# Patient Record
Sex: FEMALE | Race: WHITE | Employment: OTHER | URBAN - METROPOLITAN AREA
[De-identification: names, ages, dates, MRNs, and addresses within clinical notes are randomized per-mention and may not be internally consistent; named-entity substitution may affect disease eponyms.]

---

## 2017-06-09 ENCOUNTER — OP HISTORICAL/CONVERTED ENCOUNTER (OUTPATIENT)
Dept: OTHER | Age: 67
End: 2017-06-09

## 2017-10-19 ENCOUNTER — TELEPHONE (OUTPATIENT)
Dept: CARDIOLOGY CLINIC | Age: 67
End: 2017-10-19

## 2017-10-19 NOTE — TELEPHONE ENCOUNTER
Patient has been scheduled with Dr. Nathaniel Carrero for Tuesday November 28th at 3pm - arrival time at 2:45pm     New patient packet has been mailed

## 2017-11-27 ENCOUNTER — TELEPHONE (OUTPATIENT)
Dept: CARDIOLOGY CLINIC | Age: 67
End: 2017-11-27

## 2017-11-27 NOTE — TELEPHONE ENCOUNTER
Telephone Call RE:  Appointment reminder     Outcome:     [] Patient confirmed appointment   [] Patient rescheduled appointment for    [] Unable to reach   [x] Left message              [] Other:       Audrey Dumont

## 2017-11-28 ENCOUNTER — OFFICE VISIT (OUTPATIENT)
Dept: CARDIOLOGY CLINIC | Age: 67
End: 2017-11-28

## 2017-11-28 VITALS
DIASTOLIC BLOOD PRESSURE: 78 MMHG | TEMPERATURE: 97.9 F | WEIGHT: 197.4 LBS | BODY MASS INDEX: 34.98 KG/M2 | HEART RATE: 83 BPM | SYSTOLIC BLOOD PRESSURE: 112 MMHG | RESPIRATION RATE: 20 BRPM | OXYGEN SATURATION: 98 % | HEIGHT: 63 IN

## 2017-11-28 DIAGNOSIS — I42.0 DILATED CARDIOMYOPATHY (HCC): ICD-10-CM

## 2017-11-28 DIAGNOSIS — I10 HYPERTENSION, UNSPECIFIED TYPE: Primary | ICD-10-CM

## 2017-11-28 RX ORDER — SPIRONOLACTONE 25 MG/1
TABLET ORAL DAILY
COMMUNITY
End: 2019-02-04

## 2017-11-28 NOTE — PROGRESS NOTES
[facility] Consultation Note      DOS:   11/28/2017  NAME:  Jen Marin   MRN:   [id number]   REFERRING PROVIDER:  Harmony Ledesma MD   PRIMARY CARE PHYSICIAN: Susan Sims MD  PRIMARY CARDIOLOGIST: Harmony Ledesma MD      Chief Complaint:   Chief Complaint   Patient presents with    New Patient    Shortness of Breath    Fatigue       HPI: 69y.o. year old female with a history of DM2, HTN, hypothyroidism, NICM, VT s/p AICD  who presents for further evaluation of her chronic systolic heart failure. She notes increased GABRIEL and worsening generalized fatigue. She was recently started on entresto but is concerned about the cost. She is in the \"donut hole\" for her medication coverage and is unable to afford entresto. History:  Past Medical History:   Diagnosis Date    Acid indigestion     heartburn    Asthma     Back pain     Constipation     Diabetes mellitus     Diarrhea     Frequent episodic tension-type headache     Hemorrhoids     Hernia of unspecified site of abdominal cavity without mention of obstruction or gangrene     HTN (hypertension)     ICD (implantable cardioverter-defibrillator) in place     Muscle pain     joint pain    Skin cancer     SOB (shortness of breath)     Thyroid disorder     Wears dentures      Past Surgical History:   Procedure Laterality Date    HX CARPAL TUNNEL RELEASE      right hand    HX HEART CATHETERIZATION  2/6/16    HX HYSTERECTOMY      HX PACEMAKER Left 6/20/16    [employer] ICD    HX THYROIDECTOMY      REMOVAL GALLBLADDER       Social History     Social History    Marital status:      Spouse name: N/A    Number of children: N/A    Years of education: N/A     Occupational History    Not on file.      Social History Main Topics    Smoking status: Never Smoker    Smokeless tobacco: Never Used    Alcohol use Yes      Comment: wine occasionally    Drug use: No    Sexual activity: Not on file     Other Topics Concern    Not on file     Social History Narrative     Family History   Problem Relation Age of Onset    Diabetes Mother     Heart Disease Mother     Heart Disease Father     Cancer Sister     Diabetes Sister     Heart Disease Sister     Hypertension Sister     Diabetes Brother        Current Medications:   Current Outpatient Prescriptions   Medication Sig Dispense Refill    metFORMIN (GLUCOPHAGE) 1,000 mg tablet Take 1 Tab by mouth two (2) times a day. Resume 6/23 30 Tab 0    acetaminophen-codeine (TYLENOL #3) 300-30 mg per tablet Take 1 Tab by mouth every six (6) hours as needed for Pain. Max Daily Amount: 4 Tabs. 20 Tab 0    Insulin Needles, Disposable, (BD INSULIN PEN NEEDLE UF SHORT) 31 gauge x 5/16\" ndle USE AS DIRECTED FOUR TIMES A  Package 5    OTHER 1 Cap daily. Tumeric Curcumin 500 mg      furosemide (LASIX) 20 mg tablet Take 20 mg by mouth daily.  gabapentin (NEURONTIN) 300 mg capsule Take 300 mg by mouth two (2) times a day.  oxybutynin chloride XL (DITROPAN XL) 5 mg CR tablet Take 5 mg by mouth daily.  levothyroxine (SYNTHROID) 150 mcg tablet Take 150 mcg by mouth Daily (before breakfast).  metoprolol succinate (TOPROL-XL) 50 mg XL tablet Take 150 mg by mouth daily.  DULoxetine (CYMBALTA) 30 mg capsule Take 30 mg by mouth daily.  omeprazole (PRILOSEC) 40 mg capsule Take 40 mg by mouth daily.  isosorbide mononitrate ER (IMDUR) 30 mg tablet Take 30 mg by mouth two (2) times a day.  atorvastatin (LIPITOR) 20 mg tablet Take  by mouth daily.  nitroglycerin (NITROSTAT) 0.4 mg SL tablet by SubLINGual route every five (5) minutes as needed for Chest Pain.  HUMALOG MIX 75-25 KWIKPEN 100 unit/mL (75-25) flexpen INJECT 24 UNITS UNDER THE SKIN TWICE A DAY WITH BREAKFAST AND DINNER 15 mL 6    aspirin 81 mg tablet Take 81 mg by mouth.  potassium chloride SR (KLOR-CON 10) 10 mEq tablet Take 10 mEq by mouth daily.       fluticasone-salmeterol (ADVAIR DISKUS) 250-50 mcg/dose diskus inhaler Take 1 Puff by inhalation as needed.  glucose blood VI test strips (FREESTYLE TEST) strip 200 Each by Does Not Apply route four (4) times daily. 2 Package 6       Allergies: Allergies   Allergen Reactions    Codeine Other (comments)     Patient states she is not allergic    Novocain [Procaine] Nausea and Vomiting    Tramadol Other (comments)     Headache       ROS:    General:  positive for  - fatigue  HEENT: negative  Pulmonary: positive for - shortness of breath  Cardiac: positive for dyspnea, dyspnea on exertion  GI:  no abdominal pain, change in bowel habits, or black or bloody stools  Musculo: positive for - joint pain, joint stiffness, joint swelling and muscle pain  Neuro: no TIA or stroke symptoms  Skin:  negative  Allergies: REMINDER:DOCUMENT ALLERGIES IN ALLERGY ACTIVITY  Psych: positive for - depression    Admission Weight: Last Weight   Weight: 197 lb 6.4 oz (89.5 kg) Weight: 197 lb 6.4 oz (89.5 kg)       Physical Exam:   Vitals:    Visit Vitals    Ht 5' 3\" (1.6 m)    Wt 197 lb 6.4 oz (89.5 kg)    BMI 34.97 kg/m2       General:  alert, fatigued, cooperative  HEENT: PERRLA, EOMI and Sclera clear, anicteric  Neck:  supple, no significant adenopathy, carotids upstroke normal bilaterally, no bruits  CVP:  6 cm  ( - ) HJR  Cardiac: regular rate, regular rhythm, S1, S2 normal, S4 present and systolic murmur present  Chest: breath sounds clear and equal bilaterally  Abdomen: soft, non-tender. Bowel sounds normal. No masses, no organomegaly. Extremity: extremities normal, atraumatic, no cyanosis or edema  Neuro: Alert and oriented to person, place, and time; normal strength and tone. Normal symmetric reflexes. Normal coordination and gait  Skin:   no rashes, no ecchymoses, no petechiae    Recent Labs: No flowsheet data found.      EK/28/17  Sinus  Rhythm at 76 bpm  -Right bundle branch block with left axis -bifascicular block.    -  Nonspecific T-abnormality,  ms      Cardiac Catheterizations:   LHC in 2/16 which demonstrated nonobstructive/nonrevascularizable CAD. LV gram demonstrated an LVEF of 20%. Echocardiogram:  1/16 LVEF 25-30%  4/16 LVEF 30-35%. Impression / Plan:     1. ICM - Stage C, NYHA Class III   On metoprolol succinate, spironolactone, furosemide   Continue entresto 24/26 mg, 1 tablet twice daily - samples given, 30 day coupon given   2/16 - LVEF 20%   7/17 LVEF 20-25%   Check CMP, CBC, TFTs, SPEP, UPEP, serum free light chains, iron profile   Would benefit from CRT - refer to Dr. Timmy Stokes    2. s/p AICD   No shocks    3. CAD    Stable on ASA, BB, statin, nitrate    4. 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  Neuropathy   On neurontin and cymbalta   Recommend gluten free diet        [facility] [address] [address]  [phone]  97 hour VAD/HF Pager: [phone]

## 2017-11-28 NOTE — LETTER
11/28/2017 4:53 PM 
 
Patient:  Sharon Higgins YOB: 1950 Date of Visit: 11/28/2017 Dear Cari Zamora MD 
[address] [facility] [address] [address] VIA Facsimile: [phone] Lisa Avilez MD 
[address] [facility] [address] [facility],[address] [address] VIA Facsimile: [phone] Norris Corey MD 
[address] [facility] [address] [place] [address] VIA In Basket 
 : Thank you for referring Ms. Harish Singleton to me for evaluation/treatment. Below are the relevant portions of my assessment and plan of care. [facility] Consultation Note DOS:   11/28/2017 NAME:  Sharon Higgins MRN:   [id number] REFERRING PROVIDER:  Lisa Avilez MD  
PRIMARY CARE PHYSICIAN: Cari Zamora MD 
PRIMARY CARDIOLOGIST: Lisa Avilez MD 
 
 
Chief Complaint:  
Chief Complaint Patient presents with  New Patient  Shortness of Breath  Fatigue HPI: 69y.o. year old female with a history of DM2, HTN, hypothyroidism, NICM, VT s/p AICD  who presents for further evaluation of her chronic systolic heart failure. She notes increased GABRIEL and worsening generalized fatigue. She was recently started on entresto but is concerned about the cost. She is in the \"donut hole\" for her medication coverage and is unable to afford entresto. History: 
Past Medical History:  
Diagnosis Date  Acid indigestion   
 heartburn  Asthma  Back pain  Constipation  Diabetes mellitus  Diarrhea  Frequent episodic tension-type headache  Hemorrhoids  Hernia of unspecified site of abdominal cavity without mention of obstruction or gangrene  HTN (hypertension)  ICD (implantable cardioverter-defibrillator) in place  Muscle pain   
 joint pain  Skin cancer  SOB (shortness of breath)  Thyroid disorder  Wears dentures Past Surgical History:  
Procedure Laterality Date  HX CARPAL TUNNEL RELEASE    
 right hand  HX HEART CATHETERIZATION  2/6/16  HX HYSTERECTOMY  HX PACEMAKER Left 6/20/16 Adriana Miller 78  HX THYROIDECTOMY  REMOVAL GALLBLADDER Social History Social History  Marital status:  Spouse name: N/A  
 Number of children: N/A  
 Years of education: N/A Occupational History  Not on file. Social History Main Topics  Smoking status: Never Smoker  Smokeless tobacco: Never Used  Alcohol use Yes Comment: wine occasionally  Drug use: No  
 Sexual activity: Not on file Other Topics Concern  Not on file Social History Narrative Family History Problem Relation Age of Onset  Diabetes Mother  Heart Disease Mother  Heart Disease Father  Cancer Sister  Diabetes Sister  Heart Disease Sister  Hypertension Sister  Diabetes Brother Current Medications:  
Current Outpatient Prescriptions Medication Sig Dispense Refill  metFORMIN (GLUCOPHAGE) 1,000 mg tablet Take 1 Tab by mouth two (2) times a day. Resume 6/23 30 Tab 0  
 acetaminophen-codeine (TYLENOL #3) 300-30 mg per tablet Take 1 Tab by mouth every six (6) hours as needed for Pain. Max Daily Amount: 4 Tabs. 20 Tab 0  
 Insulin Needles, Disposable, (BD INSULIN PEN NEEDLE UF SHORT) 31 gauge x 5/16\" ndle USE AS DIRECTED FOUR TIMES A  Package 5  
 OTHER 1 Cap daily. Tumeric Curcumin 500 mg  furosemide (LASIX) 20 mg tablet Take 20 mg by mouth daily.  gabapentin (NEURONTIN) 300 mg capsule Take 300 mg by mouth two (2) times a day.  oxybutynin chloride XL (DITROPAN XL) 5 mg CR tablet Take 5 mg by mouth daily.  levothyroxine (SYNTHROID) 150 mcg tablet Take 150 mcg by mouth Daily (before breakfast).  metoprolol succinate (TOPROL-XL) 50 mg XL tablet Take 150 mg by mouth daily.  DULoxetine (CYMBALTA) 30 mg capsule Take 30 mg by mouth daily.  omeprazole (PRILOSEC) 40 mg capsule Take 40 mg by mouth daily.  isosorbide mononitrate ER (IMDUR) 30 mg tablet Take 30 mg by mouth two (2) times a day.  atorvastatin (LIPITOR) 20 mg tablet Take  by mouth daily.  nitroglycerin (NITROSTAT) 0.4 mg SL tablet by SubLINGual route every five (5) minutes as needed for Chest Pain.  HUMALOG MIX 75-25 KWIKPEN 100 unit/mL (75-25) flexpen INJECT 24 UNITS UNDER THE SKIN TWICE A DAY WITH BREAKFAST AND DINNER 15 mL 6  
 aspirin 81 mg tablet Take 81 mg by mouth.  potassium chloride SR (KLOR-CON 10) 10 mEq tablet Take 10 mEq by mouth daily.  fluticasone-salmeterol (ADVAIR DISKUS) 250-50 mcg/dose diskus inhaler Take 1 Puff by inhalation as needed.  glucose blood VI test strips (FREESTYLE TEST) strip 200 Each by Does Not Apply route four (4) times daily. 2 Package 6 Allergies: Allergies Allergen Reactions  Codeine Other (comments) Patient states she is not allergic  Novocain [Procaine] Nausea and Vomiting  Tramadol Other (comments) Headache ROS:   
General:  positive for  - fatigue HEENT: negative Pulmonary: positive for - shortness of breath Cardiac: positive for dyspnea, dyspnea on exertion GI:  no abdominal pain, change in bowel habits, or black or bloody stools Musculo: positive for - joint pain, joint stiffness, joint swelling and muscle pain Neuro: no TIA or stroke symptoms Skin:  negative Allergies: REMINDER:DOCUMENT ALLERGIES IN ALLERGY ACTIVITY Psych: positive for - depression Admission Weight: Last Weight Weight: 197 lb 6.4 oz (89.5 kg) Weight: 197 lb 6.4 oz (89.5 kg) Physical Exam:  
Vitals:   
Visit Vitals  Ht 5' 3\" (1.6 m)  Wt 197 lb 6.4 oz (89.5 kg)  BMI 34.97 kg/m2 General:  alert, fatigued, cooperative HEENT: PERRLA, EOMI and Sclera clear, anicteric Neck:  supple, no significant adenopathy, carotids upstroke normal bilaterally, no bruits CVP:  6 cm  ( - ) HJR Cardiac: regular rate, regular rhythm, S1, S2 normal, S4 present and systolic murmur present Chest: breath sounds clear and equal bilaterally Abdomen: soft, non-tender. Bowel sounds normal. No masses, no organomegaly. Extremity: extremities normal, atraumatic, no cyanosis or edema Neuro: Alert and oriented to person, place, and time; normal strength and tone. Normal symmetric reflexes. Normal coordination and gait Skin:   no rashes, no ecchymoses, no petechiae Recent Labs: No flowsheet data found. EK/28/17 Sinus  Rhythm at 76 bpm 
-Right bundle branch block with left axis -bifascicular block. -  Nonspecific T-abnormality,  ms Cardiac Catheterizations: LHC in  which demonstrated nonobstructive/nonrevascularizable CAD. LV gram demonstrated an LVEF of 20%. Echocardiogram: 
 LVEF 25-30%  LVEF 30-35%. Impression / Plan: 1. ICM - Stage C, NYHA Class III On metoprolol succinate, spironolactone, furosemide Continue entresto 24/26 mg, 1 tablet twice daily - samples given, 30 day coupon given  - LVEF 20%  LVEF 20-25% Check CMP, CBC, TFTs, SPEP, UPEP, serum free light chains, iron profile Would benefit from CRT - refer to Dr. Bertha Valentin 2. s/p AICD No shocks 3. CAD Stable on ASA, BB, statin, nitrate 4. 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 Neuropathy On neurontin and cymbalta Recommend gluten free diet Veronica Luna MD 
 
[facility] [address] [facility] [address] [id number] [phone] 
57 hour VAD/HF Pager: [phone] If you have questions, please do not hesitate to call me. I look forward to following Ms. Deng Barrientos along with you. Sincerely, Veronica Luna MD

## 2017-11-28 NOTE — PATIENT INSTRUCTIONS
1. Continue entresto 24/26 mg, 1 tablet twice daily  2. Take metoprolol succinate after eating a meal with protein  3. Blood work today  4. Refer to Dr. Yan Osuna for Upgrade to BiV pacing  5.  Follow up with the [facility] in 2-4 weeks

## 2017-11-28 NOTE — COMMUNICATION BODY
[facility] Consultation Note      DOS:   11/28/2017  NAME:  Sharon Higgins   MRN:   [id number]   REFERRING PROVIDER:  Lisa Avilez MD   PRIMARY CARE PHYSICIAN: Cari Zamora MD  PRIMARY CARDIOLOGIST: Lisa Avilez MD      Chief Complaint:   Chief Complaint   Patient presents with    New Patient    Shortness of Breath    Fatigue       HPI: 69y.o. year old female with a history of DM2, HTN, hypothyroidism, NICM, VT s/p AICD  who presents for further evaluation of her chronic systolic heart failure. She notes increased GABRIEL and worsening generalized fatigue. She was recently started on entresto but is concerned about the cost. She is in the \"donut hole\" for her medication coverage and is unable to afford entresto. History:  Past Medical History:   Diagnosis Date    Acid indigestion     heartburn    Asthma     Back pain     Constipation     Diabetes mellitus     Diarrhea     Frequent episodic tension-type headache     Hemorrhoids     Hernia of unspecified site of abdominal cavity without mention of obstruction or gangrene     HTN (hypertension)     ICD (implantable cardioverter-defibrillator) in place     Muscle pain     joint pain    Skin cancer     SOB (shortness of breath)     Thyroid disorder     Wears dentures      Past Surgical History:   Procedure Laterality Date    HX CARPAL TUNNEL RELEASE      right hand    HX HEART CATHETERIZATION  2/6/16    HX HYSTERECTOMY      HX PACEMAKER Left 6/20/16    [employer] ICD    HX THYROIDECTOMY      REMOVAL GALLBLADDER       Social History     Social History    Marital status:      Spouse name: N/A    Number of children: N/A    Years of education: N/A     Occupational History    Not on file.      Social History Main Topics    Smoking status: Never Smoker    Smokeless tobacco: Never Used    Alcohol use Yes      Comment: wine occasionally    Drug use: No    Sexual activity: Not on file     Other Topics Concern    Not on file     Social History Narrative     Family History   Problem Relation Age of Onset    Diabetes Mother     Heart Disease Mother     Heart Disease Father     Cancer Sister     Diabetes Sister     Heart Disease Sister     Hypertension Sister     Diabetes Brother        Current Medications:   Current Outpatient Prescriptions   Medication Sig Dispense Refill    metFORMIN (GLUCOPHAGE) 1,000 mg tablet Take 1 Tab by mouth two (2) times a day. Resume 6/23 30 Tab 0    acetaminophen-codeine (TYLENOL #3) 300-30 mg per tablet Take 1 Tab by mouth every six (6) hours as needed for Pain. Max Daily Amount: 4 Tabs. 20 Tab 0    Insulin Needles, Disposable, (BD INSULIN PEN NEEDLE UF SHORT) 31 gauge x 5/16\" ndle USE AS DIRECTED FOUR TIMES A  Package 5    OTHER 1 Cap daily. Tumeric Curcumin 500 mg      furosemide (LASIX) 20 mg tablet Take 20 mg by mouth daily.  gabapentin (NEURONTIN) 300 mg capsule Take 300 mg by mouth two (2) times a day.  oxybutynin chloride XL (DITROPAN XL) 5 mg CR tablet Take 5 mg by mouth daily.  levothyroxine (SYNTHROID) 150 mcg tablet Take 150 mcg by mouth Daily (before breakfast).  metoprolol succinate (TOPROL-XL) 50 mg XL tablet Take 150 mg by mouth daily.  DULoxetine (CYMBALTA) 30 mg capsule Take 30 mg by mouth daily.  omeprazole (PRILOSEC) 40 mg capsule Take 40 mg by mouth daily.  isosorbide mononitrate ER (IMDUR) 30 mg tablet Take 30 mg by mouth two (2) times a day.  atorvastatin (LIPITOR) 20 mg tablet Take  by mouth daily.  nitroglycerin (NITROSTAT) 0.4 mg SL tablet by SubLINGual route every five (5) minutes as needed for Chest Pain.  HUMALOG MIX 75-25 KWIKPEN 100 unit/mL (75-25) flexpen INJECT 24 UNITS UNDER THE SKIN TWICE A DAY WITH BREAKFAST AND DINNER 15 mL 6    aspirin 81 mg tablet Take 81 mg by mouth.  potassium chloride SR (KLOR-CON 10) 10 mEq tablet Take 10 mEq by mouth daily.       fluticasone-salmeterol (ADVAIR DISKUS) 250-50 mcg/dose diskus inhaler Take 1 Puff by inhalation as needed.  glucose blood VI test strips (FREESTYLE TEST) strip 200 Each by Does Not Apply route four (4) times daily. 2 Package 6       Allergies: Allergies   Allergen Reactions    Codeine Other (comments)     Patient states she is not allergic    Novocain [Procaine] Nausea and Vomiting    Tramadol Other (comments)     Headache       ROS:    General:  positive for  - fatigue  HEENT: negative  Pulmonary: positive for - shortness of breath  Cardiac: positive for dyspnea, dyspnea on exertion  GI:  no abdominal pain, change in bowel habits, or black or bloody stools  Musculo: positive for - joint pain, joint stiffness, joint swelling and muscle pain  Neuro: no TIA or stroke symptoms  Skin:  negative  Allergies: REMINDER:DOCUMENT ALLERGIES IN ALLERGY ACTIVITY  Psych: positive for - depression    Admission Weight: Last Weight   Weight: 197 lb 6.4 oz (89.5 kg) Weight: 197 lb 6.4 oz (89.5 kg)       Physical Exam:   Vitals:    Visit Vitals    Ht 5' 3\" (1.6 m)    Wt 197 lb 6.4 oz (89.5 kg)    BMI 34.97 kg/m2       General:  alert, fatigued, cooperative  HEENT: PERRLA, EOMI and Sclera clear, anicteric  Neck:  supple, no significant adenopathy, carotids upstroke normal bilaterally, no bruits  CVP:  6 cm  ( - ) HJR  Cardiac: regular rate, regular rhythm, S1, S2 normal, S4 present and systolic murmur present  Chest: breath sounds clear and equal bilaterally  Abdomen: soft, non-tender. Bowel sounds normal. No masses, no organomegaly. Extremity: extremities normal, atraumatic, no cyanosis or edema  Neuro: Alert and oriented to person, place, and time; normal strength and tone. Normal symmetric reflexes. Normal coordination and gait  Skin:   no rashes, no ecchymoses, no petechiae    Recent Labs: No flowsheet data found.      EK/28/17  Sinus  Rhythm at 76 bpm  -Right bundle branch block with left axis -bifascicular block.    -  Nonspecific T-abnormality,  ms      Cardiac Catheterizations:   LHC in 2/16 which demonstrated nonobstructive/nonrevascularizable CAD. LV gram demonstrated an LVEF of 20%. Echocardiogram:  1/16 LVEF 25-30%  4/16 LVEF 30-35%. Impression / Plan:     1. ICM - Stage C, NYHA Class III   On metoprolol succinate, spironolactone, furosemide   Continue entresto 24/26 mg, 1 tablet twice daily - samples given, 30 day coupon given   2/16 - LVEF 20%   7/17 LVEF 20-25%   Check CMP, CBC, TFTs, SPEP, UPEP, serum free light chains, iron profile   Would benefit from CRT - refer to Dr. Miryam Escalante    2. s/p AICD   No shocks    3. CAD    Stable on ASA, BB, statin, nitrate    4. 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  Neuropathy   On neurontin and cymbalta   Recommend gluten free diet        MD Alex Pandey [address] [place] [address]  [phone]  57 hour VAD/HF Pager: [phone]

## 2017-11-29 ENCOUNTER — DOCUMENTATION ONLY (OUTPATIENT)
Dept: CARDIOLOGY CLINIC | Age: 67
End: 2017-11-29

## 2017-11-29 ENCOUNTER — TELEPHONE (OUTPATIENT)
Dept: CARDIOLOGY CLINIC | Age: 67
End: 2017-11-29

## 2017-11-29 NOTE — TELEPHONE ENCOUNTER
Called [facility] pharmacy-no prior authorization needed but co-pay is $173.74. Aimee Warner assisting patient to apply for patient assistance.

## 2017-11-29 NOTE — PROGRESS NOTES
met with the patient during her office visit to the [facility] yesterday.  discussed the entresto patient assistance program with the patient and her  (patient currently in [facility]). Presented her with the patient section of the application to complete and submit back to  along with her income verifications. She reports she will gather these forms and return them to .  will also follow up with the patient on insulin patient assistance once the patient's income verifications are received.     Mark Zavala, MSW, LCSW    Clinical    Alex Go 1976   [employer] ® ACP Facilitator

## 2017-11-29 NOTE — TELEPHONE ENCOUNTER
I spoke with patient regarding her appointment to see Dr. Kathe Bass. She is scheduled to see him 12-27-17 at 10:40AM.  She understands.

## 2017-12-01 LAB
ALBUMIN SERPL ELPH-MCNC: 3.3 G/DL (ref 2.9–4.4)
ALBUMIN SERPL-MCNC: 3.8 G/DL (ref 3.6–4.8)
ALBUMIN/GLOB SERPL: 0.9 {RATIO} (ref 0.7–1.7)
ALBUMIN/GLOB SERPL: 1.2 {RATIO} (ref 1.2–2.2)
ALP SERPL-CCNC: 97 IU/L (ref 39–117)
ALPHA1 GLOB SERPL ELPH-MCNC: 0.3 G/DL (ref 0–0.4)
ALPHA2 GLOB SERPL ELPH-MCNC: 0.8 G/DL (ref 0.4–1)
ALT SERPL-CCNC: 29 IU/L (ref 0–32)
AST SERPL-CCNC: 31 IU/L (ref 0–40)
B-GLOBULIN SERPL ELPH-MCNC: 1.2 G/DL (ref 0.7–1.3)
BILIRUB SERPL-MCNC: 0.5 MG/DL (ref 0–1.2)
BUN SERPL-MCNC: 10 MG/DL (ref 8–27)
BUN/CREAT SERPL: 16 (ref 12–28)
CALCIUM SERPL-MCNC: 8.9 MG/DL (ref 8.7–10.3)
CHLORIDE SERPL-SCNC: 99 MMOL/L (ref 96–106)
CO2 SERPL-SCNC: 23 MMOL/L (ref 18–29)
CREAT SERPL-MCNC: 0.61 MG/DL (ref 0.57–1)
ERYTHROCYTE [DISTWIDTH] IN BLOOD BY AUTOMATED COUNT: 14.1 % (ref 12.3–15.4)
GAMMA GLOB SERPL ELPH-MCNC: 1.4 G/DL (ref 0.4–1.8)
GFR SERPLBLD CREATININE-BSD FMLA CKD-EPI: 107 ML/MIN/1.73
GFR SERPLBLD CREATININE-BSD FMLA CKD-EPI: 93 ML/MIN/1.73
GLOBULIN SER CALC-MCNC: 3.1 G/DL (ref 1.5–4.5)
GLOBULIN SER CALC-MCNC: 3.6 G/DL (ref 2.2–3.9)
GLUCOSE SERPL-MCNC: 310 MG/DL (ref 65–99)
HCT VFR BLD AUTO: 39.4 % (ref 34–46.6)
HGB BLD-MCNC: 12.8 G/DL (ref 11.1–15.9)
IRON SATN MFR SERPL: 20 % (ref 15–55)
IRON SERPL-MCNC: 66 UG/DL (ref 27–139)
KAPPA LC FREE SER-MCNC: 45.8 MG/L (ref 3.3–19.4)
KAPPA LC FREE/LAMBDA FREE SER: 0.86 {RATIO} (ref 0.26–1.65)
LAMBDA LC FREE SERPL-MCNC: 53.1 MG/L (ref 5.7–26.3)
M PROTEIN SERPL ELPH-MCNC: NORMAL G/DL
MCH RBC QN AUTO: 29.6 PG (ref 26.6–33)
MCHC RBC AUTO-ENTMCNC: 32.5 G/DL (ref 31.5–35.7)
MCV RBC AUTO: 91 FL (ref 79–97)
NOTE, 149533: NORMAL
NT-PROBNP SERPL-MCNC: 376 PG/ML (ref 0–301)
PLATELET # BLD AUTO: 116 X10E3/UL (ref 150–379)
PLEASE NOTE, 011150: NORMAL
POTASSIUM SERPL-SCNC: 4 MMOL/L (ref 3.5–5.2)
PROT SERPL-MCNC: 6.9 G/DL (ref 6–8.5)
PROT UR-MCNC: NORMAL MG/DL
RBC # BLD AUTO: 4.33 X10E6/UL (ref 3.77–5.28)
SODIUM SERPL-SCNC: 139 MMOL/L (ref 134–144)
T3FREE SERPL-MCNC: 2.4 PG/ML (ref 2–4.4)
T4 SERPL-MCNC: 4.7 UG/DL (ref 4.5–12)
TIBC SERPL-MCNC: 323 UG/DL (ref 250–450)
TSH SERPL DL<=0.005 MIU/L-ACNC: 5.72 UIU/ML (ref 0.45–4.5)
UIBC SERPL-MCNC: 257 UG/DL (ref 118–369)
WBC # BLD AUTO: 6.7 X10E3/UL (ref 3.4–10.8)

## 2017-12-04 ENCOUNTER — TELEPHONE (OUTPATIENT)
Dept: CARDIOLOGY CLINIC | Age: 67
End: 2017-12-04

## 2017-12-04 NOTE — TELEPHONE ENCOUNTER
I called patient and reviewed lab results with her, per Verónica Sanchez, ok, needs to follow up with PCP for thyroid and glucose. I faxed lab results to her PCP and she states she will call for appointment today. She has no further questions.

## 2017-12-14 LAB
ALBUMIN 24H MFR UR ELPH: 0 %
ALPHA1 GLOB 24H MFR UR ELPH: 0 %
ALPHA2 GLOB 24H MFR UR ELPH: 0 %
B-GLOBULIN MFR UR ELPH: 0 %
GAMMA GLOB 24H MFR UR ELPH: 0 %
INTERPRETATION UR IFE-IMP: NORMAL
M PROTEIN 24H MFR UR ELPH: NORMAL %
NOTE, 149533: NORMAL
PROT 24H UR-MRATE: <40 MG/24 HR (ref 30–150)
PROT UR-MCNC: <4 MG/DL

## 2017-12-22 DIAGNOSIS — R06.02 SOB (SHORTNESS OF BREATH): Primary | ICD-10-CM

## 2017-12-22 DIAGNOSIS — I42.0 DILATED CARDIOMYOPATHY (HCC): ICD-10-CM

## 2017-12-26 ENCOUNTER — TELEPHONE (OUTPATIENT)
Dept: CARDIOLOGY CLINIC | Age: 67
End: 2017-12-26

## 2017-12-26 NOTE — TELEPHONE ENCOUNTER
Call from patient. [facility] a question [facility] regarding  her patient assistance forms. I have sent this information to Sara Campbell.

## 2017-12-27 ENCOUNTER — TELEPHONE (OUTPATIENT)
Dept: CARDIOLOGY CLINIC | Age: 67
End: 2017-12-27

## 2017-12-27 ENCOUNTER — CLINICAL SUPPORT (OUTPATIENT)
Dept: CARDIOLOGY CLINIC | Age: 67
End: 2017-12-27

## 2017-12-27 ENCOUNTER — OFFICE VISIT (OUTPATIENT)
Dept: CARDIOLOGY CLINIC | Age: 67
End: 2017-12-27

## 2017-12-27 VITALS
OXYGEN SATURATION: 98 % | DIASTOLIC BLOOD PRESSURE: 86 MMHG | HEIGHT: 63 IN | BODY MASS INDEX: 34.45 KG/M2 | SYSTOLIC BLOOD PRESSURE: 140 MMHG | WEIGHT: 194.4 LBS | HEART RATE: 67 BPM | RESPIRATION RATE: 18 BRPM

## 2017-12-27 DIAGNOSIS — I42.0 DILATED CARDIOMYOPATHY (HCC): Primary | ICD-10-CM

## 2017-12-27 DIAGNOSIS — Z01.818 PREOPERATIVE TESTING: ICD-10-CM

## 2017-12-27 DIAGNOSIS — Z95.810 PRESENCE OF AUTOMATIC CARDIOVERTER/DEFIBRILLATOR (AICD): Primary | ICD-10-CM

## 2017-12-27 NOTE — TELEPHONE ENCOUNTER
I left a message on patient's voice mail reminding her to have blood drawn tomorrow. I also asked that she give us a [facility] so that her order can be faxed.

## 2017-12-27 NOTE — PROGRESS NOTES
Chief Complaint   Patient presents with    Pacemaker Check    Other     Pt c/o of acid reflux; chest tightnest; Onset 24 hrs. 1. Have you been to the ER, urgent care clinic since your last visit? Hospitalized since your last visit? No    2. Have you seen or consulted any other health care providers outside of the [address] [place] [address] since your last visit? Include any pap smears or colon screening.  No

## 2017-12-27 NOTE — PROGRESS NOTES
HISTORY OF PRESENTING ILLNESS      Lucía Sarmiento is a 69 y.o. female with NICM, NYHA class II-III, LVEF 30-35%, ICD, CAD, DM here to consider upgrade of her ICD to biventricular system.         ACTIVE PROBLEM LIST     Patient Active Problem List    Diagnosis Date Noted    Cardiomyopathy [facility]) 05/23/2016    Skin cancer     Acid indigestion     Asthma     Back pain     Wears dentures     Constipation     Diabetes mellitus     Diarrhea     Frequent episodic tension-type headache     Hemorrhoids     Hernia of unspecified site of abdominal cavity without mention of obstruction or gangrene     HTN (hypertension)     Muscle pain     SOB (shortness of breath)     Thyroid disorder            PAST MEDICAL HISTORY     Past Medical History:   Diagnosis Date    Acid indigestion     heartburn    Asthma     Back pain     Constipation     Diabetes mellitus     Diarrhea     Frequent episodic tension-type headache     Hemorrhoids     Hernia of unspecified site of abdominal cavity without mention of obstruction or gangrene     HTN (hypertension)     ICD (implantable cardioverter-defibrillator) in place     Muscle pain     joint pain    Skin cancer     SOB (shortness of breath)     Thyroid disorder     Wears dentures            PAST SURGICAL HISTORY     Past Surgical History:   Procedure Laterality Date    HX CARPAL TUNNEL RELEASE      right hand    HX HEART CATHETERIZATION  2/6/16    HX HYSTERECTOMY      HX PACEMAKER Left 6/20/16    [employer] ICD    HX THYROIDECTOMY      REMOVAL GALLBLADDER            ALLERGIES     Allergies   Allergen Reactions    Codeine Other (comments)     Patient states she is not allergic    Novocain [Procaine] Nausea and Vomiting    Tramadol Other (comments)     Headache          FAMILY HISTORY     Family History   Problem Relation Age of Onset    Diabetes Mother     Heart Disease Mother     Heart Disease Father     Cancer Sister     Diabetes Sister     Heart Disease Sister     Hypertension Sister     Diabetes Brother     negative for cardiac disease       SOCIAL HISTORY     Social History     Social History    Marital status:      Spouse name: N/A    Number of children: N/A    Years of education: N/A     Social History Main Topics    Smoking status: Never Smoker    Smokeless tobacco: Never Used    Alcohol use Yes      Comment: wine occasionally    Drug use: No    Sexual activity: Not Asked     Other Topics Concern    None     Social History Narrative         MEDICATIONS     Current Outpatient Prescriptions   Medication Sig    spironolactone (ALDACTONE) 25 mg tablet Take  by mouth daily.  sacubitril-valsartan (ENTRESTO) 24 mg/26 mg tablet Take 1 Tab by mouth two (2) times a day.  sacubitril-valsartan (ENTRESTO) 24 mg/26 mg tablet Take 1 Tab by mouth two (2) times a day.  metFORMIN (GLUCOPHAGE) 1,000 mg tablet Take 1 Tab by mouth two (2) times a day. Resume 6/23    Insulin Needles, Disposable, (BD INSULIN PEN NEEDLE UF SHORT) 31 gauge x 5/16\" ndle USE AS DIRECTED FOUR TIMES A DAY    OTHER 1 Cap daily. Tumeric Curcumin 500 mg    gabapentin (NEURONTIN) 300 mg capsule Take 300 mg by mouth two (2) times a day.  oxybutynin chloride XL (DITROPAN XL) 5 mg CR tablet Take 5 mg by mouth daily.  levothyroxine (SYNTHROID) 150 mcg tablet Take 175 mcg by mouth Daily (before breakfast).  metoprolol succinate (TOPROL-XL) 50 mg XL tablet Take 150 mg by mouth daily.  DULoxetine (CYMBALTA) 30 mg capsule Take 30 mg by mouth daily.  omeprazole (PRILOSEC) 40 mg capsule Take 40 mg by mouth daily.  atorvastatin (LIPITOR) 20 mg tablet Take  by mouth daily.  HUMALOG MIX 75-25 KWIKPEN 100 unit/mL (75-25) flexpen INJECT 24 UNITS UNDER THE SKIN TWICE A DAY WITH BREAKFAST AND DINNER    aspirin 81 mg tablet Take 81 mg by mouth.  potassium chloride SR (KLOR-CON 10) 10 mEq tablet Take 10 mEq by mouth daily.     glucose blood VI test strips (FREESTYLE TEST) strip 200 Each by Does Not Apply route four (4) times daily.  acetaminophen-codeine (TYLENOL #3) 300-30 mg per tablet Take 1 Tab by mouth every six (6) hours as needed for Pain. Max Daily Amount: 4 Tabs.  furosemide (LASIX) 20 mg tablet Take 20 mg by mouth daily.  isosorbide mononitrate ER (IMDUR) 30 mg tablet Take 30 mg by mouth two (2) times a day.  nitroglycerin (NITROSTAT) 0.4 mg SL tablet by SubLINGual route every five (5) minutes as needed for Chest Pain.  fluticasone-salmeterol (ADVAIR DISKUS) 250-50 mcg/dose diskus inhaler Take 1 Puff by inhalation as needed. No current facility-administered medications for this visit. I have reviewed the nurses notes, vitals, problem list, allergy list, medical history, family, social history and medications. REVIEW OF SYMPTOMS      General: Pt denies excessive weight gain or loss. Pt is able to conduct ADL's  HEENT: Denies blurred vision, headaches, hearing loss, epistaxis and difficulty swallowing. Respiratory: Denies cough, congestion, shortness of breath, GABRIEL, wheezing or stridor. Cardiovascular: Denies precordial pain, palpitations, edema or PND  Gastrointestinal: Denies poor appetite, indigestion, abdominal pain or blood in stool  Genitourinary: Denies hematuria, dysuria, increased urinary frequency  Musculoskeletal: Denies joint pain or swelling from muscles or joints  Neurologic: Denies tremor, paresthesias, headache, or sensory motor disturbance  Psychiatric: Denies confusion, insomnia, depression  Integumentray: Denies rash, itching or ulcers. Hematologic: Denies easy bruising, bleeding     PHYSICAL EXAMINATION      Vitals:    12/27/17 1044   BP: 140/86   Pulse: 67   Resp: 18   SpO2: 98%   Weight: 194 lb 6.4 oz (88.2 kg)   Height: 5' 3\" (1.6 m)     General: Well developed, in no acute distress.   HEENT: No jaundice, oral mucosa moist, no oral ulcers  Neck: Supple, no stiffness, no lymphadenopathy, supple  Heart:  Normal S1/S2 negative S3 or S4. Regular, no murmur, gallop or rub, no jugular venous distention  Respiratory: Clear bilaterally x 4, no wheezing or rales  Abdomen:   Soft, non-tender, bowel sounds are active.   Extremities:  No edema, normal cap refill, no cyanosis. Musculoskeletal: No clubbing, no deformities  Neuro: A&Ox3, speech clear, gait stable, cooperative, no focal neurologic deficits  Skin: Skin color is normal. No rashes or lesions. Non diaphoretic, moist.  Vascular: 2+ pulses symmetric in all extremities       DIAGNOSTIC DATA      EKG:        LABORATORY DATA      Lab Results   Component Value Date/Time    WBC 6.7 11/29/2017 12:00 AM    HGB 12.8 11/29/2017 12:00 AM    HCT 39.4 11/29/2017 12:00 AM    PLATELET 287 85/74/3553 12:00 AM    MCV 91 11/29/2017 12:00 AM      Lab Results   Component Value Date/Time    Sodium 139 11/29/2017 12:00 AM    Potassium 4.0 11/29/2017 12:00 AM    Chloride 99 11/29/2017 12:00 AM    CO2 23 11/29/2017 12:00 AM    Glucose 310 11/29/2017 12:00 AM    BUN 10 11/29/2017 12:00 AM    Creatinine 0.61 11/29/2017 12:00 AM    BUN/Creatinine ratio 16 11/29/2017 12:00 AM    GFR est  11/29/2017 12:00 AM    GFR est non-AA 93 11/29/2017 12:00 AM    Calcium 8.9 11/29/2017 12:00 AM    Bilirubin, total 0.5 11/29/2017 12:00 AM    AST (SGOT) 31 11/29/2017 12:00 AM    Alk. phosphatase 97 11/29/2017 12:00 AM    Protein, total 6.9 11/29/2017 12:00 AM    Albumin 3.8 11/29/2017 12:00 AM    A-G Ratio 1.2 11/29/2017 12:00 AM    ALT (SGPT) 29 11/29/2017 12:00 AM           ASSESSMENT      1. Cardiomyopathy              A. NICM              B. NYHA class II              C. LVEF 30-35%  2. CAD, nonobstructive  3. Asthma  4. Diabetes  5. Hypothyroidism  6.  IVCD/LBBB       PLAN     Plan for upgrade of single chamber ICD to a biventricular ICD system with [facility] [employer]/ MAC anesthesia       FOLLOW-UP     1 month post procedure      Thank you,  Dotty Simmons MD and  Beau/Susan for involving me in the care of this extraordinarily pleasant female. Please do not hesitate to contact me for further questions/concerns.          Jeanne Ching MD  Cardiac Electrophysiology / Cardiology    [address] [facility] [address]  [address] [facility], [facility], [address]  [phone] / [phone] Fax   [phone] / [phone] Fax

## 2017-12-28 ENCOUNTER — DOCUMENTATION ONLY (OUTPATIENT)
Dept: CARDIOLOGY CLINIC | Age: 67
End: 2017-12-28

## 2017-12-28 NOTE — PROGRESS NOTES
Returned the patient's phone call with regard to the [employer] patient assistance application. The patient inquired if she needed to fill out the patient section to which  confirmed that she does. She reports she will mail the application back to  on this date.  will await the appliation and submit once received.      Nupur Hitchcock MSW, LCSW    Clinical    [phone]   [employer] ® ACP Facilitator

## 2018-01-04 ENCOUNTER — TELEPHONE (OUTPATIENT)
Dept: CARDIOLOGY CLINIC | Age: 68
End: 2018-01-04

## 2018-01-04 LAB
BUN SERPL-MCNC: 20 MG/DL (ref 8–27)
BUN/CREAT SERPL: 24 (ref 12–28)
CALCIUM SERPL-MCNC: 9.1 MG/DL (ref 8.7–10.3)
CHLORIDE SERPL-SCNC: 93 MMOL/L (ref 96–106)
CO2 SERPL-SCNC: 27 MMOL/L (ref 18–29)
CREAT SERPL-MCNC: 0.84 MG/DL (ref 0.57–1)
GLUCOSE SERPL-MCNC: 418 MG/DL (ref 65–99)
NT-PROBNP SERPL-MCNC: 216 PG/ML (ref 0–301)
POTASSIUM SERPL-SCNC: 4.2 MMOL/L (ref 3.5–5.2)
SODIUM SERPL-SCNC: 136 MMOL/L (ref 134–144)

## 2018-01-04 NOTE — TELEPHONE ENCOUNTER
I called patient and reviewed her lab results with her. She states her glucose was high due to not taking insulin for 2 months because she was in [facility] and couldn't afford it. Curtis Olsen has applied for patient assistance for her for [facility] and for Insulin. She saw Dr. Layne Kam yesterday and he wrote new script for insulin and gave her samples of Entresto. She is scheduled for BiV upgrade with Dr. Liza Portillo on January 26, will make follow up appointment in [facility] before that. Transferred to [facility] for follow up appointment.

## 2018-01-04 NOTE — TELEPHONE ENCOUNTER
Glucose elevated- needs to see PCP or whoever manages her diabetes   proBNP stable   she needs follow up appointment thanks

## 2018-01-04 NOTE — TELEPHONE ENCOUNTER
Spoke with patient to schedule an appointment with our office.     Patient is scheduled on Tuesday February 20th at 1pm with Arthur Hensley NP

## 2018-01-08 ENCOUNTER — DOCUMENTATION ONLY (OUTPATIENT)
Dept: CARDIOLOGY CLINIC | Age: 68
End: 2018-01-08

## 2018-01-08 NOTE — PROGRESS NOTES
Received the patient's financial assistance paperwork in the mail on this date. Faxed her application for entresto patient assistance to [employer] on this date.     Ascencion Blanca, MSW, LCSW    Clinical    [phone]   [employer] ® ACP Facilitator

## 2018-01-18 RX ORDER — SODIUM CHLORIDE 0.9 % (FLUSH) 0.9 %
5-10 SYRINGE (ML) INJECTION EVERY 8 HOURS
Status: CANCELLED | OUTPATIENT
Start: 2018-01-18

## 2018-01-18 RX ORDER — SODIUM CHLORIDE 0.9 % (FLUSH) 0.9 %
5-10 SYRINGE (ML) INJECTION AS NEEDED
Status: CANCELLED | OUTPATIENT
Start: 2018-01-18

## 2018-01-20 LAB
BUN SERPL-MCNC: 24 MG/DL (ref 8–27)
BUN/CREAT SERPL: 33 (ref 12–28)
CALCIUM SERPL-MCNC: 9.3 MG/DL (ref 8.7–10.3)
CHLORIDE SERPL-SCNC: 96 MMOL/L (ref 96–106)
CO2 SERPL-SCNC: 27 MMOL/L (ref 18–29)
CREAT SERPL-MCNC: 0.73 MG/DL (ref 0.57–1)
ERYTHROCYTE [DISTWIDTH] IN BLOOD BY AUTOMATED COUNT: 13.7 % (ref 12.3–15.4)
GLUCOSE SERPL-MCNC: 229 MG/DL (ref 65–99)
HCT VFR BLD AUTO: 41.9 % (ref 34–46.6)
HGB BLD-MCNC: 13.7 G/DL (ref 11.1–15.9)
INR PPP: 1.1 (ref 0.8–1.2)
MCH RBC QN AUTO: 30.9 PG (ref 26.6–33)
MCHC RBC AUTO-ENTMCNC: 32.7 G/DL (ref 31.5–35.7)
MCV RBC AUTO: 95 FL (ref 79–97)
PLATELET # BLD AUTO: 144 X10E3/UL (ref 150–379)
POTASSIUM SERPL-SCNC: 4.2 MMOL/L (ref 3.5–5.2)
PROTHROMBIN TIME: 11.8 SEC (ref 9.1–12)
RBC # BLD AUTO: 4.43 X10E6/UL (ref 3.77–5.28)
SODIUM SERPL-SCNC: 140 MMOL/L (ref 134–144)
WBC # BLD AUTO: 8.2 X10E3/UL (ref 3.4–10.8)

## 2018-01-26 ENCOUNTER — APPOINTMENT (OUTPATIENT)
Dept: GENERAL RADIOLOGY | Age: 68
End: 2018-01-26
Attending: INTERNAL MEDICINE
Payer: MEDICARE

## 2018-01-26 ENCOUNTER — ANESTHESIA EVENT (OUTPATIENT)
Dept: SURGERY | Age: 68
End: 2018-01-26
Payer: MEDICARE

## 2018-01-26 ENCOUNTER — ANESTHESIA (OUTPATIENT)
Dept: SURGERY | Age: 68
End: 2018-01-26
Payer: MEDICARE

## 2018-01-26 ENCOUNTER — HOSPITAL ENCOUNTER (OUTPATIENT)
Dept: NON INVASIVE DIAGNOSTICS | Age: 68
Setting detail: OBSERVATION
Discharge: HOME | End: 2018-01-27
Attending: INTERNAL MEDICINE | Admitting: INTERNAL MEDICINE
Payer: MEDICARE

## 2018-01-26 DIAGNOSIS — Z95.810 BIVENTRICULAR ICD (IMPLANTABLE CARDIOVERTER-DEFIBRILLATOR) IN PLACE: Primary | ICD-10-CM

## 2018-01-26 LAB
ATRIAL RATE: 71 BPM
CALCULATED P AXIS, ECG09: 27 DEGREES
CALCULATED R AXIS, ECG10: -41 DEGREES
CALCULATED T AXIS, ECG11: 99 DEGREES
DIAGNOSIS, 93000: NORMAL
GLUCOSE BLD STRIP.AUTO-MCNC: 211 MG/DL (ref 65–100)
GLUCOSE BLD STRIP.AUTO-MCNC: 226 MG/DL (ref 65–100)
GLUCOSE BLD STRIP.AUTO-MCNC: 252 MG/DL (ref 65–100)
P-R INTERVAL, ECG05: 156 MS
Q-T INTERVAL, ECG07: 450 MS
QRS DURATION, ECG06: 138 MS
QTC CALCULATION (BEZET), ECG08: 489 MS
SERVICE CMNT-IMP: ABNORMAL
VENTRICULAR RATE, ECG03: 71 BPM

## 2018-01-26 PROCEDURE — 74011250636 HC RX REV CODE- 250/636: Performed by: INTERNAL MEDICINE

## 2018-01-26 PROCEDURE — 74011250636 HC RX REV CODE- 250/636

## 2018-01-26 PROCEDURE — 74011636637 HC RX REV CODE- 636/637: Performed by: INTERNAL MEDICINE

## 2018-01-26 PROCEDURE — C1892 INTRO/SHEATH,FIXED,PEEL-AWAY: HCPCS

## 2018-01-26 PROCEDURE — 77030005401 HC CATH RAD ARRO -A

## 2018-01-26 PROCEDURE — 93005 ELECTROCARDIOGRAM TRACING: CPT

## 2018-01-26 PROCEDURE — 33264 RMVL & RPLCMT DFB GEN MLT LD: CPT

## 2018-01-26 PROCEDURE — 77030018673

## 2018-01-26 PROCEDURE — 33225 L VENTRIC PACING LEAD ADD-ON: CPT

## 2018-01-26 PROCEDURE — 71046 X-RAY EXAM CHEST 2 VIEWS: CPT

## 2018-01-26 PROCEDURE — 77030018547 HC SUT ETHBND1 J&J -B

## 2018-01-26 PROCEDURE — 77030031139 HC SUT VCRL2 J&J -A

## 2018-01-26 PROCEDURE — 99218 HC RM OBSERVATION: CPT

## 2018-01-26 PROCEDURE — 77030028698 HC BLD TISS PLSM MEDT -D

## 2018-01-26 PROCEDURE — 77030012935 HC DRSG AQUACEL BMS -B

## 2018-01-26 PROCEDURE — 77030020268 HC MISC GENERAL SUPPLY

## 2018-01-26 PROCEDURE — 36620 INSERTION CATHETER ARTERY: CPT

## 2018-01-26 PROCEDURE — 82962 GLUCOSE BLOOD TEST: CPT

## 2018-01-26 PROCEDURE — 77030018729 HC ELECTRD DEFIB PAD CARD -B

## 2018-01-26 PROCEDURE — 74011000250 HC RX REV CODE- 250: Performed by: INTERNAL MEDICINE

## 2018-01-26 PROCEDURE — 74011250637 HC RX REV CODE- 250/637: Performed by: INTERNAL MEDICINE

## 2018-01-26 PROCEDURE — C1751 CATH, INF, PER/CENT/MIDLINE: HCPCS

## 2018-01-26 PROCEDURE — 74011000250 HC RX REV CODE- 250

## 2018-01-26 PROCEDURE — A4565 SLINGS: HCPCS

## 2018-01-26 PROCEDURE — C1887 CATHETER, GUIDING: HCPCS | Performed by: INTERNAL MEDICINE

## 2018-01-26 PROCEDURE — C1900 LEAD, CORONARY VENOUS: HCPCS | Performed by: INTERNAL MEDICINE

## 2018-01-26 PROCEDURE — C1882 AICD, OTHER THAN SING/DUAL: HCPCS | Performed by: INTERNAL MEDICINE

## 2018-01-26 PROCEDURE — C1898 LEAD, PMKR, OTHER THAN TRANS: HCPCS | Performed by: INTERNAL MEDICINE

## 2018-01-26 PROCEDURE — 77030011640 HC PAD GRND REM COVD -A

## 2018-01-26 PROCEDURE — 77030029065 HC DRSG HEMO QCLOT ZMED -B

## 2018-01-26 PROCEDURE — 94640 AIRWAY INHALATION TREATMENT: CPT

## 2018-01-26 PROCEDURE — 77030013797 HC KT TRNSDUC PRSSR EDWD -A

## 2018-01-26 PROCEDURE — 76060000034 HC ANESTHESIA 1.5 TO 2 HR

## 2018-01-26 PROCEDURE — C1769 GUIDE WIRE: HCPCS

## 2018-01-26 PROCEDURE — 77030002933 HC SUT MCRYL J&J -A

## 2018-01-26 PROCEDURE — 74011636320 HC RX REV CODE- 636/320: Performed by: INTERNAL MEDICINE

## 2018-01-26 RX ORDER — SODIUM CHLORIDE 0.9 % (FLUSH) 0.9 %
5-10 SYRINGE (ML) INJECTION AS NEEDED
Status: DISCONTINUED | OUTPATIENT
Start: 2018-01-26 | End: 2018-01-26 | Stop reason: HOSPADM

## 2018-01-26 RX ORDER — DULOXETIN HYDROCHLORIDE 30 MG/1
30 CAPSULE, DELAYED RELEASE ORAL DAILY
Status: DISCONTINUED | OUTPATIENT
Start: 2018-01-27 | End: 2018-01-27 | Stop reason: HOSPADM

## 2018-01-26 RX ORDER — PANTOPRAZOLE SODIUM 40 MG/1
40 TABLET, DELAYED RELEASE ORAL
Status: DISCONTINUED | OUTPATIENT
Start: 2018-01-27 | End: 2018-01-27 | Stop reason: HOSPADM

## 2018-01-26 RX ORDER — FUROSEMIDE 20 MG/1
20 TABLET ORAL DAILY
Status: DISCONTINUED | OUTPATIENT
Start: 2018-01-27 | End: 2018-01-27 | Stop reason: HOSPADM

## 2018-01-26 RX ORDER — ARFORMOTEROL TARTRATE 15 UG/2ML
15 SOLUTION RESPIRATORY (INHALATION)
Status: DISCONTINUED | OUTPATIENT
Start: 2018-01-26 | End: 2018-01-27 | Stop reason: HOSPADM

## 2018-01-26 RX ORDER — FENTANYL CITRATE 50 UG/ML
INJECTION, SOLUTION INTRAMUSCULAR; INTRAVENOUS
Status: COMPLETED
Start: 2018-01-26 | End: 2018-01-26

## 2018-01-26 RX ORDER — INSULIN GLARGINE 100 [IU]/ML
28 INJECTION, SOLUTION SUBCUTANEOUS
Status: DISCONTINUED | OUTPATIENT
Start: 2018-01-26 | End: 2018-01-27 | Stop reason: HOSPADM

## 2018-01-26 RX ORDER — POLYETHYLENE GLYCOL 3350 17 G/17G
17 POWDER, FOR SOLUTION ORAL AS NEEDED
COMMUNITY
End: 2022-01-01

## 2018-01-26 RX ORDER — EPHEDRINE SULFATE 50 MG/ML
INJECTION, SOLUTION INTRAVENOUS AS NEEDED
Status: DISCONTINUED | OUTPATIENT
Start: 2018-01-26 | End: 2018-01-26 | Stop reason: HOSPADM

## 2018-01-26 RX ORDER — OXYBUTYNIN CHLORIDE 5 MG/1
5 TABLET, EXTENDED RELEASE ORAL DAILY
Status: DISCONTINUED | OUTPATIENT
Start: 2018-01-27 | End: 2018-01-27 | Stop reason: HOSPADM

## 2018-01-26 RX ORDER — GUAIFENESIN 100 MG/5ML
81 LIQUID (ML) ORAL DAILY
Status: DISCONTINUED | OUTPATIENT
Start: 2018-01-27 | End: 2018-01-27 | Stop reason: HOSPADM

## 2018-01-26 RX ORDER — DEXTROSE 50 % IN WATER (D50W) INTRAVENOUS SYRINGE
12.5-25 AS NEEDED
Status: DISCONTINUED | OUTPATIENT
Start: 2018-01-26 | End: 2018-01-27 | Stop reason: HOSPADM

## 2018-01-26 RX ORDER — GENTAMICIN SULFATE 80 MG/100ML
80 INJECTION, SOLUTION INTRAVENOUS ONCE
Status: COMPLETED | OUTPATIENT
Start: 2018-01-26 | End: 2018-01-26

## 2018-01-26 RX ORDER — ACETAMINOPHEN 325 MG/1
650 TABLET ORAL
Status: SHIPPED | COMMUNITY
Start: 2018-01-26 | End: 2018-05-07 | Stop reason: SDUPTHER

## 2018-01-26 RX ORDER — ACETAMINOPHEN 325 MG/1
650 TABLET ORAL
Status: DISCONTINUED | OUTPATIENT
Start: 2018-01-26 | End: 2018-01-27 | Stop reason: HOSPADM

## 2018-01-26 RX ORDER — SPIRONOLACTONE 25 MG/1
25 TABLET ORAL DAILY
Status: DISCONTINUED | OUTPATIENT
Start: 2018-01-27 | End: 2018-01-27 | Stop reason: HOSPADM

## 2018-01-26 RX ORDER — ATORVASTATIN CALCIUM 20 MG/1
20 TABLET, FILM COATED ORAL DAILY
Status: DISCONTINUED | OUTPATIENT
Start: 2018-01-27 | End: 2018-01-27 | Stop reason: HOSPADM

## 2018-01-26 RX ORDER — FENTANYL CITRATE 50 UG/ML
INJECTION, SOLUTION INTRAMUSCULAR; INTRAVENOUS AS NEEDED
Status: DISCONTINUED | OUTPATIENT
Start: 2018-01-26 | End: 2018-01-26 | Stop reason: HOSPADM

## 2018-01-26 RX ORDER — CEFAZOLIN SODIUM IN 0.9 % NACL 2 G/50 ML
2 INTRAVENOUS SOLUTION, PIGGYBACK (ML) INTRAVENOUS ONCE
Status: COMPLETED | OUTPATIENT
Start: 2018-01-26 | End: 2018-01-26

## 2018-01-26 RX ORDER — ONDANSETRON 2 MG/ML
4 INJECTION INTRAMUSCULAR; INTRAVENOUS
Status: DISCONTINUED | OUTPATIENT
Start: 2018-01-26 | End: 2018-01-27 | Stop reason: HOSPADM

## 2018-01-26 RX ORDER — BUDESONIDE 0.5 MG/2ML
500 INHALANT ORAL
Status: DISCONTINUED | OUTPATIENT
Start: 2018-01-26 | End: 2018-01-27 | Stop reason: HOSPADM

## 2018-01-26 RX ORDER — METOPROLOL SUCCINATE 50 MG/1
150 TABLET, EXTENDED RELEASE ORAL DAILY
Status: DISCONTINUED | OUTPATIENT
Start: 2018-01-27 | End: 2018-01-27 | Stop reason: HOSPADM

## 2018-01-26 RX ORDER — MIDAZOLAM HYDROCHLORIDE 1 MG/ML
INJECTION, SOLUTION INTRAMUSCULAR; INTRAVENOUS AS NEEDED
Status: DISCONTINUED | OUTPATIENT
Start: 2018-01-26 | End: 2018-01-26 | Stop reason: HOSPADM

## 2018-01-26 RX ORDER — BUPIVACAINE HYDROCHLORIDE 5 MG/ML
20 INJECTION, SOLUTION EPIDURAL; INTRACAUDAL ONCE
Status: COMPLETED | OUTPATIENT
Start: 2018-01-26 | End: 2018-01-26

## 2018-01-26 RX ORDER — PROPOFOL 10 MG/ML
INJECTION, EMULSION INTRAVENOUS AS NEEDED
Status: DISCONTINUED | OUTPATIENT
Start: 2018-01-26 | End: 2018-01-26 | Stop reason: HOSPADM

## 2018-01-26 RX ORDER — SODIUM CHLORIDE 0.9 % (FLUSH) 0.9 %
5-10 SYRINGE (ML) INJECTION AS NEEDED
Status: DISCONTINUED | OUTPATIENT
Start: 2018-01-26 | End: 2018-01-27 | Stop reason: HOSPADM

## 2018-01-26 RX ORDER — SODIUM CHLORIDE 0.9 % (FLUSH) 0.9 %
5-10 SYRINGE (ML) INJECTION EVERY 8 HOURS
Status: DISCONTINUED | OUTPATIENT
Start: 2018-01-26 | End: 2018-01-26 | Stop reason: HOSPADM

## 2018-01-26 RX ORDER — CEFAZOLIN SODIUM IN 0.9 % NACL 2 G/50 ML
2 INTRAVENOUS SOLUTION, PIGGYBACK (ML) INTRAVENOUS EVERY 8 HOURS
Status: DISCONTINUED | OUTPATIENT
Start: 2018-01-26 | End: 2018-01-27 | Stop reason: HOSPADM

## 2018-01-26 RX ORDER — KETAMINE HYDROCHLORIDE 10 MG/ML
INJECTION, SOLUTION INTRAMUSCULAR; INTRAVENOUS AS NEEDED
Status: DISCONTINUED | OUTPATIENT
Start: 2018-01-26 | End: 2018-01-26 | Stop reason: HOSPADM

## 2018-01-26 RX ORDER — SODIUM CHLORIDE 9 MG/ML
INJECTION, SOLUTION INTRAVENOUS
Status: DISCONTINUED | OUTPATIENT
Start: 2018-01-26 | End: 2018-01-26 | Stop reason: HOSPADM

## 2018-01-26 RX ORDER — CEPHALEXIN 500 MG/1
500 CAPSULE ORAL 3 TIMES DAILY
Qty: 15 CAP | Refills: 0 | Status: SHIPPED | OUTPATIENT
Start: 2018-01-26 | End: 2018-01-31

## 2018-01-26 RX ORDER — SODIUM CHLORIDE 0.9 % (FLUSH) 0.9 %
5-10 SYRINGE (ML) INJECTION EVERY 8 HOURS
Status: DISCONTINUED | OUTPATIENT
Start: 2018-01-26 | End: 2018-01-27 | Stop reason: HOSPADM

## 2018-01-26 RX ORDER — POTASSIUM CHLORIDE 750 MG/1
10 TABLET, FILM COATED, EXTENDED RELEASE ORAL DAILY
Status: DISCONTINUED | OUTPATIENT
Start: 2018-01-27 | End: 2018-01-27 | Stop reason: HOSPADM

## 2018-01-26 RX ORDER — MAGNESIUM SULFATE 100 %
4 CRYSTALS MISCELLANEOUS AS NEEDED
Status: DISCONTINUED | OUTPATIENT
Start: 2018-01-26 | End: 2018-01-27 | Stop reason: HOSPADM

## 2018-01-26 RX ORDER — GABAPENTIN 100 MG/1
100 CAPSULE ORAL 3 TIMES DAILY
Status: DISCONTINUED | OUTPATIENT
Start: 2018-01-26 | End: 2018-01-27 | Stop reason: HOSPADM

## 2018-01-26 RX ORDER — PROPOFOL 10 MG/ML
INJECTION, EMULSION INTRAVENOUS
Status: DISCONTINUED | OUTPATIENT
Start: 2018-01-26 | End: 2018-01-26 | Stop reason: HOSPADM

## 2018-01-26 RX ORDER — HEPARIN SODIUM 200 [USP'U]/100ML
500 INJECTION, SOLUTION INTRAVENOUS ONCE
Status: COMPLETED | OUTPATIENT
Start: 2018-01-26 | End: 2018-01-26

## 2018-01-26 RX ORDER — BUPIVACAINE HYDROCHLORIDE 5 MG/ML
20 INJECTION, SOLUTION EPIDURAL; INTRACAUDAL ONCE
Status: DISPENSED | OUTPATIENT
Start: 2018-01-26 | End: 2018-01-27

## 2018-01-26 RX ORDER — INSULIN LISPRO 100 [IU]/ML
INJECTION, SOLUTION INTRAVENOUS; SUBCUTANEOUS
Status: DISCONTINUED | OUTPATIENT
Start: 2018-01-26 | End: 2018-01-27 | Stop reason: HOSPADM

## 2018-01-26 RX ORDER — LIDOCAINE HYDROCHLORIDE AND EPINEPHRINE 10; 10 MG/ML; UG/ML
20 INJECTION, SOLUTION INFILTRATION; PERINEURAL ONCE
Status: COMPLETED | OUTPATIENT
Start: 2018-01-26 | End: 2018-01-26

## 2018-01-26 RX ORDER — FENTANYL CITRATE 50 UG/ML
100 INJECTION, SOLUTION INTRAMUSCULAR; INTRAVENOUS ONCE
Status: CANCELLED | OUTPATIENT
Start: 2018-01-26 | End: 2018-01-27

## 2018-01-26 RX ADMIN — INSULIN GLARGINE 28 UNITS: 100 INJECTION, SOLUTION SUBCUTANEOUS at 18:49

## 2018-01-26 RX ADMIN — IOPAMIDOL 35 ML: 755 INJECTION, SOLUTION INTRAVENOUS at 15:30

## 2018-01-26 RX ADMIN — FENTANYL CITRATE 50 MCG: 50 INJECTION, SOLUTION INTRAMUSCULAR; INTRAVENOUS at 14:20

## 2018-01-26 RX ADMIN — CEFAZOLIN 2 G: 1 INJECTION, POWDER, FOR SOLUTION INTRAMUSCULAR; INTRAVENOUS; PARENTERAL at 14:53

## 2018-01-26 RX ADMIN — SODIUM CHLORIDE: 900 INJECTION, SOLUTION INTRAVENOUS at 15:29

## 2018-01-26 RX ADMIN — ACETAMINOPHEN 650 MG: 325 TABLET ORAL at 18:27

## 2018-01-26 RX ADMIN — EPHEDRINE SULFATE 7.5 MG: 50 INJECTION, SOLUTION INTRAVENOUS at 14:45

## 2018-01-26 RX ADMIN — BUDESONIDE 500 MCG: 0.5 INHALANT RESPIRATORY (INHALATION) at 19:51

## 2018-01-26 RX ADMIN — KETAMINE HYDROCHLORIDE 10 MG: 10 INJECTION, SOLUTION INTRAMUSCULAR; INTRAVENOUS at 15:03

## 2018-01-26 RX ADMIN — EPHEDRINE SULFATE 7.5 MG: 50 INJECTION, SOLUTION INTRAVENOUS at 15:03

## 2018-01-26 RX ADMIN — INSULIN LISPRO 3 UNITS: 100 INJECTION, SOLUTION INTRAVENOUS; SUBCUTANEOUS at 18:48

## 2018-01-26 RX ADMIN — GENTAMICIN SULFATE 80 MG: 80 INJECTION, SOLUTION INTRAVENOUS at 15:18

## 2018-01-26 RX ADMIN — ONDANSETRON 4 MG: 2 INJECTION INTRAMUSCULAR; INTRAVENOUS at 17:20

## 2018-01-26 RX ADMIN — KETAMINE HYDROCHLORIDE 10 MG: 10 INJECTION, SOLUTION INTRAMUSCULAR; INTRAVENOUS at 15:13

## 2018-01-26 RX ADMIN — GABAPENTIN 100 MG: 100 CAPSULE ORAL at 21:40

## 2018-01-26 RX ADMIN — GABAPENTIN 100 MG: 100 CAPSULE ORAL at 18:27

## 2018-01-26 RX ADMIN — LIDOCAINE HYDROCHLORIDE,EPINEPHRINE BITARTRATE 200 MG: 10; .01 INJECTION, SOLUTION INFILTRATION; PERINEURAL at 15:01

## 2018-01-26 RX ADMIN — FENTANYL CITRATE 50 MCG: 50 INJECTION, SOLUTION INTRAMUSCULAR; INTRAVENOUS at 14:58

## 2018-01-26 RX ADMIN — CEFAZOLIN 2 G: 1 INJECTION, POWDER, FOR SOLUTION INTRAMUSCULAR; INTRAVENOUS; PARENTERAL at 21:06

## 2018-01-26 RX ADMIN — KETAMINE HYDROCHLORIDE 10 MG: 10 INJECTION, SOLUTION INTRAMUSCULAR; INTRAVENOUS at 15:26

## 2018-01-26 RX ADMIN — EPHEDRINE SULFATE 7.5 MG: 50 INJECTION, SOLUTION INTRAVENOUS at 15:14

## 2018-01-26 RX ADMIN — ARFORMOTEROL TARTRATE 15 MCG: 15 SOLUTION RESPIRATORY (INHALATION) at 19:51

## 2018-01-26 RX ADMIN — SACUBITRIL AND VALSARTAN 1 TABLET: 24; 26 TABLET, FILM COATED ORAL at 18:49

## 2018-01-26 RX ADMIN — INSULIN LISPRO 2 UNITS: 100 INJECTION, SOLUTION INTRAVENOUS; SUBCUTANEOUS at 21:40

## 2018-01-26 RX ADMIN — HEPARIN SODIUM 1000 UNITS: 200 INJECTION, SOLUTION INTRAVENOUS at 15:05

## 2018-01-26 RX ADMIN — FENTANYL CITRATE 50 MCG: 50 INJECTION, SOLUTION INTRAMUSCULAR; INTRAVENOUS at 15:59

## 2018-01-26 RX ADMIN — EPHEDRINE SULFATE 7.5 MG: 50 INJECTION, SOLUTION INTRAVENOUS at 15:36

## 2018-01-26 RX ADMIN — PROPOFOL 30 MG: 10 INJECTION, EMULSION INTRAVENOUS at 14:40

## 2018-01-26 RX ADMIN — Medication 10 ML: at 22:00

## 2018-01-26 RX ADMIN — KETAMINE HYDROCHLORIDE 10 MG: 10 INJECTION, SOLUTION INTRAMUSCULAR; INTRAVENOUS at 15:48

## 2018-01-26 RX ADMIN — SODIUM CHLORIDE: 9 INJECTION, SOLUTION INTRAVENOUS at 14:15

## 2018-01-26 RX ADMIN — PROPOFOL 50 MCG/KG/MIN: 10 INJECTION, EMULSION INTRAVENOUS at 14:41

## 2018-01-26 RX ADMIN — FENTANYL CITRATE 50 MCG: 50 INJECTION, SOLUTION INTRAMUSCULAR; INTRAVENOUS at 16:03

## 2018-01-26 RX ADMIN — IOPAMIDOL 15 ML: 755 INJECTION, SOLUTION INTRAVENOUS at 15:00

## 2018-01-26 RX ADMIN — BUPIVACAINE HYDROCHLORIDE 100 MG: 5 INJECTION, SOLUTION EPIDURAL; INTRACAUDAL at 15:02

## 2018-01-26 RX ADMIN — MIDAZOLAM HYDROCHLORIDE 2 MG: 1 INJECTION, SOLUTION INTRAMUSCULAR; INTRAVENOUS at 14:15

## 2018-01-26 RX ADMIN — KETAMINE HYDROCHLORIDE 10 MG: 10 INJECTION, SOLUTION INTRAMUSCULAR; INTRAVENOUS at 14:54

## 2018-01-26 NOTE — PROGRESS NOTES
1315 Received patient from waiting area. Armband and allergies verbally confirmed with patient. Procedure explained and consents signed. 1420 right radial arterial iv placed by anesthesia  1430 TRANSFER - OUT REPORT:    Verbal report given to brittney(name) on Tasha Melton  being transferred to ep lab(unit) for routine progression of care       Report consisted of patients Situation, Background, Assessment and   Recommendations(SBAR). Information from the following report(s) Procedure Summary was reviewed with the receiving nurse. Lines:   Peripheral IV 01/26/18 Left Antecubital (Active)   Site Assessment Clean, dry, & intact 1/26/2018  1:44 PM       Peripheral IV 01/26/18 Right Antecubital (Active)        Opportunity for questions and clarification was provided. Patient transported with:   Registered Nurse    1600PM TRANSFER - IN REPORT:    Verbal report received from doc (name) on Tasha Melton  being received from ep lab(unit) for   Routine progression of care    Report consisted of patients Situation, Background, Assessment and   Recommendations(SBAR). Information from the following report(s) Procedure Summary was reviewed with the receiving nurse. Opportunity for questions and clarification was provided. Assessment completed upon patients arrival to unit and care assumed.      1615 pressure dressing placed over dressing, 100% pacing  in to see, update given plan to recover for one hour then transfer to room 520, ice pack to left shoulder dressing, sling to left arm     1620 arterial removed from right wrist, hand held pressure for 10 minutes, quick clot and transparent dressing applied, arm board reapplied   1720 message to dr. Manny Potter re; humalog orders, pt takes metformin needs to hold for 48 hours due to contrast used in procedure  1730 report called preparing yuliet transfer to room 520 with monitor

## 2018-01-26 NOTE — PROGRESS NOTES
TRANSFER - OUT REPORT:    Verbal report given to NainMiguel AngelPhong  [facility]ail  [facility]       Report consisted of patients Situation, Background, Assessment and   Recommendations(SBAR). Information from the following report(s) SBAR and Procedure Summary was reviewed with the receiving nurse. Lines:   Peripheral IV 01/26/18 Left Antecubital (Active)   Site Assessment Clean, dry, & intact 1/26/2018  1:44 PM       Peripheral IV 01/26/18 Right Antecubital (Active)       Arterial Line 01/26/18 Right Radial artery (Active)        Opportunity for questions and clarification was provided.       Patient transported with:   Registered Nurse

## 2018-01-26 NOTE — ANESTHESIA PREPROCEDURE EVALUATION
Anesthetic History   No history of anesthetic complications            Review of Systems / Medical History  Patient summary reviewed, nursing notes reviewed and pertinent labs reviewed    Pulmonary  Within defined limits          Asthma        Neuro/Psych   Within defined limits           Cardiovascular  Within defined limits  Hypertension          Pacemaker    Exercise tolerance: >4 METS  Comments: EF 55% from last cath in 2015   GI/Hepatic/Renal  Within defined limits              Endo/Other  Within defined limits  Diabetes  Hypothyroidism  Obesity     Other Findings              Physical Exam    Airway  Mallampati: II    Neck ROM: normal range of motion   Mouth opening: Normal     Cardiovascular  Regular rate and rhythm,  S1 and S2 normal,  no murmur, click, rub, or gallop  Rhythm: regular  Rate: normal         Dental    Dentition: Full lower dentures and Full upper dentures     Pulmonary  Breath sounds clear to auscultation               Abdominal  GI exam deferred       Other Findings            Anesthetic Plan    ASA: 3  Anesthesia type: MAC          Induction: Intravenous  Anesthetic plan and risks discussed with: Patient

## 2018-01-26 NOTE — H&P
HISTORY OF PRESENTING ILLNESS       Jose Alejandro Mendoza is a 69 y.o. female with NICM, DM, hypertension, LVEF 20%, LHC 2/16, LBBB (QRS duration 144 msec), NYHA class III symptoms and a single chamber ICD presenting for upgrade to a biventricular ICD system.          ACTIVE PROBLEM LIST           Patient Active Problem List     Diagnosis Date Noted    Cardiomyopathy ([facility] [address] 05/23/2016    Skin cancer      Acid indigestion      Asthma      Back pain      Wears dentures      Constipation      Diabetes mellitus      Diarrhea      Frequent episodic tension-type headache      Hemorrhoids      Hernia of unspecified site of abdominal cavity without mention of obstruction or gangrene      HTN (hypertension)      Muscle pain      SOB (shortness of breath)      Thyroid disorder               PAST MEDICAL HISTORY           Past Medical History:   Diagnosis Date    Acid indigestion       heartburn    Asthma      Back pain      Constipation      Diabetes mellitus      Diarrhea      Frequent episodic tension-type headache      Hemorrhoids      Hernia of unspecified site of abdominal cavity without mention of obstruction or gangrene      HTN (hypertension)      ICD (implantable cardioverter-defibrillator) in place      Muscle pain       joint pain    Skin cancer      SOB (shortness of breath)      Thyroid disorder      Wears dentures               PAST SURGICAL HISTORY            Past Surgical History:   Procedure Laterality Date    HX CARPAL TUNNEL RELEASE         right hand    HX HEART CATHETERIZATION   2/6/16    HX HYSTERECTOMY        HX PACEMAKER Left 6/20/16     [employer]    HX THYROIDECTOMY        REMOVAL GALLBLADDER                 ALLERGIES            Allergies   Allergen Reactions    Codeine Other (comments)       Patient states she is not allergic    Novocain [Procaine] Nausea and Vomiting    Tramadol Other (comments)       Headache            FAMILY HISTORY     Family History   Problem Relation Age of Onset    Diabetes Mother      Heart Disease Mother      Heart Disease Father      Cancer Sister      Diabetes Sister      Heart Disease Sister      Hypertension Sister      Diabetes Brother      negative for cardiac disease         SOCIAL HISTORY       Social History                Social History    Marital status:        Spouse name: N/A    Number of children: N/A    Years of education: N/A              Social History Main Topics     Smoking status: Never Smoker     Smokeless tobacco: Never Used     Alcohol use Yes         Comment: wine occasionally     Drug use: No     Sexual activity: Not Asked            Other Topics Concern    None      Social History Narrative               MEDICATIONS           Current Outpatient Prescriptions   Medication Sig    spironolactone (ALDACTONE) 25 mg tablet Take  by mouth daily.  sacubitril-valsartan (ENTRESTO) 24 mg/26 mg tablet Take 1 Tab by mouth two (2) times a day.  sacubitril-valsartan (ENTRESTO) 24 mg/26 mg tablet Take 1 Tab by mouth two (2) times a day.  metFORMIN (GLUCOPHAGE) 1,000 mg tablet Take 1 Tab by mouth two (2) times a day. Resume 6/23    Insulin Needles, Disposable, (BD INSULIN PEN NEEDLE UF SHORT) 31 gauge x 5/16\" ndle USE AS DIRECTED FOUR TIMES A DAY    OTHER 1 Cap daily. Tumeric Curcumin 500 mg    gabapentin (NEURONTIN) 300 mg capsule Take 300 mg by mouth two (2) times a day.  oxybutynin chloride XL (DITROPAN XL) 5 mg CR tablet Take 5 mg by mouth daily.  levothyroxine (SYNTHROID) 150 mcg tablet Take 175 mcg by mouth Daily (before breakfast).  metoprolol succinate (TOPROL-XL) 50 mg XL tablet Take 150 mg by mouth daily.  DULoxetine (CYMBALTA) 30 mg capsule Take 30 mg by mouth daily.  omeprazole (PRILOSEC) 40 mg capsule Take 40 mg by mouth daily.  atorvastatin (LIPITOR) 20 mg tablet Take  by mouth daily.     HUMALOG MIX 75-25 KWIKPEN 100 unit/mL (75-25) flexpen INJECT 24 UNITS UNDER THE SKIN TWICE A DAY WITH BREAKFAST AND DINNER    aspirin 81 mg tablet Take 81 mg by mouth.  potassium chloride SR (KLOR-CON 10) 10 mEq tablet Take 10 mEq by mouth daily.  glucose blood VI test strips (FREESTYLE TEST) strip 200 Each by Does Not Apply route four (4) times daily.  acetaminophen-codeine (TYLENOL #3) 300-30 mg per tablet Take 1 Tab by mouth every six (6) hours as needed for Pain. Max Daily Amount: 4 Tabs.  furosemide (LASIX) 20 mg tablet Take 20 mg by mouth daily.  isosorbide mononitrate ER (IMDUR) 30 mg tablet Take 30 mg by mouth two (2) times a day.  nitroglycerin (NITROSTAT) 0.4 mg SL tablet by SubLINGual route every five (5) minutes as needed for Chest Pain.  fluticasone-salmeterol (ADVAIR DISKUS) 250-50 mcg/dose diskus inhaler Take 1 Puff by inhalation as needed.      No current facility-administered medications for this visit.          I have reviewed the nurses notes, vitals, problem list, allergy list, medical history, family, social history and medications.         REVIEW OF SYMPTOMS       General: Pt denies excessive weight gain or loss. Pt is able to conduct ADL's  HEENT: Denies blurred vision, headaches, hearing loss, epistaxis and difficulty swallowing. Respiratory: Denies cough, congestion, shortness of breath, GABRIEL, wheezing or stridor. Cardiovascular: Denies precordial pain, palpitations, edema or PND  Gastrointestinal: Denies poor appetite, indigestion, abdominal pain or blood in stool  Genitourinary: Denies hematuria, dysuria, increased urinary frequency  Musculoskeletal: Denies joint pain or swelling from muscles or joints  Neurologic: Denies tremor, paresthesias, headache, or sensory motor disturbance  Psychiatric: Denies confusion, insomnia, depression  Integumentray: Denies rash, itching or ulcers.   Hematologic: Denies easy bruising, bleeding      PHYSICAL EXAMINATION           Vitals:     12/27/17 1044   BP: 140/86   Pulse: 67   Resp: 18   SpO2: 98%   Weight: 194 lb 6.4 oz (88.2 kg)   Height: 5' 3\" (1.6 m)      General: Well developed, in no acute distress. HEENT: No jaundice, oral mucosa moist, no oral ulcers  Neck: Supple, no stiffness, no lymphadenopathy, supple  Heart:  Normal S1/S2 negative S3 or S4. Regular, no murmur, gallop or rub, no jugular venous distention  Respiratory: Clear bilaterally x 4, no wheezing or rales  Abdomen:   Soft, non-tender, bowel sounds are active.   Extremities:  No edema, normal cap refill, no cyanosis. Musculoskeletal: No clubbing, no deformities  Neuro: A&Ox3, speech clear, gait stable, cooperative, no focal neurologic deficits  Skin: Skin color is normal. No rashes or lesions. Non diaphoretic, moist.  Vascular: 2+ pulses symmetric in all extremities         DIAGNOSTIC DATA       EKG:          LABORATORY DATA             Lab Results   Component Value Date/Time     WBC 6.7 11/29/2017 12:00 AM     HGB 12.8 11/29/2017 12:00 AM     HCT 39.4 11/29/2017 12:00 AM     PLATELET 688 23/93/8919 12:00 AM     MCV 91 11/29/2017 12:00 AM            Lab Results   Component Value Date/Time     Sodium 139 11/29/2017 12:00 AM     Potassium 4.0 11/29/2017 12:00 AM     Chloride 99 11/29/2017 12:00 AM     CO2 23 11/29/2017 12:00 AM     Glucose 310 11/29/2017 12:00 AM     BUN 10 11/29/2017 12:00 AM     Creatinine 0.61 11/29/2017 12:00 AM     BUN/Creatinine ratio 16 11/29/2017 12:00 AM     GFR est  11/29/2017 12:00 AM     GFR est non-AA 93 11/29/2017 12:00 AM     Calcium 8.9 11/29/2017 12:00 AM     Bilirubin, total 0.5 11/29/2017 12:00 AM     AST (SGOT) 31 11/29/2017 12:00 AM     Alk. phosphatase 97 11/29/2017 12:00 AM     Protein, total 6.9 11/29/2017 12:00 AM     Albumin 3.8 11/29/2017 12:00 AM     A-G Ratio 1.2 11/29/2017 12:00 AM     ALT (SGPT) 29 11/29/2017 12:00 AM             ASSESSMENT       1. Cardiomyopathy   A. NICM   B. LVEF 25%   C. NYHA class III  2. ICD   A. [address] [facility] chamber  3.  Diabetes mellitus  4. Hypertension  5.  LBBB         PLAN      Plan for upgrade of single chamber ICD to a biventricular ICD system         Claudio Carrasco MD  Cardiac Electrophysiology / Cardiology     [address], [address], [address]  [place]                                                                                  [place]  [phone] / [phone] Fax                                                                  [phone] / [phone] Fax

## 2018-01-26 NOTE — IP AVS SNAPSHOT
[address] [facility] 
 
 
 [address] [facility] 
[phone] Patient: Byron Amador MRN: [id number] DTK:2/60/3159 About your hospitalization You were admitted on:  January 26, 2018 You last received care in the:  [facility]  MED SURG 2 You were discharged on:  January 27, 2018 Why you were hospitalized Your primary diagnosis was:  Not on File Your diagnoses also included:  Biventricular Icd (Implantable Cardioverter-Defibrillator) In Place Follow-up Information Follow up With Details Comments Contact Info Dirk Nicholson NP On 2/2/2018 10 am  [address] 100[address] [facility] 
[phone] Tara Nguyen MD   [address] [place], [place] [address] [phone] Your Scheduled Appointments Friday February 02, 2018 10:00 AM EST  
ESTABLISHED PATIENT with Dirk Nicholson NP  
[facility] ([facility]) [address] [facility] 600 1007 [facility]  
[phone] Tuesday February 20, 2018  1:00 PM EST Follow Up with YUNIOR Perez 1229 [facility] ([facility]) [address]  
[phone] Discharge Orders None A check franck indicates which time of day the medication should be taken. My Medications START taking these medications Instructions Each Dose to Equal  
 Morning Noon Evening Bedtime  
 acetaminophen 325 mg tablet Commonly known as:  TYLENOL Take 2 Tabs by mouth every four (4) hours as needed. 650 mg  
    
   
   
   
  
 * cephALEXin 500 mg capsule Commonly known as:  Eulas Po Take 1 Cap by mouth three (3) times daily for 5 days. 500 mg  
    
  
   
  
   
   
  
  
 * cephALEXin 500 mg capsule Commonly known as:  Eulas Po Take 1 Cap by mouth three (3) times daily for 5 days.   
 500 mg  
 HYDROcodone-acetaminophen 5-325 mg per tablet Commonly known as:  Carol Flanagan Take 1 Tab by mouth every four (4) hours as needed for Pain. Max Daily Amount: 6 Tabs. 1 Tab * Notice: This list has 2 medication(s) that are the same as other medications prescribed for you. Read the directions carefully, and ask your doctor or other care provider to review them with you. CONTINUE taking these medications Instructions Each Dose to Equal  
 Morning Noon Evening Bedtime  
 acetaminophen-codeine 300-30 mg per tablet Commonly known as:  TYLENOL #3 Take 1 Tab by mouth every six (6) hours as needed for Pain. Max Daily Amount: 4 Tabs. 1 Tab ADVAIR DISKUS 250-50 mcg/dose diskus inhaler Generic drug:  fluticasone-salmeterol Take 1 Puff by inhalation as needed. 1 Puff  
    
   
   
   
  
 aspirin 81 mg tablet Take 81 mg by mouth. 81 mg  
    
   
   
   
  
 atorvastatin 20 mg tablet Commonly known as:  LIPITOR Your last dose was:  1/27/2018  8:28 AM  
Your next dose is:  1/28/2018 Tomorrow Morning Take  by mouth daily. DULoxetine 30 mg capsule Commonly known as:  CYMBALTA Your last dose was:  1/27/2018  8:28 AM  
Your next dose is:  1/28/2018 Tomorrow Morning Take 30 mg by mouth daily. 30 mg  
    
  
   
   
   
  
 furosemide 20 mg tablet Commonly known as:  LASIX Your last dose was:  1/27/2018  8:28 AM  
Your next dose is:  1/28/2018 Tomorrow Morning Take 20 mg by mouth daily. 20 mg  
    
  
   
   
   
  
 gabapentin 300 mg capsule Commonly known as:  NEURONTIN Your last dose was:  1/27/2018  8:28 AM  
Your next dose is: Today at 4:00pm (1/27/18) Take 100 mg by mouth three (3) times daily. 100 mg  
    
  
   
   
  
   
  
 glucose blood VI test strips strip Commonly known as:  FREESTYLE TEST  
   
 200 Each by Does Not Apply route four (4) times daily. 200 Each HumaLOG Mix 75-25 KwikPen 100 unit/mL (75-25) Inpn Generic drug:  Insulin Lisp & Lisp Prot (Hum) INJECT 24 UNITS UNDER THE SKIN TWICE A DAY WITH BREAKFAST AND DINNER Insulin Needles (Disposable) 31 gauge x 5/16\" Ndle Commonly known as:  BD INSULIN PEN NEEDLE UF SHORT  
   
 USE AS DIRECTED FOUR TIMES A DAY  
     
   
   
   
  
 KLOR-CON 10 10 mEq tablet Generic drug:  potassium chloride SR Your last dose was:  1/27/2018  8:28 AM  
Your next dose is:  1/28/2018 Tomorrow Morning Take 10 mEq by mouth daily. 10 mEq  
    
  
   
   
   
  
 levothyroxine 150 mcg tablet Commonly known as:  SYNTHROID Your last dose was:  1/27/2018  8:01 AM  
Your next dose is:  Tomorrow morning, on an empty stomach and 30 minutes prior to eating. Take 175 mcg by mouth Daily (before breakfast). 175 mcg  
    
  
   
   
   
  
 metFORMIN 1,000 mg tablet Commonly known as:  GLUCOPHAGE Take 1 Tab by mouth two (2) times a day. Resume 6/23  
 1000 mg  
    
   
   
   
  
 metoprolol succinate 50 mg XL tablet Commonly known as:  TOPROL-XL Your last dose was:  1/27/2018  8:28 AM  
Your next dose is:  1/28/2018 Tomorrow morning Take 150 mg by mouth daily. 150 mg MIRALAX 17 gram packet Generic drug:  polyethylene glycol Take 17 g by mouth daily. 17 g  
    
   
   
   
  
 nitroglycerin 0.4 mg SL tablet Commonly known as:  NITROSTAT  
   
 by SubLINGual route every five (5) minutes as needed for Chest Pain. omeprazole 40 mg capsule Commonly known as:  PRILOSEC Take 40 mg by mouth daily. 40 mg  
    
   
   
   
  
 OTHER  
   
 1 Cap daily. Tumeric Curcumin 500 mg  
 1 Cap  
    
   
   
   
  
 oxybutynin chloride XL 5 mg CR tablet Commonly known as:  DITROPAN XL  
 Your last dose was:  1/27/2018  8:27 AM  
Your next dose is:  1/28/2018 Tomorrow morning Take 5 mg by mouth daily. 5 mg  
    
  
   
   
   
  
 sacubitril-valsartan 24-26 mg tablet Commonly known as:  ENTRESTO Your last dose was:  1/27/2018  9:00 AM  
Your next dose is: Tonight (1/27) at 6:00pm  
   
 Take 1 Tab by mouth two (2) times a day. 1 Tab  
    
  
   
   
  
   
  
 spironolactone 25 mg tablet Commonly known as:  ALDACTONE Your last dose was:  1/27/2018  8:28 AM  
Your next dose is:  1/28/2018 Tomorrow morning Take  by mouth daily. Where to Get Your Medications These medications were sent to [address] [place] [address], [address] [place] [address] Phone:  [phone]  
  cephALEXin 500 mg capsule  
 cephALEXin 500 mg capsule Information on where to get these meds will be given to you by the nurse or doctor. ! Ask your nurse or doctor about these medications HYDROcodone-acetaminophen 5-325 mg per tablet Discharge Instructions ICD Discharge Instructions Please make sure you have received your Temporary ICD identification card with your discharge instructions MEDICATIONS ? Take only the medications prescribed to you at discharge. ACTIVITY ? Return to your normal activity, except as noted below. o Do not lift anything heavier than 10 pounds for 4 weeks with the affected arm. This is how long it takes the muscles to heal, and the leads inside your heart to stabilize their position. o Do not reach above your head with the affected arm for 4 weeks, doing so increases the risk of lead dislodgement. o It is, however, important to move the affected arm to prevent shoulder stiffness and locking.  
o Avoid tight clothes or unnecessary pressure over your incision (such as bra straps or seat belts). If it is tender or sensitive to clothing, cover the incision with a soft dressing or pad. 
o Questions about driving are individualized and should be discussed with one of the EP Physicians prior to discharge. SHOWERING  
  
 
? Leave the bandage over your incision for 7 days after the ICD implant. You bandage will be removed in clinic in 7 days. ? It is important to keep the bandaged area clean and dry. You may shower around the site until the bandage is removed in clinic. Thereafter, you may shower after the bandage is removed, washing it gently with soap and water. Do not apply any lotions, powders, or perfumes to the incision line. ? Avoid submerging your incision in water (tub baths, hot tubs, or swimming) for four weeks. ? Underneath the dressing. o If you have white steri-strips over your incision (underneath the gauze dressing), they will curl up at the end and fall off, usually within 10 days. Do not pull them off. 
- OR -  
o You may have a different type of closure for the incision. If Dermabond Adhesive was used to close your incision, you will receive a separate instruction sheet. DISCHARGE PRECAUTIONS ? Record your temperature every day, at the same time, for 3 weeks after your implant. A temperature of 100.5 F, or higher, can be the first sign of infection. This should be reported to your Doctor immediately. ? You can have an MRI after 6 weeks. You must be aware that any strong magnet or magnetic field can affect your ICD. In general, be careful of metal detectors, heavy machinery, and any area where arc-welding is performed. Avoid metal detectors such as the ones in security checkpoints at [facility] or [address] [place] [address]. When approaching a security checkpoint show your ICD Identification Card to security personnel and ask to be hand searched. ? Always tell your doctor or dentist that you have an ICD.   Antibiotics may be prescribed before certain procedures. ? If you use a cell phone, hold it on the opposite side from where your ICD is implanted. ? Your temporary identification will be given to you with these instructions. Keep your ICD card in your wallet or on your person at all times. You should receive your permanent card in 8 weeks. If you do not receive your permanent card, please call the office at [phone]. TAKING YOUR PULSE ? Take your pulse the same time every day, preferably in the morning. ? Sit down and rest for 5 minutes prior to taking your pulse. ? Take your pulse for 1 full minute, use a clock or stop watch with a second hand. ? To feel your pulse, use the first two fingers of one hand; place them on the thumb side of the wrist of the opposite hand. The pulse will be steady, regular and throbbing. ? Call the office if your pulse is less than 40 beats per minute. SYMPTOMS THAT NEED TO BE REPORTED IMMEDIATELY ? Temperature more than 100.4 F ? Redness or warmth at the incision site, or pain for longer than the first 5 days after the implant. ? Drainage from the incision site. ? Swelling around the incision site. ? Shortness of breath. ? Rapid heart rate or palpitations. ? Dizziness, lightheadedness, fainting. ? Slow pulse below 40 beats per minute. ? REMEMBER: If you feel something is an emergency or cannot be handled over the phone, call 911 or go to the closest emergency room. WHAT TO DO IF YOUR ICD DELIVERS A SHOCK · If you ICD delivers a shock, you should seek attention at the nearest emergency room, call our office or call 911. Celeste Perez MD 
Cardiac Electrophysiology / Cardiology 9 [facility] YANDY Daley [address] 
[address] [place] [address], [address] [phone] / [phone] Fax       [phone] / [phone] Fax [employer] Announcement We are excited to announce that we are making your provider's discharge notes available to you in [employer]. You will see these notes when they are completed and signed by the physician that discharged you from your recent hospital stay. If you have any questions or concerns about any information you see in Fandeavor, please call the Health Information Department where you were seen or reach out to your Primary Care Provider for more information about your plan of care. Introducing [facility]! Nicolas Alvares introduces [employer]andeavor patient portal. Now you can access parts of your medical record, email your doctor's office, and request medication refills online. 1. In your internet browser, go to https://[employer]. [employer] 2. Click on the First Time User? Click Here link in the Sign In box. You will see the New Member Sign Up page. 3. Enter your Fandeavor Access Code exactly as it appears below. You will not need to use this code after youve completed the sign-up process. If you do not sign up before the expiration date, you must request a new code. · [employer]avor Access Code: [email or web address] Expires: 4/25/2018  4:06 PM 
 
4. Enter the last four digits of your Social Security Number (xxxx) and Date of Birth (mm/dd/yyyy) as indicated and click Submit. You will be taken to the next sign-up page. 5. Create a [employer] ID. This will be your Fandeavor login ID and cannot be changed, so think of one that is secure and easy to remember. 6. Create a [employer]avor password. You can change your password at any time. 7. Enter your Password Reset Question and Answer. This can be used at a later time if you forget your password. 8. Enter your e-mail address. You will receive e-mail notification when new information is available in [address] 9. Click Sign Up. You can now view and download portions of your medical record. 10. Click the Download Summary menu link to download a portable copy of your medical information. If you have questions, please visit the Frequently Asked Questions section of the [employer] website. Remember, [employer] is NOT to be used for urgent needs. For medical emergencies, dial 911. Now available from your iPhone and Android! Providers Seen During Your Hospitalization Provider Specialty Primary office phone Rocky Rodriguez MD Cardiology [phone] Immunizations Administered for This Admission Name Date Influenza Vaccine (Quad) PF  Deferred () Your Primary Care Physician (PCP) Primary Care Physician Office Phone Office Fax Paulette Babb [phone] You are allergic to the following Allergen Reactions Codeine Other (comments) Patient states she is not allergic Novocain (Procaine) Nausea and Vomiting Tramadol Other (comments) Headache Recent Documentation Height Weight BMI Smoking Status 1.6 m 86.3 kg 33.7 kg/m2 Never Smoker Emergency Contacts Name Discharge Info Relation Home Work Mobile [address] [place] [address] CAREGIVER [3] Spouse [3] [phone] Patient Belongings The following personal items are in your possession at time of discharge: 
  Dental Appliances: None  Visual Aid: Glasses, With patient      Home Medications: None   Jewelry: None  Clothing: None    Other Valuables: None  Personal Items Sent to Safe:  (none) Please provide this summary of care documentation to your next provider. Signatures-by signing, you are acknowledging that this After Visit Summary has been reviewed with you and you have received a copy. Patient Signature:  ____________________________________________________________ Date:  ____________________________________________________________  
  
Rexann Ports Provider Signature:  ____________________________________________________________ Date:  ____________________________________________________________

## 2018-01-26 NOTE — ANESTHESIA PROCEDURE NOTES
Arterial Line Placement    Start time: 1/26/2018 2:15 PM  End time: 1/26/2018 2:25 PM  Performed by: Anahi Little by: Santa Null     Pre-Procedure  Indications:  Arterial pressure monitoring  Preanesthetic Checklist: patient identified, risks and benefits discussed, anesthesia consent, site marked, patient being monitored, timeout performed and patient being monitored    Timeout Time: 14:15        Procedure:   Prep:  Alcohol  Seldinger Technique?: No    Orientation:  Right  Location:  Radial artery  Catheter size:  20 G  Number of attempts:  2  Cont Cardiac Output Sensor: No      Assessment:   Post-procedure:  Line secured and sterile dressing applied  Patient Tolerance:  Patient tolerated the procedure well with no immediate complications

## 2018-01-26 NOTE — ROUTINE PROCESS
TRANSFER - IN REPORT:    Verbal report received from Zahira Loredo RN(name) on Cori Kirk  being received from CATH lab(unit) for routine post - op      Report consisted of patients Situation, Background, Assessment and   Recommendations(SBAR). Information from the following report(s) SBAR, Kardex, Procedure Summary, Intake/Output, MAR and Recent Results was reviewed with the receiving nurse. Opportunity for questions and clarification was provided. Assessment completed upon patients arrival to unit and care assumed.      Primary Nurse Praveena Sharma RN and Zahira Loredo RN performed a dual skin assessment on this patient Impairment noted- see wound doc flow sheet  Godwin score is 20

## 2018-01-26 NOTE — IP AVS SNAPSHOT
[address] 
[phone] Patient: Wyatt Castaneda MRN: [id number] YHQ:[birth year] A check franck indicates which time of day the medication should be taken. My Medications START taking these medications Instructions Each Dose to Equal  
 Morning Noon Evening Bedtime  
 acetaminophen 325 mg tablet Commonly known as:  TYLENOL Take 2 Tabs by mouth every four (4) hours as needed. 650 mg  
    
   
   
   
  
 * cephALEXin 500 mg capsule Commonly known as:  Estel Barry Take 1 Cap by mouth three (3) times daily for 5 days. 500 mg  
    
  
   
  
   
   
  
  
 * cephALEXin 500 mg capsule Commonly known as:  Estel Baryr Take 1 Cap by mouth three (3) times daily for 5 days. 500 mg HYDROcodone-acetaminophen 5-325 mg per tablet Commonly known as:  Rean Fore Take 1 Tab by mouth every four (4) hours as needed for Pain. Max Daily Amount: 6 Tabs. 1 Tab * Notice: This list has 2 medication(s) that are the same as other medications prescribed for you. Read the directions carefully, and ask your doctor or other care provider to review them with you. CONTINUE taking these medications Instructions Each Dose to Equal  
 Morning Noon Evening Bedtime  
 acetaminophen-codeine 300-30 mg per tablet Commonly known as:  TYLENOL #3 Take 1 Tab by mouth every six (6) hours as needed for Pain. Max Daily Amount: 4 Tabs. 1 Tab ADVAIR DISKUS 250-50 mcg/dose diskus inhaler Generic drug:  fluticasone-salmeterol Take 1 Puff by inhalation as needed. 1 Puff  
    
   
   
   
  
 aspirin 81 mg tablet Take 81 mg by mouth. 81 mg  
    
   
   
   
  
 atorvastatin 20 mg tablet Commonly known as:  LIPITOR Your last dose was:  1/27/2018  8:28 AM  
 Your next dose is:  1/28/2018 Tomorrow Morning Take  by mouth daily. DULoxetine 30 mg capsule Commonly known as:  CYMBALTA Your last dose was:  1/27/2018  8:28 AM  
Your next dose is:  1/28/2018 Tomorrow Morning Take 30 mg by mouth daily. 30 mg  
    
  
   
   
   
  
 furosemide 20 mg tablet Commonly known as:  LASIX Your last dose was:  1/27/2018  8:28 AM  
Your next dose is:  1/28/2018 Tomorrow Morning Take 20 mg by mouth daily. 20 mg  
    
  
   
   
   
  
 gabapentin 300 mg capsule Commonly known as:  NEURONTIN Your last dose was:  1/27/2018  8:28 AM  
Your next dose is: Today at 4:00pm (1/27/18) Take 100 mg by mouth three (3) times daily. 100 mg  
    
  
   
   
  
   
  
 glucose blood VI test strips strip Commonly known as:  FREESTYLE TEST  
   
 200 Each by Does Not Apply route four (4) times daily. 200 Each HumaLOG Mix 75-25 KwikPen 100 unit/mL (75-25) Inpn Generic drug:  Insulin Lisp & Lisp Prot (Hum) INJECT 24 UNITS UNDER THE SKIN TWICE A DAY WITH BREAKFAST AND DINNER Insulin Needles (Disposable) 31 gauge x 5/16\" Ndle Commonly known as:  BD INSULIN PEN NEEDLE UF SHORT  
   
 USE AS DIRECTED FOUR TIMES A DAY  
     
   
   
   
  
 KLOR-CON 10 10 mEq tablet Generic drug:  potassium chloride SR Your last dose was:  1/27/2018  8:28 AM  
Your next dose is:  1/28/2018 Tomorrow Morning Take 10 mEq by mouth daily. 10 mEq  
    
  
   
   
   
  
 levothyroxine 150 mcg tablet Commonly known as:  SYNTHROID Your last dose was:  1/27/2018  8:01 AM  
Your next dose is:  Tomorrow morning, on an empty stomach and 30 minutes prior to eating. Take 175 mcg by mouth Daily (before breakfast). 175 mcg  
    
  
   
   
   
  
 metFORMIN 1,000 mg tablet Commonly known as:  GLUCOPHAGE Take 1 Tab by mouth two (2) times a day. Resume 6/23 1000 mg  
    
   
   
   
  
 metoprolol succinate 50 mg XL tablet Commonly known as:  TOPROL-XL Your last dose was:  1/27/2018  8:28 AM  
Your next dose is:  1/28/2018 Tomorrow morning Take 150 mg by mouth daily. 150 mg MIRALAX 17 gram packet Generic drug:  polyethylene glycol Take 17 g by mouth daily. 17 g  
    
   
   
   
  
 nitroglycerin 0.4 mg SL tablet Commonly known as:  NITROSTAT  
   
 by SubLINGual route every five (5) minutes as needed for Chest Pain. omeprazole 40 mg capsule Commonly known as:  PRILOSEC Take 40 mg by mouth daily. 40 mg  
    
   
   
   
  
 OTHER  
   
 1 Cap daily. Tumeric Curcumin 500 mg  
 1 Cap  
    
   
   
   
  
 oxybutynin chloride XL 5 mg CR tablet Commonly known as:  DITROPAN XL Your last dose was:  1/27/2018  8:27 AM  
Your next dose is:  1/28/2018 Tomorrow morning Take 5 mg by mouth daily. 5 mg  
    
  
   
   
   
  
 sacubitril-valsartan 24-26 mg tablet Commonly known as:  ENTRESTO Your last dose was:  1/27/2018  9:00 AM  
Your next dose is: Tonight (1/27) at 6:00pm  
   
 Take 1 Tab by mouth two (2) times a day. 1 Tab  
    
  
   
   
  
   
  
 spironolactone 25 mg tablet Commonly known as:  ALDACTONE Your last dose was:  1/27/2018  8:28 AM  
Your next dose is:  1/28/2018 Tomorrow morning Take  by mouth daily. Where to Get Your Medications These medications were sent to [address] [place] [address], [address] [place] [address] Phone:  [phone]  
  cephALEXin 500 mg capsule  
 cephALEXin 500 mg capsule Information on where to get these meds will be given to you by the nurse or doctor. ! Ask your nurse or doctor about these medications HYDROcodone-acetaminophen 5-325 mg per tablet

## 2018-01-26 NOTE — ANESTHESIA POSTPROCEDURE EVALUATION
Post-Anesthesia Evaluation and Assessment    Patient: Rosette Al MRN: [id number]  SSN: [id number]    YOB: 1950  Age: 69 y.o. Sex: female       Cardiovascular Function/Vital Signs  Visit Vitals    /67 (BP 1 Location: Right arm, BP Patient Position: At rest)    Pulse 87    Temp 36.3 °C (97.4 °F)    Resp 14    Ht 5' 3\" (1.6 m)    Wt 86.3 kg (190 lb 4.1 oz)    SpO2 92%    BMI 33.7 kg/m2       Patient is status post MAC anesthesia for * No procedures listed *. Nausea/Vomiting: None    Postoperative hydration reviewed and adequate. Pain:  Pain Scale 1: Numeric (0 - 10) (01/26/18 1744)  Pain Intensity 1: 5 (01/26/18 1744)   Managed    Neurological Status: At baseline    Mental Status and Level of Consciousness: Arousable    Pulmonary Status:   O2 Device: Room air (01/26/18 1744)   Adequate oxygenation and airway patent    Complications related to anesthesia: None    Post-anesthesia assessment completed.  No concerns    Signed By: Nidia Vanessa MD     January 26, 2018

## 2018-01-26 NOTE — PROCEDURES
Cardiac Electrophysiology Report        PATIENT INFORMATION     Patient Name: Jamar Rodriguez       MRN: [id number]    Study Date: 2018    YOB: 1950        Age: 69 y.o. Gender: female      Procedure: Biventricular ICD Implantation    Referring Physician:  Pelon Suazo MD / Dr. Wilbert Baron / Dr. Sabine Bingham     Duty Name   Electrophysiologist Hugo Brandon MD   Monitor Anesthesia service   Circulator Esthela Orozco RN; Pierre Bragg RN       PATIENT HISTORY     Aidan Ohara a 69 y.o. female with NICM, DM, hypertension, LVEF 20%, LHC , LBBB (QRS duration 144 msec), NYHA class III symptoms and a single chamber ICD presenting for upgrade to a biventricular ICD system for primary prevention of sudden death. PROCEDURE     The patient was brought to the Cardiac Electrophysiology laboratory in a post-absorptive, fasting state. Informed consent was obtained. A peripheral IV was in place. Continuous electrocardiographic, blood pressure, O2 saturation and  CO2 monitoring was initiated. Intravenous antibiotics were administered pre-operatively. Self-adhesive cardioversion patches were positioned on the chest.  Conscious sedation was effectuated according to protocol. The patient was then prepped and draped in the usual sterile fashion. A left subclavian venogram was performed and demonstrated patency of the vein. A 50/50 mixture of lidocaine (1%) with epinephrine and bupivicaine (0.5%) was utilized for local anesthesia. An incision was performed medial to the left delto-pectoral groove. Sharp and blunt dissection was carried down to the level of the pre-pectoralis fascia. Hemostasis was maintained with electrocautery. Extra-thoracic, subclavian venous access was obtained x 2 and sheaths were placed using the modified Seldinger technique.  A permanent pace/sense lead was then advanced under fluoroscopic guidance into the right atrium and positioned where a stable pace/sense characteristic was encountered. The sheath was then peeled away and the lead was anchored to the pre-pectoralis fascia using O-ethibond, non-absorbable suture material. Using various coronary sinus sheaths/catheters/wires, the coronary sinus was catheterized. A balloon-occlusive venogram was performed and demonstrated several CS branch targets. Once engaged, a quad-electrode LV lead was advanced over a 0.014 ACS wire into the target branch where a stable pace/sense characteristic was encountered without phrenic nerve stimulation. The sheath was then removed and the lead was anchored to the pre-pectoralis fascia using O-ethibond, non-absorbable suture material. A device pocket was then revised to accommodate the larger generator and flushed copiously with antibiotic solution. An ICD generator was connected to the leads and the generator was placed into the pocket. The device was secured to the pocket using O-ethibond, non-absorbable suture material. The pocket was then closed in three layers using 2-O vicryl, 3-O monocryl, 4-O monocryl absorbable suture material and dermabond. The skin was closed using a sub-cuticular technique. A bio-occlusive dressing were applied to the skin. The patient remained hemodynamically stable, tolerated the procedure well and was transferred in stable condition. There were no immediate complications encountered during the procedure. No specimen were removed; there was minimal blood loss.        LEAD & GENERATOR DATA       Model # Serial #   Explanted Generator [id number] [id number]   Implanted Generator Tru [employer] [id number]   Atrial Lead [employer] [id number]   RV Lead [employer] [id number]   LV Lead [employer] [id number]       PACE/SENSE DATA      Sensed Wave (mV) Threshold (V) Impedance (?)   Atrium 2.3 1.3 693   Right Ventricle 7.8 0.7 526   Left Ventricle 21.0 1.6 809   Shock ------ ------ 87 FINAL PROGRAMMING     Pacing Mode Lower Rate (ppm) Upper Rate (ppm)   VVI 60 130   VF Rate (bpm) # Antitachycardia Pacing [employer] (J)   210 Quick Convert 41 x 8   VT Rate (bpm) # Antitachycardia Pacing [employer] (J)   185 Busts (3) 41 x 6         DEFIBRILLATION THRESHOLD TESTING     Deferred      MEDICATION SUMMARY     Medication Route Unit Total   Gentamycin IV mg 80   Ancef IV grams 2       RADIOLOGY SUMMARY      Total   Fluoro Time (minutes) 4.3      Dose Area Product (mGy) 124       CONCLUSIONS     1. Successful upgrade of single chamber ICD to a biventricular ICD system  2. IV antibiotics x 24 hours for 3 doses followed by Keflex 500 mg po tid x 5 days. 3. Monitor overnight on telemetry. 4. CXR (PA & lateral) and device interrogation in AM.  5. Possible discharge in AM.  6. Wound check in EP clinic in 7 days. 7. Follow up in EP clinic in 1 month or earlier if necessary. 8. Follow up with Jacklyn Ivey MD and Dr. Abdulaziz Stokes as scheduled. Thank you, Jacklyn Ivey MD and Dr. Abdulaziz Stkoes for involving me in the care of this extraordinarily pleasant female.               Omar Dias MD  Cardiac Electrophysiology / Cardiology    [address]  [address] [facility], [address], [address]  [facility]       [facility]  [phone] / [phone] Fax     [phone] / [phone] Fax

## 2018-01-27 VITALS
WEIGHT: 190.26 LBS | RESPIRATION RATE: 14 BRPM | OXYGEN SATURATION: 95 % | SYSTOLIC BLOOD PRESSURE: 110 MMHG | TEMPERATURE: 98 F | HEIGHT: 63 IN | DIASTOLIC BLOOD PRESSURE: 66 MMHG | BODY MASS INDEX: 33.71 KG/M2 | HEART RATE: 74 BPM

## 2018-01-27 LAB
ANION GAP SERPL CALC-SCNC: 6 MMOL/L (ref 5–15)
BUN SERPL-MCNC: 15 MG/DL (ref 6–20)
BUN/CREAT SERPL: 20 (ref 12–20)
CALCIUM SERPL-MCNC: 8.3 MG/DL (ref 8.5–10.1)
CHLORIDE SERPL-SCNC: 104 MMOL/L (ref 97–108)
CO2 SERPL-SCNC: 26 MMOL/L (ref 21–32)
CREAT SERPL-MCNC: 0.75 MG/DL (ref 0.55–1.02)
GLUCOSE BLD STRIP.AUTO-MCNC: 208 MG/DL (ref 65–100)
GLUCOSE SERPL-MCNC: 242 MG/DL (ref 65–100)
POTASSIUM SERPL-SCNC: 4 MMOL/L (ref 3.5–5.1)
SERVICE CMNT-IMP: ABNORMAL
SODIUM SERPL-SCNC: 136 MMOL/L (ref 136–145)

## 2018-01-27 PROCEDURE — 74011250637 HC RX REV CODE- 250/637: Performed by: INTERNAL MEDICINE

## 2018-01-27 PROCEDURE — 94640 AIRWAY INHALATION TREATMENT: CPT

## 2018-01-27 PROCEDURE — 80048 BASIC METABOLIC PNL TOTAL CA: CPT | Performed by: INTERNAL MEDICINE

## 2018-01-27 PROCEDURE — 74011000250 HC RX REV CODE- 250: Performed by: INTERNAL MEDICINE

## 2018-01-27 PROCEDURE — 74011636637 HC RX REV CODE- 636/637: Performed by: INTERNAL MEDICINE

## 2018-01-27 PROCEDURE — 82962 GLUCOSE BLOOD TEST: CPT

## 2018-01-27 PROCEDURE — 99218 HC RM OBSERVATION: CPT

## 2018-01-27 PROCEDURE — 74011250636 HC RX REV CODE- 250/636: Performed by: INTERNAL MEDICINE

## 2018-01-27 RX ORDER — HYDROCODONE BITARTRATE AND ACETAMINOPHEN 5; 325 MG/1; MG/1
1 TABLET ORAL
Qty: 20 TAB | Refills: 0 | Status: SHIPPED | OUTPATIENT
Start: 2018-01-27 | End: 2019-02-04

## 2018-01-27 RX ORDER — CEPHALEXIN 500 MG/1
500 CAPSULE ORAL 3 TIMES DAILY
Qty: 15 CAP | Refills: 0 | Status: SHIPPED | OUTPATIENT
Start: 2018-01-27 | End: 2018-02-01

## 2018-01-27 RX ADMIN — METOPROLOL SUCCINATE 150 MG: 50 TABLET, EXTENDED RELEASE ORAL at 08:28

## 2018-01-27 RX ADMIN — ATORVASTATIN CALCIUM 20 MG: 20 TABLET, FILM COATED ORAL at 08:28

## 2018-01-27 RX ADMIN — INSULIN LISPRO 3 UNITS: 100 INJECTION, SOLUTION INTRAVENOUS; SUBCUTANEOUS at 08:28

## 2018-01-27 RX ADMIN — SACUBITRIL AND VALSARTAN 1 TABLET: 24; 26 TABLET, FILM COATED ORAL at 09:00

## 2018-01-27 RX ADMIN — CEFAZOLIN 2 G: 1 INJECTION, POWDER, FOR SOLUTION INTRAMUSCULAR; INTRAVENOUS; PARENTERAL at 04:41

## 2018-01-27 RX ADMIN — FUROSEMIDE 20 MG: 20 TABLET ORAL at 08:27

## 2018-01-27 RX ADMIN — ACETAMINOPHEN 650 MG: 325 TABLET ORAL at 04:48

## 2018-01-27 RX ADMIN — GABAPENTIN 100 MG: 100 CAPSULE ORAL at 08:28

## 2018-01-27 RX ADMIN — DULOXETINE HYDROCHLORIDE 30 MG: 30 CAPSULE, DELAYED RELEASE ORAL at 08:28

## 2018-01-27 RX ADMIN — Medication 10 ML: at 04:43

## 2018-01-27 RX ADMIN — LEVOTHYROXINE SODIUM 175 MCG: 150 TABLET ORAL at 08:01

## 2018-01-27 RX ADMIN — OXYBUTYNIN CHLORIDE 5 MG: 5 TABLET, FILM COATED, EXTENDED RELEASE ORAL at 08:27

## 2018-01-27 RX ADMIN — BUDESONIDE 500 MCG: 0.5 INHALANT RESPIRATORY (INHALATION) at 09:22

## 2018-01-27 RX ADMIN — SPIRONOLACTONE 25 MG: 25 TABLET, FILM COATED ORAL at 08:28

## 2018-01-27 RX ADMIN — ASPIRIN 81 MG 81 MG: 81 TABLET ORAL at 08:27

## 2018-01-27 RX ADMIN — POTASSIUM CHLORIDE 10 MEQ: 750 TABLET, FILM COATED, EXTENDED RELEASE ORAL at 08:28

## 2018-01-27 RX ADMIN — PANTOPRAZOLE SODIUM 40 MG: 40 TABLET, DELAYED RELEASE ORAL at 08:01

## 2018-01-27 RX ADMIN — ACETAMINOPHEN 650 MG: 325 TABLET ORAL at 08:28

## 2018-01-27 RX ADMIN — ARFORMOTEROL TARTRATE 15 MCG: 15 SOLUTION RESPIRATORY (INHALATION) at 09:22

## 2018-01-27 NOTE — DISCHARGE SUMMARY
Cardiac Electrophysiology Discharge Summary       Patient ID:  Luis Armando Zepeda  [id number]  54 y.o.  1950    Admit Date: 1/26/2018    Discharge Date: 1/27/2018     Admitting Physician: Sivan Gill MD     Discharge Physician: Sivan Gill MD    Admission Diagnoses: cath lab ;Biventricular ICD (implantable cardioverter-defib*    Discharge Diagnoses: Active Problems:    Biventricular ICD (implantable cardioverter-defibrillator) in place (1/26/2018)        Discharge Condition: stable    Cardiology Procedures this Admission:  upgrade to [address] [place] [address] Course: Patient underwent above procedure without complication and remained stable without acute events. Discharge Exam:  Visit Vitals    /66    Pulse 74    Temp 98 °F (36.7 °C)    Resp 14    Ht 5' 3\" (1.6 m)    Wt 190 lb 4.1 oz (86.3 kg)    SpO2 95%    BMI 33.7 kg/m2       Abdomen: soft, non-tender  Extremities: extremities normal  Heart: regular rate and rhythm  Lungs: clear to auscultation bilaterally  Neck: no JVD  Neurologic: Grossly normal  Pulses: 2+ and symmetric    Disposition: Home    Patient Instructions:   Current Discharge Medication List      START taking these medications    Details   !! cephALEXin (KEFLEX) 500 mg capsule Take 1 Cap by mouth three (3) times daily for 5 days. Qty: 15 Cap, Refills: 0      acetaminophen (TYLENOL) 325 mg tablet Take 2 Tabs by mouth every four (4) hours as needed. !! cephALEXin (KEFLEX) 500 mg capsule Take 1 Cap by mouth three (3) times daily for 5 days. Qty: 15 Cap, Refills: 0       !! - Potential duplicate medications found. Please discuss with provider. CONTINUE these medications which have NOT CHANGED    Details   polyethylene glycol (MIRALAX) 17 gram packet Take 17 g by mouth daily. spironolactone (ALDACTONE) 25 mg tablet Take  by mouth daily.     Associated Diagnoses: Hypertension, unspecified type; Dilated cardiomyopathy (HCC)      sacubitril-valsartan (ENTRESTO) 24 mg/26 mg tablet Take 1 Tab by mouth two (2) times a day. Qty: 28 Tab, Refills: 0    Associated Diagnoses: Hypertension, unspecified type; Dilated cardiomyopathy (HCC)      metFORMIN (GLUCOPHAGE) 1,000 mg tablet Take 1 Tab by mouth two (2) times a day. Resume 6/23  Qty: 30 Tab, Refills: 0      acetaminophen-codeine (TYLENOL #3) 300-30 mg per tablet Take 1 Tab by mouth every six (6) hours as needed for Pain. Max Daily Amount: 4 Tabs. Qty: 20 Tab, Refills: 0      Insulin Needles, Disposable, (BD INSULIN PEN NEEDLE UF SHORT) 31 gauge x 5/16\" ndle USE AS DIRECTED FOUR TIMES A DAY  Qty: 200 Package, Refills: 5      furosemide (LASIX) 20 mg tablet Take 20 mg by mouth daily. oxybutynin chloride XL (DITROPAN XL) 5 mg CR tablet Take 5 mg by mouth daily. levothyroxine (SYNTHROID) 150 mcg tablet Take 175 mcg by mouth Daily (before breakfast). metoprolol succinate (TOPROL-XL) 50 mg XL tablet Take 150 mg by mouth daily. DULoxetine (CYMBALTA) 30 mg capsule Take 30 mg by mouth daily. omeprazole (PRILOSEC) 40 mg capsule Take 40 mg by mouth daily. atorvastatin (LIPITOR) 20 mg tablet Take  by mouth daily. Associated Diagnoses: Type II or unspecified type diabetes mellitus without mention of complication, uncontrolled; Other specified acquired hypothyroidism; Unspecified vitamin D deficiency      HUMALOG MIX 75-25 KWIKPEN 100 unit/mL (75-25) flexpen INJECT 24 UNITS UNDER THE SKIN TWICE A DAY WITH BREAKFAST AND DINNER  Qty: 15 mL, Refills: 6      aspirin 81 mg tablet Take 81 mg by mouth. Associated Diagnoses: Type II or unspecified type diabetes mellitus without mention of complication, uncontrolled      potassium chloride SR (KLOR-CON 10) 10 mEq tablet Take 10 mEq by mouth daily. Associated Diagnoses: Type II or unspecified type diabetes mellitus without mention of complication, uncontrolled      OTHER 1 Cap daily.  Tumeric Curcumin 500 mg      gabapentin (NEURONTIN) 300 mg capsule Take 100 mg by mouth three (3) times daily. nitroglycerin (NITROSTAT) 0.4 mg SL tablet by SubLINGual route every five (5) minutes as needed for Chest Pain. Associated Diagnoses: Type II or unspecified type diabetes mellitus without mention of complication, uncontrolled; Other specified acquired hypothyroidism; Unspecified vitamin D deficiency      fluticasone-salmeterol (ADVAIR DISKUS) 250-50 mcg/dose diskus inhaler Take 1 Puff by inhalation as needed. Associated Diagnoses: Type II or unspecified type diabetes mellitus without mention of complication, uncontrolled      glucose blood VI test strips (FREESTYLE TEST) strip 200 Each by Does Not Apply route four (4) times daily. Qty: 2 Package, Refills: 6    Associated Diagnoses: Type II or unspecified type diabetes mellitus without mention of complication, uncontrolled             Referenced discharge instructions provided by nursing for diet and activity.     Follow-up with Ziggy Craig MD             Signed:  Radhames Dalton MD  Cardiac Electrophysiology  1/27/2018  11:37 AM

## 2018-01-27 NOTE — PROGRESS NOTES
Bedside and Verbal shift change report given to Ana Rosa Barton (oncoming nurse) by Nereida Doss (offgoing nurse). Report included the following information SBAR, Kardex, Intake/Output, MAR and Recent Results.

## 2018-01-27 NOTE — DISCHARGE INSTRUCTIONS
ICD  Discharge Instructions    Please make sure you have received your Temporary ICD identification card with your discharge instructions      MEDICATIONS         Take only the medications prescribed to you at discharge. ACTIVITY         Return to your normal activity, except as noted below. o Do not lift anything heavier than 10 pounds for 4 weeks with the affected arm. This is how long it takes the muscles to heal, and the leads inside your heart to stabilize their position. o Do not reach above your head with the affected arm for 4 weeks, doing so increases the risk of lead dislodgement.    o It is, however, important to move the affected arm to prevent shoulder stiffness and locking. o Avoid tight clothes or unnecessary pressure over your incision (such as bra straps or seat belts). If it is tender or sensitive to clothing, cover the incision with a soft dressing or pad.  o Questions about driving are individualized and should be discussed with one of the EP Physicians prior to discharge. SHOWERING         Leave the bandage over your incision for 7 days after the ICD implant. You bandage will be removed in clinic in 7 days.  It is important to keep the bandaged area clean and dry. You may shower around the site until the bandage is removed in clinic. Thereafter, you may shower after the bandage is removed, washing it gently with soap and water. Do not apply any lotions, powders, or perfumes to the incision line.  Avoid submerging your incision in water (tub baths, hot tubs, or swimming) for four weeks.  Underneath the dressing. o If you have white steri-strips over your incision (underneath the gauze dressing), they will curl up at the end and fall off, usually within 10 days. Do not pull them off.  - OR -   o You may have a different type of closure for the incision.   If Dermabond Adhesive was used to close your incision, you will receive a separate instruction sheet.      DISCHARGE PRECAUTIONS         Record your temperature every day, at the same time, for 3 weeks after your implant. A temperature of 100.5 F, or higher, can be the first sign of infection. This should be reported to your Doctor immediately.  You can have an MRI after 6 weeks. You must be aware that any strong magnet or magnetic field can affect your ICD. In general, be careful of metal detectors, heavy machinery, and any area where arc-welding is performed. Avoid metal detectors such as the ones in security checkpoints at [facility] or [address] [place] [address]. When approaching a security checkpoint show your ICD Identification Card to security personnel and ask to be hand searched.  Always tell your doctor or dentist that you have an ICD. Antibiotics may be prescribed before certain procedures.  If you use a cell phone, hold it on the opposite side from where your ICD is implanted.  Your temporary identification will be given to you with these instructions. Keep your ICD card in your wallet or on your person at all times. You should receive your permanent card in 8 weeks. If you do not receive your permanent card, please call the office at [phone]. TAKING YOUR PULSE         Take your pulse the same time every day, preferably in the morning.  Sit down and rest for 5 minutes prior to taking your pulse.  Take your pulse for 1 full minute, use a clock or stop watch with a second hand.  To feel your pulse, use the first two fingers of one hand; place them on the thumb side of the wrist of the opposite hand. The pulse will be steady, regular and throbbing.  Call the office if your pulse is less than 40 beats per minute. SYMPTOMS THAT NEED TO BE REPORTED IMMEDIATELY         Temperature more than 100.4 F     Redness or warmth at the incision site, or pain for longer than the first 5 days after the implant.  Drainage from the incision site.      Swelling around the incision site.  Shortness of breath.  Rapid heart rate or palpitations.  Dizziness, lightheadedness, fainting.  Slow pulse below 40 beats per minute.  REMEMBER: If you feel something is an emergency or cannot be handled over the phone, call 911 or go to the closest emergency room. WHAT TO DO IF YOUR ICD DELIVERS A SHOCK     · If you ICD delivers a shock, you should seek attention at the nearest emergency room, call our office or call 911.           Ana M Corral MD  Cardiac Electrophysiology / Cardiology    [facility]  [address] [facility], [address] [facility], [address]  [address]  [phone] / [phone] Fax       [phone] / [phone] Fax

## 2018-01-27 NOTE — ROUTINE PROCESS
Bedside and Verbal shift change report given to Atul Barnhart RN (oncoming nurse) by Nedra Valadez RN (offgoing nurse). Report included the following information SBAR, Kardex, ED Summary, Intake/Output, MAR and Recent Results.

## 2018-02-05 ENCOUNTER — OFFICE VISIT (OUTPATIENT)
Dept: CARDIOLOGY CLINIC | Age: 68
End: 2018-02-05

## 2018-02-05 VITALS
SYSTOLIC BLOOD PRESSURE: 128 MMHG | DIASTOLIC BLOOD PRESSURE: 70 MMHG | HEIGHT: 63 IN | OXYGEN SATURATION: 98 % | WEIGHT: 194.8 LBS | BODY MASS INDEX: 34.52 KG/M2 | HEART RATE: 63 BPM | RESPIRATION RATE: 18 BRPM

## 2018-02-05 DIAGNOSIS — Z51.89 VISIT FOR WOUND CHECK: Primary | ICD-10-CM

## 2018-02-05 NOTE — PROGRESS NOTES
Bianka Aguilar is a 69 y.o. female  Chief Complaint   Patient presents with    Wound Check     Visit Vitals    /70 (BP 1 Location: Right arm, BP Patient Position: Sitting)    Pulse 63    Resp 18    Ht 5' 3\" (1.6 m)    Wt 194 lb 12.8 oz (88.4 kg)    SpO2 98%    BMI 34.51 kg/m2

## 2018-02-05 NOTE — PROGRESS NOTES
Patient presents for wound check post-device implantation. The dressing was removed and the site was inspected. The site appeared to be well-healing without ecchymosis/tenderness/erythema. Denies pain, fevers, discharge. Plan:    Continue follow up in device clinic as planned.        Michael rCowe MD

## 2018-02-15 ENCOUNTER — TELEPHONE (OUTPATIENT)
Dept: CARDIOLOGY CLINIC | Age: 68
End: 2018-02-15

## 2018-02-15 NOTE — TELEPHONE ENCOUNTER
Telephone Call RE:  Appointment reminder     Outcome:     [] Patient confirmed appointment   [] Patient rescheduled appointment for    [] Unable to reach   [] Left message              [x] Other:     Need to reschedule patients appointment .    Attempted to reach      formerly [facility]

## 2018-02-15 NOTE — TELEPHONE ENCOUNTER
Received call, ID verified using two patient identifiers, patient is s/p BiV ICD on 1/26/18. She states she has a raised area that feels hard at the incision site. Denies any redness,pain, swelling,chills,  fever, drainage or open areas at incision. Confirms that she has been following all post op restrictions as far as lifting and moving her arm. Advised patient that I would relay information to Dr. Severo Rosser for his recommendations. Advised patient to continue monitoring site and to call with any further questions or concerns.

## 2018-02-16 ENCOUNTER — DOCUMENTATION ONLY (OUTPATIENT)
Dept: CARDIOLOGY CLINIC | Age: 68
End: 2018-02-16

## 2018-02-16 ENCOUNTER — TELEPHONE (OUTPATIENT)
Dept: CARDIOLOGY CLINIC | Age: 68
End: 2018-02-16

## 2018-02-16 DIAGNOSIS — Z95.810 BIVENTRICULAR ICD (IMPLANTABLE CARDIOVERTER-DEFIBRILLATOR) IN PLACE: Primary | ICD-10-CM

## 2018-02-16 RX ORDER — SULFAMETHOXAZOLE AND TRIMETHOPRIM 800; 160 MG/1; MG/1
1 TABLET ORAL 2 TIMES DAILY
Qty: 10 TAB | Refills: 0 | Status: SHIPPED | OUTPATIENT
Start: 2018-02-16 | End: 2018-05-07 | Stop reason: ALTCHOICE

## 2018-02-16 NOTE — TELEPHONE ENCOUNTER
Telephone Call RE:  Appointment reminder     Outcome:     [] Patient confirmed appointment   [] Patient rescheduled appointment for    [] Unable to reach   [] Left message              [x] Other:     Asked patients sister to have the patient return my call to reschedule her appointment on Tuesday. Patient was driving .         Zheng Bazan

## 2018-02-16 NOTE — TELEPHONE ENCOUNTER
Called patient, ID verified using 2 patient identifiers, per CHRIS Tavares NP patient needs to be seen in office for site check. Patient scheduled for today at 1:20 pm at [facility] office. Patient agreeable to this plan.

## 2018-02-16 NOTE — PROGRESS NOTES
HISTORY OF PRESENTING ILLNESS      Jesús Calloway is a 69 y.o. female with recent CRTD implant presenting prior to scheduled appointment with complaint of pain and swelling at her ICD site. She has been icing the site. There is no redness but the site is noticeably swollen, slightly tender and soft. Denies fevers. Incision intact.         ACTIVE PROBLEM LIST     Patient Active Problem List    Diagnosis Date Noted    Biventricular ICD (implantable cardioverter-defibrillator) in place 01/26/2018     Priority: 1 - One    Cardiomyopathy ([address] 05/23/2016    Skin cancer     Acid indigestion     Asthma     Back pain     Wears dentures     Constipation     Diabetes mellitus     Diarrhea     Frequent episodic tension-type headache     Hemorrhoids     Hernia of unspecified site of abdominal cavity without mention of obstruction or gangrene     HTN (hypertension)     Muscle pain     SOB (shortness of breath)     Thyroid disorder            PAST MEDICAL HISTORY     Past Medical History:   Diagnosis Date    Acid indigestion     heartburn    Asthma     Back pain     Constipation     Diabetes mellitus     Diarrhea     Frequent episodic tension-type headache     Hemorrhoids     Hernia of unspecified site of abdominal cavity without mention of obstruction or gangrene     HTN (hypertension)     ICD (implantable cardioverter-defibrillator) in place     Muscle pain     joint pain    Skin cancer     SOB (shortness of breath)     Thyroid disorder     Wears dentures            PAST SURGICAL HISTORY     Past Surgical History:   Procedure Laterality Date    HX CARPAL TUNNEL RELEASE      right hand    HX HEART CATHETERIZATION  2/6/16    HX HYSTERECTOMY      HX PACEMAKER Left 6/20/16    [employer] ICD    HX THYROIDECTOMY      REMOVAL GALLBLADDER            ALLERGIES     Allergies   Allergen Reactions    Codeine Other (comments)     Patient states she is not allergic    Novocain [Procaine] Nausea and Vomiting    Tramadol Other (comments)     Headache          FAMILY HISTORY     Family History   Problem Relation Age of Onset    Diabetes Mother     Heart Disease Mother     Heart Disease Father    Harding Junk Sister     Diabetes Sister     Heart Disease Sister     Hypertension Sister     Diabetes Brother     negative for cardiac disease       SOCIAL HISTORY     Social History     Social History    Marital status:      Spouse name: N/A    Number of children: N/A    Years of education: N/A     Social History Main Topics    Smoking status: Never Smoker    Smokeless tobacco: Never Used    Alcohol use Yes      Comment: wine occasionally    Drug use: No    Sexual activity: Not on file     Other Topics Concern    Not on file     Social History Narrative         MEDICATIONS     Current Outpatient Prescriptions   Medication Sig    HYDROcodone-acetaminophen (NORCO) 5-325 mg per tablet Take 1 Tab by mouth every four (4) hours as needed for Pain. Max Daily Amount: 6 Tabs.  polyethylene glycol (MIRALAX) 17 gram packet Take 17 g by mouth daily.  acetaminophen (TYLENOL) 325 mg tablet Take 2 Tabs by mouth every four (4) hours as needed.  spironolactone (ALDACTONE) 25 mg tablet Take  by mouth daily.  sacubitril-valsartan (ENTRESTO) 24 mg/26 mg tablet Take 1 Tab by mouth two (2) times a day.  metFORMIN (GLUCOPHAGE) 1,000 mg tablet Take 1 Tab by mouth two (2) times a day. Resume 6/23    acetaminophen-codeine (TYLENOL #3) 300-30 mg per tablet Take 1 Tab by mouth every six (6) hours as needed for Pain. Max Daily Amount: 4 Tabs.  Insulin Needles, Disposable, (BD INSULIN PEN NEEDLE UF SHORT) 31 gauge x 5/16\" ndle USE AS DIRECTED FOUR TIMES A DAY    OTHER 1 Cap daily. Tumeric Curcumin 500 mg    furosemide (LASIX) 20 mg tablet Take 20 mg by mouth daily.  gabapentin (NEURONTIN) 300 mg capsule Take 100 mg by mouth three (3) times daily.     oxybutynin chloride XL (DITROPAN XL) 5 mg CR tablet Take 5 mg by mouth daily.  levothyroxine (SYNTHROID) 150 mcg tablet Take 175 mcg by mouth Daily (before breakfast).  metoprolol succinate (TOPROL-XL) 50 mg XL tablet Take 50 mg by mouth daily.  DULoxetine (CYMBALTA) 30 mg capsule Take 30 mg by mouth daily.  omeprazole (PRILOSEC) 40 mg capsule Take 40 mg by mouth daily.  atorvastatin (LIPITOR) 20 mg tablet Take  by mouth daily.  nitroglycerin (NITROSTAT) 0.4 mg SL tablet by SubLINGual route every five (5) minutes as needed for Chest Pain.  HUMALOG MIX 75-25 KWIKPEN 100 unit/mL (75-25) flexpen INJECT 24 UNITS UNDER THE SKIN TWICE A DAY WITH BREAKFAST AND DINNER    aspirin 81 mg tablet Take 81 mg by mouth.  potassium chloride SR (KLOR-CON 10) 10 mEq tablet Take 10 mEq by mouth daily.  fluticasone-salmeterol (ADVAIR DISKUS) 250-50 mcg/dose diskus inhaler Take 1 Puff by inhalation as needed.  glucose blood VI test strips (FREESTYLE TEST) strip 200 Each by Does Not Apply route four (4) times daily. No current facility-administered medications for this visit. I have reviewed the nurses notes, vitals, problem list, allergy list, medical history, family, social history and medications. REVIEW OF SYMPTOMS      General: Pt denies excessive weight gain or loss. Pt is able to conduct ADL's  HEENT: Denies blurred vision, headaches, hearing loss, epistaxis and difficulty swallowing. Respiratory: Denies cough, congestion, shortness of breath, GABRIEL, wheezing or stridor.   Cardiovascular: Denies precordial pain, palpitations, edema or PND  Gastrointestinal: Denies poor appetite, indigestion, abdominal pain or blood in stool  Genitourinary: Denies hematuria, dysuria, increased urinary frequency  Musculoskeletal: Denies joint pain or swelling from muscles or joints  Neurologic: Denies tremor, paresthesias, headache, or sensory motor disturbance  Psychiatric: Denies confusion, insomnia, depression  Integumentray: Denies rash, itching or ulcers. Hematologic: Denies easy bruising, bleeding       PHYSICAL EXAMINATION      There were no vitals filed for this visit. General: Well developed, in no acute distress. HEENT: No jaundice, oral mucosa moist, no oral ulcers  Neck: Supple, no stiffness, no lymphadenopathy, supple  Heart:  Normal S1/S2 negative S3 or S4. Regular, no murmur, gallop or rub, no jugular venous distention  Respiratory: Clear bilaterally x 4, no wheezing or rales  Abdomen:   Soft, non-tender, bowel sounds are active.   Extremities:  No edema, normal cap refill, no cyanosis. Musculoskeletal: No clubbing, no deformities  Neuro: A&Ox3, speech clear, gait stable, cooperative, no focal neurologic deficits  Skin: Skin color is normal. No rashes or lesions. Non diaphoretic, moist.  Vascular: 2+ pulses symmetric in all extremities       DIAGNOSTIC DATA      EKG:        LABORATORY DATA      Lab Results   Component Value Date/Time    WBC 8.2 01/19/2018 02:09 PM    HGB 13.7 01/19/2018 02:09 PM    HCT 41.9 01/19/2018 02:09 PM    PLATELET 501 (L) 74/78/0830 02:09 PM    MCV 95 01/19/2018 02:09 PM      Lab Results   Component Value Date/Time    Sodium 136 01/27/2018 04:45 AM    Potassium 4.0 01/27/2018 04:45 AM    Chloride 104 01/27/2018 04:45 AM    CO2 26 01/27/2018 04:45 AM    Anion gap 6 01/27/2018 04:45 AM    Glucose 242 (H) 01/27/2018 04:45 AM    BUN 15 01/27/2018 04:45 AM    Creatinine 0.75 01/27/2018 04:45 AM    BUN/Creatinine ratio 20 01/27/2018 04:45 AM    GFR est AA >60 01/27/2018 04:45 AM    GFR est non-AA >60 01/27/2018 04:45 AM    Calcium 8.3 (L) 01/27/2018 04:45 AM    Bilirubin, total 0.5 11/29/2017 12:00 AM    AST (SGOT) 31 11/29/2017 12:00 AM    Alk. phosphatase 97 11/29/2017 12:00 AM    Protein, total 6.9 11/29/2017 12:00 AM    Albumin 3.8 11/29/2017 12:00 AM    A-G Ratio 1.2 11/29/2017 12:00 AM    ALT (SGPT) 29 11/29/2017 12:00 AM           ASSESSMENT      1. ICD  2.  Pocket swelling/pain         PLAN     Unclear whether swelling is due to infection. Will treat with prophylactic antibiotics. Bactrim DS 1 tab bid.         FOLLOW-UP     1 week      Ronaldo Alejandro MD  Cardiac Electrophysiology / Cardiology    [facility] [address], [address] [place] [address]  [phone] / [phone] Fax   [phone] / [phone] Fax

## 2018-02-23 ENCOUNTER — OFFICE VISIT (OUTPATIENT)
Dept: CARDIOLOGY CLINIC | Age: 68
End: 2018-02-23

## 2018-02-23 VITALS
SYSTOLIC BLOOD PRESSURE: 148 MMHG | HEIGHT: 63 IN | BODY MASS INDEX: 34.2 KG/M2 | HEART RATE: 80 BPM | OXYGEN SATURATION: 98 % | WEIGHT: 193 LBS | RESPIRATION RATE: 20 BRPM | DIASTOLIC BLOOD PRESSURE: 80 MMHG

## 2018-02-23 DIAGNOSIS — Z51.89 VISIT FOR WOUND CHECK: Primary | ICD-10-CM

## 2018-02-23 NOTE — PROGRESS NOTES
Patient presents for wound check post-device implantation. The dressing was removed and the site was inspected. The site appeared to be well-healing without ecchymosis/tenderness/erythema. Denies pain, fevers, discharge. Plan:    Continue follow up in device clinic as planned.        Deejay Doran MD

## 2018-02-23 NOTE — MR AVS SNAPSHOT
[address] [facility] 
[phone] Patient: Yulissa Ppoe MRN: [id number] OBC:[birth year] Visit Information Date & Time Provider Department Dept. Phone Encounter #  
 2/23/2018 11:20 AM Cliff Moses MD [facility] [phone] [id number] Your Appointments 2/28/2018  2:15 PM  
PROCEDURE with PACEMAKER, STFRANCES  
[facility] ([facility] SCHEDULING) Appt Note: 5 wk hosp fu and device check sll [address] [facility] [address] [facility]  
[address] [place] [address]  
  
    
 2/28/2018  2:20 PM  
ESTABLISHED PATIENT with Cliff Moses MD  
[facility] ([address]) Appt Note: 5 wk hosp fu and device check sll [address] [facility] [address] [facility]  
[address] [address]  
  
    
 3/12/2018  9:40 AM  
ESTABLISHED PATIENT with Cliff Moses MD  
[facility] ([address]) Appt Note: 2 wk fu  
 [address] [facility]  
[phone] Upcoming Health Maintenance Date Due Hepatitis C Screening 1950 DTaP/Tdap/Td series (1 - Tdap) 8/28/1969 BREAST CANCER SCRN MAMMOGRAM 8/28/1998 FOBT Q 1 YEAR AGE 50-75 8/28/1998 ZOSTER VACCINE AGE 60> 6/28/2008 GLAUCOMA SCREENING Q2Y 8/28/2013 Pneumococcal 65+ High/Highest Risk (1 of 2 - PCV13) 8/28/2013 MEDICARE YEARLY EXAM 8/28/2013 MICROALBUMIN Q1 2/25/2015 EYE EXAM RETINAL OR DILATED Q1 11/5/2015 HEMOGLOBIN A1C Q6M 11/18/2015 FOOT EXAM Q1 4/2/2016 LIPID PANEL Q1 5/12/2016 Influenza Age 5 to Adult 8/1/2017 Allergies as of 2/23/2018  Review Complete On: 2/5/2018 By: Cliff Moses MD  
  
 Severity Noted Reaction Type Reactions Codeine  05/31/2011   Intolerance Other (comments) Patient states she is not allergic Novocain [Procaine]  11/01/2011    Nausea and Vomiting Tramadol  05/23/2016    Other (comments) Headache Current Immunizations  Never Reviewed No immunizations on file. Not reviewed this visit Vitals BP Pulse Resp Height(growth percentile) Weight(growth percentile) SpO2  
 148/80 80 20 5' 3\" (1.6 m) 193 lb (87.5 kg) 98% BMI Smoking Status 34.19 kg/m2 Never Smoker Vitals History BMI and BSA Data Body Mass Index Body Surface Area  
 34.19 kg/m 2 1.97 m 2 Preferred Pharmacy Pharmacy Name Phone [facility] [address] [facility] [address] [facility], [address] [phone] Your Updated Medication List  
  
   
This list is accurate as of 2/23/18 11:48 AM.  Always use your most recent med list.  
  
  
  
  
 acetaminophen 325 mg tablet Commonly known as:  TYLENOL Take 2 Tabs by mouth every four (4) hours as needed. acetaminophen-codeine 300-30 mg per tablet Commonly known as:  TYLENOL #3 Take 1 Tab by mouth every six (6) hours as needed for Pain. Max Daily Amount: 4 Tabs. ADVAIR DISKUS 250-50 mcg/dose diskus inhaler Generic drug:  fluticasone-salmeterol Take 1 Puff by inhalation as needed. aspirin 81 mg tablet Take 81 mg by mouth. atorvastatin 20 mg tablet Commonly known as:  LIPITOR Take  by mouth daily. DULoxetine 30 mg capsule Commonly known as:  CYMBALTA Take 30 mg by mouth daily. furosemide 20 mg tablet Commonly known as:  LASIX Take 20 mg by mouth daily. gabapentin 300 mg capsule Commonly known as:  NEURONTIN Take 100 mg by mouth three (3) times daily. glucose blood VI test strips strip Commonly known as:  FREESTYLE TEST  
200 Each by Does Not Apply route four (4) times daily. HumaLOG Mix 75-25 KwikPen 100 unit/mL (75-25) Inpn Generic drug:  insulin lispro  & lisp protamine (human) INJECT 24 UNITS UNDER THE SKIN TWICE A DAY WITH BREAKFAST AND DINNER HYDROcodone-acetaminophen 5-325 mg per tablet Commonly known as:  [place] Take 1 Tab by mouth every four (4) hours as needed for Pain. Max Daily Amount: 6 Tabs. Insulin Needles (Disposable) 31 gauge x 5/16\" Ndle Commonly known as:  BD INSULIN PEN NEEDLE UF SHORT  
USE AS DIRECTED FOUR TIMES A DAY  
  
 KLOR-CON 10 10 mEq tablet Generic drug:  potassium chloride SR Take 10 mEq by mouth daily. levothyroxine 150 mcg tablet Commonly known as:  SYNTHROID Take 175 mcg by mouth Daily (before breakfast). metFORMIN 1,000 mg tablet Commonly known as:  GLUCOPHAGE Take 1 Tab by mouth two (2) times a day. Resume 6/23  
  
 metoprolol succinate 50 mg XL tablet Commonly known as:  TOPROL-XL Take 50 mg by mouth daily. MIRALAX 17 gram packet Generic drug:  polyethylene glycol Take 17 g by mouth daily. nitroglycerin 0.4 mg SL tablet Commonly known as:  NITROSTAT  
by SubLINGual route every five (5) minutes as needed for Chest Pain. omeprazole 40 mg capsule Commonly known as:  PRILOSEC Take 40 mg by mouth daily. OTHER  
1 Cap daily. Tumeric Curcumin 500 mg  
  
 oxybutynin chloride XL 5 mg CR tablet Commonly known as:  DITROPAN XL Take 5 mg by mouth daily. sacubitril-valsartan 24-26 mg tablet Commonly known as:  ENTRESTO Take 1 Tab by mouth two (2) times a day. spironolactone 25 mg tablet Commonly known as:  ALDACTONE Take  by mouth daily. trimethoprim-sulfamethoxazole 160-800 mg per tablet Commonly known as:  BACTRIM DS, SEPTRA DS Take 1 Tab by mouth two (2) times a day. Introducing [facility]!    
 Kelly Barroso introduces [employer] patient portal. Now you can access parts of your medical record, email your doctor's office, and request medication refills online. 1. In your internet browser, go to [email or web address] 2. Click on the First Time User? Click Here link in the Sign In box. You will see the New Member Sign Up page. 3. Enter your [employer] Access Code exactly as it appears below. You will not need to use this code after youve completed the sign-up process. If you do not sign up before the expiration date, you must request a new code. · [employer] Access Code: [email or web address] Expires: 4/25/2018  4:06 PM 
 
4. Enter the last four digits of your Social Security Number (xxxx) and Date of Birth (mm/dd/yyyy) as indicated and click Submit. You will be taken to the next sign-up page. 5. Create a Gipis ID. This will be your G[employer]is login ID and cannot be changed, so think of one that is secure and easy to remember. 6. Create a G[employer]is password. You can change your password at any time. 7. Enter your Password Reset Question and Answer. This can be used at a later time if you forget your password. 8. Enter your e-mail address. You will receive e-mail notification when new information is available in [address] 9. Click Sign Up. You can now view and download portions of your medical record. 10. Click the Download Summary menu link to download a portable copy of your medical information. If you have questions, please visit the Frequently Asked Questions section of the [employer] website. Remember, [employer]is is NOT to be used for urgent needs. For medical emergencies, dial 911. Now available from your iPhone and Android! Please provide this summary of care documentation to your next provider. Your primary care clinician is listed as Mendez Guo. If you have any questions after today's visit, please call [phone].

## 2018-03-12 ENCOUNTER — TELEPHONE (OUTPATIENT)
Dept: CARDIOLOGY CLINIC | Age: 68
End: 2018-03-12

## 2018-04-05 ENCOUNTER — TELEPHONE (OUTPATIENT)
Dept: CARDIOLOGY CLINIC | Age: 68
End: 2018-04-05

## 2018-04-05 NOTE — TELEPHONE ENCOUNTER
Telephone Call RE:  Appointment reminder     Outcome:     [] Patient confirmed appointment   [] Patient rescheduled appointment for    [] Unable to reach   [x] Left message              [] Other:     Asking patient for a return call to schedule a follow up everett Simons

## 2018-05-07 ENCOUNTER — OFFICE VISIT (OUTPATIENT)
Dept: CARDIOLOGY CLINIC | Age: 68
End: 2018-05-07

## 2018-05-07 ENCOUNTER — CLINICAL SUPPORT (OUTPATIENT)
Dept: CARDIOLOGY CLINIC | Age: 68
End: 2018-05-07

## 2018-05-07 VITALS
RESPIRATION RATE: 20 BRPM | SYSTOLIC BLOOD PRESSURE: 160 MMHG | BODY MASS INDEX: 34.84 KG/M2 | OXYGEN SATURATION: 98 % | HEIGHT: 63 IN | DIASTOLIC BLOOD PRESSURE: 80 MMHG | HEART RATE: 84 BPM | WEIGHT: 196.6 LBS

## 2018-05-07 DIAGNOSIS — Z45.02 ICD (IMPLANTABLE CARDIOVERTER-DEFIBRILLATOR) BATTERY DEPLETION: Primary | ICD-10-CM

## 2018-05-07 DIAGNOSIS — I42.8 OTHER CARDIOMYOPATHY (HCC): Primary | ICD-10-CM

## 2018-05-07 RX ORDER — ACETAMINOPHEN 500 MG
500 TABLET ORAL
COMMUNITY

## 2018-05-07 NOTE — PROGRESS NOTES
HISTORY OF PRESENTING ILLNESS      Oc Paige a 69 y.o. female with NICM, DM, hypertension, LVEF 20%, LHC 2/16, LBBB (QRS duration 144 msec), NYHA class III symptoms and a single chamber ICD who underwent upgrade to a biventricular ICD system for primary prevention of sudden death and now presents for follow up. Her implant site had been swollen and tender; prophylactic courses of additional antibiotics were prescribed and she demonstrated continued improvement of these symptoms. Her incision site is healing well and device interrogation reveals normal functioning.         ACTIVE PROBLEM LIST     Patient Active Problem List    Diagnosis Date Noted    Biventricular ICD (implantable cardioverter-defibrillator) in place 01/26/2018     Priority: 1 - One    Cardiomyopathy ([facility] [address] 05/23/2016    Skin cancer     Acid indigestion     Asthma     Back pain     Wears dentures     Constipation     Diabetes mellitus     Diarrhea     Frequent episodic tension-type headache     Hemorrhoids     Hernia of unspecified site of abdominal cavity without mention of obstruction or gangrene     HTN (hypertension)     Muscle pain     SOB (shortness of breath)     Thyroid disorder            PAST MEDICAL HISTORY     Past Medical History:   Diagnosis Date    Acid indigestion     heartburn    Asthma     Back pain     Constipation     Diabetes mellitus     Diarrhea     Frequent episodic tension-type headache     Hemorrhoids     Hernia of unspecified site of abdominal cavity without mention of obstruction or gangrene     HTN (hypertension)     ICD (implantable cardioverter-defibrillator) in place     Muscle pain     joint pain    Pacemaker     biventricular ICD 1/26/18    Skin cancer     SOB (shortness of breath)     Thyroid disorder     Wears dentures            PAST SURGICAL HISTORY     Past Surgical History:   Procedure Laterality Date    HX CARPAL TUNNEL RELEASE      right hand    HX HEART CATHETERIZATION  2/6/16    HX HYSTERECTOMY      HX PACEMAKER Left 6/20/16    HuoBi ICD    HX THYROIDECTOMY      REMOVAL GALLBLADDER            ALLERGIES     Allergies   Allergen Reactions    Codeine Other (comments)     Patient states she is not allergic    Novocain [Procaine] Nausea and Vomiting    Tramadol Other (comments)     Headache          FAMILY HISTORY     Family History   Problem Relation Age of Onset    Diabetes Mother     Heart Disease Mother     Heart Disease Father     Cancer Sister     Diabetes Sister     Heart Disease Sister     Hypertension Sister     Diabetes Brother     negative for cardiac disease       SOCIAL HISTORY     Social History     Social History    Marital status:      Spouse name: N/A    Number of children: N/A    Years of education: N/A     Social History Main Topics    Smoking status: Never Smoker    Smokeless tobacco: Never Used    Alcohol use Yes      Comment: wine occasionally    Drug use: No    Sexual activity: Not on file     Other Topics Concern    Not on file     Social History Narrative         MEDICATIONS     Current Outpatient Prescriptions   Medication Sig    trimethoprim-sulfamethoxazole (BACTRIM DS, SEPTRA DS) 160-800 mg per tablet Take 1 Tab by mouth two (2) times a day.  HYDROcodone-acetaminophen (NORCO) 5-325 mg per tablet Take 1 Tab by mouth every four (4) hours as needed for Pain. Max Daily Amount: 6 Tabs.  polyethylene glycol (MIRALAX) 17 gram packet Take 17 g by mouth daily.  acetaminophen (TYLENOL) 325 mg tablet Take 2 Tabs by mouth every four (4) hours as needed.  spironolactone (ALDACTONE) 25 mg tablet Take  by mouth daily.  sacubitril-valsartan (ENTRESTO) 24 mg/26 mg tablet Take 1 Tab by mouth two (2) times a day.  metFORMIN (GLUCOPHAGE) 1,000 mg tablet Take 1 Tab by mouth two (2) times a day.  Resume 6/23    acetaminophen-codeine (TYLENOL #3) 300-30 mg per tablet Take 1 Tab by mouth every six (6) hours as needed for Pain. Max Daily Amount: 4 Tabs.  Insulin Needles, Disposable, (BD INSULIN PEN NEEDLE UF SHORT) 31 gauge x 5/16\" ndle USE AS DIRECTED FOUR TIMES A DAY    OTHER 1 Cap daily. Tumeric Curcumin 500 mg    furosemide (LASIX) 20 mg tablet Take 20 mg by mouth daily.  gabapentin (NEURONTIN) 300 mg capsule Take 100 mg by mouth three (3) times daily.  oxybutynin chloride XL (DITROPAN XL) 5 mg CR tablet Take 5 mg by mouth daily.  levothyroxine (SYNTHROID) 150 mcg tablet Take 175 mcg by mouth Daily (before breakfast).  metoprolol succinate (TOPROL-XL) 50 mg XL tablet Take 50 mg by mouth daily.  DULoxetine (CYMBALTA) 30 mg capsule Take 30 mg by mouth daily.  omeprazole (PRILOSEC) 40 mg capsule Take 40 mg by mouth daily.  atorvastatin (LIPITOR) 20 mg tablet Take  by mouth daily.  nitroglycerin (NITROSTAT) 0.4 mg SL tablet by SubLINGual route every five (5) minutes as needed for Chest Pain.  HUMALOG MIX 75-25 KWIKPEN 100 unit/mL (75-25) flexpen INJECT 24 UNITS UNDER THE SKIN TWICE A DAY WITH BREAKFAST AND DINNER    aspirin 81 mg tablet Take 81 mg by mouth.  potassium chloride SR (KLOR-CON 10) 10 mEq tablet Take 10 mEq by mouth daily.  fluticasone-salmeterol (ADVAIR DISKUS) 250-50 mcg/dose diskus inhaler Take 1 Puff by inhalation as needed.  glucose blood VI test strips (FREESTYLE TEST) strip 200 Each by Does Not Apply route four (4) times daily. No current facility-administered medications for this visit. I have reviewed the nurses notes, vitals, problem list, allergy list, medical history, family, social history and medications. REVIEW OF SYMPTOMS      General: Pt denies excessive weight gain or loss. Pt is able to conduct ADL's  HEENT: Denies blurred vision, headaches, hearing loss, epistaxis and difficulty swallowing. Respiratory: Denies cough, congestion, shortness of breath, GABRIEL, wheezing or stridor.   Cardiovascular: Denies precordial pain, palpitations, edema or PND  Gastrointestinal: Denies poor appetite, indigestion, abdominal pain or blood in stool  Genitourinary: Denies hematuria, dysuria, increased urinary frequency  Musculoskeletal: Denies joint pain or swelling from muscles or joints  Neurologic: Denies tremor, paresthesias, headache, or sensory motor disturbance  Psychiatric: Denies confusion, insomnia, depression  Integumentray: Denies rash, itching or ulcers. Hematologic: Denies easy bruising, bleeding       PHYSICAL EXAMINATION      There were no vitals filed for this visit. General: Well developed, in no acute distress. HEENT: No jaundice, oral mucosa moist, no oral ulcers  Neck: Supple, no stiffness, no lymphadenopathy, supple  Heart:  Normal S1/S2 negative S3 or S4. Regular, no murmur, gallop or rub, no jugular venous distention  Respiratory: Clear bilaterally x 4, no wheezing or rales  Abdomen:   Soft, non-tender, bowel sounds are active.   Extremities:  No edema, normal cap refill, no cyanosis. Musculoskeletal: No clubbing, no deformities  Neuro: A&Ox3, speech clear, gait stable, cooperative, no focal neurologic deficits  Skin: Skin color is normal. No rashes or lesions.  Non diaphoretic, moist.  Vascular: 2+ pulses symmetric in all extremities       DIAGNOSTIC DATA      EKG:        LABORATORY DATA      Lab Results   Component Value Date/Time    WBC 8.2 01/19/2018 02:09 PM    HGB 13.7 01/19/2018 02:09 PM    HCT 41.9 01/19/2018 02:09 PM    PLATELET 017 (L) 42/77/2721 02:09 PM    MCV 95 01/19/2018 02:09 PM      Lab Results   Component Value Date/Time    Sodium 136 01/27/2018 04:45 AM    Potassium 4.0 01/27/2018 04:45 AM    Chloride 104 01/27/2018 04:45 AM    CO2 26 01/27/2018 04:45 AM    Anion gap 6 01/27/2018 04:45 AM    Glucose 242 (H) 01/27/2018 04:45 AM    BUN 15 01/27/2018 04:45 AM    Creatinine 0.75 01/27/2018 04:45 AM    BUN/Creatinine ratio 20 01/27/2018 04:45 AM    GFR est AA >60 01/27/2018 04:45 AM    GFR est non-AA >60 01/27/2018 04:45 AM    Calcium 8.3 (L) 01/27/2018 04:45 AM    Bilirubin, total 0.5 11/29/2017 12:00 AM    AST (SGOT) 31 11/29/2017 12:00 AM    Alk. phosphatase 97 11/29/2017 12:00 AM    Protein, total 6.9 11/29/2017 12:00 AM    Albumin 3.8 11/29/2017 12:00 AM    A-G Ratio 1.2 11/29/2017 12:00 AM    ALT (SGPT) 29 11/29/2017 12:00 AM           ASSESSMENT      1. Cardiomyopathy              A. NICM              B. NYHA class II              C. LVEF 30-35%  2. CAD, nonobstructive  3. Asthma  4. Diabetes  5. Hypothyroidism  6. IVCD/LBBB  7. ICD   A. Upgrade to CRTD       PLAN     Continue current drug regimen. Continue follow-up in device clinic. FOLLOW-UP     1 year      Thank you, Mony Calzada MD and Dr. Rodrigue Bender and Dr. Joy Ohara for allowing me to participate in the care of this extraordinarily pleasant female. Please do not hesitate to contact me for further questions/concerns.          Chelsie Almendarez MD  Cardiac Electrophysiology / Cardiology    [facility].  [address] [facility], [facility], [address] [place] [address]  [phone] / [phone] Fax   [phone] / [phone] Fax

## 2018-05-07 NOTE — PROGRESS NOTES
Chief Complaint   Patient presents with    Follow-up     Cardiomyopathy,ICD     1. Have you been to the ER, urgent care clinic since your last visit? Hospitalized since your last visit? No    2. Have you seen or consulted any other health care providers outside of the [address] [place] [address] since your last visit? Include any pap smears or colon screening.  No  Visit Vitals    /80 (BP 1 Location: Right arm, BP Patient Position: Sitting)    Pulse 84    Resp 20    Ht 5' 3\" (1.6 m)    Wt 196 lb 9.6 oz (89.2 kg)    SpO2 98%    BMI 34.83 kg/m2

## 2018-05-07 NOTE — MR AVS SNAPSHOT
[address] [facility] [address] [facility] 
[phone] Patient: Enrike De Paz MRN: [id number] [id number] Visit Information Date & Time Provider Department Dept. Phone Encounter #  
 5/7/2018 10:20 AM Yinka Salvador MD [facility] [facility] [phone] [id number] Your Appointments 5/13/2019 10:20 AM  
ESTABLISHED PATIENT with Yinka Salvador MD  
[facility] ([address]) Appt Note: annual  
 [address] [facility] [address] [facility]  
[phone]  
  
   
 [address] [facility] [address] [address] [facility] [place]  
  
    
  
 8/16/2018 10:45 AM  
REMOTE OFFICE VISIT with Elizabeth Arambula [facility] ([facility] SCHEDULING) Appt Note: lat icd/stf  
 [address] [facility] [address] [facility] [address]  
[phone]  
  
   
 [address] [address]  
  
    
 11/20/2018 11:30 AM  
REMOTE OFFICE VISIT with Elizabeth Arambula [facility] ([facility] SCHEDULING) Appt Note: lat icd/stf  
 [address] [facility] [address] [facility]  
[phone]  
  
    
 2/26/2019 11:00 AM  
REMOTE OFFICE VISIT with Elizabeth Arambula [facility] ([facility] SCHEDULING) Appt Note: lat icd/stf  
 [address] [facility] [address] [facility]  
[phone] Upcoming Health Maintenance Date Due Hepatitis C Screening 1950 DTaP/Tdap/Td series (1 - Tdap) 8/28/1969 BREAST CANCER SCRN MAMMOGRAM 8/28/1998 FOBT Q 1 YEAR AGE 50-75 8/28/1998 ZOSTER VACCINE AGE 60> 6/28/2008 GLAUCOMA SCREENING Q2Y 8/28/2013 Pneumococcal 65+ High/Highest Risk (1 of 2 - PCV13) 8/28/2013 MICROALBUMIN Q1 2/25/2015 EYE EXAM RETINAL OR DILATED Q1 11/5/2015 HEMOGLOBIN A1C Q6M 11/18/2015 FOOT EXAM Q1 4/2/2016 LIPID PANEL Q1 5/12/2016 MEDICARE YEARLY EXAM 3/14/2018 Influenza Age 5 to Adult 8/1/2018 Allergies as of 5/7/2018  Review Complete On: 5/7/2018 By: Mike Bradley MD  
  
 Severity Noted Reaction Type Reactions Codeine  05/31/2011   Intolerance Other (comments) Patient states she is not allergic Novocain [Procaine]  11/01/2011    Nausea and Vomiting Tramadol  05/23/2016    Other (comments) Headache Current Immunizations  Reviewed on 5/7/2018 No immunizations on file. Reviewed by Sue Jaramillo LPN on 8/6/9131 at 53:95 AM  
You Were Diagnosed With   
  
 Codes Comments Other [facility]    -  Primary ICD-10-CM: I42.8 ICD-9-CM: 425.4 Vitals BP Pulse Resp Height(growth percentile) Weight(growth percentile) SpO2  
 160/80 (BP 1 Location: Right arm, BP Patient Position: Sitting) 84 20 5' 3\" (1.6 m) 196 lb 9.6 oz (89.2 kg) 98% BMI OB Status Smoking Status 34.83 kg/m2 Hysterectomy Never Smoker Vitals History BMI and BSA Data Body Mass Index Body Surface Area 34.83 kg/m 2 1.99 m 2 Preferred Pharmacy Pharmacy Name Phone [address] [facility], [address] [phone] Your Updated Medication List  
  
   
This list is accurate as of 5/7/18 10:52 AM.  Always use your most recent med list.  
  
  
  
  
 acetaminophen-codeine 300-30 mg per tablet Commonly known as:  TYLENOL #3 Take 1 Tab by mouth every six (6) hours as needed for Pain. Max Daily Amount: 4 Tabs. ADVAIR DISKUS 250-50 mcg/dose diskus inhaler Generic drug:  fluticasone-salmeterol Take 1 Puff by inhalation as needed. aspirin 81 mg tablet Take 81 mg by mouth. atorvastatin 20 mg tablet Commonly known as:  LIPITOR Take  by mouth daily. DULoxetine 30 mg capsule Commonly known as:  CYMBALTA Take 30 mg by mouth daily. furosemide 20 mg tablet Commonly known as:  LASIX Take 20 mg by mouth daily. gabapentin 300 mg capsule Commonly known as:  NEURONTIN Take 100 mg by mouth three (3) times daily. glucose blood VI test strips strip Commonly known as:  FREESTYLE TEST  
200 Each by Does Not Apply route four (4) times daily. HumaLOG Mix 75-25 KwikPen 100 unit/mL (75-25) Inpn Generic drug:  insulin lispro  & lisp protamine (human) INJECT 24 UNITS UNDER THE SKIN TWICE A DAY WITH BREAKFAST AND DINNER HYDROcodone-acetaminophen 5-325 mg per tablet Commonly known as:  Anahi Batman Take 1 Tab by mouth every four (4) hours as needed for Pain. Max Daily Amount: 6 Tabs. Insulin Needles (Disposable) 31 gauge x 5/16\" Ndle Commonly known as:  BD ULTRA-FINE SHORT PEN NEEDLE  
USE AS DIRECTED FOUR TIMES A DAY  
  
 KLOR-CON 10 10 mEq tablet Generic drug:  potassium chloride SR Take 10 mEq by mouth daily. levothyroxine 150 mcg tablet Commonly known as:  SYNTHROID Take 175 mcg by mouth Daily (before breakfast). metFORMIN 1,000 mg tablet Commonly known as:  GLUCOPHAGE Take 1 Tab by mouth two (2) times a day. Resume 6/23  
  
 metoprolol succinate 50 mg XL tablet Commonly known as:  TOPROL-XL Take 50 mg by mouth daily. MIRALAX 17 gram packet Generic drug:  polyethylene glycol Take 17 g by mouth as needed. nitroglycerin 0.4 mg SL tablet Commonly known as:  NITROSTAT  
by SubLINGual route every five (5) minutes as needed for Chest Pain. omeprazole 40 mg capsule Commonly known as:  PRILOSEC Take 40 mg by mouth daily. OTHER  
1 Cap daily. Tumeric Curcumin 500 mg  
  
 oxybutynin chloride XL 5 mg CR tablet Commonly known as:  DITROPAN XL Take 5 mg by mouth daily. sacubitril-valsartan 24-26 mg tablet Commonly known as:  ENTRESTO Take 1 Tab by mouth two (2) times a day. spironolactone 25 mg tablet Commonly known as:  ALDACTONE Take  by mouth daily. TYLENOL EXTRA STRENGTH 500 mg tablet Generic drug:  acetaminophen Take  by mouth every six (6) hours as needed for Pain. We Performed the Following AMB POC EKG ROUTINE W/ 12 LEADS, INTER & REP [01877 CPT(R)] Introducing [facility] SERVICES! Yannick Fernandez introduces [employer] patient portal. Now you can access parts of your medical record, email your doctor's office, and request medication refills online. 1. In your internet browser, go to [email or web address] 2. Click on the First Time User? Click Here link in the Sign In box. You will see the New Member Sign Up page. 3. Enter your [employer] Access Code exactly as it appears below. You will not need to use this code after youve completed the sign-up process. If you do not sign up before the expiration date, you must request a new code. · [employer] Access Code: [email or web address] Expires: 8/5/2018 10:52 AM 
 
4. Enter the last four digits of your Social Security Number (xxxx) and Date of Birth (mm/dd/yyyy) as indicated and click Submit. You will be taken to the next sign-up page. 5. Create a [employer] ID. This will be your [employer] login ID and cannot be changed, so think of one that is secure and easy to remember. 6. Create a [employer] password. You can change your password at any time. 7. Enter your Password Reset Question and Answer. This can be used at a later time if you forget your password. 8. Enter your e-mail address. You will receive e-mail notification when new information is available in [address] 9. Click Sign Up. You can now view and download portions of your medical record. 10. Click the Download Summary menu link to download a portable copy of your medical information. If you have questions, please visit the Frequently Asked Questions section of the [employer] website. Remember, [employer] is NOT to be used for urgent needs. For medical emergencies, dial 911. Now available from your iPhone and Android! Please provide this summary of care documentation to your next provider. Your primary care clinician is listed as Mony Calzada. If you have any questions after today's visit, please call [phone].

## 2018-05-08 ENCOUNTER — OP HISTORICAL/CONVERTED ENCOUNTER (OUTPATIENT)
Dept: OTHER | Age: 68
End: 2018-05-08

## 2018-08-16 ENCOUNTER — OFFICE VISIT (OUTPATIENT)
Dept: CARDIOLOGY CLINIC | Age: 68
End: 2018-08-16

## 2018-08-16 DIAGNOSIS — Z95.810 CARDIAC DEFIBRILLATOR IN SITU: Primary | ICD-10-CM

## 2018-11-20 ENCOUNTER — OFFICE VISIT (OUTPATIENT)
Dept: CARDIOLOGY CLINIC | Age: 68
End: 2018-11-20

## 2018-11-20 DIAGNOSIS — Z95.810 CARDIAC DEFIBRILLATOR IN SITU: Primary | ICD-10-CM

## 2018-11-29 NOTE — PROGRESS NOTES
LOV:5/7/18  Reason For Visit: Follow Up, Cardiomyopathy, ICD  EKG: YES  Device:[employer] biventricular ICD   Device Check: 11/20/18

## 2018-12-07 ENCOUNTER — OFFICE VISIT (OUTPATIENT)
Dept: CARDIOLOGY CLINIC | Age: 68
End: 2018-12-07

## 2018-12-07 ENCOUNTER — CLINICAL SUPPORT (OUTPATIENT)
Dept: CARDIOLOGY CLINIC | Age: 68
End: 2018-12-07

## 2018-12-07 VITALS
BODY MASS INDEX: 35.44 KG/M2 | OXYGEN SATURATION: 98 % | RESPIRATION RATE: 12 BRPM | WEIGHT: 200 LBS | SYSTOLIC BLOOD PRESSURE: 108 MMHG | HEART RATE: 106 BPM | HEIGHT: 63 IN | DIASTOLIC BLOOD PRESSURE: 74 MMHG

## 2018-12-07 DIAGNOSIS — Z95.810 PRESENCE OF AUTOMATIC CARDIOVERTER/DEFIBRILLATOR (AICD): Primary | ICD-10-CM

## 2018-12-07 DIAGNOSIS — I42.9 CARDIOMYOPATHY, UNSPECIFIED TYPE (HCC): Primary | ICD-10-CM

## 2018-12-07 RX ORDER — AMIODARONE HYDROCHLORIDE 200 MG/1
200 TABLET ORAL DAILY
Qty: 30 TAB | Refills: 3 | Status: SHIPPED | OUTPATIENT
Start: 2018-12-07 | End: 2019-02-04

## 2018-12-07 NOTE — PROGRESS NOTES
Visit Vitals  /74 (BP 1 Location: Right arm, BP Patient Position: Sitting)   Pulse (!) 106   Resp 12   Ht 5' 3\" (1.6 m)   Wt 200 lb (90.7 kg)   SpO2 98%   BMI 35.43 kg/m²

## 2018-12-07 NOTE — PROGRESS NOTES
1. Have you been to the ER, urgent care clinic since your last visit? Hospitalized since your last visit? No    2. Have you seen or consulted any other health care providers outside of the [address] [place] [address] since your last visit? Include any pap smears or colon screening. No     Pt reports Med Rec. Completed. Pt states Nitroglycerin . Chief Complaint   Patient presents with    Pacemaker Check    Cardiomyopathy    Chest Pain     Pt states chest pain 3 wks.

## 2018-12-07 NOTE — PROGRESS NOTES
HISTORY OF PRESENTING ILLNESS      Dorothea Benavidez is a 77 y.o. female with NICM, DM, hypertension, LVEF 20%, LHC 2/16, LBBB (QRS duration 144 msec), NYHA class III symptoms and a single chamber ICD who underwent upgrade to a biventricular ICD system for primary prevention of sudden death. Interrogation today shows an 80% CRT burden. She is having frequent PVCs. Reports worsening SOB despite adjustments in diuretics. Interrogation also shows decreased thoracic impedance since November as well. She believes she is about 5 pounds above her dry weight.         ACTIVE PROBLEM LIST     Patient Active Problem List    Diagnosis Date Noted    Biventricular ICD (implantable cardioverter-defibrillator) in place 01/26/2018     Priority: 1 - One    Cardiomyopathy ([facility] [address] 05/23/2016    Skin cancer     Acid indigestion     Asthma     Back pain     Wears dentures     Constipation     Diabetes mellitus     Diarrhea     Frequent episodic tension-type headache     Hemorrhoids     Hernia of unspecified site of abdominal cavity without mention of obstruction or gangrene     HTN (hypertension)     Muscle pain     SOB (shortness of breath)     Thyroid disorder            PAST MEDICAL HISTORY     Past Medical History:   Diagnosis Date    Acid indigestion     heartburn    Asthma     Back pain     Constipation     Diabetes mellitus     Diarrhea     Frequent episodic tension-type headache     Hemorrhoids     Hernia of unspecified site of abdominal cavity without mention of obstruction or gangrene     HTN (hypertension)     ICD (implantable cardioverter-defibrillator) in place     Muscle pain     joint pain    Pacemaker     biventricular ICD 1/26/18    Skin cancer     SOB (shortness of breath)     Thyroid disorder     Wears dentures            PAST SURGICAL HISTORY     Past Surgical History:   Procedure Laterality Date    HX CARPAL TUNNEL RELEASE      right hand    HX HEART CATHETERIZATION  2/6/16  HX HYSTERECTOMY      HX PACEMAKER Left 6/20/16    [employer] ICD    HX THYROIDECTOMY      REMOVAL GALLBLADDER            ALLERGIES     Allergies   Allergen Reactions    Codeine Other (comments)     Patient states she is not allergic    Novocain [Procaine] Nausea and Vomiting    Tramadol Other (comments)     Headache          FAMILY HISTORY     Family History   Problem Relation Age of Onset    Diabetes Mother     Heart Disease Mother     Heart Disease Father     Cancer Sister     Diabetes Sister     Heart Disease Sister     Hypertension Sister     Diabetes Brother     negative for cardiac disease       SOCIAL HISTORY     Social History     Socioeconomic History    Marital status:      Spouse name: Not on file    Number of children: Not on file    Years of education: Not on file    Highest education level: Not on file   Tobacco Use    Smoking status: Never Smoker    Smokeless tobacco: Never Used   Substance and Sexual Activity    Alcohol use: Yes     Comment: wine occasionally    Drug use: No         MEDICATIONS     Current Outpatient Medications   Medication Sig    acetaminophen (TYLENOL EXTRA STRENGTH) 500 mg tablet Take  by mouth every six (6) hours as needed for Pain.  polyethylene glycol (MIRALAX) 17 gram packet Take 17 g by mouth as needed.  spironolactone (ALDACTONE) 25 mg tablet Take  by mouth daily.  metFORMIN (GLUCOPHAGE) 1,000 mg tablet Take 1 Tab by mouth two (2) times a day. Resume 6/23    Insulin Needles, Disposable, (BD INSULIN PEN NEEDLE UF SHORT) 31 gauge x 5/16\" ndle USE AS DIRECTED FOUR TIMES A DAY    OTHER 1 Cap daily. Tumeric Curcumin 500 mg    furosemide (LASIX) 20 mg tablet Take 20 mg by mouth daily.  gabapentin (NEURONTIN) 300 mg capsule Take 100 mg by mouth three (3) times daily.  oxybutynin chloride XL (DITROPAN XL) 5 mg CR tablet Take 5 mg by mouth daily.     levothyroxine (SYNTHROID) 150 mcg tablet Take 175 mcg by mouth Daily (before breakfast).  metoprolol succinate (TOPROL-XL) 50 mg XL tablet Take 50 mg by mouth daily.  omeprazole (PRILOSEC) 40 mg capsule Take 40 mg by mouth daily.  atorvastatin (LIPITOR) 20 mg tablet Take  by mouth daily.  nitroglycerin (NITROSTAT) 0.4 mg SL tablet by SubLINGual route every five (5) minutes as needed for Chest Pain.  HUMALOG MIX 75-25 KWIKPEN 100 unit/mL (75-25) flexpen INJECT 24 UNITS UNDER THE SKIN TWICE A DAY WITH BREAKFAST AND DINNER    aspirin 81 mg tablet Take 81 mg by mouth.  potassium chloride SR (KLOR-CON 10) 10 mEq tablet Take 10 mEq by mouth daily.  fluticasone-salmeterol (ADVAIR DISKUS) 250-50 mcg/dose diskus inhaler Take 1 Puff by inhalation as needed.  glucose blood VI test strips (FREESTYLE TEST) strip 200 Each by Does Not Apply route four (4) times daily.  HYDROcodone-acetaminophen (NORCO) 5-325 mg per tablet Take 1 Tab by mouth every four (4) hours as needed for Pain. Max Daily Amount: 6 Tabs. (Patient not taking: Reported on 5/7/2018)    sacubitril-valsartan (ENTRESTO) 24 mg/26 mg tablet Take 1 Tab by mouth two (2) times a day.  acetaminophen-codeine (TYLENOL #3) 300-30 mg per tablet Take 1 Tab by mouth every six (6) hours as needed for Pain. Max Daily Amount: 4 Tabs.  DULoxetine (CYMBALTA) 30 mg capsule Take 30 mg by mouth daily. No current facility-administered medications for this visit. I have reviewed the nurses notes, vitals, problem list, allergy list, medical history, family, social history and medications. REVIEW OF SYMPTOMS      General: Pt denies excessive weight gain or loss. Pt is able to conduct ADL's  HEENT: Denies blurred vision, headaches, hearing loss, epistaxis and difficulty swallowing. Respiratory: Denies cough, congestion, shortness of breath, GABRIEL, wheezing or stridor.   Cardiovascular: Denies precordial pain, palpitations, edema or PND  Gastrointestinal: Denies poor appetite, indigestion, abdominal pain or blood in stool  Genitourinary: Denies hematuria, dysuria, increased urinary frequency  Musculoskeletal: Denies joint pain or swelling from muscles or joints  Neurologic: Denies tremor, paresthesias, headache, or sensory motor disturbance  Psychiatric: Denies confusion, insomnia, depression  Integumentray: Denies rash, itching or ulcers. Hematologic: Denies easy bruising, bleeding       PHYSICAL EXAMINATION      Vitals:    12/07/18 1408   BP: 108/74   Pulse: (!) 106   Resp: 12   SpO2: 98%   Weight: 200 lb (90.7 kg)   Height: 5' 3\" (1.6 m)     General: Well developed, in no acute distress. HEENT: No jaundice, oral mucosa moist, no oral ulcers  Neck: Supple, no stiffness, no lymphadenopathy, supple  Heart:  Normal S1/S2 negative S3 or S4. Regular, no murmur, gallop or rub, no jugular venous distention  Respiratory: Clear bilaterally x 4, no wheezing or rales  Abdomen:   Soft, non-tender, bowel sounds are active.   Extremities:  No edema, normal cap refill, no cyanosis. Musculoskeletal: No clubbing, no deformities  Neuro: A&Ox3, speech clear, gait stable, cooperative, no focal neurologic deficits  Skin: Skin color is normal. No rashes or lesions.  Non diaphoretic, moist.  Vascular: 2+ pulses symmetric in all extremities       DIAGNOSTIC DATA      EKG:        LABORATORY DATA      Lab Results   Component Value Date/Time    WBC 8.2 01/19/2018 02:09 PM    HGB 13.7 01/19/2018 02:09 PM    HCT 41.9 01/19/2018 02:09 PM    PLATELET 201 (L) 03/40/7994 02:09 PM    MCV 95 01/19/2018 02:09 PM      Lab Results   Component Value Date/Time    Sodium 136 01/27/2018 04:45 AM    Potassium 4.0 01/27/2018 04:45 AM    Chloride 104 01/27/2018 04:45 AM    CO2 26 01/27/2018 04:45 AM    Anion gap 6 01/27/2018 04:45 AM    Glucose 242 (H) 01/27/2018 04:45 AM    BUN 15 01/27/2018 04:45 AM    Creatinine 0.75 01/27/2018 04:45 AM    BUN/Creatinine ratio 20 01/27/2018 04:45 AM    GFR est AA >60 01/27/2018 04:45 AM    GFR est non-AA >60 01/27/2018 04:45 AM    Calcium 8.3 (L) 01/27/2018 04:45 AM    Bilirubin, total 0.5 11/29/2017 12:00 AM    AST (SGOT) 31 11/29/2017 12:00 AM    Alk. phosphatase 97 11/29/2017 12:00 AM    Protein, total 6.9 11/29/2017 12:00 AM    Albumin 3.8 11/29/2017 12:00 AM    A-G Ratio 1.2 11/29/2017 12:00 AM    ALT (SGPT) 29 11/29/2017 12:00 AM           ASSESSMENT      1.   2.   3.   4.   5.   6. PLAN     Uncertain whether patient's volume overload is related to suboptimal CRT%. Will start trial of amiodarone 200 mg daily to suppress PVC's to determine whether optimization of CRT would help with fluid retention. Will notify Dr. Macey Rodriguez regarding patient's current symptoms       FOLLOW-UP     1 month    Thank you, Emma Woods MD and Dr. Macey Rodriguez for allowing me to participate in the care of this extraordinarily pleasant female. Please do not hesitate to contact me for further questions/concerns.          Kapil Gonzalez MD  Cardiac Electrophysiology / Cardiology    [address]  [address], [facility] [address] Yanci Orozco.    Raquel Lechuga  [phone] / [phone] Fax   [phone] / [phone] Fax

## 2018-12-25 ENCOUNTER — IP HISTORICAL/CONVERTED ENCOUNTER (OUTPATIENT)
Dept: OTHER | Age: 68
End: 2018-12-25

## 2018-12-31 ENCOUNTER — IP HISTORICAL/CONVERTED ENCOUNTER (OUTPATIENT)
Dept: OTHER | Age: 68
End: 2018-12-31

## 2019-01-31 NOTE — PROGRESS NOTES
HISTORY OF PRESENTING ILLNESS Muriel Beverly is a 77 y.o. female with NICM, DM, hypertension, LVEF 20%, LHC 2/16, LBBB (QRS duration 144 msec), NYHA class III symptoms and a single chamber ICD who underwent upgrade to a biventricular ICD system for primary prevention of sudden death. Interrogation today shows an 80% CRT burden. She was having frequent PVCs. She reported worsening SOB despite adjustments in diuretics. Interrogation had shown decreased thoracic impedance since November as well. Her CRT pacing percentage was 80%. She was started on amiodarone in an attempt to suppress her PVCs to allow optimization of her CRT pacing. Subsequently she was hospitalized for heart failure exacerbation and later again for a GI bleed. She is also no longer on metoprolol due to hypotension. In fact she is now on Midodrine. She has not continued on amiodarone as her prescription ran out and she was uncertain whether to continue this. ACTIVE PROBLEM LIST Patient Active Problem List  
 Diagnosis Date Noted  Biventricular ICD (implantable cardioverter-defibrillator) in place 01/26/2018 Priority: 1 - One  Cardiomyopathy ([address] 05/23/2016  
 Skin cancer  Acid indigestion  Asthma  Back pain  Wears dentures  Constipation  Diabetes mellitus  Diarrhea  Frequent episodic tension-type headache  Hemorrhoids  Hernia of unspecified site of abdominal cavity without mention of obstruction or gangrene  HTN (hypertension)  Muscle pain  SOB (shortness of breath)  Thyroid disorder PAST MEDICAL HISTORY Past Medical History:  
Diagnosis Date  Acid indigestion   
 heartburn  Asthma  Back pain  Constipation  Diabetes mellitus  Diarrhea  Frequent episodic tension-type headache  Hemorrhoids  Hernia of unspecified site of abdominal cavity without mention of obstruction or gangrene  HTN (hypertension)  ICD (implantable cardioverter-defibrillator) in place  Muscle pain   
 joint pain  Pacemaker   
 biventricular ICD 1/26/18  
 Skin cancer  SOB (shortness of breath)  Thyroid disorder  Wears dentures PAST SURGICAL HISTORY Past Surgical History:  
Procedure Laterality Date  HX CARPAL TUNNEL RELEASE    
 right hand  HX HEART CATHETERIZATION  2/6/16  HX HYSTERECTOMY  HX PACEMAKER Left 6/20/16 Adriana Miller 78  HX THYROIDECTOMY  REMOVAL GALLBLADDER ALLERGIES Allergies Allergen Reactions  Codeine Other (comments) Patient states she is not allergic  Novocain [Procaine] Nausea and Vomiting  Tramadol Other (comments) Headache FAMILY HISTORY Family History Problem Relation Age of Onset  Diabetes Mother  Heart Disease Mother  Heart Disease Father  Cancer Sister  Diabetes Sister  Heart Disease Sister  Hypertension Sister  Diabetes Brother   
 negative for cardiac disease SOCIAL HISTORY Social History Socioeconomic History  Marital status:  Spouse name: Not on file  Number of children: Not on file  Years of education: Not on file  Highest education level: Not on file Tobacco Use  Smoking status: Never Smoker  Smokeless tobacco: Never Used Substance and Sexual Activity  Alcohol use: Yes Comment: wine occasionally  Drug use: No  
 
 
 
MEDICATIONS Current Outpatient Medications Medication Sig  
 amiodarone (CORDARONE) 200 mg tablet Take 1 Tab by mouth daily.  acetaminophen (TYLENOL EXTRA STRENGTH) 500 mg tablet Take  by mouth every six (6) hours as needed for Pain.  HYDROcodone-acetaminophen (NORCO) 5-325 mg per tablet Take 1 Tab by mouth every four (4) hours as needed for Pain. Max Daily Amount: 6 Tabs. (Patient not taking: Reported on 5/7/2018)  polyethylene glycol (MIRALAX) 17 gram packet Take 17 g by mouth as needed.  spironolactone (ALDACTONE) 25 mg tablet Take  by mouth daily.  sacubitril-valsartan (ENTRESTO) 24 mg/26 mg tablet Take 1 Tab by mouth two (2) times a day.  metFORMIN (GLUCOPHAGE) 1,000 mg tablet Take 1 Tab by mouth two (2) times a day. Resume 6/23  acetaminophen-codeine (TYLENOL #3) 300-30 mg per tablet Take 1 Tab by mouth every six (6) hours as needed for Pain. Max Daily Amount: 4 Tabs.  Insulin Needles, Disposable, (BD INSULIN PEN NEEDLE UF SHORT) 31 gauge x 5/16\" ndle USE AS DIRECTED FOUR TIMES A DAY  
 OTHER 1 Cap daily. Tumeric Curcumin 500 mg  furosemide (LASIX) 20 mg tablet Take 20 mg by mouth daily.  gabapentin (NEURONTIN) 300 mg capsule Take 100 mg by mouth three (3) times daily.  oxybutynin chloride XL (DITROPAN XL) 5 mg CR tablet Take 5 mg by mouth daily.  levothyroxine (SYNTHROID) 150 mcg tablet Take 175 mcg by mouth Daily (before breakfast).  metoprolol succinate (TOPROL-XL) 50 mg XL tablet Take 50 mg by mouth daily.  DULoxetine (CYMBALTA) 30 mg capsule Take 30 mg by mouth daily.  omeprazole (PRILOSEC) 40 mg capsule Take 40 mg by mouth daily.  atorvastatin (LIPITOR) 20 mg tablet Take  by mouth daily.  nitroglycerin (NITROSTAT) 0.4 mg SL tablet by SubLINGual route every five (5) minutes as needed for Chest Pain.  HUMALOG MIX 75-25 KWIKPEN 100 unit/mL (75-25) flexpen INJECT 24 UNITS UNDER THE SKIN TWICE A DAY WITH BREAKFAST AND DINNER  
 aspirin 81 mg tablet Take 81 mg by mouth.  potassium chloride SR (KLOR-CON 10) 10 mEq tablet Take 10 mEq by mouth daily.  fluticasone-salmeterol (ADVAIR DISKUS) 250-50 mcg/dose diskus inhaler Take 1 Puff by inhalation as needed.  glucose blood VI test strips (FREESTYLE TEST) strip 200 Each by Does Not Apply route four (4) times daily. No current facility-administered medications for this visit.    
 
 
I have reviewed the nurses notes, vitals, problem list, allergy list, medical history, family, social history and medications. REVIEW OF SYMPTOMS General: Pt denies excessive weight gain or loss. Pt is able to conduct ADL's HEENT: Denies blurred vision, headaches, hearing loss, epistaxis and difficulty swallowing. Respiratory: Denies cough, congestion, shortness of breath, GABRIEL, wheezing or stridor. Cardiovascular: Denies precordial pain, palpitations, edema or PND Gastrointestinal: Denies poor appetite, indigestion, abdominal pain or blood in stool Genitourinary: Denies hematuria, dysuria, increased urinary frequency Musculoskeletal: Denies joint pain or swelling from muscles or joints Neurologic: Denies tremor, paresthesias, headache, or sensory motor disturbance Psychiatric: Denies confusion, insomnia, depression Integumentray: Denies rash, itching or ulcers. Hematologic: Denies easy bruising, bleeding PHYSICAL EXAMINATION There were no vitals filed for this visit. General: Well developed, in no acute distress. HEENT: No jaundice, oral mucosa moist, no oral ulcers Neck: Supple, no stiffness, no lymphadenopathy, supple Heart:  Normal S1/S2 negative S3 or S4. Regular, no murmur, gallop or rub, no jugular venous distention Respiratory: Clear bilaterally x 4, no wheezing or rales Abdomen:   Soft, non-tender, bowel sounds are active.  
Extremities:  No edema, normal cap refill, no cyanosis. Musculoskeletal: No clubbing, no deformities Neuro: A&Ox3, speech clear, gait stable, cooperative, no focal neurologic deficits Skin: Skin color is normal. No rashes or lesions. Non diaphoretic, moist. 
Vascular: 2+ pulses symmetric in all extremities DIAGNOSTIC DATA EKG: Sinus rhythm with inferior axis PVCs and quadrigeminy LABORATORY DATA Lab Results Component Value Date/Time  WBC 8.2 01/19/2018 02:09 PM  
 HGB 13.7 01/19/2018 02:09 PM  
 HCT 41.9 01/19/2018 02:09 PM  
 PLATELET 478 (L) 67/38/0980 02:09 PM  
 MCV 95 01/19/2018 02:09 PM  
  
Lab Results Component Value Date/Time Sodium 136 01/27/2018 04:45 AM  
 Potassium 4.0 01/27/2018 04:45 AM  
 Chloride 104 01/27/2018 04:45 AM  
 CO2 26 01/27/2018 04:45 AM  
 Anion gap 6 01/27/2018 04:45 AM  
 Glucose 242 (H) 01/27/2018 04:45 AM  
 BUN 15 01/27/2018 04:45 AM  
 Creatinine 0.75 01/27/2018 04:45 AM  
 BUN/Creatinine ratio 20 01/27/2018 04:45 AM  
 GFR est AA >60 01/27/2018 04:45 AM  
 GFR est non-AA >60 01/27/2018 04:45 AM  
 Calcium 8.3 (L) 01/27/2018 04:45 AM  
 Bilirubin, total 0.5 11/29/2017 12:00 AM  
 AST (SGOT) 31 11/29/2017 12:00 AM  
 Alk. phosphatase 97 11/29/2017 12:00 AM  
 Protein, total 6.9 11/29/2017 12:00 AM  
 Albumin 3.8 11/29/2017 12:00 AM  
 A-G Ratio 1.2 11/29/2017 12:00 AM  
 ALT (SGPT) 29 11/29/2017 12:00 AM  
  
 
 
 ASSESSMENT 1. Cardiomyopathy 
            A. NICM 
            B. NYHA class II 
            C. LVEF 30-35% 2. CAD, nonobstructive 3. Asthma 4. Diabetes 5. Hypothyroidism 6. IVCD/LBBB 7. ICD Glenice Postin to CRTD PLAN Restart amiodarone trial at 200 mg daily in attempt to suppress her PVCs and to allow enhanced CRT pacing to determine whether she has clinical benefit with CRT. If her pacing improves to 90% yet she fail to derive clinical improvement, will discontinue amiodarone and allow her to have PVCs. However, should she have improvement with optimized CRT pacing percentages will consider long-term management strategies for PVC suppression worthwhile pursuing. FOLLOW-UP  
 
1 month with device interrogation Thank you, Mykel Chavez MD, Dr. Sandriat Keller, and Dr. Elmer Jimenez for allowing me to participate in the care of this extraordinarily pleasant female. Please do not hesitate to contact me for further questions/concerns. Mino Butcher MD 
Cardiac Electrophysiology / Cardiology [address] [place] [address] 
 [address] [facility], [address] [facility], [address] [place], [address] [place] [address] Drive    [place] 
[phone] / [phone] Fax   [phone] / [phone] Fax

## 2019-02-04 ENCOUNTER — OFFICE VISIT (OUTPATIENT)
Dept: CARDIOLOGY CLINIC | Age: 69
End: 2019-02-04

## 2019-02-04 VITALS
SYSTOLIC BLOOD PRESSURE: 102 MMHG | BODY MASS INDEX: 33.95 KG/M2 | HEIGHT: 63 IN | OXYGEN SATURATION: 97 % | RESPIRATION RATE: 16 BRPM | WEIGHT: 191.6 LBS | HEART RATE: 78 BPM | DIASTOLIC BLOOD PRESSURE: 60 MMHG

## 2019-02-04 DIAGNOSIS — E03.8 OTHER SPECIFIED HYPOTHYROIDISM: ICD-10-CM

## 2019-02-04 DIAGNOSIS — I42.9 CARDIOMYOPATHY, UNSPECIFIED TYPE (HCC): Primary | ICD-10-CM

## 2019-02-04 DIAGNOSIS — Z79.4 TYPE 2 DIABETES MELLITUS WITH DIABETIC NEPHROPATHY, WITH LONG-TERM CURRENT USE OF INSULIN (HCC): ICD-10-CM

## 2019-02-04 DIAGNOSIS — Z95.810 BIVENTRICULAR ICD (IMPLANTABLE CARDIOVERTER-DEFIBRILLATOR) IN PLACE: ICD-10-CM

## 2019-02-04 DIAGNOSIS — E55.9 VITAMIN D DEFICIENCY, UNSPECIFIED: ICD-10-CM

## 2019-02-04 DIAGNOSIS — E11.21 TYPE 2 DIABETES MELLITUS WITH DIABETIC NEPHROPATHY, WITH LONG-TERM CURRENT USE OF INSULIN (HCC): ICD-10-CM

## 2019-02-04 RX ORDER — ALLOPURINOL 100 MG/1
100 TABLET ORAL DAILY
COMMUNITY
Start: 2019-01-18 | End: 2020-09-21

## 2019-02-04 RX ORDER — AMIODARONE HYDROCHLORIDE 200 MG/1
200 TABLET ORAL DAILY
Qty: 30 TAB | Refills: 3 | Status: SHIPPED | OUTPATIENT
Start: 2019-02-04 | End: 2019-09-23 | Stop reason: ALTCHOICE

## 2019-02-04 RX ORDER — CODEINE PHOSPHATE AND GUAIFENESIN 10; 100 MG/5ML; MG/5ML
5 SOLUTION ORAL
COMMUNITY
End: 2020-09-21

## 2019-02-04 RX ORDER — LANOLIN ALCOHOL/MO/W.PET/CERES
3 CREAM (GRAM) TOPICAL
COMMUNITY

## 2019-02-04 RX ORDER — NITROGLYCERIN 0.4 MG/1
0.4 TABLET SUBLINGUAL
Qty: 1 BOTTLE | Refills: 3 | Status: SHIPPED | OUTPATIENT
Start: 2019-02-04 | End: 2020-08-14

## 2019-02-04 RX ORDER — ALBUTEROL SULFATE 90 UG/1
2 AEROSOL, METERED RESPIRATORY (INHALATION)
COMMUNITY
Start: 2019-01-17 | End: 2020-09-21

## 2019-02-04 RX ORDER — MIDODRINE HYDROCHLORIDE 5 MG/1
TABLET ORAL 2 TIMES DAILY
COMMUNITY
End: 2019-09-23

## 2019-02-04 RX ORDER — LINACLOTIDE 145 UG/1
145 CAPSULE, GELATIN COATED ORAL
COMMUNITY
Start: 2019-01-16 | End: 2020-09-21

## 2019-02-04 NOTE — PROGRESS NOTES
[facility] on 12/25/18 for CHF, elevated cardiac enzymes, pneumonia and erratic BP and was sent to [facility]([place]), Discharged on 12/30/18, went back [facility] 12/31/18 GI bleed discharged on 1/7/19. Visit Vitals /60 (BP 1 Location: Left arm, BP Patient Position: Sitting) Pulse 78 Resp 16 Ht 5' 3\" (1.6 m) Wt 191 lb 9.6 oz (86.9 kg) SpO2 97% BMI 33.94 kg/m² C/o SOB with least exertion

## 2019-02-04 NOTE — PATIENT INSTRUCTIONS

## 2019-02-08 ENCOUNTER — OP HISTORICAL/CONVERTED ENCOUNTER (OUTPATIENT)
Dept: OTHER | Age: 69
End: 2019-02-08

## 2019-02-26 ENCOUNTER — OFFICE VISIT (OUTPATIENT)
Dept: CARDIOLOGY CLINIC | Age: 69
End: 2019-02-26

## 2019-02-26 DIAGNOSIS — Z95.810 CARDIAC DEFIBRILLATOR IN SITU: Primary | ICD-10-CM

## 2019-03-13 ENCOUNTER — CLINICAL SUPPORT (OUTPATIENT)
Dept: CARDIOLOGY CLINIC | Age: 69
End: 2019-03-13

## 2019-03-13 ENCOUNTER — OFFICE VISIT (OUTPATIENT)
Dept: CARDIOLOGY CLINIC | Age: 69
End: 2019-03-13

## 2019-03-13 VITALS — BODY MASS INDEX: 34.55 KG/M2 | WEIGHT: 195 LBS | HEART RATE: 83 BPM | HEIGHT: 63 IN | OXYGEN SATURATION: 98 %

## 2019-03-13 DIAGNOSIS — Z95.810 BIVENTRICULAR ICD (IMPLANTABLE CARDIOVERTER-DEFIBRILLATOR) IN PLACE: Primary | ICD-10-CM

## 2019-03-13 DIAGNOSIS — Z95.810 BIVENTRICULAR ICD (IMPLANTABLE CARDIOVERTER-DEFIBRILLATOR) IN PLACE: ICD-10-CM

## 2019-03-13 DIAGNOSIS — I42.9 CARDIOMYOPATHY, UNSPECIFIED TYPE (HCC): Primary | ICD-10-CM

## 2019-03-13 RX ORDER — CARVEDILOL 12.5 MG/1
TABLET ORAL 2 TIMES DAILY WITH MEALS
COMMUNITY
End: 2019-09-23

## 2019-03-13 RX ORDER — METFORMIN HYDROCHLORIDE 750 MG/1
500 TABLET, EXTENDED RELEASE ORAL 2 TIMES DAILY
COMMUNITY
End: 2021-03-17 | Stop reason: ALTCHOICE

## 2019-03-13 RX ORDER — COLCHICINE 0.6 MG/1
0.6 TABLET ORAL DAILY
COMMUNITY
End: 2019-09-23

## 2019-03-13 NOTE — PROGRESS NOTES
HISTORY OF PRESENTING ILLNESS      Early Juana is a 77 y.o. female with NICM, DM, hypertension, LVEF 20%, LHC 2/16, LBBB (QRS duration 144 msec), NYHA class III symptoms and a single chamber ICD who underwent upgrade to a biventricular ICD system for primary prevention of sudden death. Interrogation showed an 80% CRT burden. She was having frequent PVCs. She reported worsening SOB despite adjustments in diuretics. She was started on amiodarone in an attempt to suppress her PVCs to allow optimization of her CRT pacing. Subsequently she was hospitalized for heart failure exacerbation and later again for a GI bleed. She is also no longer on metoprolol due to hypotension and was started on Midodrine. Last visit we restarted amiodarone trial at 200 mg daily in attempt to suppress her PVCs and to allow enhanced CRT pacing to determine whether she has clinical benefit with CRT. Her pacing percentage improved from 69% to 84% however she has failed to derive some dramatic improvement thus far. EKG shows sinus rhythm with occasional PVCs.        ACTIVE PROBLEM LIST     Patient Active Problem List    Diagnosis Date Noted    Biventricular ICD (implantable cardioverter-defibrillator) in place 01/26/2018     Priority: 1 - One    Cardiomyopathy ([address] 05/23/2016    Skin cancer     Acid indigestion     Asthma     Back pain     Wears dentures     Constipation     Diabetes mellitus     Diarrhea     Frequent episodic tension-type headache     Hemorrhoids     Hernia of unspecified site of abdominal cavity without mention of obstruction or gangrene     HTN (hypertension)     Muscle pain     SOB (shortness of breath)     Thyroid disorder            PAST MEDICAL HISTORY     Past Medical History:   Diagnosis Date    Acid indigestion     heartburn    Asthma     Back pain     Constipation     Diabetes mellitus     Diarrhea     Frequent episodic tension-type headache     Hemorrhoids     Hernia of unspecified site of abdominal cavity without mention of obstruction or gangrene     HTN (hypertension)     ICD (implantable cardioverter-defibrillator) in place     Muscle pain     joint pain    Pacemaker     biventricular ICD 1/26/18    Skin cancer     SOB (shortness of breath)     Thyroid disorder     Wears dentures            PAST SURGICAL HISTORY     Past Surgical History:   Procedure Laterality Date    HX CARPAL TUNNEL RELEASE      right hand    HX HEART CATHETERIZATION  2/6/16    HX HYSTERECTOMY      HX PACEMAKER Left 6/20/16    [employer] ICD    HX THYROIDECTOMY      REMOVAL GALLBLADDER            ALLERGIES     Allergies   Allergen Reactions    Codeine Other (comments)     Patient states she is not allergic    Novocain [Procaine] Nausea and Vomiting    Tramadol Other (comments)     Headache          FAMILY HISTORY     Family History   Problem Relation Age of Onset    Diabetes Mother     Heart Disease Mother     Heart Disease Father     Cancer Sister     Diabetes Sister     Heart Disease Sister     Hypertension Sister     Diabetes Brother     negative for cardiac disease       SOCIAL HISTORY     Social History     Socioeconomic History    Marital status:      Spouse name: Not on file    Number of children: Not on file    Years of education: Not on file    Highest education level: Not on file   Tobacco Use    Smoking status: Never Smoker    Smokeless tobacco: Never Used   Substance and Sexual Activity    Alcohol use: Yes     Comment: wine occasionally    Drug use: No         MEDICATIONS     Current Outpatient Medications   Medication Sig    melatonin 3 mg tablet Take 3 mg by mouth nightly as needed.  guaiFENesin-codeine (VIRTUSSIN AC) 100-10 mg/5 mL solution Take 5 mL by mouth three (3) times daily as needed for Cough.  midodrine (PROAMITINE) 5 mg tablet Take  by mouth two (2) times a day.  allopurinol (ZYLOPRIM) 100 mg tablet Take 100 mg by mouth daily.  PROAIR HFA 90 mcg/actuation inhaler Take 2 Puffs by inhalation every four (4) hours as needed.  LINZESS 145 mcg cap capsule Take 145 mcg by mouth daily.  amiodarone (CORDARONE) 200 mg tablet Take 1 Tab by mouth daily.  nitroglycerin (NITROSTAT) 0.4 mg SL tablet 1 Tab by SubLINGual route every five (5) minutes as needed for Chest Pain.  acetaminophen (TYLENOL EXTRA STRENGTH) 500 mg tablet Take  by mouth every six (6) hours as needed for Pain.  polyethylene glycol (MIRALAX) 17 gram packet Take 17 g by mouth as needed.  sacubitril-valsartan (ENTRESTO) 24 mg/26 mg tablet Take 1 Tab by mouth two (2) times a day.  metFORMIN (GLUCOPHAGE) 1,000 mg tablet Take 1 Tab by mouth two (2) times a day. Resume 6/23 (Patient taking differently: Take 500 mg by mouth two (2) times a day. Resume 6/23)    acetaminophen-codeine (TYLENOL #3) 300-30 mg per tablet Take 1 Tab by mouth every six (6) hours as needed for Pain. Max Daily Amount: 4 Tabs.  Insulin Needles, Disposable, (BD INSULIN PEN NEEDLE UF SHORT) 31 gauge x 5/16\" ndle USE AS DIRECTED FOUR TIMES A DAY    furosemide (LASIX) 40 mg tablet Take 40 mg by mouth daily.  gabapentin (NEURONTIN) 300 mg capsule Take 100 mg by mouth three (3) times daily.  oxybutynin chloride XL (DITROPAN XL) 10 mg CR tablet Take 10 mg by mouth daily.  levothyroxine 175 mcg cap Take 175 mcg by mouth Daily (before breakfast).  omeprazole (PRILOSEC) 40 mg capsule Take 40 mg by mouth daily.  atorvastatin (LIPITOR) 40 mg tablet Take 40 mg by mouth daily.  HUMALOG MIX 75-25 KWIKPEN 100 unit/mL (75-25) flexpen INJECT 24 UNITS UNDER THE SKIN TWICE A DAY WITH BREAKFAST AND DINNER    aspirin 81 mg tablet Take 81 mg by mouth.  potassium chloride SR (KLOR-CON 10) 10 mEq tablet Take 10 mEq by mouth daily.  glucose blood VI test strips (FREESTYLE TEST) strip 200 Each by Does Not Apply route four (4) times daily.      No current facility-administered medications for this visit. I have reviewed the nurses notes, vitals, problem list, allergy list, medical history, family, social history and medications. REVIEW OF SYMPTOMS      General: Pt denies excessive weight gain or loss. Pt is able to conduct ADL's  HEENT: Denies blurred vision, headaches, hearing loss, epistaxis and difficulty swallowing. Respiratory: Denies cough, congestion, shortness of breath, GABRIEL, wheezing or stridor. Cardiovascular: Denies precordial pain, palpitations, edema or PND  Gastrointestinal: Denies poor appetite, indigestion, abdominal pain or blood in stool  Genitourinary: Denies hematuria, dysuria, increased urinary frequency  Musculoskeletal: Denies joint pain or swelling from muscles or joints  Neurologic: Denies tremor, paresthesias, headache, or sensory motor disturbance  Psychiatric: Denies confusion, insomnia, depression  Integumentray: Denies rash, itching or ulcers. Hematologic: Denies easy bruising, bleeding       PHYSICAL EXAMINATION      There were no vitals filed for this visit. General: Well developed, in no acute distress. HEENT: No jaundice, oral mucosa moist, no oral ulcers  Neck: Supple, no stiffness, no lymphadenopathy, supple  Heart:  Normal S1/S2 negative S3 or S4. Regular, no murmur, gallop or rub, no jugular venous distention  Respiratory: Clear bilaterally x 4, no wheezing or rales  Abdomen:   Soft, non-tender, bowel sounds are active.   Extremities:  No edema, normal cap refill, no cyanosis. Musculoskeletal: No clubbing, no deformities  Neuro: A&Ox3, speech clear, gait stable, cooperative, no focal neurologic deficits  Skin: Skin color is normal. No rashes or lesions.  Non diaphoretic, moist.  Vascular: 2+ pulses symmetric in all extremities       DIAGNOSTIC DATA      EKG: Sinus rhythm with pvcs       LABORATORY DATA      Lab Results   Component Value Date/Time    WBC 8.2 01/19/2018 02:09 PM    HGB 13.7 01/19/2018 02:09 PM    HCT 41.9 01/19/2018 02:09 PM PLATELET 502 (L) 02/29/9616 02:09 PM    MCV 95 01/19/2018 02:09 PM      Lab Results   Component Value Date/Time    Sodium 136 01/27/2018 04:45 AM    Potassium 4.0 01/27/2018 04:45 AM    Chloride 104 01/27/2018 04:45 AM    CO2 26 01/27/2018 04:45 AM    Anion gap 6 01/27/2018 04:45 AM    Glucose 242 (H) 01/27/2018 04:45 AM    BUN 15 01/27/2018 04:45 AM    Creatinine 0.75 01/27/2018 04:45 AM    BUN/Creatinine ratio 20 01/27/2018 04:45 AM    GFR est AA >60 01/27/2018 04:45 AM    GFR est non-AA >60 01/27/2018 04:45 AM    Calcium 8.3 (L) 01/27/2018 04:45 AM    Bilirubin, total 0.5 11/29/2017 12:00 AM    AST (SGOT) 31 11/29/2017 12:00 AM    Alk. phosphatase 97 11/29/2017 12:00 AM    Protein, total 6.9 11/29/2017 12:00 AM    Albumin 3.8 11/29/2017 12:00 AM    A-G Ratio 1.2 11/29/2017 12:00 AM    ALT (SGPT) 29 11/29/2017 12:00 AM           ASSESSMENT      1. Cardiomyopathy              A. NICM              B. NYHA class II              C. LVEF 30-35%  2. CAD, nonobstructive  3. Asthma  4. Diabetes  5. Hypothyroidism  6. IVCD/LBBB  7. ICD              C. Upgrade to CRTD       PLAN     Will increase amiodarone to 400 mg daily. If her pacing improves to 90% yet she fails to derive clinical improvement, will discontinue amiodarone and allow her to have PVCs. However, should she have improvement with optimized CRT pacing percentages will consider long-term management strategies for PVC suppression worthwhile pursuing. Recheck device in 2 weeks at [facility]. FOLLOW-UP     6 months    Thank you, Reed Bateman MD and Dr. Toshia Woodward for allowing me to participate in the care of this extraordinarily pleasant female. Please do not hesitate to contact me for further questions/concerns.          Lina Llanes MD  Cardiac Electrophysiology / Cardiology    [address]  [address] [facility], [facility] [address] [place] [address] [address] [facility] [address], [place]  [phone] / [phone] Fax   [phone] / [phone] Fax

## 2019-08-19 ENCOUNTER — OP HISTORICAL/CONVERTED ENCOUNTER (OUTPATIENT)
Dept: OTHER | Age: 69
End: 2019-08-19

## 2019-09-23 ENCOUNTER — OFFICE VISIT (OUTPATIENT)
Dept: CARDIOLOGY CLINIC | Age: 69
End: 2019-09-23

## 2019-09-23 ENCOUNTER — CLINICAL SUPPORT (OUTPATIENT)
Dept: CARDIOLOGY CLINIC | Age: 69
End: 2019-09-23

## 2019-09-23 VITALS
SYSTOLIC BLOOD PRESSURE: 132 MMHG | HEART RATE: 84 BPM | DIASTOLIC BLOOD PRESSURE: 80 MMHG | BODY MASS INDEX: 35.15 KG/M2 | OXYGEN SATURATION: 97 % | WEIGHT: 198.4 LBS | RESPIRATION RATE: 18 BRPM | HEIGHT: 63 IN

## 2019-09-23 DIAGNOSIS — E11.21 TYPE 2 DIABETES MELLITUS WITH DIABETIC NEPHROPATHY, WITH LONG-TERM CURRENT USE OF INSULIN (HCC): ICD-10-CM

## 2019-09-23 DIAGNOSIS — Z95.810 BIVENTRICULAR ICD (IMPLANTABLE CARDIOVERTER-DEFIBRILLATOR) IN PLACE: Primary | ICD-10-CM

## 2019-09-23 DIAGNOSIS — Z79.4 TYPE 2 DIABETES MELLITUS WITH DIABETIC NEPHROPATHY, WITH LONG-TERM CURRENT USE OF INSULIN (HCC): ICD-10-CM

## 2019-09-23 DIAGNOSIS — I42.9 CARDIOMYOPATHY, UNSPECIFIED TYPE (HCC): ICD-10-CM

## 2019-09-23 DIAGNOSIS — I10 HYPERTENSION, UNSPECIFIED TYPE: ICD-10-CM

## 2019-09-23 RX ORDER — DICLOFENAC SODIUM 10 MG/G
GEL TOPICAL
COMMUNITY
End: 2020-08-14

## 2019-09-23 NOTE — PROGRESS NOTES
HISTORY OF PRESENTING ILLNESS      Matt Betancourt is a 68 y.o. female  with NICM, DM, hypertension, LVEF 20%, LHC 2/16, LBBB (QRS duration 144 msec), NYHA class III symptoms and a single chamber ICD who underwent upgrade to a biventricular ICD system for primary prevention of sudden death. Interrogation showed an 80% CRT burden. She was having frequent PVCs. She reported worsening SOB despite adjustments in diuretics. She was started on amiodarone in an attempt to suppress her PVCs to allow optimization of her CRT pacing. Subsequently she was hospitalized for heart failure exacerbation and later again for a GI bleed, she is no longer on anticoagulation d/t GI Bleeding.      Last visit we restarted amiodarone trial at 200 mg daily in attempt to suppress her PVCs and to allow enhanced CRT pacing to determine whether she has clinical benefit with CRT. Her pacing percentage improved from 69% to 84% however she has failed to derive some dramatic improvement thus far.       Her amiodarone was then increased to 400 mg daily. Since last visit, she underwent cardiac catheterization on 8/19/2019 which demonstrated non obstructive coronary disease. She notes only minimal improvement in her symptoms, struggling mainly with her fluid restriction. She ran out of amiodarone 2.5 months ago and has not refilled. Her CRT pacing has improved to 92% despite being off of amiodarone.         ACTIVE PROBLEM LIST     Patient Active Problem List    Diagnosis Date Noted    Biventricular ICD (implantable cardioverter-defibrillator) in place 01/26/2018     Priority: 1 - One    Cardiomyopathy ([address] 05/23/2016    Skin cancer     Acid indigestion     Asthma     Back pain     Wears dentures     Constipation     Diabetes mellitus     Diarrhea     Frequent episodic tension-type headache     Hemorrhoids     Hernia of unspecified site of abdominal cavity without mention of obstruction or gangrene     HTN (hypertension)     Muscle pain     SOB (shortness of breath)     Thyroid disorder            PAST MEDICAL HISTORY     Past Medical History:   Diagnosis Date    Acid indigestion     heartburn    Asthma     Back pain     Constipation     Diabetes mellitus     Diarrhea     Frequent episodic tension-type headache     Hemorrhoids     Hernia of unspecified site of abdominal cavity without mention of obstruction or gangrene     HTN (hypertension)     ICD (implantable cardioverter-defibrillator) in place     Muscle pain     joint pain    Pacemaker     biventricular ICD 1/26/18    Skin cancer     SOB (shortness of breath)     Thyroid disorder     Wears dentures            PAST SURGICAL HISTORY     Past Surgical History:   Procedure Laterality Date    HX CARPAL TUNNEL RELEASE      right hand    HX HEART CATHETERIZATION  2/6/16    HX HYSTERECTOMY      HX PACEMAKER Left 6/20/16    [place] [employer] ICD    HX THYROIDECTOMY      REMOVAL GALLBLADDER            ALLERGIES     Allergies   Allergen Reactions    Codeine Other (comments)     Patient states she is not allergic    Novocain [Procaine] Nausea and Vomiting    Tramadol Other (comments)     Headache          FAMILY HISTORY     Family History   Problem Relation Age of Onset    Diabetes Mother     Heart Disease Mother     Heart Disease Father     Cancer Sister     Diabetes Sister     Heart Disease Sister     Hypertension Sister     Diabetes Brother     negative for cardiac disease       SOCIAL HISTORY     Social History     Socioeconomic History    Marital status:      Spouse name: Not on file    Number of children: Not on file    Years of education: Not on file    Highest education level: Not on file   Tobacco Use    Smoking status: Never Smoker    Smokeless tobacco: Never Used   Substance and Sexual Activity    Alcohol use: Yes     Comment: wine occasionally    Drug use: No         MEDICATIONS     Current Outpatient Medications   Medication Sig  carvedilol (COREG) 12.5 mg tablet Take  by mouth two (2) times daily (with meals).  colchicine 0.6 mg tablet Take 0.6 mg by mouth daily.  metFORMIN (GLUCOPHAGE) 500 mg tablet Take  by mouth two (2) times daily (with meals).  melatonin 3 mg tablet Take 3 mg by mouth nightly as needed.  guaiFENesin-codeine (VIRTUSSIN AC) 100-10 mg/5 mL solution Take 5 mL by mouth three (3) times daily as needed for Cough.  midodrine (PROAMITINE) 5 mg tablet Take  by mouth two (2) times a day.  allopurinol (ZYLOPRIM) 100 mg tablet Take 100 mg by mouth daily.  PROAIR HFA 90 mcg/actuation inhaler Take 2 Puffs by inhalation every four (4) hours as needed.  LINZESS 145 mcg cap capsule Take 145 mcg by mouth daily.  amiodarone (CORDARONE) 200 mg tablet Take 1 Tab by mouth daily.  nitroglycerin (NITROSTAT) 0.4 mg SL tablet 1 Tab by SubLINGual route every five (5) minutes as needed for Chest Pain.  acetaminophen (TYLENOL EXTRA STRENGTH) 500 mg tablet Take  by mouth every six (6) hours as needed for Pain.  polyethylene glycol (MIRALAX) 17 gram packet Take 17 g by mouth as needed.  sacubitril-valsartan (ENTRESTO) 24 mg/26 mg tablet Take 1 Tab by mouth two (2) times a day.  acetaminophen-codeine (TYLENOL #3) 300-30 mg per tablet Take 1 Tab by mouth every six (6) hours as needed for Pain. Max Daily Amount: 4 Tabs.  Insulin Needles, Disposable, (BD INSULIN PEN NEEDLE UF SHORT) 31 gauge x 5/16\" ndle USE AS DIRECTED FOUR TIMES A DAY    furosemide (LASIX) 40 mg tablet Take 40 mg by mouth daily.  gabapentin (NEURONTIN) 300 mg capsule Take 100 mg by mouth three (3) times daily.  oxybutynin chloride XL (DITROPAN XL) 10 mg CR tablet Take 10 mg by mouth two (2) times a day.  levothyroxine 175 mcg cap Take 175 mcg by mouth Daily (before breakfast).  omeprazole (PRILOSEC) 40 mg capsule Take 40 mg by mouth daily.  atorvastatin (LIPITOR) 40 mg tablet Take 40 mg by mouth daily.     HUMALOG MIX 75-25 KWIKPEN 100 unit/mL (75-25) flexpen INJECT 24 UNITS UNDER THE SKIN TWICE A DAY WITH BREAKFAST AND DINNER    aspirin 81 mg tablet Take 81 mg by mouth.  potassium chloride SR (KLOR-CON 10) 10 mEq tablet Take 10 mEq by mouth daily.  glucose blood VI test strips (FREESTYLE TEST) strip 200 Each by Does Not Apply route four (4) times daily. No current facility-administered medications for this visit. I have reviewed the nurses notes, vitals, problem list, allergy list, medical history, family, social history and medications. REVIEW OF SYMPTOMS      General: Pt denies excessive weight gain or loss. Pt is able to conduct ADL's  HEENT: Denies blurred vision, headaches, hearing loss, epistaxis and difficulty swallowing. Respiratory: Denies cough, congestion, shortness of breath, GABRIEL, wheezing or stridor. Cardiovascular: Denies precordial pain, palpitations, edema or PND  Gastrointestinal: Denies poor appetite, indigestion, abdominal pain or blood in stool  Genitourinary: Denies hematuria, dysuria, increased urinary frequency  Musculoskeletal: Denies joint pain or swelling from muscles or joints  Neurologic: Denies tremor, paresthesias, headache, or sensory motor disturbance  Psychiatric: Denies confusion, insomnia, depression  Integumentray: Denies rash, itching or ulcers. Hematologic: Denies easy bruising, bleeding       PHYSICAL EXAMINATION      Vitals:    09/23/19 1056   Weight: 198 lb 6.4 oz (90 kg)   Height: 5' 3\" (1.6 m)     General: Well developed, in no acute distress. HEENT: No jaundice, oral mucosa moist, no oral ulcers  Neck: Supple, no stiffness, no lymphadenopathy, supple  Heart:  Normal S1/S2 negative S3 or S4. Regular, no murmur, gallop or rub, no jugular venous distention  Respiratory: Clear bilaterally x 4, no wheezing or rales  Abdomen:   Soft, non-tender, bowel sounds are active.   Extremities:  No edema, normal cap refill, no cyanosis.   Musculoskeletal: No clubbing, no deformities  Neuro: A&Ox3, speech clear, gait stable, cooperative, no focal neurologic deficits  Skin: Skin color is normal. No rashes or lesions. Non diaphoretic, moist.  Vascular: 2+ pulses symmetric in all extremities       DIAGNOSTIC DATA      EKG: v pacing with PVCs       LABORATORY DATA      Lab Results   Component Value Date/Time    WBC 8.2 01/19/2018 02:09 PM    HGB 13.7 01/19/2018 02:09 PM    HCT 41.9 01/19/2018 02:09 PM    PLATELET 139 (L) 15/52/9825 02:09 PM    MCV 95 01/19/2018 02:09 PM      Lab Results   Component Value Date/Time    Sodium 136 01/27/2018 04:45 AM    Potassium 4.0 01/27/2018 04:45 AM    Chloride 104 01/27/2018 04:45 AM    CO2 26 01/27/2018 04:45 AM    Anion gap 6 01/27/2018 04:45 AM    Glucose 242 (H) 01/27/2018 04:45 AM    BUN 15 01/27/2018 04:45 AM    Creatinine 0.75 01/27/2018 04:45 AM    BUN/Creatinine ratio 20 01/27/2018 04:45 AM    GFR est AA >60 01/27/2018 04:45 AM    GFR est non-AA >60 01/27/2018 04:45 AM    Calcium 8.3 (L) 01/27/2018 04:45 AM    Bilirubin, total 0.5 11/29/2017 12:00 AM    AST (SGOT) 31 11/29/2017 12:00 AM    Alk. phosphatase 97 11/29/2017 12:00 AM    Protein, total 6.9 11/29/2017 12:00 AM    Albumin 3.8 11/29/2017 12:00 AM    A-G Ratio 1.2 11/29/2017 12:00 AM    ALT (SGPT) 29 11/29/2017 12:00 AM           ASSESSMENT      1. Cardiomyopathy              A. NICM              B. NYHA class II              C. LVEF 30-35%  2. CAD, nonobstructive  3. Asthma  4. Diabetes  5. Hypothyroidism  6. IVCD/LBBB  7. ICD              K. Upgrade to CRTD     PLAN     Continue to monitoring pacing percentages and will follow up. She has had increased CRT pacing percentages without the use of amiodarone. She will continue device checks with [facility]. I've contacted [facility] to discuss possible watchman device for patient as she is not on [address] at this time.       FOLLOW-UP   prn    Thank you, Kathy Gibson MD and Dr. Victor M Coto for allowing me to participate in the care of this extraordinarily pleasant female. Please do not hesitate to contact me for further questions/concerns.        Vasyl Darnell, NP

## 2019-09-23 NOTE — PROGRESS NOTES
Room # 3    SOB, fatigue, palpitations, rare dizziness, slight edema lower legs and tight around abd    Needs refill on Amiodarone    Visit Vitals  /80 (BP 1 Location: Left arm, BP Patient Position: Sitting)   Pulse 84   Resp 18   Ht 5' 3\" (1.6 m)   Wt 198 lb 6.4 oz (90 kg)   SpO2 97%   BMI 35.14 kg/m²

## 2019-11-04 ENCOUNTER — OFFICE VISIT (OUTPATIENT)
Dept: CARDIOLOGY CLINIC | Age: 69
End: 2019-11-04

## 2019-11-04 VITALS
DIASTOLIC BLOOD PRESSURE: 82 MMHG | HEIGHT: 63 IN | RESPIRATION RATE: 16 BRPM | HEART RATE: 72 BPM | WEIGHT: 199.8 LBS | BODY MASS INDEX: 35.4 KG/M2 | SYSTOLIC BLOOD PRESSURE: 148 MMHG | OXYGEN SATURATION: 97 %

## 2019-11-04 DIAGNOSIS — Z45.02 ICD (IMPLANTABLE CARDIOVERTER-DEFIBRILLATOR) BATTERY DEPLETION: ICD-10-CM

## 2019-11-04 DIAGNOSIS — I10 HYPERTENSION, UNSPECIFIED TYPE: ICD-10-CM

## 2019-11-04 DIAGNOSIS — Z95.810 CARDIAC DEFIBRILLATOR IN SITU: ICD-10-CM

## 2019-11-04 DIAGNOSIS — Z79.4 TYPE 2 DIABETES MELLITUS WITH DIABETIC NEPHROPATHY, WITH LONG-TERM CURRENT USE OF INSULIN (HCC): ICD-10-CM

## 2019-11-04 DIAGNOSIS — Z95.810 BIVENTRICULAR ICD (IMPLANTABLE CARDIOVERTER-DEFIBRILLATOR) IN PLACE: Primary | ICD-10-CM

## 2019-11-04 DIAGNOSIS — E11.21 TYPE 2 DIABETES MELLITUS WITH DIABETIC NEPHROPATHY, WITH LONG-TERM CURRENT USE OF INSULIN (HCC): ICD-10-CM

## 2019-11-04 DIAGNOSIS — I42.0 DILATED CARDIOMYOPATHY (HCC): ICD-10-CM

## 2019-11-04 DIAGNOSIS — I42.8 OTHER CARDIOMYOPATHY (HCC): ICD-10-CM

## 2019-11-04 RX ORDER — CARVEDILOL 12.5 MG/1
TABLET ORAL 2 TIMES DAILY WITH MEALS
COMMUNITY
End: 2020-08-14

## 2019-11-04 NOTE — PROGRESS NOTES
ROOM # 5    Discuss Watchman    Visit Vitals  /82 (BP 1 Location: Left arm, BP Patient Position: Sitting) Comment: No medications taken this am   Pulse 72   Resp 16   Ht 5' 3\" (1.6 m)   Wt 199 lb 12.8 oz (90.6 kg)   SpO2 97%   BMI 35.39 kg/m²

## 2019-11-04 NOTE — PROGRESS NOTES
HISTORY OF PRESENTING ILLNESS      Joyce Paz is a 68 y.o. female with NICM, DM, hypertension, LVEF 20%, LHC 2/16, LBBB (QRS duration 144 msec), NYHA class III symptoms, single chamber ICD s/p upgrade to a biventricular ICD system, frequent PVCs who we started on amiodarone in an attempt to suppress her PVCs to allow optimization of her CRT pacing. Subsequently she was hospitalized for heart failure exacerbation and later again for a GI bleed, she is no longer on anticoagulation d/t GI Bleeding. She now presents to discuss CVA risk reduction.         ACTIVE PROBLEM LIST     Patient Active Problem List    Diagnosis Date Noted    Biventricular ICD (implantable cardioverter-defibrillator) in place 01/26/2018     Priority: 1 - One    Cardiomyopathy ([address] 05/23/2016    Skin cancer     Acid indigestion     Asthma     Back pain     Wears dentures     Constipation     Diabetes mellitus     Diarrhea     Frequent episodic tension-type headache     Hemorrhoids     Hernia of unspecified site of abdominal cavity without mention of obstruction or gangrene     HTN (hypertension)     Muscle pain     SOB (shortness of breath)     Thyroid disorder            PAST MEDICAL HISTORY     Past Medical History:   Diagnosis Date    Acid indigestion     heartburn    Asthma     Back pain     Constipation     Diabetes mellitus     Diarrhea     Frequent episodic tension-type headache     Hemorrhoids     Hernia of unspecified site of abdominal cavity without mention of obstruction or gangrene     HTN (hypertension)     ICD (implantable cardioverter-defibrillator) in place     Muscle pain     joint pain    Pacemaker     biventricular ICD 1/26/18    Skin cancer     SOB (shortness of breath)     Thyroid disorder     Wears dentures            PAST SURGICAL HISTORY     Past Surgical History:   Procedure Laterality Date    HX CARPAL TUNNEL RELEASE      right hand    HX HEART CATHETERIZATION  2/6/16  HX HYSTERECTOMY      HX PACEMAKER Left 6/20/16    [employer] ICD    HX THYROIDECTOMY      REMOVAL GALLBLADDER            ALLERGIES     Allergies   Allergen Reactions    Codeine Other (comments)     Patient states she is not allergic    Novocain [Procaine] Nausea and Vomiting    Tramadol Other (comments)     Headache          FAMILY HISTORY     Family History   Problem Relation Age of Onset    Diabetes Mother     Heart Disease Mother     Heart Disease Father     Cancer Sister     Diabetes Sister     Heart Disease Sister     Hypertension Sister     Diabetes Brother     negative for cardiac disease       SOCIAL HISTORY     Social History     Socioeconomic History    Marital status:      Spouse name: Not on file    Number of children: Not on file    Years of education: Not on file    Highest education level: Not on file   Tobacco Use    Smoking status: Never Smoker    Smokeless tobacco: Never Used   Substance and Sexual Activity    Alcohol use: Not Currently     Comment: wine occasionally    Drug use: No         MEDICATIONS     Current Outpatient Medications   Medication Sig    metFORMIN (GLUCOPHAGE) 500 mg tablet Take  by mouth two (2) times daily (with meals).  allopurinol (ZYLOPRIM) 100 mg tablet Take 100 mg by mouth daily.  PROAIR HFA 90 mcg/actuation inhaler Take 2 Puffs by inhalation every four (4) hours as needed.  acetaminophen (TYLENOL EXTRA STRENGTH) 500 mg tablet Take  by mouth every six (6) hours as needed for Pain.  sacubitril-valsartan (ENTRESTO) 24 mg/26 mg tablet Take 1 Tab by mouth two (2) times a day.  acetaminophen-codeine (TYLENOL #3) 300-30 mg per tablet Take 1 Tab by mouth every six (6) hours as needed for Pain. Max Daily Amount: 4 Tabs.  Insulin Needles, Disposable, (BD INSULIN PEN NEEDLE UF SHORT) 31 gauge x 5/16\" ndle USE AS DIRECTED FOUR TIMES A DAY    furosemide (LASIX) 40 mg tablet Take 40 mg by mouth three (3) times daily. Indications: sometimes tid    gabapentin (NEURONTIN) 100 mg capsule Take 100 mg by mouth three (3) times daily.  oxybutynin chloride XL (DITROPAN XL) 10 mg CR tablet Take 10 mg by mouth two (2) times a day.  levothyroxine 175 mcg cap Take 175 mcg by mouth Daily (before breakfast).  omeprazole (PRILOSEC) 40 mg capsule Take 40 mg by mouth daily.  atorvastatin (LIPITOR) 40 mg tablet Take 40 mg by mouth daily.  HUMALOG MIX 75-25 KWIKPEN 100 unit/mL (75-25) flexpen INJECT 24 UNITS UNDER THE SKIN TWICE A DAY WITH BREAKFAST AND DINNER    glucose blood VI test strips (FREESTYLE TEST) strip 200 Each by Does Not Apply route four (4) times daily.  diclofenac (VOLTAREN) 1 % gel Apply  to affected area four (4) times daily as needed.  melatonin 3 mg tablet Take 3 mg by mouth nightly as needed.  guaiFENesin-codeine (VIRTUSSIN AC) 100-10 mg/5 mL solution Take 5 mL by mouth three (3) times daily as needed for Cough.  LINZESS 145 mcg cap capsule Take 145 mcg by mouth daily.  nitroglycerin (NITROSTAT) 0.4 mg SL tablet 1 Tab by SubLINGual route every five (5) minutes as needed for Chest Pain.  polyethylene glycol (MIRALAX) 17 gram packet Take 17 g by mouth as needed.  aspirin 81 mg tablet Take 81 mg by mouth.  potassium chloride SR (KLOR-CON 10) 10 mEq tablet Take 10 mEq by mouth daily. No current facility-administered medications for this visit. I have reviewed the nurses notes, vitals, problem list, allergy list, medical history, family, social history and medications. REVIEW OF SYMPTOMS      General: Pt denies excessive weight gain or loss. Pt is able to conduct ADL's  HEENT: Denies blurred vision, headaches, hearing loss, epistaxis and difficulty swallowing. Respiratory: Denies cough, congestion, shortness of breath, GABRIEL, wheezing or stridor.   Cardiovascular: Denies precordial pain, palpitations, edema or PND  Gastrointestinal: Denies poor appetite, indigestion, abdominal pain or blood in stool  Genitourinary: Denies hematuria, dysuria, increased urinary frequency  Musculoskeletal: Denies joint pain or swelling from muscles or joints  Neurologic: Denies tremor, paresthesias, headache, or sensory motor disturbance  Psychiatric: Denies confusion, insomnia, depression  Integumentray: Denies rash, itching or ulcers. Hematologic: Denies easy bruising, bleeding       PHYSICAL EXAMINATION      Vitals:    11/04/19 1043   Resp: 16   Weight: 199 lb 12.8 oz (90.6 kg)   Height: 5' 3\" (1.6 m)     General: Well developed, in no acute distress. HEENT: No jaundice, oral mucosa moist, no oral ulcers  Neck: Supple, no stiffness, no lymphadenopathy, supple  Heart:  Normal S1/S2 negative S3 or S4. Regular, no murmur, gallop or rub, no jugular venous distention  Respiratory: Clear bilaterally x 4, no wheezing or rales  Abdomen:   Soft, non-tender, bowel sounds are active.   Extremities:  No edema, normal cap refill, no cyanosis. Musculoskeletal: No clubbing, no deformities  Neuro: A&Ox3, speech clear, gait stable, cooperative, no focal neurologic deficits  Skin: Skin color is normal. No rashes or lesions.  Non diaphoretic, moist.  Vascular: 2+ pulses symmetric in all extremities       DIAGNOSTIC DATA      EKG:        LABORATORY DATA      Lab Results   Component Value Date/Time    WBC 8.2 01/19/2018 02:09 PM    HGB 13.7 01/19/2018 02:09 PM    HCT 41.9 01/19/2018 02:09 PM    PLATELET 939 (L) 01/76/2099 02:09 PM    MCV 95 01/19/2018 02:09 PM      Lab Results   Component Value Date/Time    Sodium 136 01/27/2018 04:45 AM    Potassium 4.0 01/27/2018 04:45 AM    Chloride 104 01/27/2018 04:45 AM    CO2 26 01/27/2018 04:45 AM    Anion gap 6 01/27/2018 04:45 AM    Glucose 242 (H) 01/27/2018 04:45 AM    BUN 15 01/27/2018 04:45 AM    Creatinine 0.75 01/27/2018 04:45 AM    BUN/Creatinine ratio 20 01/27/2018 04:45 AM    GFR est AA >60 01/27/2018 04:45 AM    GFR est non-AA >60 01/27/2018 04:45 AM    Calcium 8.3 (L) 01/27/2018 04:45 AM    Bilirubin, total 0.5 11/29/2017 12:00 AM    AST (SGOT) 31 11/29/2017 12:00 AM    Alk. phosphatase 97 11/29/2017 12:00 AM    Protein, total 6.9 11/29/2017 12:00 AM    Albumin 3.8 11/29/2017 12:00 AM    A-G Ratio 1.2 11/29/2017 12:00 AM    ALT (SGPT) 29 11/29/2017 12:00 AM           ASSESSMENT      1. Cardiomyopathy              A. NICM              B. NYHA class II              C. LVEF 30-35%  2. CAD, nonobstructive  3. Asthma  4. Diabetes  5. Hypothyroidism  6. IVCD/LBBB  7. ICD              K. Upgrade to CRTD       PLAN     Will review pacemaker electrograms with [facility] to determine whether patient's ATR episodes are actually atrial arrhythmias that would warrant OAC/consideration for EDWARD occlusion vs atrial oversensing of ventricular depolarizations in which case would defer anticoagulation/LAAO. If it is felt to be oversensing, will attempt to reprogram sensitivities and monitor for AF/AFL in future. FOLLOW-UP     TBD      Thank you, Libby Barba MD and Dr. Sonia Charles for allowing me to participate in the care of this extraordinarily pleasant female. Please do not hesitate to contact me for further questions/concerns.          Buffy Guzman MD  Cardiac Electrophysiology / Cardiology    [address]  [address] [facility], [facility], [address]  [phone] / [phone] Fax   [phone] / [phone] Fax

## 2019-11-14 ENCOUNTER — TELEPHONE (OUTPATIENT)
Dept: CARDIOLOGY CLINIC | Age: 69
End: 2019-11-14

## 2019-11-14 NOTE — TELEPHONE ENCOUNTER
Madalyn Ghosh with [facility] Cardiology need to speak with a nurse about a gail.     She can be reached at [phone]

## 2019-11-15 NOTE — TELEPHONE ENCOUNTER
Returned call.  was unable to reach Jaú. Was disconnected before leaving . Will attempt call again later.

## 2019-11-19 NOTE — TELEPHONE ENCOUNTER
Returned call to SOFIA[facility], she was not available, attempted times 2 to leave voice mail and got disconnected prior to being able to leave a message both times. Received her e-mail address and will send her an e-mail stating that per alban Justin NP patient  doesn't need the ANISA for her pre-watchman measurements, no need for watchman d/t atrial tachycardia- not AF on her device. Patient notified via phone of this information as well.

## 2020-01-28 ENCOUNTER — OFFICE VISIT (OUTPATIENT)
Dept: CARDIOLOGY CLINIC | Age: 70
End: 2020-01-28

## 2020-01-28 DIAGNOSIS — Z95.810 AUTOMATIC IMPLANTABLE CARDIAC DEFIBRILLATOR IN SITU: Primary | ICD-10-CM

## 2020-02-06 ENCOUNTER — DOCUMENTATION ONLY (OUTPATIENT)
Dept: CARDIOLOGY CLINIC | Age: 70
End: 2020-02-06

## 2020-02-20 ENCOUNTER — OP HISTORICAL/CONVERTED ENCOUNTER (OUTPATIENT)
Dept: OTHER | Age: 70
End: 2020-02-20

## 2020-05-05 ENCOUNTER — DOCUMENTATION ONLY (OUTPATIENT)
Dept: CARDIOLOGY CLINIC | Age: 70
End: 2020-05-05

## 2020-05-05 NOTE — PROGRESS NOTES
Received monthly [facility] report. Heart failure index is 2. Continue follow up.      Catarino Waters NP

## 2020-08-10 ENCOUNTER — APPOINTMENT (OUTPATIENT)
Dept: GENERAL RADIOLOGY | Age: 70
DRG: 064 | End: 2020-08-10
Attending: FAMILY MEDICINE
Payer: MEDICARE

## 2020-08-10 ENCOUNTER — APPOINTMENT (OUTPATIENT)
Dept: CT IMAGING | Age: 70
DRG: 064 | End: 2020-08-10
Attending: FAMILY MEDICINE
Payer: MEDICARE

## 2020-08-10 ENCOUNTER — HOSPITAL ENCOUNTER (INPATIENT)
Age: 70
LOS: 2 days | Discharge: HOME HEALTH | DRG: 064 | End: 2020-08-14
Attending: EMERGENCY MEDICINE | Admitting: HOSPITALIST
Payer: MEDICARE

## 2020-08-10 DIAGNOSIS — R47.1 DYSARTHRIA: ICD-10-CM

## 2020-08-10 DIAGNOSIS — I10 HYPERTENSION, UNSPECIFIED TYPE: ICD-10-CM

## 2020-08-10 DIAGNOSIS — I42.9 CARDIOMYOPATHY, UNSPECIFIED TYPE (HCC): ICD-10-CM

## 2020-08-10 DIAGNOSIS — I50.9 CHRONIC CONGESTIVE HEART FAILURE, UNSPECIFIED HEART FAILURE TYPE (HCC): ICD-10-CM

## 2020-08-10 DIAGNOSIS — R77.8 ELEVATED TROPONIN: ICD-10-CM

## 2020-08-10 DIAGNOSIS — I42.0 DILATED CARDIOMYOPATHY (HCC): ICD-10-CM

## 2020-08-10 DIAGNOSIS — I10 ESSENTIAL HYPERTENSION: ICD-10-CM

## 2020-08-10 DIAGNOSIS — I63.512 CEREBRAL INFARCTION DUE TO OCCLUSION OF LEFT MIDDLE CEREBRAL ARTERY (HCC): ICD-10-CM

## 2020-08-10 DIAGNOSIS — Z95.810 BIVENTRICULAR ICD (IMPLANTABLE CARDIOVERTER-DEFIBRILLATOR) IN PLACE: ICD-10-CM

## 2020-08-10 DIAGNOSIS — G45.9 TIA (TRANSIENT ISCHEMIC ATTACK): Primary | ICD-10-CM

## 2020-08-10 LAB
ALBUMIN SERPL-MCNC: 2.7 G/DL (ref 3.5–5)
ALBUMIN/GLOB SERPL: 0.6 {RATIO} (ref 1.1–2.2)
ALP SERPL-CCNC: 87 U/L (ref 45–117)
ALT SERPL-CCNC: 24 U/L (ref 12–78)
ANION GAP SERPL CALC-SCNC: 6 MMOL/L (ref 5–15)
AST SERPL-CCNC: 25 U/L (ref 15–37)
BASOPHILS # BLD: 0 K/UL (ref 0–0.1)
BASOPHILS NFR BLD: 1 % (ref 0–1)
BILIRUB SERPL-MCNC: 0.9 MG/DL (ref 0.2–1)
BNP SERPL-MCNC: 1862 PG/ML
BUN SERPL-MCNC: 20 MG/DL (ref 6–20)
BUN/CREAT SERPL: 29 (ref 12–20)
CALCIUM SERPL-MCNC: 9 MG/DL (ref 8.5–10.1)
CHLORIDE SERPL-SCNC: 106 MMOL/L (ref 97–108)
CO2 SERPL-SCNC: 25 MMOL/L (ref 21–32)
COMMENT, HOLDF: NORMAL
CREAT SERPL-MCNC: 0.7 MG/DL (ref 0.55–1.02)
DIFFERENTIAL METHOD BLD: ABNORMAL
EOSINOPHIL # BLD: 0.1 K/UL (ref 0–0.4)
EOSINOPHIL NFR BLD: 2 % (ref 0–7)
ERYTHROCYTE [DISTWIDTH] IN BLOOD BY AUTOMATED COUNT: 13.7 % (ref 11.5–14.5)
GLOBULIN SER CALC-MCNC: 4.9 G/DL (ref 2–4)
GLUCOSE SERPL-MCNC: 185 MG/DL (ref 65–100)
HCT VFR BLD AUTO: 33.1 % (ref 35–47)
HGB BLD-MCNC: 10.6 G/DL (ref 11.5–16)
IMM GRANULOCYTES # BLD AUTO: 0 K/UL (ref 0–0.04)
IMM GRANULOCYTES NFR BLD AUTO: 0 % (ref 0–0.5)
INR PPP: 1.3 (ref 0.9–1.1)
LYMPHOCYTES # BLD: 1.8 K/UL (ref 0.8–3.5)
LYMPHOCYTES NFR BLD: 32 % (ref 12–49)
MCH RBC QN AUTO: 30.6 PG (ref 26–34)
MCHC RBC AUTO-ENTMCNC: 32 G/DL (ref 30–36.5)
MCV RBC AUTO: 95.7 FL (ref 80–99)
MONOCYTES # BLD: 0.5 K/UL (ref 0–1)
MONOCYTES NFR BLD: 9 % (ref 5–13)
NEUTS SEG # BLD: 3.2 K/UL (ref 1.8–8)
NEUTS SEG NFR BLD: 56 % (ref 32–75)
NRBC # BLD: 0 K/UL (ref 0–0.01)
NRBC BLD-RTO: 0 PER 100 WBC
PLATELET # BLD AUTO: 133 K/UL (ref 150–400)
PMV BLD AUTO: 10.2 FL (ref 8.9–12.9)
POTASSIUM SERPL-SCNC: 3.5 MMOL/L (ref 3.5–5.1)
PROT SERPL-MCNC: 7.6 G/DL (ref 6.4–8.2)
PROTHROMBIN TIME: 13.5 SEC (ref 9–11.1)
RBC # BLD AUTO: 3.46 M/UL (ref 3.8–5.2)
SAMPLES BEING HELD,HOLD: NORMAL
SODIUM SERPL-SCNC: 137 MMOL/L (ref 136–145)
TROPONIN I SERPL-MCNC: 0.28 NG/ML
TSH SERPL DL<=0.05 MIU/L-ACNC: 6.58 UIU/ML (ref 0.36–3.74)
WBC # BLD AUTO: 5.7 K/UL (ref 3.6–11)

## 2020-08-10 PROCEDURE — 84484 ASSAY OF TROPONIN QUANT: CPT

## 2020-08-10 PROCEDURE — 85610 PROTHROMBIN TIME: CPT

## 2020-08-10 PROCEDURE — 84443 ASSAY THYROID STIM HORMONE: CPT

## 2020-08-10 PROCEDURE — 74011000258 HC RX REV CODE- 258: Performed by: RADIOLOGY

## 2020-08-10 PROCEDURE — 83880 ASSAY OF NATRIURETIC PEPTIDE: CPT

## 2020-08-10 PROCEDURE — 70450 CT HEAD/BRAIN W/O DYE: CPT

## 2020-08-10 PROCEDURE — 94760 N-INVAS EAR/PLS OXIMETRY 1: CPT

## 2020-08-10 PROCEDURE — 80053 COMPREHEN METABOLIC PANEL: CPT

## 2020-08-10 PROCEDURE — 71045 X-RAY EXAM CHEST 1 VIEW: CPT

## 2020-08-10 PROCEDURE — 70496 CT ANGIOGRAPHY HEAD: CPT

## 2020-08-10 PROCEDURE — 36415 COLL VENOUS BLD VENIPUNCTURE: CPT

## 2020-08-10 PROCEDURE — 93005 ELECTROCARDIOGRAM TRACING: CPT

## 2020-08-10 PROCEDURE — 99285 EMERGENCY DEPT VISIT HI MDM: CPT

## 2020-08-10 PROCEDURE — 99218 HC RM OBSERVATION: CPT

## 2020-08-10 PROCEDURE — 0042T CT CODE NEURO PERF W CBF: CPT

## 2020-08-10 PROCEDURE — 85025 COMPLETE CBC W/AUTO DIFF WBC: CPT

## 2020-08-10 PROCEDURE — 74011636320 HC RX REV CODE- 636/320: Performed by: RADIOLOGY

## 2020-08-10 RX ORDER — INSULIN LISPRO 100 [IU]/ML
INJECTION, SOLUTION INTRAVENOUS; SUBCUTANEOUS
Status: DISCONTINUED | OUTPATIENT
Start: 2020-08-11 | End: 2020-08-14 | Stop reason: HOSPADM

## 2020-08-10 RX ORDER — GUAIFENESIN 100 MG/5ML
81 LIQUID (ML) ORAL DAILY
Status: DISCONTINUED | OUTPATIENT
Start: 2020-08-11 | End: 2020-08-12

## 2020-08-10 RX ORDER — ACETAMINOPHEN 650 MG/1
650 SUPPOSITORY RECTAL
Status: DISCONTINUED | OUTPATIENT
Start: 2020-08-10 | End: 2020-08-14 | Stop reason: HOSPADM

## 2020-08-10 RX ORDER — SODIUM CHLORIDE 9 MG/ML
75 INJECTION, SOLUTION INTRAVENOUS CONTINUOUS
Status: CANCELLED | OUTPATIENT
Start: 2020-08-10 | End: 2020-08-11

## 2020-08-10 RX ORDER — MAGNESIUM SULFATE 100 %
4 CRYSTALS MISCELLANEOUS AS NEEDED
Status: DISCONTINUED | OUTPATIENT
Start: 2020-08-10 | End: 2020-08-14 | Stop reason: HOSPADM

## 2020-08-10 RX ORDER — CARVEDILOL 3.12 MG/1
3.12 TABLET ORAL 2 TIMES DAILY WITH MEALS
Status: DISCONTINUED | OUTPATIENT
Start: 2020-08-11 | End: 2020-08-14 | Stop reason: HOSPADM

## 2020-08-10 RX ORDER — OXYBUTYNIN CHLORIDE 5 MG/1
10 TABLET, EXTENDED RELEASE ORAL 2 TIMES DAILY
Status: DISCONTINUED | OUTPATIENT
Start: 2020-08-11 | End: 2020-08-14 | Stop reason: HOSPADM

## 2020-08-10 RX ORDER — FUROSEMIDE 10 MG/ML
40 INJECTION INTRAMUSCULAR; INTRAVENOUS ONCE
Status: COMPLETED | OUTPATIENT
Start: 2020-08-10 | End: 2020-08-11

## 2020-08-10 RX ORDER — ATORVASTATIN CALCIUM 40 MG/1
40 TABLET, FILM COATED ORAL DAILY
Status: DISCONTINUED | OUTPATIENT
Start: 2020-08-11 | End: 2020-08-14 | Stop reason: HOSPADM

## 2020-08-10 RX ORDER — SODIUM CHLORIDE 0.9 % (FLUSH) 0.9 %
10 SYRINGE (ML) INJECTION
Status: DISCONTINUED | OUTPATIENT
Start: 2020-08-10 | End: 2020-08-10

## 2020-08-10 RX ORDER — SODIUM CHLORIDE 0.9 % (FLUSH) 0.9 %
10 SYRINGE (ML) INJECTION
Status: COMPLETED | OUTPATIENT
Start: 2020-08-10 | End: 2020-08-10

## 2020-08-10 RX ORDER — ACETAMINOPHEN 325 MG/1
650 TABLET ORAL
Status: DISCONTINUED | OUTPATIENT
Start: 2020-08-10 | End: 2020-08-14 | Stop reason: HOSPADM

## 2020-08-10 RX ORDER — DEXTROSE MONOHYDRATE 100 MG/ML
0-250 INJECTION, SOLUTION INTRAVENOUS AS NEEDED
Status: DISCONTINUED | OUTPATIENT
Start: 2020-08-10 | End: 2020-08-14 | Stop reason: HOSPADM

## 2020-08-10 RX ORDER — LANOLIN ALCOHOL/MO/W.PET/CERES
3 CREAM (GRAM) TOPICAL
Status: DISCONTINUED | OUTPATIENT
Start: 2020-08-10 | End: 2020-08-14 | Stop reason: HOSPADM

## 2020-08-10 RX ADMIN — SODIUM CHLORIDE 100 ML: 900 INJECTION, SOLUTION INTRAVENOUS at 22:25

## 2020-08-10 RX ADMIN — Medication 10 ML: at 22:26

## 2020-08-10 RX ADMIN — IOPAMIDOL 120 ML: 755 INJECTION, SOLUTION INTRAVENOUS at 22:20

## 2020-08-10 NOTE — ED PROVIDER NOTES
20-year-old female presents to the emergency department as a transfer from [facility] in [place], [place] as a transfer after she had a TIA reportedly this morning on awaking around 9 AM.  Reportedly the patient experienced difficulty speaking and left-sided numbness and weakness. Records were reviewed from OSH evaluation significant for glucose 245, and elevated troponin at 0.3. She has known CHF. CT head at OSH showed no acute intracranial abnormalities. CXR shows cardiomegaly and vascular congestion present. Her case was reportedly discussed with Dr. Kacey Moody, hospitalist who accepted the patient in transfer for admission and further TIA work-up. She also notes some mild discomfort with urination that has been going on for the last couple days as well, but denies fevers, abdominal pain, flank pain, nausea or vomiting. TIA   Primary symptoms include speech difficulty. Pertinent negatives include no shortness of breath, no chest pain, no vomiting, no headaches and no nausea.         Past Medical History:   Diagnosis Date    Acid indigestion     heartburn    Asthma     Back pain     Constipation     Diabetes mellitus     Diarrhea     Frequent episodic tension-type headache     Hemorrhoids     Hernia of unspecified site of abdominal cavity without mention of obstruction or gangrene     HTN (hypertension)     ICD (implantable cardioverter-defibrillator) in place     Muscle pain     joint pain    Pacemaker     biventricular ICD 1/26/18    Skin cancer     SOB (shortness of breath)     Thyroid disorder     Wears dentures        Past Surgical History:   Procedure Laterality Date    HX CARPAL TUNNEL RELEASE      right hand    HX HEART CATHETERIZATION  2/6/16    HX HYSTERECTOMY      HX PACEMAKER Left 6/20/16    [employer] ICD    HX THYROIDECTOMY      REMOVAL GALLBLADDER           Family History:   Problem Relation Age of Onset    Diabetes Mother     Heart Disease Mother     Heart Disease Father     Cancer Sister     Diabetes Sister     Heart Disease Sister     Hypertension Sister     Diabetes Brother        Social History     Socioeconomic History    Marital status:      Spouse name: Not on file    Number of children: Not on file    Years of education: Not on file    Highest education level: Not on file   Occupational History    Not on file   Social Needs    Financial resource strain: Not on file    Food insecurity     Worry: Not on file     Inability: Not on file   [employer] needs     Medical: Not on file     Non-medical: Not on file   Tobacco Use    Smoking status: Never Smoker    Smokeless tobacco: Never Used   Substance and Sexual Activity    Alcohol use: Not Currently     Comment: wine occasionally    Drug use: No    Sexual activity: Not on file   Lifestyle    Physical activity     Days per week: Not on file     Minutes per session: Not on file    Stress: Not on file   Relationships    Social connections     Talks on phone: Not on file     Gets together: Not on file     Attends Voodoo service: Not on file     Active member of club or organization: Not on file     Attends meetings of clubs or organizations: Not on file     Relationship status: Not on file    Intimate partner violence     Fear of current or ex partner: Not on file     Emotionally abused: Not on file     Physically abused: Not on file     Forced sexual activity: Not on file   Other Topics Concern    Not on file   Social History Narrative    Not on file         ALLERGIES: Codeine; Novocain [procaine]; and Tramadol    Review of Systems   Constitutional: Negative for activity change, appetite change, chills and fever. HENT: Negative for congestion, rhinorrhea, sinus pressure, sneezing and sore throat. Eyes: Negative for photophobia and visual disturbance. Respiratory: Negative for cough and shortness of breath. Cardiovascular: Negative for chest pain. Gastrointestinal: Negative for abdominal pain, blood in stool, constipation, diarrhea, nausea and vomiting. Genitourinary: Negative for difficulty urinating, dysuria, flank pain, frequency, hematuria, menstrual problem, urgency, vaginal bleeding and vaginal discharge. Musculoskeletal: Negative for arthralgias, back pain, myalgias and neck pain. Skin: Negative for rash and wound. Neurological: Positive for speech difficulty, weakness and numbness. Negative for syncope and headaches. Psychiatric/Behavioral: Negative for self-injury and suicidal ideas. All other systems reviewed and are negative. Vitals:    08/11/20 0539 08/11/20 0548 08/11/20 0927 08/11/20 1006   BP:  113/50 94/43 109/63   Pulse:  92 77 81   Resp:  14  17   Temp:  97.4 °F (36.3 °C)  97.5 °F (36.4 °C)   SpO2:  93%  95%   Weight: 92.7 kg (204 lb 6.4 oz)      Height:                Physical Exam  Vitals signs and nursing note reviewed. Constitutional:       General: She is not in acute distress. Appearance: Normal appearance. She is well-developed. She is obese. She is not diaphoretic. HENT:      Head: Normocephalic and atraumatic. Nose: Nose normal.   Eyes:      Extraocular Movements: Extraocular movements intact. Conjunctiva/sclera: Conjunctivae normal.      Pupils: Pupils are equal, round, and reactive to light. Neck:      Musculoskeletal: Neck supple. Cardiovascular:      Rate and Rhythm: Normal rate and regular rhythm. Heart sounds: Normal heart sounds. Pulmonary:      Effort: Pulmonary effort is normal.      Breath sounds: Rales (mild bibasilar) present. Abdominal:      General: There is no distension. Palpations: Abdomen is soft. Tenderness: There is no abdominal tenderness. Musculoskeletal:         General: No tenderness. Skin:     General: Skin is warm and dry. Neurological:      General: No focal deficit present.       Mental Status: She is alert and oriented to person, place, and time.      Cranial Nerves: No cranial nerve deficit. Sensory: No sensory deficit. Motor: No weakness. Coordination: Coordination normal.      Comments: Intact sensation, 5/5 strength in all 4 extremities, intact finger to nose, neg pronator drift, fluent speech, CN intact. MDM    68 y.o. female presents for TIA workup, neuro sx resolved. CT neg at OSH. Labs returned showing positive troponin at 0.28, less than previous measure at OSH. No chest pain. UA ordered. Will admit to the hospitalist service for further care and assessment. Procedures    1754 EKG shows atrial sensed ventricular paced rhythm with a rate of 89 bpm with frequent PVCs no acute ST or T wave abnormalities suggestive of ischemia. Hospitalist Perfect Serve for Admission  7:25 PM    ED Room Number: 650/01  Patient Name and age:  Theron Hanson 68 y.o.  female  Working Diagnosis:   1. TIA (transient ischemic attack)    2. Cardiomyopathy, unspecified type ([address]    3. Chronic congestive heart failure, unspecified heart failure type ([address]    4. Cerebral infarction due to occlusion of left middle cerebral artery ([address]        COVID-19 Suspicion:  no    Code Status:  Full Code  Readmission: no  Isolation Requirements:  no  Recommended Level of Care:  telemetry  Department:[facility] Adult ED - 21   Other: Transferred from OSH for TIA/CVA work-up. Neuro symptoms resolved. Has chronic CHF and mildly elevated troponin on our labs, but similar to and slightly less than labs taken at OSH. CT head at OSH negative. She was reportedly accepted by Dr. Rickey Carroll earlier but presented to the ED because it was in the bed.

## 2020-08-10 NOTE — ED TRIAGE NOTES
Pt arrived from Hospital in [facility] [place] [facility] for expressive aphasia, L sided weakness and L sided facial droop that resolved around 1300 today. Dr. Maurisio Dos Santos accepted patient here. Pt states she is still intermittently having some expressive aphasia. Pt does not takes any blood thinners.

## 2020-08-11 ENCOUNTER — APPOINTMENT (OUTPATIENT)
Dept: MRI IMAGING | Age: 70
DRG: 064 | End: 2020-08-11
Attending: FAMILY MEDICINE
Payer: MEDICARE

## 2020-08-11 PROBLEM — R47.1 DYSARTHRIA: Status: ACTIVE | Noted: 2020-08-11

## 2020-08-11 LAB
APPEARANCE UR: CLEAR
ASPIRIN TEST, ASPIRN: 577 ARU
ATRIAL RATE: 89 BPM
BACTERIA URNS QL MICRO: NEGATIVE /HPF
BILIRUB UR QL: NEGATIVE
CALCULATED P AXIS, ECG09: 64 DEGREES
CALCULATED R AXIS, ECG10: -46 DEGREES
CALCULATED T AXIS, ECG11: 77 DEGREES
CHOLEST SERPL-MCNC: 148 MG/DL
COLOR UR: ABNORMAL
DIAGNOSIS, 93000: NORMAL
EPITH CASTS URNS QL MICRO: ABNORMAL /LPF
ERYTHROCYTE [DISTWIDTH] IN BLOOD BY AUTOMATED COUNT: 13.6 % (ref 11.5–14.5)
EST. AVERAGE GLUCOSE BLD GHB EST-MCNC: 252 MG/DL
GLUCOSE BLD STRIP.AUTO-MCNC: 201 MG/DL (ref 65–100)
GLUCOSE BLD STRIP.AUTO-MCNC: 220 MG/DL (ref 65–100)
GLUCOSE BLD STRIP.AUTO-MCNC: 242 MG/DL (ref 65–100)
GLUCOSE BLD STRIP.AUTO-MCNC: 248 MG/DL (ref 65–100)
GLUCOSE UR STRIP.AUTO-MCNC: 250 MG/DL
HBA1C MFR BLD: 10.4 % (ref 4–5.6)
HCT VFR BLD AUTO: 34.7 % (ref 35–47)
HDLC SERPL-MCNC: 53 MG/DL
HDLC SERPL: 2.8 {RATIO} (ref 0–5)
HGB BLD-MCNC: 11.1 G/DL (ref 11.5–16)
HGB UR QL STRIP: ABNORMAL
KETONES UR QL STRIP.AUTO: NEGATIVE MG/DL
LDLC SERPL CALC-MCNC: 70.4 MG/DL (ref 0–100)
LEUKOCYTE ESTERASE UR QL STRIP.AUTO: ABNORMAL
LIPID PROFILE,FLP: NORMAL
MCH RBC QN AUTO: 30.3 PG (ref 26–34)
MCHC RBC AUTO-ENTMCNC: 32 G/DL (ref 30–36.5)
MCV RBC AUTO: 94.8 FL (ref 80–99)
NITRITE UR QL STRIP.AUTO: POSITIVE
NRBC # BLD: 0 K/UL (ref 0–0.01)
NRBC BLD-RTO: 0 PER 100 WBC
P-R INTERVAL, ECG05: 168 MS
P2Y12 PLT RESPONSE,PPPR: 246 PRU (ref 194–418)
PH UR STRIP: 5 [PH] (ref 5–8)
PLATELET # BLD AUTO: 146 K/UL (ref 150–400)
PMV BLD AUTO: 10.2 FL (ref 8.9–12.9)
PROT UR STRIP-MCNC: 100 MG/DL
Q-T INTERVAL, ECG07: 428 MS
QRS DURATION, ECG06: 174 MS
QTC CALCULATION (BEZET), ECG08: 520 MS
RBC # BLD AUTO: 3.66 M/UL (ref 3.8–5.2)
RBC #/AREA URNS HPF: ABNORMAL /HPF (ref 0–5)
SERVICE CMNT-IMP: ABNORMAL
SP GR UR REFRACTOMETRY: 1.01 (ref 1–1.03)
TRIGL SERPL-MCNC: 123 MG/DL (ref ?–150)
TROPONIN I SERPL-MCNC: 0.27 NG/ML
TROPONIN I SERPL-MCNC: 0.27 NG/ML
UR CULT HOLD, URHOLD: NORMAL
UROBILINOGEN UR QL STRIP.AUTO: 1 EU/DL (ref 0.2–1)
VENTRICULAR RATE, ECG03: 89 BPM
VLDLC SERPL CALC-MCNC: 24.6 MG/DL
WBC # BLD AUTO: 6.4 K/UL (ref 3.6–11)
WBC URNS QL MICRO: ABNORMAL /HPF (ref 0–4)

## 2020-08-11 PROCEDURE — 80061 LIPID PANEL: CPT

## 2020-08-11 PROCEDURE — 84484 ASSAY OF TROPONIN QUANT: CPT

## 2020-08-11 PROCEDURE — 97116 GAIT TRAINING THERAPY: CPT

## 2020-08-11 PROCEDURE — 74011250636 HC RX REV CODE- 250/636: Performed by: FAMILY MEDICINE

## 2020-08-11 PROCEDURE — 96376 TX/PRO/DX INJ SAME DRUG ADON: CPT

## 2020-08-11 PROCEDURE — 74011250637 HC RX REV CODE- 250/637: Performed by: PSYCHIATRY & NEUROLOGY

## 2020-08-11 PROCEDURE — 83036 HEMOGLOBIN GLYCOSYLATED A1C: CPT

## 2020-08-11 PROCEDURE — 97112 NEUROMUSCULAR REEDUCATION: CPT

## 2020-08-11 PROCEDURE — 92610 EVALUATE SWALLOWING FUNCTION: CPT

## 2020-08-11 PROCEDURE — 99222 1ST HOSP IP/OBS MODERATE 55: CPT | Performed by: SPECIALIST

## 2020-08-11 PROCEDURE — 82962 GLUCOSE BLOOD TEST: CPT

## 2020-08-11 PROCEDURE — 99218 HC RM OBSERVATION: CPT

## 2020-08-11 PROCEDURE — 85027 COMPLETE CBC AUTOMATED: CPT

## 2020-08-11 PROCEDURE — 85576 BLOOD PLATELET AGGREGATION: CPT

## 2020-08-11 PROCEDURE — 99222 1ST HOSP IP/OBS MODERATE 55: CPT | Performed by: STUDENT IN AN ORGANIZED HEALTH CARE EDUCATION/TRAINING PROGRAM

## 2020-08-11 PROCEDURE — 97530 THERAPEUTIC ACTIVITIES: CPT

## 2020-08-11 PROCEDURE — 74011250637 HC RX REV CODE- 250/637: Performed by: FAMILY MEDICINE

## 2020-08-11 PROCEDURE — 99223 1ST HOSP IP/OBS HIGH 75: CPT | Performed by: PSYCHIATRY & NEUROLOGY

## 2020-08-11 PROCEDURE — 96374 THER/PROPH/DIAG INJ IV PUSH: CPT

## 2020-08-11 PROCEDURE — 77010033678 HC OXYGEN DAILY

## 2020-08-11 PROCEDURE — 97161 PT EVAL LOW COMPLEX 20 MIN: CPT

## 2020-08-11 PROCEDURE — 81001 URINALYSIS AUTO W/SCOPE: CPT

## 2020-08-11 PROCEDURE — 97165 OT EVAL LOW COMPLEX 30 MIN: CPT

## 2020-08-11 PROCEDURE — 36415 COLL VENOUS BLD VENIPUNCTURE: CPT

## 2020-08-11 PROCEDURE — 74011250637 HC RX REV CODE- 250/637: Performed by: HOSPITALIST

## 2020-08-11 PROCEDURE — 74011636637 HC RX REV CODE- 636/637: Performed by: FAMILY MEDICINE

## 2020-08-11 PROCEDURE — 74011250636 HC RX REV CODE- 250/636: Performed by: HOSPITALIST

## 2020-08-11 RX ORDER — FUROSEMIDE 40 MG/1
40 TABLET ORAL 3 TIMES DAILY
Status: DISCONTINUED | OUTPATIENT
Start: 2020-08-11 | End: 2020-08-14 | Stop reason: HOSPADM

## 2020-08-11 RX ORDER — ALLOPURINOL 100 MG/1
100 TABLET ORAL DAILY
Status: DISCONTINUED | OUTPATIENT
Start: 2020-08-12 | End: 2020-08-14 | Stop reason: HOSPADM

## 2020-08-11 RX ORDER — GLUCOSAMINE SULFATE 1500 MG
POWDER IN PACKET (EA) ORAL DAILY
COMMUNITY
End: 2020-09-21

## 2020-08-11 RX ORDER — PANTOPRAZOLE SODIUM 40 MG/1
40 TABLET, DELAYED RELEASE ORAL
Status: DISCONTINUED | OUTPATIENT
Start: 2020-08-12 | End: 2020-08-14 | Stop reason: HOSPADM

## 2020-08-11 RX ORDER — CLOPIDOGREL BISULFATE 75 MG/1
75 TABLET ORAL DAILY
Status: DISCONTINUED | OUTPATIENT
Start: 2020-08-11 | End: 2020-08-14 | Stop reason: HOSPADM

## 2020-08-11 RX ORDER — LANOLIN ALCOHOL/MO/W.PET/CERES
400 CREAM (GRAM) TOPICAL 2 TIMES DAILY
COMMUNITY

## 2020-08-11 RX ORDER — FUROSEMIDE 10 MG/ML
40 INJECTION INTRAMUSCULAR; INTRAVENOUS 2 TIMES DAILY
Status: DISCONTINUED | OUTPATIENT
Start: 2020-08-11 | End: 2020-08-12

## 2020-08-11 RX ORDER — LANOLIN ALCOHOL/MO/W.PET/CERES
400 CREAM (GRAM) TOPICAL 2 TIMES DAILY
Status: DISCONTINUED | OUTPATIENT
Start: 2020-08-11 | End: 2020-08-14 | Stop reason: HOSPADM

## 2020-08-11 RX ORDER — GABAPENTIN 100 MG/1
100 CAPSULE ORAL 3 TIMES DAILY
Status: DISCONTINUED | OUTPATIENT
Start: 2020-08-11 | End: 2020-08-14 | Stop reason: HOSPADM

## 2020-08-11 RX ORDER — MELATONIN
1000 DAILY
Status: DISCONTINUED | OUTPATIENT
Start: 2020-08-12 | End: 2020-08-14 | Stop reason: HOSPADM

## 2020-08-11 RX ADMIN — SACUBITRIL AND VALSARTAN 1 TABLET: 24; 26 TABLET, FILM COATED ORAL at 17:10

## 2020-08-11 RX ADMIN — ASPIRIN 81 MG CHEWABLE TABLET 81 MG: 81 TABLET CHEWABLE at 09:27

## 2020-08-11 RX ADMIN — CARVEDILOL 3.12 MG: 3.12 TABLET, FILM COATED ORAL at 17:06

## 2020-08-11 RX ADMIN — FUROSEMIDE 40 MG: 10 INJECTION, SOLUTION INTRAMUSCULAR; INTRAVENOUS at 17:07

## 2020-08-11 RX ADMIN — FUROSEMIDE 40 MG: 10 INJECTION, SOLUTION INTRAMUSCULAR; INTRAVENOUS at 15:13

## 2020-08-11 RX ADMIN — INSULIN LISPRO 1 UNITS: 100 INJECTION, SOLUTION INTRAVENOUS; SUBCUTANEOUS at 21:06

## 2020-08-11 RX ADMIN — INSULIN LISPRO 2 UNITS: 100 INJECTION, SOLUTION INTRAVENOUS; SUBCUTANEOUS at 17:06

## 2020-08-11 RX ADMIN — INSULIN LISPRO 2 UNITS: 100 INJECTION, SOLUTION INTRAVENOUS; SUBCUTANEOUS at 11:50

## 2020-08-11 RX ADMIN — ATORVASTATIN CALCIUM 40 MG: 40 TABLET, FILM COATED ORAL at 09:27

## 2020-08-11 RX ADMIN — INSULIN LISPRO 2 UNITS: 100 INJECTION, SOLUTION INTRAVENOUS; SUBCUTANEOUS at 09:27

## 2020-08-11 RX ADMIN — LEVOTHYROXINE SODIUM 175 MCG: 0.1 TABLET ORAL at 06:59

## 2020-08-11 RX ADMIN — FUROSEMIDE 40 MG: 10 INJECTION, SOLUTION INTRAMUSCULAR; INTRAVENOUS at 04:45

## 2020-08-11 RX ADMIN — GABAPENTIN 100 MG: 100 CAPSULE ORAL at 21:07

## 2020-08-11 RX ADMIN — GABAPENTIN 100 MG: 100 CAPSULE ORAL at 17:06

## 2020-08-11 RX ADMIN — MELATONIN 3 MG: at 00:34

## 2020-08-11 RX ADMIN — Medication 400 MG: at 17:06

## 2020-08-11 RX ADMIN — OXYBUTYNIN CHLORIDE 10 MG: 5 TABLET, EXTENDED RELEASE ORAL at 17:06

## 2020-08-11 RX ADMIN — CLOPIDOGREL BISULFATE 75 MG: 75 TABLET ORAL at 11:50

## 2020-08-11 NOTE — PROGRESS NOTES
Problem: Mobility Impaired (Adult and Pediatric)  Goal: *Acute Goals and Plan of Care (Insert Text)  Description: FUNCTIONAL STATUS PRIOR TO ADMISSION: Patient was modified independent using a rollator and single point cane for functional mobility. HOME SUPPORT PRIOR TO ADMISSION: The patient lived with  and blind adult son but did not require assist.    Physical Therapy Goals  Initiated 8/11/2020  1. Patient will move from supine to sit and sit to supine , scoot up and down, and roll side to side in bed with independence within 7 day(s). 2.  Patient will transfer from bed to chair and chair to bed with independence using the least restrictive device within 7 day(s). 3.  Patient will perform sit to stand with independence within 7 day(s). 4.  Patient will ambulate with modified independence for 300 feet with the least restrictive device within 7 day(s). 5.  Patient will ascend/descend 3 stairs with one handrail(s) with modified independence within 7 day(s). Outcome: Not Met   PHYSICAL THERAPY EVALUATION  Patient: July Alfaro (48 y.o. female)  Date: 8/11/2020  Primary Diagnosis: TIA (transient ischemic attack) [G45.9]        Precautions: fall risk       ASSESSMENT  Based on the objective data described below, the patient presents with a slight decline in function s/p admission for TIA 8/10/20. She exhibited LE weakness, hypokinesia in R hand, shortness of breath with ambulation, an unsteady gait, and minor trouble word finding. At baseline, she was inde and helped to look after her son; her  works during the day. She required Nilo for bed mobility, sit<>stand transfers, and ambulation. She also required supplemental O2 to keep saturation above 95%. At this time, recommending HH as she is near her baseline in order to increase independence and safety in functional mobility and ADLs. She owns a cane and a rollator. Educated on BEFAST for s/s of stroke and given a handout. Current Level of Function Impacting Discharge (mobility/balance): Nilo for ambulation    Functional Outcome Measure: The patient scored 21/56 on the Wu outcome measure which is indicative of moderate fall risk. Other factors to consider for discharge: supplemental O2 use in hospital, owns a rollator, 7 dogs at home      Patient will benefit from skilled therapy intervention to address the above noted impairments. PLAN :  Recommendations and Planned Interventions: bed mobility training, transfer training, gait training, therapeutic exercises, neuromuscular re-education, patient and family training/education, and therapeutic activities      Frequency/Duration: Patient will be followed by physical therapy:  3 times a week to address goals. Recommendation for discharge: (in order for the patient to meet his/her long term goals)  Physical therapy at least 2 days/week in the home     This discharge recommendation:  Has not yet been discussed the attending provider and/or case management    IF patient discharges home will need the following DME: patient owns DME required for discharge         SUBJECTIVE:   Patient stated I have 7 dogs. I rescue 'em.     OBJECTIVE DATA SUMMARY:   HISTORY:    Past Medical History:   Diagnosis Date    Acid indigestion     heartburn    Asthma     Back pain     Constipation     Diabetes mellitus     Diarrhea     Frequent episodic tension-type headache     Hemorrhoids     Hernia of unspecified site of abdominal cavity without mention of obstruction or gangrene     HTN (hypertension)     ICD (implantable cardioverter-defibrillator) in place     Muscle pain     joint pain    Pacemaker     biventricular ICD 1/26/18    Skin cancer     SOB (shortness of breath)     Thyroid disorder     Wears dentures      Past Surgical History:   Procedure Laterality Date    HX CARPAL TUNNEL RELEASE      right hand    HX HEART CATHETERIZATION  2/6/16    HX HYSTERECTOMY      HX PACEMAKER Left 16    [employer] ICD    HX THYROIDECTOMY      REMOVAL GALLBLADDER         Home Situation  Home Environment: Private residence  # Steps to Enter: 3  Rails to Enter: Yes  Hand Rails : Bilateral  One/Two Story Residence: One story  Living Alone: No( and son who is blind)  Support Systems: Family member(s)  Patient Expects to be Discharged to[de-identified] Private residence  Current DME Used/Available at Home: [place], straight, Walker, rolling    EXAMINATION/PRESENTATION/DECISION MAKING:   Critical Behavior:  Neurologic State: Alert, Appropriate for age  Orientation Level: Oriented X4  Cognition: Follows commands     Hearing: Auditory  Auditory Impairment: None    Range Of Motion:  AROM: Within functional limits        PROM: Within functional limits        Strength:    Strength: Generally decreased, functional(L LE)    Tone & Sensation:   Tone: Normal    Sensation: Intact       Coordination:  Coordination: Generally decreased, functional(R hand to nose)   Vision:      Functional Mobility:  Bed Mobility:     Supine to Sit: Minimum assistance; Additional time  Sit to Supine: (left in chair)  Scooting: Contact guard assistance  Transfers:  Sit to Stand: Minimum assistance  Stand to Sit: Contact guard assistance(VC)       Balance:   Sitting: Intact  Standing: Impaired  Standing - Static: Good;Constant support  Standing - Dynamic : Fair;Constant support  Ambulation/Gait Training:  Distance (ft): 150 Feet (ft)(+50 additional)  Assistive Device: Gait belt;Walker, rolling  Ambulation - Level of Assistance: Minimal assistance; Additional time        Gait Abnormalities: Decreased step clearance;Shuffling gait;Trunk sway increased        Base of Support: Widened     Speed/Aleah: Slow  Step Length: Left shortened;Right shortened        Stairs:       Functional Measure:  Wu Balance Test:    Sitting to Standin  Standing Unsupported: 3  Sitting with Back Unsupported: 4  Standing to Sitting: 3  Transfers: 2  Standing Unsupported with Eyes Closed: 2  Standing Unsupported with Feet Together: 1  Reach Forward with Outstretched Arm: 1   Object: 1  Turn to Look Over Shoulders: 0  Turn 360 Degrees: 2  Alternate Foot on Step/Stool: 0  Standing Unsupported One Foot in Front: 0  Stand on One Le  Total:          56=Maximum possible score;   0-20=High fall risk  21-40=Moderate fall risk   41-56=Low fall risk                Physical Therapy Evaluation Charge Determination   History Examination Presentation Decision-Making   HIGH Complexity :3+ comorbidities / personal factors will impact the outcome/ POC  HIGH Complexity : 4+ Standardized tests and measures addressing body structure, function, activity limitation and / or participation in recreation  LOW Complexity : Stable, uncomplicated  Other outcome measures Wu  MEDIUM      Based on the above components, the patient evaluation is determined to be of the following complexity level: LOW     Activity Tolerance:   Fair and desaturates with exertion and requires oxygen  Please refer to the flowsheet for vital signs taken during this treatment. After treatment patient left in no apparent distress:   Sitting in chair and Call bell within reach    COMMUNICATION/EDUCATION:   The patients plan of care was discussed with: Registered nurse. Fall prevention education was provided and the patient/caregiver indicated understanding., Patient/family have participated as able in goal setting and plan of care. , and Patient/family agree to work toward stated goals and plan of care. Thank you for this referral.  Tanisha Bermudez, SPT   Time Calculation: 32 mins    Regarding student involvement in patient care:  A student participated in this treatment session. Per CMS Medicare statements and APTA guidelines I certify that the following was true:  1. I was present and directly observed the entire session. 2. I made all skilled judgments and clinical decisions regarding care.   3. I am the practitioner responsible for assessment, treatment, and documentation.

## 2020-08-11 NOTE — PROGRESS NOTES
Problem: Self Care Deficits Care Plan (Adult)  Goal: *Acute Goals and Plan of Care (Insert Text)  Description: FUNCTIONAL STATUS PRIOR TO ADMISSION: She uses a walker at home per her report. She is independent with her own self care activities. She lives with her  and they have 7 dogs. HOME SUPPORT:  can support her at home. Occupational Therapy Goals  Initiated 8/11/2020    1. Patient will perform lower body dressing with supervision/set-up using within 7 days. 2.  Patient will perform upper body dressing with supervision/set-up within 7 days. 3.  Patient will standing ADLs 15 mins at supervision/set-up within 7 days. 4.  Patient will complete toilet transfer with supervision within 7 days. 5.  patient will complete toileting with supervision within 7 days. 6.  patient will complete bathing with supervision within 7 days. Outcome: Progressing Towards Goal     OCCUPATIONAL THERAPY EVALUATION  Patient: Pepper Arciniega (29 y.o. female)  Date: 8/11/2020  Primary Diagnosis: TIA (transient ischemic attack) [G45.9]        Precautions:   Fall    ASSESSMENT  Based on the objective data described below, the patient presents with decreased independence with self care and functional mobility following admission for TIA. CT revealed stenosis in L MCA region with partial occlusion. She reports her symptoms are resolving and noted no significant issues during OT assessment. PT sitting unsupported EOB for FM assessment. Strength equal LOLA.  present during intervention and in agreement with all findings. At this time, recommend pt to return home with family support. Today, pt completed FM sitting unsupported on the EOB. Pt did well with all activities. Current Level of Function Impacting Discharge (ADLs/self-care): mod A overall for LB dressing and bathing tasks. Functional Outcome Measure:   The patient scored Total A-D  Total A-D (Motor Function): 66/66 on the Fugl-Lorenzo Assessment which is indicative of moderate impairment in upper extremity functional status. Other factors to consider for discharge: none     Patient will benefit from skilled therapy intervention to address the above noted impairments. PLAN :  Recommendations and Planned Interventions: self care training, functional mobility training, therapeutic exercise, balance training, therapeutic activities, endurance activities, neuromuscular re-education, patient education, home safety training, and family training/education    Frequency/Duration: Patient will be followed by occupational therapy 5 times a week to address goals. Recommendation for discharge: (in order for the patient to meet his/her long term goals)  No skilled occupational therapy/ follow up rehabilitation needs identified at this time. This discharge recommendation:  Has been made in collaboration with the attending provider and/or case management    IF patient discharges home will need the following DME: none       SUBJECTIVE:   Patient stated I am feeling much better.     OBJECTIVE DATA SUMMARY:   HISTORY:   Past Medical History:   Diagnosis Date    Acid indigestion     heartburn    Asthma     Back pain     Constipation     Diabetes mellitus     Diarrhea     Frequent episodic tension-type headache     Hemorrhoids     Hernia of unspecified site of abdominal cavity without mention of obstruction or gangrene     HTN (hypertension)     ICD (implantable cardioverter-defibrillator) in place     Muscle pain     joint pain    Pacemaker     biventricular ICD 1/26/18    Skin cancer     SOB (shortness of breath)     Thyroid disorder     Wears dentures      Past Surgical History:   Procedure Laterality Date    HX CARPAL TUNNEL RELEASE      right hand    HX HEART CATHETERIZATION  2/6/16    HX HYSTERECTOMY      HX PACEMAKER Left 6/20/16    [employer] ICD    HX THYROIDECTOMY      REMOVAL GALLBLADDER       Expanded or extensive additional review of patient history:     Home Situation  Home Environment: Private residence  # Steps to Enter: 3  Rails to Enter: Yes  Hand Rails : Bilateral  One/Two Story Residence: One story  Living Alone: No  Support Systems: Family member(s)  Patient Expects to be Discharged to[de-identified] Private residence  Current DME Used/Available at Home: [place], straight, Walker, rolling  Tub or Shower Type: Tub/Shower combination    Hand dominance: Right    EXAMINATION OF PERFORMANCE DEFICITS:  Cognitive/Behavioral Status:  Neurologic State: Alert  Orientation Level: Oriented X4  Cognition: Appropriate for age attention/concentration  Perception: Appears intact  Perseveration: No perseveration noted  Safety/Judgement: Good awareness of safety precautions    Skin: see nursing notes    Edema: none noted    Hearing: Auditory  Auditory Impairment: None    Vision/Perceptual:                           Acuity: Within Defined Limits         Range of Motion:    AROM: Within functional limits  PROM: Within functional limits                      Strength:    Strength: Within functional limits                Coordination:  Coordination: Within functional limits  Fine Motor Skills-Upper: Right Intact; Left Intact    Gross Motor Skills-Upper: Right Intact; Left Intact    Tone & Sensation:    Tone: Normal  Sensation: Intact                      Balance:  Sitting: Intact  Sitting - Static: Good (unsupported)  Sitting - Dynamic: Good (unsupported)  Standing: Impaired  Standing - Static: Fair  Standing - Dynamic : Fair    Functional Mobility and Transfers for ADLs:  Bed Mobility:  Supine to Sit: Minimum assistance  Sit to Supine: Minimum assistance  Scooting: Contact guard assistance    Transfers:  Sit to Stand: Contact guard assistance  Stand to Sit: Contact guard assistance  Bed to Chair: Contact guard assistance  Bathroom Mobility: Contact guard assistance  Toilet Transfer : Contact guard assistance(to C)    ADL Assessment:  Feeding: Supervision    Oral Facial Hygiene/Grooming: Supervision    Bathing: Moderate assistance(simulated with EOB activities)    Upper Body Dressing: Minimum assistance    Lower Body Dressing: Maximum assistance    Toileting: Contact guard assistance                ADL Intervention and task modifications:                                     Cognitive Retraining  Safety/Judgement: Good awareness of safety precautions    Functional Measure:  Fugl-Lorenzo Assessment of Motor Recovery after Stroke:   Reflex Activity  Flexors/Biceps/Fingers: Can be elicited  Extensors/Triceps: Can be elicited  Reflex Subtotal: 4    Volitional Movement Within Synergies  Shoulder Retraction: Full  Shoulder Elevation: Full  Shoulder Abduction (90 degrees): Full  Shoulder External Rotation: Full  Elbow Flexion: Full  Forearm Supination: Full  Shoulder Adduction/Internal Rotation: Full  Elbow Extension: Full  Forearm Pronation: Full  Subtotal: 18    Volitional Movement Mixing Synergies  Hand to Lumbar Spine: Full  Shoulder Flexion (0-90 degrees): Full  Pronation-Supination: Full  Subtotal: 6    Volitional Movement With Little or No Synergy  Shoulder Abduction (0-90 degrees): Full  Shoulder Flexion ( degrees): Full  Pronation/Supination: Full  Subtotal : 6    Normal Reflex Activity  Biceps, Triceps, Finger Flexors:  Full  Subtotal : 2    Upper Extremity Total   Upper Extremity Total: 36    Wrist  Stability at 15 Degree Dorsiflexion: Full  Repeated Dorsiflexion/ Volar Flexion: Full  Stability at 15 Degree Dorsiflexion: Full  Repeated Dorsiflexion/ Volar Flexion: Full  Circumduction: Full  Wrist Total: 10    Hand  Mass Flexion: Full  Mass Extension: Full  Grasp A: Full  Grasp B: Full  Grasp C: Full  Grasp D: Full  Grasp E: Full  Hand Total: 14    Coordination/Speed  Tremor: None  Dysmetria: None  Time: <1s  Coordination/Speed Total : 6    Total A-D  Total A-D (Motor Function): 66/66     This is a reliable/valid measure of arm function after a neurological event. It has established value to characterize functional status and for measuring spontaneous and therapy-induced recovery; tests proximal and distal motor functions. Fugl-Lorenzo Assessment - UE scores recorded between five and 30 days post neurologic event can be used to predict UE recovery at six months post neurologic event. Severe = 0-21 points   Moderately Severe = 22-33 points   Moderate = 34-47 points   Mild = 48-66 points  FRANTZ Covarrubias, CADEN Currie, & DEVANTE Clark (1992). Measurement of motor recovery after stroke: Outcome assessment and sample size requirements. Stroke, 23, pp. 0719-2394.   ------------------------------------------------------------------------------------------------------------------------------------------------------------------  MCID:  Stroke:   Tucker Ba et al, 2001; n = 171; mean age 79 (6) years; assessed within 16 (12) days of stroke, Acute Stroke)  FMA Motor Scores from Admission to Discharge   10 point increase in FMA Upper Extremity = 1.5 change in discharge FIM   10 point increase in FMA Lower Extremity = 1.9 change in discharge FIM  MDC:   Stroke:   Reza Mcfadden et al, 2008, n = 14, mean age = 59.9 (14.6) years, assessed on average 14 (6.5) months post stroke, Chronic Stroke)   FMA = 5.2 points for the Upper Extremity portion of the assessment     Occupational Therapy Evaluation Charge Determination   History Examination Decision-Making   LOW Complexity : Brief history review  HIGH Complexity : 5 or more performance deficits relating to physical, cognitive , or psychosocial skils that result in activity limitations and / or participation restrictions HIGH Complexity : Patient presents with comorbidities that affect occupational performance.  Signifigant modification of tasks or assistance (eg, physical or verbal) with assessment (s) is necessary to enable patient to complete evaluation       Based on the above components, the patient evaluation is determined to be of the following complexity level: LOW   Pain Rating:  No pain    Activity Tolerance:   Good  Please refer to the flowsheet for vital signs taken during this treatment. After treatment patient left in no apparent distress:    Supine in bed, Call bell within reach, and Caregiver / family present    COMMUNICATION/EDUCATION:   The patients plan of care was discussed with: Physical therapist and Registered nurse. Patient was educated regarding her deficit(s) of general debility as this relates to her diagnosis of possible TIA. She demonstrated Good understanding as evidenced by engagement with education. Patient and/or family was verbally educated on the BE FAST acronym for signs/symptoms of CVA and TIA. BE FAST was written on patient's communication board  for visual education and reinforcement. All questions answered with patient indicating good understanding. Home safety education was provided and the patient/caregiver indicated understanding. This patients plan of care is appropriate for delegation to [facility].     Thank you for this referral.  Tono Brooks OT  Time Calculation: 23 mins

## 2020-08-11 NOTE — PROGRESS NOTES
Transition of Care Plan   RUR- N/A    DISPOSITION: Patient presents from home and is expected to go home with home health when ready ; pending medical progression, pending CVA work-up    Transport:  Chau Coates [phone]  [address]    Follow up: PCP/Specialist(s)       Reason for Admission:   Transferred from [facility] in [facility] for expressive aphasia, L sided weakness and L sided facial droop                    RUR Score:  N/A                    Plan for utilizing home health:    Home heal recommended, CM to discuss choices on the day of discharge       PCP: First and Last name:  Dr. Zaira Echevarria. Reed Gutierrez MD    Name of Practice: [facility]    Are you a current patient: Yes/No: yes    Approximate date of last visit: couple of months ago    Can you participate in a virtual visit with your PCP: yes                     Current Advanced Directive/Advance Care Plan: Living Will on file'  Danilo Officer [phone] is [email or web address] and son Tato Easley is [email or web address]                          Transition of Care Plan:    Patient is expected to return home with home when ready, pending CVA work up. Reviewed chart for transitions of care,and discussed in rounds. CM met with patient at bedside to explain role and offer support. Patient is alert and oriented x4, and confirmed demographics. Patient lives with her  and a son in one-storied hosue with 3 steps to enter. Patient is independent with ADLs and ambulates with a cane and walker. Mrs. Shital Aranda located at [address] [facility] a mail order for maintenance medications. Her  or son is expected to transport at discharge. Observation notice provided in writing to patient and/or caregiver as well as verbal explanation of the policy. Patients who are in outpatient status also receive the Observation notice.       Care Management Interventions  PCP Verified by CM: Yes(couple of months ago )  Palliative Care Criteria Met (RRAT>21 & CHF Dx)?: No  Mode of Transport at Discharge:  Other (see comment)( Aparna Salamanca [phone])  Transition of Care Consult (CM Consult): Discharge Planning  MyChart Signup: No  Discharge Durable Medical Equipment: No  Health Maintenance Reviewed: Yes  Physical Therapy Consult: Yes  Occupational Therapy Consult: Yes  Speech Therapy Consult: Yes  Current Support Network: Own Home, Lives with Spouse  Confirm Follow Up Transport: Family( Aparna Salamanca [phone])  The Patient and/or Patient Representative was Provided with a Choice of Provider and Agrees with the Discharge Plan?: Yes  Freedom of Choice List was Provided with Basic Dialogue that Supports the Patient's Individualized Plan of Care/Goals, Treatment Preferences and Shares the Quality Data Associated with the Providers?: Yes  [place] Resource Information Provided?: No  Discharge Location  Discharge Placement: Home with home health    SHERI Epperson

## 2020-08-11 NOTE — PROGRESS NOTES
Problem: Diabetes Self-Management  Goal: *Disease process and treatment process  Description: Define diabetes and identify own type of diabetes; list 3 options for treating diabetes. 8/11/2020 0416 by Elizabeth Foster  Outcome: Progressing Towards Goal  8/11/2020 0414 by Elizabeth Foster  Outcome: Progressing Towards Goal  Goal: *Incorporating nutritional management into lifestyle  Description: Describe effect of type, amount and timing of food on blood glucose; list 3 methods for planning meals. 8/11/2020 0416 by Elizabeth Foster  Outcome: Progressing Towards Goal  8/11/2020 0414 by Elizabeth Foster  Outcome: Progressing Towards Goal  Goal: *Incorporating physical activity into lifestyle  Description: State effect of exercise on blood glucose levels. 8/11/2020 0416 by Elizabeth Foster  Outcome: Progressing Towards Goal  8/11/2020 0414 by Elizabeth Foster  Outcome: Progressing Towards Goal  Goal: *Developing strategies to promote health/change behavior  Description: Define the ABC's of diabetes; identify appropriate screenings, schedule and personal plan for screenings. 8/11/2020 0416 by Elizabeth Foster  Outcome: Progressing Towards Goal  8/11/2020 0414 by Elizabeth Foster  Outcome: Progressing Towards Goal  Goal: *Using medications safely  Description: State effect of diabetes medications on diabetes; name diabetes medication taking, action and side effects. 8/11/2020 0416 by Elizabeth Foster  Outcome: Progressing Towards Goal  8/11/2020 0414 by Elizabeth Foster  Outcome: Progressing Towards Goal  Goal: *Monitoring blood glucose, interpreting and using results  Description: Identify recommended blood glucose targets  and personal targets.   8/11/2020 0416 by Elizabeth Foster  Outcome: Progressing Towards Goal  8/11/2020 0414 by Elizabeth Foster  Outcome: Progressing Towards Goal  Goal: *Prevention, detection, treatment of acute complications  Description: List symptoms of hyper- and hypoglycemia; describe how to treat low blood sugar and actions for lowering  high blood glucose level. 8/11/2020 0416 by Faustino Trevino  Outcome: Progressing Towards Goal  8/11/2020 0414 by Faustino Trevino  Outcome: Progressing Towards Goal  Goal: *Prevention, detection and treatment of chronic complications  Description: Define the natural course of diabetes and describe the relationship of blood glucose levels to long term complications of diabetes.   8/11/2020 0416 by Faustino Trevino  Outcome: Progressing Towards Goal  8/11/2020 0414 by Faustino Trevino  Outcome: Progressing Towards Goal  Goal: *Developing strategies to address psychosocial issues  Description: Describe feelings about living with diabetes; identify support needed and support network  8/11/2020 0416 by Faustino Trevino  Outcome: Progressing Towards Goal  8/11/2020 0414 by Faustino Trevino  Outcome: Progressing Towards Goal  Goal: *Insulin pump training  8/11/2020 0416 by Lou LUCAS  Outcome: Progressing Towards Goal  8/11/2020 0414 by Faustino Trevino  Outcome: Progressing Towards Goal  Goal: *Sick day guidelines  8/11/2020 0416 by Faustino Tervino  Outcome: Progressing Towards Goal  8/11/2020 0414 by Faustino Trevino  Outcome: Progressing Towards Goal  Goal: *Patient Specific Goal (EDIT GOAL, INSERT TEXT)  8/11/2020 0416 by Faustino Trevino  Outcome: Progressing Towards Goal  8/11/2020 0414 by Faustino Trevino  Outcome: Progressing Towards Goal     Problem: TIA/CVA Stroke: 0-24 hours  Goal: Off Pathway (Use only if patient is Off Pathway)  Outcome: Progressing Towards Goal  Goal: Activity/Safety  Outcome: Progressing Towards Goal  Goal: Consults, if ordered  Outcome: Progressing Towards Goal  Goal: Diagnostic Test/Procedures  Outcome: Progressing Towards Goal  Goal: Nutrition/Diet  Outcome: Progressing Towards Goal  Goal: Discharge Planning  Outcome: Progressing Towards Goal  Goal: Medications  Outcome: Progressing Towards Goal  Goal: Respiratory  Outcome: Progressing Towards Goal  Goal: Treatments/Interventions/Procedures  Outcome: Progressing Towards Goal  Goal: Minimize risk of bleeding post-thrombolytic infusion  Outcome: Progressing Towards Goal  Goal: Monitor for complications post-thrombolytic infusion  Outcome: Progressing Towards Goal  Goal: Psychosocial  Outcome: Progressing Towards Goal  Goal: *Hemodynamically stable  Outcome: Progressing Towards Goal  Goal: *Neurologically stable  Description: Absence of additional neurological deficits    Outcome: Progressing Towards Goal  Goal: *Verbalizes anxiety and depression are reduced or absent  Outcome: Progressing Towards Goal  Goal: *Absence of Signs of Aspiration on Current Diet  Outcome: Progressing Towards Goal  Goal: *Absence of deep venous thrombosis signs and symptoms(Stroke Metric)  Outcome: Progressing Towards Goal  Goal: *Ability to perform ADLs and demonstrates progressive mobility and function  Outcome: Progressing Towards Goal  Goal: *Stroke education started(Stroke Metric)  Outcome: Progressing Towards Goal  Goal: *Dysphagia screen performed(Stroke Metric)  Outcome: Progressing Towards Goal  Goal: *Rehab consulted(Stroke Metric)  Outcome: Progressing Towards Goal     Problem: Patient Education: Go to Patient Education Activity  Goal: Patient/Family Education  8/11/2020 0416 by David LUCAS  Outcome: Progressing Towards Goal  8/11/2020 0414 by Luis Field  Outcome: Progressing Towards Goal     Problem: Patient Education: Go to Patient Education Activity  Goal: Patient/Family Education  Outcome: Progressing Towards Goal     Problem: Ischemic Stroke: Discharge Outcomes  Goal: *Verbalizes anxiety and depression are reduced or absent  Outcome: Progressing Towards Goal  Goal: *Verbalize understanding of risk factor modification(Stroke Metric)  Outcome: Progressing Towards Goal  Goal: *Hemodynamically stable  Outcome: Progressing Towards Goal  Goal: *Absence of aspiration pneumonia  Outcome: Progressing Towards Goal  Goal: *Aware of needed dietary changes  Outcome: Progressing Towards Goal  Goal: *Verbalize understanding of prescribed medications including anti-coagulants, anti-lipid, and/or anti-platelets(Stroke Metric)  Outcome: Progressing Towards Goal  Goal: *Tolerating diet  Outcome: Progressing Towards Goal  Goal: *Aware of follow-up diagnostics related to anticoagulants  Outcome: Progressing Towards Goal  Goal: *Ability to perform ADLs and demonstrates progressive mobility and function  Outcome: Progressing Towards Goal  Goal: *Absence of DVT(Stroke Metric)  Outcome: Progressing Towards Goal  Goal: *Absence of aspiration  Outcome: Progressing Towards Goal  Goal: *Optimal pain control at patient's stated goal  Outcome: Progressing Towards Goal  Goal: *Home safety concerns addressed  Outcome: Progressing Towards Goal  Goal: *Describes available resources and support systems  Outcome: Progressing Towards Goal  Goal: *Verbalizes understanding of activation of EMS(911) for stroke symptoms(Stroke Metric)  Outcome: Progressing Towards Goal  Goal: *Understands and describes signs and symptoms to report to providers(Stroke Metric)  Outcome: Progressing Towards Goal  Goal: *Neurolgocially stable (absence of additional neurological deficits)  Outcome: Progressing Towards Goal  Goal: *Verbalizes importance of follow-up with primary care physician(Stroke Metric)  Outcome: Progressing Towards Goal  Goal: *Smoking cessation discussed,if applicable(Stroke Metric)  Outcome: Progressing Towards Goal  Goal: *Depression screening completed(Stroke Metric)  Outcome: Progressing Towards Goal

## 2020-08-11 NOTE — PROGRESS NOTES
1854: Patient complaining of light headedness. BP 90/52, MAP 65. Orders received to notify on call physician if MAP is less than 60. Will continue to monitor.

## 2020-08-11 NOTE — CONSULTS
INPATIENT NEUROLOGY CONSULTATION  8/11/2020     Consulted by: Juan Gregory MD        Patient ID:  Nadya Navarro  [id number]  68 y.o.  1950    CC: Slurred speech    HPI    Sia Silva is a 75-year-old woman with a history of diabetes, hypertension who has a pacemaker here because she started to notice difficulty speaking. She was talking to her daughter on the phone and could not get her words out. She went to the hospital and was transferred here for escalated care. Imaging via CTA showing left MCA stenosis with partial occlusion. Also evidence of a right P-comm aneurysm. Neuro interventional has already visited with her today she tells me. Currently she denies any headache. She feels her baseline she tells me. She takes aspirin prior to admission daily. Review of Systems   Eyes: Negative for double vision. Gastrointestinal: Negative for nausea. Neurological: Positive for speech change and headaches. All other systems reviewed and are negative.       Past Medical History:   Diagnosis Date    Acid indigestion     heartburn    Asthma     Back pain     Constipation     Diabetes mellitus     Diarrhea     Frequent episodic tension-type headache     Hemorrhoids     Hernia of unspecified site of abdominal cavity without mention of obstruction or gangrene     HTN (hypertension)     ICD (implantable cardioverter-defibrillator) in place     Muscle pain     joint pain    Pacemaker     biventricular ICD 1/26/18    Skin cancer     SOB (shortness of breath)     Thyroid disorder     Wears dentures      Family History   Problem Relation Age of Onset    Diabetes Mother     Heart Disease Mother     Heart Disease Father     Cancer Sister     Diabetes Sister     Heart Disease Sister     Hypertension Sister     Diabetes Brother      Social History     Socioeconomic History    Marital status:      Spouse name: Not on file    Number of children: Not on file    Years of education: Not on file    Highest education level: Not on file   Occupational History    Not on file   Social Needs    Financial resource strain: Not on file    Food insecurity     Worry: Not on file     Inability: Not on file    Transportation needs     Medical: Not on file     Non-medical: Not on file   Tobacco Use    Smoking status: Never Smoker    Smokeless tobacco: Never Used   Substance and Sexual Activity    Alcohol use: Not Currently     Comment: wine occasionally    Drug use: No    Sexual activity: Not on file   Lifestyle    Physical activity     Days per week: Not on file     Minutes per session: Not on file    Stress: Not on file   Relationships    Social connections     Talks on phone: Not on file     Gets together: Not on file     Attends [employer] service: Not on file     Active member of club or organization: Not on file     Attends meetings of clubs or organizations: Not on file     Relationship status: Not on file    Intimate partner violence     Fear of current or ex partner: Not on file     Emotionally abused: Not on file     Physically abused: Not on file     Forced sexual activity: Not on file   Other Topics Concern    Not on file   Social History Narrative    Not on file     Current Facility-Administered Medications   Medication Dose Route Frequency    aspirin chewable tablet 81 mg  81 mg Oral DAILY    atorvastatin (LIPITOR) tablet 40 mg  40 mg Oral DAILY    carvediloL (COREG) tablet 3.125 mg  3.125 mg Oral BID WITH MEALS    levothyroxine (SYNTHROID) 175 mcg  175 mcg Oral ACB    melatonin tablet 3 mg  3 mg Oral QHS PRN    oxybutynin chloride XL (DITROPAN XL) tablet 10 mg  10 mg Oral BID    sacubitriL-valsartan (ENTRESTO) 24-26 mg tablet 1 Tab  1 Tab Oral BID    acetaminophen (TYLENOL) tablet 650 mg  650 mg Oral Q4H PRN    Or    acetaminophen (TYLENOL) solution 650 mg  650 mg Per NG tube Q4H PRN    Or    acetaminophen (TYLENOL) suppository 650 mg  650 mg Rectal Q4H PRN    glucose chewable tablet 16 g  4 Tab Oral PRN    glucagon (GLUCAGEN) injection 1 mg  1 mg IntraMUSCular PRN    dextrose 10% infusion 0-250 mL  0-250 mL IntraVENous PRN    insulin lispro (HUMALOG) injection   SubCUTAneous AC&HS     Allergies   Allergen Reactions    Codeine Other (comments)     Patient states she is not allergic    Novocain [Procaine] Nausea and Vomiting    Tramadol Other (comments)     Headache       Visit Vitals  /63 (BP 1 Location: Right arm, BP Patient Position: At rest)   Pulse 81   Temp 97.5 °F (36.4 °C)   Resp 17   Ht 5' 2\" (1.575 m)   Wt 92.7 kg (204 lb 6.4 oz)   SpO2 95%   BMI 37.39 kg/m²     Physical Exam   Constitutional: She appears well-developed and well-nourished. Cardiovascular: Normal rate. Pulmonary/Chest: Effort normal.   Skin: Skin is warm and dry. Psychiatric: Her behavior is normal.   Vitals reviewed. Neurologic Exam     Mental Status   Elderly woman pleasant smiling talkative sitting upright in her chair. Pupils are equal, EOMI but she does have a slightly disconjugate gaze. Left eye can only see shadows. Right eye intact.   Face is asymmetric to the right tongue is midline speech is a little slurred but no clear aphasia  Strength is 5/5 throughout symmetric intact  Finger-to-nose intact  Sensation intact  Gait deferred            Lab Results   Component Value Date/Time    WBC 6.4 08/11/2020 12:17 AM    HGB 11.1 (L) 08/11/2020 12:17 AM    HCT 34.7 (L) 08/11/2020 12:17 AM    PLATELET 461 (L) 17/58/2305 12:17 AM    MCV 94.8 08/11/2020 12:17 AM     Lab Results   Component Value Date/Time    Hemoglobin A1c 10.4 (H) 08/11/2020 12:17 AM    Hemoglobin A1c, External 10.4 05/12/2015    Hemoglobin A1c, External 7.9 02/13/2015    Glucose 185 (H) 08/10/2020 05:54 PM    Glucose (POC) 220 (H) 08/11/2020 11:21 AM    Microalb/Creat ratio (ug/mg creat.) 2.7 02/28/2013 01:44 PM    MICROALBUMIN,MG/DAY CANCELED 08/27/2012 12:00 AM    LDL, calculated 70.4 08/11/2020 12:17 AM Creatinine 0.70 08/10/2020 05:54 PM      Lab Results   Component Value Date/Time    Cholesterol, total 148 08/11/2020 12:17 AM    HDL Cholesterol 53 08/11/2020 12:17 AM    LDL, calculated 70.4 08/11/2020 12:17 AM    LDL-C, External 38 05/12/2015    Triglyceride 123 08/11/2020 12:17 AM    CHOL/HDL Ratio 2.8 08/11/2020 12:17 AM     Lab Results   Component Value Date/Time    ALT (SGPT) 24 08/10/2020 05:54 PM    Alk. phosphatase 87 08/10/2020 05:54 PM    Bilirubin, total 0.9 08/10/2020 05:54 PM    Albumin 2.7 (L) 08/10/2020 05:54 PM    Protein, total 7.6 08/10/2020 05:54 PM    INR 1.3 (H) 08/10/2020 05:54 PM    Prothrombin time 13.5 (H) 08/10/2020 05:54 PM    Prothrombin time, External 11.8 06/15/2016    PLATELET 363 (L) 98/16/3647 12:17 AM        CT Results (maximum last 3): Results from [place] encounter on 08/10/20   CT CODE NEURO PERF W CBF    Narrative *PRELIMINARY REPORT*    No significant perfusion defect. No acute thrombosis. Stenosis at the origins of  the M2 segments of the left middle cerebral artery. Preliminary report was provided by Dr. Austen Agarwal, the on-call radiologist, at 11:11  PM.    Final report to follow. *END PRELIMINARY REPORT*    EXAM:  CT CODE NEURO PERF W CBF, CTA CODE NEURO HEAD AND NECK W CONT    INDICATION:   CVA. Dysarthria. COMPARISON:  CT head 8/10/2020. CONTRAST:  120 mL of Isovue-370. TECHNIQUE:  Unenhanced  images were obtained to localize the volume for  acquisition. Multislice helical axial CT angiography was performed from the  aortic arch to the top of the head during uneventful rapid bolus intravenous  contrast administration. Coronal and sagittal reformations and 3D post  processing was performed. CT dose reduction was achieved through use of a  standardized protocol tailored for this examination and automatic exposure  control for dose modulation.     CT brain perfusion was performed with generation of hemodynamic maps of multiple  parameters, including cerebral blood flow, cerebral blood volume, and MTT (mean  transit time). CT dose reduction was achieved through use of a standardized  protocol tailored for this examination and automatic exposure control for dose  modulation. FINDINGS:    CTA Head:  There is no evidence of large vessel occlusion in the anterior circulation  bilaterally. There is partially occlusive thrombus noted at the left MCA  bifurcation within the proximal left M2 anterior and posterior divisions (series  2 image 366 and series 2 image 363), with resultant severe focal stenoses. The  distal left MCA branches remain widely patent. Calcification of the bilateral  carotid siphons with no more than mild stenosis. The anterior communicating  artery is diminutive but patent. There is no evidence of large vessel occlusion or flow-limiting stenosis of the  intracranial vertebral arteries, basilar artery, or posterior cerebral arteries. The right posterior communicating artery is patent, the left is not seen. There  are large bilateral anterior choroidal arteries. 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 The dural venous sinuses and deep cerebral venous system are patent. No  evidence of abnormal enhancement on delayed phase images. CTA NECK:  NASCET method was utilized for calculating stenosis. Moderate calcific atherosclerosis of the aortic arch. Mild stenosis of the  proximal left subclavian artery from circumferential calcified plaque. The  common carotid arteries demonstrate no significant stenosis. Calcific  atherosclerosis of the right carotid bifurcation with mild (less than 50%)  stenosis of the proximal right internal carotid artery. Calcific atherosclerosis  of the left carotid bifurcation without significant stenosis. There is a left dominant vertebrobasilar arterial system.  The cervical vertebral  arteries are normal in course, size and contour without significant stenosis. Status post left hemithyroidectomy. The right thyroid lobe is enlarged and  heterogeneous without a discrete dominant nodule. The trachea is deviated to the  left, but without significant narrowing. There is interlobular septal thickening  noted in the upper lobes. There are numerous mildly enlarged mediastinal lymph  nodes measuring up to 13 mm. Left chest cardiac device is noted. No acute  fracture or aggressive osseous lesion. Multilevel degenerative disc disease in  the mid and lower cervical spine with scattered multilevel facet arthropathy. CT Brain Perfusion:  There are no regional areas of elevated Tmax, decreased cerebral blood flow or  blood volume. rCBF < 30% = 0 cc. Tmax > 6 seconds = 0 cc. Impression IMPRESSION:   CTA Head:  1. Partially occlusive thrombus within the proximal left M2 anterior and  posterior divisions, with associated severe focal stenoses. The distal left MCA  branches remain widely patent. 2. Additional mild atherosclerotic disease as above. 3. 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 CTA Neck:  1. No evidence of flow-limiting stenosis. 2. Mild stenosis (less than 50% by NASCET criteria) of the proximal right  internal carotid artery. 3. Status post left thyroid lobectomy. Enlarged heterogeneous right thyroid  gland without a discrete nodule. Leftward deviation of the trachea without  significant narrowing. 4. Pulmonary interstitial edema. 5. Numerous mildly enlarged mediastinal lymph nodes, which are indeterminant. CT Brain Perfusion:  1. No acute abnormality. The findings were called to [address] [place] [address] on 8/11/2020 at 8:18 AM by Dr. Rohith Srivastava. [address]           CT CODE NEURO HEAD WO CONTRAST    Narrative EXAM: CT CODE NEURO HEAD WO CONTRAST    INDICATION: CVA    COMPARISON: None. CONTRAST: None. TECHNIQUE: Unenhanced CT of the head was performed using 5 mm images. Brain and  bone windows were generated.  Coronal and sagittal reformats. CT dose reduction  was achieved through use of a standardized protocol tailored for this  examination and automatic exposure control for dose modulation. FINDINGS:  The ventricles and sulci are appropriate in size, shape and configuration. Old  lacunar infarcts are seen in the basal ganglia. There is mild periventricular  white matter disease. There is no intracranial hemorrhage, extra-axial  collection, or mass effect. The basilar cisterns are open. No CT evidence of  acute infarct. The bone windows demonstrate no abnormalities. The visualized portions of the  paranasal sinuses and mastoid air cells are clear. Impression IMPRESSION:   No evidence of acute intracranial process. Old lacunar infarcts in the basal  ganglia. Mild periventricular white matter disease, likely representing chronic  small vessel ischemia. MRI Results (maximum last 3): No results found for this or any previous visit. VAS/US/Carotid Doppler Results (maximum last 3): No results found for this or any previous visit. PET Results (maximum last 3): No results found for this or any previous visit. Assessment and Plan        78-year-old woman whom I suspect has had a left MCA infarct. She still has some mild symptoms manifesting as dysarthria and facial asymmetry. Recommend dual antiplatelets which has been discussed with her already. MRI if possible given the pacemaker. Echo , statin. Rehab needs. Recommend inpatient status     During this evaluation, we also discussed stroke education to include signs and symptoms of stroke and TIA. This clinical note was dictated with an electronic dictation software that can make unintentional errors. If there are any questions, please contact me directly for clarification.       2 [facility] [place],   NEUROLOGIST  Diplomate CLARA  8/11/2020

## 2020-08-11 NOTE — CONSULTS
Date of  Admission: 8/10/2020  5:27 PM     Jerardo Stallworth is a 68 y.o. female admitted for TIA (transient ischemic attack) [G45.9]  Subjective:  Asked to see for cardiac evaluation. Patient admitted with tia and found to have intracranial disease on ct scan. Has non ischemic cardiomyopathy and an AICD. She is unable to really exercise because of some orthopedic issues mainly with her knees but she does do her own laundry cooking and cleaning with minimal cardiac symptoms. She feels like she has been retaining fluid lately so she is back on 80 mg a day Lasix. Occasionally she takes an extra. She is uncertain about what her weights been doing. She has no history or symptoms of A. fib. Her last defibrillator check was in February of this year. denies chest pain, palpitations, syncope, orthopnea, paroxysmal nocturnal dyspnea, exertional chest pressure/discomfort, claudication. Cardiac risk factors: family history, dyslipidemia, diabetes mellitus, obesity, hypertension, post-menopausal.    Assessment/Plan: In spite of her elevated brain natruretic peptide, she appears to be fairly well compensated with minimal signs of volume overload. I would continue her 80 mg a day Lasix and other meds as previously prescribed. Her troponin is slightly elevated but flat and I do not think this represents any sort of acute coronary syndrome. An echo has already been ordered which is good and will follow that up and we will have her aicd interogated to make sure she is not having any atrial fib, since this would change her anticoagulate therapy.     Patient Active Problem List    Diagnosis Date Noted    Biventricular ICD (implantable cardioverter-defibrillator) in place 01/26/2018     Priority: 1 - One    Dysarthria 08/11/2020    TIA (transient ischemic attack) 08/10/2020    Cardiomyopathy ([facility] [address] 05/23/2016    Skin cancer     Acid indigestion     Asthma     Back pain     Wears dentures     Constipation  Diabetes mellitus     Diarrhea     Frequent episodic tension-type headache     Hemorrhoids     Hernia of unspecified site of abdominal cavity without mention of obstruction or gangrene     HTN (hypertension)     Muscle pain     SOB (shortness of breath)     Thyroid disorder       Apolinar Garcia MD  Past Medical History:   Diagnosis Date    Acid indigestion     heartburn    Asthma     Back pain     Constipation     Diabetes mellitus     Diarrhea     Frequent episodic tension-type headache     Hemorrhoids     Hernia of unspecified site of abdominal cavity without mention of obstruction or gangrene     HTN (hypertension)     ICD (implantable cardioverter-defibrillator) in place     Muscle pain     joint pain    Pacemaker     biventricular ICD 1/26/18    Skin cancer     SOB (shortness of breath)     Thyroid disorder     Wears dentures       Past Surgical History:   Procedure Laterality Date    HX CARPAL TUNNEL RELEASE      right hand    HX HEART CATHETERIZATION  2/6/16    HX HYSTERECTOMY      HX PACEMAKER Left 6/20/16    [employer] ICD    HX THYROIDECTOMY      REMOVAL GALLBLADDER       Allergies   Allergen Reactions    Codeine Other (comments)     Patient states she is not allergic    Novocain [Procaine] Nausea and Vomiting    Tramadol Other (comments)     Headache      Family History   Problem Relation Age of Onset    Diabetes Mother     Heart Disease Mother     Heart Disease Father     Cancer Sister     Diabetes Sister     Heart Disease Sister     Hypertension Sister     Diabetes Brother       Current Facility-Administered Medications   Medication Dose Route Frequency    clopidogreL (PLAVIX) tablet 75 mg  75 mg Oral DAILY    [Held by provider] furosemide (LASIX) tablet 40 mg  40 mg Oral TID    [START ON 8/12/2020] cholecalciferol (VITAMIN D3) (1000 Units /25 mcg) tablet 1 Tab  1,000 Units Oral DAILY    [START ON 8/12/2020] allopurinoL (ZYLOPRIM) tablet 100 mg  100 mg Oral DAILY    gabapentin (NEURONTIN) capsule 100 mg  100 mg Oral TID    . PHARMACY TO SUBSTITUTE PER PROTOCOL (Reordered from: [facility] 145 mcg cap capsule)    Per Protocol    magnesium oxide (MAG-OX) tablet 400 mg  400 mg Oral BID    [START ON 8/12/2020] pantoprazole (PROTONIX) tablet 40 mg  40 mg Oral ACB    furosemide (LASIX) injection 40 mg  40 mg IntraVENous BID    aspirin chewable tablet 81 mg  81 mg Oral DAILY    atorvastatin (LIPITOR) tablet 40 mg  40 mg Oral DAILY    carvediloL (COREG) tablet 3.125 mg  3.125 mg Oral BID WITH MEALS    levothyroxine (SYNTHROID) 175 mcg  175 mcg Oral ACB    melatonin tablet 3 mg  3 mg Oral QHS PRN    oxybutynin chloride XL (DITROPAN XL) tablet 10 mg  10 mg Oral BID    sacubitriL-valsartan (ENTRESTO) 24-26 mg tablet 1 Tab  1 Tab Oral BID    acetaminophen (TYLENOL) tablet 650 mg  650 mg Oral Q4H PRN    Or    acetaminophen (TYLENOL) solution 650 mg  650 mg Per NG tube Q4H PRN    Or    acetaminophen (TYLENOL) suppository 650 mg  650 mg Rectal Q4H PRN    glucose chewable tablet 16 g  4 Tab Oral PRN    glucagon (GLUCAGEN) injection 1 mg  1 mg IntraMUSCular PRN    dextrose 10% infusion 0-250 mL  0-250 mL IntraVENous PRN    insulin lispro (HUMALOG) injection   SubCUTAneous AC&HS         Review of Symptoms:  A comprehensive review of systems was negative except for that written in the HPI. Physical Exam    Visit Vitals  /89   Pulse 90   Temp 97.5 °F (36.4 °C)   Resp 25   Ht 5' 2\" (1.575 m)   Wt 204 lb 6.4 oz (92.7 kg)   SpO2 97%   BMI 37.39 kg/m²     Neck: supple, symmetrical, trachea midline, no adenopathy, no carotid bruit and no JVD  Heart: regular rate and rhythm, S1, S2 normal, no murmur, click, rub or gallop  Lungs: clear to auscultation bilaterally  Abdomen: soft, non-tender.  Bowel sounds normal. No masses,  no organomegaly  Extremities: edema tr  Pulses: 2+ and symmetric  Neurologic: Grossly normal    Cardiographics    Telemetry: paced  ECG: paced  Echocardiogram: Not done    Recent radiology, intake/output and wt reviewed    Labs:   Recent Results (from the past 24 hour(s))   CBC WITH AUTOMATED DIFF    Collection Time: 08/10/20  5:54 PM   Result Value Ref Range    WBC 5.7 3.6 - 11.0 K/uL    RBC 3.46 (L) 3.80 - 5.20 M/uL    HGB 10.6 (L) 11.5 - 16.0 g/dL    HCT 33.1 (L) 35.0 - 47.0 %    MCV 95.7 80.0 - 99.0 FL    MCH 30.6 26.0 - 34.0 PG    MCHC 32.0 30.0 - 36.5 g/dL    RDW 13.7 11.5 - 14.5 %    PLATELET 213 (L) 295 - 400 K/uL    MPV 10.2 8.9 - 12.9 FL    NRBC 0.0 0  WBC    ABSOLUTE NRBC 0.00 0.00 - 0.01 K/uL    NEUTROPHILS 56 32 - 75 %    LYMPHOCYTES 32 12 - 49 %    MONOCYTES 9 5 - 13 %    EOSINOPHILS 2 0 - 7 %    BASOPHILS 1 0 - 1 %    IMMATURE GRANULOCYTES 0 0.0 - 0.5 %    ABS. NEUTROPHILS 3.2 1.8 - 8.0 K/UL    ABS. LYMPHOCYTES 1.8 0.8 - 3.5 K/UL    ABS. MONOCYTES 0.5 0.0 - 1.0 K/UL    ABS. EOSINOPHILS 0.1 0.0 - 0.4 K/UL    ABS. BASOPHILS 0.0 0.0 - 0.1 K/UL    ABS. IMM. GRANS. 0.0 0.00 - 0.04 K/UL    DF AUTOMATED     METABOLIC PANEL, COMPREHENSIVE    Collection Time: 08/10/20  5:54 PM   Result Value Ref Range    Sodium 137 136 - 145 mmol/L    Potassium 3.5 3.5 - 5.1 mmol/L    Chloride 106 97 - 108 mmol/L    CO2 25 21 - 32 mmol/L    Anion gap 6 5 - 15 mmol/L    Glucose 185 (H) 65 - 100 mg/dL    BUN 20 6 - 20 MG/DL    Creatinine 0.70 0.55 - 1.02 MG/DL    BUN/Creatinine ratio 29 (H) 12 - 20      GFR est AA >60 >60 ml/min/1.73m2    GFR est non-AA >60 >60 ml/min/1.73m2    Calcium 9.0 8.5 - 10.1 MG/DL    Bilirubin, total 0.9 0.2 - 1.0 MG/DL    ALT (SGPT) 24 12 - 78 U/L    AST (SGOT) 25 15 - 37 U/L    Alk.  phosphatase 87 45 - 117 U/L    Protein, total 7.6 6.4 - 8.2 g/dL    Albumin 2.7 (L) 3.5 - 5.0 g/dL    Globulin 4.9 (H) 2.0 - 4.0 g/dL    A-G Ratio 0.6 (L) 1.1 - 2.2     PROTHROMBIN TIME + INR    Collection Time: 08/10/20  5:54 PM   Result Value Ref Range    INR 1.3 (H) 0.9 - 1.1      Prothrombin time 13.5 (H) 9.0 - 11.1 sec   TROPONIN I Collection Time: 08/10/20  5:54 PM   Result Value Ref Range    Troponin-I, Qt. 0.28 (H) <0.05 ng/mL   NT-PRO BNP    Collection Time: 08/10/20  5:54 PM   Result Value Ref Range    NT pro-BNP 1,862 (H) <125 PG/ML   SAMPLES BEING HELD    Collection Time: 08/10/20  5:54 PM   Result Value Ref Range    SAMPLES BEING HELD 1RED     COMMENT        Add-on orders for these samples will be processed based on acceptable specimen integrity and analyte stability, which may vary by analyte.    TSH 3RD GENERATION    Collection Time: 08/10/20  5:54 PM   Result Value Ref Range    TSH 6.58 (H) 0.36 - 3.74 uIU/mL   EKG, 12 LEAD, INITIAL    Collection Time: 08/10/20  5:54 PM   Result Value Ref Range    Ventricular Rate 89 BPM    Atrial Rate 89 BPM    P-R Interval 168 ms    QRS Duration 174 ms    Q-T Interval 428 ms    QTC Calculation (Bezet) 520 ms    Calculated P Axis 64 degrees    Calculated R Axis -46 degrees    Calculated T Axis 77 degrees    Diagnosis       Atrial-sensed ventricular-paced rhythm with occasional AV dual-paced   complexes and with frequent premature ventricular complexes  When compared with ECG of 26-JAN-2018 13:53,  Electronic ventricular pacemaker has replaced Sinus rhythm     LIPID PANEL    Collection Time: 08/11/20 12:17 AM   Result Value Ref Range    LIPID PROFILE          Cholesterol, total 148 <200 MG/DL    Triglyceride 123 <150 MG/DL    HDL Cholesterol 53 MG/DL    LDL, calculated 70.4 0 - 100 MG/DL    VLDL, calculated 24.6 MG/DL    CHOL/HDL Ratio 2.8 0.0 - 5.0     HEMOGLOBIN A1C WITH EAG    Collection Time: 08/11/20 12:17 AM   Result Value Ref Range    Hemoglobin A1c 10.4 (H) 4.0 - 5.6 %    Est. average glucose 252 mg/dL   CBC W/O DIFF    Collection Time: 08/11/20 12:17 AM   Result Value Ref Range    WBC 6.4 3.6 - 11.0 K/uL    RBC 3.66 (L) 3.80 - 5.20 M/uL    HGB 11.1 (L) 11.5 - 16.0 g/dL    HCT 34.7 (L) 35.0 - 47.0 %    MCV 94.8 80.0 - 99.0 FL    MCH 30.3 26.0 - 34.0 PG    MCHC 32.0 30.0 - 36.5 g/dL    RDW 13.6 11.5 - 14.5 %    PLATELET 515 (L) 912 - 400 K/uL    MPV 10.2 8.9 - 12.9 FL    NRBC 0.0 0  WBC    ABSOLUTE NRBC 0.00 0.00 - 0.01 K/uL   TROPONIN I    Collection Time: 08/11/20 12:17 AM   Result Value Ref Range    Troponin-I, Qt. 0.27 (H) <0.05 ng/mL   URINALYSIS W/MICROSCOPIC    Collection Time: 08/11/20 12:45 AM   Result Value Ref Range    Color YELLOW/STRAW      Appearance CLEAR CLEAR      Specific gravity 1.010 1.003 - 1.030      pH (UA) 5.0 5.0 - 8.0      Protein 100 (A) NEG mg/dL    Glucose 250 (A) NEG mg/dL    Ketone Negative NEG mg/dL    Bilirubin Negative NEG      Blood SMALL (A) NEG      Urobilinogen 1.0 0.2 - 1.0 EU/dL    Nitrites Positive (A) NEG      Leukocyte Esterase SMALL (A) NEG      WBC 5-10 0 - 4 /hpf    RBC 0-5 0 - 5 /hpf    Epithelial cells FEW FEW /lpf    Bacteria Negative NEG /hpf   URINE CULTURE HOLD SAMPLE    Collection Time: 08/11/20 12:45 AM    Specimen: Serum; Urine   Result Value Ref Range    Urine culture hold        Urine on hold in Microbiology dept for 2 days. If unpreserved urine is submitted, it cannot be used for addtional testing after 24 hours, recollection will be required.    TROPONIN I    Collection Time: 08/11/20  6:28 AM   Result Value Ref Range    Troponin-I, Qt. 0.27 (H) <0.05 ng/mL   GLUCOSE, POC    Collection Time: 08/11/20  8:42 AM   Result Value Ref Range    Glucose (POC) 201 (H) 65 - 100 mg/dL    Performed by Shahid Troncoso    GLUCOSE, POC    Collection Time: 08/11/20 11:21 AM   Result Value Ref Range    Glucose (POC) 220 (H) 65 - 100 mg/dL    Performed by Shahid Troncoso

## 2020-08-11 NOTE — PROGRESS NOTES
Bedside shift change report given to WANDA Mcintyre (oncoming nurse) by Abhishek Jamison RN (offgoing nurse). Report included the following information SBAR, Kardex, Intake/Output, MAR, Med Rec Status, Cardiac Rhythm NSR, Quality Measures and Dual Neuro Assessment.

## 2020-08-11 NOTE — CONSULTS
Neurointerventional Surgery Consult    Patient: Ashlie Crespo MRN: [id number]  SSN: [id number]    YOB: 1950  Age: 68 y.o. Sex: female      History of Presenting Illness:       Ashlie Crespo is a 68 y.o. female with a past medical history of diabetes, hypertension, CHF with LvEF 25% and AICD. Presents with dysarthria and right sided facial droop. She was initially sent to [facility] and transferred to [facility]. Today on exam she tells me that her slurred speech and word finding difficulty have completely resolved, feels episode lasted about 15 hours or so. Stroke work up including CTA of head/neck shows left MCA stenosis with partial M2 occlusion. We are consulted for this reason. During my exam today, she denies headache, numbness/tingling, shortness of breath, chest pain, weakness on one side or there other, speech or visual disturbance.      Past Medical History:   Diagnosis Date    Acid indigestion     heartburn    Asthma     Back pain     Constipation     Diabetes mellitus     Diarrhea     Frequent episodic tension-type headache     Hemorrhoids     Hernia of unspecified site of abdominal cavity without mention of obstruction or gangrene     HTN (hypertension)     ICD (implantable cardioverter-defibrillator) in place     Muscle pain     joint pain    Pacemaker     biventricular ICD 1/26/18    Skin cancer     SOB (shortness of breath)     Thyroid disorder     Wears dentures      Past Surgical History:   Procedure Laterality Date    HX CARPAL TUNNEL RELEASE      right hand    HX HEART CATHETERIZATION  2/6/16    HX HYSTERECTOMY      HX PACEMAKER Left 6/20/16    [employer] ICD    HX THYROIDECTOMY      REMOVAL GALLBLADDER        Family History   Problem Relation Age of Onset    Diabetes Mother     Heart Disease Mother     Heart Disease Father     Cancer Sister     Diabetes Sister     Heart Disease Sister     Hypertension Sister  Diabetes Brother      Social History     Tobacco Use    Smoking status: Never Smoker    Smokeless tobacco: Never Used   Substance Use Topics    Alcohol use: Not Currently     Comment: wine occasionally      Current Facility-Administered Medications   Medication Dose Route Frequency Provider Last Rate Last Dose    clopidogreL (PLAVIX) tablet 75 mg  75 mg Oral DAILY Shayna Mckeon DO   75 mg at 08/11/20 1150    [Held by provider] furosemide (LASIX) tablet 40 mg  40 mg Oral TID Elvira Awad MD        [START ON 8/12/2020] cholecalciferol (VITAMIN D3) (1000 Units /25 mcg) tablet 1 Tab  1,000 Units Oral DAILY Elvira Awad MD        [START ON 8/12/2020] allopurinoL (ZYLOPRIM) tablet 100 mg  100 mg Oral DAILY Elvira Awad MD        gabapentin (NEURONTIN) capsule 100 mg  100 mg Oral TID Elvira Awad MD        . PHARMACY TO SUBSTITUTE PER PROTOCOL (Reordered from: LINZESS 145 mcg cap capsule)    Per Protocol Elvira Awad MD        magnesium oxide (MAG-OX) tablet 400 mg  400 mg Oral BID Elvira Awad MD       [address] [place] [address] [START ON 8/12/2020] pantoprazole (PROTONIX) tablet 40 mg  40 mg Oral ACB Gaurav Ny MD        furosemide (LASIX) injection 40 mg  40 mg IntraVENous BID Elvira Awad MD        aspirin chewable tablet 81 mg  81 mg Oral DAILY Aura Soares MD   81 mg at 08/11/20 0927    atorvastatin (LIPITOR) tablet 40 mg  40 mg Oral DAILY Aura Soares MD   40 mg at 08/11/20 0085    carvediloL (COREG) tablet 3.125 mg  3.125 mg Oral BID WITH MEALS Aura Soares MD   Stopped at 08/11/20 6273    levothyroxine (SYNTHROID) 175 mcg  175 mcg Oral ACB Aura Soares MD   175 mcg at 08/11/20 0659    melatonin tablet 3 mg  3 mg Oral QHS PRN Aura Soares MD   3 mg at 08/11/20 0034    oxybutynin chloride XL (DITROPAN XL) tablet 10 mg  10 mg Oral BID Aura Soares MD        sacubitriL-valsartan (ENTRESTO) 24-26 mg tablet 1 Tab  1 Tab Oral BID Aura Soares MD   Stopped at 08/11/20 3931    acetaminophen (TYLENOL) tablet 650 mg  650 mg Oral Q4H PRN Royce Castillo MD        Or    acetaminophen (TYLENOL) solution 650 mg  650 mg Per NG tube Q4H PRN Royce Castillo MD        Or   Praveena Downy acetaminophen (TYLENOL) suppository 650 mg  650 mg Rectal Q4H PRN Royce Castillo MD        glucose chewable tablet 16 g  4 Tab Oral PRN Royce Castillo MD        glucagon (GLUCAGEN) injection 1 mg  1 mg IntraMUSCular PRN Royce Castillo MD        dextrose 10% infusion 0-250 mL  0-250 mL IntraVENous PRN Royce Castillo MD        insulin lispro (HUMALOG) injection   SubCUTAneous AC&HS Royce Castillo MD   2 Units at 08/11/20 1150        Allergies   Allergen Reactions    Codeine Other (comments)     Patient states she is not allergic    Novocain [Procaine] Nausea and Vomiting    Tramadol Other (comments)     Headache       Review of Systems:  A comprehensive review of systems was negative except for that written in the History of Present Illness. Objective:     Vitals:    08/11/20 0548 08/11/20 0927 08/11/20 1006 08/11/20 1355   BP: 113/50 94/43 109/63 105/66   Pulse: 92 77 81 80   Resp: 14  17 25   Temp: 97.4 °F (36.3 °C)  97.5 °F (36.4 °C) 97.5 °F (36.4 °C)   SpO2: 93%  95% 97%   Weight:       Height:              Neurologic Exam:  Mental Status:  Alert and oriented x 4. Appropriate affect, mood and behavior. Language:    Normal fluency, repetition, comprehension and naming. Cranial Nerves:   Pupils equal, round and reactive to light. Visual fields full to confrontation in the right, patient has chronic visual loss in the left eye due to                                        Cataracts and can only see shadows. Extraocular movements intact. Facial sensation intact V1 - V3. Full facial strength, no asymmetry. Hearing intact pm left side, chronically reduced hearing in right ear. No dysarthria. Tongue protrudes to midline, palate elevates symmetrically.       Shoulder shrug 5/5 bilaterally. Motor:    No pronator drift. Bulk and tone normal.      5/5 power in all extremities proximally and distally. No involuntary movements. Sensation:    Sensation intact throughout to light touch    Coordination & Gait: Deferred. FTN intact and H2S indicating no ataxia. Imaging: All imaging reviewed by Dr. Irvin Willis    CT Results (maximum last 3): Results from [place] encounter on 08/10/20   CT CODE NEURO PERF W CBF     Narrative *PRELIMINARY REPORT*     No significant perfusion defect. No acute thrombosis. Stenosis at the origins of  the M2 segments of the left middle cerebral artery.     Preliminary report was provided by Dr. Galilea Ruelas, the on-call radiologist, at 11:11  PM.     Final report to follow.     *END PRELIMINARY REPORT*     EXAM:  CT CODE NEURO PERF W CBF, CTA CODE NEURO HEAD AND NECK W CONT     INDICATION:   CVA. Dysarthria.     COMPARISON:  CT head 8/10/2020.     CONTRAST:  120 mL of Isovue-370.     TECHNIQUE:  Unenhanced  images were obtained to localize the volume for  acquisition. Multislice helical axial CT angiography was performed from the  aortic arch to the top of the head during uneventful rapid bolus intravenous  contrast administration. Coronal and sagittal reformations and 3D post  processing was performed. CT dose reduction was achieved through use of a  standardized protocol tailored for this examination and automatic exposure  control for dose modulation.     CT brain perfusion was performed with generation of hemodynamic maps of multiple  parameters, including cerebral blood flow, cerebral blood volume, and MTT (mean  transit time). CT dose reduction was achieved through use of a standardized  protocol tailored for this examination and automatic exposure control for dose  modulation.     FINDINGS:     CTA Head:  There is no evidence of large vessel occlusion in the anterior circulation  bilaterally.  There is partially occlusive thrombus noted at the left MCA  bifurcation within the proximal left M2 anterior and posterior divisions (series  2 image 366 and series 2 image 363), with resultant severe focal stenoses. The  distal left MCA branches remain widely patent. Calcification of the bilateral  carotid siphons with no more than mild stenosis. The anterior communicating  artery is diminutive but patent.      There is no evidence of large vessel occlusion or flow-limiting stenosis of the  intracranial vertebral arteries, basilar artery, or posterior cerebral arteries. The right posterior communicating artery is patent, the left is not seen. There  are large bilateral anterior choroidal arteries.      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 The dural venous sinuses and deep cerebral venous system are patent. No  evidence of abnormal enhancement on delayed phase images.     CTA NECK:  NASCET method was utilized for calculating stenosis.     Moderate calcific atherosclerosis of the aortic arch. Mild stenosis of the  proximal left subclavian artery from circumferential calcified plaque. The  common carotid arteries demonstrate no significant stenosis. Calcific  atherosclerosis of the right carotid bifurcation with mild (less than 50%)  stenosis of the proximal right internal carotid artery. Calcific atherosclerosis  of the left carotid bifurcation without significant stenosis.     There is a left dominant vertebrobasilar arterial system. The cervical vertebral  arteries are normal in course, size and contour without significant stenosis.      Status post left hemithyroidectomy. The right thyroid lobe is enlarged and  heterogeneous without a discrete dominant nodule. The trachea is deviated to the  left, but without significant narrowing. There is interlobular septal thickening  noted in the upper lobes. There are numerous mildly enlarged mediastinal lymph  nodes measuring up to 13 mm.  Left chest cardiac device is noted. No acute  fracture or aggressive osseous lesion. Multilevel degenerative disc disease in  the mid and lower cervical spine with scattered multilevel facet arthropathy.     CT Brain Perfusion:  There are no regional areas of elevated Tmax, decreased cerebral blood flow or  blood volume. rCBF < 30% = 0 cc. Tmax > 6 seconds = 0 cc.        Impression IMPRESSION:   CTA Head:  1. Partially occlusive thrombus within the proximal left M2 anterior and  posterior divisions, with associated severe focal stenoses. The distal left MCA  branches remain widely patent. 2. Additional mild atherosclerotic disease as above. 3. 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     CTA Neck:  1. No evidence of flow-limiting stenosis. 2. Mild stenosis (less than 50% by NASCET criteria) of the proximal right  internal carotid artery. 3. Status post left thyroid lobectomy. Enlarged heterogeneous right thyroid  gland without a discrete nodule. Leftward deviation of the trachea without  significant narrowing. 4. Pulmonary interstitial edema. 5. Numerous mildly enlarged mediastinal lymph nodes, which are indeterminant.     CT Brain Perfusion:  1. No acute abnormality.     The findings were called to [address] [place] [address] on 8/11/2020 at 8:18 AM by Dr. Adi Pena. [id number]               CT CODE NEURO HEAD WO CONTRAST     Narrative EXAM: CT CODE NEURO HEAD WO CONTRAST     INDICATION: CVA     COMPARISON: None.     CONTRAST: None.     TECHNIQUE: Unenhanced CT of the head was performed using 5 mm images. Brain and  bone windows were generated. Coronal and sagittal reformats. CT dose reduction  was achieved through use of a standardized protocol tailored for this  examination and automatic exposure control for dose modulation.       FINDINGS:  The ventricles and sulci are appropriate in size, shape and configuration. Old  lacunar infarcts are seen in the basal ganglia. There is mild periventricular  white matter disease. There is no intracranial hemorrhage, extra-axial  collection, or mass effect. The basilar cisterns are open. No CT evidence of  acute infarct.     The bone windows demonstrate no abnormalities. The visualized portions of the  paranasal sinuses and mastoid air cells are clear.        Impression IMPRESSION:   No evidence of acute intracranial process. Old lacunar infarcts in the basal  ganglia. Mild periventricular white matter disease, likely representing chronic  small vessel ischemia.          Assessment:     Hospital Problems  Date Reviewed: 8/11/2020          Codes Class Noted POA    * (Principal) Dysarthria ICD-10-CM: R47.1  ICD-9-CM: 784.51  8/11/2020 Yes              Plan: This is a 68year old female who presents from OSH with dysarthria and expressive aphasia. All of her symptoms have resolved today and she feels back at baseline. We are consulted for partially occlusive thrombus within the left M2 anterior and posterior divisions with associated severe focal stenoses. 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 All imaging reviewed by Dr. Dulce Alves. She does have a partially occlusive thrombus vs. Severe stenosis in the left M2 anterior division. This is too distal for any consideration of stenting. The patient needs to be on dual antiplatelet therapy. She does report a history of GI bleed 2 years ago, but ultimately was diagnosed as possible hemorrhoids and EGD was negative. [facility] -- discussed with hospitalist that either PCP or cardiologist should be notified about dual antiplatelet therapy to ensure she does not have any definitive contraindications. Aspirin and P2Y12 testing ordered for tomorrow to ensure she is therapeutic. Recommend outpatient follow up with Dr. Dulce Alves to discuss treatment options vs. surveillance imaging moving forward for incidental cerebral aneurysm.      Thank you for this consult and participating in the care of this patient. I have discussed the diagnosis with the patient and the intended plan as seen in the above orders. Patient is in agreement.       Signed By: Britney Zelaya NP     August 11, 2020

## 2020-08-11 NOTE — ED NOTES
TRANSFER - OUT REPORT:    Verbal report given to Leslie Mitchell (name) on Rebecca Roe  being transferred to [facility] (unit) for routine progression of care       Report consisted of patients Situation, Background, Assessment and   Recommendations(SBAR). Information from the following report(s) SBAR, ED Summary, [facility] and Recent Results was reviewed with the receiving nurse. Lines:   Peripheral IV 08/10/20 Left Antecubital (Active)   Site Assessment Clean, dry, & intact 08/10/20 1746   Phlebitis Assessment 0 08/10/20 1746   Infiltration Assessment 0 08/10/20 1746   Dressing Status Clean, dry, & intact 08/10/20 1746        Opportunity for questions and clarification was provided.

## 2020-08-11 NOTE — PROGRESS NOTES
Physical Therapy:     Orders received, chart reviewed and patient evaluated by physical therapy. Pending progression with skilled acute physical therapy, recommend:  Physical therapy at least 2 days/week in the home     Full evaluation to follow.      Brittnee Moore, PT, DPT

## 2020-08-11 NOTE — PROGRESS NOTES
Problem: Diabetes Self-Management  Goal: *Disease process and treatment process  Description: Define diabetes and identify own type of diabetes; list 3 options for treating diabetes. Outcome: Progressing Towards Goal  Goal: *Incorporating nutritional management into lifestyle  Description: Describe effect of type, amount and timing of food on blood glucose; list 3 methods for planning meals. Outcome: Progressing Towards Goal  Goal: *Incorporating physical activity into lifestyle  Description: State effect of exercise on blood glucose levels. Outcome: Progressing Towards Goal     Problem: TIA/CVA Stroke: Day 2 Until Discharge  Goal: Activity/Safety  Outcome: Progressing Towards Goal  Goal: Diagnostic Test/Procedures  Outcome: Progressing Towards Goal  Goal: Nutrition/Diet  Outcome: Progressing Towards Goal  Goal: Discharge Planning  Outcome: Progressing Towards Goal     Problem: Falls - Risk of  Goal: *Absence of Falls  Description: Document Claire Fall Risk and appropriate interventions in the flowsheet.   Outcome: Progressing Towards Goal  Note: Fall Risk Interventions:  Mobility Interventions: Patient to call before getting OOB         Medication Interventions: Bed/chair exit alarm, Evaluate medications/consider consulting pharmacy, Patient to call before getting OOB

## 2020-08-11 NOTE — H&P
History & Physical    Primary Care Provider: Gertrude Hernández MD  Source of Information: Patient     History of Presenting Illness:   Gennaro Doran is a 68 y.o. female who presents with dysarthria    History was probably obtained from the patient    Patient reports that this morning she woke up and had slurred speech, also noticed that she had some right-sided facial droop. Patient went to [facility] and was transferred to [facility].  I could not find any paperwork that came with the patient from [facility]. Per nurse Malcolm Fung, she did not find any paperwork with the patient. Patient reports that she had no other focal neurological deficit associated with her symptoms. Reports that she has had some blurred vision off and on but has attributed it to old age. Patient denies any other complaints or problems    The patient denies any Headache, blurry vision, sore throat, trouble swallowing,chest pain, SOB, cough, fever, chills, N/V/D, abd pain, urinary symptoms, constipation, recent travels, sick contacts, focal or generalized oneirological symptoms,  falls, injuries, rashes, contact with COVID-19 diagnosed patients, hematemesis, melena, hemoptysis, hematuria, rashes, denies starting any new medications and denies any other concerns or problems besides as mentioned above. Review of Systems:  A comprehensive review of systems was negative except for that written in the History of Present Illness.      Past Medical History:   Diagnosis Date    Acid indigestion     heartburn    Asthma     Back pain     Constipation     Diabetes mellitus     Diarrhea     Frequent episodic tension-type headache     Hemorrhoids     Hernia of unspecified site of abdominal cavity without mention of obstruction or gangrene     HTN (hypertension)     ICD (implantable cardioverter-defibrillator) in place     Muscle pain     joint pain    Pacemaker     biventricular ICD 1/26/18    Skin cancer     SOB (shortness of breath)     Thyroid disorder     Wears dentures       Past Surgical History:   Procedure Laterality Date    HX CARPAL TUNNEL RELEASE      right hand    HX HEART CATHETERIZATION  2/6/16    HX HYSTERECTOMY      HX PACEMAKER Left 6/20/16    [employer] ICD    HX THYROIDECTOMY      REMOVAL GALLBLADDER       Prior to Admission medications    Medication Sig Start Date End Date Taking? Authorizing Provider   carvedilol (COREG) 12.5 mg tablet Take  by mouth two (2) times daily (with meals). Provider, Historical   diclofenac (VOLTAREN) 1 % gel Apply  to affected area four (4) times daily as needed. Provider, Historical   metFORMIN (GLUCOPHAGE) 500 mg tablet Take  by mouth two (2) times daily (with meals). Provider, Historical   melatonin 3 mg tablet Take 3 mg by mouth nightly as needed. Provider, Historical   guaiFENesin-codeine (VIRTUSSIN AC) 100-10 mg/5 mL solution Take 5 mL by mouth three (3) times daily as needed for Cough. Provider, Historical   allopurinol (ZYLOPRIM) 100 mg tablet Take 100 mg by mouth daily. 1/18/19   Provider, Historical   PROAIR HFA 90 mcg/actuation inhaler Take 2 Puffs by inhalation every four (4) hours as needed. 1/17/19   Provider, Historical   LINZESS 145 mcg cap capsule Take 145 mcg by mouth daily as needed. 1/16/19   Provider, Historical   nitroglycerin (NITROSTAT) 0.4 mg SL tablet 1 Tab by SubLINGual route every five (5) minutes as needed for Chest Pain. 2/4/19   Michael Chicas MD   acetaminophen (TYLENOL EXTRA STRENGTH) 500 mg tablet Take  by mouth every six (6) hours as needed for Pain. Provider, Historical   polyethylene glycol (MIRALAX) 17 gram packet Take 17 g by mouth as needed. Provider, Historical   sacubitril-valsartan (ENTRESTO) 24 mg/26 mg tablet Take 1 Tab by mouth two (2) times a day.  11/28/17   Casa Santoyo MD acetaminophen-codeine (TYLENOL #3) 300-30 mg per tablet Take 1 Tab by mouth every six (6) hours as needed for Pain. Max Daily Amount: 4 Tabs. 6/21/16   Nica Colunga NP   Insulin Needles, Disposable, (BD INSULIN PEN NEEDLE UF SHORT) 31 gauge x 5/16\" ndle USE AS DIRECTED FOUR TIMES A DAY 6/21/16   Nica Colunga NP   furosemide (LASIX) 40 mg tablet Take 40 mg by mouth three (3) times daily. Indications: sometimes tid 5/4/16   Provider, Historical   gabapentin (NEURONTIN) 100 mg capsule Take 100 mg by mouth three (3) times daily. 4/27/16   Provider, Historical   oxybutynin chloride XL (DITROPAN XL) 10 mg CR tablet Take 10 mg by mouth two (2) times a day. 3/30/16   Provider, Historical   levothyroxine 175 mcg cap Take 175 mcg by mouth Daily (before breakfast). 4/27/16   Provider, Historical   omeprazole (PRILOSEC) 40 mg capsule Take 40 mg by mouth daily. 4/27/16   Provider, Historical   atorvastatin (LIPITOR) 40 mg tablet Take 40 mg by mouth daily. Provider, Historical   HUMALOG MIX 75-25 KWIKPEN 100 unit/mL (75-25) flexpen INJECT 24 UNITS UNDER THE SKIN TWICE A DAY WITH BREAKFAST AND DINNER 2/15/15   Rod Lenz MD   aspirin 81 mg tablet Take 81 mg by mouth. Provider, Historical   potassium chloride SR (KLOR-CON 10) 10 mEq tablet Take 10 mEq by mouth daily. Provider, Historical   glucose blood VI test strips (FREESTYLE TEST) strip 200 Each by Does Not Apply route four (4) times daily.  11/1/11   Rod Lenz MD     Allergies   Allergen Reactions    Codeine Other (comments)     Patient states she is not allergic    Novocain [Procaine] Nausea and Vomiting    Tramadol Other (comments)     Headache      Family History   Problem Relation Age of Onset    Diabetes Mother     Heart Disease Mother     Heart Disease Father    Yuncindy SepulvedaJasson Sister     Diabetes Sister     Heart Disease Sister     Hypertension Sister     Diabetes Brother         SOCIAL HISTORY:  Patient resides:  Independently x Assisted Living    SNF    With family care       Smoking history:   None    Former x   Chronic      Alcohol history:   None    Social x   Chronic      Ambulates:   Independently    w/cane x   w/walker    w/wc    CODE STATUS:  DNR    Full x   Other      Objective:     Physical Exam:     Visit Vitals  /57   Pulse 96   Temp 98.5 °F (36.9 °C)   Resp 21   Ht 5' 2\" (1.575 m)   Wt 103.2 kg (227 lb 8.2 oz)   SpO2 96%   BMI 41.61 kg/m²      O2 Device: Room air    General : alert x 3, awake, no acute distress, resting in bed, female, appears to be stated age  HEENT: PEERL, EOMI, moist mucus membrane, TM clear, right sided facial droop  Neck: supple, no JVD, no meningeal signs  Chest: Clear to auscultation bilaterally   CVS: S1 S2 heard, Capillary refill less than 2 seconds  Abd: soft/ Non tender, non distended, BS physiological,   Ext: no clubbing, no cyanosis, no edema, brisk 2+ DP pulses  Neuro/Psych: pleasant mood and affect, CN 2-12 grossly intac except for right facial droop t, sensory grossly within normal limit, Strength 5/5 in all extremities, DTR 1+ x 4  Skin: warm      EKG: Pending      Data Review:     Recent Days:  Recent Labs     08/10/20  1754   WBC 5.7   HGB 10.6*   HCT 33.1*   *     Recent Labs     08/10/20  1754      K 3.5      CO2 25   *   BUN 20   CREA 0.70   CA 9.0   ALB 2.7*   ALT 24   INR 1.3*     No results for input(s): PH, PCO2, PO2, HCO3, FIO2 in the last 72 hours.     24 Hour Results:  Recent Results (from the past 24 hour(s))   CBC WITH AUTOMATED DIFF    Collection Time: 08/10/20  5:54 PM   Result Value Ref Range    WBC 5.7 3.6 - 11.0 K/uL    RBC 3.46 (L) 3.80 - 5.20 M/uL    HGB 10.6 (L) 11.5 - 16.0 g/dL    HCT 33.1 (L) 35.0 - 47.0 %    MCV 95.7 80.0 - 99.0 FL    MCH 30.6 26.0 - 34.0 PG    MCHC 32.0 30.0 - 36.5 g/dL    RDW 13.7 11.5 - 14.5 %    PLATELET 987 (L) 764 - 400 K/uL    MPV 10.2 8.9 - 12.9 FL    NRBC 0.0 0  WBC    ABSOLUTE NRBC 0.00 0.00 - 0.01 K/uL NEUTROPHILS 56 32 - 75 %    LYMPHOCYTES 32 12 - 49 %    MONOCYTES 9 5 - 13 %    EOSINOPHILS 2 0 - 7 %    BASOPHILS 1 0 - 1 %    IMMATURE GRANULOCYTES 0 0.0 - 0.5 %    ABS. NEUTROPHILS 3.2 1.8 - 8.0 K/UL    ABS. LYMPHOCYTES 1.8 0.8 - 3.5 K/UL    ABS. MONOCYTES 0.5 0.0 - 1.0 K/UL    ABS. EOSINOPHILS 0.1 0.0 - 0.4 K/UL    ABS. BASOPHILS 0.0 0.0 - 0.1 K/UL    ABS. IMM. GRANS. 0.0 0.00 - 0.04 K/UL    DF AUTOMATED     METABOLIC PANEL, COMPREHENSIVE    Collection Time: 08/10/20  5:54 PM   Result Value Ref Range    Sodium 137 136 - 145 mmol/L    Potassium 3.5 3.5 - 5.1 mmol/L    Chloride 106 97 - 108 mmol/L    CO2 25 21 - 32 mmol/L    Anion gap 6 5 - 15 mmol/L    Glucose 185 (H) 65 - 100 mg/dL    BUN 20 6 - 20 MG/DL    Creatinine 0.70 0.55 - 1.02 MG/DL    BUN/Creatinine ratio 29 (H) 12 - 20      GFR est AA >60 >60 ml/min/1.73m2    GFR est non-AA >60 >60 ml/min/1.73m2    Calcium 9.0 8.5 - 10.1 MG/DL    Bilirubin, total 0.9 0.2 - 1.0 MG/DL    ALT (SGPT) 24 12 - 78 U/L    AST (SGOT) 25 15 - 37 U/L    Alk. phosphatase 87 45 - 117 U/L    Protein, total 7.6 6.4 - 8.2 g/dL    Albumin 2.7 (L) 3.5 - 5.0 g/dL    Globulin 4.9 (H) 2.0 - 4.0 g/dL    A-G Ratio 0.6 (L) 1.1 - 2.2     PROTHROMBIN TIME + INR    Collection Time: 08/10/20  5:54 PM   Result Value Ref Range    INR 1.3 (H) 0.9 - 1.1      Prothrombin time 13.5 (H) 9.0 - 11.1 sec   TROPONIN I    Collection Time: 08/10/20  5:54 PM   Result Value Ref Range    Troponin-I, Qt. 0.28 (H) <0.05 ng/mL   NT-PRO BNP    Collection Time: 08/10/20  5:54 PM   Result Value Ref Range    NT pro-BNP 1,862 (H) <125 PG/ML   SAMPLES BEING HELD    Collection Time: 08/10/20  5:54 PM   Result Value Ref Range    SAMPLES BEING HELD 1RED     COMMENT        Add-on orders for these samples will be processed based on acceptable specimen integrity and analyte stability, which may vary by analyte.    EKG, 12 LEAD, INITIAL    Collection Time: 08/10/20  5:54 PM   Result Value Ref Range    Ventricular Rate 89 BPM Atrial Rate 89 BPM    P-R Interval 168 ms    QRS Duration 174 ms    Q-T Interval 428 ms    QTC Calculation (Bezet) 520 ms    Calculated P Axis 64 degrees    Calculated R Axis -46 degrees    Calculated T Axis 77 degrees    Diagnosis       Atrial-sensed ventricular-paced rhythm with occasional AV dual-paced   complexes and with frequent premature ventricular complexes  When compared with ECG of 26-JAN-2018 13:53,  Electronic ventricular pacemaker has replaced Sinus rhythm           Imaging:     No results found.     Assessment/Plan      Dysarthria: Patient will be observed on a telemetry bed, per ER physician on report he was told that the CT of the head is negative for acute CVA, will repeat a CT head CTA head and neck, continue aspirin, statin, neurovascular checks, PT OT speech consult, IV hydration, cycle troponins, telemetry monitoring, neurology consult, echocardiogram, MRI brain if possible with ICD, close monitoring    Acute on chronic systolic congestive heart failure: Patient appears to be in mild CHF exacerbation, baseline unclear, will give 1 dose Lasix, strict I's and O's, daily weights, telemetry monitoring, aspirin, echocardiogram, may consider getting a cardiology consult, supportive care and close monitoring    Troponinemia: Patient with chronically elevated troponin, per ER physician troponin was 0.3 at outside facility today, appears to be trending down, aspirin, statin, telemetry monitoring, cycle troponins, echo, may consider getting a cardiology consult    Diabetes mellitus type 2 poorly controlled: Patient will be on sliding scale and insulin Accu-Cheks diet control close monitoring hold metformin for now    Hypothyroidism: Continue home medications and continue monitor       GI DVT prophylaxis: Patient will be on SCDs                                   Signed By: Ke Carroll MD     August 10, 2020

## 2020-08-11 NOTE — PROGRESS NOTES
Bedside and Verbal shift change report given to WANDA Corona (oncoming nurse) by Manuel Martin RN (offgoing nurse). Report included the following information SBAR, Kardex, MAR, Cardiac Rhythm Paced and Dual Neuro Assessment.

## 2020-08-11 NOTE — CONSULTS
Ears/Nose/Throat Consult    Subjective:     Date of Consultation:  August 11, 2020    Referring Physician:     History of Present Illness:   Patient is a 68 y.o.  female who is being evaluated for CT finding of enlarged right thyroid lobe. she  was admitted to the hospital for TIA (transient ischemic attack) [G45.9].       Past Medical History:   Diagnosis Date    Acid indigestion     heartburn    Asthma     Back pain     Constipation     Diabetes mellitus     Diarrhea     Frequent episodic tension-type headache     Hemorrhoids     Hernia of unspecified site of abdominal cavity without mention of obstruction or gangrene     HTN (hypertension)     ICD (implantable cardioverter-defibrillator) in place     Muscle pain     joint pain    Pacemaker     biventricular ICD 1/26/18    Skin cancer     SOB (shortness of breath)     Thyroid disorder     Wears dentures       Family History   Problem Relation Age of Onset    Diabetes Mother     Heart Disease Mother     Heart Disease Father     Cancer Sister     Diabetes Sister     Heart Disease Sister     Hypertension Sister     Diabetes Brother       Social History     Tobacco Use    Smoking status: Never Smoker    Smokeless tobacco: Never Used   Substance Use Topics    Alcohol use: Not Currently     Comment: wine occasionally     Past Surgical History:   Procedure Laterality Date    HX CARPAL TUNNEL RELEASE      right hand    HX HEART CATHETERIZATION  2/6/16    HX HYSTERECTOMY      HX PACEMAKER Left 6/20/16    [employer] ICD    HX THYROIDECTOMY      REMOVAL GALLBLADDER        Current Facility-Administered Medications   Medication Dose Route Frequency    clopidogreL (PLAVIX) tablet 75 mg  75 mg Oral DAILY    [Held by provider] furosemide (LASIX) tablet 40 mg  40 mg Oral TID    [START ON 8/12/2020] cholecalciferol (VITAMIN D3) (1000 Units /25 mcg) tablet 1 Tab  1,000 Units Oral DAILY    [START ON 8/12/2020] allopurinoL (ZYLOPRIM) tablet 100 mg  100 mg Oral DAILY    gabapentin (NEURONTIN) capsule 100 mg  100 mg Oral TID    . PHARMACY TO SUBSTITUTE PER PROTOCOL (Reordered from: LINZESS 145 mcg cap capsule)    Per Protocol    magnesium oxide (MAG-OX) tablet 400 mg  400 mg Oral BID    [START ON 2020] pantoprazole (PROTONIX) tablet 40 mg  40 mg Oral ACB    furosemide (LASIX) injection 40 mg  40 mg IntraVENous BID    aspirin chewable tablet 81 mg  81 mg Oral DAILY    atorvastatin (LIPITOR) tablet 40 mg  40 mg Oral DAILY    carvediloL (COREG) tablet 3.125 mg  3.125 mg Oral BID WITH MEALS    levothyroxine (SYNTHROID) 175 mcg  175 mcg Oral ACB    melatonin tablet 3 mg  3 mg Oral QHS PRN    oxybutynin chloride XL (DITROPAN XL) tablet 10 mg  10 mg Oral BID    sacubitriL-valsartan (ENTRESTO) 24-26 mg tablet 1 Tab  1 Tab Oral BID    acetaminophen (TYLENOL) tablet 650 mg  650 mg Oral Q4H PRN    Or    acetaminophen (TYLENOL) solution 650 mg  650 mg Per NG tube Q4H PRN    Or    acetaminophen (TYLENOL) suppository 650 mg  650 mg Rectal Q4H PRN    glucose chewable tablet 16 g  4 Tab Oral PRN    glucagon (GLUCAGEN) injection 1 mg  1 mg IntraMUSCular PRN    dextrose 10% infusion 0-250 mL  0-250 mL IntraVENous PRN    insulin lispro (HUMALOG) injection   SubCUTAneous AC&HS      Allergies   Allergen Reactions    Codeine Other (comments)     Patient states she is not allergic    Novocain [Procaine] Nausea and Vomiting    Tramadol Other (comments)     Headache            Objective:     Patient Vitals for the past 8 hrs:   BP Temp Pulse Resp SpO2   20 1710 115/70  83     20 1706 115/70  86     20 1513 108/89  90     20 1355 105/66 97.5 °F (36.4 °C) 80 25 97 %   20 1006 109/63 97.5 °F (36.4 °C) 81 17 95 %     Temp (24hrs), Av.6 °F (36.4 °C), Min:97.3 °F (36.3 °C), Max:98.5 °F (36.9 °C)     1901 -  0700  In: 300 [P.O.:300]  Out: 500 [Urine:500]     CT scan reviewed:  No pathology bilateral temporal bones, cpa. Residual right thyroid lobe enlarged, s/p left thyroid lobectomy years ago. No airway compromise. Assessment:     S/p left thyroid lobectomy. Residual right thyroid lobe enlarged, deviation of trachea but good tracheal airway throughout. Hospital Problems  Date Reviewed: 8/11/2020          Codes Class Noted POA    * (Principal) Dysarthria ICD-10-CM: R47.1  ICD-9-CM: 784.51  8/11/2020 Yes                Plan:      S/p left thyroid lobectomy. Residual right thyroid lobe enlarged, deviation of trachea but good tracheal airway throughout. In light of recent tia,  Recommend eval with one of our thyroid surgeons as outpatient after recovery from acute event. Thyroid findings noncontributory to current tia findings.       Signed By: Katalina Pembertno MD     August 11, 2020

## 2020-08-11 NOTE — PROGRESS NOTES
[facility] [facility] Adult  Hospitalist Group                                                                                          Hospitalist Progress Note  Emile Beckman MD  Answering service: [phone] from in house phone        Date of Service:  2020  NAME:  Ervin Parks  :    MRN:  [id number]      Admission Summary:   Ervin Parks is a 68 y.o. female who presents with dysarthria     History was probably obtained from the patient     Patient reports that this morning she woke up and had slurred speech, also noticed that she had some right-sided facial droop. Patient went to [facility] and was transferred to [facility].  I could not find any paperwork that came with the patient from [facility]. Per nurse Ning So, she did not find any paperwork with the patient. Patient reports that she had no other focal neurological deficit associated with her symptoms. Reports that she has had some blurred vision off and on but has attributed it to old age. Patient denies any other complaints or problems     The patient denies any Headache, blurry vision, sore throat, trouble swallowing,chest pain, SOB, cough, fever, chills, N/V/D, abd pain, urinary symptoms, constipation, recent travels, sick contacts, focal or generalized oneirological symptoms,  falls, injuries, rashes, contact with COVID-19 diagnosed patients, hematemesis, melena, hemoptysis, hematuria, rashes, denies starting any new medications and denies any other concerns or problems besides as mentioned above. Interval history / Subjective:     F/u dysarthria     Assessment & Plan:     Dysarthria  -CT head 8/10 No evidence of acute intracranial process. Old lacunar infarcts in the basal ganglia. Mild periventricular white matter disease, likely representing chronic small vessel ischemia. -CTA head and neck with perfusion: 1.  Partially occlusive thrombus within the proximal left M2 anterior and  posterior divisions, with associated severe focal stenoses. The distal left MCA  branches remain widely patent. 2. Additional mild atherosclerotic disease as above. 3. 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     CTA Neck:  1. No evidence of flow-limiting stenosis. 2. Mild stenosis (less than 50% by NASCET criteria) of the proximal right  internal carotid artery. 3. Status post left thyroid lobectomy. Enlarged heterogeneous right thyroid  gland without a discrete nodule. Leftward deviation of the trachea without  significant narrowing. 4. Pulmonary interstitial edema. 5. Numerous mildly enlarged mediastinal lymph nodes, which are indeterminant.     CT Brain Perfusion:  1. No acute abnormality    -Follow Echo  -MRI brain if possible  -Appreciate Neurology/NIS. On dual antiplatelets       Acute on chronic systolic congestive heart failure, NYHA III  -s/p ICD  -on IV lasix  -Daily weight  -I/O  -follow echo  -Will consult Cardiology     Mildly elevated troponins  -Follow Cardiology    Enlarged thyroid with leftward tracheal deviation  -CT Enlarged heterogeneous right thyroid gland without a discrete nodule. Leftward deviation of the trachea without significant narrowing.  -Will consult ENT     Diabetes mellitus type 2 poorly controlled:  -SSI     Hypothyroidism:   -Continue home medications and continue monitor      Diabetic diet     PT/OT home health    Code status: FULL CODE  DVT prophylaxis: scd    Plan : Awaiting Echo, Cardiology, ENT    Care Plan discussed with: Patient/Family  Anticipated Disposition: Home w/Family  Anticipated Discharge: 24 hours to 48 hours     Hospital Problems  Date Reviewed: 8/11/2020          Codes Class Noted POA    * (Principal) Dysarthria ICD-10-CM: R47.1  ICD-9-CM: 784.51  8/11/2020 Yes                Review of Systems:   A comprehensive review of systems was negative except for that written in the HPI.        Vital Signs:    Last 24hrs VS reviewed since prior progress note. Most recent are:  Visit Vitals  /63 (BP 1 Location: Right arm, BP Patient Position: At rest)   Pulse 81   Temp 97.5 °F (36.4 °C)   Resp 17   Ht 5' 2\" (1.575 m)   Wt 92.7 kg (204 lb 6.4 oz)   SpO2 95%   BMI 37.39 kg/m²         Intake/Output Summary (Last 24 hours) at 8/11/2020 1227  Last data filed at 8/11/2020 5117  Gross per 24 hour   Intake 300 ml   Output 500 ml   Net -200 ml        Physical Examination:             Constitutional:  No acute distress, cooperative, pleasant    ENT:  Oral mucosa moist, oropharynx benign. Resp:  CTA bilaterally. No wheezing/rhonchi/rales. No accessory muscle use   CV:  Regular rhythm, normal rate, no murmurs, gallops, rubs    GI:  Soft, non distended, non tender. normoactive bowel sounds, no hepatosplenomegaly     Musculoskeletal:  No edema, warm, 2+ pulses throughout    Neurologic:  Moves all extremities. AAOx3, CN II-XII reviewed     Skin:  Good turgor, no rashes or ulcers       Data Review:    Review and/or order of clinical lab test      Labs:     Recent Labs     08/11/20 0017 08/10/20  1754   WBC 6.4 5.7   HGB 11.1* 10.6*   HCT 34.7* 33.1*   * 133*     Recent Labs     08/10/20  1754      K 3.5      CO2 25   BUN 20   CREA 0.70   *   CA 9.0     Recent Labs     08/10/20  1754   ALT 24   AP 87   TBILI 0.9   TP 7.6   ALB 2.7*   GLOB 4.9*     Recent Labs     08/10/20  1754   INR 1.3*   PTP 13.5*      No results for input(s): FE, TIBC, PSAT, FERR in the last 72 hours. No results found for: FOL, RBCF   No results for input(s): PH, PCO2, PO2 in the last 72 hours.   Recent Labs     08/11/20  0628 08/11/20 0017 08/10/20  1754   TROIQ 0.27* 0.27* 0.28*     Lab Results   Component Value Date/Time    Cholesterol, total 148 08/11/2020 12:17 AM    HDL Cholesterol 53 08/11/2020 12:17 AM    LDL, calculated 70.4 08/11/2020 12:17 AM    Triglyceride 123 08/11/2020 12:17 AM    CHOL/HDL Ratio 2.8 08/11/2020 12:17 AM     Lab Results   Component Value Date/Time    Glucose (POC) 220 (H) 08/11/2020 11:21 AM    Glucose (POC) 201 (H) 08/11/2020 08:42 AM    Glucose (POC) 208 (H) 01/27/2018 07:57 AM    Glucose (POC) 211 (H) 01/26/2018 09:29 PM    Glucose (POC) 226 (H) 01/26/2018 04:17 PM     Lab Results   Component Value Date/Time    Color YELLOW/STRAW 08/11/2020 12:45 AM    Appearance CLEAR 08/11/2020 12:45 AM    Specific gravity 1.010 08/11/2020 12:45 AM    pH (UA) 5.0 08/11/2020 12:45 AM    Protein 100 (A) 08/11/2020 12:45 AM    Glucose 250 (A) 08/11/2020 12:45 AM    Ketone Negative 08/11/2020 12:45 AM    Bilirubin Negative 08/11/2020 12:45 AM    Urobilinogen 1.0 08/11/2020 12:45 AM    Nitrites Positive (A) 08/11/2020 12:45 AM    Leukocyte Esterase SMALL (A) 08/11/2020 12:45 AM    Epithelial cells FEW 08/11/2020 12:45 AM    Bacteria Negative 08/11/2020 12:45 AM    WBC 5-10 08/11/2020 12:45 AM    RBC 0-5 08/11/2020 12:45 AM         Medications Reviewed:     Current Facility-Administered Medications   Medication Dose Route Frequency    clopidogreL (PLAVIX) tablet 75 mg  75 mg Oral DAILY    aspirin chewable tablet 81 mg  81 mg Oral DAILY    atorvastatin (LIPITOR) tablet 40 mg  40 mg Oral DAILY    carvediloL (COREG) tablet 3.125 mg  3.125 mg Oral BID WITH MEALS    levothyroxine (SYNTHROID) 175 mcg  175 mcg Oral ACB    melatonin tablet 3 mg  3 mg Oral QHS PRN    oxybutynin chloride XL (DITROPAN XL) tablet 10 mg  10 mg Oral BID    sacubitriL-valsartan (ENTRESTO) 24-26 mg tablet 1 Tab  1 Tab Oral BID    acetaminophen (TYLENOL) tablet 650 mg  650 mg Oral Q4H PRN    Or    acetaminophen (TYLENOL) solution 650 mg  650 mg Per NG tube Q4H PRN    Or    acetaminophen (TYLENOL) suppository 650 mg  650 mg Rectal Q4H PRN    glucose chewable tablet 16 g  4 Tab Oral PRN    glucagon (GLUCAGEN) injection 1 mg  1 mg IntraMUSCular PRN    dextrose 10% infusion 0-250 mL  0-250 mL IntraVENous PRN    insulin lispro (HUMALOG) injection   SubCUTAneous AC&HS     ______________________________________________________________________  EXPECTED LENGTH OF STAY: - - -  ACTUAL LENGTH OF STAY:          MD Natalie

## 2020-08-11 NOTE — PROGRESS NOTES
Patient seen at the bedside and initial assessment completed. Full note to follow. Home with home health. Observation notice provided in writing to patient and/or caregiver as well as verbal explanation of the policy. Patients who are in outpatient status also receive the Observation notice.       SHERI Vargas

## 2020-08-11 NOTE — PROGRESS NOTES
Patient arrived to [facility]        08/10/20 3247   Vital Signs   Temp 97.3 °F (36.3 °C)   Temp Source Oral   Pulse (Heart Rate) 98   Heart Rate Source Monitor   Cardiac Rhythm Paced   Resp Rate 14   O2 Sat (%) 93 %   Level of Consciousness Alert   /85   MAP (Calculated) 100   MEWS Score 0

## 2020-08-11 NOTE — PROGRESS NOTES
SPEECH PATHOLOGY BEDSIDE SWALLOW EVALUATION/DISCHARGE  Patient: Willie Rahman (24 y.o. female)  Date: 8/11/2020  Primary Diagnosis: TIA (transient ischemic attack) [G45.9]       Precautions:        ASSESSMENT :  Based on the objective data described below, the patient presents with no oral or pharyngeal dysphagia, and no overt s/s aspiration observed. Patient reported that she has returned to baseline language, speech, and cognitive function, and patient Ox4. Skilled acute therapy provided by a speech-language pathologist is not indicated at this time. PLAN :  Recommendations:  -Continue regular/thin liquid diet  Discharge Recommendations: None     SUBJECTIVE:   Patient stated That was yesterday when asked about difficulty with word-finding.     OBJECTIVE:     Past Medical History:   Diagnosis Date    Acid indigestion     heartburn    Asthma     Back pain     Constipation     Diabetes mellitus     Diarrhea     Frequent episodic tension-type headache     Hemorrhoids     Hernia of unspecified site of abdominal cavity without mention of obstruction or gangrene     HTN (hypertension)     ICD (implantable cardioverter-defibrillator) in place     Muscle pain     joint pain    Pacemaker     biventricular ICD 1/26/18    Skin cancer     SOB (shortness of breath)     Thyroid disorder     Wears dentures      Past Surgical History:   Procedure Laterality Date    HX CARPAL TUNNEL RELEASE      right hand    HX HEART CATHETERIZATION  2/6/16    HX HYSTERECTOMY      HX PACEMAKER Left 6/20/16    [employer] ICD    HX THYROIDECTOMY      REMOVAL GALLBLADDER       Prior Level of Function/Home Situation:   Home Situation  Home Environment: Private residence  # Steps to Enter: 3  Rails to Enter: Yes  Hand Rails : Bilateral  One/Two Story Residence: One story  Living Alone: No( and son who is blind)  Support Systems: Family member(s)  Patient Expects to be Discharged to[de-identified] Private residence  Current DME Used/Available at Home: Cane, straight, Walker, rolling  Diet prior to admission: regular/thin  Current Diet:  Regular/thin   Cognitive and Communication Status:  Neurologic State: Alert, Appropriate for age  Orientation Level: Oriented X4  Cognition: Follows commands           Oral Assessment:  Oral Assessment  Labial: Right droop(mild)  Dentition: Upper & lower dentures  Oral Hygiene: moist oral mucosa  Lingual: No impairment  Velum: No impairment  Mandible: No impairment  P.O. Trials:  Patient Position: sitting EOB  Vocal quality prior to P.O.: No impairment  Consistency Presented: Thin liquid; Solid; Other (comment)(breakfast tray)  How Presented: Self-fed/presented;Straw;Successive swallows;Spoon     Bolus Acceptance: No impairment  Bolus Formation/Control: No impairment     Propulsion: No impairment  Oral Residue: None  Initiation of Swallow: No impairment  Laryngeal Elevation: Functional  Aspiration Signs/Symptoms: None  Pharyngeal Phase Characteristics: No impairment, issues, or problems   Effective Modifications: None  Cues for Modifications: None       Oral Phase Severity: No impairment  Pharyngeal Phase Severity : No impairment  NOMS:   The NOMS functional outcome measure was used to quantify this patient's level of swallowing impairment. Based on the NOMS, the patient was determined to be at level 7 for swallow function     NOMS Swallowing Levels:  Level 1 (CN): NPO  Level 2 (CM): NPO but takes consistency in therapy  Level 3 (CL): Takes less than 50% of nutrition p.o. and continues with nonoral feedings; and/or safe with mod cues; and/or max diet restriction  Level 4 (CK):  Safe swallow but needs mod cues; and/or mod diet restriction; and/or still requires some nonoral feeding/supplements  Level 5 (CJ): Safe swallow with min diet restriction; and/or needs min cues  Level 6 (CI): Independent with p.o.; rare cues; usually self cues; may need to avoid some foods or needs extra time  Level 7 ([address] [place] [address]): Independent for all p.o.  AMBER. (2003). National Outcomes Measurement System (NOMS): Adult Speech-Language Pathology User's Guide. Pain:  Pain Scale 1: Numeric (0 - 10)  Pain Intensity 1: 0     After treatment:   Patient left in no apparent distress in bed, Call bell within reach and Nursing notified    COMMUNICATION/EDUCATION:   Patient was educated regarding her deficit(s) of WFL swallow as this relates to her diagnosis of ?CVA. She demonstrated Good understanding as evidenced by verbalizing understanding. The patient's plan of care including recommendations, planned interventions, and recommended diet changes were discussed with: Registered nurse.      Thank you for this referral.  John Moran, SLP  Time Calculation: 10 mins

## 2020-08-11 NOTE — PROGRESS NOTES
MRI safety note:    Ms. Nathalie Ruelas has a [employer] AG[employer] 77 F286 CRT-D with RA Invegity 7741 lead, RV [place] 0292 lead, and LV Acuity x4 Spiral Short 4674 lead. This system is MR conditional, 1.5T or 3T. Will obtain device settings orders from cardiologist and coordinate time with [employer] for MRI at soonest time possible, estimated on 8/12 or 8/13.

## 2020-08-11 NOTE — NURSE NAVIGATOR
Chart reviewed by Heart Failure Nurse Navigator. Heart Failure database completed. EF:  pending     ACEi/ARB/ARNi: Entresto     BB: Coreg    Aldosterone Antagonist: echo pending    Obstructive Sleep Apnea Screening: No   STOP-BANG score:   Referred to Sleep Medicine:     CRT AICD    NYHA Functional Class III. Heart Failure Teach Back in Patient Education. Heart Failure Avoiding Triggers on Discharge Instructions. Cardiologist:       Post discharge follow up phone call to be made within 48-72 hours of discharge.

## 2020-08-12 ENCOUNTER — APPOINTMENT (OUTPATIENT)
Dept: MRI IMAGING | Age: 70
DRG: 064 | End: 2020-08-12
Attending: FAMILY MEDICINE
Payer: MEDICARE

## 2020-08-12 ENCOUNTER — APPOINTMENT (OUTPATIENT)
Dept: NON INVASIVE DIAGNOSTICS | Age: 70
DRG: 064 | End: 2020-08-12
Attending: FAMILY MEDICINE
Payer: MEDICARE

## 2020-08-12 LAB
ECHO AO ROOT DIAM: 2.91 CM
ECHO AV AREA PEAK VELOCITY: 1.59 CM2
ECHO AV AREA/BSA PEAK VELOCITY: 0.8 CM2/M2
ECHO AV PEAK GRADIENT: 9.73 MMHG
ECHO AV PEAK VELOCITY: 155.98 CM/S
ECHO LA AREA 4C: 27.17 CM2
ECHO LA MAJOR AXIS: 4.83 CM
ECHO LA MINOR AXIS: 2.53 CM
ECHO LA VOL 2C: 94.84 ML (ref 22–52)
ECHO LA VOL 4C: 91.33 ML (ref 22–52)
ECHO LA VOL BP: 104.08 ML (ref 22–52)
ECHO LA VOL/BSA BIPLANE: 54.45 ML/M2 (ref 16–28)
ECHO LA VOLUME INDEX A2C: 49.62 ML/M2 (ref 16–28)
ECHO LA VOLUME INDEX A4C: 47.78 ML/M2 (ref 16–28)
ECHO LV EDV A2C: 143.48 ML
ECHO LV EDV A4C: 159.04 ML
ECHO LV EDV BP: 152.78 ML (ref 56–104)
ECHO LV EDV INDEX A4C: 83.2 ML/M2
ECHO LV EDV INDEX BP: 79.9 ML/M2
ECHO LV EDV NDEX A2C: 75.1 ML/M2
ECHO LV EJECTION FRACTION A2C: 18 PERCENT
ECHO LV EJECTION FRACTION A4C: 21 PERCENT
ECHO LV EJECTION FRACTION BIPLANE: 20 PERCENT (ref 55–100)
ECHO LV ESV A2C: 116.98 ML
ECHO LV ESV A4C: 126.34 ML
ECHO LV ESV BP: 122.28 ML (ref 19–49)
ECHO LV ESV INDEX A2C: 61.2 ML/M2
ECHO LV ESV INDEX A4C: 66.1 ML/M2
ECHO LV ESV INDEX BP: 64 ML/M2
ECHO LV INTERNAL DIMENSION DIASTOLIC: 5.6 CM (ref 3.9–5.3)
ECHO LV INTERNAL DIMENSION DIASTOLIC: 5.65 CM (ref 3.9–5.3)
ECHO LV INTERNAL DIMENSION SYSTOLIC: 4.93 CM
ECHO LV INTERNAL DIMENSION SYSTOLIC: 5.14 CM
ECHO LV IVSD: 1.07 CM (ref 0.6–0.9)
ECHO LV POSTERIOR WALL DIASTOLIC: 1.1 CM (ref 0.6–0.9)
ECHO LVOT DIAM: 2.25 CM
ECHO LVOT PEAK GRADIENT: 1.54 MMHG
ECHO LVOT PEAK VELOCITY: 62.11 CM/S
ECHO MV A VELOCITY: 59.12 CM/S
ECHO MV AREA PHT: 3.86 CM2
ECHO MV E DECELERATION TIME (DT): 0.2 S
ECHO MV E VELOCITY: 103.62 CM/S
ECHO MV E/A RATIO: 1.75
ECHO MV PRESSURE HALF TIME (PHT): 0.06 S
ECHO PV PEAK INSTANTANEOUS GRADIENT SYSTOLIC: 5.7 MMHG
ECHO RV INTERNAL DIMENSION: 5.94 CM
ECHO RV TAPSE: 2.33 CM (ref 1.5–2)
ECHO TV REGURGITANT MAX VELOCITY: 280.74 CM/S
ECHO TV REGURGITANT PEAK GRADIENT: 31.53 MMHG
GLUCOSE BLD STRIP.AUTO-MCNC: 184 MG/DL (ref 65–100)
GLUCOSE BLD STRIP.AUTO-MCNC: 224 MG/DL (ref 65–100)
GLUCOSE BLD STRIP.AUTO-MCNC: 232 MG/DL (ref 65–100)
GLUCOSE BLD STRIP.AUTO-MCNC: 239 MG/DL (ref 65–100)
GLUCOSE BLD STRIP.AUTO-MCNC: 241 MG/DL (ref 65–100)
SERVICE CMNT-IMP: ABNORMAL

## 2020-08-12 PROCEDURE — 93306 TTE W/DOPPLER COMPLETE: CPT | Performed by: SPECIALIST

## 2020-08-12 PROCEDURE — 96376 TX/PRO/DX INJ SAME DRUG ADON: CPT

## 2020-08-12 PROCEDURE — 96375 TX/PRO/DX INJ NEW DRUG ADDON: CPT

## 2020-08-12 PROCEDURE — 70551 MRI BRAIN STEM W/O DYE: CPT

## 2020-08-12 PROCEDURE — 82962 GLUCOSE BLOOD TEST: CPT

## 2020-08-12 PROCEDURE — 99232 SBSQ HOSP IP/OBS MODERATE 35: CPT | Performed by: SPECIALIST

## 2020-08-12 PROCEDURE — 99232 SBSQ HOSP IP/OBS MODERATE 35: CPT | Performed by: NURSE PRACTITIONER

## 2020-08-12 PROCEDURE — 65660000000 HC RM CCU STEPDOWN

## 2020-08-12 PROCEDURE — 74011250637 HC RX REV CODE- 250/637: Performed by: FAMILY MEDICINE

## 2020-08-12 PROCEDURE — 74011250636 HC RX REV CODE- 250/636: Performed by: HOSPITALIST

## 2020-08-12 PROCEDURE — 99218 HC RM OBSERVATION: CPT

## 2020-08-12 PROCEDURE — 94760 N-INVAS EAR/PLS OXIMETRY 1: CPT

## 2020-08-12 PROCEDURE — 74011636637 HC RX REV CODE- 636/637: Performed by: FAMILY MEDICINE

## 2020-08-12 PROCEDURE — 74011250637 HC RX REV CODE- 250/637: Performed by: PSYCHIATRY & NEUROLOGY

## 2020-08-12 PROCEDURE — 74011250637 HC RX REV CODE- 250/637: Performed by: HOSPITALIST

## 2020-08-12 RX ORDER — ASPIRIN 325 MG
325 TABLET ORAL DAILY
Status: DISCONTINUED | OUTPATIENT
Start: 2020-08-13 | End: 2020-08-14 | Stop reason: HOSPADM

## 2020-08-12 RX ORDER — CLOPIDOGREL BISULFATE 75 MG/1
75 TABLET ORAL
Status: DISCONTINUED | OUTPATIENT
Start: 2020-08-12 | End: 2020-08-12

## 2020-08-12 RX ADMIN — ASPIRIN 81 MG CHEWABLE TABLET 81 MG: 81 TABLET CHEWABLE at 08:11

## 2020-08-12 RX ADMIN — INSULIN LISPRO 1 UNITS: 100 INJECTION, SOLUTION INTRAVENOUS; SUBCUTANEOUS at 22:45

## 2020-08-12 RX ADMIN — LEVOTHYROXINE SODIUM 175 MCG: 0.1 TABLET ORAL at 08:13

## 2020-08-12 RX ADMIN — CARVEDILOL 3.12 MG: 3.12 TABLET, FILM COATED ORAL at 08:11

## 2020-08-12 RX ADMIN — ALLOPURINOL 100 MG: 100 TABLET ORAL at 08:11

## 2020-08-12 RX ADMIN — PANTOPRAZOLE SODIUM 40 MG: 40 TABLET, DELAYED RELEASE ORAL at 07:00

## 2020-08-12 RX ADMIN — OXYBUTYNIN CHLORIDE 10 MG: 5 TABLET, EXTENDED RELEASE ORAL at 17:49

## 2020-08-12 RX ADMIN — GABAPENTIN 100 MG: 100 CAPSULE ORAL at 08:11

## 2020-08-12 RX ADMIN — FUROSEMIDE 40 MG: 10 INJECTION, SOLUTION INTRAMUSCULAR; INTRAVENOUS at 08:11

## 2020-08-12 RX ADMIN — VITAMIN D 1 TABLET: 25 TAB ORAL at 08:11

## 2020-08-12 RX ADMIN — OXYBUTYNIN CHLORIDE 10 MG: 5 TABLET, EXTENDED RELEASE ORAL at 08:11

## 2020-08-12 RX ADMIN — INSULIN LISPRO 2 UNITS: 100 INJECTION, SOLUTION INTRAVENOUS; SUBCUTANEOUS at 17:49

## 2020-08-12 RX ADMIN — Medication 400 MG: at 17:49

## 2020-08-12 RX ADMIN — GABAPENTIN 100 MG: 100 CAPSULE ORAL at 16:23

## 2020-08-12 RX ADMIN — INSULIN LISPRO 2 UNITS: 100 INJECTION, SOLUTION INTRAVENOUS; SUBCUTANEOUS at 11:51

## 2020-08-12 RX ADMIN — Medication 400 MG: at 08:11

## 2020-08-12 RX ADMIN — GABAPENTIN 100 MG: 100 CAPSULE ORAL at 22:46

## 2020-08-12 RX ADMIN — SACUBITRIL AND VALSARTAN 1 TABLET: 24; 26 TABLET, FILM COATED ORAL at 08:11

## 2020-08-12 RX ADMIN — CLOPIDOGREL BISULFATE 75 MG: 75 TABLET ORAL at 08:11

## 2020-08-12 RX ADMIN — PERFLUTREN 2 ML: 6.52 INJECTION, SUSPENSION INTRAVENOUS at 11:20

## 2020-08-12 RX ADMIN — ATORVASTATIN CALCIUM 40 MG: 40 TABLET, FILM COATED ORAL at 08:11

## 2020-08-12 NOTE — PROGRESS NOTES
Bedside shift change report given to Ewa Llanos RN (oncoming nurse) by Katie Whipple RN (offgoing nurse). Report included the following information SBAR, Kardex, Intake/Output, MAR, Med Rec Status, Cardiac Rhythm NSR, Quality Measures and Dual Neuro Assessment.

## 2020-08-12 NOTE — PROGRESS NOTES
08/12/20 0929 08/12/20 0932 08/12/20 0945   Vital Signs   Pulse (Heart Rate) 74 75  --    BP (!) 80/33 93/59 103/43   MAP (Calculated) (!) 49 70 (!) 63   BP Patient Position Sitting Supine Trendelenburg      08/12/20 0950   Vital Signs   Pulse (Heart Rate) 76   BP (!) 83/34   MAP (Calculated) (!) 50   BP Patient Position Supine       Pt received IV Lasix this morning. BP taken as pt sitting in chair (70's). Pt states she \"feels different\". Pt placed back to bed in trendelenburg, BP stabilized. Pt placed back to supine position and rechecked BP in 15 minutes. BP dropped again. Paged MD and made aware. Pt placed back in trendelenburg position. Will continue to monitor.

## 2020-08-12 NOTE — PROGRESS NOTES
[facility] Adult  Hospitalist Group                                                                                          Hospitalist Progress Note  Keaton Gotti MD  Answering service: [phone] from in house phone        Date of Service:  2020  NAME:  Joe Booth  :    MRN:  [id number]      Admission Summary:   Joe Booth is a 68 y.o. female who presents with dysarthria     History was probably obtained from the patient     Patient reports that this morning she woke up and had slurred speech, also noticed that she had some right-sided facial droop. Patient went to [facility] [place] and was transferred to [address] [place] [address].  I could not find any paperwork that came with the patient from [facility] [place]. Per nurse Zelalem, she did not find any paperwork with the patient. Patient reports that she had no other focal neurological deficit associated with her symptoms. Reports that she has had some blurred vision off and on but has attributed it to old age. Patient denies any other complaints or problems     The patient denies any Headache, blurry vision, sore throat, trouble swallowing,chest pain, SOB, cough, fever, chills, N/V/D, abd pain, urinary symptoms, constipation, recent travels, sick contacts, focal or generalized oneirological symptoms,  falls, injuries, rashes, contact with COVID-19 diagnosed patients, hematemesis, melena, hemoptysis, hematuria, rashes, denies starting any new medications and denies any other concerns or problems besides as mentioned above. Interval history / Subjective:     F/u dysarthria   BP dropped again with IV lasix  Assessment & Plan:     Dysarthria/facial asymmetry  -likely acute CVA  -CT head 8/10 No evidence of acute intracranial process. Old lacunar infarcts in the basal ganglia.  Mild periventricular white matter disease, likely representing chronic small vessel ischemia. -CTA head and neck with perfusion: 1. Partially occlusive thrombus within the proximal left M2 anterior and  posterior divisions, with associated severe focal stenoses. The distal left MCA  branches remain widely patent. 2. Additional mild atherosclerotic disease as above. 3. 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     CTA Neck:  1. No evidence of flow-limiting stenosis. 2. Mild stenosis (less than 50% by NASCET criteria) of the proximal right  internal carotid artery. 3. Status post left thyroid lobectomy. Enlarged heterogeneous right thyroid  gland without a discrete nodule. Leftward deviation of the trachea without  significant narrowing. 4. Pulmonary interstitial edema. 5. Numerous mildly enlarged mediastinal lymph nodes, which are indeterminant.     CT Brain Perfusion:  1. No acute abnormality    -Follow Echo, still awaited  -MRI brain if possible  -Appreciate Neurology/NIS. On dual antiplatelets  -ASA test 577, P2Y12 246, per NIS, increased ASA to 325 mg and continue Plavix 75 mg for 3 months, followed by either ASA or Plavix if no contraindications  -Follow ASA/P2Y12 for tomorrow  -repeat CTA head and neck in 3 months  -Outpatient follow up with Neurology/NIS       Acute on chronic systolic congestive heart failure, NYHA III  -s/p ICD  -on IV lasix  -Daily weight  -I/O  -Still awaiting echo  -In light of low normal BP would let cardiology re-adjust home meds  -Appreciate Cardiology     Mildly elevated troponins  -Appreciate Cardiology, no need for work up     Enlarged thyroid with leftward tracheal deviation  -CT Enlarged heterogeneous right thyroid gland without a discrete nodule. Leftward deviation of the trachea without significant narrowing.   -Appreciate ENT, outpatient follow up     Diabetes mellitus type 2 poorly controlled:  -Hba1c 10.4  -On Humalog Mix at home, currently on hold  -SSI     Hypothyroidism:   -Continue home medications and continue monitor      Diabetic diet     PT/OT home health    Code status: FULL CODE  DVT prophylaxis: scd  PTA: home  Baseline: Independent    Plan : Awaiting Echo, MRI, Cardiology, NIS. Discharge soon    Care Plan discussed with: Patient/Family  Anticipated Disposition: Home w/Family  Anticipated Discharge: 24 hours to 48 hours     Hospital Problems  Date Reviewed: 8/11/2020          Codes Class Noted POA    * (Principal) Dysarthria ICD-10-CM: R47.1  ICD-9-CM: 784.51  8/11/2020 Yes                Review of Systems:   A comprehensive review of systems was negative except for that written in the HPI. Vital Signs:    Last 24hrs VS reviewed since prior progress note. Most recent are:  Visit Vitals  BP 97/49 (BP 1 Location: Right arm, BP Patient Position: At rest)   Pulse 72   Temp 97.6 °F (36.4 °C)   Resp 13   Ht 5' 2\" (1.575 m)   Wt 90.9 kg (200 lb 4.8 oz)   SpO2 95%   BMI 36.64 kg/m²         Intake/Output Summary (Last 24 hours) at 8/12/2020 0801  Last data filed at 8/12/2020 0413  Gross per 24 hour   Intake 150 ml   Output 950 ml   Net -800 ml        Physical Examination:             Constitutional:  No acute distress, cooperative, pleasant    ENT:  Oral mucosa moist, oropharynx benign. Resp:  CTA bilaterally. No wheezing/rhonchi/rales. No accessory muscle use   CV:  Regular rhythm, normal rate, no murmurs, gallops, rubs    GI:  Soft, non distended, non tender. normoactive bowel sounds, no hepatosplenomegaly     Musculoskeletal:  No edema, warm, 2+ pulses throughout    Neurologic:  Moves all extremities.   AAOx3, CN II-XII reviewed     Skin:  Good turgor, no rashes or ulcers       Data Review:    Review and/or order of clinical lab test      Labs:     Recent Labs     08/11/20  0017 08/10/20  1754   WBC 6.4 5.7   HGB 11.1* 10.6*   HCT 34.7* 33.1*   * 133*     Recent Labs     08/10/20  1754      K 3.5      CO2 25   BUN 20   CREA 0.70   *   CA 9.0     Recent Labs     08/10/20  1754   ALT 24   AP 87   TBILI 0.9   TP 7. 6   ALB 2.7*   GLOB 4.9*     Recent Labs     08/10/20  1754   INR 1.3*   PTP 13.5*      No results for input(s): FE, TIBC, PSAT, FERR in the last 72 hours. No results found for: FOL, RBCF   No results for input(s): PH, PCO2, PO2 in the last 72 hours.   Recent Labs     08/11/20  0628 08/11/20  0017 08/10/20  1754   TROIQ 0.27* 0.27* 0.28*     Lab Results   Component Value Date/Time    Cholesterol, total 148 08/11/2020 12:17 AM    HDL Cholesterol 53 08/11/2020 12:17 AM    LDL, calculated 70.4 08/11/2020 12:17 AM    Triglyceride 123 08/11/2020 12:17 AM    CHOL/HDL Ratio 2.8 08/11/2020 12:17 AM     Lab Results   Component Value Date/Time    Glucose (POC) 184 (H) 08/12/2020 07:55 AM    Glucose (POC) 242 (H) 08/11/2020 08:39 PM    Glucose (POC) 248 (H) 08/11/2020 04:24 PM    Glucose (POC) 220 (H) 08/11/2020 11:21 AM    Glucose (POC) 201 (H) 08/11/2020 08:42 AM     Lab Results   Component Value Date/Time    Color YELLOW/STRAW 08/11/2020 12:45 AM    Appearance CLEAR 08/11/2020 12:45 AM    Specific gravity 1.010 08/11/2020 12:45 AM    pH (UA) 5.0 08/11/2020 12:45 AM    Protein 100 (A) 08/11/2020 12:45 AM    Glucose 250 (A) 08/11/2020 12:45 AM    Ketone Negative 08/11/2020 12:45 AM    Bilirubin Negative 08/11/2020 12:45 AM    Urobilinogen 1.0 08/11/2020 12:45 AM    Nitrites Positive (A) 08/11/2020 12:45 AM    Leukocyte Esterase SMALL (A) 08/11/2020 12:45 AM    Epithelial cells FEW 08/11/2020 12:45 AM    Bacteria Negative 08/11/2020 12:45 AM    WBC 5-10 08/11/2020 12:45 AM    RBC 0-5 08/11/2020 12:45 AM         Medications Reviewed:     Current Facility-Administered Medications   Medication Dose Route Frequency    clopidogreL (PLAVIX) tablet 75 mg  75 mg Oral DAILY    [Held by provider] furosemide (LASIX) tablet 40 mg  40 mg Oral TID    cholecalciferol (VITAMIN D3) (1000 Units /25 mcg) tablet 1 Tab  1,000 Units Oral DAILY    allopurinoL (ZYLOPRIM) tablet 100 mg  100 mg Oral DAILY    gabapentin (NEURONTIN) capsule 100 mg  100 mg Oral TID    . PHARMACY TO SUBSTITUTE PER PROTOCOL (Reordered from: LINZESS 145 mcg cap capsule)    Per Protocol    magnesium oxide (MAG-OX) tablet 400 mg  400 mg Oral BID    pantoprazole (PROTONIX) tablet 40 mg  40 mg Oral ACB    furosemide (LASIX) injection 40 mg  40 mg IntraVENous BID    aspirin chewable tablet 81 mg  81 mg Oral DAILY    atorvastatin (LIPITOR) tablet 40 mg  40 mg Oral DAILY    carvediloL (COREG) tablet 3.125 mg  3.125 mg Oral BID WITH MEALS    levothyroxine (SYNTHROID) 175 mcg  175 mcg Oral ACB    melatonin tablet 3 mg  3 mg Oral QHS PRN    oxybutynin chloride XL (DITROPAN XL) tablet 10 mg  10 mg Oral BID    sacubitriL-valsartan (ENTRESTO) 24-26 mg tablet 1 Tab  1 Tab Oral BID    acetaminophen (TYLENOL) tablet 650 mg  650 mg Oral Q4H PRN    Or    acetaminophen (TYLENOL) solution 650 mg  650 mg Per NG tube Q4H PRN    Or    acetaminophen (TYLENOL) suppository 650 mg  650 mg Rectal Q4H PRN    glucose chewable tablet 16 g  4 Tab Oral PRN    glucagon (GLUCAGEN) injection 1 mg  1 mg IntraMUSCular PRN    dextrose 10% infusion 0-250 mL  0-250 mL IntraVENous PRN    insulin lispro (HUMALOG) injection   SubCUTAneous AC&HS     ______________________________________________________________________  EXPECTED LENGTH OF STAY: - - -  ACTUAL LENGTH OF STAY:          Sebastien Tavares MD

## 2020-08-12 NOTE — PROGRESS NOTES
Physical Therapy    Chart reviewed prior to session and spoke with OT regarding her recent note, deferred session. Patient's BP remains low and RN recommending bed level exercises only. Will defer at this time, check back later in day as able and appropriate.     Nivia Alvarado, MS, PT

## 2020-08-12 NOTE — PROGRESS NOTES
Transition of Care Plan  · RUR- N/A   · DISPOSITION: Patient presents from home and is expected to go home with home health PT when ready ; pending medical progression: BP issues, echo and aicd interogation per cardiology   · Heart failure patient, letter provided   · Transport:  Kaylee Barone [phone]  · Continue Medical Care   · Follow up: PCP/Specialist(s)     Reviewed chart for transitions of care,and discussed in rounds. Patient is having BP issues and cardio have switched back to PO Lasix. Planned Echo and Aicd Interogation today. Patient upgraded to IP status for continued medical care. Medicare pt has received, reviewed, and signed 1st IM letter informing them of their right to appeal the discharge. Signed copy has been placed on pt bedside chart. BCPI-A letter regarding Heart Failure has been delivered to the patient/caregiver.       SHERI Hugo

## 2020-08-12 NOTE — PROGRESS NOTES
NeuroInterventional Note  68year old female who presents from OSH with dysarthria and expressive aphasia. All of her symptoms have resolved. There is more likely a moderate to severe stenosis of the left M2 segment with no changes in CT perfusion. 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 PCP or cardiologist should be notified about dual antiplatelet therapy to ensure she does not have any definitive contraindications, otherwise patient will benefit from aspirin and plavix. Current ASA assay is 577 and P2Y12: 246. Recommendations:  1.  mg + Plavix 75 mg daily for 3 months followed by either ASA or Plavix if no contraindications  2. ASA and P2Y12 assays in AM  2. Keep LDL <70  3. Repeat CTA in 3 months  4.  Will follow up patient in the clinic and do a surveillance on her 3 mm Pcomm aneurysm    Nancy Davis MD  NeuroInterventional Surgery

## 2020-08-12 NOTE — PROGRESS NOTES
8/12/2020     Admit Date: 8/10/2020    Admit Diagnosis: TIA (transient ischemic attack) [G45.9]    Principal Problem:    Dysarthria (8/11/2020)        Subjective:  Lying flat, comfortable in bed. Feels nearly back to baseline. Says she often has low bp, because of her meds. This does not limit her in anyway. Izzy Stanley denies chest pain, dyspnea, palpitations, syncope, orthopnea, paroxysmal nocturnal dyspnea, exertional chest pressure/discomfort, claudication. Assessment/Plan:  With low bp and minimal volume overload, will switch back to po lasix. For echo and aicd interogation today   Jose Leary MD    Objective:     Visit Vitals  /43 (BP 1 Location: Right arm, BP Patient Position: Trendelenburg) Comment: RN notified   Pulse 74   Temp 98 °F (36.7 °C)   Resp 15   Ht 5' 2\" (1.575 m)   Wt 200 lb 4.8 oz (90.9 kg)   SpO2 93%   BMI 36.64 kg/m²        Physical Exam:  Neck: supple, symmetrical, trachea midline, no adenopathy, no carotid bruit and no JVD  Heart: regular rate and rhythm, S1, S2 normal, no murmur, click, rub or gallop  Lungs: clear to auscultation bilaterally  Abdomen: soft, non-tender.  Bowel sounds normal. No masses,  no organomegaly  Extremities: edema tr      Labs:    Recent Labs     08/11/20  0628   TROIQ 0.27*     Recent Labs     08/10/20  1754      K 3.5      BUN 20   CREA 0.70   *   CA 9.0     Recent Labs     08/11/20  0017   WBC 6.4   HGB 11.1*   HCT 34.7*   *     Recent Labs     08/11/20  0017   CHOL 148   LDLC 70.4       Telemetry: PVC, paced     Current Facility-Administered Medications   Medication Dose Route Frequency    clopidogreL (PLAVIX) tablet 75 mg  75 mg Oral DAILY    furosemide (LASIX) tablet 40 mg  40 mg Oral TID    cholecalciferol (VITAMIN D3) (1000 Units /25 mcg) tablet 1 Tab  1,000 Units Oral DAILY    allopurinoL (ZYLOPRIM) tablet 100 mg  100 mg Oral DAILY    gabapentin (NEURONTIN) capsule 100 mg  100 mg Oral TID  . PHARMACY TO SUBSTITUTE PER PROTOCOL (Reordered from: LINZESS 145 mcg cap capsule)    Per Protocol    magnesium oxide (MAG-OX) tablet 400 mg  400 mg Oral BID    pantoprazole (PROTONIX) tablet 40 mg  40 mg Oral ACB    aspirin chewable tablet 81 mg  81 mg Oral DAILY    atorvastatin (LIPITOR) tablet 40 mg  40 mg Oral DAILY    carvediloL (COREG) tablet 3.125 mg  3.125 mg Oral BID WITH MEALS    levothyroxine (SYNTHROID) 175 mcg  175 mcg Oral ACB    melatonin tablet 3 mg  3 mg Oral QHS PRN    oxybutynin chloride XL (DITROPAN XL) tablet 10 mg  10 mg Oral BID    sacubitriL-valsartan (ENTRESTO) 24-26 mg tablet 1 Tab  1 Tab Oral BID    acetaminophen (TYLENOL) tablet 650 mg  650 mg Oral Q4H PRN    Or    acetaminophen (TYLENOL) solution 650 mg  650 mg Per NG tube Q4H PRN    Or    acetaminophen (TYLENOL) suppository 650 mg  650 mg Rectal Q4H PRN    glucose chewable tablet 16 g  4 Tab Oral PRN    glucagon (GLUCAGEN) injection 1 mg  1 mg IntraMUSCular PRN    dextrose 10% infusion 0-250 mL  0-250 mL IntraVENous PRN    insulin lispro (HUMALOG) injection   SubCUTAneous AC&HS       Data Review: current meds, labs,recent radiology, intake/output/weight and problem list reviewed

## 2020-08-12 NOTE — PROGRESS NOTES
Completed thorough chart review, noted BP 97/49 10 min prior to session. Communicated with RN who recommended bedlevel activities only. Checked in on patient and BP supine was 96/42. Defer treatment at this time and will follow up as able. RN notified. 2nd attempt for OT tx at 15:30 and BP 92/38, RN notified. Defer OT at this time.  Will attempt to follow up tomorrow    Thank you,  Froy Loyola OTR/L

## 2020-08-12 NOTE — PROGRESS NOTES
Problem: Diabetes Self-Management  Goal: *Disease process and treatment process  Description: Define diabetes and identify own type of diabetes; list 3 options for treating diabetes. Outcome: Progressing Towards Goal  Goal: *Incorporating nutritional management into lifestyle  Description: Describe effect of type, amount and timing of food on blood glucose; list 3 methods for planning meals. Outcome: Progressing Towards Goal  Goal: *Incorporating physical activity into lifestyle  Description: State effect of exercise on blood glucose levels. Outcome: Progressing Towards Goal  Goal: *Developing strategies to promote health/change behavior  Description: Define the ABC's of diabetes; identify appropriate screenings, schedule and personal plan for screenings. Outcome: Progressing Towards Goal  Goal: *Using medications safely  Description: State effect of diabetes medications on diabetes; name diabetes medication taking, action and side effects. Outcome: Progressing Towards Goal  Goal: *Monitoring blood glucose, interpreting and using results  Description: Identify recommended blood glucose targets  and personal targets. Outcome: Progressing Towards Goal  Goal: *Prevention, detection, treatment of acute complications  Description: List symptoms of hyper- and hypoglycemia; describe how to treat low blood sugar and actions for lowering  high blood glucose level. Outcome: Progressing Towards Goal  Goal: *Prevention, detection and treatment of chronic complications  Description: Define the natural course of diabetes and describe the relationship of blood glucose levels to long term complications of diabetes.   Outcome: Progressing Towards Goal  Goal: *Developing strategies to address psychosocial issues  Description: Describe feelings about living with diabetes; identify support needed and support network  Outcome: Progressing Towards Goal  Goal: *Insulin pump training  Outcome: Progressing Towards Goal  Goal: *Sick day guidelines  Outcome: Progressing Towards Goal  Goal: *Patient Specific Goal (EDIT GOAL, INSERT TEXT)  Outcome: Progressing Towards Goal     Problem: Patient Education: Go to Patient Education Activity  Goal: Patient/Family Education  Outcome: Progressing Towards Goal     Problem: Patient Education: Go to Patient Education Activity  Goal: Patient/Family Education  Outcome: Progressing Towards Goal     Problem: TIA/CVA Stroke: 0-24 hours  Goal: Off Pathway (Use only if patient is Off Pathway)  Outcome: Progressing Towards Goal  Goal: Activity/Safety  Outcome: Progressing Towards Goal  Goal: Consults, if ordered  Outcome: Progressing Towards Goal  Goal: Diagnostic Test/Procedures  Outcome: Progressing Towards Goal  Goal: Nutrition/Diet  Outcome: Progressing Towards Goal  Goal: Discharge Planning  Outcome: Progressing Towards Goal  Goal: Medications  Outcome: Progressing Towards Goal  Goal: Respiratory  Outcome: Progressing Towards Goal  Goal: Treatments/Interventions/Procedures  Outcome: Progressing Towards Goal  Goal: Minimize risk of bleeding post-thrombolytic infusion  Outcome: Progressing Towards Goal  Goal: Monitor for complications post-thrombolytic infusion  Outcome: Progressing Towards Goal  Goal: Psychosocial  Outcome: Progressing Towards Goal  Goal: *Hemodynamically stable  Outcome: Progressing Towards Goal  Goal: *Neurologically stable  Description: Absence of additional neurological deficits    Outcome: Progressing Towards Goal  Goal: *Verbalizes anxiety and depression are reduced or absent  Outcome: Progressing Towards Goal  Goal: *Absence of Signs of Aspiration on Current Diet  Outcome: Progressing Towards Goal  Goal: *Absence of deep venous thrombosis signs and symptoms(Stroke Metric)  Outcome: Progressing Towards Goal  Goal: *Ability to perform ADLs and demonstrates progressive mobility and function  Outcome: Progressing Towards Goal  Goal: *Stroke education started(Stroke Metric)  Outcome: Progressing Towards Goal  Goal: *Dysphagia screen performed(Stroke Metric)  Outcome: Progressing Towards Goal  Goal: *Rehab consulted(Stroke Metric)  Outcome: Progressing Towards Goal     Problem: TIA/CVA Stroke: Day 2 Until Discharge  Goal: Off Pathway (Use only if patient is Off Pathway)  Outcome: Progressing Towards Goal  Goal: Activity/Safety  Outcome: Progressing Towards Goal  Goal: Diagnostic Test/Procedures  Outcome: Progressing Towards Goal  Goal: Nutrition/Diet  Outcome: Progressing Towards Goal  Goal: Discharge Planning  Outcome: Progressing Towards Goal  Goal: Medications  Outcome: Progressing Towards Goal  Goal: Respiratory  Outcome: Progressing Towards Goal  Goal: Treatments/Interventions/Procedures  Outcome: Progressing Towards Goal  Goal: Psychosocial  Outcome: Progressing Towards Goal  Goal: *Verbalizes anxiety and depression are reduced or absent  Outcome: Progressing Towards Goal  Goal: *Absence of aspiration  Outcome: Progressing Towards Goal  Goal: *Absence of deep venous thrombosis signs and symptoms(Stroke Metric)  Outcome: Progressing Towards Goal  Goal: *Optimal pain control at patient's stated goal  Outcome: Progressing Towards Goal  Goal: *Tolerating diet  Outcome: Progressing Towards Goal  Goal: *Ability to perform ADLs and demonstrates progressive mobility and function  Outcome: Progressing Towards Goal  Goal: *Stroke education continued(Stroke Metric)  Outcome: Progressing Towards Goal     Problem: Ischemic Stroke: Discharge Outcomes  Goal: *Verbalizes anxiety and depression are reduced or absent  Outcome: Progressing Towards Goal  Goal: *Verbalize understanding of risk factor modification(Stroke Metric)  Outcome: Progressing Towards Goal  Goal: *Hemodynamically stable  Outcome: Progressing Towards Goal  Goal: *Absence of aspiration pneumonia  Outcome: Progressing Towards Goal  Goal: *Aware of needed dietary changes  Outcome: Progressing Towards Goal  Goal: *Verbalize understanding of prescribed medications including anti-coagulants, anti-lipid, and/or anti-platelets(Stroke Metric)  Outcome: Progressing Towards Goal  Goal: *Tolerating diet  Outcome: Progressing Towards Goal  Goal: *Aware of follow-up diagnostics related to anticoagulants  Outcome: Progressing Towards Goal  Goal: *Ability to perform ADLs and demonstrates progressive mobility and function  Outcome: Progressing Towards Goal  Goal: *Absence of DVT(Stroke Metric)  Outcome: Progressing Towards Goal  Goal: *Absence of aspiration  Outcome: Progressing Towards Goal  Goal: *Optimal pain control at patient's stated goal  Outcome: Progressing Towards Goal  Goal: *Home safety concerns addressed  Outcome: Progressing Towards Goal  Goal: *Describes available resources and support systems  Outcome: Progressing Towards Goal  Goal: *Verbalizes understanding of activation of EMS(911) for stroke symptoms(Stroke Metric)  Outcome: Progressing Towards Goal  Goal: *Understands and describes signs and symptoms to report to providers(Stroke Metric)  Outcome: Progressing Towards Goal  Goal: *Neurolgocially stable (absence of additional neurological deficits)  Outcome: Progressing Towards Goal  Goal: *Verbalizes importance of follow-up with primary care physician(Stroke Metric)  Outcome: Progressing Towards Goal  Goal: *Smoking cessation discussed,if applicable(Stroke Metric)  Outcome: Progressing Towards Goal  Goal: *Depression screening completed(Stroke Metric)  Outcome: Progressing Towards Goal

## 2020-08-12 NOTE — PROGRESS NOTES
Problem: TIA/CVA Stroke: Day 2 Until Discharge  Goal: Activity/Safety  8/12/2020 1209 by Jessica Bhardwaj RN  Outcome: Progressing Towards Goal  8/12/2020 1206 by Jessica Bhardwaj RN  Outcome: Progressing Towards Goal  Goal: Diagnostic Test/Procedures  Outcome: Progressing Towards Goal  Goal: Medications  Outcome: Progressing Towards Goal  Goal: Treatments/Interventions/Procedures  8/12/2020 1209 by Jessica Bhardwaj RN  Outcome: Progressing Towards Goal  8/12/2020 1206 by Jessica Bhardwaj RN  Outcome: Progressing Towards Goal     Problem: Heart Failure: Day 2  Goal: Diagnostic Test/Procedures  Outcome: Progressing Towards Goal  Goal: Nutrition/Diet  Outcome: Progressing Towards Goal

## 2020-08-12 NOTE — PROGRESS NOTES
Neurology Progress Note    Patient ID:  Alphonso Clay  [id number]  68 y.o.  1950    Chief Complaint:    Subjective:   Initial HPI  Sesar Renteria is a 55-year-old woman with a history of diabetes, hypertension who has a pacemaker here because she started to notice difficulty speaking. She was talking to her daughter on the phone and could not get her words out. She went to the hospital and was transferred here for escalated care. Imaging via CTA showing left MCA stenosis with partial occlusion. Also evidence of a right P-comm aneurysm. Neuro interventional has already visited with her today she tells me. Currently she denies any headache. She feels her baseline she tells me. She takes aspirin prior to admission daily. Interval visit   During my exam today, she denies headache, numbness/tingling,  speech or visual disturbance. No events overnight. Objective: All records in [facility] reviewed and noted    ROS:  Per HPI  All other 12 pt ROS negative    Meds:  Current Facility-Administered Medications   Medication Dose Route Frequency    clopidogreL (PLAVIX) tablet 75 mg  75 mg Oral NOW    [START ON 8/13/2020] aspirin tablet 325 mg  325 mg Oral DAILY    clopidogreL (PLAVIX) tablet 75 mg  75 mg Oral DAILY    furosemide (LASIX) tablet 40 mg  40 mg Oral TID    cholecalciferol (VITAMIN D3) (1000 Units /25 mcg) tablet 1 Tab  1,000 Units Oral DAILY    allopurinoL (ZYLOPRIM) tablet 100 mg  100 mg Oral DAILY    gabapentin (NEURONTIN) capsule 100 mg  100 mg Oral TID    . PHARMACY TO SUBSTITUTE PER PROTOCOL (Reordered from: LINZESS 145 mcg cap capsule)    Per Protocol    magnesium oxide (MAG-OX) tablet 400 mg  400 mg Oral BID    pantoprazole (PROTONIX) tablet 40 mg  40 mg Oral ACB    aspirin chewable tablet 81 mg  81 mg Oral DAILY    atorvastatin (LIPITOR) tablet 40 mg  40 mg Oral DAILY    carvediloL (COREG) tablet 3.125 mg  3.125 mg Oral BID WITH MEALS    levothyroxine (SYNTHROID) 175 mcg  175 mcg Oral ACB    melatonin tablet 3 mg  3 mg Oral QHS PRN    oxybutynin chloride XL (DITROPAN XL) tablet 10 mg  10 mg Oral BID    sacubitriL-valsartan (ENTRESTO) 24-26 mg tablet 1 Tab  1 Tab Oral BID    acetaminophen (TYLENOL) tablet 650 mg  650 mg Oral Q4H PRN    Or    acetaminophen (TYLENOL) solution 650 mg  650 mg Per NG tube Q4H PRN    Or    acetaminophen (TYLENOL) suppository 650 mg  650 mg Rectal Q4H PRN    glucose chewable tablet 16 g  4 Tab Oral PRN    glucagon (GLUCAGEN) injection 1 mg  1 mg IntraMUSCular PRN    dextrose 10% infusion 0-250 mL  0-250 mL IntraVENous PRN    insulin lispro (HUMALOG) injection   SubCUTAneous AC&HS       Imaging:  CT Results (most recent):  Results from Hospital Encounter encounter on 08/10/20   CT CODE NEURO PERF W CBF    Narrative *PRELIMINARY REPORT*    No significant perfusion defect. No acute thrombosis. Stenosis at the origins of  the M2 segments of the left middle cerebral artery. Preliminary report was provided by Dr. Ernesto Melendez, the on-call radiologist, at 11:11  PM.    Final report to follow. *END PRELIMINARY REPORT*    EXAM:  CT CODE NEURO PERF W CBF, CTA CODE NEURO HEAD AND NECK W CONT    INDICATION:   CVA. Dysarthria. COMPARISON:  CT head 8/10/2020. CONTRAST:  120 mL of Isovue-370. TECHNIQUE:  Unenhanced  images were obtained to localize the volume for  acquisition. Multislice helical axial CT angiography was performed from the  aortic arch to the top of the head during uneventful rapid bolus intravenous  contrast administration. Coronal and sagittal reformations and 3D post  processing was performed. CT dose reduction was achieved through use of a  standardized protocol tailored for this examination and automatic exposure  control for dose modulation. CT brain perfusion was performed with generation of hemodynamic maps of multiple  parameters, including cerebral blood flow, cerebral blood volume, and MTT (mean  transit time).  CT dose reduction was achieved through use of a standardized  protocol tailored for this examination and automatic exposure control for dose  modulation. FINDINGS:    CTA Head:  There is no evidence of large vessel occlusion in the anterior circulation  bilaterally. There is partially occlusive thrombus noted at the left MCA  bifurcation within the proximal left M2 anterior and posterior divisions (series  2 image 366 and series 2 image 363), with resultant severe focal stenoses. The  distal left MCA branches remain widely patent. Calcification of the bilateral  carotid siphons with no more than mild stenosis. The anterior communicating  artery is diminutive but patent. There is no evidence of large vessel occlusion or flow-limiting stenosis of the  intracranial vertebral arteries, basilar artery, or posterior cerebral arteries. The right posterior communicating artery is patent, the left is not seen. There  are large bilateral anterior choroidal arteries. 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 The dural venous sinuses and deep cerebral venous system are patent. No  evidence of abnormal enhancement on delayed phase images. CTA NECK:  NASCET method was utilized for calculating stenosis. Moderate calcific atherosclerosis of the aortic arch. Mild stenosis of the  proximal left subclavian artery from circumferential calcified plaque. The  common carotid arteries demonstrate no significant stenosis. Calcific  atherosclerosis of the right carotid bifurcation with mild (less than 50%)  stenosis of the proximal right internal carotid artery. Calcific atherosclerosis  of the left carotid bifurcation without significant stenosis. There is a left dominant vertebrobasilar arterial system. The cervical vertebral  arteries are normal in course, size and contour without significant stenosis. Status post left hemithyroidectomy.  The right thyroid lobe is enlarged and  heterogeneous without a discrete dominant nodule. The trachea is deviated to the  left, but without significant narrowing. There is interlobular septal thickening  noted in the upper lobes. There are numerous mildly enlarged mediastinal lymph  nodes measuring up to 13 mm. Left chest cardiac device is noted. No acute  fracture or aggressive osseous lesion. Multilevel degenerative disc disease in  the mid and lower cervical spine with scattered multilevel facet arthropathy. CT Brain Perfusion:  There are no regional areas of elevated Tmax, decreased cerebral blood flow or  blood volume. rCBF < 30% = 0 cc. Tmax > 6 seconds = 0 cc. Impression IMPRESSION:   CTA Head:  1. Partially occlusive thrombus within the proximal left M2 anterior and  posterior divisions, with associated severe focal stenoses. The distal left MCA  branches remain widely patent. 2. Additional mild atherosclerotic disease as above. 3. 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 CTA Neck:  1. No evidence of flow-limiting stenosis. 2. Mild stenosis (less than 50% by NASCET criteria) of the proximal right  internal carotid artery. 3. Status post left thyroid lobectomy. Enlarged heterogeneous right thyroid  gland without a discrete nodule. Leftward deviation of the trachea without  significant narrowing. 4. Pulmonary interstitial edema. 5. Numerous mildly enlarged mediastinal lymph nodes, which are indeterminant. CT Brain Perfusion:  1. No acute abnormality. The findings were called to [address] [place] [address] on 8/11/2020 at 8:18 AM by Dr. Terra Curtis.    789                 Lab Review   Recent Results (from the past 24 hour(s))   GLUCOSE, POC    Collection Time: 08/11/20 11:21 AM   Result Value Ref Range    Glucose (POC) 220 (H) 65 - 100 mg/dL    Performed by Bonita Arias    PLATELET FUNCTION, VERIFY NOW P2Y12    Collection Time: 08/11/20  4:00 PM   Result Value Ref Range    P2Y12 Plt response 246 194 - 418 PRU   ASPIRIN TEST Collection Time: 08/11/20  4:00 PM   Result Value Ref Range    Aspirin test 577 ARU   GLUCOSE, POC    Collection Time: 08/11/20  4:24 PM   Result Value Ref Range    Glucose (POC) 248 (H) 65 - 100 mg/dL    Performed by Hesham Sifuentes    GLUCOSE, POC    Collection Time: 08/11/20  8:39 PM   Result Value Ref Range    Glucose (POC) 242 (H) 65 - 100 mg/dL    Performed by Niall Dunn    GLUCOSE, POC    Collection Time: 08/12/20  7:55 AM   Result Value Ref Range    Glucose (POC) 184 (H) 65 - 100 mg/dL    Performed by Manda Alvarez (CON)        Exam:  Visit Vitals  /43 (BP 1 Location: Right arm, BP Patient Position: Trendelenburg)   Pulse 74   Temp 98 °F (36.7 °C)   Resp 15   Ht 5' 2\" (1.575 m)   Wt 90.9 kg (200 lb 4.8 oz)   SpO2 93%   BMI 36.64 kg/m²     Gen: Well developed  CV: RRR  Lungs: non labored breathing  Abd: non distending  Neuro: A&O x 3, no dysarthria or aphasia  CN II-XII: PERRL, EOMI, slight right side droop without smiling, with smile no drooping present.  tongue/palate midline  Motor: strength 5/5 all four ext  Gait: FTN intact     Assessment:     Patient Active Problem List   Diagnosis Code    Acid indigestion K30    Asthma 493    Back pain M54.9    Wears dentures Z97.2    Constipation 564.0    Diabetes mellitus 250    Diarrhea R19.7    Frequent episodic tension-type headache G44.219    Hemorrhoids 65    Hernia of unspecified site of abdominal cavity without mention of obstruction or gangrene K46.9    HTN (hypertension) I10    Muscle pain M79.10    SOB (shortness of breath) R06.02    Thyroid disorder E07.9    Skin cancer C44.90    Cardiomyopathy (HCC) I42.9    Biventricular ICD (implantable cardioverter-defibrillator) in place Z95.810    TIA (transient ischemic attack) G45.9    Dysarthria R47.1     Plan:   1.) Left MCA stenosis with partial occlusion.    - seen on CTA    -MRI of the brain, pending awaiting pacemaker information   -ASA assay is 577, Increase ASA 325mg daily -P2Y12 246,Continue Plavix 75 mg daily for 3 months  Per [facility]    - LDL  70.4, goal less than 70, Continue Lipitor 40mg nightly    - Echo, pending    - A1c 10.4, goal less than 6 , will defer to primary team and consult  diabetes management   2.)Incidental 3 mm right posterior communicating artery aneurysm.   - Repeat CTA in 3 months    - [facility] will follow up patient in the clinic and do a surveillance on her 3 mm  Pcomm aneurysm    Signed:  Raymon Jones NP  8/12/2020  10:22 AM

## 2020-08-13 LAB
ANION GAP SERPL CALC-SCNC: 7 MMOL/L (ref 5–15)
ASPIRIN TEST, ASPIRN: 441 ARU
BUN SERPL-MCNC: 18 MG/DL (ref 6–20)
BUN/CREAT SERPL: 21 (ref 12–20)
CALCIUM SERPL-MCNC: 8.5 MG/DL (ref 8.5–10.1)
CHLORIDE SERPL-SCNC: 99 MMOL/L (ref 97–108)
CO2 SERPL-SCNC: 29 MMOL/L (ref 21–32)
COMMENT, HOLDF: NORMAL
CREAT SERPL-MCNC: 0.85 MG/DL (ref 0.55–1.02)
GLUCOSE BLD STRIP.AUTO-MCNC: 195 MG/DL (ref 65–100)
GLUCOSE BLD STRIP.AUTO-MCNC: 226 MG/DL (ref 65–100)
GLUCOSE BLD STRIP.AUTO-MCNC: 248 MG/DL (ref 65–100)
GLUCOSE BLD STRIP.AUTO-MCNC: 248 MG/DL (ref 65–100)
GLUCOSE SERPL-MCNC: 172 MG/DL (ref 65–100)
P2Y12 PLT RESPONSE,PPPR: 223 PRU (ref 194–418)
POTASSIUM SERPL-SCNC: 3.6 MMOL/L (ref 3.5–5.1)
SAMPLES BEING HELD,HOLD: NORMAL
SERVICE CMNT-IMP: ABNORMAL
SODIUM SERPL-SCNC: 135 MMOL/L (ref 136–145)

## 2020-08-13 PROCEDURE — 80048 BASIC METABOLIC PNL TOTAL CA: CPT

## 2020-08-13 PROCEDURE — 94760 N-INVAS EAR/PLS OXIMETRY 1: CPT

## 2020-08-13 PROCEDURE — 85576 BLOOD PLATELET AGGREGATION: CPT

## 2020-08-13 PROCEDURE — 74011250637 HC RX REV CODE- 250/637: Performed by: NURSE PRACTITIONER

## 2020-08-13 PROCEDURE — 97530 THERAPEUTIC ACTIVITIES: CPT

## 2020-08-13 PROCEDURE — 82962 GLUCOSE BLOOD TEST: CPT

## 2020-08-13 PROCEDURE — 74011250637 HC RX REV CODE- 250/637: Performed by: FAMILY MEDICINE

## 2020-08-13 PROCEDURE — 97116 GAIT TRAINING THERAPY: CPT

## 2020-08-13 PROCEDURE — 36415 COLL VENOUS BLD VENIPUNCTURE: CPT

## 2020-08-13 PROCEDURE — 65660000000 HC RM CCU STEPDOWN

## 2020-08-13 PROCEDURE — 99232 SBSQ HOSP IP/OBS MODERATE 35: CPT | Performed by: SPECIALIST

## 2020-08-13 PROCEDURE — 74011250637 HC RX REV CODE- 250/637: Performed by: HOSPITALIST

## 2020-08-13 PROCEDURE — 74011250637 HC RX REV CODE- 250/637: Performed by: PSYCHIATRY & NEUROLOGY

## 2020-08-13 PROCEDURE — 97535 SELF CARE MNGMENT TRAINING: CPT

## 2020-08-13 PROCEDURE — 74011636637 HC RX REV CODE- 636/637: Performed by: FAMILY MEDICINE

## 2020-08-13 RX ADMIN — INSULIN LISPRO 2 UNITS: 100 INJECTION, SOLUTION INTRAVENOUS; SUBCUTANEOUS at 21:16

## 2020-08-13 RX ADMIN — OXYBUTYNIN CHLORIDE 10 MG: 5 TABLET, EXTENDED RELEASE ORAL at 17:41

## 2020-08-13 RX ADMIN — OXYBUTYNIN CHLORIDE 10 MG: 5 TABLET, EXTENDED RELEASE ORAL at 09:03

## 2020-08-13 RX ADMIN — CLOPIDOGREL BISULFATE 75 MG: 75 TABLET ORAL at 09:04

## 2020-08-13 RX ADMIN — ATORVASTATIN CALCIUM 40 MG: 40 TABLET, FILM COATED ORAL at 09:04

## 2020-08-13 RX ADMIN — PANTOPRAZOLE SODIUM 40 MG: 40 TABLET, DELAYED RELEASE ORAL at 06:42

## 2020-08-13 RX ADMIN — VITAMIN D 1 TABLET: 25 TAB ORAL at 09:04

## 2020-08-13 RX ADMIN — INSULIN LISPRO 2 UNITS: 100 INJECTION, SOLUTION INTRAVENOUS; SUBCUTANEOUS at 17:40

## 2020-08-13 RX ADMIN — CARVEDILOL 3.12 MG: 3.12 TABLET, FILM COATED ORAL at 09:04

## 2020-08-13 RX ADMIN — LEVOTHYROXINE SODIUM 175 MCG: 0.1 TABLET ORAL at 09:03

## 2020-08-13 RX ADMIN — CARVEDILOL 3.12 MG: 3.12 TABLET, FILM COATED ORAL at 17:41

## 2020-08-13 RX ADMIN — GABAPENTIN 100 MG: 100 CAPSULE ORAL at 21:16

## 2020-08-13 RX ADMIN — Medication 400 MG: at 17:41

## 2020-08-13 RX ADMIN — INSULIN LISPRO 2 UNITS: 100 INJECTION, SOLUTION INTRAVENOUS; SUBCUTANEOUS at 12:17

## 2020-08-13 RX ADMIN — GABAPENTIN 100 MG: 100 CAPSULE ORAL at 09:04

## 2020-08-13 RX ADMIN — ASPIRIN 325 MG ORAL TABLET 325 MG: 325 PILL ORAL at 09:04

## 2020-08-13 RX ADMIN — GABAPENTIN 100 MG: 100 CAPSULE ORAL at 17:41

## 2020-08-13 RX ADMIN — Medication 400 MG: at 09:03

## 2020-08-13 RX ADMIN — ALLOPURINOL 100 MG: 100 TABLET ORAL at 09:04

## 2020-08-13 NOTE — PROGRESS NOTES
8/13/2020     Admit Date: 8/10/2020    Admit Diagnosis: TIA (transient ischemic attack) [G45.9];TIA (transient ischemic attack) [G45.9]    Principal Problem:    Dysarthria (8/11/2020)    Active Problems:    TIA (transient ischemic attack) (8/10/2020)        Subjective:  No complaints. bp still fluctuating, no lasix since yesterday am. Coreg dose reduced       Cozettryan Rodt denies chest pain, dyspnea, palpitations, syncope, orthopnea, paroxysmal nocturnal dyspnea, exertional chest pressure/discomfort, claudication, lower extremity edema. Assessment/Plan:  Would continue current coreg and entresto doses, lasix 40mg per day. I told her to separate meds by 2hrs to avoid low bp. Dr [facility]. If has afib should switch antiplatelets to oac's. She has fup with cardiology nurse practioner in [place] [address] 8/25   Travon Shaffer MD    Objective:     Visit Vitals  BP 96/63   Pulse 76   Temp 97.7 °F (36.5 °C)   Resp 15   Ht 5' 2\" (1.575 m)   Wt 200 lb (90.7 kg)   SpO2 95%   BMI 36.58 kg/m²        Physical Exam:  Neck: supple, symmetrical, trachea midline, no adenopathy, no carotid bruit and no JVD  Heart: regular rate and rhythm, S1, S2 normal, no murmur, click, rub or gallop  Lungs: clear to auscultation bilaterally  Abdomen: soft, non-tender.  Bowel sounds normal. No masses,  no organomegaly  Extremities: extremities normal, atraumatic, no cyanosis or edema      Labs:    Recent Labs     08/11/20  0628   TROIQ 0.27*     Recent Labs     08/13/20  0549   *   K 3.6   CL 99   BUN 18   CREA 0.85   *   CA 8.5     Recent Labs     08/11/20  0017   WBC 6.4   HGB 11.1*   HCT 34.7*   *     Recent Labs     08/11/20  0017   CHOL 148   LDLC 70.4       Telemetry: paced     Current Facility-Administered Medications   Medication Dose Route Frequency    aspirin tablet 325 mg  325 mg Oral DAILY    clopidogreL (PLAVIX) tablet 75 mg  75 mg Oral DAILY    [Held by provider] furosemide (LASIX) tablet 40 mg  40 mg Oral TID    cholecalciferol (VITAMIN D3) (1000 Units /25 mcg) tablet 1 Tab  1,000 Units Oral DAILY    allopurinoL (ZYLOPRIM) tablet 100 mg  100 mg Oral DAILY    gabapentin (NEURONTIN) capsule 100 mg  100 mg Oral TID    . PHARMACY TO SUBSTITUTE PER PROTOCOL (Reordered from: LINZESS 145 mcg cap capsule)    Per Protocol    magnesium oxide (MAG-OX) tablet 400 mg  400 mg Oral BID    pantoprazole (PROTONIX) tablet 40 mg  40 mg Oral ACB    atorvastatin (LIPITOR) tablet 40 mg  40 mg Oral DAILY    carvediloL (COREG) tablet 3.125 mg  3.125 mg Oral BID WITH MEALS    levothyroxine (SYNTHROID) 175 mcg  175 mcg Oral ACB    melatonin tablet 3 mg  3 mg Oral QHS PRN    oxybutynin chloride XL (DITROPAN XL) tablet 10 mg  10 mg Oral BID    sacubitriL-valsartan (ENTRESTO) 24-26 mg tablet 1 Tab  1 Tab Oral BID    acetaminophen (TYLENOL) tablet 650 mg  650 mg Oral Q4H PRN    Or    acetaminophen (TYLENOL) solution 650 mg  650 mg Per NG tube Q4H PRN    Or    acetaminophen (TYLENOL) suppository 650 mg  650 mg Rectal Q4H PRN    glucose chewable tablet 16 g  4 Tab Oral PRN    glucagon (GLUCAGEN) injection 1 mg  1 mg IntraMUSCular PRN    dextrose 10% infusion 0-250 mL  0-250 mL IntraVENous PRN    insulin lispro (HUMALOG) injection   SubCUTAneous AC&HS       Data Review: current meds, labs,recent radiology, intake/output/weight and problem list reviewed

## 2020-08-13 NOTE — PROGRESS NOTES
Problem: Mobility Impaired (Adult and Pediatric)  Goal: *Acute Goals and Plan of Care (Insert Text)  Description: FUNCTIONAL STATUS PRIOR TO ADMISSION: Patient was modified independent using a rollator and single point cane for functional mobility. HOME SUPPORT PRIOR TO ADMISSION: The patient lived with  and blind adult son but did not require assist.    Physical Therapy Goals  Initiated 8/11/2020  1. Patient will move from supine to sit and sit to supine , scoot up and down, and roll side to side in bed with independence within 7 day(s). 2.  Patient will transfer from bed to chair and chair to bed with independence using the least restrictive device within 7 day(s). 3.  Patient will perform sit to stand with independence within 7 day(s). 4.  Patient will ambulate with modified independence for 300 feet with the least restrictive device within 7 day(s). 5.  Patient will ascend/descend 3 stairs with one handrail(s) with modified independence within 7 day(s). Outcome: Progressing Towards Goal  PHYSICAL THERAPY TREATMENT  Patient: Deepak Larson (51 y.o. female)  Date: 8/13/2020  Diagnosis: TIA (transient ischemic attack) [G45.9]  TIA (transient ischemic attack) [G45.9]   Dysarthria       Precautions: Fall  Chart, physical therapy assessment, plan of care and goals were reviewed. ASSESSMENT  Patient continues with skilled PT services and is progressing towards goals. Orthostatics obtained and negative. Pt able to perform bed mobility, transfers, and ambulation using RW with up to CGA and VCs for safe walker management. O2 sats remained >95% on room air. Pt agreeable to end session seated in a chair and left with all need met. [facility]. Current Level of Function Impacting Discharge (mobility/balance):  CGA for transfers and ambulation    Other factors to consider for discharge: mod I baseline          PLAN :  Patient continues to benefit from skilled intervention to address the above impairments. Continue treatment per established plan of care. to address goals. Recommendation for discharge: (in order for the patient to meet his/her long term goals)  Physical therapy at least 2 days/week in the home AND ensure assist and/or supervision for safety with mobility and ADLs    This discharge recommendation:  Has not yet been discussed the attending provider and/or case management    IF patient discharges home will need the following DME: patient owns DME required for discharge       SUBJECTIVE:   Patient stated I have 7 dogs.     OBJECTIVE DATA SUMMARY:   Critical Behavior:  Neurologic State: Alert  Orientation Level: Oriented X4  Cognition: Appropriate decision making, Appropriate for age attention/concentration, Appropriate safety awareness, Follows commands  Safety/Judgement: Good awareness of safety precautions  Functional Mobility Training:  Bed Mobility:     Supine to Sit: Stand-by assistance  Sit to Supine: Stand-by assistance           Transfers:  Sit to Stand: Contact guard assistance  Stand to Sit: Contact guard assistance        Bed to Chair: Contact guard assistance                    Balance:  Sitting: Intact  Standing: Impaired  Standing - Static: Fair  Standing - Dynamic : Fair  Ambulation/Gait Training:  Distance (ft): 100 Feet (ft)  Assistive Device: Gait belt;Walker, rolling  Ambulation - Level of Assistance: Contact guard assistance        Gait Abnormalities: Decreased step clearance;Shuffling gait;Trunk sway increased        Base of Support: Widened     Speed/[place]ah: Slow;Shuffled  Step Length: Right shortened;Left shortened    Activity Tolerance:   Good, VSS on RA  Please refer to the flowsheet for vital signs taken during this treatment.     After treatment patient left in no apparent distress:   Sitting in chair, Call bell within reach, and Bed / chair alarm activated    COMMUNICATION/COLLABORATION:   The patients plan of care was discussed with: Occupational therapist and Registered nurse.      Jonathan Del Real PT, DPT   Time Calculation: 26 mins

## 2020-08-13 NOTE — PROGRESS NOTES
[address] [facility]                                                                                          Hospitalist Progress Note  Amy Singleton MD  Answering service: [phone] from in house phone        Date of Service:  2020  NAME:  Radha Brewer  :    MRN:  [id number]      Admission Summary:   Radha Brewer is a 68 y.o. female who presents with dysarthria     History was probably obtained from the patient     Patient reports that this morning she woke up and had slurred speech, also noticed that she had some right-sided facial droop. Patient went to [facility] and was transferred to [facility].  I could not find any paperwork that came with the patient from [facility]. Per nurse Paulino Li, she did not find any paperwork with the patient. Patient reports that she had no other focal neurological deficit associated with her symptoms. Reports that she has had some blurred vision off and on but has attributed it to old age. Patient denies any other complaints or problems     The patient denies any Headache, blurry vision, sore throat, trouble swallowing,chest pain, SOB, cough, fever, chills, N/V/D, abd pain, urinary symptoms, constipation, recent travels, sick contacts, focal or generalized oneirological symptoms,  falls, injuries, rashes, contact with COVID-19 diagnosed patients, hematemesis, melena, hemoptysis, hematuria, rashes, denies starting any new medications and denies any other concerns or problems besides as mentioned above. Interval history / Subjective:     F/u dysarthria   Patient seen and examined this morning. Patient has no new complaints. Her blood pressure on the low side.      Assessment & Plan:     # Acute ischemic stroke with Left MCA stenosis w/ partial occlusion   Patient who presented with dysarthria/facial asymmetry  - MRI showing small scattered acute infarcts in the left cerebral hemisphere, in the left  middle cerebral artery territory  - Echo showing EF 20-25% with no shunt   - on dual antiplt therapeutic( but given scattered emboli, suspect cardiac origin many need [address] will discuss case with neuro)  - repeat CTA head and neck in 3 months  -Outpatient follow up with Neurology/NIS       # Acute on chronic systolic congestive heart failure, NYHA III  - s/p ICD  - on IV lasix( on hold due to low Bp)  - ct with carvedilol 3.125 mg BID and entresto   - strict I&O, daily weight   - In light of low normal BP would let cardiology re-adjust home meds  - [address] [facility]     # Mildly elevated troponins  -Appreciate Cardiology, no need for work up     # Enlarged thyroid with leftward tracheal deviation  - CT Enlarged heterogeneous right thyroid gland without a discrete nodule. Leftward deviation of the trachea without significant narrowing.  - Appreciate ENT, outpatient follow up     # Diabetes mellitus type 2 poorly controlled:  -Hba1c 10.4  -On Humalog Mix at home, currently on hold  -SSI     # Hypothyroidism:   -Continue home medications and continue monitor      Diabetic diet     PT/OT home health    Code status: FULL CODE  DVT prophylaxis: scd  PTA: home  Baseline: Independent    Care Plan discussed with: Patient/Family  Anticipated Disposition: Home w/Family  Anticipated Discharge: 24 hours to 48 hours     Hospital Problems  Date Reviewed: 8/11/2020          Codes Class Noted POA    * (Principal) Dysarthria ICD-10-CM: R47.1  ICD-9-CM: 784.51  8/11/2020 Yes        TIA (transient ischemic attack) ICD-10-CM: G45.9  ICD-9-CM: 435.9  8/10/2020 Unknown                Review of Systems:   A comprehensive review of systems was negative except for that written in the HPI. Vital Signs:    Last 24hrs VS reviewed since prior progress note.  Most recent are:  Visit Vitals  /62 (BP 1 Location: Right arm, BP Patient Position: Standing)   Pulse 90   Temp 97.5 °F (36.4 °C)   Resp 14   Ht 5' 2\" (1.575 m)   Wt 90.7 kg (200 lb)   SpO2 95%   BMI 36.58 kg/m²         Intake/Output Summary (Last 24 hours) at 8/13/2020 1214  Last data filed at 8/13/2020 0538  Gross per 24 hour   Intake 360 ml   Output 850 ml   Net -490 ml        Physical Examination:             Constitutional:  No acute distress, cooperative, pleasant    ENT:  Oral mucosa moist, oropharynx benign. Resp:  CTA bilaterally. No wheezing/rhonchi/rales. No accessory muscle use   CV:  Regular rhythm, normal rate, no murmurs, gallops, rubs    GI:  Soft, non distended, non tender. normoactive bowel sounds, no hepatosplenomegaly     Musculoskeletal:  No edema, warm, 2+ pulses throughout    Neurologic:  Moves all extremities. AAOx3, CN II-XII reviewed     Skin:  Good turgor, no rashes or ulcers       Data Review:     I personally reviewed labs and imaging     Labs:     Recent Labs     08/11/20 0017 08/10/20  1754   WBC 6.4 5.7   HGB 11.1* 10.6*   HCT 34.7* 33.1*   * 133*     Recent Labs     08/13/20  0549 08/10/20  1754   * 137   K 3.6 3.5   CL 99 106   CO2 29 25   BUN 18 20   CREA 0.85 0.70   * 185*   CA 8.5 9.0     Recent Labs     08/10/20  1754   ALT 24   AP 87   TBILI 0.9   TP 7.6   ALB 2.7*   GLOB 4.9*     Recent Labs     08/10/20  1754   INR 1.3*   PTP 13.5*      No results for input(s): FE, TIBC, PSAT, FERR in the last 72 hours. No results found for: FOL, RBCF   No results for input(s): PH, PCO2, PO2 in the last 72 hours.   Recent Labs     08/11/20  0628 08/11/20 0017 08/10/20  1754   TROIQ 0.27* 0.27* 0.28*     Lab Results   Component Value Date/Time    Cholesterol, total 148 08/11/2020 12:17 AM    HDL Cholesterol 53 08/11/2020 12:17 AM    LDL, calculated 70.4 08/11/2020 12:17 AM    Triglyceride 123 08/11/2020 12:17 AM    CHOL/HDL Ratio 2.8 08/11/2020 12:17 AM     Lab Results   Component Value Date/Time    Glucose (POC) 248 (H) 08/13/2020 11:41 AM    Glucose (POC) 195 (H) 08/13/2020 08:23 AM    Glucose (POC) 224 (H) 08/12/2020 10:40 PM    Glucose (POC) 232 (H) 08/12/2020 08:01 PM    Glucose (POC) 239 (H) 08/12/2020 05:13 PM     Lab Results   Component Value Date/Time    Color YELLOW/STRAW 08/11/2020 12:45 AM    Appearance CLEAR 08/11/2020 12:45 AM    Specific gravity 1.010 08/11/2020 12:45 AM    pH (UA) 5.0 08/11/2020 12:45 AM    Protein 100 (A) 08/11/2020 12:45 AM    Glucose 250 (A) 08/11/2020 12:45 AM    Ketone Negative 08/11/2020 12:45 AM    Bilirubin Negative 08/11/2020 12:45 AM    Urobilinogen 1.0 08/11/2020 12:45 AM    Nitrites Positive (A) 08/11/2020 12:45 AM    Leukocyte Esterase SMALL (A) 08/11/2020 12:45 AM    Epithelial cells FEW 08/11/2020 12:45 AM    Bacteria Negative 08/11/2020 12:45 AM    WBC 5-10 08/11/2020 12:45 AM    RBC 0-5 08/11/2020 12:45 AM         Medications Reviewed:     Current Facility-Administered Medications   Medication Dose Route Frequency    aspirin tablet 325 mg  325 mg Oral DAILY    clopidogreL (PLAVIX) tablet 75 mg  75 mg Oral DAILY    [Held by provider] furosemide (LASIX) tablet 40 mg  40 mg Oral TID    cholecalciferol (VITAMIN D3) (1000 Units /25 mcg) tablet 1 Tab  1,000 Units Oral DAILY    allopurinoL (ZYLOPRIM) tablet 100 mg  100 mg Oral DAILY    gabapentin (NEURONTIN) capsule 100 mg  100 mg Oral TID    . PHARMACY TO SUBSTITUTE PER PROTOCOL (Reordered from: LINZESS 145 mcg cap capsule)    Per Protocol    magnesium oxide (MAG-OX) tablet 400 mg  400 mg Oral BID    pantoprazole (PROTONIX) tablet 40 mg  40 mg Oral ACB    atorvastatin (LIPITOR) tablet 40 mg  40 mg Oral DAILY    carvediloL (COREG) tablet 3.125 mg  3.125 mg Oral BID WITH MEALS    levothyroxine (SYNTHROID) 175 mcg  175 mcg Oral ACB    melatonin tablet 3 mg  3 mg Oral QHS PRN    oxybutynin chloride XL (DITROPAN XL) tablet 10 mg  10 mg Oral BID    sacubitriL-valsartan (ENTRESTO) 24-26 mg tablet 1 Tab  1 Tab Oral BID    acetaminophen (TYLENOL) tablet 650 mg  650 mg Oral Q4H PRN    Or    acetaminophen (TYLENOL) solution 650 mg  650 mg Per NG tube Q4H PRN    Or    acetaminophen (TYLENOL) suppository 650 mg  650 mg Rectal Q4H PRN    glucose chewable tablet 16 g  4 Tab Oral PRN    glucagon (GLUCAGEN) injection 1 mg  1 mg IntraMUSCular PRN    dextrose 10% infusion 0-250 mL  0-250 mL IntraVENous PRN    insulin lispro (HUMALOG) injection   SubCUTAneous AC&HS     ______________________________________________________________________  EXPECTED LENGTH OF STAY: 4d 7h  ACTUAL LENGTH OF STAY:          1                 Mariam Milladr MD

## 2020-08-13 NOTE — PROGRESS NOTES
Bedside shift change report given to WANDA Mcintyre (oncoming nurse) by Kendell Og RN (offgoing nurse). Report included the following information SBAR, Kardex, Intake/Output, MAR, Med Rec Status, Cardiac Rhythm NSR, Quality Measures and Dual Neuro Assessment.

## 2020-08-13 NOTE — PROGRESS NOTES
Problem: Diabetes Self-Management  Goal: *Disease process and treatment process  Description: Define diabetes and identify own type of diabetes; list 3 options for treating diabetes. Outcome: Progressing Towards Goal  Goal: *Incorporating nutritional management into lifestyle  Description: Describe effect of type, amount and timing of food on blood glucose; list 3 methods for planning meals. Outcome: Progressing Towards Goal  Goal: *Incorporating physical activity into lifestyle  Description: State effect of exercise on blood glucose levels. Outcome: Progressing Towards Goal  Goal: *Developing strategies to promote health/change behavior  Description: Define the ABC's of diabetes; identify appropriate screenings, schedule and personal plan for screenings. Outcome: Progressing Towards Goal  Goal: *Using medications safely  Description: State effect of diabetes medications on diabetes; name diabetes medication taking, action and side effects. Outcome: Progressing Towards Goal  Goal: *Monitoring blood glucose, interpreting and using results  Description: Identify recommended blood glucose targets  and personal targets. Outcome: Progressing Towards Goal  Goal: *Prevention, detection, treatment of acute complications  Description: List symptoms of hyper- and hypoglycemia; describe how to treat low blood sugar and actions for lowering  high blood glucose level. Outcome: Progressing Towards Goal  Goal: *Prevention, detection and treatment of chronic complications  Description: Define the natural course of diabetes and describe the relationship of blood glucose levels to long term complications of diabetes.   Outcome: Progressing Towards Goal  Goal: *Developing strategies to address psychosocial issues  Description: Describe feelings about living with diabetes; identify support needed and support network  Outcome: Progressing Towards Goal  Goal: *Insulin pump training  Outcome: Progressing Towards Goal  Goal: *Sick day guidelines  Outcome: Progressing Towards Goal  Goal: *Patient Specific Goal (EDIT GOAL, INSERT TEXT)  Outcome: Progressing Towards Goal     Problem: TIA/CVA Stroke: Day 2 Until Discharge  Goal: Off Pathway (Use only if patient is Off Pathway)  Outcome: Progressing Towards Goal  Goal: Activity/Safety  Outcome: Progressing Towards Goal  Goal: Diagnostic Test/Procedures  Outcome: Progressing Towards Goal  Goal: Nutrition/Diet  Outcome: Progressing Towards Goal  Goal: Discharge Planning  Outcome: Progressing Towards Goal  Goal: Medications  Outcome: Progressing Towards Goal  Goal: Respiratory  Outcome: Progressing Towards Goal  Goal: Treatments/Interventions/Procedures  Outcome: Progressing Towards Goal  Goal: Psychosocial  Outcome: Progressing Towards Goal  Goal: *Verbalizes anxiety and depression are reduced or absent  Outcome: Progressing Towards Goal  Goal: *Absence of aspiration  Outcome: Progressing Towards Goal  Goal: *Absence of deep venous thrombosis signs and symptoms(Stroke Metric)  Outcome: Progressing Towards Goal  Goal: *Optimal pain control at patient's stated goal  Outcome: Progressing Towards Goal  Goal: *Tolerating diet  Outcome: Progressing Towards Goal  Goal: *Ability to perform ADLs and demonstrates progressive mobility and function  Outcome: Progressing Towards Goal  Goal: *Stroke education continued(Stroke Metric)  Outcome: Progressing Towards Goal     Problem: Ischemic Stroke: Discharge Outcomes  Goal: *Verbalizes anxiety and depression are reduced or absent  Outcome: Progressing Towards Goal  Goal: *Verbalize understanding of risk factor modification(Stroke Metric)  Outcome: Progressing Towards Goal  Goal: *Hemodynamically stable  Outcome: Progressing Towards Goal  Goal: *Absence of aspiration pneumonia  Outcome: Progressing Towards Goal  Goal: *Aware of needed dietary changes  Outcome: Progressing Towards Goal  Goal: *Verbalize understanding of prescribed medications including anti-coagulants, anti-lipid, and/or anti-platelets(Stroke Metric)  Outcome: Progressing Towards Goal  Goal: *Tolerating diet  Outcome: Progressing Towards Goal  Goal: *Aware of follow-up diagnostics related to anticoagulants  Outcome: Progressing Towards Goal  Goal: *Ability to perform ADLs and demonstrates progressive mobility and function  Outcome: Progressing Towards Goal  Goal: *Absence of DVT(Stroke Metric)  Outcome: Progressing Towards Goal  Goal: *Absence of aspiration  Outcome: Progressing Towards Goal  Goal: *Optimal pain control at patient's stated goal  Outcome: Progressing Towards Goal  Goal: *Home safety concerns addressed  Outcome: Progressing Towards Goal  Goal: *Describes available resources and support systems  Outcome: Progressing Towards Goal  Goal: *Verbalizes understanding of activation of EMS(911) for stroke symptoms(Stroke Metric)  Outcome: Progressing Towards Goal  Goal: *Understands and describes signs and symptoms to report to providers(Stroke Metric)  Outcome: Progressing Towards Goal  Goal: *Neurolgocially stable (absence of additional neurological deficits)  Outcome: Progressing Towards Goal  Goal: *Verbalizes importance of follow-up with primary care physician(Stroke Metric)  Outcome: Progressing Towards Goal  Goal: *Smoking cessation discussed,if applicable(Stroke Metric)  Outcome: Progressing Towards Goal  Goal: *Depression screening completed(Stroke Metric)  Outcome: Progressing Towards Goal

## 2020-08-13 NOTE — PROGRESS NOTES
Problem: Self Care Deficits Care Plan (Adult)  Goal: *Acute Goals and Plan of Care (Insert Text)  Description: FUNCTIONAL STATUS PRIOR TO ADMISSION: She uses a walker at home per her report. She is independent with her own self care activities. She lives with her  and they have 7 dogs. HOME SUPPORT:  can support her at home. Occupational Therapy Goals  Initiated 8/11/2020    1. Patient will perform lower body dressing with supervision/set-up using within 7 days. 2.  Patient will perform upper body dressing with supervision/set-up within 7 days. 3.  Patient will standing ADLs 15 mins at supervision/set-up within 7 days. 4.  Patient will complete toilet transfer with supervision within 7 days. 5.  patient will complete toileting with supervision within 7 days. 6.  patient will complete bathing with supervision within 7 days. Outcome: Progressing Towards Goal     OCCUPATIONAL THERAPY TREATMENT  Patient: Wiliam Fischer (9 y.o. female)  Date: 8/13/2020  Diagnosis: TIA (transient ischemic attack) [G45.9]  TIA (transient ischemic attack) [G45.9]   Dysarthria      Precautions: Fall  Chart, occupational therapy assessment, plan of care, and goals were reviewed. ASSESSMENT  Patient continues with skilled OT services and is progressing towards goals. She demo'd improved balance, activity tolerance, distal lower body access, and OOB mobility with RW support this session on RA. O2 sats stable with in and out of room mobility, however patient requiring min-mod cues for safety & RW management with functional tasks, slight SOB noted but no O2 desat on RA. Educated on adaptive ADLs and energy conservation principles, patient acknowledging understanding. No further acute OT needs upon d/c.       Current Level of Function Impacting Discharge (ADLs): SPV-CGA for ADLs and mobility with RW    Other factors to consider for discharge: fall risk, PMH, PLOF         PLAN :  Patient continues to benefit from skilled intervention to address the above impairments. Continue treatment per established plan of care. to address goals. Recommend with staff: Recommend with nursing patient to complete as able in order to maintain strength, endurance and independence: ADLs with supervision/setup, once Egress Test completed OOB to chair 3x/day and mobilizing to the bathroom for toileting with 1 assist & RW. Thank you for your assistance. Recommendation for discharge: (in order for the patient to meet his/her long term goals)  No skilled occupational therapy/ follow up rehabilitation needs identified at this time. 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     OBJECTIVE DATA SUMMARY:   Cognitive/Behavioral Status:  Neurologic State: Alert  Orientation Level: Oriented X4  Cognition: Appropriate decision making; Appropriate for age attention/concentration; Follows commands  Perception: Appears intact  Perseveration: No perseveration noted  Safety/Judgement: Awareness of environment; Fall prevention;Decreased awareness of need for safety    Functional Mobility and Transfers for ADLs:  Bed Mobility:  Supine to Sit: Stand-by assistance  Sit to Supine: Stand-by assistance  Scooting: Stand-by assistance    Transfers:  Sit to Stand: Contact guard assistance  Functional Transfers  Bathroom Mobility: Stand-by assistance  Toilet Transfer : Stand-by assistance  Cues: Verbal cues provided;Visual cues provided  Adaptive Equipment: Walker (comment)  Bed to Chair: Contact guard assistance    Balance:  Sitting: Intact  Standing: Impaired; With support(RW)  Standing - Static: Fair  Standing - Dynamic : Fair    ADL Intervention:       Grooming  Grooming Assistance: Stand-by assistance  Position Performed: Standing(at sink)  Washing Hands: Stand-by assistance(cues for RW mgmt)  Cues: Verbal cues provided;Visual cues provided  Adaptive Equipment: (RW)    Lower Body Dressing Assistance  Dressing Assistance: Stand-by assistance  Pants With Elastic Waist: Stand-by assistance(simulated)  Socks: Supervision; Compensatory technique training  Leg Crossed Method Used: Yes  Position Performed: Seated in chair;Seated edge of bed;Standing  Cues: Tactile cues provided;Verbal cues provided;Visual cues provided    Toileting  Toileting Assistance: Supervision  Bladder Hygiene: Independent  Clothing Management: Supervision  Cues: Verbal cues provided;Visual cues provided  Adaptive Equipment: Walker    Cognitive Retraining  Safety/Judgement: Awareness of environment; Fall prevention;Decreased awareness of need for safety    Therapeutic Exercises:   Ambulation in room, bathroom, and hallway mobility with SB-CGA and RW, up to mod cues for RW management & safety    Pain:  None reported    Activity Tolerance:   Good and observed SOB with activity  Please refer to the flowsheet for vital signs taken during this treatment. After treatment patient left in no apparent distress:   Sitting in chair and Call bell within reach    COMMUNICATION/COLLABORATION:   The patients plan of care was discussed with: Physical therapist and Registered nurse.      Shay Brandon OTD, OTR/L  Time Calculation: 32 mins

## 2020-08-13 NOTE — PROGRESS NOTES
Problem: Diabetes Self-Management  Goal: *Monitoring blood glucose, interpreting and using results  Description: Identify recommended blood glucose targets  and personal targets. Outcome: Progressing Towards Goal  Goal: *Prevention, detection, treatment of acute complications  Description: List symptoms of hyper- and hypoglycemia; describe how to treat low blood sugar and actions for lowering  high blood glucose level.   Outcome: Progressing Towards Goal     Problem: TIA/CVA Stroke: Day 2 Until Discharge  Goal: Activity/Safety  Outcome: Progressing Towards Goal  Goal: Diagnostic Test/Procedures  Outcome: Progressing Towards Goal  Goal: Nutrition/Diet  Outcome: Progressing Towards Goal

## 2020-08-14 VITALS
HEIGHT: 62 IN | BODY MASS INDEX: 36.93 KG/M2 | TEMPERATURE: 97.6 F | RESPIRATION RATE: 22 BRPM | OXYGEN SATURATION: 96 % | WEIGHT: 200.7 LBS | DIASTOLIC BLOOD PRESSURE: 61 MMHG | HEART RATE: 77 BPM | SYSTOLIC BLOOD PRESSURE: 114 MMHG

## 2020-08-14 LAB
GLUCOSE BLD STRIP.AUTO-MCNC: 169 MG/DL (ref 65–100)
GLUCOSE BLD STRIP.AUTO-MCNC: 236 MG/DL (ref 65–100)
GLUCOSE BLD STRIP.AUTO-MCNC: 288 MG/DL (ref 65–100)
SERVICE CMNT-IMP: ABNORMAL

## 2020-08-14 PROCEDURE — 74011250637 HC RX REV CODE- 250/637: Performed by: NURSE PRACTITIONER

## 2020-08-14 PROCEDURE — 74011250637 HC RX REV CODE- 250/637: Performed by: HOSPITALIST

## 2020-08-14 PROCEDURE — 94760 N-INVAS EAR/PLS OXIMETRY 1: CPT

## 2020-08-14 PROCEDURE — 74011250637 HC RX REV CODE- 250/637: Performed by: FAMILY MEDICINE

## 2020-08-14 PROCEDURE — 74011250637 HC RX REV CODE- 250/637: Performed by: PSYCHIATRY & NEUROLOGY

## 2020-08-14 PROCEDURE — 82962 GLUCOSE BLOOD TEST: CPT

## 2020-08-14 PROCEDURE — 74011636637 HC RX REV CODE- 636/637: Performed by: FAMILY MEDICINE

## 2020-08-14 RX ORDER — FUROSEMIDE 40 MG/1
40 TABLET ORAL DAILY
Qty: 30 TAB | Refills: 1 | Status: SHIPPED | OUTPATIENT
Start: 2020-08-14 | End: 2020-09-13

## 2020-08-14 RX ORDER — CARVEDILOL 3.12 MG/1
3.12 TABLET ORAL 2 TIMES DAILY WITH MEALS
Qty: 60 TAB | Refills: 1 | Status: SHIPPED | OUTPATIENT
Start: 2020-08-14 | End: 2020-09-13

## 2020-08-14 RX ORDER — ASPIRIN 325 MG
325 TABLET ORAL DAILY
Qty: 30 TAB | Refills: 1 | Status: SHIPPED | OUTPATIENT
Start: 2020-08-15 | End: 2020-09-01 | Stop reason: DRUGHIGH

## 2020-08-14 RX ORDER — CLOPIDOGREL BISULFATE 75 MG/1
75 TABLET ORAL DAILY
Qty: 30 TAB | Refills: 1 | Status: SHIPPED | OUTPATIENT
Start: 2020-08-15 | End: 2020-09-14

## 2020-08-14 RX ORDER — INSULIN LISPRO 100 [IU]/ML
28 INJECTION, SUSPENSION SUBCUTANEOUS
Qty: 15 ML | Refills: 6 | Status: SHIPPED | OUTPATIENT
Start: 2020-08-14 | End: 2020-09-29

## 2020-08-14 RX ADMIN — PANTOPRAZOLE SODIUM 40 MG: 40 TABLET, DELAYED RELEASE ORAL at 06:30

## 2020-08-14 RX ADMIN — ASPIRIN 325 MG ORAL TABLET 325 MG: 325 PILL ORAL at 09:06

## 2020-08-14 RX ADMIN — VITAMIN D 1 TABLET: 25 TAB ORAL at 09:07

## 2020-08-14 RX ADMIN — LEVOTHYROXINE SODIUM 175 MCG: 0.1 TABLET ORAL at 09:15

## 2020-08-14 RX ADMIN — OXYBUTYNIN CHLORIDE 10 MG: 5 TABLET, EXTENDED RELEASE ORAL at 09:09

## 2020-08-14 RX ADMIN — INSULIN LISPRO 3 UNITS: 100 INJECTION, SOLUTION INTRAVENOUS; SUBCUTANEOUS at 12:06

## 2020-08-14 RX ADMIN — ATORVASTATIN CALCIUM 40 MG: 40 TABLET, FILM COATED ORAL at 09:09

## 2020-08-14 RX ADMIN — SACUBITRIL AND VALSARTAN 1 TABLET: 24; 26 TABLET, FILM COATED ORAL at 09:14

## 2020-08-14 RX ADMIN — INSULIN LISPRO 2 UNITS: 100 INJECTION, SOLUTION INTRAVENOUS; SUBCUTANEOUS at 17:08

## 2020-08-14 RX ADMIN — Medication 400 MG: at 09:07

## 2020-08-14 RX ADMIN — ALLOPURINOL 100 MG: 100 TABLET ORAL at 09:09

## 2020-08-14 RX ADMIN — CARVEDILOL 3.12 MG: 3.12 TABLET, FILM COATED ORAL at 09:06

## 2020-08-14 RX ADMIN — CLOPIDOGREL BISULFATE 75 MG: 75 TABLET ORAL at 09:07

## 2020-08-14 RX ADMIN — GABAPENTIN 100 MG: 100 CAPSULE ORAL at 09:07

## 2020-08-14 NOTE — PROGRESS NOTES
Problem: Diabetes Self-Management  Goal: *Disease process and treatment process  Description: Define diabetes and identify own type of diabetes; list 3 options for treating diabetes. Outcome: Resolved/Met  Goal: *Incorporating nutritional management into lifestyle  Description: Describe effect of type, amount and timing of food on blood glucose; list 3 methods for planning meals. Outcome: Resolved/Met  Goal: *Incorporating physical activity into lifestyle  Description: State effect of exercise on blood glucose levels. Outcome: Resolved/Met  Goal: *Developing strategies to promote health/change behavior  Description: Define the ABC's of diabetes; identify appropriate screenings, schedule and personal plan for screenings. Outcome: Resolved/Met  Goal: *Using medications safely  Description: State effect of diabetes medications on diabetes; name diabetes medication taking, action and side effects. Outcome: Resolved/Met  Goal: *Monitoring blood glucose, interpreting and using results  Description: Identify recommended blood glucose targets  and personal targets. Outcome: Resolved/Met  Goal: *Prevention, detection, treatment of acute complications  Description: List symptoms of hyper- and hypoglycemia; describe how to treat low blood sugar and actions for lowering  high blood glucose level. Outcome: Resolved/Met  Goal: *Prevention, detection and treatment of chronic complications  Description: Define the natural course of diabetes and describe the relationship of blood glucose levels to long term complications of diabetes.   Outcome: Resolved/Met  Goal: *Developing strategies to address psychosocial issues  Description: Describe feelings about living with diabetes; identify support needed and support network  Outcome: Resolved/Met  Goal: *Insulin pump training  Outcome: Resolved/Met  Goal: *Sick day guidelines  Outcome: Resolved/Met  Goal: *Patient Specific Goal (EDIT GOAL, INSERT TEXT)  Outcome: Resolved/Met Problem: Patient Education: Go to Patient Education Activity  Goal: Patient/Family Education  Outcome: Resolved/Met     Problem: Patient Education: Go to Patient Education Activity  Goal: Patient/Family Education  Outcome: Resolved/Met     Problem: TIA/CVA Stroke: 0-24 hours  Goal: Off Pathway (Use only if patient is Off Pathway)  Outcome: Resolved/Met  Goal: Activity/Safety  Outcome: Resolved/Met  Goal: Consults, if ordered  Outcome: Resolved/Met  Goal: Diagnostic Test/Procedures  Outcome: Resolved/Met  Goal: Nutrition/Diet  Outcome: Resolved/Met  Goal: Discharge Planning  Outcome: Resolved/Met  Goal: Medications  Outcome: Resolved/Met  Goal: Respiratory  Outcome: Resolved/Met  Goal: Treatments/Interventions/Procedures  Outcome: Resolved/Met  Goal: Minimize risk of bleeding post-thrombolytic infusion  Outcome: Resolved/Met  Goal: Monitor for complications post-thrombolytic infusion  Outcome: Resolved/Met  Goal: Psychosocial  Outcome: Resolved/Met  Goal: *Hemodynamically stable  Outcome: Resolved/Met  Goal: *Neurologically stable  Description: Absence of additional neurological deficits    Outcome: Resolved/Met  Goal: *Verbalizes anxiety and depression are reduced or absent  Outcome: Resolved/Met  Goal: *Absence of Signs of Aspiration on Current Diet  Outcome: Resolved/Met  Goal: *Absence of deep venous thrombosis signs and symptoms(Stroke Metric)  Outcome: Resolved/Met  Goal: *Ability to perform ADLs and demonstrates progressive mobility and function  Outcome: Resolved/Met  Goal: *Stroke education started(Stroke Metric)  Outcome: Resolved/Met  Goal: *Dysphagia screen performed(Stroke Metric)  Outcome: Resolved/Met  Goal: *Rehab consulted(Stroke Metric)  Outcome: Resolved/Met     Problem: TIA/CVA Stroke: Day 2 Until Discharge  Goal: Off Pathway (Use only if patient is Off Pathway)  Outcome: Resolved/Met  Goal: Activity/Safety  8/14/2020 1658 by David Byrd RN  Outcome: Resolved/Met  8/14/2020 1147 by [facility], Mike Alvarez RN  Outcome: Progressing Towards Goal  Goal: Diagnostic Test/Procedures  8/14/2020 1658 by David Byrd RN  Outcome: Resolved/Met  8/14/2020 1147 by David Byrd RN  Outcome: Progressing Towards Goal  Goal: Nutrition/Diet  Outcome: Resolved/Met  Goal: Discharge Planning  8/14/2020 [address] by David Byrd RN  Outcome: Resolved/Met  8/14/2020 1147 by David Byrd RN  Outcome: Progressing Towards Goal  Goal: Medications  8/14/2020 1658 by David Byrd RN  Outcome: Resolved/Met  8/14/2020 1147 by David Byrd RN  Outcome: Progressing Towards Goal  Goal: Respiratory  Outcome: Resolved/Met  Goal: Treatments/Interventions/Procedures  Outcome: Resolved/Met  Goal: Psychosocial  Outcome: Resolved/Met  Goal: *Verbalizes anxiety and depression are reduced or absent  Outcome: Resolved/Met  Goal: *Absence of aspiration  Outcome: Resolved/Met  Goal: *Absence of deep venous thrombosis signs and symptoms(Stroke Metric)  Outcome: Resolved/Met  Goal: *Optimal pain control at patient's stated goal  Outcome: Resolved/Met  Goal: *Tolerating diet  Outcome: Resolved/Met  Goal: *Ability to perform ADLs and demonstrates progressive mobility and function  Outcome: Resolved/Met  Goal: *Stroke education continued(Stroke Metric)  8/14/2020 1658 by David Byrd RN  Outcome: Resolved/Met  8/14/2020 1147 by David Byrd RN  Outcome: Progressing Towards Goal     Problem: Ischemic Stroke: Discharge Outcomes  Goal: *Verbalizes anxiety and depression are reduced or absent  Outcome: Resolved/Met  Goal: *Verbalize understanding of risk factor modification(Stroke Metric)  Outcome: Resolved/Met  Goal: *Hemodynamically stable  Outcome: Resolved/Met  Goal: *Absence of aspiration pneumonia  Outcome: Resolved/Met  Goal: *Aware of needed dietary changes  Outcome: Resolved/Met  Goal: *Verbalize understanding of prescribed medications including anti-coagulants, anti-lipid, and/or anti-platelets(Stroke Metric)  Outcome: Resolved/Met  Goal: *Tolerating diet  Outcome: Resolved/Met  Goal: *Aware of follow-up diagnostics related to anticoagulants  Outcome: Resolved/Met  Goal: *Ability to perform ADLs and demonstrates progressive mobility and function  Outcome: Resolved/Met  Goal: *Absence of DVT(Stroke Metric)  Outcome: Resolved/Met  Goal: *Absence of aspiration  Outcome: Resolved/Met  Goal: *Optimal pain control at patient's stated goal  Outcome: Resolved/Met  Goal: *Home safety concerns addressed  Outcome: Resolved/Met  Goal: *Describes available resources and support systems  Outcome: Resolved/Met  Goal: *Verbalizes understanding of activation of EMS(911) for stroke symptoms(Stroke Metric)  Outcome: Resolved/Met  Goal: *Understands and describes signs and symptoms to report to providers(Stroke Metric)  Outcome: Resolved/Met  Goal: *Neurolgocially stable (absence of additional neurological deficits)  Outcome: Resolved/Met  Goal: *Verbalizes importance of follow-up with primary care physician(Stroke Metric)  Outcome: Resolved/Met  Goal: *Smoking cessation discussed,if applicable(Stroke Metric)  Outcome: Resolved/Met  Goal: *Depression screening completed(Stroke Metric)  Outcome: Resolved/Met     Problem: Falls - Risk of  Goal: *Absence of Falls  Description: Document Claire Fall Risk and appropriate interventions in the flowsheet.   8/14/2020 1658 by David Braun RN  Outcome: Resolved/Met  8/14/2020 1147 by David Braun RN  Outcome: Progressing Towards Goal  Note: Fall Risk Interventions:  Mobility Interventions: OT consult for ADLs, Patient to call before getting OOB, PT Consult for mobility concerns, PT Consult for assist device competence, Communicate number of staff needed for ambulation/transfer         Medication Interventions: Evaluate medications/consider consulting pharmacy, Patient to call before getting OOB, Teach patient to arise slowly         History of Falls Interventions: Bed/chair exit alarm, Consult care management for discharge planning, Door open when patient unattended, Evaluate medications/consider consulting pharmacy, Investigate reason for fall         Problem: Patient Education: Go to Patient Education Activity  Goal: Patient/Family Education  Outcome: Resolved/Met     Problem: Patient Education: Go to Patient Education Activity  Goal: Patient/Family Education  Outcome: Resolved/Met     Problem: Discharge Planning  Goal: *Discharge to safe environment  Outcome: Resolved/Met  Goal: *Knowledge of medication management  Outcome: Resolved/Met  Goal: *Knowledge of discharge instructions  Outcome: Resolved/Met     Problem: Patient Education: Go to Patient Education Activity  Goal: Patient/Family Education  Outcome: Resolved/Met     Problem: Patient Education: Go to Patient Education Activity  Goal: Patient/Family Education  Outcome: Resolved/Met     Problem: Patient Education: Go to Patient Education Activity  Goal: Patient/Family Education  Outcome: Resolved/Met     Problem: Heart Failure: Day 1  Goal: Off Pathway (Use only if patient is Off Pathway)  Outcome: Resolved/Met  Goal: Activity/Safety  Outcome: Resolved/Met  Goal: Consults, if ordered  Outcome: Resolved/Met  Goal: Diagnostic Test/Procedures  Outcome: Resolved/Met  Goal: Nutrition/Diet  Outcome: Resolved/Met  Goal: Discharge Planning  Outcome: Resolved/Met  Goal: Medications  Outcome: Resolved/Met  Goal: Respiratory  Outcome: Resolved/Met  Goal: Treatments/Interventions/Procedures  Outcome: Resolved/Met  Goal: Psychosocial  Outcome: Resolved/Met  Goal: *Oxygen saturation within defined limits  Outcome: Resolved/Met  Goal: *Hemodynamically stable  Outcome: Resolved/Met  Goal: *Optimal pain control at patient's stated goal  Outcome: Resolved/Met  Goal: *Anxiety reduced or absent  Outcome: Resolved/Met     Problem: Heart Failure: Day 2  Goal: Off Pathway (Use only if patient is Off Pathway)  Outcome: Resolved/Met  Goal: Activity/Safety  Outcome: Resolved/Met  Goal: Consults, if ordered  Outcome: Resolved/Met  Goal: Diagnostic Test/Procedures  Outcome: Resolved/Met  Goal: Nutrition/Diet  Outcome: Resolved/Met  Goal: Discharge Planning  Outcome: Resolved/Met  Goal: Medications  Outcome: Resolved/Met  Goal: Respiratory  Outcome: Resolved/Met  Goal: Treatments/Interventions/Procedures  Outcome: Resolved/Met  Goal: Psychosocial  Outcome: Resolved/Met  Goal: *Oxygen saturation within defined limits  Outcome: Resolved/Met  Goal: *Hemodynamically stable  Outcome: Resolved/Met  Goal: *Optimal pain control at patient's stated goal  Outcome: Resolved/Met  Goal: *Anxiety reduced or absent  Outcome: Resolved/Met  Goal: *Demonstrates progressive activity  Outcome: Resolved/Met     Problem: Heart Failure: Day 3  Goal: Off Pathway (Use only if patient is Off Pathway)  Outcome: Resolved/Met  Goal: Activity/Safety  Outcome: Resolved/Met  Goal: Diagnostic Test/Procedures  Outcome: Resolved/Met  Goal: Nutrition/Diet  Outcome: Resolved/Met  Goal: Discharge Planning  Outcome: Resolved/Met  Goal: Medications  Outcome: Resolved/Met  Goal: Respiratory  Outcome: Resolved/Met  Goal: Treatments/Interventions/Procedures  Outcome: Resolved/Met  Goal: Psychosocial  Outcome: Resolved/Met  Goal: *Oxygen saturation within defined limits  Outcome: Resolved/Met  Goal: *Hemodynamically stable  Outcome: Resolved/Met  Goal: *Optimal pain control at patient's stated goal  Outcome: Resolved/Met  Goal: *Anxiety reduced or absent  Outcome: Resolved/Met  Goal: *Demonstrates progressive activity  Outcome: Resolved/Met     Problem: Heart Failure: Day 4  Goal: Off Pathway (Use only if patient is Off Pathway)  Outcome: Resolved/Met  Goal: Activity/Safety  8/14/2020 1658 by Bere Salazar RN  Outcome: Resolved/Met  8/14/2020 1147 by Bere Salazar RN  Outcome: Progressing Towards Goal  Goal: Diagnostic Test/Procedures  8/14/2020 1658 by Bere Salazar RN  Outcome: Resolved/Met  8/14/2020 1147 by [facility] Angel Ernandez RN  Outcome: Progressing Towards Goal  Goal: Nutrition/Diet  Outcome: Resolved/Met  Goal: Discharge Planning  Outcome: Resolved/Met  Goal: Medications  8/14/2020 1658 by Radha Melendez RN  Outcome: Resolved/Met  8/14/2020 1147 by Radha Melendez RN  Outcome: Progressing Towards Goal  Goal: Respiratory  Outcome: Resolved/Met  Goal: Treatments/Interventions/Procedures  Outcome: Resolved/Met  Goal: Psychosocial  Outcome: Resolved/Met  Goal: *Oxygen saturation within defined limits  Outcome: Resolved/Met  Goal: *Hemodynamically stable  Outcome: Resolved/Met  Goal: *Optimal pain control at patient's stated goal  Outcome: Resolved/Met  Goal: *Anxiety reduced or absent  Outcome: Resolved/Met  Goal: *Demonstrates progressive activity  Outcome: Resolved/Met     Problem: Heart Failure: Day 5  Goal: Off Pathway (Use only if patient is Off Pathway)  Outcome: Resolved/Met  Goal: Activity/Safety  Outcome: Resolved/Met  Goal: Diagnostic Test/Procedures  Outcome: Resolved/Met  Goal: Nutrition/Diet  Outcome: Resolved/Met  Goal: Discharge Planning  Outcome: Resolved/Met  Goal: Medications  Outcome: Resolved/Met  Goal: Respiratory  Outcome: Resolved/Met  Goal: Treatments/Interventions/Procedures  Outcome: Resolved/Met  Goal: Psychosocial  Outcome: Resolved/Met     Problem: Heart Failure: Discharge Outcomes  Goal: *Demonstrates ability to perform prescribed activity without shortness of breath or discomfort  Outcome: Resolved/Met  Goal: *Left ventricular function assessment completed prior to or during stay, or planned for post-discharge  Outcome: Resolved/Met  Goal: *ACEI prescribed if LVEF less than 40% and no contraindications or ARB prescribed  Outcome: Resolved/Met  Goal: *Verbalizes understanding and describes prescribed diet  Outcome: Resolved/Met  Goal: *Verbalizes understanding/describes prescribed medications  Outcome: Resolved/Met  Goal: *Describes available resources and support systems  Description: (eg: Home Health, Palliative Care, Advanced Medical Directive)  Outcome: Resolved/Met  Goal: *Describes smoking cessation resources  Outcome: Resolved/Met  Goal: *Understands and describes signs and symptoms to report to providers(Stroke Metric)  Outcome: Resolved/Met  Goal: *Describes/verbalizes understanding of follow-up/return appt  Description: (eg: to physicians, diabetes treatment coordinator, and other resources  Outcome: Resolved/Met  Goal: *Describes importance of continuing daily weights and changes to report to physician  Outcome: Resolved/Met

## 2020-08-14 NOTE — PROGRESS NOTES
Transition of Care Plan  RUR 14%    Disposition  Home with   Transportation   will transport  [address]    Contact   Néstor Draperws [phone]     Medicare pt has received, reviewed, and signed 2nd IM letter informing them of their right to appeal the discharge. Signed copy has been placed on pt bedside chart. CM met with patient to secure home health choice    The Plan for Transition of Care is related to the following treatment goals: home health    The Patient  was provided with a choice of provider and agrees   with the discharge plan. [x] Yes [] No    Freedom of choice list was provided with basic dialogue that supports the patient's individualized plan of care/goals, treatment preferences and shares the quality data associated with the providers. [x] Yes [] No    Patient chose [facility] as she has been serviced by the agency in the past.  Referral sent with order attached to the agency via "[employer] and accepted. CM talked with Adrianna Hernandez at the agency and patient will be provided with PT/OT and SN. [place] of choice letter placed in chart.   Name and number of agency placed on AVS

## 2020-08-14 NOTE — PROGRESS NOTES
Pharmacist Discharge Medication Reconciliation    Discharging Provider: Jada Solo MD      Significant PMH:   Past Medical History:   Diagnosis Date    Acid indigestion     heartburn    Asthma     Back pain     Constipation     Diabetes mellitus     Diarrhea     Frequent episodic tension-type headache     Hemorrhoids     Hernia of unspecified site of abdominal cavity without mention of obstruction or gangrene     HTN (hypertension)     ICD (implantable cardioverter-defibrillator) in place     Muscle pain     joint pain    Pacemaker     biventricular ICD 1/26/18    Skin cancer     SOB (shortness of breath)     Thyroid disorder     Wears dentures      Chief Complaint for this Admission:   Chief Complaint   Patient presents with    TIA     Allergies: Codeine; Novocain [procaine]; and Tramadol    Discharge Medications:   Current Discharge Medication List        START taking these medications    Details   clopidogreL (PLAVIX) 75 mg tab Take 1 Tab by mouth daily for 30 days. Qty: 30 Tab, Refills: 1           CONTINUE these medications which have CHANGED    Details   aspirin (ASPIRIN) 325 mg tablet Take 1 Tab by mouth daily for 30 days. Qty: 30 Tab, Refills: 1      carvediloL (COREG) 3.125 mg tablet Take 1 Tab by mouth two (2) times daily (with meals) for 30 days. Qty: 60 Tab, Refills: 1      furosemide (LASIX) 40 mg tablet Take 1 Tab by mouth daily for 30 days. Indications: sometimes tid  Qty: 30 Tab, Refills: 1           CONTINUE these medications which have NOT CHANGED    Details   magnesium oxide (MAG-OX) 400 mg tablet Take 400 mg by mouth two (2) times a day. cholecalciferol (Vitamin D3) 25 mcg (1,000 unit) cap Take  by mouth daily. metFORMIN (GLUCOPHAGE) 500 mg tablet Take  by mouth two (2) times daily (with meals). guaiFENesin-codeine (VIRTUSSIN AC) 100-10 mg/5 mL solution Take 5 mL by mouth three (3) times daily as needed for Cough.       allopurinol (ZYLOPRIM) 100 mg tablet Take 100 mg by mouth daily. PROAIR HFA 90 mcg/actuation inhaler Take 2 Puffs by inhalation every four (4) hours as needed. LINZESS 145 mcg cap capsule Take 145 mcg by mouth daily as needed. acetaminophen (TYLENOL EXTRA STRENGTH) 500 mg tablet Take  by mouth every six (6) hours as needed for Pain.      polyethylene glycol (MIRALAX) 17 gram packet Take 17 g by mouth as needed. sacubitril-valsartan (ENTRESTO) 24 mg/26 mg tablet Take 1 Tab by mouth two (2) times a day. Qty: 28 Tab, Refills: 0    Associated Diagnoses: Hypertension, unspecified type; Dilated cardiomyopathy (HCC)      acetaminophen-codeine (TYLENOL #3) 300-30 mg per tablet Take 1 Tab by mouth every six (6) hours as needed for Pain. Max Daily Amount: 4 Tabs. Qty: 20 Tab, Refills: 0      gabapentin (NEURONTIN) 100 mg capsule Take 100 mg by mouth three (3) times daily. oxybutynin chloride XL (DITROPAN XL) 10 mg CR tablet Take 10 mg by mouth two (2) times a day. levothyroxine 175 mcg cap Take 175 mcg by mouth Daily (before breakfast). omeprazole (PRILOSEC) 40 mg capsule Take 40 mg by mouth daily. atorvastatin (LIPITOR) 40 mg tablet Take 40 mg by mouth daily. Associated Diagnoses: Type II or unspecified type diabetes mellitus without mention of complication, uncontrolled; Other specified acquired hypothyroidism; Unspecified vitamin D deficiency      HUMALOG MIX 75-25 KWIKPEN 100 unit/mL (75-25) flexpen INJECT 24 UNITS UNDER THE SKIN TWICE A DAY WITH BREAKFAST AND DINNER  Qty: 15 mL, Refills: 6      potassium chloride SR (KLOR-CON 10) 10 mEq tablet Take 10 mEq by mouth daily. Associated Diagnoses: Type II or unspecified type diabetes mellitus without mention of complication, uncontrolled      melatonin 3 mg tablet Take 3 mg by mouth nightly as needed.            STOP taking these medications       diclofenac (VOLTAREN) 1 % gel Comments:   Reason for Stopping:         nitroglycerin (NITROSTAT) 0.4 mg SL tablet Comments:   Reason for Stopping:               The patient's chart, MAR and AVS were reviewed by Elizabeth Sauer RPH.

## 2020-08-14 NOTE — ROUTINE PROCESS
Attempted to schedule PCP appointment per CM request, however the patients doctor is out all next week with no one else in the practice able to see her. The  confirmed that the patient already has a virtual appointment scheduled for Monday, August 24 @ 10:00AM.

## 2020-08-14 NOTE — DISCHARGE SUMMARY
Discharge Summary       PATIENT ID: July Alfaro  MRN: [id number]   YOB: 1950    DATE OF ADMISSION: 8/10/2020  5:27 PM    DATE OF DISCHARGE: 08/14/20  PRIMARY CARE PROVIDER: Gmue Spivey MD       DISCHARGING PROVIDER: Roxanna Alvarez MD    To contact this individual call [phone] and ask the  to page. If unavailable ask to be transferred the Adult Hospitalist Department. CONSULTATIONS: IP CONSULT TO NEUROLOGY  IP CONSULT TO NEUROINTERVENTIONAL SURGERY  IP CONSULT TO OTOLARYNGOLOGY  IP CONSULT TO CARDIOLOGY      PROCEDURES/SURGERIES: * No surgery found *    IMAGING  Mri Brain Wo Cont    Result Date: 8/12/2020  IMPRESSION:  1. Small scattered acute infarcts in the left cerebral hemisphere, in the left middle cerebral artery territory. No major territorial infarct. 2. Mild to moderate white matter signal abnormality and chronic lacunar infarcts consistent with chronic small vessel ischemic changes. Xr Chest Port    Result Date: 8/10/2020  IMPRESSION: Cardiomegaly with moderately severe pulmonary edema. Cta Code Neuro Head And Neck W Cont    Result Date: 8/11/2020  IMPRESSION: CTA Head: 1. Partially occlusive thrombus within the proximal left M2 anterior and posterior divisions, with associated severe focal stenoses. The distal left MCA branches remain widely patent. 2. Additional mild atherosclerotic disease as above. 3. 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 CTA Neck: 1. No evidence of flow-limiting stenosis. 2. Mild stenosis (less than 50% by NASCET criteria) of the proximal right internal carotid artery. 3. Status post left thyroid lobectomy. Enlarged heterogeneous right thyroid gland without a discrete nodule. Leftward deviation of the trachea without significant narrowing. 4. Pulmonary interstitial edema. 5. Numerous mildly enlarged mediastinal lymph nodes, which are indeterminant.  CT Brain Perfusion: 1. No acute abnormality. The findings were called to [address] [place] [address] on 8/11/2020 at 8:18 AM by Dr. Amber Jiménez. [phone]     Ct Code Neuro Head Wo Contrast    Result Date: 8/10/2020  IMPRESSION: No evidence of acute intracranial process. Old lacunar infarcts in the basal ganglia. Mild periventricular white matter disease, likely representing chronic small vessel ischemia. Ct Code Neuro Perf W Cbf    Result Date: 8/11/2020  IMPRESSION: CTA Head: 1. Partially occlusive thrombus within the proximal left M2 anterior and posterior divisions, with associated severe focal stenoses. The distal left MCA branches remain widely patent. 2. Additional mild atherosclerotic disease as above. 3. 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 CTA Neck: 1. No evidence of flow-limiting stenosis. 2. Mild stenosis (less than 50% by NASCET criteria) of the proximal right internal carotid artery. 3. Status post left thyroid lobectomy. Enlarged heterogeneous right thyroid gland without a discrete nodule. Leftward deviation of the trachea without significant narrowing. 4. Pulmonary interstitial edema. 5. Numerous mildly enlarged mediastinal lymph nodes, which are indeterminant. CT Brain Perfusion: 1. No acute abnormality. The findings were called to [address] [place] [address] on 8/11/2020 at 8:18 AM by Dr. Amber Jiménez.  [address] COURSE:   # Acute ischemic stroke with Left MCA stenosis w/ partial occlusion   Patient who presented with dysarthria/facial asymmetry  - MRI showing small scattered acute infarcts in the left cerebral hemisphere, in the left middle cerebral artery territory  - Echo showing EF 20-25% with no shunt   - on dual antiplt therapeutic( aspirin and Plavix), therapeutic   - repeat CTA head and neck in 3 months  - Outpatient follow up with Neurology/NIS        # Acute on chronic systolic congestive heart failure, NYHA III  - s/p ICD  - on IV lasix( on hold due to low Bp), re-started on low dose on discharge   - ct with carvedilol 3.125 mg BID and entresto   - strict I&O, daily weight   - In light of low normal BP, home medication readjustment was done by cardiology  - [address] Cardiology input      # Mildly elevated troponins  - Appreciate Cardiology, no need for work up      # Enlarged thyroid with leftward tracheal deviation  - CT Enlarged heterogeneous right thyroid gland without a discrete nodule. Leftward deviation of the trachea without significant narrowing.  - Appreciate ENT, outpatient follow up     # Diabetes mellitus type 2 poorly controlled:  - Hba1c 10.4  - On Humalog Mix at home, on hold during hospital stay   - SSI     # Hypothyroidism:   - Continue home medications and continue monitor            DISCHARGE DIAGNOSES / PLAN:    # Acute ischemic stroke with Left MCA stenosis w/ partial occlusion   # Acute on chronic systolic congestive heart failure, NYHA III   # Mildly elevated troponins  # Enlarged thyroid with leftward tracheal deviation  # Diabetes mellitus type 2 poorly controlled   # Hypothyroidism:     Patient couldn't tolerate entresto therefore it was stopped on discharge.    Her insulin was also adjusted     Patient Active Problem List   Diagnosis Code    Acid indigestion K30    Asthma 80    Back pain M54.9    Wears dentures Z97.2    Constipation 564.0    Diabetes mellitus 250    Diarrhea R19.7    Frequent episodic tension-type headache G44.219    Hemorrhoids 65    Hernia of unspecified site of abdominal cavity without mention of obstruction or gangrene K46.9    HTN (hypertension) I10    Muscle pain M79.10    SOB (shortness of breath) R06.02    Thyroid disorder E07.9    Skin cancer C44.90    Cardiomyopathy (HCC) I42.9    Biventricular ICD (implantable cardioverter-defibrillator) in place Z95.810    TIA (transient ischemic attack) G45.9    Dysarthria R47.1               PENDING TEST RESULTS:   At the time of discharge the following test results are still pending: None     FOLLOW UP APPOINTMENTS:    Follow-up Information     Follow up With Specialties Details Why Contact Info    Coleen Bernal MD Neuroradiology In 4 weeks For follow up on incidental aneurysm finding with neurointerventional surgery  [address] [facility] [address]       Yany Jo MD Family Medicine Schedule an appointment as soon as possible for a visit on 8/24/2020 VIRTUAL hospital discharge follow up has been scheduled for Monday, August 24 @ 10:00AM [facility]      Natty Wagner MD Cardiology Schedule an appointment as soon as possible for a visit in 2 weeks Follow up  [address] [facility], DO Neurology Schedule an appointment as soon as possible for a visit in 2 weeks Follow up  [address] [facility] [address]      [facility]   home health  call agency if you have not been contacted by noon the day after discharge to inquire as to the day and time of initial visit [address]:   · It is important that you take the medication exactly as they are prescribed. · Keep your medication in the bottles provided by the pharmacist and keep a list of the medication names, dosages, and times to be taken in your wallet. · Do not take other medications without consulting your doctor. · No drinking alcohol or driving car or operating machinery if you are on narcotic pain medications. Donot take sedating mediations if you are sleepy or confused.    · Fall Precautions  · Keep Well Hydrated  · Report to your medical provider if you feel you have  developed allergies to medications  · Follow up with your PCP or Consultant for medication adjustments and refills  · Monitor for signs of fevers,chills,bleeding,chest pain and seek medical attention if you do so. ADDITIONAL CARE RECOMMENDATIONS:   - Fall precaution     DIET: Cardiac diet     ACTIVITY: As tolerated     WOUND CARE: None     EQUIPMENT needed: None     DISCHARGE MEDICATIONS:  Current Discharge Medication List      START taking these medications    Details   clopidogreL (PLAVIX) 75 mg tab Take 1 Tab by mouth daily for 30 days. Qty: 30 Tab, Refills: 1         CONTINUE these medications which have CHANGED    Details   aspirin (ASPIRIN) 325 mg tablet Take 1 Tab by mouth daily for 30 days. Qty: 30 Tab, Refills: 1      carvediloL (COREG) 3.125 mg tablet Take 1 Tab by mouth two (2) times daily (with meals) for 30 days. Qty: 60 Tab, Refills: 1      furosemide (LASIX) 40 mg tablet Take 1 Tab by mouth daily for 30 days. Indications: sometimes tid  Qty: 30 Tab, Refills: 1      insulin lispro protamin-lispro (HumaLOG Mix 75-25 KwikPen) flexpen 28 Units by SubCUTAneous route ACB/HS. Qty: 15 mL, Refills: 6         CONTINUE these medications which have NOT CHANGED    Details   magnesium oxide (MAG-OX) 400 mg tablet Take 400 mg by mouth two (2) times a day. cholecalciferol (Vitamin D3) 25 mcg (1,000 unit) cap Take  by mouth daily. metFORMIN (GLUCOPHAGE) 500 mg tablet Take  by mouth two (2) times daily (with meals). guaiFENesin-codeine (VIRTUSSIN AC) 100-10 mg/5 mL solution Take 5 mL by mouth three (3) times daily as needed for Cough. allopurinol (ZYLOPRIM) 100 mg tablet Take 100 mg by mouth daily. PROAIR HFA 90 mcg/actuation inhaler Take 2 Puffs by inhalation every four (4) hours as needed. LINZESS 145 mcg cap capsule Take 145 mcg by mouth daily as needed. acetaminophen (TYLENOL EXTRA STRENGTH) 500 mg tablet Take  by mouth every six (6) hours as needed for Pain.      polyethylene glycol (MIRALAX) 17 gram packet Take 17 g by mouth as needed.       acetaminophen-codeine (TYLENOL #3) 300-30 mg per tablet Take 1 Tab by mouth every six (6) hours as needed for Pain. Max Daily Amount: 4 Tabs. Qty: 20 Tab, Refills: 0      gabapentin (NEURONTIN) 100 mg capsule Take 100 mg by mouth three (3) times daily. oxybutynin chloride XL (DITROPAN XL) 10 mg CR tablet Take 10 mg by mouth two (2) times a day. levothyroxine 175 mcg cap Take 175 mcg by mouth Daily (before breakfast). omeprazole (PRILOSEC) 40 mg capsule Take 40 mg by mouth daily. atorvastatin (LIPITOR) 40 mg tablet Take 40 mg by mouth daily. Associated Diagnoses: Type II or unspecified type diabetes mellitus without mention of complication, uncontrolled; Other specified acquired hypothyroidism; Unspecified vitamin D deficiency      potassium chloride SR (KLOR-CON 10) 10 mEq tablet Take 10 mEq by mouth daily. Associated Diagnoses: Type II or unspecified type diabetes mellitus without mention of complication, uncontrolled      melatonin 3 mg tablet Take 3 mg by mouth nightly as needed. STOP taking these medications       sacubitril-valsartan (ENTRESTO) 24 mg/26 mg tablet Comments:   Reason for Stopping:         diclofenac (VOLTAREN) 1 % gel Comments:   Reason for Stopping:         nitroglycerin (NITROSTAT) 0.4 mg SL tablet Comments:   Reason for Stopping:               All Micro Results     Procedure Component Value Units Date/Time    URINE CULTURE HOLD SAMPLE [[id number]] Collected:  08/11/20 0045    Order Status:  Completed Specimen:  Urine from Serum Updated:  08/11/20 0125     Urine culture hold       Urine on hold in Microbiology dept for 2 days. If unpreserved urine is submitted, it cannot be used for addtional testing after 24 hours, recollection will be required.                 Recent Results (from the past 24 hour(s))   GLUCOSE, POC    Collection Time: 08/13/20  5:11 PM   Result Value Ref Range    Glucose (POC) 248 (H) 65 - 100 mg/dL    Performed by Bernadette Velázquez    GLUCOSE, POC    Collection Time: 08/13/20  8:42 PM   Result Value Ref Range Glucose (POC) 226 (H) 65 - 100 mg/dL    Performed by Gelacio Llanos, POC    Collection Time: 08/14/20  6:46 AM   Result Value Ref Range    Glucose (POC) 169 (H) 65 - 100 mg/dL    Performed by Eli Velásquez    GLUCOSE, POC    Collection Time: 08/14/20 11:35 AM   Result Value Ref Range    Glucose (POC) 288 (H) 65 - 100 mg/dL    Performed by Jessica Leonardo    GLUCOSE, POC    Collection Time: 08/14/20  4:55 PM   Result Value Ref Range    Glucose (POC) 236 (H) 65 - 100 mg/dL    Performed by Mikie Gowers            NOTIFY YOUR PHYSICIAN FOR ANY OF THE FOLLOWING:   Fever over 101 degrees for 24 hours. Chest pain, shortness of breath, fever, chills, nausea, vomiting, diarrhea, change in mentation, falling, weakness, bleeding. Severe pain or pain not relieved by medications. Or, any other signs or symptoms that you may have questions about. DISPOSITION:  x  Home With:  x OT x PT  HH  RN       Long term SNF/Inpatient Rehab    Independent/assisted living    Hospice    Other:       PATIENT CONDITION AT DISCHARGE:     Functional status    Poor     Deconditioned    x Independent      Cognition     Lucid     Forgetful     Dementia      Catheters/lines (plus indication)    Herman     PICC     PEG    x None      Code status   x  Full code     DNR      PHYSICAL EXAMINATION AT DISCHARGE:  Gen:  No distress, alert, Obese   HEENT:  Normal cephalic atraumatic, extra-occular movements are intact. Neck:  Supple, No JVD  Lungs:  Clear bilaterally, no wheeze, no rales, normal effort  Heart:  Regular Rate and Rhythm, normal S1 and S2, no edema  Abdomen:  Soft, non tender, normal bowel sounds, no guarding.   Extremities:  Well perfused, no cyanosis or edema  Neurological:  Awake and alert, CN's are intact, normal strength throughout extremities  Skin:  No rashes or moles  Mental Status:  Normal thought process, does not appear anxious      CHRONIC MEDICAL DIAGNOSES:  Problem List as of 8/14/2020 Date Reviewed: 8/11/2020 Codes Class Noted - Resolved    Biventricular ICD (implantable cardioverter-defibrillator) in place ICD-10-CM: Z95.810  ICD-9-CM: V45.02  1/26/2018 - Present        * (Principal) Dysarthria ICD-10-CM: R47.1  ICD-9-CM: 784.51  8/11/2020 - Present        TIA (transient ischemic attack) ICD-10-CM: G45.9  ICD-9-CM: 435.9  8/10/2020 - Present        Cardiomyopathy ([address] ICD-10-CM: I42.9  ICD-9-CM: 425.4  5/23/2016 - Present        Skin cancer ICD-10-CM: C44.90  ICD-9-CM: 173.90  Unknown - Present        Acid indigestion ICD-10-CM: K30  ICD-9-CM: 536.8  Unknown - Present        Asthma ICD-10-CM: 493  ICD-9-CM: 629  Unknown - Present        Back pain ICD-10-CM: M54.9  ICD-9-CM: 724.5  Unknown - Present        Wears dentures ICD-10-CM: Z97.2  ICD-9-CM: V45.84  Unknown - Present        Constipation ICD-10-CM: 564.0  ICD-9-CM: 564.0  Unknown - Present        Diabetes mellitus ICD-10-CM: 250  ICD-9-CM: 250  Unknown - Present        Diarrhea ICD-10-CM: R19.7  ICD-9-CM: 787.91  Unknown - Present        Frequent episodic tension-type headache ICD-10-CM: Q36.497  ICD-9-CM: 339.11  Unknown - Present        Hemorrhoids ICD-10-CM: 711  ICD-9-CM: 187  Unknown - Present        Hernia of unspecified site of abdominal cavity without mention of obstruction or gangrene ICD-10-CM: K46.9  ICD-9-CM: 553.9  Unknown - Present        HTN (hypertension) ICD-10-CM: I10  ICD-9-CM: 401.9  Unknown - Present        Muscle pain ICD-10-CM: M79.10  ICD-9-CM: 729.1  Unknown - Present        SOB (shortness of breath) ICD-10-CM: R06.02  ICD-9-CM: 786.05  Unknown - Present        Thyroid disorder ICD-10-CM: E07.9  ICD-9-CM: 246. 9  Unknown - Present                Discussed with patient and family. Explained the importance of following up, Compliance with medications and recommendations on discharge,Side effect profile of medications were explained. Safety precautions at home and while taking pain medications also explained.  All questions answered to the satisfaction of the patient/family. Discussed with consultant(s) who are agreeable to the discharge. Verbal and written instructions on discharge given. Explained about Discharge medications and side effect profile. Advised patient/family to followup with their pcp for medication refills and preauthorization of medications, Home health orders. checkups,screenign programs as appropriate for age. Thank you Alfredo Yip MD for taking care of your patient, Please call with any questions.       Greater than 35 minutes were spent with the patient on counseling and coordination of care    Signed:   Dione Mai MD  8/14/2020  10:01 AM

## 2020-08-14 NOTE — DISCHARGE INSTRUCTIONS
Patient Education   Learning About Coronavirus [phone])  Coronavirus [phone]): Overview  What is coronavirus ([id number])? The coronavirus disease (COVID-19) is caused by a virus. It is an illness that was first found in [place], [place], in December 2019. It has since spread worldwide. The virus can cause fever, cough, and trouble breathing. In severe cases, it can cause pneumonia and make it hard to breathe without help. It can cause death. Coronaviruses are a large group of viruses. They cause the common cold. They also cause more serious illnesses like Middle East respiratory syndrome (MERS) and severe acute respiratory syndrome (SARS). COVID-19 is caused by a novel coronavirus. That means it's a new type that has not been seen in people before. This virus spreads person-to-person through droplets from coughing and sneezing. It can also spread when you are close to someone who is infected. And it can spread when you touch something that has the virus on it, such as a doorknob or a tabletop. What can you do to protect yourself from coronavirus (COVID-19)? The best way to protect yourself from getting sick is to:  · Avoid areas where there is an outbreak. · Avoid contact with people who may be infected. · Wash your hands often with soap or alcohol-based hand sanitizers. · Avoid crowds and try to stay at least 6 feet away from other people. · Wash your hands often, especially after you cough or sneeze. Use soap and water, and scrub for at least 20 seconds. If soap and water aren't available, use an alcohol-based hand . · Avoid touching your mouth, nose, and eyes. What can you do to avoid spreading the virus to others? To help avoid spreading the virus to others:  · Cover your mouth with a tissue when you cough or sneeze. Then throw the tissue in the trash. · Use a disinfectant to clean things that you touch often. · Stay home if you are sick or have been exposed to the virus.  Don't go to school, work, or public areas. And don't use public transportation. · If you are sick:  ? Leave your home only if you need to get medical care. But call the doctor's office first so they know you're coming. And wear a face mask, if you have one.  ? If you have a face mask, wear it whenever you're around other people. It can help stop the spread of the virus when you cough or sneeze. ? Clean and disinfect your home every day. Use household  and disinfectant wipes or sprays. Take special care to clean things that you grab with your hands. These include doorknobs, remote controls, phones, and handles on your refrigerator and microwave. And don't forget countertops, tabletops, bathrooms, and computer keyboards. When to call for help  Call 911 anytime you think you may need emergency care. For example, call if:  · You have severe trouble breathing. (You can't talk at all.)  · You have constant chest pain or pressure. · You are severely dizzy or lightheaded. · You are confused or can't think clearly. · Your face and lips have a blue color. · You pass out (lose consciousness) or are very hard to wake up. Call your doctor now if you develop symptoms such as:  · Shortness of breath. · Fever. · Cough. If you need to get care, call ahead to the doctor's office for instructions before you go. Make sure you wear a face mask, if you have one, to prevent exposing other people to the virus. Where can you get the latest information? The following health organizations are tracking and studying this virus. Their websites contain the most up-to-date information. Heather Mahoney also learn what to do if you think you may have been exposed to the virus. · U.S. Centers for Disease Control and Prevention (CDC): The CDC provides updated news about the disease and travel advice. The website also tells you how to prevent the spread of infection.  www.cdc.gov  · World Health Organization [facility]): WHO offers information about the virus outbreaks. WHO also has travel advice. www.who.int  Current as of: April 1, 2020               Content Version: 12.4  © 2006-2020 Healthwise, Incorporated. Care instructions adapted under license by your healthcare professional. If you have questions about a medical condition or this instruction, always ask your healthcare professional. [address] any warranty or liability for your use of this information. Discharge Instructions       PATIENT ID: Laila Hughes  MRN: [id number]   YOB: 1950    DATE OF ADMISSION: 8/10/2020  5:27 PM    DATE OF DISCHARGE: 8/14/2020    PRIMARY CARE PROVIDER: Yany Jo MD     ATTENDING PHYSICIAN: Shirley Baptiste MD  DISCHARGING PROVIDER: Mikki Mcmanus MD    To contact this individual call [phone] and ask the  to page. If unavailable ask to be transferred the Adult Hospitalist Department.     DISCHARGE DIAGNOSES   # Acute ischemic stroke with Left MCA stenosis with partial occlusion   # Acute on chronic systolic congestive heart failure, NYHA III    CONSULTATIONS: IP CONSULT TO NEUROLOGY  IP CONSULT TO NEUROINTERVENTIONAL SURGERY  IP CONSULT TO OTOLARYNGOLOGY  IP CONSULT TO CARDIOLOGY    PROCEDURES/SURGERIES: * No surgery found *    PENDING TEST RESULTS:   At the time of discharge the following test results are still pending: None     FOLLOW UP APPOINTMENTS:   Follow-up Information     Follow up With Specialties Details Why Contact Info    Coleen Bernal MD Neuroradiology In 4 weeks For follow up on incidental aneurysm finding with neurointerventional surgery  [address]      Yany Jo MD Family Medicine Schedule an appointment as soon as possible for a visit in 2 weeks Post hospital discharge follow up  [facility] Shelly Yap       Nicole William MD Cardiology Schedule an appointment as soon as possible for a visit in 2 weeks Follow up  [address] [facility] [address] [facility] [place]  [address] [facility], Jimbofausto Magallanes, DO Neurology Schedule an appointment as soon as possible for a visit in 2 weeks Follow up  [address] [facility] [address] [facility] [address]:   - Fall precaution     DIET: Cardiac diet     ACTIVITY: As tolerated     WOUND CARE: None     EQUIPMENT needed: None       DISCHARGE MEDICATIONS:   See Medication Reconciliation Form    · It is important that you take the medication exactly as they are prescribed. · Keep your medication in the bottles provided by the pharmacist and keep a list of the medication names, dosages, and times to be taken in your wallet. · Do not take other medications without consulting your doctor. NOTIFY YOUR PHYSICIAN FOR ANY OF THE FOLLOWING:   Fever over 101 degrees for 24 hours. Chest pain, shortness of breath, fever, chills, nausea, vomiting, diarrhea, change in mentation, falling, weakness, bleeding. Severe pain or pain not relieved by medications. Or, any other signs or symptoms that you may have questions about. DISPOSITION:  x  Home With:  x OT x PT  HH  RN       SNF/Inpatient Rehab/LTAC    Independent/assisted living    Hospice    Other:     CDMP Checked:   Yes ***     PROBLEM LIST Updated:  Yes ***       Signed:   Cristina Le MD  8/14/2020  9:51 AM      Patient Education        Learning About Dysarthria  What is dysarthria? When people have dysarthria (say \"dis-AR-three-uh\"), they cannot speak well. They understand language. They know what they want to say. And they usually don't have trouble reading and writing. But when they talk, their speech is often slurred and hard to understand. This can be caused by an injury to the brain or a disease of the nervous system. It can affect the nerves that control the lips, jaw, tongue, and soft palate.  Some causes include a stroke, Parkinson's disease, myasthenia gravis, or injury to the head. If you have dysarthria, you and your family may feel frustrated and anxious. But speech therapy can help improve your speech so that others can understand you better. What are the symptoms? The symptoms of dysarthria depend on what caused it. People with dysarthria may:  · Slur their speech. · Have a hard time saying consonants with enough force to be understood. · Speak too fast or too slow. · Mumble and run their words together. · Be able to say only a few words per breath. · Speak too softly. · Have a hard time sucking, chewing, and swallowing. How is it diagnosed? Your doctor will do a physical exam and ask questions about your medical history. If it looks like you have a speech problem, the doctor may refer you to a speech-language pathologist (SLP). Your doctor or SLP may suggest other tests to:  · Find out if you have had a stroke, an injury to your brain, or a disease of your nervous system. · Look for a swallowing problem. Sometimes this is done with a swallowing test that uses X-rays. The SLP will also listen to you talk. He or she will watch how you say sounds and combinations of sounds. The SLP will also listen to how you pause between phrases, how you put stress on parts of words, and how loudly you speak. How is it treated? If a treatable medical condition is causing your speech problem, your doctor will likely start by treating that condition. This may also improve your speech. If your speech problem can't be solved by treating a medical condition, then there are things your doctor or speech-language pathologist can do to help improve your speech. He or she may give you:  · Methods to help make your speech sounds clearer. · Strategies to help you communicate better. · A computer or other device, if needed, to help you communicate. · A palatal lift. This is a device that looks like a retainer.  It supports the soft palate. Your health care team will help you decide on the best schedule for treatment. What are some tips for coping? Communication problems can be very frustrating. Here are some tips: For you  · Try to exaggerate the sounds of each word. · Be patient with others. If they have trouble understanding you, try again. · Use other methods to help listeners understand you. For example, you can use an alphabet board. Point to the first letter of each word as you say it. For family and friends  · Be patient, understanding, and supportive. · Speak directly to your loved one. Keep eye contact. · Give your loved one enough time to talk. · Talk with the person in a quiet place without distractions like radio or TV. · Don't correct the person's pronunciation. · Ask the person to repeat something if you don't understand. · Now and then, repeat back what the person said. This helps confirm that you understand the message. · Limit conversations to small groups or one-on-one talks. Large group conversations may be hard for your loved one to follow. Follow-up care is a key part of your treatment and safety. Be sure to make and go to all appointments, and call your doctor if you are having problems. It's also a good idea to know your test results and keep a list of the medicines you take. Where can you learn more? Go to http://gabrielle-vitor.info/  Enter P901 in the search box to learn more about \"Learning About Dysarthria. \"  Current as of: November 20, 2019               Content Version: 12.5  © 9752-9488 Healthwise, Incorporated. Care instructions adapted under license by [employer] (which disclaims liability or warranty for this information). If you have questions about a medical condition or this instruction, always ask your healthcare professional. [address] any warranty or liability for your use of this information.           Learning About an Ischemic Stroke  What is an ischemic stroke? An ischemic (say \"lwk-IGP-umpv\") stroke occurs when a blood clot blocks a blood vessel in the brain. This means that blood cannot flow to some part of the brain. Without blood and the oxygen it carries, this part of the brain starts to die. The part of the body controlled by the damaged area of the brain cannot work properly. This is different from a hemorrhagic (say \"jka-exh-MZ-jick\") stroke, which happens when a blood vessel in the brain has burst open or has started to leak. The brain damage from a stroke starts within minutes. Quick treatment can help limit damage to the brain and make recovery more likely. People who have had a stroke may have a hard time talking, understanding things, and making decisions. They may have to relearn daily activities, such as how to eat, bathe, and dress. How well someone recovers from a stroke depends on how quickly the person gets to the hospital, where in the brain the stroke happened, and how severe it was. Training and therapy also make a difference in how well people recover. What are the symptoms? If you have any of these symptoms, [id number] other emergency services right away:   · You have symptoms of a stroke. These may include:  ? Sudden numbness, tingling, weakness, or loss of movement in your face, arm, or leg, especially on only one side of your body. ? Sudden vision changes. ? Sudden trouble speaking. ? Sudden confusion or trouble understanding simple statements. ? Sudden problems with walking or balance. ? A sudden, severe headache that is different from past headaches. See your doctor if you have symptoms that seem like a stroke, even if they go away quickly. You may have had a transient ischemic attack (TIA), sometimes called a mini-stroke. A TIA is a warning that a stroke may happen soon. Getting early treatment for a TIA can help prevent a stroke. What causes an ischemic stroke?   An ischemic stroke is caused by a blood clot that blocks blood flow in the brain. The most common causes of blood clots include:  · Hardening of the arteries (atherosclerosis). This is caused by high blood pressure, diabetes, high cholesterol, or smoking. · Atrial fibrillation. How is ischemic stroke treated? Emergency treatment may be done to restore blood flow to the brain using medicine or a procedure. You may take medicines to help prevent another stroke. Ask your doctor if a stroke rehab program is right for you. How can you prevent another stroke? · Work with your doctor to treat any health problems you have. High blood pressure, high cholesterol, atrial fibrillation, and diabetes all raise your chances of having a stroke. · Be safe with medicines. Take your medicine exactly as prescribed. Call your doctor if you think you are having a problem with your medicine. · Have a healthy lifestyle. ? Do not smoke or allow others to smoke around you. If you need help quitting, talk to your doctor about stop-smoking programs and medicines. These can increase your chances of quitting for good. Smoking makes a stroke more likely. ? Limit alcohol to 2 drinks a day for men and 1 drink a day for women. ? Lose weight if you need to. A healthy weight will help you keep your heart and body healthy. ? Be active. Ask your doctor what type and level of activity is safe for you.  ? Eat heart-healthy foods, like fruits, vegetables, and high-fiber foods. Follow-up care is a key part of your treatment and safety. Be sure to make and go to all appointments, and call your doctor if you are having problems. It's also a good idea to know your test results and keep a list of the medicines you take. Where can you learn more? Go to http://gabrielle-vitor.info/  Enter D161 in the search box to learn more about \"Learning About an Ischemic Stroke. \"  Current as of: March 4, 2020               Content Version: 12.5  © 6411-2344 Healthwise, Incorporated. Care instructions adapted under license by [employer] (which disclaims liability or warranty for this information). If you have questions about a medical condition or this instruction, always ask your healthcare professional. [address] any warranty or liability for your use of this information. Diabetes Management:  1. Take a blood glucose reading four times daily:  First thing in the morning before you eat or drink anything. Then before lunch, dinner and bedtime. Make a log and bring to your next doctor appointment. If your blood sugar is over 200 consistently OR   If your blood sugar is under 100 consistently: call your doctor for a medication adjustment   Goal blood sugar is: _80-130 in am and before meals; after meals <180mg/dl_______________    2. Take your insulin two times a day- first thing in the morning and before bedtime. Goal A1C is 7%. 3. Make a follow up appointment with your endocrinologist or primary care physician. Please see him within the next 2-4 weeks. 4. Make sure to get a DILATED eye exam every year. This checks for retinal blood vessel damage caused by long term high blood sugar. 5. Make sure to examine your feet every day looking for any wound or foot irritation as sensation can be impaired in your feet. Always wear socks and/or slippers even while at home. This will protect your feet from injury. 6. Diabetes Self Management Training:  Outpatient appointments are available with a certified diabetic educator who can  you with meal planning, insulin administration and other diabetes management strategies. Please call [phone] to schedule at a location close to your home.

## 2020-08-14 NOTE — PROGRESS NOTES
Problem: TIA/CVA Stroke: Day 2 Until Discharge  Goal: Activity/Safety  Outcome: Progressing Towards Goal  Goal: Diagnostic Test/Procedures  Outcome: Progressing Towards Goal  Goal: Discharge Planning  Outcome: Progressing Towards Goal  Goal: Medications  Outcome: Progressing Towards Goal  Goal: *Stroke education continued(Stroke Metric)  Outcome: Progressing Towards Goal     Problem: Falls - Risk of  Goal: *Absence of Falls  Description: Document Claire Fall Risk and appropriate interventions in the flowsheet.   Outcome: Progressing Towards Goal  Note: Fall Risk Interventions:  Mobility Interventions: OT consult for ADLs, Patient to call before getting OOB, PT Consult for mobility concerns, PT Consult for assist device competence, Communicate number of staff needed for ambulation/transfer         Medication Interventions: Evaluate medications/consider consulting pharmacy, Patient to call before getting OOB, Teach patient to arise slowly         History of Falls Interventions: Bed/chair exit alarm, Consult care management for discharge planning, Door open when patient unattended, Evaluate medications/consider consulting pharmacy, Investigate reason for fall         Problem: Heart Failure: Day 4  Goal: Activity/Safety  Outcome: Progressing Towards Goal  Goal: Diagnostic Test/Procedures  Outcome: Progressing Towards Goal  Goal: Medications  Outcome: Progressing Towards Goal

## 2020-08-14 NOTE — DIABETES MGMT
[facility]  CLINICAL NURSE SPECIALIST CONSULT  PROGRAM FOR DIABETES HEALTH    INITIAL NOTE    Presentation   Melissa Mg is a 68 y.o. female admitted dysarthria/slurred speech and also HF exacerbation. EF 20-25%. After initial work up and CT scan -thromus noted in left. Neuro deficits now resolved  HX: PMH: DM2- A1C 10.4%/ ICD-pacemeaker/ hypothyroidism (partial thyroidectomy)/ HF    DX: CT-head + for thrombus/ ECHO -EF 20-25%    TX: diuresis     Current clinical course has been complicated by persistent hyperglycemia and elevated A1C. .     Diabetes: Patient has known poor control Type 2 diabetes, treated with 75/25 insulin and Metformin PTA. Family history unknown for diabetes. Consulted by Provider for advanced diabetes nursing assessment and care, specifically related to   [] Transitioning off Kathy Achilles   [] Inpatient management strategy  [x] Home management assessment  [] Survival skill education    Diabetes-related medical history  Acute complications  hyperglycemia  Neurological complications  Peripheral neuropathy  Microvascular disease  denies  Macrovascular disease  denies  Other associated conditions     HF/hypothyroid    Diabetes medication history  Drug class Currently in use Discontinued Never used   Biguanide Metformin 750mg BID     DDP-4 inhibitor       Sulfonylurea      Thiazolidinedione      GLP-1 RA      SGLT-2 inhibitors      Basal insulin      Fixed Dose  Combinations 75/25 Humalog  24units BID     Bolus insulin        Subjective   Ms Kelley Curtis is a pleasant woman who suffers with HF and managing her diabetes. She voiced increassed stress lately (takes care of disabled adult son who is blind, and cares for her 7 dogs). She shared that she has loss of appetite often and \"doesn't eat much\" and has not had the energy to cook lately.  She is followed for diabetes management by her PCP, Irene, and states\" she communicates with him often\"  She states she watches her consumption of potatoes/rice/ and other starchy vegggies. Patient reports the following home diabetes self-care practices:  Eating pattern-eats late/ makes turkey dishes/ does not eat much red meat/ drinks smoothies with frozen fruit and drinks milk with ice at night for a snack    Physical activity pattern-limited due to HF    Monitoring pattern-only checks twice a day (usually runs 200s)-Rarely has low BG. Taking medications pattern  [x] Consistent administration  [x] Affordable    Social determinants of health impacting diabetes self-management practices   Concerned that you need to know more about how to stay healthy with diabetes    Objective   Physical exam  General Alert, oriented and in no acute distress. Conversant and cooperative. Vital Signs   Visit Vitals  /52 (BP 1 Location: Left arm, BP Patient Position: At rest)   Pulse 78   Temp 97.3 °F (36.3 °C)   Resp 14   Ht 5' 2\" (1.575 m)   Wt 91 kg (200 lb 11.2 oz)   SpO2 96%   BMI 36.71 kg/m²     Skin  Warm and dry. Heart   Regular rate and rhythm. No murmurs, rubs or gallops  Lungs  Clear to auscultation without rales or rhonchi  Extremities No foot wounds    Diabetic foot exam:    Left Foot     Visual Exam: normal    Pulse DP: 2+ (normal)   Filament test: reduced sensation      Right Foot   Visual Exam: normal    Pulse DP: 2+ (normal)   Filament test: reduced sensation     DP & PT pulses +2.      Laboratory  Lab Results   Component Value Date/Time    Hemoglobin A1c 10.4 (H) 08/11/2020 12:17 AM    Hemoglobin A1c (POC) 9.0 05/18/2015 03:03 PM    Hemoglobin A1c, External 10.4 05/12/2015     Lab Results   Component Value Date/Time    LDL, calculated 70.4 08/11/2020 12:17 AM     Lab Results   Component Value Date/Time    Creatinine 0.85 08/13/2020 05:49 AM     Lab Results   Component Value Date/Time    Sodium 135 (L) 08/13/2020 05:49 AM    Potassium 3.6 08/13/2020 05:49 AM    Chloride 99 08/13/2020 05:49 AM    CO2 29 08/13/2020 05:49 AM    Anion gap 7 08/13/2020 05:49 AM    Glucose 172 (H) 08/13/2020 05:49 AM    BUN 18 08/13/2020 05:49 AM    Creatinine 0.85 08/13/2020 05:49 AM    BUN/Creatinine ratio 21 (H) 08/13/2020 05:49 AM    GFR est AA >60 08/13/2020 05:49 AM    GFR est non-AA >60 08/13/2020 05:49 AM    Calcium 8.5 08/13/2020 05:49 AM    Bilirubin, total 0.9 08/10/2020 05:54 PM    Alk. phosphatase 87 08/10/2020 05:54 PM    Protein, total 7.6 08/10/2020 05:54 PM    Albumin 2.7 (L) 08/10/2020 05:54 PM    Globulin 4.9 (H) 08/10/2020 05:54 PM    A-G Ratio 0.6 (L) 08/10/2020 05:54 PM    ALT (SGPT) 24 08/10/2020 05:54 PM     Lab Results   Component Value Date/Time    ALT (SGPT) 24 08/10/2020 05:54 PM       Factors affecting BG pattern  Factor Dose Comments   Nutrition:  Carb-controlled meals     60 grams/meal        Blood glucose pattern        Assessment and Plan   Nursing Diagnosis Risk for unstable blood glucose pattern   Nursing Intervention Domain 5253 Decision-making Support   Nursing Interventions Examined current inpatient diabetes control   Explored factors facilitating and impeding inpatient management  Identified self-management practices impeding diabetes control  Explored corrective strategies with patient and responsible inpatient provider   Informed patient of rational for insulin strategy while hospitalized  Instructed patient in monitoring blood glucose more often, following up with PCP, watching consumption of fruit and dairy. Evaluation   Ms. Murrell Hamman  with poorly controlled Type 2 diabetes, hasn't achieved inpatient blood glucose target of 100-180mg/dl. Has been on correctional insulin only while hospitalized and BG ranges >200 consistently. Hospitalist consulted for medication assessment and recommendations. Since A1C 10.4%, it would be appropriate that her 75/25 insulin dose be adjusted to control her BG as she is insulin resistant.   It would also be appropriate that she follow up with her PCP to discuss adding another diabetic medication to further control her diabetes and decrease A1C. Recommendations   Discharge Medication Recommendations  - Increase 75/25 insulin dose to 28units BID (42 units daily basal/ 14units daily Lispro)   -She was instructed to check her BG more often & follow up with PCP to discuss her diabetes management. _Follow up with PCP within 2 weeks (will route my note)      Billing Code(s)   I personally reviewed chart, notes, data and current medications in the medical record, and examined the patient at bedside before making care recommendations. Thank you for including us in their care. I spent 60 minutes in direct patient care today for this patient.   Time includes chart review, face to face with patient and collaboration with interdisciplinary care team.     [facility], [facility]  Program for Diabetes Health  [phone]  Access via [address] [place] [address]

## 2020-08-14 NOTE — PROGRESS NOTES
Pharmacist Discharge Medication Reconciliation    Discharging Provider: Dr. Henny Cagle    Significant PMH:   Past Medical History:   Diagnosis Date    Acid indigestion     heartburn    Asthma     Back pain     Constipation     Diabetes mellitus     Diarrhea     Frequent episodic tension-type headache     Hemorrhoids     Hernia of unspecified site of abdominal cavity without mention of obstruction or gangrene     HTN (hypertension)     ICD (implantable cardioverter-defibrillator) in place     Muscle pain     joint pain    Pacemaker     biventricular ICD 1/26/18    Skin cancer     SOB (shortness of breath)     Thyroid disorder     Wears dentures      Chief Complaint for this Admission:   Chief Complaint   Patient presents with    TIA     Allergies: Codeine; Novocain [procaine]; and Tramadol    Discharge Medications:   Current Discharge Medication List        START taking these medications    Details   clopidogreL (PLAVIX) 75 mg tab Take 1 Tab by mouth daily for 30 days. Qty: 30 Tab, Refills: 1           CONTINUE these medications which have CHANGED    Details   aspirin (ASPIRIN) 325 mg tablet Take 1 Tab by mouth daily for 30 days. Qty: 30 Tab, Refills: 1      carvediloL (COREG) 3.125 mg tablet Take 1 Tab by mouth two (2) times daily (with meals) for 30 days. Qty: 60 Tab, Refills: 1      furosemide (LASIX) 40 mg tablet Take 1 Tab by mouth daily for 30 days. Indications: sometimes tid  Qty: 30 Tab, Refills: 1           CONTINUE these medications which have NOT CHANGED    Details   magnesium oxide (MAG-OX) 400 mg tablet Take 400 mg by mouth two (2) times a day. cholecalciferol (Vitamin D3) 25 mcg (1,000 unit) cap Take  by mouth daily. metFORMIN (GLUCOPHAGE) 500 mg tablet Take  by mouth two (2) times daily (with meals). guaiFENesin-codeine (VIRTUSSIN AC) 100-10 mg/5 mL solution Take 5 mL by mouth three (3) times daily as needed for Cough. allopurinol (ZYLOPRIM) 100 mg tablet Take 100 mg by mouth daily. PROAIR HFA 90 mcg/actuation inhaler Take 2 Puffs by inhalation every four (4) hours as needed. LINZESS 145 mcg cap capsule Take 145 mcg by mouth daily as needed. acetaminophen (TYLENOL EXTRA STRENGTH) 500 mg tablet Take  by mouth every six (6) hours as needed for Pain.      polyethylene glycol (MIRALAX) 17 gram packet Take 17 g by mouth as needed. sacubitril-valsartan (ENTRESTO) 24 mg/26 mg tablet Take 1 Tab by mouth two (2) times a day. Qty: 28 Tab, Refills: 0    Associated Diagnoses: Hypertension, unspecified type; Dilated cardiomyopathy (HCC)      acetaminophen-codeine (TYLENOL #3) 300-30 mg per tablet Take 1 Tab by mouth every six (6) hours as needed for Pain. Max Daily Amount: 4 Tabs. Qty: 20 Tab, Refills: 0      gabapentin (NEURONTIN) 100 mg capsule Take 100 mg by mouth three (3) times daily. oxybutynin chloride XL (DITROPAN XL) 10 mg CR tablet Take 10 mg by mouth two (2) times a day. levothyroxine 175 mcg cap Take 175 mcg by mouth Daily (before breakfast). omeprazole (PRILOSEC) 40 mg capsule Take 40 mg by mouth daily. atorvastatin (LIPITOR) 40 mg tablet Take 40 mg by mouth daily. Associated Diagnoses: Type II or unspecified type diabetes mellitus without mention of complication, uncontrolled; Other specified acquired hypothyroidism; Unspecified vitamin D deficiency      HUMALOG MIX 75-25 KWIKPEN 100 unit/mL (75-25) flexpen INJECT 24 UNITS UNDER THE SKIN TWICE A DAY WITH BREAKFAST AND DINNER  Qty: 15 mL, Refills: 6      potassium chloride SR (KLOR-CON 10) 10 mEq tablet Take 10 mEq by mouth daily. Associated Diagnoses: Type II or unspecified type diabetes mellitus without mention of complication, uncontrolled      melatonin 3 mg tablet Take 3 mg by mouth nightly as needed.            STOP taking these medications       diclofenac (VOLTAREN) 1 % gel Comments:   Reason for Stopping:         nitroglycerin (NITROSTAT) 0.4 mg SL tablet Comments:   Reason for Stopping: The patient's chart, MAR and AVS were reviewed by Sherlyn Khan PHARMD.

## 2020-08-14 NOTE — PROGRESS NOTES
08/14/20 1405   Vitals   Temp 97.5 °F (36.4 °C)   Temp Source Oral   Pulse (Heart Rate) 65   Heart Rate Source Monitor   Resp Rate 15   O2 Sat (%) 94 %   Level of Consciousness Alert   BP (!) 76/44   MAP (Calculated) (!) 55   BP 1 Location Right arm   BP Patient Position Sitting   MEWS Score 3   Box Number 650   Electrodes Replaced No   MD aware of BP.

## 2020-08-14 NOTE — PROGRESS NOTES
Bedside and Verbal shift change report given to Braulio Ruiz RN (oncoming nurse) by Sally Acosta RN (offgoing nurse). Report included the following information SBAR, Kardex, Intake/Output, MAR, Recent Results, Med Rec Status, Cardiac Rhythm Paced, Alarm Parameters  and Dual Neuro Assessment.

## 2020-08-14 NOTE — PROGRESS NOTES
Problem: Diabetes Self-Management  Goal: *Disease process and treatment process  Description: Define diabetes and identify own type of diabetes; list 3 options for treating diabetes. Outcome: Progressing Towards Goal  Goal: *Incorporating nutritional management into lifestyle  Description: Describe effect of type, amount and timing of food on blood glucose; list 3 methods for planning meals. Outcome: Progressing Towards Goal  Goal: *Incorporating physical activity into lifestyle  Description: State effect of exercise on blood glucose levels. Outcome: Progressing Towards Goal  Goal: *Developing strategies to promote health/change behavior  Description: Define the ABC's of diabetes; identify appropriate screenings, schedule and personal plan for screenings. Outcome: Progressing Towards Goal  Goal: *Using medications safely  Description: State effect of diabetes medications on diabetes; name diabetes medication taking, action and side effects. Outcome: Progressing Towards Goal  Goal: *Monitoring blood glucose, interpreting and using results  Description: Identify recommended blood glucose targets  and personal targets. Outcome: Progressing Towards Goal  Goal: *Prevention, detection, treatment of acute complications  Description: List symptoms of hyper- and hypoglycemia; describe how to treat low blood sugar and actions for lowering  high blood glucose level. Outcome: Progressing Towards Goal  Goal: *Prevention, detection and treatment of chronic complications  Description: Define the natural course of diabetes and describe the relationship of blood glucose levels to long term complications of diabetes.   Outcome: Progressing Towards Goal  Goal: *Developing strategies to address psychosocial issues  Description: Describe feelings about living with diabetes; identify support needed and support network  Outcome: Progressing Towards Goal  Goal: *Insulin pump training  Outcome: Progressing Towards Goal  Goal: *Sick day guidelines  Outcome: Progressing Towards Goal  Goal: *Patient Specific Goal (EDIT GOAL, INSERT TEXT)  Outcome: Progressing Towards Goal     Problem: TIA/CVA Stroke: Day 2 Until Discharge  Goal: Activity/Safety  Outcome: Progressing Towards Goal  Goal: Diagnostic Test/Procedures  Outcome: Progressing Towards Goal  Goal: Nutrition/Diet  Outcome: Progressing Towards Goal  Goal: Discharge Planning  Outcome: Progressing Towards Goal  Goal: Medications  Outcome: Progressing Towards Goal  Goal: Respiratory  Outcome: Progressing Towards Goal  Goal: Treatments/Interventions/Procedures  Outcome: Progressing Towards Goal  Goal: Psychosocial  Outcome: Progressing Towards Goal  Goal: *Verbalizes anxiety and depression are reduced or absent  Outcome: Progressing Towards Goal  Goal: *Absence of aspiration  Outcome: Progressing Towards Goal  Goal: *Absence of deep venous thrombosis signs and symptoms(Stroke Metric)  Outcome: Progressing Towards Goal  Goal: *Optimal pain control at patient's stated goal  Outcome: Progressing Towards Goal  Goal: *Tolerating diet  Outcome: Progressing Towards Goal  Goal: *Ability to perform ADLs and demonstrates progressive mobility and function  Outcome: Progressing Towards Goal  Goal: *Stroke education continued(Stroke Metric)  Outcome: Progressing Towards Goal     Problem: Falls - Risk of  Goal: *Absence of Falls  Description: Document Claire Fall Risk and appropriate interventions in the flowsheet.   Outcome: Progressing Towards Goal  Note: Fall Risk Interventions:  Mobility Interventions: Communicate number of staff needed for ambulation/transfer, PT Consult for mobility concerns, PT Consult for assist device competence         Medication Interventions: Evaluate medications/consider consulting pharmacy, Patient to call before getting OOB         History of Falls Interventions: Consult care management for discharge planning         Problem: Heart Failure: Day 3  Goal: Activity/Safety  Outcome: Progressing Towards Goal  Goal: Diagnostic Test/Procedures  Outcome: Progressing Towards Goal  Goal: Nutrition/Diet  Outcome: Progressing Towards Goal  Goal: Discharge Planning  Outcome: Progressing Towards Goal  Goal: Medications  Outcome: Progressing Towards Goal  Goal: Respiratory  Outcome: Progressing Towards Goal  Goal: Treatments/Interventions/Procedures  Outcome: Progressing Towards Goal  Goal: Psychosocial  Outcome: Progressing Towards Goal  Goal: *Oxygen saturation within defined limits  Outcome: Progressing Towards Goal  Goal: *Hemodynamically stable  Outcome: Progressing Towards Goal  Goal: *Optimal pain control at patient's stated goal  Outcome: Progressing Towards Goal  Goal: *Anxiety reduced or absent  Outcome: Progressing Towards Goal  Goal: *Demonstrates progressive activity  Outcome: Progressing Towards Goal

## 2020-08-14 NOTE — PROGRESS NOTES
Stroke Education documented in Patient Education: YES  Core Measures Documented in [facility]:  Risk Factors: YES  Warning signs of stroke: YES  When to Activate 911: YES  Medication Education for Risk Factors: YES  Smoking cessation if applicable: YES  Written Education Given:  YES    Discharge NIH Completed: YES  Score: 0    BRAINS: NO    Follow Up Appointment Made: NO, patient ot follow up with home cardiologist.  Patient will call Monday. Bedside RN performed patient education and medication education. Discharge concerns initiated and discussed with patient, including clarification on \"who\" assists the patient at their home and instructions for when the home going patient should call their provider after discharge. Opportunity for questions and clarification was provided. Patient receptive to education: YES  Patient stated: \"I will call my cardiologist on Monday. \"  Barriers to Education: None  Diagnosis Education given:  NO    Length of stay: 2  Expected Day of Discharge: 8/14/2020  Ask if they have \"Help at Home\" & add to white board? YES    Education Day #: 4    Medication Education Given:  YES  M in the box Medication name: Lasix, entresto, coreg, aspirin, plavix    Pt aware of HCAHPS survey: YES    I have reviewed discharge instructions with the patient and spouse. The patient and spouse verbalized understanding. Patient is being discharged to home by family. IV removed. Belongings with patient.

## 2020-08-15 ENCOUNTER — PATIENT OUTREACH (OUTPATIENT)
Dept: CASE MANAGEMENT | Age: 70
End: 2020-08-15

## 2020-08-15 NOTE — PROGRESS NOTES
8/15/2020 -Care transitions nurse -   Call to and reached Ms. Kwame Prabhakar - who verbalized that she had a TIA -   Her medications were reconciled - new rx - Plavix - anti-platelet - action discussed -   ASA was increased from 81 mg to 325 mg every day -   Pt has follow up with [facility] cardiology Aug 25,with NP;   and with pcp Randy Denton -  - virtual.    We discussed monitoring at home - bp/ pulse; daily weights/ glucose , and temperature - for virtual visits -   Pt's cardiologist is with [facility] - seen in hospital by [facility]  - Dr. Vane [place]. Pt to follow up with neurology re: 3 mm aneurysm noted with CT scan to be repeated in 3 months -     Ms. Holden' meds were adjusted - Lasix and Coreg d/t bp - CTN discussed with pt to monitor weight and bp - call cardiology if increased noted with these changes. Pt verbalized some depression d/t # of conditions and medications - thus she has not always been diet compliant -     Margaret Quach RN, [facility], [facility]  Care transitions nurse [phone]  [facility] Coordination Team    Patient contacted regarding COVID-19  risk. Discussed COVID-19 related testing which was not done at this time. Test results were not done. Patient informed of results, if available? n/a     Care Transition Nurse/ Ambulatory Care Manager/ LPN Care Coordinator contacted the patient by telephone to perform post discharge assessment. Verified name and  with patient as identifiers. Provided introduction to self, and explanation of the CTN/ACM/LPN role, and reason for call due to risk factors for infection and/or exposure to COVID-19. Symptoms reviewed with patient who verbalized the following symptoms: pain or aching joints and shortness of breath/Pt admitted with diagnosis of TIA - dysarthra, facial changes - resolved prior to discharge. Pt has bilateral knee pain. Pt with history of CHF/ ICD; presenting with pulmonary edema.   Pt to monitor bp and weight closely re: changes in meds - call cardiology - [facility] cardiology is her home provider. Due to no new or worsening symptoms encounter was not routed to provider for escalation. Discussed follow-up appointments. If no appointment was previously scheduled, appointment scheduling offered: yes  [facility] follow up appointment(s): No future appointments. Non-[facility] follow up appointment(s): Pt has virtual appt with Dr. Brena Opitz - 8/25 - Pt will monitor bp, pulse, weight, glucose, and temperature and record for virtual updates -   MD on call 24/7 - all providers discussed with pt. Advance Care Planning:   Does patient have an Advance Directive: yes, reviewed and current     Patient has following risk factors of: heart failure and asthma, Diabetes. Pt verbalized depression re: all the conditions and pills she has to take - some of the pills are thought to contribute to her nausea -   We discussed to talk to pcp about this-    CTN/ACM/LPN reviewed discharge instructions, medical action plan and red flags such as increased shortness of breath, increasing fever and signs of decompensation with patient who verbalized understanding. Discussed exposure protocols and quarantine with CDC Guidelines What to do if you are sick with coronavirus disease 2019.  Patient was given an opportunity for questions and concerns. The patient agrees to contact the [facility] exposure line [phone], [facility] 106  ([phone]) and PCP office for questions related to their healthcare. CTN/ACM provided contact information for future needs. Reviewed and educated patient on any new and changed medications related to discharge diagnosis. Patient/family/caregiver given information for [facility] and agrees to enroll yes  Patient's preferred e-mail:  [email or web address]  Patient's preferred phone number: [phone]; [phone]  Based on Loop alert triggers, patient will be contacted by nurse care manager for worsening symptoms. Pt will be further monitored by COVID Loop Team based on severity of symptoms and risk factors. Ms. Stella Santo had elevated A1C 10.4 - discussed with pt -   Regular meals - fruits, vegetables; moderation with carbs - she is using some protein shakes ;  Drink water; check glucose and record -  Resources - [facility]. Exercise as tolerated with her knee issues - gradual progression of activity -   _________________________________________________________________  NeuroInterventional Note  68year old female who presents from OSH with dysarthria and expressive aphasia. All of her symptoms have resolved. There is more likely a moderate to severe stenosis of the left M2 segment with no changes in CT perfusion. 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 PCP or cardiologist should be notified about dual antiplatelet therapy to ensure she does not have any definitive contraindications, otherwise patient will benefit from aspirin and plavix. Current ASA assay is 577 and P2Y12: 246. Recommendations:  1.  mg + Plavix 75 mg daily for 3 months followed by either ASA or Plavix if no contraindications  2. ASA and P2Y12 assays in AM  2. Keep LDL <70  3. Repeat CTA in 3 months  4. Will follow up patient in the clinic and do a surveillance on her 3 mm Pcomm aneurysm     Amber Chatterjee MD  NeuroInterventional Surgery  ______________________________________________    S/p left thyroid lobectomy. Residual right thyroid lobe enlarged, deviation of trachea but good tracheal airway throughout. In light of recent tia,  Recommend eval with one of our thyroid surgeons as outpatient after recovery from acute event. Thyroid findings noncontributory to current tia findings.        Signed By: Reed Solomon MD      August 11, 2020      (Dysarthria is a speech disorder caused by muscle weakness. It can make it hard for you to talk.  People may have trouble understanding what you say.)    DISCHARGE DIAGNOSES / PLAN:    # Acute ischemic stroke with Left MCA stenosis w/ partial occlusion   # Acute on chronic systolic congestive heart failure, NYHA III   # Mildly elevated troponins  # Enlarged thyroid with leftward tracheal deviation  # Diabetes mellitus type 2 poorly controlled   # Hypothyroidism:      Patient couldn't tolerate entresto therefore it was stopped on discharge.  Her insulin was also adjusted     HEMOGLOBIN A1C WITH EAG [[id number]] (Order [id number])   Lab   Date: 8/10/2020  Department: [facility] 6s Neuro-Sci Tele  Released By: Joni Cordoba RN (auto-released)  Authorizing: MD Tequila Martinez MD Lossie Oxford, MD Lossie Oxford, MD    Attending Provider(s)  Admitting Provider  PCP    Jose Alejandro Llanes DO; Tequila Galvan MD; Raquel Mei MD; MD Raquel Wen MD Vickie Dam, MD    8/11/2020  1:45 AM - [place], Lab In [facility]     Component  Value  Flag  Ref Range  Units  Status    Hemoglobin A1c  10.4  High    4.0 - 5.6  %  Final    Comment:    NEW METHOD

## 2020-08-26 ENCOUNTER — PATIENT OUTREACH (OUTPATIENT)
Dept: CASE MANAGEMENT | Age: 70
End: 2020-08-26

## 2020-08-26 NOTE — PROGRESS NOTES
8/26/20 - Care transitions     Call to and reached Ms. Karly Srinivasan -   She had a virtual follow up with Dr. Tara Ozuna - and he called her to check on her today 8/28. She saw cardiology - [facility] cardiology - in person yesterday 8/25 - with no med changes - MD did not detect pleural fluid by auscultation.      - Ms. Karly Srinivasan continues to have dyspnea with minimal exertion.    - She has moderate bilateral leg swelling - she is using compression stockings-    She does report taking her Lasix in amended patterns d/t travel - a few days - 1/2 dose b/c of travel. Other days 40 mg / one day - extra dose. BP in 111/67 range - she is monitoring and recording -   Pulse ox in 97-99 % range -  Glucose - highest reading - 222/ (chocolate cake); today am reading 185 - water and produce encouraged/ fiber - for stabilization. She told Dr. Tara Ozuna she has noted dark stools on Plavix - and he is having her come to the office on Friday for labs - urinalysis and FOBT -     She has appt Dr. Nohemy Harrington - Sept 1                        Dr. Isabela Siddiqui    Sept 21 - post holter    She continues off [facility] -   She continues to have home health services.     Keerthi Riggins RN, [facility], [facility]  Care transitions nurse [phone]  [facility] Coordination Team

## 2020-09-01 ENCOUNTER — OFFICE VISIT (OUTPATIENT)
Dept: NEUROSURGERY | Age: 70
End: 2020-09-01
Payer: MEDICARE

## 2020-09-01 ENCOUNTER — HOSPITAL ENCOUNTER (OUTPATIENT)
Dept: LAB | Age: 70
Discharge: HOME | End: 2020-09-01

## 2020-09-01 VITALS
DIASTOLIC BLOOD PRESSURE: 80 MMHG | OXYGEN SATURATION: 97 % | HEIGHT: 63 IN | BODY MASS INDEX: 35.79 KG/M2 | WEIGHT: 202 LBS | SYSTOLIC BLOOD PRESSURE: 120 MMHG | HEART RATE: 105 BPM | TEMPERATURE: 96.8 F

## 2020-09-01 DIAGNOSIS — E66.01 SEVERE OBESITY (HCC): ICD-10-CM

## 2020-09-01 DIAGNOSIS — I67.1 CEREBRAL ANEURYSM: ICD-10-CM

## 2020-09-01 DIAGNOSIS — I67.1 CEREBRAL ANEURYSM: Primary | ICD-10-CM

## 2020-09-01 LAB
ASPIRIN TEST, ASPIRN: 505 ARU
COMMENT, HOLDF: NORMAL
P2Y12 PLT RESPONSE,PPPR: 266 PRU (ref 194–418)
SAMPLES BEING HELD,HOLD: NORMAL

## 2020-09-01 PROCEDURE — 99203 OFFICE O/P NEW LOW 30 MIN: CPT | Performed by: STUDENT IN AN ORGANIZED HEALTH CARE EDUCATION/TRAINING PROGRAM

## 2020-09-01 RX ORDER — ASPIRIN 81 MG/1
81 TABLET ORAL DAILY
Qty: 30 TAB | Refills: 2 | Status: ON HOLD | OUTPATIENT
Start: 2020-09-01 | End: 2022-01-01

## 2020-09-01 NOTE — PATIENT INSTRUCTIONS
A Healthy Lifestyle: Care Instructions Your Care Instructions A healthy lifestyle can help you feel good, stay at a healthy weight, and have plenty of energy for both work and play. A healthy lifestyle is something you can share with your whole family. A healthy lifestyle also can lower your risk for serious health problems, such as high blood pressure, heart disease, and diabetes. You can follow a few steps listed below to improve your health and the health of your family. Follow-up care is a key part of your treatment and safety. Be sure to make and go to all appointments, and call your doctor if you are having problems. It's also a good idea to know your test results and keep a list of the medicines you take. How can you care for yourself at home? · Do not eat too much sugar, fat, or fast foods. You can still have dessert and treats now and then. The goal is moderation. · Start small to improve your eating habits. Pay attention to portion sizes, drink less juice and soda pop, and eat more fruits and vegetables. ? Eat a healthy amount of food. A 3-ounce serving of meat, for example, is about the size of a deck of cards. Fill the rest of your plate with vegetables and whole grains. ? Limit the amount of soda and sports drinks you have every day. Drink more water when you are thirsty. ? Eat at least 5 servings of fruits and vegetables every day. It may seem like a lot, but it is not hard to reach this goal. A serving or helping is 1 piece of fruit, 1 cup of vegetables, or 2 cups of leafy, raw vegetables. Have an apple or some carrot sticks as an afternoon snack instead of a candy bar. Try to have fruits and/or vegetables at every meal. 
· Make exercise part of your daily routine. You may want to start with simple activities, such as walking, bicycling, or slow swimming. Try to be active 30 to 60 minutes every day.  You do not need to do all 30 to 60 minutes all at once. For example, you can exercise 3 times a day for 10 or 20 minutes. Moderate exercise is safe for most people, but it is always a good idea to talk to your doctor before starting an exercise program. 
· Keep moving. Carmela Corporal the lawn, work in the garden, or Web[employer]. Take the stairs instead of the elevator at work. · If you smoke, quit. People who smoke have an increased risk for heart attack, stroke, cancer, and other lung illnesses. Quitting is hard, but there are ways to boost your chance of quitting tobacco for good. ? Use nicotine gum, patches, or lozenges. ? Ask your doctor about stop-smoking programs and medicines. ? Keep trying. In addition to reducing your risk of diseases in the future, you will notice some benefits soon after you stop using tobacco. If you have shortness of breath or asthma symptoms, they will likely get better within a few weeks after you quit. · Limit how much alcohol you drink. Moderate amounts of alcohol (up to 2 drinks a day for men, 1 drink a day for women) are okay. But drinking too much can lead to liver problems, high blood pressure, and other health problems. Family health If you have a family, there are many things you can do together to improve your health. · Eat meals together as a family as often as possible. · Eat healthy foods. This includes fruits, vegetables, lean meats and dairy, and whole grains. · Include your family in your fitness plan. Most people think of activities such as jogging or tennis as the way to fitness, but there are many ways you and your family can be more active. Anything that makes you breathe hard and gets your heart pumping is exercise. Here are some tips: 
? Walk to do errands or to take your child to school or the bus. 
? Go for a family bike ride after dinner instead of watching TV. Where can you learn more? Go to http://gabrielle-vitor.info/ Enter Z903 in the search box to learn more about \"A Healthy Lifestyle: Care Instructions. \" Current as of: January 31, 2020               Content Version: 12.5 © 2006-2020 [employer]wise[employer]. Care instructions adapted under license by [employer] (which disclaims liability or warranty for this information). If you have questions about a medical condition or this instruction, always ask your healthcare professional. [place] [address] any warranty or liability for your use of this information.

## 2020-09-01 NOTE — PROGRESS NOTES
[facility] follow up presenting with PCOM aneurysm, s/p acute stroke. Patient reports she continues to have difficulty getting her words out since discharge from the hospital.  Reports occasional headaches, dizziness and blurred vision. Patient thinks her blurred vision is from her diabetes. Patient complained of edema in her right ankle. PCP increased her lasix. No acute problems reported.

## 2020-09-02 ENCOUNTER — TELEPHONE (OUTPATIENT)
Dept: NEUROSURGERY | Age: 70
End: 2020-09-02

## 2020-09-02 PROBLEM — E66.01 SEVERE OBESITY (HCC): Status: ACTIVE | Noted: 2020-09-02

## 2020-09-02 LAB
ALBUMIN SERPL-MCNC: 3.4 G/DL (ref 3.7–4.7)
ALBUMIN/GLOB SERPL: 0.9 {RATIO} (ref 1.2–2.2)
ALP SERPL-CCNC: 87 IU/L (ref 39–117)
ALT SERPL-CCNC: 18 IU/L (ref 0–32)
AST SERPL-CCNC: 29 IU/L (ref 0–40)
BILIRUB SERPL-MCNC: 0.7 MG/DL (ref 0–1.2)
BUN SERPL-MCNC: 27 MG/DL (ref 8–27)
BUN/CREAT SERPL: 33 (ref 12–28)
CALCIUM SERPL-MCNC: 8.9 MG/DL (ref 8.7–10.3)
CHLORIDE SERPL-SCNC: 101 MMOL/L (ref 96–106)
CO2 SERPL-SCNC: 23 MMOL/L (ref 20–29)
CREAT SERPL-MCNC: 0.82 MG/DL (ref 0.57–1)
ERYTHROCYTE [DISTWIDTH] IN BLOOD BY AUTOMATED COUNT: 12.7 % (ref 11.7–15.4)
GLOBULIN SER CALC-MCNC: 3.7 G/DL (ref 1.5–4.5)
GLUCOSE SERPL-MCNC: 175 MG/DL (ref 65–99)
HCT VFR BLD AUTO: 30.4 % (ref 34–46.6)
HGB BLD-MCNC: 9.7 G/DL (ref 11.1–15.9)
MCH RBC QN AUTO: 30.2 PG (ref 26.6–33)
MCHC RBC AUTO-ENTMCNC: 31.9 G/DL (ref 31.5–35.7)
MCV RBC AUTO: 95 FL (ref 79–97)
PLATELET # BLD AUTO: 128 X10E3/UL (ref 150–450)
POTASSIUM SERPL-SCNC: 3.8 MMOL/L (ref 3.5–5.2)
PROT SERPL-MCNC: 7.1 G/DL (ref 6–8.5)
RBC # BLD AUTO: 3.21 X10E6/UL (ref 3.77–5.28)
SODIUM SERPL-SCNC: 137 MMOL/L (ref 134–144)
WBC # BLD AUTO: 5.5 X10E3/UL (ref 3.4–10.8)

## 2020-09-02 NOTE — PROGRESS NOTES
1515 Dayana Vivas MD    Patient: Maral Gutierrez MRN: [id number]  SSN: [id number]    YOB: 1950  Age: 65 y.o. Sex: female        Subjective:      Maral Gutierrez is a 65 y.o. female who is being seen for intracranial stenosis. Past Medical History:   Diagnosis Date    Acid indigestion     heartburn    Anxiety disorder     Arthritis     Asthma     Back pain     Bladder spasms     Cerebral artery occlusion with cerebral infarction ([facility] [address] 08/2020    Chest pain     Constipation     Cough     Diabetes mellitus     Diarrhea     Fatigue     Fibromyalgia     Frequent episodic tension-type headache     Hearing reduced     Hemorrhoids     Hernia of unspecified site of abdominal cavity without mention of obstruction or gangrene     HTN (hypertension)     ICD (implantable cardioverter-defibrillator) in place     Muscle pain     joint pain    N&V (nausea and vomiting)     Pacemaker     biventricular ICD 1/26/18    Ringing in ears     Skin cancer     SOB (shortness of breath)     SOB (shortness of breath)     Swallowing difficulty     Thyroid disorder     Vertigo     Wears dentures      Family History   Problem Relation Age of Onset    Diabetes Mother     Heart Disease Mother     Heart Disease Father    Negin Maya Sister     Diabetes Sister     Heart Disease Sister     Hypertension Sister     Lupus Sister     Diabetes Brother      Social History     Tobacco Use    Smoking status: Never Smoker    Smokeless tobacco: Never Used   Substance Use Topics    Alcohol use: Not Currently     Comment: wine occasionally      Cannot display prior to admission medications because the patient has not been admitted in this contact.        Allergies   Allergen Reactions    Codeine Other (comments)     Patient states she is not allergic    Novocain [Procaine] Nausea and Vomiting    Tramadol Other (comments)     Headache       Review of Systems:  A comprehensive review of systems was negative except for that written in the History of Present Illness. Denies numbness, tingling, chest pain, leg pain, nausea, vomiting, difficulty swallowing, headache, and dyspnea. Objective:     Vitals:    09/01/20 1131   BP: 120/80   Pulse: (!) 105   Temp: 96.8 °F (36 °C)   TempSrc: Temporal   SpO2: 97%   Weight: 202 lb (91.6 kg)   Height: 5' 3\" (1.6 m)      Physical Exam:  GENERAL: Calm, cooperative, NAD  SKIN: Warm, dry, color appropriate for ethnicity. Groin site soft, with no odor, bleeding or drainage noted. Mild ecchymosis present. Neurologic Exam:  Mental Status:  Alert and oriented x 4. Appropriate affect, mood and behavior. Language:    Normal fluency, repetition, comprehension and naming. Cranial Nerves:   Pupils 3 mm, equal, round and reactive to light. Visual fields full to confrontation. Extraocular movements intact. Facial sensation intact. Full facial strength, no asymmetry. Hearing grossly intact bilaterally. No dysarthria. Tongue protrudes to midline, palate elevates symmetrically. Shoulder shrug 5/5 bilaterally. Motor:    No pronator drift. Bulk and tone normal.      5/5 power in all extremities proximally and distally. No involuntary movements. Sensation:    Sensation intact throughout to light touch, temperature, pinprick, vibration, proprioception     Reflexes:    Reflexes are 2+ at the biceps, triceps, patella and achilles bilaterally. Negative Babinskis    Coordination & Gait: Normal. FTN and HTS intact with no ataxia present.     Labs:  Lab Results   Component Value Date/Time    WBC 5.5 09/01/2020 12:24 PM    HGB 9.7 (L) 09/01/2020 12:24 PM    HCT 30.4 (L) 09/01/2020 12:24 PM    PLATELET 622 (L) 74/36/3886 12:24 PM    MCV 95 09/01/2020 12:24 PM      Lab Results   Component Value Date/Time    Sodium 137 09/01/2020 12:24 PM    Potassium 3.8 09/01/2020 12:24 PM    Chloride 101 09/01/2020 12:24 PM    CO2 23 09/01/2020 12:24 PM    Anion gap 7 08/13/2020 05:49 AM    Glucose 175 (H) 09/01/2020 12:24 PM    BUN 27 09/01/2020 12:24 PM    Creatinine 0.82 09/01/2020 12:24 PM    BUN/Creatinine ratio 33 (H) 09/01/2020 12:24 PM    GFR est AA 83 09/01/2020 12:24 PM    GFR est non-AA 72 09/01/2020 12:24 PM    Calcium 8.9 09/01/2020 12:24 PM     Lab Results   Component Value Date/Time    Troponin-I, Qt. 0.27 (H) 08/11/2020 06:28 AM       Imaging:      CT Results (maximum last 3):  Per imaging studies     Assessment:   33-year-old female with history of diabetes, hypertension, hyperlipidemia, presenting with dysarthria and expressive aphasia incidental 3 mm right posterior communicating artery aneurysm. Refers feeling well no slurred speech no headaches no blurry vision. She Is currently on aspirin 325 and Plavix 75 for severe stenosis of the M2 division of the left middle cerebral artery. Patient reports occasional dark stools and a recent P2 Y 12 is subtherapeutic. Will discontinue Plavix at this point and patient will need to continue on her current dose of aspirin. Plan:     Continue with current dose of aspirin  We will stop Plavix  CTA of the head by the end of September 2020    Thank you for this consult and participating in the care of this patient.   Signed By: Tarik Williamson MD     September 2, 2020

## 2020-09-02 NOTE — TELEPHONE ENCOUNTER
Spoke to patient to inform her per provider, she is resistant to the Plavix. Due to patient reporting dark stools at office visit, discontinue taking the plavix and wait for her PCP evaluation. Informed patient to continue taking the current dose of aspirin. Asked that patient call the office with an update once she has been evaluated by her PCP. Patient stated understanding.

## 2020-09-10 ENCOUNTER — HOSPITAL ENCOUNTER (INPATIENT)
Age: 70
LOS: 1 days | Discharge: HOME HEALTH | End: 2020-09-11
Attending: INTERNAL MEDICINE | Admitting: INTERNAL MEDICINE

## 2020-09-10 ENCOUNTER — IP HISTORICAL/CONVERTED ENCOUNTER (OUTPATIENT)
Dept: OTHER | Age: 70
End: 2020-09-10

## 2020-09-11 RX ORDER — NITROGLYCERIN 0.4 MG/1
0.4 TABLET SUBLINGUAL
COMMUNITY
End: 2020-09-21

## 2020-09-11 RX ORDER — SACUBITRIL AND VALSARTAN 24; 26 MG/1; MG/1
1 TABLET, FILM COATED ORAL 2 TIMES DAILY
COMMUNITY
End: 2020-09-21

## 2020-09-11 RX ORDER — AMIODARONE HYDROCHLORIDE 200 MG/1
200 TABLET ORAL
COMMUNITY
End: 2020-09-21

## 2020-09-21 ENCOUNTER — OFFICE VISIT (OUTPATIENT)
Dept: CARDIOLOGY CLINIC | Age: 70
End: 2020-09-21
Payer: MEDICARE

## 2020-09-21 VITALS
DIASTOLIC BLOOD PRESSURE: 62 MMHG | WEIGHT: 201.7 LBS | SYSTOLIC BLOOD PRESSURE: 100 MMHG | BODY MASS INDEX: 35.74 KG/M2 | RESPIRATION RATE: 24 BRPM | HEIGHT: 63 IN | OXYGEN SATURATION: 98 % | HEART RATE: 60 BPM

## 2020-09-21 DIAGNOSIS — I10 HYPERTENSION, UNSPECIFIED TYPE: ICD-10-CM

## 2020-09-21 DIAGNOSIS — Z79.4 TYPE 2 DIABETES MELLITUS WITH DIABETIC NEPHROPATHY, WITH LONG-TERM CURRENT USE OF INSULIN (HCC): ICD-10-CM

## 2020-09-21 DIAGNOSIS — I42.0 DILATED CARDIOMYOPATHY (HCC): ICD-10-CM

## 2020-09-21 DIAGNOSIS — E11.21 TYPE 2 DIABETES MELLITUS WITH DIABETIC NEPHROPATHY, WITH LONG-TERM CURRENT USE OF INSULIN (HCC): ICD-10-CM

## 2020-09-21 DIAGNOSIS — Z95.810 AUTOMATIC IMPLANTABLE CARDIAC DEFIBRILLATOR IN SITU: Primary | ICD-10-CM

## 2020-09-21 PROCEDURE — G8752 SYS BP LESS 140: HCPCS | Performed by: INTERNAL MEDICINE

## 2020-09-21 PROCEDURE — 1111F DSCHRG MED/CURRENT MED MERGE: CPT | Performed by: INTERNAL MEDICINE

## 2020-09-21 PROCEDURE — G8399 PT W/DXA RESULTS DOCUMENT: HCPCS | Performed by: INTERNAL MEDICINE

## 2020-09-21 PROCEDURE — G8754 DIAS BP LESS 90: HCPCS | Performed by: INTERNAL MEDICINE

## 2020-09-21 PROCEDURE — G8536 NO DOC ELDER MAL SCRN: HCPCS | Performed by: INTERNAL MEDICINE

## 2020-09-21 PROCEDURE — 1090F PRES/ABSN URINE INCON ASSESS: CPT | Performed by: INTERNAL MEDICINE

## 2020-09-21 PROCEDURE — 2022F DILAT RTA XM EVC RTNOPTHY: CPT | Performed by: INTERNAL MEDICINE

## 2020-09-21 PROCEDURE — 1101F PT FALLS ASSESS-DOCD LE1/YR: CPT | Performed by: INTERNAL MEDICINE

## 2020-09-21 PROCEDURE — G0463 HOSPITAL OUTPT CLINIC VISIT: HCPCS | Performed by: INTERNAL MEDICINE

## 2020-09-21 PROCEDURE — 99215 OFFICE O/P EST HI 40 MIN: CPT | Performed by: INTERNAL MEDICINE

## 2020-09-21 PROCEDURE — G8427 DOCREV CUR MEDS BY ELIG CLIN: HCPCS | Performed by: INTERNAL MEDICINE

## 2020-09-21 PROCEDURE — 3046F HEMOGLOBIN A1C LEVEL >9.0%: CPT | Performed by: INTERNAL MEDICINE

## 2020-09-21 PROCEDURE — 3017F COLORECTAL CA SCREEN DOC REV: CPT | Performed by: INTERNAL MEDICINE

## 2020-09-21 PROCEDURE — G8417 CALC BMI ABV UP PARAM F/U: HCPCS | Performed by: INTERNAL MEDICINE

## 2020-09-21 PROCEDURE — G8432 DEP SCR NOT DOC, RNG: HCPCS | Performed by: INTERNAL MEDICINE

## 2020-09-21 RX ORDER — MULTIVIT,TX WITH IRON,MINERALS
1 TABLET ORAL DAILY
COMMUNITY

## 2020-09-21 RX ORDER — FUROSEMIDE 40 MG/1
80 TABLET ORAL DAILY
COMMUNITY
End: 2020-09-29

## 2020-09-21 RX ORDER — AMIODARONE HYDROCHLORIDE 200 MG/1
200 TABLET ORAL DAILY
Qty: 30 TAB | Refills: 1 | Status: ON HOLD | OUTPATIENT
Start: 2020-09-21 | End: 2020-11-16 | Stop reason: ALTCHOICE

## 2020-09-21 RX ORDER — CARVEDILOL 3.12 MG/1
TABLET ORAL 2 TIMES DAILY WITH MEALS
COMMUNITY
End: 2020-11-20

## 2020-09-21 NOTE — PROGRESS NOTES
HISTORY OF PRESENTING ILLNESS      Gennaro Doran is a 65 y.o. female with NICM, DM, hypertension, LVEF 20%, LHC 2/16, LBBB (QRS duration 144 msec), NYHA class III symptoms, single chamber ICD s/p upgrade to a biventricular ICD system, frequent PVCs who we started on amiodarone in an attempt to suppress her PVCs to allow optimization of her CRT pacing. Subsequently she was hospitalized for heart failure exacerbation and later again for a GI bleed, she is no longer on anticoagulation d/t GI Bleeding. Watchman was deferred after review of rhythm strips from device showed AT rather than AF. She presents for follow up due to lower CRT pacing noted. She has noted increased SOB for the past 2 months with some edema occasionally. Interrogation today shows LV PVCs based on intracardiac electrograms. She is no longer on amiodarone. Her SBP is 100 mm Hg today. Heart Logic is 34 today despite compliance with diuretics.         PAST MEDICAL HISTORY     Past Medical History:   Diagnosis Date    Acid indigestion     heartburn    Anxiety disorder     Arthritis     Asthma     Back pain     Bladder spasms     Cerebral artery occlusion with cerebral infarction ([facility] [address] 08/2020    Chest pain     Constipation     Cough     Diabetes mellitus     Diarrhea     Fatigue     Fibromyalgia     Frequent episodic tension-type headache     Hearing reduced     Hemorrhoids     Hernia of unspecified site of abdominal cavity without mention of obstruction or gangrene     HTN (hypertension)     ICD (implantable cardioverter-defibrillator) in place     Muscle pain     joint pain    N&V (nausea and vomiting)     Pacemaker     biventricular ICD 1/26/18    Ringing in ears     Skin cancer     SOB (shortness of breath)     SOB (shortness of breath)     Swallowing difficulty     Thyroid disorder     Vertigo     Wears dentures            PAST SURGICAL HISTORY     Past Surgical History:   Procedure Laterality Date    HX CARPAL TUNNEL RELEASE      right hand    HX HEART CATHETERIZATION  2/6/16    HX HYSTERECTOMY      HX PACEMAKER Left 6/20/16    [employer] ICD    HX THYROIDECTOMY      REMOVAL GALLBLADDER            ALLERGIES     Allergies   Allergen Reactions    Codeine Other (comments)     Patient states she is not allergic    Novocain [Procaine] Nausea and Vomiting    Tramadol Other (comments)     Headache          FAMILY HISTORY     Family History   Problem Relation Age of Onset    Diabetes Mother     Heart Disease Mother     Heart Disease Father     Cancer Sister     Diabetes Sister     Heart Disease Sister     Hypertension Sister     Lupus Sister     Diabetes Brother     negative for cardiac disease       SOCIAL HISTORY     Social History     Socioeconomic History    Marital status:      Spouse name: Not on file    Number of children: Not on file    Years of education: Not on file    Highest education level: Not on file   Tobacco Use    Smoking status: Never Smoker    Smokeless tobacco: Never Used   Substance and Sexual Activity    Alcohol use: Not Currently     Comment: wine occasionally    Drug use: No         MEDICATIONS     Current Outpatient Medications   Medication Sig    ticagrelor (Brilinta) 90 mg tablet Take  by mouth two (2) times a day.  carvediloL (Coreg) 3.125 mg tablet Take  by mouth two (2) times daily (with meals).  furosemide (LASIX) 40 mg tablet Take 80 mg by mouth daily.  multivitamin,tx-iron-minerals (Complete Multivitamin) tab Take  by mouth daily.  aspirin delayed-release 81 mg tablet Take 1 Tab by mouth daily.  insulin lispro protamin-lispro (HumaLOG Mix 75-25 KwikPen) flexpen 28 Units by SubCUTAneous route ACB/HS.  magnesium oxide (MAG-OX) 400 mg tablet Take 400 mg by mouth two (2) times a day.  metFORMIN ER (GLUCOPHAGE XR) 750 mg tablet Take 750 mg by mouth two (2) times daily (with meals).     melatonin 3 mg tablet Take 3 mg by mouth nightly as needed.  acetaminophen (TYLENOL EXTRA STRENGTH) 500 mg tablet Take  by mouth every six (6) hours as needed for Pain.  polyethylene glycol (MIRALAX) 17 gram packet Take 17 g by mouth as needed.  gabapentin (NEURONTIN) 100 mg capsule Take 100 mg by mouth three (3) times daily.  oxybutynin chloride XL (DITROPAN XL) 10 mg CR tablet Take 10 mg by mouth two (2) times a day.  levothyroxine (SYNTHROID) 200 mcg tablet Take 200 mcg by mouth Daily (before breakfast).  omeprazole (PRILOSEC) 40 mg capsule Take 40 mg by mouth daily.  atorvastatin (LIPITOR) 40 mg tablet Take 40 mg by mouth daily.  potassium chloride SR (KLOR-CON 10) 10 mEq tablet Take 10 mEq by mouth daily. No current facility-administered medications for this visit. I have reviewed the nurses notes, vitals, problem list, allergy list, medical history, family, social history and medications. REVIEW OF SYMPTOMS      General: Pt denies excessive weight gain or loss. Pt is able to conduct ADL's  HEENT: Denies blurred vision, headaches, hearing loss, epistaxis and difficulty swallowing. Respiratory: Denies cough, congestion, shortness of breath, GABRIEL, wheezing or stridor. Cardiovascular: Denies precordial pain, palpitations, edema or PND  Gastrointestinal: Denies poor appetite, indigestion, abdominal pain or blood in stool  Genitourinary: Denies hematuria, dysuria, increased urinary frequency  Musculoskeletal: Denies joint pain or swelling from muscles or joints  Neurologic: Denies tremor, paresthesias, headache, or sensory motor disturbance  Psychiatric: Denies confusion, insomnia, depression  Integumentray: Denies rash, itching or ulcers. Hematologic: Denies easy bruising, bleeding       PHYSICAL EXAMINATION      Vitals: see vitals section  General: Well developed, in no acute distress.   HEENT: No jaundice, oral mucosa moist, no oral ulcers  Neck: Supple, no stiffness, no lymphadenopathy, supple  Heart:  Normal S1/S2 negative S3 or S4. Regular, no murmur, gallop or rub, no jugular venous distention  Respiratory: Clear bilaterally x 4, no wheezing or rales  Abdomen:   Soft, non-tender, bowel sounds are active. Extremities:  No edema, normal cap refill, no cyanosis. Musculoskeletal: No clubbing, no deformities  Neuro: A&Ox3, speech clear, gait stable, cooperative, no focal neurologic deficits  Skin: Skin color is normal. No rashes or lesions. Non diaphoretic, moist.  Vascular: 2+ pulses symmetric in all extremities       DIAGNOSTIC DATA      EKG:        LABORATORY DATA      Lab Results   Component Value Date/Time    WBC 5.5 09/01/2020 12:24 PM    HGB 9.7 (L) 09/01/2020 12:24 PM    HCT 30.4 (L) 09/01/2020 12:24 PM    PLATELET 022 (L) 58/78/2802 12:24 PM    MCV 95 09/01/2020 12:24 PM      Lab Results   Component Value Date/Time    Sodium 137 09/01/2020 12:24 PM    Potassium 3.8 09/01/2020 12:24 PM    Chloride 101 09/01/2020 12:24 PM    CO2 23 09/01/2020 12:24 PM    Anion gap 7 08/13/2020 05:49 AM    Glucose 175 (H) 09/01/2020 12:24 PM    BUN 27 09/01/2020 12:24 PM    Creatinine 0.82 09/01/2020 12:24 PM    BUN/Creatinine ratio 33 (H) 09/01/2020 12:24 PM    GFR est AA 83 09/01/2020 12:24 PM    GFR est non-AA 72 09/01/2020 12:24 PM    Calcium 8.9 09/01/2020 12:24 PM    Bilirubin, total 0.7 09/01/2020 12:24 PM    Alk. phosphatase 87 09/01/2020 12:24 PM    Protein, total 7.1 09/01/2020 12:24 PM    Albumin 3.4 (L) 09/01/2020 12:24 PM    Globulin 4.9 (H) 08/10/2020 05:54 PM    A-G Ratio 0.9 (L) 09/01/2020 12:24 PM    ALT (SGPT) 18 09/01/2020 12:24 PM           ASSESSMENT       1. Cardiomyopathy              A. NICM              B. NYHA class II              C. LVEF 30-35%  2. CAD, nonobstructive  3. Asthma  4. Diabetes  5. Hypothyroidism  6. IVCD/LBBB  7. ICD              A. Upgrade to CRTD          PLAN     BIV trigger turned ON. Restart amiodarone for now.  Will discuss Heart Logic findings with [address]; may consider course of inpatient IV diuretics. ICD-10-CM ICD-9-CM    1. Automatic implantable cardiac defibrillator in situ  Z95.810 V45.02    2. Hypertension, unspecified type  I10 401.9    3. Type 2 diabetes mellitus with diabetic nephropathy, with long-term current use of insulin (HCC)  E11.21 250.40     Z79.4 583.81      V58.67    4. Dilated cardiomyopathy (HCC)  I42.0 425.4      Orders Placed This Encounter    ticagrelor (Brilinta) 90 mg tablet     Sig: Take  by mouth two (2) times a day.  carvediloL (Coreg) 3.125 mg tablet     Sig: Take  by mouth two (2) times daily (with meals).  furosemide (LASIX) 40 mg tablet     Sig: Take 80 mg by mouth daily.  multivitamin,tx-iron-minerals (Complete Multivitamin) tab     Sig: Take  by mouth daily. FOLLOW-UP     1 month      Thank you, Raul Krishnamurthy MD and Dr. Culver/Noe/Prema for allowing me to participate in the care of this extraordinarily pleasant female. Please do not hesitate to contact me for further questions/concerns.          Javy Browning MD  Cardiac Electrophysiology / Cardiology    [place] [id number].  [address] [place] [address]  [phone] / [phone] Fax   [phone] / [phone] Fax

## 2020-09-21 NOTE — PROGRESS NOTES
ROOM # 2  8/10/20-[facility] for 4 days Stent placement  Saw @ [address] last Friday  CP last night sharp left sided twinges,SOB, random dizziness, swelling lower legs,palpitations    Visit Vitals  /62 (BP 1 Location: Left arm, BP Patient Position: Sitting)   Pulse 60   Resp 24   Ht 5' 3\" (1.6 m)   Wt 201 lb 11.2 oz (91.5 kg) Comment: patient reported   SpO2 98%   BMI 35.73 kg/m²

## 2020-09-22 ENCOUNTER — HOSPITAL ENCOUNTER (INPATIENT)
Age: 70
LOS: 4 days | Discharge: HOME HEALTH | DRG: 291 | End: 2020-09-29
Attending: EMERGENCY MEDICINE | Admitting: FAMILY MEDICINE
Payer: MEDICARE

## 2020-09-22 ENCOUNTER — APPOINTMENT (OUTPATIENT)
Dept: GENERAL RADIOLOGY | Age: 70
DRG: 291 | End: 2020-09-22
Attending: EMERGENCY MEDICINE
Payer: MEDICARE

## 2020-09-22 DIAGNOSIS — I50.9 ACUTE ON CHRONIC CONGESTIVE HEART FAILURE, UNSPECIFIED HEART FAILURE TYPE (HCC): Primary | ICD-10-CM

## 2020-09-22 LAB
ALBUMIN SERPL-MCNC: 3 G/DL (ref 3.5–5)
ALBUMIN/GLOB SERPL: 0.6 {RATIO} (ref 1.1–2.2)
ALP SERPL-CCNC: 101 U/L (ref 45–117)
ALT SERPL-CCNC: 27 U/L (ref 12–78)
ANION GAP SERPL CALC-SCNC: 8 MMOL/L (ref 5–15)
AST SERPL W P-5'-P-CCNC: 37 U/L (ref 15–37)
BASOPHILS # BLD: 0 K/UL (ref 0–0.2)
BASOPHILS NFR BLD: 1 % (ref 0–2.5)
BILIRUB SERPL-MCNC: 0.8 MG/DL (ref 0.2–1)
BNP SERPL-MCNC: 2738 PG/ML
BUN SERPL-MCNC: 38 MG/DL (ref 6–20)
BUN/CREAT SERPL: 33 (ref 12–20)
CA-I BLD-MCNC: 9.1 MG/DL (ref 8.5–10.1)
CHLORIDE SERPL-SCNC: 103 MMOL/L (ref 97–108)
CO2 SERPL-SCNC: 29 MMOL/L (ref 21–32)
CREAT SERPL-MCNC: 1.15 MG/DL (ref 0.55–1.02)
EOSINOPHIL # BLD: 0.1 K/UL (ref 0–0.7)
EOSINOPHIL NFR BLD: 2 % (ref 0.9–2.9)
ERYTHROCYTE [DISTWIDTH] IN BLOOD BY AUTOMATED COUNT: 14.5 % (ref 11.5–14.5)
GLOBULIN SER CALC-MCNC: 5.2 G/DL (ref 2–4)
GLUCOSE SERPL-MCNC: 127 MG/DL (ref 65–100)
HCT VFR BLD AUTO: 29.2 % (ref 36–46)
HGB BLD-MCNC: 9.7 % (ref 13.5–17.5)
LYMPHOCYTES # BLD: 1.4 K/UL (ref 1–4.8)
LYMPHOCYTES NFR BLD: 26 % (ref 20.5–51.1)
MCH RBC QN AUTO: 29.9 PG (ref 31–34)
MCHC RBC AUTO-ENTMCNC: 33.1 G/DL (ref 31–36)
MCV RBC AUTO: 90.4 FL (ref 80–100)
MONOCYTES # BLD: 0.6 K/UL (ref 0.2–2.4)
MONOCYTES NFR BLD: 10 % (ref 1.7–9.3)
NEUTS SEG # BLD: 3.4 K/UL (ref 1.8–7.7)
NEUTS SEG NFR BLD: 61 % (ref 42–75)
NRBC # BLD: 0 K/UL
NRBC BLD-RTO: 0 PER 100 WBC
PLATELET # BLD AUTO: 162 K/UL
PMV BLD AUTO: 8.4 FL (ref 6.5–11.5)
POTASSIUM SERPL-SCNC: 3.6 MMOL/L (ref 3.5–5.1)
PROT SERPL-MCNC: 8.2 G/DL (ref 6.4–8.2)
RBC # BLD AUTO: 3.23 M/UL (ref 4.5–5.9)
SODIUM SERPL-SCNC: 140 MMOL/L (ref 136–145)
TROPONIN I SERPL-MCNC: 0.22 NG/ML
TROPONIN I SERPL-MCNC: 0.22 NG/ML
WBC # BLD AUTO: 5.5 K/UL (ref 4.4–11.3)

## 2020-09-22 PROCEDURE — 74011250636 HC RX REV CODE- 250/636: Performed by: EMERGENCY MEDICINE

## 2020-09-22 PROCEDURE — 99218 HC RM OBSERVATION: CPT

## 2020-09-22 PROCEDURE — 85025 COMPLETE CBC W/AUTO DIFF WBC: CPT

## 2020-09-22 PROCEDURE — 83880 ASSAY OF NATRIURETIC PEPTIDE: CPT

## 2020-09-22 PROCEDURE — 36415 COLL VENOUS BLD VENIPUNCTURE: CPT

## 2020-09-22 PROCEDURE — 71045 X-RAY EXAM CHEST 1 VIEW: CPT

## 2020-09-22 PROCEDURE — 96374 THER/PROPH/DIAG INJ IV PUSH: CPT

## 2020-09-22 PROCEDURE — 99284 EMERGENCY DEPT VISIT MOD MDM: CPT

## 2020-09-22 PROCEDURE — 84484 ASSAY OF TROPONIN QUANT: CPT

## 2020-09-22 PROCEDURE — 80053 COMPREHEN METABOLIC PANEL: CPT

## 2020-09-22 PROCEDURE — 74011250637 HC RX REV CODE- 250/637: Performed by: EMERGENCY MEDICINE

## 2020-09-22 PROCEDURE — 93005 ELECTROCARDIOGRAM TRACING: CPT

## 2020-09-22 RX ORDER — ACETAMINOPHEN 325 MG/1
650 TABLET ORAL
Status: DISCONTINUED | OUTPATIENT
Start: 2020-09-22 | End: 2020-09-29 | Stop reason: HOSPADM

## 2020-09-22 RX ORDER — SODIUM CHLORIDE 0.9 % (FLUSH) 0.9 %
5-40 SYRINGE (ML) INJECTION EVERY 8 HOURS
Status: DISCONTINUED | OUTPATIENT
Start: 2020-09-22 | End: 2020-09-29 | Stop reason: HOSPADM

## 2020-09-22 RX ORDER — FUROSEMIDE 10 MG/ML
20 INJECTION INTRAMUSCULAR; INTRAVENOUS EVERY 6 HOURS
Status: DISCONTINUED | OUTPATIENT
Start: 2020-09-22 | End: 2020-09-23

## 2020-09-22 RX ORDER — BISACODYL 5 MG
5 TABLET, DELAYED RELEASE (ENTERIC COATED) ORAL DAILY PRN
Status: DISCONTINUED | OUTPATIENT
Start: 2020-09-22 | End: 2020-09-29 | Stop reason: HOSPADM

## 2020-09-22 RX ORDER — FUROSEMIDE 10 MG/ML
40 INJECTION INTRAMUSCULAR; INTRAVENOUS
Status: COMPLETED | OUTPATIENT
Start: 2020-09-22 | End: 2020-09-22

## 2020-09-22 RX ORDER — ENOXAPARIN SODIUM 100 MG/ML
40 INJECTION SUBCUTANEOUS DAILY
Status: DISCONTINUED | OUTPATIENT
Start: 2020-09-23 | End: 2020-09-23

## 2020-09-22 RX ORDER — ONDANSETRON 4 MG/1
4 TABLET, ORALLY DISINTEGRATING ORAL
Status: DISCONTINUED | OUTPATIENT
Start: 2020-09-22 | End: 2020-09-29 | Stop reason: HOSPADM

## 2020-09-22 RX ORDER — ACETAMINOPHEN 650 MG/1
650 SUPPOSITORY RECTAL
Status: DISCONTINUED | OUTPATIENT
Start: 2020-09-22 | End: 2020-09-29 | Stop reason: HOSPADM

## 2020-09-22 RX ORDER — ONDANSETRON 2 MG/ML
4 INJECTION INTRAMUSCULAR; INTRAVENOUS
Status: DISCONTINUED | OUTPATIENT
Start: 2020-09-22 | End: 2020-09-29 | Stop reason: HOSPADM

## 2020-09-22 RX ADMIN — NITROGLYCERIN 1 INCH: 20 OINTMENT TOPICAL at 19:21

## 2020-09-22 RX ADMIN — FUROSEMIDE 40 MG: 10 INJECTION, SOLUTION INTRAVENOUS at 19:21

## 2020-09-22 NOTE — ED TRIAGE NOTES
Pt presents to ed ambulatory reporting chest pains, shortness of breath, and fluid retention. Patient reports she had a cardiac stent placed 1 week ago. Denies nausea, vomiting, fever, cough, and trauma.

## 2020-09-22 NOTE — ED PROVIDER NOTES
68-year-old female known coronary disease and long history of congestive heart failure with implanted pacemaker defibrillator seen today by cardiology nurse practitioner and sent to the ED for admission for congestive heart failure. Patient had a single-vessel cardiac stent placed last week at [facility] and has been trying to diurese excess edema and fluid for the past week. She notes dyspnea when taking a few steps occasional PND she denies chest pain or anginal-like symptoms.            Past Medical History:   Diagnosis Date    Acid indigestion     heartburn    Anxiety disorder     Arthritis     Asthma     Back pain     Bladder spasms     Cerebral artery occlusion with cerebral infarction ([facility] [address] 08/2020    Chest pain     Constipation     Cough     Diabetes mellitus     Diarrhea     Fatigue     Fibromyalgia     Frequent episodic tension-type headache     Hearing reduced     Hemorrhoids     Hernia of unspecified site of abdominal cavity without mention of obstruction or gangrene     HTN (hypertension)     ICD (implantable cardioverter-defibrillator) in place     Muscle pain     joint pain    N&V (nausea and vomiting)     Pacemaker     biventricular ICD 1/26/18    Ringing in ears     Skin cancer     SOB (shortness of breath)     SOB (shortness of breath)     Swallowing difficulty     Thyroid disorder     Vertigo     Wears dentures        Past Surgical History:   Procedure Laterality Date    HX CARPAL TUNNEL RELEASE      right hand    HX HEART CATHETERIZATION  2/6/16    HX HYSTERECTOMY      HX PACEMAKER Left 6/20/16    [employer] ICD    HX THYROIDECTOMY      REMOVAL GALLBLADDER           Family History:   Problem Relation Age of Onset    Diabetes Mother     Heart Disease Mother     Heart Disease Father    [place] Sister     Diabetes Sister     Heart Disease Sister     Hypertension Sister     Lupus Sister     Diabetes Brother        Social History Socioeconomic History    Marital status:      Spouse name: Not on file    Number of children: Not on file    Years of education: Not on file    Highest education level: Not on file   Occupational History    Not on file   Social Needs    Financial resource strain: Not on file    Food insecurity     Worry: Not on file     Inability: Not on file    Transportation needs     Medical: Not on file     Non-medical: Not on file   Tobacco Use    Smoking status: Never Smoker    Smokeless tobacco: Never Used   Substance and Sexual Activity    Alcohol use: Not Currently     Comment: wine occasionally    Drug use: No    Sexual activity: Not on file   Lifestyle    Physical activity     Days per week: Not on file     Minutes per session: Not on file    Stress: Not on file   Relationships    Social connections     Talks on phone: Not on file     Gets together: Not on file     Attends [employer] service: Not on file     Active member of club or organization: Not on file     Attends meetings of clubs or organizations: Not on file     Relationship status: Not on file    Intimate partner violence     Fear of current or ex partner: Not on file     Emotionally abused: Not on file     Physically abused: Not on file     Forced sexual activity: Not on file   Other Topics Concern    Not on file   Social History Narrative    Not on file         ALLERGIES: Codeine; Novocain [procaine]; and Tramadol    Review of Systems   All other systems reviewed and are negative. Vitals:    09/22/20 1743 09/22/20 1919 09/22/20 1920 09/22/20 1921   BP: 130/62  (!) 108/92 (!) 108/92   Pulse: 85  86 86   Resp: 18  11    Temp: 98 °F (36.7 °C)      SpO2: 95% 96% 96%    Weight: 92.5 kg (204 lb)      Height: 5' 1\" (1.549 m)               Physical Exam  Vitals signs and nursing note reviewed.    Constitutional:       Comments: Habitus of chronic illness congestive heart failure mildly dyspneic at rest   HENT:      Head: Normocephalic and atraumatic. Neck:      Musculoskeletal: Neck supple. Cardiovascular:      Rate and Rhythm: Normal rate and regular rhythm. Pulses:           Carotid pulses are 2+ on the right side and 1+ on the left side. Radial pulses are 2+ on the right side and 2+ on the left side. Heart sounds: Normal heart sounds. No murmur. Pulmonary:      Effort: No accessory muscle usage or respiratory distress. Breath sounds: Examination of the right-middle field reveals rales. Examination of the left-middle field reveals rales. Examination of the right-lower field reveals rales. Examination of the left-lower field reveals rales. Rales present. No decreased breath sounds, wheezing or rhonchi. Comments: Mildly dyspneic at rest  Chest:      Chest wall: No mass or deformity. Abdominal:      General: Bowel sounds are normal.      Palpations: Abdomen is soft. There is mass. Tenderness: There is no abdominal tenderness. There is no rebound. Musculoskeletal:      Right lower leg: Edema present. Left lower leg: Edema present. Neurological:      General: No focal deficit present. Mental Status: She is alert and oriented to person, place, and time. MDM  Number of Diagnoses or Management Options  Diagnosis management comments: 69-year-old female with long cardiac history sent to the ED by cardiology nurse practitioner for evaluation of congestive heart failure and admission. Cardiac cath with stent placement last week modestly elevated troponin awaiting second troponin not involving plan for admission here at  [facility].     ED Course as of Sep 22 2003   Tue Sep 22, 2020   2002 EKG shows a atrial sensed ventricular paced rhythm at 89 a trigeminy type pattern on the monitor    [KS]      ED Course User Index  [KS] Guillermo Dorman MD       Procedures

## 2020-09-23 LAB
ANION GAP SERPL CALC-SCNC: 8 MMOL/L (ref 5–15)
ATRIAL RATE: 89 BPM
BASOPHILS # BLD: 0 K/UL (ref 0–0.2)
BASOPHILS NFR BLD: 1 % (ref 0–2.5)
BUN SERPL-MCNC: 34 MG/DL (ref 6–20)
BUN/CREAT SERPL: 35 (ref 12–20)
CA-I BLD-MCNC: 8.9 MG/DL (ref 8.5–10.1)
CALCULATED P AXIS, ECG09: -173 DEGREES
CALCULATED R AXIS, ECG10: 37 DEGREES
CALCULATED T AXIS, ECG11: 67 DEGREES
CHLORIDE SERPL-SCNC: 104 MMOL/L (ref 97–108)
CO2 SERPL-SCNC: 29 MMOL/L (ref 21–32)
CREAT SERPL-MCNC: 0.98 MG/DL (ref 0.55–1.02)
DIAGNOSIS, 93000: NORMAL
EOSINOPHIL # BLD: 0.1 K/UL (ref 0–0.7)
EOSINOPHIL NFR BLD: 2 % (ref 0.9–2.9)
ERYTHROCYTE [DISTWIDTH] IN BLOOD BY AUTOMATED COUNT: 14.6 % (ref 11.5–14.5)
GLUCOSE SERPL-MCNC: 176 MG/DL (ref 65–100)
HCT VFR BLD AUTO: 27.2 % (ref 36–46)
HGB BLD-MCNC: 8.9 % (ref 13.5–17.5)
LYMPHOCYTES # BLD: 1.1 K/UL (ref 1–4.8)
LYMPHOCYTES NFR BLD: 23 % (ref 20.5–51.1)
MCH RBC QN AUTO: 29.5 PG (ref 31–34)
MCHC RBC AUTO-ENTMCNC: 32.9 G/DL (ref 31–36)
MCV RBC AUTO: 89.8 FL (ref 80–100)
MONOCYTES # BLD: 0.5 K/UL (ref 0.2–2.4)
MONOCYTES NFR BLD: 10 % (ref 1.7–9.3)
NEUTS SEG # BLD: 3 K/UL (ref 1.8–7.7)
NEUTS SEG NFR BLD: 64 % (ref 42–75)
NRBC # BLD: 0 K/UL
NRBC BLD-RTO: 10 PER 100 WBC
P-R INTERVAL, ECG05: 176 MS
PLATELET # BLD AUTO: 132 K/UL
PMV BLD AUTO: 8 FL (ref 6.5–11.5)
POTASSIUM SERPL-SCNC: 3.1 MMOL/L (ref 3.5–5.1)
Q-T INTERVAL, ECG07: 431 MS
QRS DURATION, ECG06: 178 MS
QTC CALCULATION (BEZET), ECG08: 525 MS
RBC # BLD AUTO: 3.03 M/UL (ref 4.5–5.9)
SODIUM SERPL-SCNC: 141 MMOL/L (ref 136–145)
VENTRICULAR RATE, ECG03: 89 BPM
WBC # BLD AUTO: 4.7 K/UL (ref 4.4–11.3)

## 2020-09-23 PROCEDURE — 99218 HC RM OBSERVATION: CPT

## 2020-09-23 PROCEDURE — 74011250637 HC RX REV CODE- 250/637: Performed by: HOSPITALIST

## 2020-09-23 PROCEDURE — 85025 COMPLETE CBC W/AUTO DIFF WBC: CPT

## 2020-09-23 PROCEDURE — 96376 TX/PRO/DX INJ SAME DRUG ADON: CPT

## 2020-09-23 PROCEDURE — 80048 BASIC METABOLIC PNL TOTAL CA: CPT

## 2020-09-23 PROCEDURE — 74011250637 HC RX REV CODE- 250/637: Performed by: NURSE PRACTITIONER

## 2020-09-23 PROCEDURE — 74011250636 HC RX REV CODE- 250/636: Performed by: HOSPITALIST

## 2020-09-23 PROCEDURE — 74011250636 HC RX REV CODE- 250/636: Performed by: NURSE PRACTITIONER

## 2020-09-23 PROCEDURE — 96375 TX/PRO/DX INJ NEW DRUG ADDON: CPT

## 2020-09-23 PROCEDURE — 36415 COLL VENOUS BLD VENIPUNCTURE: CPT

## 2020-09-23 RX ORDER — ASPIRIN 81 MG/1
81 TABLET ORAL DAILY
Status: DISCONTINUED | OUTPATIENT
Start: 2020-09-23 | End: 2020-09-29 | Stop reason: HOSPADM

## 2020-09-23 RX ORDER — ATORVASTATIN CALCIUM 40 MG/1
40 TABLET, FILM COATED ORAL DAILY
Status: DISCONTINUED | OUTPATIENT
Start: 2020-09-23 | End: 2020-09-29 | Stop reason: HOSPADM

## 2020-09-23 RX ORDER — POTASSIUM CHLORIDE 20 MEQ/1
40 TABLET, EXTENDED RELEASE ORAL 3 TIMES DAILY
Status: DISCONTINUED | OUTPATIENT
Start: 2020-09-23 | End: 2020-09-26

## 2020-09-23 RX ORDER — OXYBUTYNIN CHLORIDE 5 MG/1
10 TABLET ORAL 2 TIMES DAILY
Status: DISCONTINUED | OUTPATIENT
Start: 2020-09-23 | End: 2020-09-25

## 2020-09-23 RX ORDER — LANOLIN ALCOHOL/MO/W.PET/CERES
400 CREAM (GRAM) TOPICAL 2 TIMES DAILY
Status: DISCONTINUED | OUTPATIENT
Start: 2020-09-23 | End: 2020-09-29 | Stop reason: HOSPADM

## 2020-09-23 RX ORDER — FUROSEMIDE 10 MG/ML
40 INJECTION INTRAMUSCULAR; INTRAVENOUS 3 TIMES DAILY
Status: DISCONTINUED | OUTPATIENT
Start: 2020-09-23 | End: 2020-09-25

## 2020-09-23 RX ORDER — CHOLECALCIFEROL (VITAMIN D3) 125 MCG
5 CAPSULE ORAL
Status: DISCONTINUED | OUTPATIENT
Start: 2020-09-23 | End: 2020-09-29 | Stop reason: HOSPADM

## 2020-09-23 RX ORDER — BISMUTH SUBSALICYLATE 262 MG
1 TABLET,CHEWABLE ORAL DAILY
Status: DISCONTINUED | OUTPATIENT
Start: 2020-09-23 | End: 2020-09-29 | Stop reason: HOSPADM

## 2020-09-23 RX ORDER — METFORMIN HYDROCHLORIDE 500 MG/1
750 TABLET ORAL 2 TIMES DAILY WITH MEALS
Status: DISCONTINUED | OUTPATIENT
Start: 2020-09-23 | End: 2020-09-25

## 2020-09-23 RX ORDER — POLYETHYLENE GLYCOL 3350 17 G/17G
17 POWDER, FOR SOLUTION ORAL AS NEEDED
Status: DISCONTINUED | OUTPATIENT
Start: 2020-09-23 | End: 2020-09-29 | Stop reason: HOSPADM

## 2020-09-23 RX ORDER — METFORMIN HYDROCHLORIDE 500 MG/1
750 TABLET ORAL 2 TIMES DAILY WITH MEALS
Status: DISCONTINUED | OUTPATIENT
Start: 2020-09-23 | End: 2020-09-23

## 2020-09-23 RX ORDER — ACETAMINOPHEN 500 MG
500 TABLET ORAL
Status: DISCONTINUED | OUTPATIENT
Start: 2020-09-23 | End: 2020-09-29 | Stop reason: HOSPADM

## 2020-09-23 RX ORDER — AMIODARONE HYDROCHLORIDE 200 MG/1
200 TABLET ORAL DAILY
Status: DISCONTINUED | OUTPATIENT
Start: 2020-09-23 | End: 2020-09-29 | Stop reason: HOSPADM

## 2020-09-23 RX ORDER — POTASSIUM CHLORIDE 750 MG/1
10 TABLET, FILM COATED, EXTENDED RELEASE ORAL DAILY
Status: DISCONTINUED | OUTPATIENT
Start: 2020-09-23 | End: 2020-09-23

## 2020-09-23 RX ORDER — PANTOPRAZOLE SODIUM 20 MG/1
40 TABLET, DELAYED RELEASE ORAL DAILY
Status: DISCONTINUED | OUTPATIENT
Start: 2020-09-23 | End: 2020-09-28

## 2020-09-23 RX ORDER — CARVEDILOL 3.12 MG/1
3.12 TABLET ORAL 2 TIMES DAILY WITH MEALS
Status: DISCONTINUED | OUTPATIENT
Start: 2020-09-23 | End: 2020-09-29 | Stop reason: HOSPADM

## 2020-09-23 RX ORDER — GABAPENTIN 100 MG/1
100 CAPSULE ORAL 3 TIMES DAILY
Status: DISCONTINUED | OUTPATIENT
Start: 2020-09-23 | End: 2020-09-29 | Stop reason: HOSPADM

## 2020-09-23 RX ORDER — LEVOTHYROXINE SODIUM 100 UG/1
200 TABLET ORAL
Status: DISCONTINUED | OUTPATIENT
Start: 2020-09-24 | End: 2020-09-29 | Stop reason: HOSPADM

## 2020-09-23 RX ADMIN — CARVEDILOL 3.12 MG: 3.12 TABLET, FILM COATED ORAL at 10:10

## 2020-09-23 RX ADMIN — GABAPENTIN 100 MG: 100 CAPSULE ORAL at 10:11

## 2020-09-23 RX ADMIN — POTASSIUM CHLORIDE 40 MEQ: 20 TABLET, EXTENDED RELEASE ORAL at 15:29

## 2020-09-23 RX ADMIN — MAGNESIUM OXIDE 400 MG: 400 TABLET ORAL at 10:10

## 2020-09-23 RX ADMIN — FUROSEMIDE 40 MG: 10 INJECTION, SOLUTION INTRAVENOUS at 15:29

## 2020-09-23 RX ADMIN — MULTIVITAMIN TABLET 1 TABLET: TABLET at 12:18

## 2020-09-23 RX ADMIN — POTASSIUM CHLORIDE 40 MEQ: 20 TABLET, EXTENDED RELEASE ORAL at 21:54

## 2020-09-23 RX ADMIN — AMIODARONE HYDROCHLORIDE 200 MG: 200 TABLET ORAL at 10:09

## 2020-09-23 RX ADMIN — MAGNESIUM OXIDE 400 MG: 400 TABLET ORAL at 17:08

## 2020-09-23 RX ADMIN — FUROSEMIDE 20 MG: 10 INJECTION, SOLUTION INTRAMUSCULAR; INTRAVENOUS at 03:06

## 2020-09-23 RX ADMIN — CARVEDILOL 3.12 MG: 3.12 TABLET, FILM COATED ORAL at 16:01

## 2020-09-23 RX ADMIN — FUROSEMIDE 20 MG: 10 INJECTION, SOLUTION INTRAMUSCULAR; INTRAVENOUS at 09:02

## 2020-09-23 RX ADMIN — ASPIRIN 81 MG: 81 TABLET, COATED ORAL at 10:11

## 2020-09-23 RX ADMIN — FUROSEMIDE 40 MG: 10 INJECTION, SOLUTION INTRAVENOUS at 12:18

## 2020-09-23 RX ADMIN — METFORMIN HYDROCHLORIDE 750 MG: 500 TABLET ORAL at 16:01

## 2020-09-23 RX ADMIN — TICAGRELOR 90 MG: 90 TABLET ORAL at 10:11

## 2020-09-23 RX ADMIN — POTASSIUM CHLORIDE 40 MEQ: 20 TABLET, EXTENDED RELEASE ORAL at 10:10

## 2020-09-23 RX ADMIN — Medication 10 ML: at 13:26

## 2020-09-23 RX ADMIN — ATORVASTATIN CALCIUM 40 MG: 40 TABLET, FILM COATED ORAL at 10:11

## 2020-09-23 RX ADMIN — GABAPENTIN 100 MG: 100 CAPSULE ORAL at 15:34

## 2020-09-23 RX ADMIN — TICAGRELOR 90 MG: 90 TABLET ORAL at 17:08

## 2020-09-23 RX ADMIN — GABAPENTIN 100 MG: 100 CAPSULE ORAL at 21:53

## 2020-09-23 RX ADMIN — PANTOPRAZOLE SODIUM 40 MG: 20 TABLET, DELAYED RELEASE ORAL at 10:10

## 2020-09-23 NOTE — PROGRESS NOTES
Care Management Interventions  PCP Verified by CM: Yes  Mode of Transport at Discharge: BLS  Transition of Care Consult (CM Consult):  Other(home health to be stopped so she can get outpatient cardiac )  Discharge Durable Medical Equipment: Yes  Current Support Network: Lives with Spouse  Confirm Follow Up Transport: Family  Discharge Location  Discharge Placement: Home

## 2020-09-23 NOTE — H&P
History and Physical    Subjective:     Javy Saldaña is a 65 y.o. female  significant for Non-Ischemic Cardiomyopathy, HFrEF (EF 20%), HTN, HLP, GERD, DM-II, Biventricular ICD, Fibromyalgia, and a recent CAD/Stent, who presented to the emergency department today as referred by her cardiologist for IV Diuresis in context of dyspnea and CHF exacerbation. Patient recently underwent pacemaker interrogation yesterday 9/21 by Dr Arpita Amaya, she was then followed up by Gilda Nova NP who referred her for hospital admission for diuresis due to longstanding dyspnea. ED work-up revealed a BNP of 2738, trop 0.22, CXR consistent with pulmonary venous cephalization, without edema. At the time of my visit, patient seems to be in no respiratory distress, with O2sats 95-96%RA. She denies chest pain, chest pressure, palpitations, difficulty breathing, fever or chills. She endorsed Shortness of breath on exertion/with activity which has been the same in the last several days to weeks, sleeps with 1-2  Pillows at night, and still with trace peripheral edema in BLE. Hospitalist service was consulted for inpatient admission.      Past Medical History:   Diagnosis Date    Acid indigestion     heartburn    Anxiety disorder     Arthritis     Asthma     Back pain     Bladder spasms     Cerebral artery occlusion with cerebral infarction ([facility] [address] 08/2020    Chest pain     Constipation     Cough     Diabetes mellitus     Diarrhea     Fatigue     Fibromyalgia     Frequent episodic tension-type headache     Hearing reduced     Hemorrhoids     Hernia of unspecified site of abdominal cavity without mention of obstruction or gangrene     HTN (hypertension)     ICD (implantable cardioverter-defibrillator) in place     Muscle pain     joint pain    N&V (nausea and vomiting)     Pacemaker     biventricular ICD 1/26/18    Ringing in ears     Skin cancer     SOB (shortness of breath)     SOB (shortness of breath)     Swallowing difficulty     Thyroid disorder     Vertigo     Wears dentures       Past Surgical History:   Procedure Laterality Date    HX CARPAL TUNNEL RELEASE      right hand    HX HEART CATHETERIZATION  2/6/16    HX HYSTERECTOMY      HX PACEMAKER Left 6/20/16    [employer] ICD    HX THYROIDECTOMY      REMOVAL GALLBLADDER       Family History   Problem Relation Age of Onset    Diabetes Mother     Heart Disease Mother     Heart Disease Father     Cancer Sister     Diabetes Sister     Heart Disease Sister     Hypertension Sister     Lupus Sister     Diabetes Brother       Social History     Tobacco Use    Smoking status: Never Smoker    Smokeless tobacco: Never Used   Substance Use Topics    Alcohol use: Not Currently     Comment: wine occasionally       Prior to Admission medications    Medication Sig Start Date End Date Taking? Authorizing Provider   ticagrelor (Brilinta) 90 mg tablet Take  by mouth two (2) times a day. Provider, Historical   carvediloL (Coreg) 3.125 mg tablet Take  by mouth two (2) times daily (with meals). Provider, Historical   furosemide (LASIX) 40 mg tablet Take 80 mg by mouth daily. Provider, Historical   multivitamin,tx-iron-minerals (Complete Multivitamin) tab Take  by mouth daily. Provider, Historical   amiodarone (CORDARONE) 200 mg tablet Take 1 Tab by mouth daily. 9/21/20   Enzo Estevez NP   aspirin delayed-release 81 mg tablet Take 1 Tab by mouth daily. 9/1/20   Bianka López MD   insulin lispro protamin-lispro (HumaLOG Mix 75-25 KwikPen) flexpen 28 Units by SubCUTAneous route ACB/HS. 8/14/20   Michelle Millard MD   magnesium oxide (MAG-OX) 400 mg tablet Take 400 mg by mouth two (2) times a day. Provider, Historical   metFORMIN ER (GLUCOPHAGE XR) 750 mg tablet Take 750 mg by mouth two (2) times daily (with meals). Provider, Historical   melatonin 3 mg tablet Take 3 mg by mouth nightly as needed.     Provider, Historical acetaminophen (TYLENOL EXTRA STRENGTH) 500 mg tablet Take  by mouth every six (6) hours as needed for Pain. Provider, Historical   polyethylene glycol (MIRALAX) 17 gram packet Take 17 g by mouth as needed. Provider, Historical   gabapentin (NEURONTIN) 100 mg capsule Take 100 mg by mouth three (3) times daily. 4/27/16   Provider, Historical   oxybutynin chloride XL (DITROPAN XL) 10 mg CR tablet Take 10 mg by mouth two (2) times a day. 3/30/16   Provider, Historical   levothyroxine (SYNTHROID) 200 mcg tablet Take 200 mcg by mouth Daily (before breakfast). 4/27/16   Provider, Historical   omeprazole (PRILOSEC) 40 mg capsule Take 40 mg by mouth daily. 4/27/16   Provider, Historical   atorvastatin (LIPITOR) 40 mg tablet Take 40 mg by mouth daily. Provider, Historical   potassium chloride SR (KLOR-CON 10) 10 mEq tablet Take 10 mEq by mouth daily. Provider, Historical     Allergies   Allergen Reactions    Codeine Other (comments)     Patient states she is not allergic    Novocain [Procaine] Nausea and Vomiting    Tramadol Other (comments)     Headache        Review of Systems:  14 point ROS negative except as mentioned in HPI. Objective:     Vitals:  Visit Vitals  BP (!) 108/92   Pulse 86   Temp 98 °F (36.7 °C)   Resp 11   Ht 5' 1\" (1.549 m)   Wt 92.5 kg (204 lb)   SpO2 96%   BMI 38.55 kg/m²       Physical Exam:  General: Very pleasant, well groomed, overweight female, who appears stated age, alert oriented x 4, No acute distress observed. Head:  Normocephalic, without obvious abnormality, atraumatic. Eyes:  Conjunctivae/corneas clear. Pupils equal, round, reactive to light. Extraocular movements intact. Lungs:  Rales  to auscultation bilaterally in lower lobes, no wheezes. Chest wall: Symmetrical chest wall expansion, No tenderness or deformity. Heart:  Regular rate and rhythm, S1, S2 normal, no murmur, click, rub, or gallop. 1+ BLE Edema. Abdomen:   Soft, obese, non-tender.  Bowel sounds normal. No masses. No organomegaly. Back:  No spine tenderness to palpation  Extremities: 1+ BLE Edema. Non-traumatic, No cyanosis. Pulses: Symmetric all extremities. Skin: Overall skin condition warm and dry, normal tugor. Lymph nodes: Cervical nodes normal.  Neurologic: Awake and oriented,       Labs:  Recent Results (from the past 24 hour(s))   CBC WITH AUTOMATED DIFF    Collection Time: 09/22/20  5:50 PM   Result Value Ref Range    WBC 5.5 4.4 - 11.3 K/uL    RBC 3.23 (L) 4.50 - 5.90 M/uL    HGB 9.7 (L) 13.5 - 17.5 %    HCT 29.2 (L) 36 - 46 %    MCV 90.4 80 - 100 FL    MCH 29.9 (L) 31 - 34 PG    MCHC 33.1 31.0 - 36.0 g/dL    RDW 14.5 11.5 - 14.5 %    PLATELET 756 K/uL    MPV 8.4 6.5 - 11.5 FL    NRBC 0.0  WBC    ABSOLUTE NRBC 0.00 K/uL    NEUTROPHILS 61 42 - 75 %    LYMPHOCYTES 26 20.5 - 51.1 %    MONOCYTES 10 (H) 1.7 - 9.3 %    EOSINOPHILS 2 0.9 - 2.9 %    BASOPHILS 1 0.0 - 2.5 %    ABS. NEUTROPHILS 3.4 1.8 - 7.7 K/UL    ABS. LYMPHOCYTES 1.4 1.0 - 4.8 K/UL    ABS. MONOCYTES 0.6 0.2 - 2.4 K/UL    ABS. EOSINOPHILS 0.1 0.0 - 0.7 K/UL    ABS. BASOPHILS 0.0 0.0 - 0.2 K/UL   METABOLIC PANEL, COMPREHENSIVE    Collection Time: 09/22/20  5:50 PM   Result Value Ref Range    Sodium 140 136 - 145 mmol/L    Potassium 3.6 3.5 - 5.1 mmol/L    Chloride 103 97 - 108 mmol/L    CO2 29 21 - 32 mmol/L    Anion gap 8 5 - 15 mmol/L    Glucose 127 (H) 65 - 100 mg/dL    BUN 38 (H) 6 - 20 mg/dL    Creatinine 1.15 (H) 0.55 - 1.02 mg/dL    BUN/Creatinine ratio 33 (H) 12 - 20      GFR est AA 56 (L) >60 ml/min/1.73m2    GFR est non-AA 46 (L) >60 ml/min/1.73m2    Calcium 9.1 8.5 - 10.1 mg/dL    Bilirubin, total 0.8 0.2 - 1.0 mg/dL    AST (SGOT) 37 15 - 37 U/L    ALT (SGPT) 27 12 - 78 U/L    Alk.  phosphatase 101 45 - 117 U/L    Protein, total 8.2 6.4 - 8.2 g/dL    Albumin 3.0 (L) 3.5 - 5.0 g/dL    Globulin 5.2 (H) 2.0 - 4.0 g/dL    A-G Ratio 0.6 (L) 1.1 - 2.2     BNP    Collection Time: 09/22/20  5:50 PM   Result Value Ref Range NT pro-BNP 2,738 (H) <125 pg/mL   TROPONIN I    Collection Time: 09/22/20  5:50 PM   Result Value Ref Range    Troponin-I, Qt. 0.22 (H) <0.05 ng/mL       Imaging:  Xr Chest Port    Result Date: 9/22/2020  Exam: Frontal chest Comparison: 9/8/2020. 8/10/2020. Number of views: 1 Findings: Left subclavian central venous cardiac electrode device leads in apparent satisfactory position. There is calcific atherosclerotic disease of the thoracic aorta. The configuration of the cardiopericardial silhouette suggests enlarged cardiac chambers. There is pulmonary venous cephalization, compatible with chronic pulmonary venous hypertension. There is, however, no demonstrated pulmonary edema at this time. There is no demonstrated significant pulmonary parenchymal lesion. There is no evidence of pleural disease. Impression: Evidence of chronic left-sided heart disease, without active edema demonstrated at this time. Atherosclerosis. Assessment & Plan:      #1: Acute on Chronic Systolic Congestive Heart Failure:  -Admit to observation, and monitor on telemetry.  -Lasix 20mg IV q 6 hrs, for gentle diuresis, monitoring electrloytes and BP. -CXR consistent with pulmonary venous cephalization, without pulmonary edema, with O2sats have been 94-96% on RA.   -BMP/CBC in a.m  .-Echo 8/12/20-EF 20-25%, dilated LV, with concentric hypertrophy.  -Continue BB-Carvedilol per home dose.  -Na restricted diet/1200ml Fluid restriction. Strict I&O, daily weight.  -Appreciate cardiology input for further recommendations. -patient has been counseled on medication and dietary compliance. #2: Acute kidney injury  -suspect prerenal, in context of poor perfusion and/or possibly dehydration from chronic diuresis.   -Cr 1.15, Baseline is around 0.8.   -Avoid nephrotoxic agents, renally dose medications, and consider nephrology consult as needed. -BMP in a.m. #3: CAD/Stents  -Recent LHC with stents placed to LAD \"last week\".  ED work up shows a Troponin leak of 0.22, ECG unremarkable.   -Continue Brilinta. #4: Diabetes Mellitus type 2:  -Chronic condition.  -Continue Accucheck ACHS,   -continue home medications. #5: Biventricular ICD in situ:  -Chronic condition. Stable. #6: PVCs  -Recently started on Amiodarone by cardiologist, which we will continue while inpatient. VTE Prophylaxis: Lovenox  Code status: Full Code.    >30minutes spent reviewing chart, speaking with patient and family at bedside. Patient does want to be fully resuscitated.

## 2020-09-23 NOTE — PROGRESS NOTES
Bedside shift change report given to [facility] RN  (oncoming nurse) by susan UMÑOZ (offgoing nurse). Report included the following information SBAR.

## 2020-09-23 NOTE — CONSULTS
[facility] CARDIOLOGY  CONSULTATION    REASON FOR CONSULT:  Acute on Chronic HFrEF in established [address] patient    REQUESTING PROVIDER:  Murali Viramontes NP ( team [facility])    CHIEF COMPLAINT:    Chief Complaint   Patient presents with    Chest Pain    Shortness of Breath         HISTORY OF PRESENT ILLNESS:  This is a 65year old female with PMH of HTN, HLD, GERD, DM, Fibromyalgia, obesity, atrial tachycardia, hypothyroidism, 3mm right posterior communicating artery aneurysm, Plavix Nonresponder, stenosis vs thrombus left M2 anterior division (causing CVA) 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 Anne Flores with recent adjustement including turning on LV trigger on 9/21/2020  To increase CRT %), HFrEF (EF 10-15% last echo), and CAD s/p PCI proximal LAD 9/10 that presented to clinic yesterday with SOB, peripheral edema, GABRIEL, orthopnea, and poor urine output with home diuretics. Patient was referred to Ed for further workup and admission for IV diuresis. BNP in ED was 2738 and Trop was .22 (patient's baseline). She has diuresed some overnight, but is still overtly hypervolemic. Will try further diuresis today. Correct electrolytes and may trial metolazone tomorrow. She has no new complaints. Records from hospital admission course thus far reviewed.       Telemetry Review: paced with periods of trigeminy and frequent PVC otherwise      PAST MEDICAL HISTORY:    Past Medical History:   Diagnosis Date    Acid indigestion     heartburn    Anxiety disorder     Arthritis     Asthma     Back pain     Bladder spasms     Cerebral artery occlusion with cerebral infarction ([facility] [address] 08/2020    Chest pain     Constipation     Cough     Diabetes mellitus     Diarrhea     Fatigue     Fibromyalgia     Frequent episodic tension-type headache     Hearing reduced     Hemorrhoids     Hernia of unspecified site of abdominal cavity without mention of obstruction or gangrene  HTN (hypertension)     ICD (implantable cardioverter-defibrillator) in place     Muscle pain     joint pain    N&V (nausea and vomiting)     Pacemaker     biventricular ICD 18    Ringing in ears     Skin cancer     SOB (shortness of breath)     SOB (shortness of breath)     Swallowing difficulty     Thyroid disorder     Vertigo     Wears dentures          HOME MEDICATIONS:    Prior to Admission Medications   Prescriptions Last Dose Informant Patient Reported? Taking?   acetaminophen (TYLENOL EXTRA STRENGTH) 500 mg tablet   Yes No   Sig: Take  by mouth every six (6) hours as needed for Pain. amiodarone (CORDARONE) 200 mg tablet   No No   Sig: Take 1 Tab by mouth daily. aspirin delayed-release 81 mg tablet   No No   Sig: Take 1 Tab by mouth daily. atorvastatin (LIPITOR) 40 mg tablet   Yes No   Sig: Take 40 mg by mouth daily. carvediloL (Coreg) 3.125 mg tablet   Yes No   Sig: Take  by mouth two (2) times daily (with meals). furosemide (LASIX) 40 mg tablet   Yes No   Sig: Take 80 mg by mouth daily. gabapentin (NEURONTIN) 100 mg capsule   Yes No   Sig: Take 100 mg by mouth three (3) times daily. insulin lispro protamin-lispro (HumaLOG Mix 75-25 KwikPen) flexpen   No No   Si Units by SubCUTAneous route ACB/HS. levothyroxine (SYNTHROID) 200 mcg tablet   Yes No   Sig: Take 200 mcg by mouth Daily (before breakfast). magnesium oxide (MAG-OX) 400 mg tablet   Yes No   Sig: Take 400 mg by mouth two (2) times a day. melatonin 3 mg tablet   Yes No   Sig: Take 3 mg by mouth nightly as needed. metFORMIN ER (GLUCOPHAGE XR) 750 mg tablet   Yes No   Sig: Take 750 mg by mouth two (2) times daily (with meals). multivitamin,tx-iron-minerals (Complete Multivitamin) tab   Yes No   Sig: Take  by mouth daily. omeprazole (PRILOSEC) 40 mg capsule   Yes No   Sig: Take 40 mg by mouth daily.    oxybutynin chloride XL (DITROPAN XL) 10 mg CR tablet   Yes No   Sig: Take 10 mg by mouth two (2) times a day.   polyethylene glycol (MIRALAX) 17 gram packet   Yes No   Sig: Take 17 g by mouth as needed. potassium chloride SR (KLOR-CON 10) 10 mEq tablet   Yes No   Sig: Take 10 mEq by mouth daily. ticagrelor (Brilinta) 90 mg tablet   Yes No   Sig: Take  by mouth two (2) times a day. Facility-Administered Medications: None         ALLERGIES:    Allergies   Allergen Reactions    Codeine Other (comments)     Patient states she is not allergic    Novocain [Procaine] Nausea and Vomiting    Tramadol Other (comments)     Headache         FAMILY HISTORY:    Family History   Problem Relation Age of Onset    Diabetes Mother     Heart Disease Mother     Heart Disease Father    Gerilyn Najjar Sister     Diabetes Sister     Heart Disease Sister     Hypertension Sister     Lupus Sister     Diabetes Brother          SOCIAL HISTORY:    Tobacco: no  Drugs: no  ETOH: no      REVIEW OF SYSTEMS:  Complete review of systems performed, pertinents noted above, all other systems are negative. Patient Vitals for the past 24 hrs:   Temp Pulse Resp BP SpO2   09/23/20 0759 97 °F (36.1 °C) 88  (!) 124/54 100 %   09/23/20 0312  94 20 102/75 95 %   09/22/20 1921  86  (!) 108/92    09/22/20 1920  86 11 (!) 108/92 96 %   09/22/20 1919     96 %   09/22/20 1743 98 °F (36.7 °C) 85 18 130/62 95 %       PHYSICAL EXAMINATION:    General: Well nourished chronically ill appearing white female lying in bed, NAD, A&O  HEENT: Normocephalic, PERRL, no drainage  Neck: Supple, Trachea midline, No JVD  RESP: CTA bilaterally with faint posterior crackles LLL, symmetrical chest movement. No SOB or distress. On RA  Cardiovascular: RRR with routine skipped beat, no RG. + murmur. + device.    PVS: No rubor, cyanosis, 1+ pitting BLE edema, Radial, DP, PT pulses equal bilaterally  ABD: obese , soft NT, Normoactive BS  Derm: Warm/Dry/Intact with no lesions, poor turgor  Neuro: A&O PPTS, cranial nerves II- XII grossly intact via interaction with patient. Does have noted expressive aphasia (residual from CVA). PSYCH: No anxiety or agitation      Electrocardiogram performed earlier reviewed, it shows atrial sensed v-paced rhythm, trigeminy. Recent labs results and imaging reviewed. Recent Results (from the past 24 hour(s))   EKG, 12 LEAD, INITIAL    Collection Time: 09/22/20  5:38 PM   Result Value Ref Range    Ventricular Rate 89 BPM    Atrial Rate 89 BPM    P-R Interval 176 ms    QRS Duration 178 ms    Q-T Interval 431 ms    QTC Calculation (Bezet) 525 ms    Calculated P Axis -173 degrees    Calculated R Axis 37 degrees    Calculated T Axis 67 degrees    Diagnosis       Atrial-sensed ventricular-paced complexes  No further analysis attempted due to paced rhythm  Baseline wander in lead(s) V1     CBC WITH AUTOMATED DIFF    Collection Time: 09/22/20  5:50 PM   Result Value Ref Range    WBC 5.5 4.4 - 11.3 K/uL    RBC 3.23 (L) 4.50 - 5.90 M/uL    HGB 9.7 (L) 13.5 - 17.5 %    HCT 29.2 (L) 36 - 46 %    MCV 90.4 80 - 100 FL    MCH 29.9 (L) 31 - 34 PG    MCHC 33.1 31.0 - 36.0 g/dL    RDW 14.5 11.5 - 14.5 %    PLATELET 139 K/uL    MPV 8.4 6.5 - 11.5 FL    NRBC 0.0  WBC    ABSOLUTE NRBC 0.00 K/uL    NEUTROPHILS 61 42 - 75 %    LYMPHOCYTES 26 20.5 - 51.1 %    MONOCYTES 10 (H) 1.7 - 9.3 %    EOSINOPHILS 2 0.9 - 2.9 %    BASOPHILS 1 0.0 - 2.5 %    ABS. NEUTROPHILS 3.4 1.8 - 7.7 K/UL    ABS. LYMPHOCYTES 1.4 1.0 - 4.8 K/UL    ABS. MONOCYTES 0.6 0.2 - 2.4 K/UL    ABS. EOSINOPHILS 0.1 0.0 - 0.7 K/UL    ABS.  BASOPHILS 0.0 0.0 - 0.2 K/UL   METABOLIC PANEL, COMPREHENSIVE    Collection Time: 09/22/20  5:50 PM   Result Value Ref Range    Sodium 140 136 - 145 mmol/L    Potassium 3.6 3.5 - 5.1 mmol/L    Chloride 103 97 - 108 mmol/L    CO2 29 21 - 32 mmol/L    Anion gap 8 5 - 15 mmol/L    Glucose 127 (H) 65 - 100 mg/dL    BUN 38 (H) 6 - 20 mg/dL    Creatinine 1.15 (H) 0.55 - 1.02 mg/dL    BUN/Creatinine ratio 33 (H) 12 - 20      GFR est AA 56 (L) >60 ml/min/1.73m2 GFR est non-AA 46 (L) >60 ml/min/1.73m2    Calcium 9.1 8.5 - 10.1 mg/dL    Bilirubin, total 0.8 0.2 - 1.0 mg/dL    AST (SGOT) 37 15 - 37 U/L    ALT (SGPT) 27 12 - 78 U/L    Alk. phosphatase 101 45 - 117 U/L    Protein, total 8.2 6.4 - 8.2 g/dL    Albumin 3.0 (L) 3.5 - 5.0 g/dL    Globulin 5.2 (H) 2.0 - 4.0 g/dL    A-G Ratio 0.6 (L) 1.1 - 2.2     BNP    Collection Time: 09/22/20  5:50 PM   Result Value Ref Range    NT pro-BNP 2,738 (H) <125 pg/mL   TROPONIN I    Collection Time: 09/22/20  5:50 PM   Result Value Ref Range    Troponin-I, Qt. 0.22 (H) <0.05 ng/mL   TROPONIN I    Collection Time: 09/22/20  8:50 PM   Result Value Ref Range    Troponin-I, Qt. 0.22 (H) <9.81 ng/mL   METABOLIC PANEL, BASIC    Collection Time: 09/23/20  7:01 AM   Result Value Ref Range    Sodium 141 136 - 145 mmol/L    Potassium 3.1 (L) 3.5 - 5.1 mmol/L    Chloride 104 97 - 108 mmol/L    CO2 29 21 - 32 mmol/L    Anion gap 8 5 - 15 mmol/L    Glucose 176 (H) 65 - 100 mg/dL    BUN 34 (H) 6 - 20 mg/dL    Creatinine 0.98 0.55 - 1.02 mg/dL    BUN/Creatinine ratio 35 (H) 12 - 20      GFR est AA >60 >60 ml/min/1.73m2    GFR est non-AA 56 (L) >60 ml/min/1.73m2    Calcium 8.9 8.5 - 10.1 mg/dL   CBC WITH AUTOMATED DIFF    Collection Time: 09/23/20  7:01 AM   Result Value Ref Range    WBC 4.7 4.4 - 11.3 K/uL    RBC 3.03 (L) 4.50 - 5.90 M/uL    HGB 8.9 (L) 13.5 - 17.5 %    HCT 27.2 (L) 36 - 46 %    MCV 89.8 80 - 100 FL    MCH 29.5 (L) 31 - 34 PG    MCHC 32.9 31.0 - 36.0 g/dL    RDW 14.6 (H) 11.5 - 14.5 %    PLATELET 036 K/uL    MPV 8.0 6.5 - 11.5 FL    NRBC 10.0  WBC    ABSOLUTE NRBC 0.00 K/uL    NEUTROPHILS 64 42 - 75 %    LYMPHOCYTES 23 20.5 - 51.1 %    MONOCYTES 10 (H) 1.7 - 9.3 %    EOSINOPHILS 2 0.9 - 2.9 %    BASOPHILS 1 0.0 - 2.5 %    ABS. NEUTROPHILS 3.0 1.8 - 7.7 K/UL    ABS. LYMPHOCYTES 1.1 1.0 - 4.8 K/UL    ABS. MONOCYTES 0.5 0.2 - 2.4 K/UL    ABS. EOSINOPHILS 0.1 0.0 - 0.7 K/UL    ABS.  BASOPHILS 0.0 0.0 - 0.2 K/UL       XR Results (maximum last 3): Results from [place] encounter on 09/22/20   XR CHEST PORT    Impression Impression: Evidence of chronic left-sided heart disease, without active edema  demonstrated at this time. Atherosclerosis. Results from [place] encounter on 08/10/20   XR CHEST PORT    Impression IMPRESSION: Cardiomegaly with moderately severe pulmonary edema. Results from [place] encounter on 01/26/18   XR CHEST PA LAT    Impression IMPRESSION: No acute process seen. CT Results (maximum last 3): Results from [place] encounter on 08/10/20   CT CODE NEURO PERF W CBF    Impression IMPRESSION:   CTA Head:  1. Partially occlusive thrombus within the proximal left M2 anterior and  posterior divisions, with associated severe focal stenoses. The distal left MCA  branches remain widely patent. 2. Additional mild atherosclerotic disease as above. 3. 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 CTA Neck:  1. No evidence of flow-limiting stenosis. 2. Mild stenosis (less than 50% by NASCET criteria) of the proximal right  internal carotid artery. 3. Status post left thyroid lobectomy. Enlarged heterogeneous right thyroid  gland without a discrete nodule. Leftward deviation of the trachea without  significant narrowing. 4. Pulmonary interstitial edema. 5. Numerous mildly enlarged mediastinal lymph nodes, which are indeterminant. CT Brain Perfusion:  1. No acute abnormality. The findings were called to [address] [place] [address] on 8/11/2020 at 8:18 AM by Dr. Austin Wyatt. [phone]           CT CODE NEURO HEAD WO CONTRAST    Impression IMPRESSION:   No evidence of acute intracranial process. Old lacunar infarcts in the basal  ganglia. Mild periventricular white matter disease, likely representing chronic  small vessel ischemia. CTA CODE NEURO HEAD AND NECK W CONT    Impression IMPRESSION:   CTA Head:  1.  Partially occlusive thrombus within the proximal left M2 anterior and  posterior divisions, with associated severe focal stenoses. The distal left MCA  branches remain widely patent. 2. Additional mild atherosclerotic disease as above. 3. 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 CTA Neck:  1. No evidence of flow-limiting stenosis. 2. Mild stenosis (less than 50% by NASCET criteria) of the proximal right  internal carotid artery. 3. Status post left thyroid lobectomy. Enlarged heterogeneous right thyroid  gland without a discrete nodule. Leftward deviation of the trachea without  significant narrowing. 4. Pulmonary interstitial edema. 5. Numerous mildly enlarged mediastinal lymph nodes, which are indeterminant. CT Brain Perfusion:  1. No acute abnormality. The findings were called to [address] [place] [address] on 8/11/2020 at 8:18 AM by Dr. Michelle Queen. [phone]               MRI Results (maximum last 3): Results from [place] encounter on 08/10/20   MRI BRAIN WO CONT    Impression IMPRESSION:    1. Small scattered acute infarcts in the left cerebral hemisphere, in the left  middle cerebral artery territory. No major territorial infarct. 2. Mild to moderate white matter signal abnormality and chronic lacunar infarcts  consistent with chronic small vessel ischemic changes. Nuclear Medicine Results (maximum last 3): No results found for this or any previous visit. US Results (maximum last 3): Results from [place] encounter on 12/14/09   US EXTREMITY NON VASCULAR   US EXTREMITY NON VASCULAR       VAS/US Results (maximum last 3): No results found for this or any previous visit.       Current Facility-Administered Medications:     acetaminophen (TYLENOL) tablet 500 mg, 500 mg, Oral, Q6H PRN, Oba, Oluwabunmi S, NP    amiodarone (CORDARONE) tablet 200 mg, 200 mg, Oral, DAILY, Oba, Oluwabunmi S, NP, 200 mg at 09/23/20 1009    aspirin delayed-release tablet 81 mg, 81 mg, Oral, DAILY, Oba, Oluwabunmi S, NP, 81 mg at 09/23/20 1011   atorvastatin (LIPITOR) tablet 40 mg, 40 mg, Oral, DAILY, Oba, Oluwabunmi S, NP, 40 mg at 09/23/20 1011    carvediloL (COREG) tablet 3.125 mg, 3.125 mg, Oral, BID WITH MEALS, Oba, Oluwabunmi S, NP, 3.125 mg at 09/23/20 1010    gabapentin (NEURONTIN) capsule 100 mg, 100 mg, Oral, TID, Oba, Oluwabunmi S, NP, 100 mg at 09/23/20 1011    [START ON 9/24/2020] levothyroxine (SYNTHROID) tablet 200 mcg, 200 mcg, Oral, ACB, Oba, Oluwabunmi S, NP    magnesium oxide (MAG-OX) tablet 400 mg, 400 mg, Oral, BID, Oba, Oluwabunmi S, NP, 400 mg at 09/23/20 1010    melatonin tablet 5 mg, 5 mg, Oral, QHS PRN, Oba, Oluwabunmi S, NP    pantoprazole (PROTONIX) tablet 40 mg, 40 mg, Oral, DAILY, Oba, Oluwabunmi S, NP, 40 mg at 09/23/20 1010    oxybutynin (DITROPAN) tablet 10 mg, 10 mg, Oral, BID, Oba, Oluwabunmi S, NP    ticagrelor (BRILINTA) tablet 90 mg, 90 mg, Oral, BID, Oba, Oluwabunmi S, NP, 90 mg at 09/23/20 1011    polyethylene glycol (MIRALAX) packet 17 g, 17 g, Oral, PRN, Oba, Oluwabunmi S, NP    furosemide (LASIX) injection 40 mg, 40 mg, IntraVENous, TID, Alberto Roberts MD    potassium chloride (K-DUR, KLOR-CON) SR tablet 40 mEq, 40 mEq, Oral, TID, Alberto Roberts MD, 40 mEq at 09/23/20 1010    multivitamin (ONE A DAY) tablet 1 Tab, 1 Tab, Oral, DAILY, Alberto Roberts MD    metFORMIN (GLUCOPHAGE) tablet 750 mg, 750 mg, Oral, BID WITH MEALS, Alberto Roberts MD    sodium chloride (NS) flush 5-40 mL, 5-40 mL, IntraVENous, Q8H, Oba, Vitoruwabunmi S, NP    acetaminophen (TYLENOL) tablet 650 mg, 650 mg, Oral, Q6H PRN **OR** acetaminophen (TYLENOL) suppository 650 mg, 650 mg, Rectal, Q6H PRN, Oba, Audrey S, NP    bisacodyL (DULCOLAX) tablet 5 mg, 5 mg, Oral, DAILY PRN, ObAudrey davis, NP    ondansetron (ZOFRAN ODT) tablet 4 mg, 4 mg, Oral, Q8H PRN **OR** ondansetron (ZOFRAN) injection 4 mg, 4 mg, IntraVENous, Q6H PRN, Nguyễn Yeboah NP          Case discussed with collaborating physician Dr. Wilbert Zafar and our impression and recommendations are as follows:   1. Acute on Chronic HFrEFE: patient currently decompensated. Attempt IV diuresis with 40mg IVP TID today, replete electrolytes aggressively and repeat labs in am.  If does not make significant progress with output overnight and IVC dilated on limited TTE in then will consider dose of Metolazone in morning 30 minutes prior to Lasix dosing. Continue telemetry, strict I/O, daily weights. 2. CAD: recent PCI to proximal LAD 9/10. Please continue ASA/Brilinta DAPT, statin, BB   3. HTN - BP borderline. Monitor and leave room for diuresis. 4. Mixed Ischemic Cardiomyopathy - will repeat limited echo in am to see if EF has improved since PCI. Continue meds as above. Patient does have high PVC load and frequently has Trigeminy which decreases her CRT %. Please continue Amiodarone she was started on by Dr. Marta Bland. Continue telemetry. Keep electrolytes at goal.   5. Hypokalemia  - repletion ordered. Goal 4.5 -5. Repeat labs including magnesium level in am.       Thank you for involving us in the care of this patient. Please do not hesitate to call if additional questions arise.  If after hours please call [phone]

## 2020-09-23 NOTE — PROGRESS NOTES
Progress Note  Date:9/23/2020       Room:202/01  Patient Luciana Gregory     YOB: 1950     Age:70 y.o. Subjective    Brian Borden is a 65 y.o. female  significant for Non-Ischemic Cardiomyopathy, HFrEF (EF 20%), HTN, HLP, GERD, DM-II, Biventricular ICD, Fibromyalgia, and a recent CAD/Stent, who presented to the emergency department today as referred by her cardiologist for IV Diuresis in context of dyspnea and CHF exacerbation. Patient recently underwent pacemaker interrogation yesterday 9/21 by Dr Britney Collins, she was then followed up by Patel Nguyen NP who referred her for hospital admission for diuresis due to longstanding dyspnea. ED work-up revealed a BNP of 2738, trop 0.22, CXR consistent with pulmonary venous cephalization, without edema. At the time of my visit, patient seems to be in no respiratory distress, with O2sats 95-96%RA. She denies chest pain, chest pressure, palpitations, difficulty breathing, fever or chills. She endorsed Shortness of breath on exertion/with activity which has been the same in the last several days to weeks, sleeps with 1-2  Pillows at night, and still with trace peripheral edema in BLE. Patient seen and evaluated sitting in the bed no acute distress and has improved symptoms and continues to diurese well cardiology evaluate the patient. Denies any chest pain has good O2 saturation on room air    Review of Systems   Constitutional: Negative. HENT: Negative. Eyes: Negative. Respiratory: Positive for shortness of breath. Cardiovascular: Positive for leg swelling. Gastrointestinal: Negative. Endocrine: Negative. Genitourinary: Negative. Musculoskeletal: Negative. Skin: Negative. Neurological: Negative. Hematological: Negative. Psychiatric/Behavioral: Negative. All other systems reviewed and are negative.       Objective           Vitals Last 24 Hours:  Patient Vitals for the past 24 hrs:   Temp Pulse Resp BP SpO2 09/23/20 1545 97.6 °F (36.4 °C) 89 18  100 %   09/23/20 1200  78      09/23/20 0759 97 °F (36.1 °C) 88  (!) 124/54 100 %   09/23/20 0312  94 20 102/75 95 %   09/22/20 1921  86  (!) 108/92    09/22/20 1920  86 11 (!) 108/92 96 %   09/22/20 1919     96 %   09/22/20 1743 98 °F (36.7 °C) 85 18 130/62 95 %        I/O (24Hr): No intake or output data in the 24 hours ending 09/23/20 1738    Physical Exam:  General: Alert, cooperative, no distress, appears stated age. Head:  Normocephalic, without obvious abnormality, atraumatic. Eyes:  Conjunctivae/corneas clear. Pupils equal, round, reactive to light. Extraocular movements intact. Lungs: Air entry diminished at the bases basal crackles noted chest wall: No tenderness or deformity. Heart:  Regular rate and rhythm, S1, S2 normal, no murmur, click, rub or gallop. Abdomen:  Soft, non-tender. Bowel sounds normal. No masses,  No organomegaly. Extremities: Extremities normal, atraumatic, no cyanosis, positive for edema  Pulses: 2+ and symmetric all extremities. Skin: Skin color, texture, turgor normal. No rashes or lesions  Neurologic: Awake, Alert, oriented.  No obvious gross sensory or motor deficits      Medications           Current Facility-Administered Medications   Medication Dose Route Frequency    acetaminophen (TYLENOL) tablet 500 mg  500 mg Oral Q6H PRN    amiodarone (CORDARONE) tablet 200 mg  200 mg Oral DAILY    aspirin delayed-release tablet 81 mg  81 mg Oral DAILY    atorvastatin (LIPITOR) tablet 40 mg  40 mg Oral DAILY    carvediloL (COREG) tablet 3.125 mg  3.125 mg Oral BID WITH MEALS    gabapentin (NEURONTIN) capsule 100 mg  100 mg Oral TID    [START ON 9/24/2020] levothyroxine (SYNTHROID) tablet 200 mcg  200 mcg Oral ACB    magnesium oxide (MAG-OX) tablet 400 mg  400 mg Oral BID    melatonin tablet 5 mg  5 mg Oral QHS PRN    pantoprazole (PROTONIX) tablet 40 mg  40 mg Oral DAILY    oxybutynin (DITROPAN) tablet 10 mg  10 mg Oral BID    ticagrelor (BRILINTA) tablet 90 mg  90 mg Oral BID    polyethylene glycol (MIRALAX) packet 17 g  17 g Oral PRN    furosemide (LASIX) injection 40 mg  40 mg IntraVENous TID    potassium chloride (K-DUR, KLOR-CON) SR tablet 40 mEq  40 mEq Oral TID    multivitamin (ONE A DAY) tablet 1 Tab  1 Tab Oral DAILY    metFORMIN (GLUCOPHAGE) tablet 750 mg  750 mg Oral BID WITH MEALS    sodium chloride (NS) flush 5-40 mL  5-40 mL IntraVENous Q8H    acetaminophen (TYLENOL) tablet 650 mg  650 mg Oral Q6H PRN    Or    acetaminophen (TYLENOL) suppository 650 mg  650 mg Rectal Q6H PRN    bisacodyL (DULCOLAX) tablet 5 mg  5 mg Oral DAILY PRN    ondansetron (ZOFRAN ODT) tablet 4 mg  4 mg Oral Q8H PRN    Or    ondansetron (ZOFRAN) injection 4 mg  4 mg IntraVENous Q6H PRN         Allergies         Codeine; Novocain [procaine]; and Tramadol       Labs/Imaging/Diagnostics      Labs:  Recent Results (from the past 48 hour(s))   EKG, 12 LEAD, INITIAL    Collection Time: 09/22/20  5:38 PM   Result Value Ref Range    Ventricular Rate 89 BPM    Atrial Rate 89 BPM    P-R Interval 176 ms    QRS Duration 178 ms    Q-T Interval 431 ms    QTC Calculation (Bezet) 525 ms    Calculated P Axis -173 degrees    Calculated R Axis 37 degrees    Calculated T Axis 67 degrees    Diagnosis       Atrial-sensed ventricular-paced complexes  No further analysis attempted due to paced rhythm  Baseline wander in lead(s) V1    Confirmed by [id number] on 9/23/2020 4:39:25 PM     CBC WITH AUTOMATED DIFF    Collection Time: 09/22/20  5:50 PM   Result Value Ref Range    WBC 5.5 4.4 - 11.3 K/uL    RBC 3.23 (L) 4.50 - 5.90 M/uL    HGB 9.7 (L) 13.5 - 17.5 %    HCT 29.2 (L) 36 - 46 %    MCV 90.4 80 - 100 FL    MCH 29.9 (L) 31 - 34 PG    MCHC 33.1 31.0 - 36.0 g/dL    RDW 14.5 11.5 - 14.5 %    PLATELET 139 K/uL    MPV 8.4 6.5 - 11.5 FL    NRBC 0.0  WBC    ABSOLUTE NRBC 0.00 K/uL    NEUTROPHILS 61 42 - 75 %    LYMPHOCYTES 26 20.5 - 51.1 %    MONOCYTES 10 (H) 1.7 - 9.3 %    EOSINOPHILS 2 0.9 - 2.9 %    BASOPHILS 1 0.0 - 2.5 %    ABS. NEUTROPHILS 3.4 1.8 - 7.7 K/UL    ABS. LYMPHOCYTES 1.4 1.0 - 4.8 K/UL    ABS. MONOCYTES 0.6 0.2 - 2.4 K/UL    ABS. EOSINOPHILS 0.1 0.0 - 0.7 K/UL    ABS. BASOPHILS 0.0 0.0 - 0.2 K/UL   METABOLIC PANEL, COMPREHENSIVE    Collection Time: 09/22/20  5:50 PM   Result Value Ref Range    Sodium 140 136 - 145 mmol/L    Potassium 3.6 3.5 - 5.1 mmol/L    Chloride 103 97 - 108 mmol/L    CO2 29 21 - 32 mmol/L    Anion gap 8 5 - 15 mmol/L    Glucose 127 (H) 65 - 100 mg/dL    BUN 38 (H) 6 - 20 mg/dL    Creatinine 1.15 (H) 0.55 - 1.02 mg/dL    BUN/Creatinine ratio 33 (H) 12 - 20      GFR est AA 56 (L) >60 ml/min/1.73m2    GFR est non-AA 46 (L) >60 ml/min/1.73m2    Calcium 9.1 8.5 - 10.1 mg/dL    Bilirubin, total 0.8 0.2 - 1.0 mg/dL    AST (SGOT) 37 15 - 37 U/L    ALT (SGPT) 27 12 - 78 U/L    Alk.  phosphatase 101 45 - 117 U/L    Protein, total 8.2 6.4 - 8.2 g/dL    Albumin 3.0 (L) 3.5 - 5.0 g/dL    Globulin 5.2 (H) 2.0 - 4.0 g/dL    A-G Ratio 0.6 (L) 1.1 - 2.2     BNP    Collection Time: 09/22/20  5:50 PM   Result Value Ref Range    NT pro-BNP 2,738 (H) <125 pg/mL   TROPONIN I    Collection Time: 09/22/20  5:50 PM   Result Value Ref Range    Troponin-I, Qt. 0.22 (H) <0.05 ng/mL   TROPONIN I    Collection Time: 09/22/20  8:50 PM   Result Value Ref Range    Troponin-I, Qt. 0.22 (H) <7.02 ng/mL   METABOLIC PANEL, BASIC    Collection Time: 09/23/20  7:01 AM   Result Value Ref Range    Sodium 141 136 - 145 mmol/L    Potassium 3.1 (L) 3.5 - 5.1 mmol/L    Chloride 104 97 - 108 mmol/L    CO2 29 21 - 32 mmol/L    Anion gap 8 5 - 15 mmol/L    Glucose 176 (H) 65 - 100 mg/dL    BUN 34 (H) 6 - 20 mg/dL    Creatinine 0.98 0.55 - 1.02 mg/dL    BUN/Creatinine ratio 35 (H) 12 - 20      GFR est AA >60 >60 ml/min/1.73m2    GFR est non-AA 56 (L) >60 ml/min/1.73m2    Calcium 8.9 8.5 - 10.1 mg/dL   CBC WITH AUTOMATED DIFF    Collection Time: 09/23/20  7:01 AM   Result Value Ref Range    WBC 4.7 4.4 - 11.3 K/uL    RBC 3.03 (L) 4.50 - 5.90 M/uL    HGB 8.9 (L) 13.5 - 17.5 %    HCT 27.2 (L) 36 - 46 %    MCV 89.8 80 - 100 FL    MCH 29.5 (L) 31 - 34 PG    MCHC 32.9 31.0 - 36.0 g/dL    RDW 14.6 (H) 11.5 - 14.5 %    PLATELET 620 K/uL    MPV 8.0 6.5 - 11.5 FL    NRBC 10.0  WBC    ABSOLUTE NRBC 0.00 K/uL    NEUTROPHILS 64 42 - 75 %    LYMPHOCYTES 23 20.5 - 51.1 %    MONOCYTES 10 (H) 1.7 - 9.3 %    EOSINOPHILS 2 0.9 - 2.9 %    BASOPHILS 1 0.0 - 2.5 %    ABS. NEUTROPHILS 3.0 1.8 - 7.7 K/UL    ABS. LYMPHOCYTES 1.1 1.0 - 4.8 K/UL    ABS. MONOCYTES 0.5 0.2 - 2.4 K/UL    ABS. EOSINOPHILS 0.1 0.0 - 0.7 K/UL    ABS. BASOPHILS 0.0 0.0 - 0.2 K/UL        Imaging:  Xr Chest Port    Result Date: 9/22/2020  Impression: Evidence of chronic left-sided heart disease, without active edema demonstrated at this time. Atherosclerosis. Assessment//Plan           Problem List:  Hospital Problems  Date Reviewed: 9/22/2020          Codes Class Noted POA    [facility] ICD-10-CM: I50.9  ICD-9-CM: 428.0  9/22/2020 Unknown               Acute on Chronic Systolic Congestive Heart Failure:  -Admit to observation, and monitor on telemetry.  -Lasix 20mg IV q 6 hrs, for gentle diuresis, and was increased to 40 mg IV 3 times daily on 9/23  -CXR consistent with pulmonary venous cephalization, without pulmonary edema, with O2sats have been 94-96% on RA. .-Echo 8/12/20-EF 20-25%, dilated LV, with concentric hypertrophy.  -Continue BB-Carvedilol per home dose.  -Na restricted diet/1200ml Fluid restriction. Strict I&O, daily weight.  -Appreciate cardiology input for further recommendations.    -patient has been counseled on medication and dietary compliance.     Acute kidney injury  -suspect prerenal, in context of poor perfusion and/or possibly dehydration from chronic diuresis.   -Cr 1.15, Baseline is around 0.8.   -Avoid nephrotoxic agents, renally dose medications, and consider nephrology consult as needed. -BMP in a.m.      CAD/Stents  -Recent C with stents placed to LAD \"last week\". ED work up shows a Troponin leak of 0.22, ECG unremarkable.   -Continue Brilinta. ID as well as aspirin      Diabetes Mellitus type 2:  -Chronic condition.  -Continue Accucheck ACHS,   -continue home medications.     #5: Biventricular ICD in situ:  -Chronic condition. Stable.     #6: PVCs  -Recently started on Amiodarone by cardiologist, which we will continue while inpatient.      VTE Prophylaxis: Lovenox but patient refused continue SCDs  Code status: Full Code.       Electronically signed by Brian Nunez MD on 9/23/2020 at 5:38 PM

## 2020-09-24 ENCOUNTER — APPOINTMENT (OUTPATIENT)
Dept: VASCULAR SURGERY | Age: 70
DRG: 291 | End: 2020-09-24
Attending: NURSE PRACTITIONER
Payer: MEDICARE

## 2020-09-24 LAB
ANION GAP SERPL CALC-SCNC: 8 MMOL/L (ref 5–15)
ANION GAP SERPL CALC-SCNC: 9 MMOL/L (ref 5–15)
BASOPHILS # BLD: 0 K/UL (ref 0–0.2)
BASOPHILS NFR BLD: 0 % (ref 0–2.5)
BUN SERPL-MCNC: 36 MG/DL (ref 6–20)
BUN SERPL-MCNC: 38 MG/DL (ref 6–20)
BUN/CREAT SERPL: 28 (ref 12–20)
BUN/CREAT SERPL: 34 (ref 12–20)
CA-I BLD-MCNC: 9 MG/DL (ref 8.5–10.1)
CA-I BLD-MCNC: 9.2 MG/DL (ref 8.5–10.1)
CHLORIDE SERPL-SCNC: 102 MMOL/L (ref 97–108)
CHLORIDE SERPL-SCNC: 103 MMOL/L (ref 97–108)
CO2 SERPL-SCNC: 27 MMOL/L (ref 21–32)
CO2 SERPL-SCNC: 28 MMOL/L (ref 21–32)
CREAT SERPL-MCNC: 1.13 MG/DL (ref 0.55–1.02)
CREAT SERPL-MCNC: 1.29 MG/DL (ref 0.55–1.02)
ECHO LV INTERNAL DIMENSION DIASTOLIC: 6.2 CM (ref 3.9–5.3)
ECHO LV INTERNAL DIMENSION SYSTOLIC: 5.8 CM
ECHO LV IVSD: 1.1 CM (ref 0.6–0.9)
ECHO LV MASS 2D: 278 G (ref 67–162)
ECHO LV MASS INDEX 2D: 144.2 G/M2 (ref 43–95)
ECHO LV POSTERIOR WALL DIASTOLIC: 1 CM (ref 0.6–0.9)
ECHO RIGHT VENTRICULAR SYSTOLIC PRESSURE (RVSP): 46 MMHG
ECHO TV REGURGITANT PEAK GRADIENT: 31 MMHG
ECHO TV REGURGITANT VTI: 2.77 CM
EOSINOPHIL # BLD: 0.1 K/UL (ref 0–0.7)
EOSINOPHIL NFR BLD: 2 % (ref 0.9–2.9)
ERYTHROCYTE [DISTWIDTH] IN BLOOD BY AUTOMATED COUNT: 14.4 % (ref 11.5–14.5)
GLUCOSE SERPL-MCNC: 197 MG/DL (ref 65–100)
GLUCOSE SERPL-MCNC: 235 MG/DL (ref 65–100)
HCT VFR BLD AUTO: 28.3 % (ref 36–46)
HGB BLD-MCNC: 9.4 % (ref 13.5–17.5)
LYMPHOCYTES # BLD: 1.2 K/UL (ref 1–4.8)
LYMPHOCYTES NFR BLD: 27 % (ref 20.5–51.1)
MAGNESIUM SERPL-MCNC: 1.5 MG/DL (ref 1.6–2.4)
MCH RBC QN AUTO: 30.2 PG (ref 31–34)
MCHC RBC AUTO-ENTMCNC: 33.2 G/DL (ref 31–36)
MCV RBC AUTO: 90.9 FL (ref 80–100)
MONOCYTES # BLD: 0.6 K/UL (ref 0.2–2.4)
MONOCYTES NFR BLD: 12 % (ref 1.7–9.3)
NEUTS SEG # BLD: 2.8 K/UL (ref 1.8–7.7)
NEUTS SEG NFR BLD: 59 % (ref 42–75)
NRBC # BLD: 0 K/UL
NRBC BLD-RTO: 0 PER 100 WBC
PLATELET # BLD AUTO: 131 K/UL
PMV BLD AUTO: 8.7 FL (ref 6.5–11.5)
POTASSIUM SERPL-SCNC: 4.1 MMOL/L (ref 3.5–5.1)
POTASSIUM SERPL-SCNC: 4.5 MMOL/L (ref 3.5–5.1)
RBC # BLD AUTO: 3.12 M/UL (ref 4.5–5.9)
SODIUM SERPL-SCNC: 137 MMOL/L (ref 136–145)
SODIUM SERPL-SCNC: 140 MMOL/L (ref 136–145)
WBC # BLD AUTO: 4.7 K/UL (ref 4.4–11.3)

## 2020-09-24 PROCEDURE — 80048 BASIC METABOLIC PNL TOTAL CA: CPT

## 2020-09-24 PROCEDURE — 83735 ASSAY OF MAGNESIUM: CPT

## 2020-09-24 PROCEDURE — 96376 TX/PRO/DX INJ SAME DRUG ADON: CPT

## 2020-09-24 PROCEDURE — 74011250636 HC RX REV CODE- 250/636: Performed by: NURSE PRACTITIONER

## 2020-09-24 PROCEDURE — 85025 COMPLETE CBC W/AUTO DIFF WBC: CPT

## 2020-09-24 PROCEDURE — 74011250637 HC RX REV CODE- 250/637: Performed by: HOSPITALIST

## 2020-09-24 PROCEDURE — 96375 TX/PRO/DX INJ NEW DRUG ADDON: CPT

## 2020-09-24 PROCEDURE — 74011250636 HC RX REV CODE- 250/636: Performed by: HOSPITALIST

## 2020-09-24 PROCEDURE — 99218 HC RM OBSERVATION: CPT

## 2020-09-24 PROCEDURE — 93308 TTE F-UP OR LMTD: CPT

## 2020-09-24 PROCEDURE — 74011250637 HC RX REV CODE- 250/637: Performed by: NURSE PRACTITIONER

## 2020-09-24 PROCEDURE — 36415 COLL VENOUS BLD VENIPUNCTURE: CPT

## 2020-09-24 RX ORDER — METOLAZONE 2.5 MG/1
2.5 TABLET ORAL ONCE
Status: COMPLETED | OUTPATIENT
Start: 2020-09-24 | End: 2020-09-24

## 2020-09-24 RX ORDER — BENZONATATE 100 MG/1
100 CAPSULE ORAL
Status: DISCONTINUED | OUTPATIENT
Start: 2020-09-24 | End: 2020-09-29 | Stop reason: HOSPADM

## 2020-09-24 RX ORDER — MAGNESIUM SULFATE HEPTAHYDRATE 40 MG/ML
2 INJECTION, SOLUTION INTRAVENOUS ONCE
Status: COMPLETED | OUTPATIENT
Start: 2020-09-24 | End: 2020-09-24

## 2020-09-24 RX ADMIN — LEVOTHYROXINE SODIUM 200 MCG: 100 TABLET ORAL at 08:12

## 2020-09-24 RX ADMIN — POTASSIUM CHLORIDE 40 MEQ: 20 TABLET, EXTENDED RELEASE ORAL at 15:01

## 2020-09-24 RX ADMIN — METFORMIN HYDROCHLORIDE 750 MG: 500 TABLET ORAL at 08:11

## 2020-09-24 RX ADMIN — GABAPENTIN 100 MG: 100 CAPSULE ORAL at 15:01

## 2020-09-24 RX ADMIN — GABAPENTIN 100 MG: 100 CAPSULE ORAL at 08:13

## 2020-09-24 RX ADMIN — FUROSEMIDE 40 MG: 10 INJECTION, SOLUTION INTRAVENOUS at 12:42

## 2020-09-24 RX ADMIN — MULTIVITAMIN TABLET 1 TABLET: TABLET at 08:12

## 2020-09-24 RX ADMIN — Medication 10 ML: at 21:37

## 2020-09-24 RX ADMIN — ATORVASTATIN CALCIUM 40 MG: 40 TABLET, FILM COATED ORAL at 08:12

## 2020-09-24 RX ADMIN — ASPIRIN 81 MG: 81 TABLET, COATED ORAL at 08:12

## 2020-09-24 RX ADMIN — CARVEDILOL 3.12 MG: 3.12 TABLET, FILM COATED ORAL at 08:12

## 2020-09-24 RX ADMIN — POTASSIUM CHLORIDE 40 MEQ: 20 TABLET, EXTENDED RELEASE ORAL at 08:12

## 2020-09-24 RX ADMIN — POTASSIUM CHLORIDE 40 MEQ: 20 TABLET, EXTENDED RELEASE ORAL at 21:36

## 2020-09-24 RX ADMIN — Medication 10 ML: at 13:29

## 2020-09-24 RX ADMIN — CARVEDILOL 3.12 MG: 3.12 TABLET, FILM COATED ORAL at 17:14

## 2020-09-24 RX ADMIN — MAGNESIUM OXIDE 400 MG: 400 TABLET ORAL at 17:13

## 2020-09-24 RX ADMIN — ONDANSETRON 4 MG: 2 INJECTION INTRAMUSCULAR; INTRAVENOUS at 11:28

## 2020-09-24 RX ADMIN — FUROSEMIDE 40 MG: 10 INJECTION, SOLUTION INTRAVENOUS at 08:11

## 2020-09-24 RX ADMIN — METFORMIN HYDROCHLORIDE 750 MG: 500 TABLET ORAL at 17:13

## 2020-09-24 RX ADMIN — OXYBUTYNIN CHLORIDE 10 MG: 5 TABLET ORAL at 08:12

## 2020-09-24 RX ADMIN — Medication 10 ML: at 05:24

## 2020-09-24 RX ADMIN — Medication 10 ML: at 04:52

## 2020-09-24 RX ADMIN — METOLAZONE 2.5 MG: 2.5 TABLET ORAL at 10:29

## 2020-09-24 RX ADMIN — MAGNESIUM OXIDE 400 MG: 400 TABLET ORAL at 08:11

## 2020-09-24 RX ADMIN — TICAGRELOR 90 MG: 90 TABLET ORAL at 08:12

## 2020-09-24 RX ADMIN — PANTOPRAZOLE SODIUM 40 MG: 20 TABLET, DELAYED RELEASE ORAL at 08:12

## 2020-09-24 RX ADMIN — FUROSEMIDE 40 MG: 10 INJECTION, SOLUTION INTRAVENOUS at 15:01

## 2020-09-24 RX ADMIN — TICAGRELOR 90 MG: 90 TABLET ORAL at 17:14

## 2020-09-24 RX ADMIN — MAGNESIUM SULFATE 2 G: 2 INJECTION INTRAVENOUS at 10:22

## 2020-09-24 RX ADMIN — AMIODARONE HYDROCHLORIDE 200 MG: 200 TABLET ORAL at 08:12

## 2020-09-24 RX ADMIN — GABAPENTIN 100 MG: 100 CAPSULE ORAL at 21:36

## 2020-09-24 NOTE — PROGRESS NOTES
Spiritual Care Assessment/Progress Note  [facility]      NAME: Lanette Zuniga      MRN: [id number]  AGE: 65 y.o.  SEX: female  Amish Affiliation: Non Zoroastrian   Language: English     9/24/2020     Total Time (in minutes): 20     Spiritual Assessment begun in [facility] [address] through conversation with:         [x]Patient        [] Family    [] Friend(s)        Reason for Consult: Initial/Spiritual assessment, patient floor     Spiritual beliefs: (Please include comment if needed)     [x] Identifies with a yolie tradition:         [x] Supported by a yloie community:            [] Claims no spiritual orientation:           [] Seeking spiritual identity:                [] Adheres to an individual form of spirituality:           [] Not able to assess:                           Identified resources for coping:      [x] Prayer                               [] Music                  [x] Guided Imagery     [x] Family/friends                 [] Pet visits     [] Devotional reading                         [] Unknown     [] Other:                                               Interventions offered during this visit: (See comments for more details)    Patient Interventions: Affirmation of yolie, Catharsis/review of pertinent events in supportive environment, Coping skills reviewed/reinforced, Guided imagery, Iconic (affirming the presence of God/Higher Power), Initial/Spiritual assessment, patient floor, Normalization of emotional/spiritual concerns, Prayer (actual)           Plan of Care:     [] Support spiritual and/or cultural needs    [] Support AMD and/or advance care planning process      [] Support grieving process   [] Coordinate Rites and/or Rituals    [] Coordination with community clergy   [] No spiritual needs identified at this time   [] Detailed Plan of Care below (See Comments)  [] Make referral to Music Therapy  [] Make referral to Pet Therapy     [] Make referral to Addiction services  [] Make referral to [facility]  [] Make referral to Spiritual Care Partner  [] No future visits requested        [x] Follow up visits as needed     Comments: The purpose of the visit was to do a spiritual assessment on the patient. The patient was up and alert during the visit. The patient mentioned that she was feeling better but she is still struggling some. She shared that she has a loving and supportive family. She also mentioned that she draws strength from prayer. The  provided the ministry of presence and at the patient's request extended prayer. [address] [facility] [address].    can be reached by calling the  at [facility]  [phone]

## 2020-09-24 NOTE — PROGRESS NOTES
Progress Note    Patient: Maco Viveros MRN: [id number]  SSN: [id number]    YOB: 1950  Age: 65 y.o. Sex: female      Admit Date: 9/22/2020    LOS: 0 days     Subjective:     Patient admitted with CHF. Still with some shortness of breath. Objective:     Vitals:    09/24/20 0447 09/24/20 0714 09/24/20 0800 09/24/20 1020   BP: 131/69 137/67     Pulse: 69 67 67    Resp: 16 18     Temp: (!) 96.7 °F (35.9 °C) 97.5 °F (36.4 °C)     SpO2: 90% 96%     Weight:    91 kg (200 lb 11.2 oz)   Height:            Intake and Output:  Current Shift: 09/24 0701 - 09/24 1900  In: 240 [P.O.:240]  Out: 350 [Urine:350]  Last three shifts: 09/22 1901 - 09/24 0700  In: 300 [P.O.:300]  Out: 100 [Urine:100]    Physical Exam:   GENERAL: alert, cooperative, no distress, appears stated age  THROAT & NECK: normal and no erythema or exudates noted. LUNG: clear to auscultation bilaterally  HEART: regular rate and rhythm, S1, S2 normal, no murmur, click, rub or gallop  ABDOMEN: soft, non-tender. Bowel sounds normal. No masses,  no organomegaly  EXTREMITIES:  extremities normal, atraumatic, no cyanosis or edema  SKIN: no rash or abnormalities  NEUROLOGIC: AOx3. Gait normal. Reflexes and motor strength normal and symmetric. Cranial nerves 2-12 and sensation grossly intact. PSYCHIATRIC: non focal    Lab/Data Review: All lab results for the last 24 hours reviewed.      Recent Results (from the past 24 hour(s))   METABOLIC PANEL, BASIC    Collection Time: 09/24/20  5:28 AM   Result Value Ref Range    Sodium 140 136 - 145 mmol/L    Potassium 4.1 3.5 - 5.1 mmol/L    Chloride 103 97 - 108 mmol/L    CO2 28 21 - 32 mmol/L    Anion gap 9 5 - 15 mmol/L    Glucose 197 (H) 65 - 100 mg/dL    BUN 38 (H) 6 - 20 mg/dL    Creatinine 1.13 (H) 0.55 - 1.02 mg/dL    BUN/Creatinine ratio 34 (H) 12 - 20      GFR est AA 57 (L) >60 ml/min/1.73m2    GFR est non-AA 47 (L) >60 ml/min/1.73m2    Calcium 9.0 8.5 - 10.1 mg/dL   CBC WITH AUTOMATED DIFF Collection Time: 09/24/20  5:28 AM   Result Value Ref Range    WBC 4.7 4.4 - 11.3 K/uL    RBC 3.12 (L) 4.50 - 5.90 M/uL    HGB 9.4 (L) 13.5 - 17.5 %    HCT 28.3 (L) 36 - 46 %    MCV 90.9 80 - 100 FL    MCH 30.2 (L) 31 - 34 PG    MCHC 33.2 31.0 - 36.0 g/dL    RDW 14.4 11.5 - 14.5 %    PLATELET 339 K/uL    MPV 8.7 6.5 - 11.5 FL    NRBC 0.0  WBC    ABSOLUTE NRBC 0.00 K/uL    NEUTROPHILS 59 42 - 75 %    LYMPHOCYTES 27 20.5 - 51.1 %    MONOCYTES 12 (H) 1.7 - 9.3 %    EOSINOPHILS 2 0.9 - 2.9 %    BASOPHILS 0 0.0 - 2.5 %    ABS. NEUTROPHILS 2.8 1.8 - 7.7 K/UL    ABS. LYMPHOCYTES 1.2 1.0 - 4.8 K/UL    ABS. MONOCYTES 0.6 0.2 - 2.4 K/UL    ABS. EOSINOPHILS 0.1 0.0 - 0.7 K/UL    ABS. BASOPHILS 0.0 0.0 - 0.2 K/UL   MAGNESIUM    Collection Time: 09/24/20  5:28 AM   Result Value Ref Range    Magnesium 1.5 (L) 1.6 - 2.4 mg/dL   ECHO ADULT FOLLOW-UP OR LIMITED    Collection Time: 09/24/20  9:48 AM   Result Value Ref Range    LVIDd 6.20 3.9 - 5.3 cm    LVPWd 1.00 0.6 - 0.9 cm    LVIDs 5.80 cm    IVSd 1.10 0.6 - 0.9 cm    LV Mass .0 67 - 162 g    LV Mass AL Index 144.2 43 - 95 g/m2    RVSP 46.0 mmHg    Tricuspid Valve Regurgitation Velocity Time Interval 2.77 cm    Triscuspid Valve Regurgitation Peak Gradient 31.0 mmHg        Imaging:    No results found.        Assessment and Plan:     Congestive heart failure  -Acute on chronic systolic congestive heart failure  -Patient with ischemic cardiomyopathy with previous EF of 20 to 25%, echocardiogram pending  -Continue diuresis  -Cardiology following    CORIN   -Patient's serum creatinine is higher today  -Continue monitor while on diuresis    Coronary artery disease  -Recent stent placement  -Continue aspirin and Brilinta, continue statin and beta-blocker  -Neurology following    Diabetes  -Continue metformin  -Start insulin sliding scale coverage    Cardiac arrhythmia  -Patient with frequent PVCs with trigeminy  -Status post biventricular ICD placement in 2018, on amiodarone and Coreg  -Following electrophysiology as outpatient    Hypothyroidism  -Continue Synthroid    GERD  -Continue Protonix  -Stable    Fibromyalgia  -Continue Neurontin    DVT prophylaxis  -SCDs bilateral lower extremities    Signed By: Bereket Arias MD     September 24, 2020

## 2020-09-24 NOTE — PROGRESS NOTES
Progress Note    Patient: Leif Perez MRN: [id number]  SSN: [id number]    YOB: 1950  Age: 65 y.o. Sex: female      Admit Date: 9/22/2020    LOS: 0 days     Subjective: This is a 65year old female that is followed for Acute on Chronic HFrEF. She has diuresed some overnight, but not as expected. IVC still dilated and not collapsible on echo this am.  She continues to endorse peripheral edema, SOB,abdominal distention,  and dyspnea on exertion. She denies chest pain, palpitations, headache, dizziness. Telemetry Review: paced trigeminy    Review of Symptoms:   Reports: peripheral edema, SOB,abdominal distention,  and dyspnea on exertion    Denies chest pain, palpitations, headache, dizziness. Objective:     Vitals:    09/24/20 0447 09/24/20 0714 09/24/20 0800 09/24/20 1020   BP: 131/69 137/67     Pulse: 69 67 67    Resp: 16 18     Temp: (!) 96.7 °F (35.9 °C) 97.5 °F (36.4 °C)     SpO2: 90% 96%     Weight:    91 kg (200 lb 11.2 oz)   Height:            Intake and Output:  Current Shift: 09/24 0701 - 09/24 1900  In: 240 [P.O.:240]  Out: 350 [Urine:350]  Last three shifts: 09/22 1901 - 09/24 0700  In: 300 [P.O.:300]  Out: 100 [Urine:100]    Physical Exam:     General: Well nourished chronically ill appearing white female lying in bed, NAD, A&O  HEENT: Normocephalic, PERRL, no drainage  Neck: Supple, Trachea midline, No JVD  RESP: CTA bilaterally with faint posterior crackles LLL, symmetrical chest movement. No SOB or distress. On RA  Cardiovascular: RRR with routine skipped beat, no RG. + murmur. + device. PVS: No rubor, cyanosis, 1+ pitting BLE edema, Radial, DP, PT pulses equal bilaterally  ABD: obese, distended, soft NT, Normoactive BS  Derm: Warm/Dry/Intact with no lesions, poor turgor  Neuro: A&O PPTS, cranial nerves II- XII grossly intact via interaction with patient. Does have noted expressive aphasia (residual from CVA).    PSYCH: No anxiety or agitation    Lab/Data Review:  BMP:   Lab Results   Component Value Date/Time     09/24/2020 05:28 AM    K 4.1 09/24/2020 05:28 AM     09/24/2020 05:28 AM    CO2 28 09/24/2020 05:28 AM    AGAP 9 09/24/2020 05:28 AM     (H) 09/24/2020 05:28 AM    BUN 38 (H) 09/24/2020 05:28 AM    CREA 1.13 (H) 09/24/2020 05:28 AM    GFRAA 57 (L) 09/24/2020 05:28 AM    GFRNA 47 (L) 09/24/2020 05:28 AM        Echo Results  (Last 48 hours)    None                Current Facility-Administered Medications:     acetaminophen (TYLENOL) tablet 500 mg, 500 mg, Oral, Q6H PRN, Oba, Oluwabunmi S, NP    amiodarone (CORDARONE) tablet 200 mg, 200 mg, Oral, DAILY, Oba, Oluwabunmi S, NP, 200 mg at 09/24/20 0875    aspirin delayed-release tablet 81 mg, 81 mg, Oral, DAILY, Oba, Oluwabunmi S, NP, 81 mg at 09/24/20 3713    atorvastatin (LIPITOR) tablet 40 mg, 40 mg, Oral, DAILY, Oba, Oluwabunmi S, NP, 40 mg at 09/24/20 0812    carvediloL (COREG) tablet 3.125 mg, 3.125 mg, Oral, BID WITH MEALS, Oba, Oluwabunmi S, NP, 3.125 mg at 09/24/20 6190    gabapentin (NEURONTIN) capsule 100 mg, 100 mg, Oral, TID, Oba, Oluwabunmi S, NP, 100 mg at 09/24/20 0813    levothyroxine (SYNTHROID) tablet 200 mcg, 200 mcg, Oral, ACB, Oba, Oluwabunmi S, NP, 200 mcg at 09/24/20 1829    magnesium oxide (MAG-OX) tablet 400 mg, 400 mg, Oral, BID, Oba, Oluwabunmi S, NP, 400 mg at 09/24/20 0811    melatonin tablet 5 mg, 5 mg, Oral, QHS PRN, Oba, Oluwabunmi S, NP    pantoprazole (PROTONIX) tablet 40 mg, 40 mg, Oral, DAILY, Oba, Oluwabunmi S, NP, 40 mg at 09/24/20 7317    oxybutynin (DITROPAN) tablet 10 mg, 10 mg, Oral, BID, Oba, Oluwabunmi S, NP, 10 mg at 09/24/20 1860    ticagrelor (BRILINTA) tablet 90 mg, 90 mg, Oral, BID, Oba, Oluwabunmi S, NP, 90 mg at 09/24/20 3323    polyethylene glycol (MIRALAX) packet 17 g, 17 g, Oral, PRN, Oba, Oluwabunmi S, NP    furosemide (LASIX) injection 40 mg, 40 mg, IntraVENous, TID, Alberto Roberts MD, 40 mg at 09/24/20 1242   potassium chloride (K-DUR, KLOR-CON) SR tablet 40 mEq, 40 mEq, Oral, TID, Alberto Roberts MD, 40 mEq at 09/24/20 4959    multivitamin (ONE A DAY) tablet 1 Tab, 1 Tab, Oral, DAILY, Alberto Roberts MD, 1 Tab at 09/24/20 3861    metFORMIN (GLUCOPHAGE) tablet 750 mg, 750 mg, Oral, BID WITH MEALS, Alberto Roberts MD, 750 mg at 09/24/20 0811    sodium chloride (NS) flush 5-40 mL, 5-40 mL, IntraVENous, Q8H, Oba, Oluwabunmi S, NP, 10 mL at 09/24/20 0524    acetaminophen (TYLENOL) tablet 650 mg, 650 mg, Oral, Q6H PRN **OR** acetaminophen (TYLENOL) suppository 650 mg, 650 mg, Rectal, Q6H PRN, Oba, Oluwabunmi S, NP    bisacodyL (DULCOLAX) tablet 5 mg, 5 mg, Oral, DAILY PRN, Oba, Oluwabunmi S, NP    ondansetron (ZOFRAN ODT) tablet 4 mg, 4 mg, Oral, Q8H PRN **OR** ondansetron (ZOFRAN) injection 4 mg, 4 mg, IntraVENous, Q6H PRN, Oba, Oluwabunmi S, NP, 4 mg at 09/24/20 1128      Assessment:     Active Problems:    CHF exacerbation ([facility] (9/22/2020)        Plan:     Case discussed with Collaborating physician Dr. Danley Fleischer and our recommendations are as follows:     1. Acute on Chronic HFrEF: patient currently decompensated. Continue IV diuresis with 40mg IVP TID and will also add dose of Metolazone PO today. Replete electrolytes aggressively and repeat labs @1600 and in am.  Continue telemetry, strict I/O, daily weights. 2. CAD: recent PCI to proximal LAD 9/10. Please continue ASA/Brilinta DAPT, statin, BB   3. HTN - BP borderline. Monitor and leave room for diuresis. 4. Mixed Ischemic Cardiomyopathy - EF minimally improved on prelim echo, however patient remains overtly hypervolemic, so not accurate at this time. Continue meds as above. Patient does have high PVC load and frequently has Trigeminy which decreases her CRT %. Please continue Amiodarone she was started on by Dr. Johny Blood. Continue telemetry. Keep electrolytes at goal.   5. Hypokalemia  -  Goal 4.5 -5.  Repeat labs @ 1600 after Metolazone and also in am.   6. Hypomangesemia - repletion given today. Repeat level in am.       Thank you for involving us in the care of this patient. Please do not hesitate to call if additional questions arise. If after hours please call [phone]    Signed By: Sommer Palomares NP     September 24, 2020      SHARON Culver Addendum:  Agree with findings and plan noted above.

## 2020-09-25 PROBLEM — I50.9 CHF (CONGESTIVE HEART FAILURE) (HCC): Status: ACTIVE | Noted: 2020-09-25

## 2020-09-25 LAB
ANION GAP SERPL CALC-SCNC: 5 MMOL/L (ref 5–15)
ANION GAP SERPL CALC-SCNC: 6 MMOL/L (ref 5–15)
BASOPHILS # BLD: 0 K/UL (ref 0–0.2)
BASOPHILS NFR BLD: 1 % (ref 0–2.5)
BUN SERPL-MCNC: 41 MG/DL (ref 6–20)
BUN SERPL-MCNC: 45 MG/DL (ref 6–20)
BUN/CREAT SERPL: 25 (ref 12–20)
BUN/CREAT SERPL: 27 (ref 12–20)
CA-I BLD-MCNC: 8.9 MG/DL (ref 8.5–10.1)
CA-I BLD-MCNC: 9.2 MG/DL (ref 8.5–10.1)
CHLORIDE SERPL-SCNC: 103 MMOL/L (ref 97–108)
CHLORIDE SERPL-SCNC: 103 MMOL/L (ref 97–108)
CO2 SERPL-SCNC: 26 MMOL/L (ref 21–32)
CO2 SERPL-SCNC: 28 MMOL/L (ref 21–32)
CREAT SERPL-MCNC: 1.52 MG/DL (ref 0.55–1.02)
CREAT SERPL-MCNC: 1.81 MG/DL (ref 0.55–1.02)
EOSINOPHIL # BLD: 0.1 K/UL (ref 0–0.7)
EOSINOPHIL NFR BLD: 2 % (ref 0.9–2.9)
ERYTHROCYTE [DISTWIDTH] IN BLOOD BY AUTOMATED COUNT: 14.7 % (ref 11.5–14.5)
GLUCOSE BLD STRIP.AUTO-MCNC: 127 MG/DL (ref 65–100)
GLUCOSE BLD STRIP.AUTO-MCNC: 158 MG/DL (ref 65–100)
GLUCOSE BLD STRIP.AUTO-MCNC: 214 MG/DL (ref 65–100)
GLUCOSE SERPL-MCNC: 161 MG/DL (ref 65–100)
GLUCOSE SERPL-MCNC: 170 MG/DL (ref 65–100)
HCT VFR BLD AUTO: 28.6 % (ref 36–46)
HGB BLD-MCNC: 9.4 % (ref 13.5–17.5)
LYMPHOCYTES # BLD: 1 K/UL (ref 1–4.8)
LYMPHOCYTES NFR BLD: 18 % (ref 20.5–51.1)
MAGNESIUM SERPL-MCNC: 1.7 MG/DL (ref 1.6–2.4)
MCH RBC QN AUTO: 29.7 PG (ref 31–34)
MCHC RBC AUTO-ENTMCNC: 32.8 G/DL (ref 31–36)
MCV RBC AUTO: 90.5 FL (ref 80–100)
MONOCYTES # BLD: 0.6 K/UL (ref 0.2–2.4)
MONOCYTES NFR BLD: 11 % (ref 1.7–9.3)
NEUTS SEG # BLD: 4 K/UL (ref 1.8–7.7)
NEUTS SEG NFR BLD: 68 % (ref 42–75)
NRBC # BLD: 0.01 K/UL
NRBC BLD-RTO: 10 PER 100 WBC
PERFORMED BY, TECHID: ABNORMAL
PLATELET # BLD AUTO: 126 K/UL
PMV BLD AUTO: 9.1 FL (ref 6.5–11.5)
POTASSIUM SERPL-SCNC: 5.8 MMOL/L (ref 3.5–5.1)
POTASSIUM SERPL-SCNC: 6.6 MMOL/L (ref 3.5–5.1)
RBC # BLD AUTO: 3.16 M/UL (ref 4.5–5.9)
SODIUM SERPL-SCNC: 135 MMOL/L (ref 136–145)
SODIUM SERPL-SCNC: 136 MMOL/L (ref 136–145)
WBC # BLD AUTO: 5.8 K/UL (ref 4.4–11.3)

## 2020-09-25 PROCEDURE — 96376 TX/PRO/DX INJ SAME DRUG ADON: CPT

## 2020-09-25 PROCEDURE — 74011250637 HC RX REV CODE- 250/637: Performed by: NURSE PRACTITIONER

## 2020-09-25 PROCEDURE — 99218 HC RM OBSERVATION: CPT

## 2020-09-25 PROCEDURE — 74011250636 HC RX REV CODE- 250/636: Performed by: NURSE PRACTITIONER

## 2020-09-25 PROCEDURE — 65270000029 HC RM PRIVATE

## 2020-09-25 PROCEDURE — 80048 BASIC METABOLIC PNL TOTAL CA: CPT

## 2020-09-25 PROCEDURE — 83735 ASSAY OF MAGNESIUM: CPT

## 2020-09-25 PROCEDURE — 85025 COMPLETE CBC W/AUTO DIFF WBC: CPT

## 2020-09-25 PROCEDURE — 74011250637 HC RX REV CODE- 250/637: Performed by: HOSPITALIST

## 2020-09-25 PROCEDURE — 74011636637 HC RX REV CODE- 636/637: Performed by: FAMILY MEDICINE

## 2020-09-25 PROCEDURE — 74011250636 HC RX REV CODE- 250/636: Performed by: HOSPITALIST

## 2020-09-25 PROCEDURE — 82962 GLUCOSE BLOOD TEST: CPT

## 2020-09-25 RX ORDER — DEXTROSE 50 % IN WATER (D50W) INTRAVENOUS SYRINGE
12.5-25 AS NEEDED
Status: DISCONTINUED | OUTPATIENT
Start: 2020-09-25 | End: 2020-09-29 | Stop reason: HOSPADM

## 2020-09-25 RX ORDER — INSULIN LISPRO 100 [IU]/ML
INJECTION, SOLUTION INTRAVENOUS; SUBCUTANEOUS
Status: DISCONTINUED | OUTPATIENT
Start: 2020-09-25 | End: 2020-09-29 | Stop reason: HOSPADM

## 2020-09-25 RX ORDER — MAGNESIUM SULFATE 100 %
4 CRYSTALS MISCELLANEOUS AS NEEDED
Status: DISCONTINUED | OUTPATIENT
Start: 2020-09-25 | End: 2020-09-29 | Stop reason: HOSPADM

## 2020-09-25 RX ADMIN — INSULIN LISPRO 3 UNITS: 100 INJECTION, SOLUTION INTRAVENOUS; SUBCUTANEOUS at 12:12

## 2020-09-25 RX ADMIN — OXYBUTYNIN CHLORIDE 10 MG: 5 TABLET ORAL at 08:51

## 2020-09-25 RX ADMIN — METFORMIN HYDROCHLORIDE 750 MG: 500 TABLET ORAL at 08:51

## 2020-09-25 RX ADMIN — GABAPENTIN 100 MG: 100 CAPSULE ORAL at 08:51

## 2020-09-25 RX ADMIN — PANTOPRAZOLE SODIUM 40 MG: 20 TABLET, DELAYED RELEASE ORAL at 08:51

## 2020-09-25 RX ADMIN — Medication 10 ML: at 06:42

## 2020-09-25 RX ADMIN — POTASSIUM CHLORIDE 40 MEQ: 20 TABLET, EXTENDED RELEASE ORAL at 08:51

## 2020-09-25 RX ADMIN — GABAPENTIN 100 MG: 100 CAPSULE ORAL at 21:14

## 2020-09-25 RX ADMIN — BENZONATATE 100 MG: 100 CAPSULE ORAL at 01:15

## 2020-09-25 RX ADMIN — AMIODARONE HYDROCHLORIDE 200 MG: 200 TABLET ORAL at 08:51

## 2020-09-25 RX ADMIN — MULTIVITAMIN TABLET 1 TABLET: TABLET at 08:52

## 2020-09-25 RX ADMIN — ONDANSETRON 4 MG: 2 INJECTION INTRAMUSCULAR; INTRAVENOUS at 16:18

## 2020-09-25 RX ADMIN — POTASSIUM CHLORIDE 40 MEQ: 20 TABLET, EXTENDED RELEASE ORAL at 21:15

## 2020-09-25 RX ADMIN — LEVOTHYROXINE SODIUM 200 MCG: 100 TABLET ORAL at 08:57

## 2020-09-25 RX ADMIN — TICAGRELOR 90 MG: 90 TABLET ORAL at 08:52

## 2020-09-25 RX ADMIN — MAGNESIUM OXIDE 400 MG: 400 TABLET ORAL at 08:51

## 2020-09-25 RX ADMIN — Medication 10 ML: at 21:16

## 2020-09-25 RX ADMIN — FUROSEMIDE 40 MG: 10 INJECTION, SOLUTION INTRAVENOUS at 08:52

## 2020-09-25 RX ADMIN — INSULIN LISPRO 2 UNITS: 100 INJECTION, SOLUTION INTRAVENOUS; SUBCUTANEOUS at 16:18

## 2020-09-25 RX ADMIN — CARVEDILOL 3.12 MG: 3.12 TABLET, FILM COATED ORAL at 08:51

## 2020-09-25 RX ADMIN — ASPIRIN 81 MG: 81 TABLET, COATED ORAL at 08:51

## 2020-09-25 RX ADMIN — ATORVASTATIN CALCIUM 40 MG: 40 TABLET, FILM COATED ORAL at 08:51

## 2020-09-25 RX ADMIN — BENZONATATE 100 MG: 100 CAPSULE ORAL at 14:01

## 2020-09-25 NOTE — PROGRESS NOTES
Readmission Assessment  Number of days since last admission?: 8-30 days  Previous disposition: Home with Home Health  Who is being interviewed?: Patient, Caregiver  What was the patient's/caregiver's perception as to why they think they needed to return back to the hospital?: Other (Comment)(fluid built back up quickly, advised by NP to come back to hospital)  Did you visit your Primary Care Physician after you left the hospital, before you returned this time?: Yes  Did you see a specialist, such as Cardiac, Pulmonary, Orthopedic Physician, etc. after you left the hospital?: Yes  Who advised the patient to return to the hospital?: Physician  Does the patient report anything that got in the way of taking their medications?: No  In our efforts to provide the best possible care to you and others like you, can you think of anything that we could have done to help you after you left the hospital the first time, so that you might not have needed to return so soon?: Arrange for more help when leaving the hospital, Other (Comment)(pt will be discharged from home health so she can get cardiac rehab as an outpatient . she inquired about more help at home. put in referrall for NIN[facility] Amado to screen pt for medicaid to see if she can get personal care at home.)

## 2020-09-25 NOTE — PROGRESS NOTES
Progress Note    Patient: Angeles Nugent MRN: [id number]  SSN: [id number]    YOB: 1950  Age: 65 y.o. Sex: female      Admit Date: 9/22/2020    LOS: 0 days     Subjective:     Patient admitted with CHF. Still with some shortness of breath. Objective:     Vitals:    09/24/20 2000 09/25/20 0000 09/25/20 0400 09/25/20 0425   BP: 100/61 110/72  114/76   Pulse: 72 81  78   Resp: 18 20  20   Temp: 97.2 °F (36.2 °C) 97.6 °F (36.4 °C)  96.8 °F (36 °C)   SpO2: 96% 97%  97%   Weight:   92.6 kg (204 lb 1.6 oz)    Height:            Intake and Output:  Current Shift: No intake/output data recorded. Last three shifts: 09/23 1901 - 09/25 0700  In: 80 [P.O.:690]  Out: 1375 [Urine:1375]    Physical Exam:   GENERAL: alert, cooperative, no distress, appears stated age  THROAT & NECK: normal and no erythema or exudates noted. LUNG: clear to auscultation bilaterally  HEART: regular rate and rhythm, S1, S2 normal, no murmur, click, rub or gallop  ABDOMEN: soft, non-tender. Bowel sounds normal. No masses,  no organomegaly  EXTREMITIES:  extremities normal, atraumatic, no cyanosis or edema  SKIN: no rash or abnormalities  NEUROLOGIC: AOx3. Gait normal. Reflexes and motor strength normal and symmetric. Cranial nerves 2-12 and sensation grossly intact. PSYCHIATRIC: non focal    Lab/Data Review: All lab results for the last 24 hours reviewed.      Recent Results (from the past 24 hour(s))   ECHO ADULT FOLLOW-UP OR LIMITED    Collection Time: 09/24/20  9:48 AM   Result Value Ref Range    LVIDd 6.20 3.9 - 5.3 cm    LVPWd 1.00 0.6 - 0.9 cm    LVIDs 5.80 cm    IVSd 1.10 0.6 - 0.9 cm    LV Mass .0 67 - 162 g    LV Mass AL Index 144.2 43 - 95 g/m2    RVSP 46.0 mmHg    Tricuspid Valve Regurgitation Velocity Time Interval 2.77 cm    Triscuspid Valve Regurgitation Peak Gradient 30.4 mmHg   METABOLIC PANEL, BASIC    Collection Time: 09/24/20  2:16 PM   Result Value Ref Range    Sodium 137 136 - 145 mmol/L    Potassium 4.5 3.5 - 5.1 mmol/L    Chloride 102 97 - 108 mmol/L    CO2 27 21 - 32 mmol/L    Anion gap 8 5 - 15 mmol/L    Glucose 235 (H) 65 - 100 mg/dL    BUN 36 (H) 6 - 20 mg/dL    Creatinine 1.29 (H) 0.55 - 1.02 mg/dL    BUN/Creatinine ratio 28 (H) 12 - 20      GFR est AA 49 (L) >60 ml/min/1.73m2    GFR est non-AA 41 (L) >60 ml/min/1.73m2    Calcium 9.2 8.5 - 71.2 mg/dL   METABOLIC PANEL, BASIC    Collection Time: 09/25/20  5:22 AM   Result Value Ref Range    Sodium 136 136 - 145 mmol/L    Potassium 5.8 (H) 3.5 - 5.1 mmol/L    Chloride 103 97 - 108 mmol/L    CO2 28 21 - 32 mmol/L    Anion gap 5 5 - 15 mmol/L    Glucose 170 (H) 65 - 100 mg/dL    BUN 41 (H) 6 - 20 mg/dL    Creatinine 1.52 (H) 0.55 - 1.02 mg/dL    BUN/Creatinine ratio 27 (H) 12 - 20      GFR est AA 41 (L) >60 ml/min/1.73m2    GFR est non-AA 34 (L) >60 ml/min/1.73m2    Calcium 8.9 8.5 - 10.1 mg/dL   CBC WITH AUTOMATED DIFF    Collection Time: 09/25/20  5:22 AM   Result Value Ref Range    WBC 5.8 4.4 - 11.3 K/uL    RBC 3.16 (L) 4.50 - 5.90 M/uL    HGB 9.4 (L) 13.5 - 17.5 %    HCT 28.6 (L) 36 - 46 %    MCV 90.5 80 - 100 FL    MCH 29.7 (L) 31 - 34 PG    MCHC 32.8 31.0 - 36.0 g/dL    RDW 14.7 (H) 11.5 - 14.5 %    PLATELET 107 K/uL    MPV 9.1 6.5 - 11.5 FL    NRBC 10.0  WBC    ABSOLUTE NRBC 0.01 K/uL    NEUTROPHILS 68 42 - 75 %    LYMPHOCYTES 18 (L) 20.5 - 51.1 %    MONOCYTES 11 (H) 1.7 - 9.3 %    EOSINOPHILS 2 0.9 - 2.9 %    BASOPHILS 1 0.0 - 2.5 %    ABS. NEUTROPHILS 4.0 1.8 - 7.7 K/UL    ABS. LYMPHOCYTES 1.0 1.0 - 4.8 K/UL    ABS. MONOCYTES 0.6 0.2 - 2.4 K/UL    ABS. EOSINOPHILS 0.1 0.0 - 0.7 K/UL    ABS. BASOPHILS 0.0 0.0 - 0.2 K/UL   MAGNESIUM    Collection Time: 09/25/20  5:22 AM   Result Value Ref Range    Magnesium 1.7 1.6 - 2.4 mg/dL        Imaging:    No results found.        Assessment and Plan:     Congestive heart failure  -Acute on chronic systolic congestive heart failure  -Patient with ischemic cardiomyopathy with previous EF of 20 to 25%, echocardiogram pending  -Continue diuresis  -Cardiology following    CORIN   -From diuresis  -Continue monitor while on diuresis  -Nephrology consult    Coronary artery disease  -Recent stent placement  -Continue aspirin and Brilinta, continue statin and beta-blocker  -Neurology following    Diabetes  -Hold metformin due to renal insufficiency  -Continue insulin sliding scale coverage    Cardiac arrhythmia  -Patient with frequent PVCs with trigeminy  -Status post biventricular ICD placement in 2018, on amiodarone and Coreg  -Following electrophysiology as outpatient    Hypothyroidism  -Continue Synthroid    GERD  -Continue Protonix  -Stable    Fibromyalgia  -Continue Neurontin    DVT prophylaxis  -SCDs bilateral lower extremities    Signed By: Torie Stephenson MD     September 25, 2020

## 2020-09-25 NOTE — CONSULTS
Consult Date: 9/25/2020    IP CONSULT TO NEPHROLOGY  Consult performed by: Syl Christian MD  Consult ordered by: Laquita Muhammad MD  Reason for consult: CORIN          Subjective    This is a [place] old female with hx of CHF with reduced EF, recurrent fluid overload, diabetes, prior stroke, hypertension, who presents with increasing shortness of breath, nausea, increased swelling, decrease exercise tolerance. Patient notes being about 19 lb above her usual dry weight. She continues to be on furosemide 80 mg at home. He notes no chills, fevers, change in urine. She does note abdominal swelling and nausea. Since being in the hospital, she has been on lasix 80mg IV, later supplemented with metolazone 2.5mg daily. Urine output on 7/23 was measured at 100 cc only, increasing to ~1liters since. Patient's creatinine has increased from admission value of 0.98 to 1.52. Herman  In place shows clear urine. Review of sytstems  12 point ROS otherwise unremarkable except as noted in HPI         Objective     Vital signs for last 24 hours:  Visit Vitals  BP (!) 113/41   Pulse 75   Temp 98 °F (36.7 °C)   Resp 18   Ht 5' 2\" (1.575 m)   Wt 92.6 kg (204 lb 1.6 oz)   SpO2 97%   BMI 37.33 kg/m²       Intake/Output this shift:  Current Shift: No intake/output data recorded.   Last 3 Shifts: 09/23 1901 - 09/25 0700  In: 690 [P.O.:690]  Out: 1375 [Urine:1375]    Data Review:   Recent Results (from the past 24 hour(s))   METABOLIC PANEL, BASIC    Collection Time: 09/24/20  2:16 PM   Result Value Ref Range    Sodium 137 136 - 145 mmol/L    Potassium 4.5 3.5 - 5.1 mmol/L    Chloride 102 97 - 108 mmol/L    CO2 27 21 - 32 mmol/L    Anion gap 8 5 - 15 mmol/L    Glucose 235 (H) 65 - 100 mg/dL    BUN 36 (H) 6 - 20 mg/dL    Creatinine 1.29 (H) 0.55 - 1.02 mg/dL    BUN/Creatinine ratio 28 (H) 12 - 20      GFR est AA 49 (L) >60 ml/min/1.73m2    GFR est non-AA 41 (L) >60 ml/min/1.73m2    Calcium 9.2 8.5 - 02.2 mg/dL   METABOLIC PANEL, BASIC    Collection Time: 09/25/20  5:22 AM   Result Value Ref Range    Sodium 136 136 - 145 mmol/L    Potassium 5.8 (H) 3.5 - 5.1 mmol/L    Chloride 103 97 - 108 mmol/L    CO2 28 21 - 32 mmol/L    Anion gap 5 5 - 15 mmol/L    Glucose 170 (H) 65 - 100 mg/dL    BUN 41 (H) 6 - 20 mg/dL    Creatinine 1.52 (H) 0.55 - 1.02 mg/dL    BUN/Creatinine ratio 27 (H) 12 - 20      GFR est AA 41 (L) >60 ml/min/1.73m2    GFR est non-AA 34 (L) >60 ml/min/1.73m2    Calcium 8.9 8.5 - 10.1 mg/dL   CBC WITH AUTOMATED DIFF    Collection Time: 09/25/20  5:22 AM   Result Value Ref Range    WBC 5.8 4.4 - 11.3 K/uL    RBC 3.16 (L) 4.50 - 5.90 M/uL    HGB 9.4 (L) 13.5 - 17.5 %    HCT 28.6 (L) 36 - 46 %    MCV 90.5 80 - 100 FL    MCH 29.7 (L) 31 - 34 PG    MCHC 32.8 31.0 - 36.0 g/dL    RDW 14.7 (H) 11.5 - 14.5 %    PLATELET 099 K/uL    MPV 9.1 6.5 - 11.5 FL    NRBC 10.0  WBC    ABSOLUTE NRBC 0.01 K/uL    NEUTROPHILS 68 42 - 75 %    LYMPHOCYTES 18 (L) 20.5 - 51.1 %    MONOCYTES 11 (H) 1.7 - 9.3 %    EOSINOPHILS 2 0.9 - 2.9 %    BASOPHILS 1 0.0 - 2.5 %    ABS. NEUTROPHILS 4.0 1.8 - 7.7 K/UL    ABS. LYMPHOCYTES 1.0 1.0 - 4.8 K/UL    ABS. MONOCYTES 0.6 0.2 - 2.4 K/UL    ABS. EOSINOPHILS 0.1 0.0 - 0.7 K/UL    ABS. BASOPHILS 0.0 0.0 - 0.2 K/UL   MAGNESIUM    Collection Time: 09/25/20  5:22 AM   Result Value Ref Range    Magnesium 1.7 1.6 - 2.4 mg/dL       Physical Exam  Constitutional:       Appearance: She is ill-appearing. She is not diaphoretic. HENT:      Head: Normocephalic and atraumatic. Mouth/Throat:      Mouth: Mucous membranes are moist.      Pharynx: Oropharynx is clear. Neck:      Musculoskeletal: Normal range of motion. Cardiovascular:      Rate and Rhythm: Normal rate and regular rhythm. Pulses: Normal pulses. Heart sounds: Normal heart sounds. Pulmonary:      Effort: Pulmonary effort is normal.      Breath sounds: Rales present. Abdominal:      General: There is distension.       Palpations: Abdomen is soft.   Musculoskeletal:         General: Swelling and deformity present. No tenderness or signs of injury. Skin:     General: Skin is warm. Neurological:      General: No focal deficit present. Mental Status: She is alert and oriented to person, place, and time. Psychiatric:         Mood and Affect: Mood normal.         Behavior: Behavior normal.       ASSESSMENT AND PLAN    # Oliguric CORIN  - Acute cardiorenal syndrome in the setting of diuretic use  - Patient's presenting creatinine of 0.98 may indicate underlying hyperfiltration or reduced GFR with apparently normal creatinine on account of very low muscle mass  - UA: 1.010/5/100 protein/small blood  - Recent inpatient BP in 124-800 systolic range    # CHF with reduced EF  Presents with 19 lb above target weight  On furosemide 80mg at home at baseline  Improved urine output with addition of metolazone    # Hypotension  - May indicate reduced effective arterial blood volume in the setting of diuretic use    Plan:   Can hold diuretic dosing for 1 day, can be restared at 40mg bid dose IV to be transitioned to PO  Obtain Cystatin-C if available  Continued fluid restriction at <1.2 liters/day  Reduce Neurontin while inpatient given risks of altered mentation if possible  Hold Oxybutynin while inpatient.

## 2020-09-25 NOTE — PROGRESS NOTES
Progress Note    Patient: Maco Viveros MRN: [id number]  SSN: [id number]    YOB: 1950  Age: 65 y.o. Sex: female      Admit Date: 9/22/2020    LOS: 0 days     Subjective: This is a 65year old female that is followed for Acute on Chronic HFrEF. Diuresis was better with dose of Metolazone yesterday, however patient has worsening renal function this am.  She continues to endorse peripheral edema (though it has mildly improved), abdominal distention,  and dyspnea on exertion. She denies chest pain, palpitations, headache, dizziness. Telemetry Review: paced rhythm, no trigeminy in past 12 hours    Review of Symptoms:   Reports: peripheral edema, abdominal distention,  and dyspnea on exertion    Denies chest pain, palpitations, headache, dizziness. Objective:     Vitals:    09/25/20 0000 09/25/20 0400 09/25/20 0425 09/25/20 0851   BP: 110/72  114/76 (!) 113/41   Pulse: 81  78 75   Resp: 20 20 18   Temp: 97.6 °F (36.4 °C)  96.8 °F (36 °C) 98 °F (36.7 °C)   SpO2: 97%  97% 97%   Weight:  92.6 kg (204 lb 1.6 oz)     Height:            Intake and Output:  Current Shift: No intake/output data recorded. Last three shifts: 09/23 1901 - 09/25 0700  In: 80 [P.O.:690]  Out: 1375 [Urine:1375]    Physical Exam:     General: Well nourished chronically ill appearing white female lying in bed, NAD, A&O  HEENT: Normocephalic, PERRL, no drainage  Neck: Supple, Trachea midline, No JVD  RESP: CTA bilaterally, symmetrical chest movement. No SOB or distress. On RA  Cardiovascular: RRR with routine skipped beat, no RG. + murmur. + device. PVS: No rubor, cyanosis, trace pitting BLE edema, Radial, DP, PT pulses equal bilaterally  ABD: obese, distended, soft NT, Normoactive BS  Derm: Warm/Dry/Intact with no lesions, poor turgor  Neuro: A&O PPTS, cranial nerves II- XII grossly intact via interaction with patient. Does have noted expressive aphasia (residual from CVA).    PSYCH: No anxiety or agitation    Lab/Data Review:  BMP:   Lab Results   Component Value Date/Time     09/25/2020 05:22 AM    K 5.8 (H) 09/25/2020 05:22 AM     09/25/2020 05:22 AM    CO2 28 09/25/2020 05:22 AM    AGAP 5 09/25/2020 05:22 AM     (H) 09/25/2020 05:22 AM    BUN 41 (H) 09/25/2020 05:22 AM    CREA 1.52 (H) 09/25/2020 05:22 AM    GFRAA 41 (L) 09/25/2020 05:22 AM    GFRNA 34 (L) 09/25/2020 05:22 AM        Echo Results  (Last 48 hours)    None                Current Facility-Administered Medications:     insulin lispro (HUMALOG) injection, , SubCUTAneous, AC&HS, Daniel Bermudez MD    glucose chewable tablet 16 g, 4 Tab, Oral, PRN, Daniel Bermudez MD    dextrose (D50W) injection syrg 12.5-25 g, 12.5-25 g, IntraVENous, PRN, Daniel Bermudez MD    glucagon (GLUCAGEN) injection 1 mg, 1 mg, IntraMUSCular, PRN, Daniel Bermudez MD    benzonatate (TESSALON) capsule 100 mg, 100 mg, Oral, TID PRN, Josefa Mejía D, NP, 100 mg at 09/25/20 0115    acetaminophen (TYLENOL) tablet 500 mg, 500 mg, Oral, Q6H PRN, Oba, Oluwabunmi S, NP    amiodarone (CORDARONE) tablet 200 mg, 200 mg, Oral, DAILY, Oba, Oluwabunmi S, NP, 200 mg at 09/25/20 0851    aspirin delayed-release tablet 81 mg, 81 mg, Oral, DAILY, Oba, Oluwabunmi S, NP, 81 mg at 09/25/20 0851    atorvastatin (LIPITOR) tablet 40 mg, 40 mg, Oral, DAILY, Oba, Oluwabunmi S, NP, 40 mg at 09/25/20 0851    carvediloL (COREG) tablet 3.125 mg, 3.125 mg, Oral, BID WITH MEALS, Oba, Oluwabunmi S, NP, 3.125 mg at 09/25/20 0851    gabapentin (NEURONTIN) capsule 100 mg, 100 mg, Oral, TID, Oba, Oluwabunmi S, NP, 100 mg at 09/25/20 0851    levothyroxine (SYNTHROID) tablet 200 mcg, 200 mcg, Oral, ACB, Oba, Oluwabunmi S, NP, 200 mcg at 09/25/20 0857    magnesium oxide (MAG-OX) tablet 400 mg, 400 mg, Oral, BID, Oba, Oluwabunmi S, NP, 400 mg at 09/25/20 0851    melatonin tablet 5 mg, 5 mg, Oral, QHS PRN, Oba, Oluwabunmi S, NP    pantoprazole (PROTONIX) tablet 40 mg, 40 mg, Oral, DAILY, Paloma Carrillo, FENG, 40 mg at 09/25/20 0851    oxybutynin (DITROPAN) tablet 10 mg, 10 mg, Oral, BID, Oba, Oluwabunmi S, NP, 10 mg at 09/25/20 0851    ticagrelor (BRILINTA) tablet 90 mg, 90 mg, Oral, BID, Oba, Oluwabunmi S, NP, 90 mg at 09/25/20 0852    polyethylene glycol (MIRALAX) packet 17 g, 17 g, Oral, PRN, Oba, Oluwabunmi S, NP    potassium chloride (K-DUR, KLOR-CON) SR tablet 40 mEq, 40 mEq, Oral, TID, Alberto Roberts MD, 40 mEq at 09/25/20 0851    multivitamin (ONE A DAY) tablet 1 Tab, 1 Tab, Oral, DAILY, Alberto Roberts MD, 1 Tab at 09/25/20 0852    sodium chloride (NS) flush 5-40 mL, 5-40 mL, IntraVENous, Q8H, Oba, Oluwabunmi S, NP, 10 mL at 09/25/20 8369    acetaminophen (TYLENOL) tablet 650 mg, 650 mg, Oral, Q6H PRN **OR** acetaminophen (TYLENOL) suppository 650 mg, 650 mg, Rectal, Q6H PRN, Oba, Oluwabunmi S, NP    bisacodyL (DULCOLAX) tablet 5 mg, 5 mg, Oral, DAILY PRN, Oba, Oluwabunmi S, NP    ondansetron (ZOFRAN ODT) tablet 4 mg, 4 mg, Oral, Q8H PRN **OR** ondansetron (ZOFRAN) injection 4 mg, 4 mg, IntraVENous, Q6H PRN, Oba, Oluwabunmi S, NP, 4 mg at 09/24/20 1128      Assessment:     Active Problems:    CHF exacerbation (HCC) (9/22/2020)      CHF (congestive heart failure) ([facility] [address] (9/25/2020)        Plan:     Case discussed with Collaborating physician Dr. Dimple Holden and our recommendations are as follows:     1. Acute on Chronic HFrEF: patient currently decompensated. IVC 9/24 dilated and does not collapse. Was on IV diuresis with 40mg IVP TID and gave 1 dose of Metolazone 2.5mg PO yesterday 9/24, however had rise in creatinine overnight. Monitor electrolytes closely. Appreciate Nephrology input at this time in regards to diuresis. Continue telemetry, strict I/O, daily weights. 2. CAD: recent PCI to proximal LAD 9/10. Please continue ASA/Brilinta DAPT, statin, BB   3. HTN - BP borderline. Monitor and leave room for diuresis.     4. Mixed Ischemic Cardiomyopathy - NYHA IIIC, EF minimally improved on prelim echo, however patient remains overtly hypervolemic, so not accurate at this time. Nephrology to be involved with diuresis now that renal function has worsened - appreciate their input. Patient does have high PVC load and frequently has Trigeminy which decreases her CRT %. Please continue Amiodarone she was started on by Dr. Nakul De La Torre. She has been in paced rhythm with minimal PVC and no trigeminy overnight. Continue telemetry. Keep electrolytes at goal.   5. CORIN -likely s/t diuresis. Appreciate nephrology input. Will follow their recommendations. Thank you for involving us in the care of this patient. Please do not hesitate to call if additional questions arise. If after hours please call [phone]    Signed By: Margaret Mccallum NP     September 25, 2020        SHARON Culver Addendum:  Agree with findings and plan noted above.

## 2020-09-26 PROBLEM — E87.5 HYPERKALEMIA: Status: ACTIVE | Noted: 2020-09-26

## 2020-09-26 LAB
ANION GAP SERPL CALC-SCNC: 6 MMOL/L (ref 5–15)
ANION GAP SERPL CALC-SCNC: 8 MMOL/L (ref 5–15)
BUN SERPL-MCNC: 43 MG/DL (ref 6–20)
BUN SERPL-MCNC: 47 MG/DL (ref 6–20)
BUN/CREAT SERPL: 26 (ref 12–20)
BUN/CREAT SERPL: 28 (ref 12–20)
CA-I BLD-MCNC: 9.1 MG/DL (ref 8.5–10.1)
CA-I BLD-MCNC: 9.3 MG/DL (ref 8.5–10.1)
CHLORIDE SERPL-SCNC: 102 MMOL/L (ref 97–108)
CHLORIDE SERPL-SCNC: 104 MMOL/L (ref 97–108)
CO2 SERPL-SCNC: 25 MMOL/L (ref 21–32)
CO2 SERPL-SCNC: 27 MMOL/L (ref 21–32)
CREAT SERPL-MCNC: 1.52 MG/DL (ref 0.55–1.02)
CREAT SERPL-MCNC: 1.8 MG/DL (ref 0.55–1.02)
GLUCOSE BLD STRIP.AUTO-MCNC: 121 MG/DL (ref 65–100)
GLUCOSE BLD STRIP.AUTO-MCNC: 166 MG/DL (ref 65–100)
GLUCOSE BLD STRIP.AUTO-MCNC: 179 MG/DL (ref 65–100)
GLUCOSE BLD STRIP.AUTO-MCNC: 186 MG/DL (ref 65–100)
GLUCOSE SERPL-MCNC: 124 MG/DL (ref 65–100)
GLUCOSE SERPL-MCNC: 184 MG/DL (ref 65–100)
PERFORMED BY, TECHID: ABNORMAL
POTASSIUM SERPL-SCNC: 4.1 MMOL/L (ref 3.5–5.1)
POTASSIUM SERPL-SCNC: 6.7 MMOL/L (ref 3.5–5.1)
SODIUM SERPL-SCNC: 135 MMOL/L (ref 136–145)
SODIUM SERPL-SCNC: 137 MMOL/L (ref 136–145)

## 2020-09-26 PROCEDURE — 74011250637 HC RX REV CODE- 250/637: Performed by: NURSE PRACTITIONER

## 2020-09-26 PROCEDURE — 80048 BASIC METABOLIC PNL TOTAL CA: CPT

## 2020-09-26 PROCEDURE — 65270000029 HC RM PRIVATE

## 2020-09-26 PROCEDURE — 82962 GLUCOSE BLOOD TEST: CPT

## 2020-09-26 PROCEDURE — 36415 COLL VENOUS BLD VENIPUNCTURE: CPT

## 2020-09-26 PROCEDURE — 74011250637 HC RX REV CODE- 250/637: Performed by: HOSPITALIST

## 2020-09-26 PROCEDURE — 74011636637 HC RX REV CODE- 636/637: Performed by: FAMILY MEDICINE

## 2020-09-26 RX ORDER — SODIUM POLYSTYRENE SULFONATE 15 G/60ML
30 SUSPENSION ORAL; RECTAL
Status: COMPLETED | OUTPATIENT
Start: 2020-09-26 | End: 2020-09-26

## 2020-09-26 RX ADMIN — CARVEDILOL 3.12 MG: 3.12 TABLET, FILM COATED ORAL at 08:58

## 2020-09-26 RX ADMIN — PANTOPRAZOLE SODIUM 40 MG: 20 TABLET, DELAYED RELEASE ORAL at 09:00

## 2020-09-26 RX ADMIN — CARVEDILOL 3.12 MG: 3.12 TABLET, FILM COATED ORAL at 17:17

## 2020-09-26 RX ADMIN — AMIODARONE HYDROCHLORIDE 200 MG: 200 TABLET ORAL at 08:58

## 2020-09-26 RX ADMIN — Medication 10 ML: at 17:18

## 2020-09-26 RX ADMIN — GABAPENTIN 100 MG: 100 CAPSULE ORAL at 21:15

## 2020-09-26 RX ADMIN — GABAPENTIN 100 MG: 100 CAPSULE ORAL at 08:58

## 2020-09-26 RX ADMIN — INSULIN LISPRO 2 UNITS: 100 INJECTION, SOLUTION INTRAVENOUS; SUBCUTANEOUS at 17:17

## 2020-09-26 RX ADMIN — SODIUM POLYSTYRENE SULFONATE 30 G: 15 SUSPENSION ORAL; RECTAL at 08:00

## 2020-09-26 RX ADMIN — INSULIN LISPRO 2 UNITS: 100 INJECTION, SOLUTION INTRAVENOUS; SUBCUTANEOUS at 13:32

## 2020-09-26 RX ADMIN — MAGNESIUM OXIDE 400 MG: 400 TABLET ORAL at 08:59

## 2020-09-26 RX ADMIN — TICAGRELOR 90 MG: 90 TABLET ORAL at 17:17

## 2020-09-26 RX ADMIN — ATORVASTATIN CALCIUM 40 MG: 40 TABLET, FILM COATED ORAL at 08:58

## 2020-09-26 RX ADMIN — ASPIRIN 81 MG: 81 TABLET, COATED ORAL at 08:58

## 2020-09-26 RX ADMIN — LEVOTHYROXINE SODIUM 200 MCG: 100 TABLET ORAL at 08:59

## 2020-09-26 RX ADMIN — MULTIVITAMIN TABLET 1 TABLET: TABLET at 09:00

## 2020-09-26 RX ADMIN — Medication 10 ML: at 06:20

## 2020-09-26 RX ADMIN — GABAPENTIN 100 MG: 100 CAPSULE ORAL at 17:17

## 2020-09-26 RX ADMIN — Medication 10 ML: at 21:16

## 2020-09-26 RX ADMIN — TICAGRELOR 90 MG: 90 TABLET ORAL at 09:00

## 2020-09-26 RX ADMIN — MAGNESIUM OXIDE 400 MG: 400 TABLET ORAL at 17:16

## 2020-09-26 NOTE — PROGRESS NOTES
Progress Note  Date:9/26/2020       Room:202/01  Patient Anum Hoyos     YOB: 1950     Age:70 y.o. Jennie Dickey is a 65 y.o. female  significant for Non-Ischemic Cardiomyopathy, HFrEF (EF 20%), HTN, HLP, GERD, DM-II, Biventricular ICD, Fibromyalgia, and a recent CAD/Stent, who presented to the emergency department today as referred by her cardiologist for IV Diuresis in context of dyspnea and CHF exacerbation. Patient recently underwent pacemaker interrogation yesterday 9/21 by Dr Triston Arora, she was then followed up by Marcelo Red NP who referred her for hospital admission for diuresis due to longstanding dyspnea. ED work-up revealed a BNP of 2738, trop 0.22, CXR consistent with pulmonary venous cephalization, without edema. At the time of my visit, patient seems to be in no respiratory distress, with O2sats 95-96%RA. She denies chest pain, chest pressure, palpitations, difficulty breathing, fever or chills. She endorsed Shortness of breath on exertion/with activity which has been the same in the last several days to weeks, sleeps with 1-2  Pillows at night, and still with trace peripheral edema in BLE. Patient seen and evaluated. Sitting in chair, no acute distress. Creat increased and continues to be simillar range at 1.8. diuresis on hold and reevaluate in AM. Continues to have negative balance and additional 1lb weight loss. Review of Systems   Constitutional: Negative. HENT: Negative. Eyes: Negative. Cardiovascular: Positive for leg swelling. Gastrointestinal: Negative. Endocrine: Negative. Genitourinary: Negative. Musculoskeletal: Negative. Skin: Negative. Neurological: Negative. Hematological: Negative. Psychiatric/Behavioral: Negative. All other systems reviewed and are negative.       Objective           Vitals Last 24 Hours:  Patient Vitals for the past 24 hrs:   Temp Pulse Resp BP SpO2   09/26/20 1236 97.8 °F (36.6 °C) 77 20 (!) 108/58 96 %   09/26/20 0830 97.7 °F (36.5 °C) 74 20 (!) 122/52 96 %   09/26/20 0400 98 °F (36.7 °C) 72 20 126/64 95 %   09/26/20 0000 98 °F (36.7 °C) 70 20 129/72 97 %   09/25/20 2000 98.2 °F (36.8 °C) 77 20 108/66 95 %   09/25/20 1625 97.7 °F (36.5 °C) 65 20 (!) 100/54 97 %        I/O (24Hr): Intake/Output Summary (Last 24 hours) at 9/26/2020 1318  Last data filed at 9/25/2020 1625  Gross per 24 hour   Intake    Output 350 ml   Net -350 ml       Physical Exam:  General: Alert, cooperative, no distress, appears stated age. Head:  Normocephalic, without obvious abnormality, atraumatic. Eyes:  Conjunctivae/corneas clear. Pupils equal, round, reactive to light. Extraocular movements intact. Lungs: Air entry diminished at the bases basal crackles noted chest wall: No tenderness or deformity. Heart:  Regular rate and rhythm, S1, S2 normal, no murmur, click, rub or gallop. Abdomen:  Soft, non-tender. Bowel sounds normal. No masses,  No organomegaly. Extremities: Extremities normal, atraumatic, no cyanosis, positive for edema  Pulses: 2+ and symmetric all extremities. Skin: Skin color, texture, turgor normal. No rashes or lesions  Neurologic: Awake, Alert, oriented.  No obvious gross sensory or motor deficits      Medications           Current Facility-Administered Medications   Medication Dose Route Frequency    insulin lispro (HUMALOG) injection   SubCUTAneous AC&HS    glucose chewable tablet 16 g  4 Tab Oral PRN    dextrose (D50W) injection syrg 12.5-25 g  12.5-25 g IntraVENous PRN    glucagon (GLUCAGEN) injection 1 mg  1 mg IntraMUSCular PRN    benzonatate (TESSALON) capsule 100 mg  100 mg Oral TID PRN    acetaminophen (TYLENOL) tablet 500 mg  500 mg Oral Q6H PRN    amiodarone (CORDARONE) tablet 200 mg  200 mg Oral DAILY    aspirin delayed-release tablet 81 mg  81 mg Oral DAILY    atorvastatin (LIPITOR) tablet 40 mg  40 mg Oral DAILY    carvediloL (COREG) tablet 3.125 mg  3.125 mg Oral BID WITH MEALS    gabapentin (NEURONTIN) capsule 100 mg  100 mg Oral TID    levothyroxine (SYNTHROID) tablet 200 mcg  200 mcg Oral ACB    magnesium oxide (MAG-OX) tablet 400 mg  400 mg Oral BID    melatonin tablet 5 mg  5 mg Oral QHS PRN    pantoprazole (PROTONIX) tablet 40 mg  40 mg Oral DAILY    ticagrelor (BRILINTA) tablet 90 mg  90 mg Oral BID    polyethylene glycol (MIRALAX) packet 17 g  17 g Oral PRN    multivitamin (ONE A DAY) tablet 1 Tab  1 Tab Oral DAILY    sodium chloride (NS) flush 5-40 mL  5-40 mL IntraVENous Q8H    acetaminophen (TYLENOL) tablet 650 mg  650 mg Oral Q6H PRN    Or    acetaminophen (TYLENOL) suppository 650 mg  650 mg Rectal Q6H PRN    bisacodyL (DULCOLAX) tablet 5 mg  5 mg Oral DAILY PRN    ondansetron (ZOFRAN ODT) tablet 4 mg  4 mg Oral Q8H PRN    Or    ondansetron (ZOFRAN) injection 4 mg  4 mg IntraVENous Q6H PRN         Allergies         Codeine; Novocain [procaine]; and Tramadol       Labs/Imaging/Diagnostics      Labs:  Recent Results (from the past 48 hour(s))   METABOLIC PANEL, BASIC    Collection Time: 09/24/20  2:16 PM   Result Value Ref Range    Sodium 137 136 - 145 mmol/L    Potassium 4.5 3.5 - 5.1 mmol/L    Chloride 102 97 - 108 mmol/L    CO2 27 21 - 32 mmol/L    Anion gap 8 5 - 15 mmol/L    Glucose 235 (H) 65 - 100 mg/dL    BUN 36 (H) 6 - 20 mg/dL    Creatinine 1.29 (H) 0.55 - 1.02 mg/dL    BUN/Creatinine ratio 28 (H) 12 - 20      GFR est AA 49 (L) >60 ml/min/1.73m2    GFR est non-AA 41 (L) >60 ml/min/1.73m2    Calcium 9.2 8.5 - 38.7 mg/dL   METABOLIC PANEL, BASIC    Collection Time: 09/25/20  5:22 AM   Result Value Ref Range    Sodium 136 136 - 145 mmol/L    Potassium 5.8 (H) 3.5 - 5.1 mmol/L    Chloride 103 97 - 108 mmol/L    CO2 28 21 - 32 mmol/L    Anion gap 5 5 - 15 mmol/L    Glucose 170 (H) 65 - 100 mg/dL    BUN 41 (H) 6 - 20 mg/dL    Creatinine 1.52 (H) 0.55 - 1.02 mg/dL    BUN/Creatinine ratio 27 (H) 12 - 20      GFR est AA 41 (L) >60 ml/min/1.73m2    GFR est non-AA 34 (L) >60 ml/min/1.73m2    Calcium 8.9 8.5 - 10.1 mg/dL   CBC WITH AUTOMATED DIFF    Collection Time: 09/25/20  5:22 AM   Result Value Ref Range    WBC 5.8 4.4 - 11.3 K/uL    RBC 3.16 (L) 4.50 - 5.90 M/uL    HGB 9.4 (L) 13.5 - 17.5 %    HCT 28.6 (L) 36 - 46 %    MCV 90.5 80 - 100 FL    MCH 29.7 (L) 31 - 34 PG    MCHC 32.8 31.0 - 36.0 g/dL    RDW 14.7 (H) 11.5 - 14.5 %    PLATELET 834 K/uL    MPV 9.1 6.5 - 11.5 FL    NRBC 10.0  WBC    ABSOLUTE NRBC 0.01 K/uL    NEUTROPHILS 68 42 - 75 %    LYMPHOCYTES 18 (L) 20.5 - 51.1 %    MONOCYTES 11 (H) 1.7 - 9.3 %    EOSINOPHILS 2 0.9 - 2.9 %    BASOPHILS 1 0.0 - 2.5 %    ABS. NEUTROPHILS 4.0 1.8 - 7.7 K/UL    ABS. LYMPHOCYTES 1.0 1.0 - 4.8 K/UL    ABS. MONOCYTES 0.6 0.2 - 2.4 K/UL    ABS. EOSINOPHILS 0.1 0.0 - 0.7 K/UL    ABS.  BASOPHILS 0.0 0.0 - 0.2 K/UL   MAGNESIUM    Collection Time: 09/25/20  5:22 AM   Result Value Ref Range    Magnesium 1.7 1.6 - 2.4 mg/dL   GLUCOSE, POC    Collection Time: 09/25/20 12:07 PM   Result Value Ref Range    Glucose (POC) 214 (H) 65 - 100 mg/dL    Performed by [facility]    METABOLIC PANEL, BASIC    Collection Time: 09/25/20  2:26 PM   Result Value Ref Range    Sodium 135 (L) 136 - 145 mmol/L    Potassium 6.6 (HH) 3.5 - 5.1 mmol/L    Chloride 103 97 - 108 mmol/L    CO2 26 21 - 32 mmol/L    Anion gap 6 5 - 15 mmol/L    Glucose 161 (H) 65 - 100 mg/dL    BUN 45 (H) 6 - 20 mg/dL    Creatinine 1.81 (H) 0.55 - 1.02 mg/dL    BUN/Creatinine ratio 25 (H) 12 - 20      GFR est AA 33 (L) >60 ml/min/1.73m2    GFR est non-AA 27 (L) >60 ml/min/1.73m2    Calcium 9.2 8.5 - 10.1 mg/dL   GLUCOSE, POC    Collection Time: 09/25/20  3:42 PM   Result Value Ref Range    Glucose (POC) 158 (H) 65 - 100 mg/dL    Performed by THONY KIM, POC    Collection Time: 09/25/20  8:59 PM   Result Value Ref Range    Glucose (POC) 127 (H) 65 - 100 mg/dL    Performed by [address] [place] [address] Street, BASIC    Collection Time: 09/26/20  5:21 AM Result Value Ref Range    Sodium 135 (L) 136 - 145 mmol/L    Potassium 6.7 (HH) 3.5 - 5.1 mmol/L    Chloride 104 97 - 108 mmol/L    CO2 25 21 - 32 mmol/L    Anion gap 6 5 - 15 mmol/L    Glucose 124 (H) 65 - 100 mg/dL    BUN 47 (H) 6 - 20 mg/dL    Creatinine 1.80 (H) 0.55 - 1.02 mg/dL    BUN/Creatinine ratio 26 (H) 12 - 20      GFR est AA 34 (L) >60 ml/min/1.73m2    GFR est non-AA 28 (L) >60 ml/min/1.73m2    Calcium 9.3 8.5 - 10.1 mg/dL   GLUCOSE, POC    Collection Time: 09/26/20  7:12 AM   Result Value Ref Range    Glucose (POC) 121 (H) 65 - 100 mg/dL    Performed by Cesilia Prado, POC    Collection Time: 09/26/20 11:27 AM   Result Value Ref Range    Glucose (POC) 179 (H) 65 - 100 mg/dL    Performed by Prudencio Hall         Imaging:  Xr Chest Port    Result Date: 9/22/2020  Impression: Evidence of chronic left-sided heart disease, without active edema demonstrated at this time. Atherosclerosis. Assessment//Plan           Problem List:  Hospital Problems  Date Reviewed: 9/22/2020          Codes Class Noted POA    Hyperkalemia ICD-10-CM: E87.5  ICD-9-CM: 276.7  9/26/2020 Unknown        CHF (congestive heart failure) ([facility] ICD-10-CM: I50.9  ICD-9-CM: 428.0  9/25/2020 Unknown        CHF exacerbation ([facility] ICD-10-CM: I50.9  ICD-9-CM: 428.0  9/22/2020 Unknown               Acute on Chronic Systolic Congestive Heart Failure:  -Admit to observation, and monitor on telemetry.  -Lasix 20mg IV q 6 hrs, for gentle diuresis, and was increased to 40 mg IV 3 times daily on 9/23 and metazone was added no 9/24  -CXR consistent with pulmonary venous cephalization, without pulmonary edema, with O2sats have been 94-96% on RA. .-Echo 8/12/20-EF 20-25%, dilated LV, with concentric hypertrophy.  -Continue BB-Carvedilol per home dose.  -Na restricted diet/1200ml Fluid restriction. Strict I&O, daily weight.  -Appreciate cardiology input for further recommendations.    -patient has been counseled on medication and dietary compliance.     Acute kidney injury  -suspect prerenal, in context of poor perfusion and/or possibly dehydration from chronic diuresis.   -Cr 1.15, Baseline is around 0.8.   -Avoid nephrotoxic agents, renally dose medications, and consider nephrology consult as needed. - creat increased to 1.81 on 9/25 and similar range on 9/26 at 1.8 after TID lasix 40mg and metalazone was started so diruesis on hold and reevluate in AM 9/27    Hyperkalemia:  - has been on upward trend and currently on 9/26 is at 6.7  - stop kcl supplmenetation and give dose of kayexalate. - repeat bmp at 5pm and in AM tomorrow. - continue to monitor closely on tele.        CAD/Stents  -Recent [facility] with stents placed to LAD \"last week\". ED work up shows a Troponin leak of 0.22, ECG unremarkable.   -Continue Brilinta. ID as well as aspirin      Diabetes Mellitus type 2:  -Chronic condition.  -Continue Accucheck ACHS,   -continue home medications.     Biventricular ICD in situ:  -Chronic condition. Stable.      PVCs  -Recently started on Amiodarone by cardiologist, which we will continue while inpatient.      VTE Prophylaxis: Lovenox but patient refused continue SCDs  Code status: Full Code.       Electronically signed by Brian Nunez MD on 9/26/2020 at 5:38 PM

## 2020-09-26 NOTE — ASSESSMENT & PLAN NOTE
- has been on upward trend and currently on 9/26 is at 6.7  - stop kcl supplmenetation and give dose of kayexalate. - repeat bmp at 5pm and in AM tomorrow. - continue to monitor closely on tele.

## 2020-09-27 LAB
ALBUMIN SERPL-MCNC: 2.7 G/DL (ref 3.5–5)
ANION GAP SERPL CALC-SCNC: 11 MMOL/L (ref 5–15)
BUN SERPL-MCNC: 40 MG/DL (ref 6–20)
BUN/CREAT SERPL: 32 (ref 12–20)
CA-I BLD-MCNC: 9.4 MG/DL (ref 8.5–10.1)
CHLORIDE SERPL-SCNC: 103 MMOL/L (ref 97–108)
CO2 SERPL-SCNC: 24 MMOL/L (ref 21–32)
CREAT SERPL-MCNC: 1.25 MG/DL (ref 0.55–1.02)
GLUCOSE BLD STRIP.AUTO-MCNC: 192 MG/DL (ref 65–100)
GLUCOSE BLD STRIP.AUTO-MCNC: 204 MG/DL (ref 65–100)
GLUCOSE BLD STRIP.AUTO-MCNC: 221 MG/DL (ref 65–100)
GLUCOSE SERPL-MCNC: 166 MG/DL (ref 65–100)
PERFORMED BY, TECHID: ABNORMAL
PHOSPHATE SERPL-MCNC: 4.2 MG/DL (ref 2.6–4.7)
POTASSIUM SERPL-SCNC: 4.2 MMOL/L (ref 3.5–5.1)
SODIUM SERPL-SCNC: 138 MMOL/L (ref 136–145)

## 2020-09-27 PROCEDURE — 74011250637 HC RX REV CODE- 250/637: Performed by: HOSPITALIST

## 2020-09-27 PROCEDURE — 82962 GLUCOSE BLOOD TEST: CPT

## 2020-09-27 PROCEDURE — 74011250636 HC RX REV CODE- 250/636: Performed by: HOSPITALIST

## 2020-09-27 PROCEDURE — 65270000029 HC RM PRIVATE

## 2020-09-27 PROCEDURE — 74011636637 HC RX REV CODE- 636/637: Performed by: FAMILY MEDICINE

## 2020-09-27 PROCEDURE — 74011250637 HC RX REV CODE- 250/637: Performed by: NURSE PRACTITIONER

## 2020-09-27 PROCEDURE — 80069 RENAL FUNCTION PANEL: CPT

## 2020-09-27 PROCEDURE — 36415 COLL VENOUS BLD VENIPUNCTURE: CPT

## 2020-09-27 RX ORDER — DOCUSATE SODIUM 100 MG/1
100 CAPSULE, LIQUID FILLED ORAL 2 TIMES DAILY
Status: DISCONTINUED | OUTPATIENT
Start: 2020-09-27 | End: 2020-09-29 | Stop reason: HOSPADM

## 2020-09-27 RX ORDER — POLYETHYLENE GLYCOL 3350 17 G/17G
17 POWDER, FOR SOLUTION ORAL DAILY
Status: DISCONTINUED | OUTPATIENT
Start: 2020-09-27 | End: 2020-09-29 | Stop reason: HOSPADM

## 2020-09-27 RX ORDER — FUROSEMIDE 10 MG/ML
40 INJECTION INTRAMUSCULAR; INTRAVENOUS 2 TIMES DAILY
Status: DISCONTINUED | OUTPATIENT
Start: 2020-09-27 | End: 2020-09-28

## 2020-09-27 RX ADMIN — INSULIN LISPRO 2 UNITS: 100 INJECTION, SOLUTION INTRAVENOUS; SUBCUTANEOUS at 21:17

## 2020-09-27 RX ADMIN — DOCUSATE SODIUM 100 MG: 100 CAPSULE, LIQUID FILLED ORAL at 17:29

## 2020-09-27 RX ADMIN — MULTIVITAMIN TABLET 1 TABLET: TABLET at 08:19

## 2020-09-27 RX ADMIN — ASPIRIN 81 MG: 81 TABLET, COATED ORAL at 08:17

## 2020-09-27 RX ADMIN — INSULIN LISPRO 3 UNITS: 100 INJECTION, SOLUTION INTRAVENOUS; SUBCUTANEOUS at 12:16

## 2020-09-27 RX ADMIN — Medication 10 ML: at 15:11

## 2020-09-27 RX ADMIN — ATORVASTATIN CALCIUM 40 MG: 40 TABLET, FILM COATED ORAL at 08:17

## 2020-09-27 RX ADMIN — FUROSEMIDE 40 MG: 10 INJECTION, SOLUTION INTRAVENOUS at 08:18

## 2020-09-27 RX ADMIN — CARVEDILOL 3.12 MG: 3.12 TABLET, FILM COATED ORAL at 17:29

## 2020-09-27 RX ADMIN — Medication 10 ML: at 05:45

## 2020-09-27 RX ADMIN — BISACODYL 5 MG: 5 TABLET, COATED ORAL at 08:30

## 2020-09-27 RX ADMIN — GABAPENTIN 100 MG: 100 CAPSULE ORAL at 17:29

## 2020-09-27 RX ADMIN — DOCUSATE SODIUM 100 MG: 100 CAPSULE, LIQUID FILLED ORAL at 12:16

## 2020-09-27 RX ADMIN — TICAGRELOR 90 MG: 90 TABLET ORAL at 17:29

## 2020-09-27 RX ADMIN — TICAGRELOR 90 MG: 90 TABLET ORAL at 08:19

## 2020-09-27 RX ADMIN — Medication 10 ML: at 08:22

## 2020-09-27 RX ADMIN — FUROSEMIDE 40 MG: 10 INJECTION, SOLUTION INTRAVENOUS at 17:30

## 2020-09-27 RX ADMIN — POLYETHYLENE GLYCOL 3350 17 G: 17 POWDER, FOR SOLUTION ORAL at 08:31

## 2020-09-27 RX ADMIN — GABAPENTIN 100 MG: 100 CAPSULE ORAL at 21:08

## 2020-09-27 RX ADMIN — LEVOTHYROXINE SODIUM 200 MCG: 100 TABLET ORAL at 08:19

## 2020-09-27 RX ADMIN — MAGNESIUM OXIDE 400 MG: 400 TABLET ORAL at 08:19

## 2020-09-27 RX ADMIN — Medication 10 ML: at 21:17

## 2020-09-27 RX ADMIN — MAGNESIUM OXIDE 400 MG: 400 TABLET ORAL at 17:29

## 2020-09-27 RX ADMIN — INSULIN LISPRO 2 UNITS: 100 INJECTION, SOLUTION INTRAVENOUS; SUBCUTANEOUS at 17:29

## 2020-09-27 RX ADMIN — AMIODARONE HYDROCHLORIDE 200 MG: 200 TABLET ORAL at 08:17

## 2020-09-27 RX ADMIN — GABAPENTIN 100 MG: 100 CAPSULE ORAL at 08:31

## 2020-09-27 RX ADMIN — CARVEDILOL 3.12 MG: 3.12 TABLET, FILM COATED ORAL at 08:18

## 2020-09-27 RX ADMIN — PANTOPRAZOLE SODIUM 40 MG: 20 TABLET, DELAYED RELEASE ORAL at 08:19

## 2020-09-27 NOTE — PROGRESS NOTES
Progress Note  Date:9/27/2020       Room:202/01  Patient Nelli Walker     YOB: 1950     Age:70 y.o. Jennie Walton is a 65 y.o. female  significant for Non-Ischemic Cardiomyopathy, HFrEF (EF 20%), HTN, HLP, GERD, DM-II, Biventricular ICD, Fibromyalgia, and a recent CAD/Stent, who presented to the emergency department today as referred by her cardiologist for IV Diuresis in context of dyspnea and CHF exacerbation. Patient recently underwent pacemaker interrogation yesterday 9/21 by Dr Camila Manley, she was then followed up by Naldo Cruz NP who referred her for hospital admission for diuresis due to longstanding dyspnea. ED work-up revealed a BNP of 2738, trop 0.22, CXR consistent with pulmonary venous cephalization, without edema. At the time of my visit, patient seems to be in no respiratory distress, with O2sats 95-96%RA. She denies chest pain, chest pressure, palpitations, difficulty breathing, fever or chills. She endorsed Shortness of breath on exertion/with activity which has been the same in the last several days to weeks, sleeps with 1-2  Pillows at night, and still with trace peripheral edema in BLE. Patient seen and evaluated. Sitting in chair, no acute distress. Good o2 saturation on RA. Complains of constipation but denies any sob or chest pain. Able to ambulate to bathroom with no sob     Review of Systems   Constitutional: Negative. HENT: Negative. Eyes: Negative. Cardiovascular: Positive for leg swelling. Gastrointestinal: Positive for constipation. Endocrine: Negative. Genitourinary: Negative. Musculoskeletal: Negative. Skin: Negative. Neurological: Negative. Hematological: Negative. Psychiatric/Behavioral: Negative. All other systems reviewed and are negative.       Objective           Vitals Last 24 Hours:  Patient Vitals for the past 24 hrs:   Temp Pulse Resp BP SpO2   09/27/20 0748 97.5 °F (36.4 °C) 77 20 (!) 105/54 98 % 09/27/20 0347 98.4 °F (36.9 °C) 77 20 (!) 110/53 96 %   09/26/20 2358 97.7 °F (36.5 °C) 77 20 113/61 99 %   09/26/20 2013 97.5 °F (36.4 °C) 75 18 (!) 107/53 98 %   09/26/20 1709 97.5 °F (36.4 °C) 78 20 (!) 103/53 97 %   09/26/20 1236 97.8 °F (36.6 °C) 77 20 (!) 108/58 96 %        I/O (24Hr): Intake/Output Summary (Last 24 hours) at 9/27/2020 1157  Last data filed at 9/27/2020 0856  Gross per 24 hour   Intake 340 ml   Output 1380 ml   Net -1040 ml       Physical Exam:  General: Alert, cooperative, no distress, appears stated age. Head:  Normocephalic, without obvious abnormality, atraumatic. Eyes:  Conjunctivae/corneas clear. Pupils equal, round, reactive to light. Extraocular movements intact. Lungs: Air entry diminished at the bases basal crackles noted chest wall: No tenderness or deformity. Heart:  Regular rate and rhythm, S1, S2 normal, no murmur, click, rub or gallop. Abdomen:  Soft, non-tender. Bowel sounds normal. No masses,  No organomegaly. Extremities: Extremities normal, atraumatic, no cyanosis, positive for edema  Pulses: 2+ and symmetric all extremities. Skin: Skin color, texture, turgor normal. No rashes or lesions  Neurologic: Awake, Alert, oriented.  No obvious gross sensory or motor deficits      Medications           Current Facility-Administered Medications   Medication Dose Route Frequency    furosemide (LASIX) injection 40 mg  40 mg IntraVENous BID    polyethylene glycol (MIRALAX) packet 17 g  17 g Oral DAILY    docusate sodium (COLACE) capsule 100 mg  100 mg Oral BID    insulin lispro (HUMALOG) injection   SubCUTAneous AC&HS    glucose chewable tablet 16 g  4 Tab Oral PRN    dextrose (D50W) injection syrg 12.5-25 g  12.5-25 g IntraVENous PRN    glucagon (GLUCAGEN) injection 1 mg  1 mg IntraMUSCular PRN    benzonatate (TESSALON) capsule 100 mg  100 mg Oral TID PRN    acetaminophen (TYLENOL) tablet 500 mg  500 mg Oral Q6H PRN    amiodarone (CORDARONE) tablet 200 mg  200 mg Oral DAILY    aspirin delayed-release tablet 81 mg  81 mg Oral DAILY    atorvastatin (LIPITOR) tablet 40 mg  40 mg Oral DAILY    carvediloL (COREG) tablet 3.125 mg  3.125 mg Oral BID WITH MEALS    gabapentin (NEURONTIN) capsule 100 mg  100 mg Oral TID    levothyroxine (SYNTHROID) tablet 200 mcg  200 mcg Oral ACB    magnesium oxide (MAG-OX) tablet 400 mg  400 mg Oral BID    melatonin tablet 5 mg  5 mg Oral QHS PRN    pantoprazole (PROTONIX) tablet 40 mg  40 mg Oral DAILY    ticagrelor (BRILINTA) tablet 90 mg  90 mg Oral BID    polyethylene glycol (MIRALAX) packet 17 g  17 g Oral PRN    multivitamin (ONE A DAY) tablet 1 Tab  1 Tab Oral DAILY    sodium chloride (NS) flush 5-40 mL  5-40 mL IntraVENous Q8H    acetaminophen (TYLENOL) tablet 650 mg  650 mg Oral Q6H PRN    Or    acetaminophen (TYLENOL) suppository 650 mg  650 mg Rectal Q6H PRN    bisacodyL (DULCOLAX) tablet 5 mg  5 mg Oral DAILY PRN    ondansetron (ZOFRAN ODT) tablet 4 mg  4 mg Oral Q8H PRN    Or    ondansetron (ZOFRAN) injection 4 mg  4 mg IntraVENous Q6H PRN         Allergies         Codeine; Novocain [procaine]; and Tramadol       Labs/Imaging/Diagnostics      Labs:  Recent Results (from the past 48 hour(s))   GLUCOSE, POC    Collection Time: 09/25/20 12:07 PM   Result Value Ref Range    Glucose (POC) 214 (H) 65 - 100 mg/dL    Performed by [facility]    METABOLIC PANEL, BASIC    Collection Time: 09/25/20  2:26 PM   Result Value Ref Range    Sodium 135 (L) 136 - 145 mmol/L    Potassium 6.6 (HH) 3.5 - 5.1 mmol/L    Chloride 103 97 - 108 mmol/L    CO2 26 21 - 32 mmol/L    Anion gap 6 5 - 15 mmol/L    Glucose 161 (H) 65 - 100 mg/dL    BUN 45 (H) 6 - 20 mg/dL    Creatinine 1.81 (H) 0.55 - 1.02 mg/dL    BUN/Creatinine ratio 25 (H) 12 - 20      GFR est AA 33 (L) >60 ml/min/1.73m2    GFR est non-AA 27 (L) >60 ml/min/1.73m2    Calcium 9.2 8.5 - 10.1 mg/dL   GLUCOSE, POC    Collection Time: 09/25/20  3:42 PM   Result Value Ref Range    Glucose (POC) 158 (H) 65 - 100 mg/dL    Performed by THONY CHAPMAN    GLUCOSE, POC    Collection Time: 09/25/20  8:59 PM   Result Value Ref Range    Glucose (POC) 127 (H) 65 - 100 mg/dL    Performed by [address] [place] [address] Street, BASIC    Collection Time: 09/26/20  5:21 AM   Result Value Ref Range    Sodium 135 (L) 136 - 145 mmol/L    Potassium 6.7 (HH) 3.5 - 5.1 mmol/L    Chloride 104 97 - 108 mmol/L    CO2 25 21 - 32 mmol/L    Anion gap 6 5 - 15 mmol/L    Glucose 124 (H) 65 - 100 mg/dL    BUN 47 (H) 6 - 20 mg/dL    Creatinine 1.80 (H) 0.55 - 1.02 mg/dL    BUN/Creatinine ratio 26 (H) 12 - 20      GFR est AA 34 (L) >60 ml/min/1.73m2    GFR est non-AA 28 (L) >60 ml/min/1.73m2    Calcium 9.3 8.5 - 10.1 mg/dL   GLUCOSE, POC    Collection Time: 09/26/20  7:12 AM   Result Value Ref Range    Glucose (POC) 121 (H) 65 - 100 mg/dL    Performed by Romy Foster    GLUCOSE, POC    Collection Time: 09/26/20 11:27 AM   Result Value Ref Range    Glucose (POC) 179 (H) 65 - 100 mg/dL    Performed by Romy Foster    GLUCOSE, POC    Collection Time: 09/26/20  4:38 PM   Result Value Ref Range    Glucose (POC) 166 (H) 65 - 100 mg/dL    Performed by John LOCKWOOD    METABOLIC PANEL, BASIC    Collection Time: 09/26/20  6:25 PM   Result Value Ref Range    Sodium 137 136 - 145 mmol/L    Potassium 4.1 3.5 - 5.1 mmol/L    Chloride 102 97 - 108 mmol/L    CO2 27 21 - 32 mmol/L    Anion gap 8 5 - 15 mmol/L    Glucose 184 (H) 65 - 100 mg/dL    BUN 43 (H) 6 - 20 mg/dL    Creatinine 1.52 (H) 0.55 - 1.02 mg/dL    BUN/Creatinine ratio 28 (H) 12 - 20      GFR est AA 41 (L) >60 ml/min/1.73m2    GFR est non-AA 34 (L) >60 ml/min/1.73m2    Calcium 9.1 8.5 - 10.1 mg/dL   GLUCOSE, POC    Collection Time: 09/26/20  9:17 PM   Result Value Ref Range    Glucose (POC) 186 (H) 65 - 100 mg/dL    Performed by Lossie Ringer    RENAL FUNCTION PANEL    Collection Time: 09/27/20  6:25 AM   Result Value Ref Range    Sodium 138 136 - 145 mmol/L    Potassium 4.2 3.5 - 5.1 mmol/L    Chloride 103 97 - 108 mmol/L    CO2 24 21 - 32 mmol/L    Anion gap 11 5 - 15 mmol/L    Glucose 166 (H) 65 - 100 mg/dL    BUN 40 (H) 6 - 20 mg/dL    Creatinine 1.25 (H) 0.55 - 1.02 mg/dL    BUN/Creatinine ratio 32 (H) 12 - 20      GFR est AA 51 (L) >60 ml/min/1.73m2    GFR est non-AA 42 (L) >60 ml/min/1.73m2    Calcium 9.4 8.5 - 10.1 mg/dL    Phosphorus 4.2 2.6 - 4.7 mg/dL    Albumin 2.7 (L) 3.5 - 5.0 g/dL   GLUCOSE, POC    Collection Time: 09/27/20 11:10 AM   Result Value Ref Range    Glucose (POC) 221 (H) 65 - 100 mg/dL    Performed by Qasim Rosado         Imaging:  Xr Chest Port    Result Date: 9/22/2020  Impression: Evidence of chronic left-sided heart disease, without active edema demonstrated at this time. Atherosclerosis. Assessment//Plan           Problem List:  Hospital Problems  Date Reviewed: 9/22/2020          Codes Class Noted POA    Hyperkalemia ICD-10-CM: E87.5  ICD-9-CM: 276.7  9/26/2020 Unknown        CHF (congestive heart failure) ([facility] ICD-10-CM: I50.9  ICD-9-CM: 428.0  9/25/2020 Unknown        CHF exacerbation ([facility] ICD-10-CM: I50.9  ICD-9-CM: 428.0  9/22/2020 Unknown               Acute on Chronic Systolic Congestive Heart Failure:  -Admit to observation, and monitor on telemetry.  -Lasix 20mg IV q 6 hrs, for gentle diuresis, and was increased to 40 mg IV 3 times daily on 9/23 and metazone was added on 9/24  -CXR consistent with pulmonary venous cephalization, without pulmonary edema, with O2sats have been 94-96% on RA. .-Echo 8/12/20-EF 20-25%, dilated LV, with concentric hypertrophy.  -Continue BB-Carvedilol per home dose.  -Na restricted diet/1200ml Fluid restriction. Strict I&O, daily weight.  -Appreciate cardiology input for further recommendations. -patient has been counseled on medication and dietary compliance.   - 9/27: lasix on hold on 9/25 and 9/26 and improved creat to 1.52 so will restart lasix at 40mg IV bid.      Acute kidney injury  -suspect prerenal, in context of poor perfusion and/or possibly dehydration from chronic diuresis.   -Cr 1.15, Baseline is around 0.8.   -Avoid nephrotoxic agents, renally dose medications, and consider nephrology consult as needed. - creat increased to 1.81 on 9/25 and similar range on 9/26 at 1.8 after TID lasix 40mg and metalazone was started so diruesis on hold   - 9/27 creat now improved to 1.52 so restarted lasix dosing at 40mg iv bid     Hyperkalemia:  - has been on upward trend and currently on 9/26 is at 6.7  - stop kcl supplmenetation and give dose of kayexalate. - repeat bmp at 5pm on 9/26 had K+ at 4.1 and on 9/27 similar range at 4.2   - continue to monitor closely on tele.        CAD/Stents  -Recent [facility] with stents placed to LAD \"last week\". ED work up shows a Troponin leak of 0.22, ECG unremarkable.   -Continue Brilinta. ID as well as aspirin      Diabetes Mellitus type 2:  -Chronic condition.  -Continue Accucheck ACHS,   - patient metformin and 75/25 28 units sq BID AC have been on hold. Glucose was 166 on AM of 9/27 and 211 for lunch. Will continue to monitor closely     Constipation:  - patient notes no bowel movement since last week   - was given biscoydal 5mg dose x 1 on 9/27  - 9/27 start colace 100mg oral bid and miralax 17 gm oral daily first dose now. - monitor closely      Biventricular ICD in situ:  -Chronic condition. Stable.      PVCs  -Recently started on Amiodarone by cardiologist, which we will continue while inpatient.      VTE Prophylaxis: Lovenox but patient refused continue SCDs  Code status: Full Code. Spent 25 minutes evaluting and coordinating patient care of which >50% was spent coordinating and counseling.        Electronically signed by Neo Rivera MD on 9/27/2020 at 5:38 PM

## 2020-09-28 PROBLEM — E83.42 HYPOMAGNESEMIA: Status: ACTIVE | Noted: 2020-09-28

## 2020-09-28 LAB
ALBUMIN SERPL-MCNC: 2.6 G/DL (ref 3.5–5)
ANION GAP SERPL CALC-SCNC: 9 MMOL/L (ref 5–15)
BUN SERPL-MCNC: 34 MG/DL (ref 6–20)
BUN/CREAT SERPL: 29 (ref 12–20)
CA-I BLD-MCNC: 9.1 MG/DL (ref 8.5–10.1)
CHLORIDE SERPL-SCNC: 100 MMOL/L (ref 97–108)
CO2 SERPL-SCNC: 29 MMOL/L (ref 21–32)
CREAT SERPL-MCNC: 1.18 MG/DL (ref 0.55–1.02)
GLUCOSE BLD STRIP.AUTO-MCNC: 123 MG/DL (ref 65–100)
GLUCOSE BLD STRIP.AUTO-MCNC: 182 MG/DL (ref 65–100)
GLUCOSE BLD STRIP.AUTO-MCNC: 211 MG/DL (ref 65–100)
GLUCOSE BLD STRIP.AUTO-MCNC: 212 MG/DL (ref 65–100)
GLUCOSE SERPL-MCNC: 118 MG/DL (ref 65–100)
MAGNESIUM SERPL-MCNC: 1.3 MG/DL (ref 1.6–2.4)
PERFORMED BY, TECHID: ABNORMAL
PHOSPHATE SERPL-MCNC: 4.2 MG/DL (ref 2.6–4.7)
POTASSIUM SERPL-SCNC: 3 MMOL/L (ref 3.5–5.1)
SODIUM SERPL-SCNC: 138 MMOL/L (ref 136–145)

## 2020-09-28 PROCEDURE — 74011250637 HC RX REV CODE- 250/637: Performed by: NURSE PRACTITIONER

## 2020-09-28 PROCEDURE — 74011636637 HC RX REV CODE- 636/637: Performed by: FAMILY MEDICINE

## 2020-09-28 PROCEDURE — 83735 ASSAY OF MAGNESIUM: CPT

## 2020-09-28 PROCEDURE — 65270000029 HC RM PRIVATE

## 2020-09-28 PROCEDURE — 82962 GLUCOSE BLOOD TEST: CPT

## 2020-09-28 PROCEDURE — 74011250636 HC RX REV CODE- 250/636: Performed by: NURSE PRACTITIONER

## 2020-09-28 PROCEDURE — 74011250637 HC RX REV CODE- 250/637: Performed by: HOSPITALIST

## 2020-09-28 PROCEDURE — 36415 COLL VENOUS BLD VENIPUNCTURE: CPT

## 2020-09-28 PROCEDURE — 74011250636 HC RX REV CODE- 250/636: Performed by: HOSPITALIST

## 2020-09-28 PROCEDURE — 80069 RENAL FUNCTION PANEL: CPT

## 2020-09-28 RX ORDER — POTASSIUM CHLORIDE 20 MEQ/1
40 TABLET, EXTENDED RELEASE ORAL EVERY 4 HOURS
Status: DISCONTINUED | OUTPATIENT
Start: 2020-09-28 | End: 2020-09-28

## 2020-09-28 RX ORDER — FUROSEMIDE 40 MG/1
40 TABLET ORAL
Status: DISCONTINUED | OUTPATIENT
Start: 2020-09-28 | End: 2020-09-29 | Stop reason: HOSPADM

## 2020-09-28 RX ORDER — SENNOSIDES 8.6 MG/1
2 TABLET ORAL
Status: DISCONTINUED | OUTPATIENT
Start: 2020-09-28 | End: 2020-09-29 | Stop reason: HOSPADM

## 2020-09-28 RX ORDER — MAGNESIUM SULFATE HEPTAHYDRATE 40 MG/ML
2 INJECTION, SOLUTION INTRAVENOUS ONCE
Status: COMPLETED | OUTPATIENT
Start: 2020-09-28 | End: 2020-09-28

## 2020-09-28 RX ORDER — POTASSIUM CHLORIDE 20 MEQ/1
40 TABLET, EXTENDED RELEASE ORAL EVERY 4 HOURS
Status: DISPENSED | OUTPATIENT
Start: 2020-09-28 | End: 2020-09-29

## 2020-09-28 RX ADMIN — GABAPENTIN 100 MG: 100 CAPSULE ORAL at 20:48

## 2020-09-28 RX ADMIN — FUROSEMIDE 40 MG: 40 TABLET ORAL at 16:53

## 2020-09-28 RX ADMIN — POLYETHYLENE GLYCOL 3350 17 G: 17 POWDER, FOR SOLUTION ORAL at 08:21

## 2020-09-28 RX ADMIN — ASPIRIN 81 MG: 81 TABLET, COATED ORAL at 08:22

## 2020-09-28 RX ADMIN — SENNOSIDES 17.2 MG: 8.6 TABLET, FILM COATED ORAL at 20:48

## 2020-09-28 RX ADMIN — TICAGRELOR 90 MG: 90 TABLET ORAL at 08:23

## 2020-09-28 RX ADMIN — INSULIN LISPRO 3 UNITS: 100 INJECTION, SOLUTION INTRAVENOUS; SUBCUTANEOUS at 12:05

## 2020-09-28 RX ADMIN — ATORVASTATIN CALCIUM 40 MG: 40 TABLET, FILM COATED ORAL at 08:22

## 2020-09-28 RX ADMIN — MAGNESIUM SULFATE 2 G: 2 INJECTION INTRAVENOUS at 10:01

## 2020-09-28 RX ADMIN — DOCUSATE SODIUM 100 MG: 100 CAPSULE, LIQUID FILLED ORAL at 16:53

## 2020-09-28 RX ADMIN — FUROSEMIDE 40 MG: 40 TABLET ORAL at 12:05

## 2020-09-28 RX ADMIN — Medication 10 ML: at 05:52

## 2020-09-28 RX ADMIN — GABAPENTIN 100 MG: 100 CAPSULE ORAL at 16:54

## 2020-09-28 RX ADMIN — CARVEDILOL 3.12 MG: 3.12 TABLET, FILM COATED ORAL at 08:22

## 2020-09-28 RX ADMIN — POTASSIUM CHLORIDE 40 MEQ: 1500 TABLET, EXTENDED RELEASE ORAL at 16:54

## 2020-09-28 RX ADMIN — SENNOSIDES 17.2 MG: 8.6 TABLET, FILM COATED ORAL at 12:05

## 2020-09-28 RX ADMIN — POTASSIUM CHLORIDE 40 MEQ: 1500 TABLET, EXTENDED RELEASE ORAL at 20:47

## 2020-09-28 RX ADMIN — MAGNESIUM OXIDE 400 MG: 400 TABLET ORAL at 16:54

## 2020-09-28 RX ADMIN — MAGNESIUM OXIDE 400 MG: 400 TABLET ORAL at 08:22

## 2020-09-28 RX ADMIN — Medication 10 ML: at 21:04

## 2020-09-28 RX ADMIN — MAGNESIUM SULFATE 2 G: 2 INJECTION INTRAVENOUS at 08:22

## 2020-09-28 RX ADMIN — INSULIN LISPRO 3 UNITS: 100 INJECTION, SOLUTION INTRAVENOUS; SUBCUTANEOUS at 17:47

## 2020-09-28 RX ADMIN — GABAPENTIN 100 MG: 100 CAPSULE ORAL at 08:23

## 2020-09-28 RX ADMIN — AMIODARONE HYDROCHLORIDE 200 MG: 200 TABLET ORAL at 08:22

## 2020-09-28 RX ADMIN — PANTOPRAZOLE SODIUM 40 MG: 20 TABLET, DELAYED RELEASE ORAL at 08:22

## 2020-09-28 RX ADMIN — MULTIVITAMIN TABLET 1 TABLET: TABLET at 08:23

## 2020-09-28 RX ADMIN — POTASSIUM CHLORIDE 40 MEQ: 1500 TABLET, EXTENDED RELEASE ORAL at 12:05

## 2020-09-28 RX ADMIN — CARVEDILOL 3.12 MG: 3.12 TABLET, FILM COATED ORAL at 16:53

## 2020-09-28 RX ADMIN — POTASSIUM CHLORIDE 40 MEQ: 1500 TABLET, EXTENDED RELEASE ORAL at 08:23

## 2020-09-28 RX ADMIN — DOCUSATE SODIUM 100 MG: 100 CAPSULE, LIQUID FILLED ORAL at 08:23

## 2020-09-28 RX ADMIN — TICAGRELOR 90 MG: 90 TABLET ORAL at 16:54

## 2020-09-28 RX ADMIN — LEVOTHYROXINE SODIUM 200 MCG: 100 TABLET ORAL at 08:22

## 2020-09-28 NOTE — PROGRESS NOTES
Progress Note  Date:9/28/2020       Room:202/01  Patient Deana Aaron     YOB: 1950     Age:70 y.o. Subjective    Javy Saldaña is a 65 y.o. female  significant for Non-Ischemic Cardiomyopathy, HFrEF (EF 20%), HTN, HLP, GERD, DM-II, Biventricular ICD, Fibromyalgia, and a recent CAD/Stent, who presented to the emergency department today as referred by her cardiologist for IV Diuresis in context of dyspnea and CHF exacerbation. Patient recently underwent pacemaker interrogation yesterday 9/21 by Dr Arpita Amaya, she was then followed up by Gilda Nova NP who referred her for hospital admission for diuresis due to longstanding dyspnea. ED work-up revealed a BNP of 2738, trop 0.22, CXR consistent with pulmonary venous cephalization, without edema. At the time of my visit, patient seems to be in no respiratory distress, with O2sats 95-96%RA. She denies chest pain, chest pressure, palpitations, difficulty breathing, fever or chills. She endorsed Shortness of breath on exertion/with activity which has been the same in the last several days to weeks, sleeps with 1-2  Pillows at night, and still with trace peripheral edema in BLE. Patient seen and evaluated. Sitting in chair, no acute distress. Good o2 saturation on RA. Received muliple medications and still no bowel movement. Denies any sob or chest pain   Review of Systems   Constitutional: Negative. HENT: Negative. Eyes: Negative. Gastrointestinal: Positive for constipation. Endocrine: Negative. Genitourinary: Negative. Musculoskeletal: Negative. Skin: Negative. Neurological: Negative. Hematological: Negative. Psychiatric/Behavioral: Negative. All other systems reviewed and are negative.       Objective           Vitals Last 24 Hours:  Patient Vitals for the past 24 hrs:   Temp Pulse Resp BP SpO2   09/28/20 0822  77  (!) 128/92    09/28/20 0701 97 °F (36.1 °C) 72 22 (!) 85/50 98 %   09/28/20 0420 97 °F (36.1 °C) 78 18 (!) 112/49 97 %   09/27/20 2333 97.5 °F (36.4 °C) 70 18 (!) 102/47 97 %   09/27/20 1954 97.9 °F (36.6 °C) 70 18 (!) 114/49 99 %   09/27/20 1605 97.6 °F (36.4 °C) 65 20 (!) 102/59 97 %        I/O (24Hr): Intake/Output Summary (Last 24 hours) at 9/28/2020 1449  Last data filed at 9/28/2020 1001  Gross per 24 hour   Intake 270 ml   Output 2550 ml   Net -2280 ml       Physical Exam:  General: Alert, cooperative, no distress, appears stated age. Head:  Normocephalic, without obvious abnormality, atraumatic. Eyes:  Conjunctivae/corneas clear. Pupils equal, round, reactive to light. Extraocular movements intact. Lungs: Air entry diminished at the bases basal crackles noted chest wall: No tenderness or deformity. Heart:  Regular rate and rhythm, S1, S2 normal, no murmur, click, rub or gallop. Abdomen:  Soft, non-tender. Bowel sounds normal. No masses,  No organomegaly. Extremities: Extremities normal, atraumatic, no cyanosis, positive for edema  Pulses: 2+ and symmetric all extremities. Skin: Skin color, texture, turgor normal. No rashes or lesions  Neurologic: Awake, Alert, oriented.  No obvious gross sensory or motor deficits      Medications           Current Facility-Administered Medications   Medication Dose Route Frequency    potassium chloride (K-DUR, KLOR-CON) SR tablet 40 mEq  40 mEq Oral Q4H    furosemide (LASIX) tablet 40 mg  40 mg Oral ACB&D    senna (SENOKOT) tablet 17.2 mg  2 Tab Oral QHS    polyethylene glycol (MIRALAX) packet 17 g  17 g Oral DAILY    docusate sodium (COLACE) capsule 100 mg  100 mg Oral BID    insulin lispro (HUMALOG) injection   SubCUTAneous AC&HS    glucose chewable tablet 16 g  4 Tab Oral PRN    dextrose (D50W) injection syrg 12.5-25 g  12.5-25 g IntraVENous PRN    glucagon (GLUCAGEN) injection 1 mg  1 mg IntraMUSCular PRN    benzonatate (TESSALON) capsule 100 mg  100 mg Oral TID PRN    acetaminophen (TYLENOL) tablet 500 mg  500 mg Oral Q6H PRN    amiodarone (CORDARONE) tablet 200 mg  200 mg Oral DAILY    aspirin delayed-release tablet 81 mg  81 mg Oral DAILY    atorvastatin (LIPITOR) tablet 40 mg  40 mg Oral DAILY    carvediloL (COREG) tablet 3.125 mg  3.125 mg Oral BID WITH MEALS    gabapentin (NEURONTIN) capsule 100 mg  100 mg Oral TID    levothyroxine (SYNTHROID) tablet 200 mcg  200 mcg Oral ACB    magnesium oxide (MAG-OX) tablet 400 mg  400 mg Oral BID    melatonin tablet 5 mg  5 mg Oral QHS PRN    ticagrelor (BRILINTA) tablet 90 mg  90 mg Oral BID    polyethylene glycol (MIRALAX) packet 17 g  17 g Oral PRN    multivitamin (ONE A DAY) tablet 1 Tab  1 Tab Oral DAILY    sodium chloride (NS) flush 5-40 mL  5-40 mL IntraVENous Q8H    acetaminophen (TYLENOL) tablet 650 mg  650 mg Oral Q6H PRN    Or    acetaminophen (TYLENOL) suppository 650 mg  650 mg Rectal Q6H PRN    bisacodyL (DULCOLAX) tablet 5 mg  5 mg Oral DAILY PRN    ondansetron (ZOFRAN ODT) tablet 4 mg  4 mg Oral Q8H PRN    Or    ondansetron (ZOFRAN) injection 4 mg  4 mg IntraVENous Q6H PRN         Allergies         Codeine; Novocain [procaine]; and Tramadol       Labs/Imaging/Diagnostics      Labs:  Recent Results (from the past 48 hour(s))   GLUCOSE, POC    Collection Time: 09/26/20  4:38 PM   Result Value Ref Range    Glucose (POC) 166 (H) 65 - 100 mg/dL    Performed by Frankey Noss VERNITA    METABOLIC PANEL, BASIC    Collection Time: 09/26/20  6:25 PM   Result Value Ref Range    Sodium 137 136 - 145 mmol/L    Potassium 4.1 3.5 - 5.1 mmol/L    Chloride 102 97 - 108 mmol/L    CO2 27 21 - 32 mmol/L    Anion gap 8 5 - 15 mmol/L    Glucose 184 (H) 65 - 100 mg/dL    BUN 43 (H) 6 - 20 mg/dL    Creatinine 1.52 (H) 0.55 - 1.02 mg/dL    BUN/Creatinine ratio 28 (H) 12 - 20      GFR est AA 41 (L) >60 ml/min/1.73m2    GFR est non-AA 34 (L) >60 ml/min/1.73m2    Calcium 9.1 8.5 - 10.1 mg/dL   GLUCOSE, POC    Collection Time: 09/26/20  9:17 PM   Result Value Ref Range    Glucose (POC) 186 (H) 65 - 100 mg/dL Performed by [employer]    RENAL FUNCTION PANEL    Collection Time: 09/27/20  6:25 AM   Result Value Ref Range    Sodium 138 136 - 145 mmol/L    Potassium 4.2 3.5 - 5.1 mmol/L    Chloride 103 97 - 108 mmol/L    CO2 24 21 - 32 mmol/L    Anion gap 11 5 - 15 mmol/L    Glucose 166 (H) 65 - 100 mg/dL    BUN 40 (H) 6 - 20 mg/dL    Creatinine 1.25 (H) 0.55 - 1.02 mg/dL    BUN/Creatinine ratio 32 (H) 12 - 20      GFR est AA 51 (L) >60 ml/min/1.73m2    GFR est non-AA 42 (L) >60 ml/min/1.73m2    Calcium 9.4 8.5 - 10.1 mg/dL    Phosphorus 4.2 2.6 - 4.7 mg/dL    Albumin 2.7 (L) 3.5 - 5.0 g/dL   GLUCOSE, POC    Collection Time: 09/27/20 11:10 AM   Result Value Ref Range    Glucose (POC) 221 (H) 65 - 100 mg/dL    Performed by [employer]    GLUCOSE, POC    Collection Time: 09/27/20  3:52 PM   Result Value Ref Range    Glucose (POC) 192 (H) 65 - 100 mg/dL    Performed by [employer]    GLUCOSE, POC    Collection Time: 09/27/20  9:11 PM   Result Value Ref Range    Glucose (POC) 204 (H) 65 - 100 mg/dL    Performed by [employer]    RENAL FUNCTION PANEL    Collection Time: 09/28/20  5:20 AM   Result Value Ref Range    Sodium 138 136 - 145 mmol/L    Potassium 3.0 (L) 3.5 - 5.1 mmol/L    Chloride 100 97 - 108 mmol/L    CO2 29 21 - 32 mmol/L    Anion gap 9 5 - 15 mmol/L    Glucose 118 (H) 65 - 100 mg/dL    BUN 34 (H) 6 - 20 mg/dL    Creatinine 1.18 (H) 0.55 - 1.02 mg/dL    BUN/Creatinine ratio 29 (H) 12 - 20      GFR est AA 55 (L) >60 ml/min/1.73m2    GFR est non-AA 45 (L) >60 ml/min/1.73m2    Calcium 9.1 8.5 - 10.1 mg/dL    Phosphorus 4.2 2.6 - 4.7 mg/dL    Albumin 2.6 (L) 3.5 - 5.0 g/dL   MAGNESIUM    Collection Time: 09/28/20  5:20 AM   Result Value Ref Range    Magnesium 1.3 (L) 1.6 - 2.4 mg/dL   GLUCOSE, POC    Collection Time: 09/28/20  7:17 AM   Result Value Ref Range    Glucose (POC) 123 (H) 65 - 100 mg/dL    Performed by Bonnie Dickinson, POC    Collection Time: 09/28/20 11:52 AM   Result Value Ref Range Glucose (POC) 212 (H) 65 - 100 mg/dL    Performed by Oniel Villafuerte         Imaging:  Xr Chest Port    Result Date: 2020  Impression: Evidence of chronic left-sided heart disease, without active edema demonstrated at this time. Atherosclerosis. Assessment//Plan           Problem List:  Hospital Problems  Date Reviewed: 2020          Codes Class Noted POA    Hypomagnesemia ICD-10-CM: E83.42  ICD-9-CM: 275.2  2020 Unknown        Hyperkalemia ICD-10-CM: E87.5  ICD-9-CM: 276.7  2020 Unknown        CHF (congestive heart failure) ([facility] [address] ICD-10-CM: I50.9  ICD-9-CM: 428.0  2020 Unknown        [facility] ICD-10-CM: I50.9  ICD-9-CM: 428.0  2020 Unknown               Acute on Chronic Systolic Congestive Heart Failure:  -Admit to observation, and monitor on telemetry.  -Lasix 20mg IV q 6 hrs, for gentle diuresis, and was increased to 40 mg IV 3 times daily on  and metazone was added on   -CXR consistent with pulmonary venous cephalization, without pulmonary edema, with O2sats have been 94-96% on RA. .-Echo 20-EF 20-25%, dilated LV, with concentric hypertrophy.  -Continue BB-Carvedilol per home dose.  -Na restricted diet/1200ml Fluid restriction. Strict I&O, daily weight.  -Appreciate cardiology input for further recommendations. -patient has been counseled on medication and dietary compliance. - : lasix on hold on  and  and improved creat to 1.52 so will restart lasix at 40mg IV bid.   -  tolerating lasix and changed to po bid dosing.      Acute kidney injury  -suspect prerenal, in context of poor perfusion and/or possibly dehydration from chronic diuresis.   -Cr 1.15, Baseline is around 0.8.   -Avoid nephrotoxic agents, renally dose medications, and consider nephrology consult as needed.    - creat increased to 1.81 on  and similar range on  at 1.8 after TID lasix 40mg and metalazone was started so diruesis on hold   -  creat now improved to 1.52 so restarted lasix dosing at 40mg iv bid   - 9/28: creat improved to 1.18 and tolerating 40mg bid dosingl avoid hypotension     Hyperkalemia:  - has been on upward trend and currently on 9/26 is at 6.7  - stop kcl supplmenetation and give dose of kayexalate. - repeat bmp at 5pm on 9/26 had K+ at 4.1 and on 9/27 similar range at 4.2   - 9/28 K+ level now low at 3.0 and supplmented with 40meq kcl oral q4hrs x 4 doses   - continue to monitor closely on tele. Hypokalemia:  -   was down to 1.3 and 2gm IV mag sulfate rider x 2 doses given. - continue po supplementation   - follow repeat mag level in AM       CAD/Stents  -Recent [facility] with stents placed to LAD \"last week\". ED work up shows a Troponin leak of 0.22, ECG unremarkable.   -Continue Brilinta. ID as well as aspirin      Diabetes Mellitus type 2:  -Chronic condition.  -Continue Accucheck ACHS,   - patient metformin and 75/25 28 units sq BID AC have been on hold. Glucose was 166 on AM of 9/27 and 211 for lunch. Will continue to monitor closely     Constipation:  - patient notes no bowel movement since last week   - was given biscoydal 5mg dose x 1 on 9/27  - 9/27 start colace 100mg oral bid and miralax 17 gm oral daily first dose now. - 9/28: continues to have no bowel movement and will give senna 2 tabs bid first dose now and if no results will give soap suds enema. - monitor closely      Biventricular ICD in situ:  -Chronic condition. Stable.      PVCs  -Recently started on Amiodarone by cardiologist, which we will continue while inpatient.      VTE Prophylaxis: Lovenox but patient refused continue SCDs  Code status: Full Code. Spent 25 minutes evaluting and coordinating patient care of which >50% was spent coordinating and counseling.        Electronically signed by Christine Camacho MD on 9/28/2020 at 5:38 PM

## 2020-09-28 NOTE — ASSESSMENT & PLAN NOTE
- was down to 1.3 and 2gm IV mag sulfate rider x 2 doses given.    - continue po supplementation   - follow repeat mag level in AM

## 2020-09-28 NOTE — PROGRESS NOTES
Nephrology Progress Note  Date:2020       Room:  Patient Marie Zhou     YOB: 1950     Age:70 y.o. Subjective    Subjective   Seen and evaluated at bedside, no overnight events reported by primary team  No new complaints reported by the pt. UOP:  [id number]      Review of Systems   Constitutional: Negative. HENT: Negative. Eyes: Negative. Respiratory: Negative. Cardiovascular: Negative. Gastrointestinal: Negative. Endocrine: Negative. Genitourinary: Negative. Musculoskeletal: Negative. Skin: Negative. Hematological: Negative. Psychiatric/Behavioral: Negative. All other systems reviewed and are negative. Objective         Vitals Last 24 Hours:  TEMPERATURE:  Temp  Av.4 °F (36.3 °C)  Min: 97 °F (36.1 °C)  Max: 97.9 °F (36.6 °C)  RESPIRATIONS RANGE: Resp  Av  Min: 18  Max: 22  PULSE OXIMETRY RANGE: SpO2  Av.3 %  Min: 96 %  Max: 99 %  PULSE RANGE: Pulse  Av.1  Min: 65  Max: 78  BLOOD PRESSURE RANGE: Systolic (63TIC), ERC:985 , Min:85 , GKA:728   ; Diastolic (97VDE), PDK:97, Min:47, Max:92    I/O (24Hr): Intake/Output Summary (Last 24 hours) at 2020 1052  Last data filed at 2020 1001  Gross per 24 hour   Intake 270 ml   Output 3750 ml   Net -3480 ml     Objective:  General Appearance:  Well-appearing. Vital signs: (most recent): Blood pressure (!) 128/92, pulse 77, temperature 97 °F (36.1 °C), resp. rate 22, height 5' 2\" (1.575 m), weight 90.3 kg (199 lb 2 oz), SpO2 98 %. Vital signs are normal.    Output: Producing urine. Lungs:  Normal effort. Heart: Normal rate. No murmur or gallop. Abdomen: Abdomen is soft. Neurological: Patient is alert and oriented to person, place and time. Skin:  Warm. Labs/Imaging/Diagnostics    Labs:  CBC:No results for input(s): WBC, RBC, HGB, HCT, MCV, RDW, PLT, HGBEXT, HCTEXT, PLTEXT in the last 72 hours.   CHEMISTRIES:  Recent Labs     09/28/20  0520 09/27/20  0625 09/26/20  1825    138 137   K 3.0* 4.2 4.1    103 102   CO2 29 24 27   BUN 34* 40* 43*   CA 9.1 9.4 9.1   PHOS 4.2 4.2  --    MG 1.3*  --   --    PT/INR:No results for input(s): INR, INREXT in the last 72 hours. No lab exists for component: PROTIME  APTT:No results for input(s): APTT in the last 72 hours. LIVER PROFILE:No results for input(s): AST, ALT in the last 72 hours. No lab exists for component: RAKEL GarciaPHOS  Lab Results   Component Value Date/Time    ALT (SGPT) 27 09/22/2020 05:50 PM    AST (SGOT) 37 09/22/2020 05:50 PM    Alk. phosphatase 101 09/22/2020 05:50 PM    Bilirubin, total 0.8 09/22/2020 05:50 PM       Imaging Last 24 Hours:  No results found. Assessment//Plan   Active Problems:    CHF exacerbation ([facility] [address] (9/22/2020)      CHF (congestive heart failure) ([facility] [address] (9/25/2020)      Hyperkalemia (9/26/2020)      Assessment & Plan     1. Non oliguric CORIN - resolved  2. CKD stage III - currently at baseline w/ SCR within 1.18 ranges  C/w to avoid nephrotoxins as much as possible  Avoid volume depletion  Strict I/O while in patient  Daily BMP for electrolyte monitoring    2. Hx of CHF - c/w fluid restriction <1L/d and furosemide 40mg PO BID   3. Secondary hyperparathyroidism - parameters within acceptable ranges. Monitor phosp levels periodically. 4. Hypokalemia - agree w/ KCl 40meq PO. New BMP post supplementation recommended. 5. HTN - c/w current management.      6. DM - glycemic control as per primary team     Electronically signed by Tyesha Woods MD on 9/28/2020 at 10:52 AM

## 2020-09-28 NOTE — PROGRESS NOTES
Progress Note    Patient: Hema Dickey MRN: [id number]  SSN: [id number]    YOB: 1950  Age: 65 y.o. Sex: female      Admit Date: 9/22/2020    LOS: 3 days     Subjective: This is a 65year old female that is followed for Acute on Chronic HFrEF. She has diuresed well and renal function has improved over the weekend. She is complaining of constipation and no BM in 5 days this am.  She denies chest pain, shortness of breath, palpitations, headache, dizziness. Telemetry Review: paced rhythm    Review of Symptoms:   Reports: peripheral edema, abdominal distention, constipation, and dyspnea on exertion    Denies chest pain, palpitations, headache, dizziness. Objective:     Vitals:    09/28/20 0420 09/28/20 0556 09/28/20 0701 09/28/20 0822   BP: (!) 112/49  (!) 85/50 (!) 128/92   Pulse: 78  72 77   Resp: 18  22    Temp: 97 °F (36.1 °C)  97 °F (36.1 °C)    SpO2: 97%  98%    Weight:  90.3 kg (199 lb 2 oz)     Height:            Intake and Output:  Current Shift: 09/28 0701 - 09/28 1900  In: 170 [P.O.:120; I.V.:50]  Out: -   Last three shifts: 09/26 1901 - 09/28 0700  In: 26 [P.O.:440]  Out: 4500 [Urine:4500]    Physical Exam:     General: Well nourished chronically ill appearing white female lying in bed, NAD, A&O  HEENT: Normocephalic, PERRL, no drainage  Neck: Supple, Trachea midline, No JVD  RESP: CTA bilaterally, symmetrical chest movement. No SOB or distress. On RA  Cardiovascular: RRR, no RG. + murmur. + device. PVS: No rubor, cyanosis, no edema, Radial, DP, PT pulses equal bilaterally  ABD: obese, distended, soft NT, Normoactive BS  Derm: Warm/Dry/Intact with no lesions  Neuro: A&O PPTS, cranial nerves II- XII grossly intact via interaction with patient. Does have noted expressive aphasia (residual from CVA).    PSYCH: No anxiety or agitation    Lab/Data Review:  BMP:   Lab Results   Component Value Date/Time     09/28/2020 05:20 AM    K 3.0 (L) 09/28/2020 05:20 AM  09/28/2020 05:20 AM    CO2 29 09/28/2020 05:20 AM    AGAP 9 09/28/2020 05:20 AM     (H) 09/28/2020 05:20 AM    BUN 34 (H) 09/28/2020 05:20 AM    CREA 1.18 (H) 09/28/2020 05:20 AM    GFRAA 55 (L) 09/28/2020 05:20 AM    GFRNA 45 (L) 09/28/2020 05:20 AM        Echo Results  (Last 48 hours)    None                Current Facility-Administered Medications:     potassium chloride (K-DUR, KLOR-CON) SR tablet 40 mEq, 40 mEq, Oral, Q4H, Escobar Molina, NP    furosemide (LASIX) tablet 40 mg, 40 mg, Oral, ACB&D, Alberto Roberts MD    senna (SENOKOT) tablet 17.2 mg, 2 Tab, Oral, QHS, Alberto Roberts MD    polyethylene glycol (MIRALAX) packet 17 g, 17 g, Oral, DAILY, Alberto Roberts MD, 17 g at 09/28/20 0821    docusate sodium (COLACE) capsule 100 mg, 100 mg, Oral, BID, Alberto Roberts MD, 100 mg at 09/28/20 0823    insulin lispro (HUMALOG) injection, , SubCUTAneous, AC&HS, Justin Syed MD, Stopped at 09/28/20 0730    glucose chewable tablet 16 g, 4 Tab, Oral, PRN, Justin Syed MD    dextrose (D50W) injection syrg 12.5-25 g, 12.5-25 g, IntraVENous, PRN, Justin Syed MD    glucagon (GLUCAGEN) injection 1 mg, 1 mg, IntraMUSCular, PRN, Justin Syed MD    benzonatate (TESSALON) capsule 100 mg, 100 mg, Oral, TID PRN, Sammi SUAREZ, NP, 100 mg at 09/25/20 1401    acetaminophen (TYLENOL) tablet 500 mg, 500 mg, Oral, Q6H PRN, Oba, Oluwabunmi S, NP    amiodarone (CORDARONE) tablet 200 mg, 200 mg, Oral, DAILY, Oba, Oluwabunmi S, NP, 200 mg at 09/28/20 0822    aspirin delayed-release tablet 81 mg, 81 mg, Oral, DAILY, Oba, Oluwabunmi S, NP, 81 mg at 09/28/20 9960    atorvastatin (LIPITOR) tablet 40 mg, 40 mg, Oral, DAILY, Oba, Oluwabunmi S, NP, 40 mg at 09/28/20 7057    carvediloL (COREG) tablet 3.125 mg, 3.125 mg, Oral, BID WITH MEALS, Oba, Oluwabunmi S, NP, 3.125 mg at 09/28/20 9417    gabapentin (NEURONTIN) capsule 100 mg, 100 mg, Oral, TID, Oba, Oluwabunmi S, NP, 100 mg at 09/28/20 0210    levothyroxine (SYNTHROID) tablet 200 mcg, 200 mcg, Oral, ACB, Oba, Oluwabunmi S, NP, 200 mcg at 09/28/20 0822    magnesium oxide (MAG-OX) tablet 400 mg, 400 mg, Oral, BID, Oba, Oluwabunmi S, NP, 400 mg at 09/28/20 7653    melatonin tablet 5 mg, 5 mg, Oral, QHS PRN, Oba, Gennaro Wright, NP    ticagrelor (BRILINTA) tablet 90 mg, 90 mg, Oral, BID, Oba, Oluwabunmi S, NP, 90 mg at 09/28/20 0823    polyethylene glycol (MIRALAX) packet 17 g, 17 g, Oral, PRN, Oba, Oluwabunmi S, NP, 17 g at 09/27/20 0831    multivitamin (ONE A DAY) tablet 1 Tab, 1 Tab, Oral, DAILY, Alberto Roberts MD, 1 Tab at 09/28/20 0823    sodium chloride (NS) flush 5-40 mL, 5-40 mL, IntraVENous, Q8H, Oba, Oluwabunmi S, NP, 10 mL at 09/28/20 2614    acetaminophen (TYLENOL) tablet 650 mg, 650 mg, Oral, Q6H PRN **OR** acetaminophen (TYLENOL) suppository 650 mg, 650 mg, Rectal, Q6H PRN, Oba, Oluwabunmi S, NP    bisacodyL (DULCOLAX) tablet 5 mg, 5 mg, Oral, DAILY PRN, Oba, Oluwabunmi S, NP, 5 mg at 09/27/20 0830    ondansetron (ZOFRAN ODT) tablet 4 mg, 4 mg, Oral, Q8H PRN **OR** ondansetron (ZOFRAN) injection 4 mg, 4 mg, IntraVENous, Q6H PRN, Oba, Oluwabunmi S, NP, 4 mg at 09/25/20 1618      Assessment:     Active Problems:    CHF exacerbation (HCC) (9/22/2020)      CHF (congestive heart failure) ([facility] [address] (9/25/2020)      Hyperkalemia (9/26/2020)        Plan:     Case discussed with Collaborating physician Dr. Lashell Perdomo and our recommendations are as follows:     1. Acute on Chronic HFrEF: IVC 9/24 dilated and did not collapse. Was on IV diuresis with 40mg IVP TID and gave 1 dose of Metolazone 2.5mg PO  9/24, however had rise in creatinine, so diuresis was held 9/25 and 9/26. Was restarted yesterday and patient has tolerated well. Continue 40mg PO BID Lasix. Appreciate Nephrology input at this time in regards to diuresis. Continue telemetry, strict I/O, daily weights. Would like patient to be discharged home on Bumex if she can afford. 2. CAD: recent PCI to proximal LAD 9/10. Please continue ASA/Brilinta DAPT, statin, BB   3. HTN - BP borderline. Monitor and leave room for diuresis. 4. Mixed Ischemic Cardiomyopathy - NYHA IIIC, EF minimally improved on prelim echo, however patient remains overtly hypervolemic, so not accurate at this time. Nephrology input appreciated. Patient does have high PVC load and frequently has Trigeminy which decreases her CRT %. Please continue Amiodarone she was started on by Dr. Yoan Mcfadden as this seems to be helping. Continue telemetry. Keep electrolytes at goal.   5. CORIN -likely s/t diuresis. Appreciate nephrology input. Will follow their recommendations. 6. Hypokalemia - replete to goal 4.5 -5. Repeat labs in am.   7. Hypomagnesemia - replete to goal >2. Repeat labs in am.       Thank you for involving us in the care of this patient. Please do not hesitate to call if additional questions arise.  If after hours please call [phone]    Signed By: Tristian Loza NP     September 28, 2020

## 2020-09-29 VITALS
WEIGHT: 200.3 LBS | HEIGHT: 62 IN | DIASTOLIC BLOOD PRESSURE: 59 MMHG | OXYGEN SATURATION: 94 % | TEMPERATURE: 97.2 F | SYSTOLIC BLOOD PRESSURE: 105 MMHG | BODY MASS INDEX: 36.86 KG/M2 | HEART RATE: 74 BPM | RESPIRATION RATE: 22 BRPM

## 2020-09-29 LAB
ANION GAP SERPL CALC-SCNC: 5 MMOL/L (ref 5–15)
BUN SERPL-MCNC: 30 MG/DL (ref 6–20)
BUN/CREAT SERPL: 27 (ref 12–20)
CA-I BLD-MCNC: 9 MG/DL (ref 8.5–10.1)
CHLORIDE SERPL-SCNC: 101 MMOL/L (ref 97–108)
CO2 SERPL-SCNC: 30 MMOL/L (ref 21–32)
CREAT SERPL-MCNC: 1.11 MG/DL (ref 0.55–1.02)
GLUCOSE BLD STRIP.AUTO-MCNC: 131 MG/DL (ref 65–100)
GLUCOSE BLD STRIP.AUTO-MCNC: 207 MG/DL (ref 65–100)
GLUCOSE SERPL-MCNC: 143 MG/DL (ref 65–100)
MAGNESIUM SERPL-MCNC: 1.8 MG/DL (ref 1.6–2.4)
PERFORMED BY, TECHID: ABNORMAL
PERFORMED BY, TECHID: ABNORMAL
POTASSIUM SERPL-SCNC: 4 MMOL/L (ref 3.5–5.1)
SODIUM SERPL-SCNC: 136 MMOL/L (ref 136–145)

## 2020-09-29 PROCEDURE — 74011250637 HC RX REV CODE- 250/637: Performed by: NURSE PRACTITIONER

## 2020-09-29 PROCEDURE — 82962 GLUCOSE BLOOD TEST: CPT

## 2020-09-29 PROCEDURE — 80048 BASIC METABOLIC PNL TOTAL CA: CPT

## 2020-09-29 PROCEDURE — 36415 COLL VENOUS BLD VENIPUNCTURE: CPT

## 2020-09-29 PROCEDURE — 83735 ASSAY OF MAGNESIUM: CPT

## 2020-09-29 PROCEDURE — 74011250637 HC RX REV CODE- 250/637: Performed by: HOSPITALIST

## 2020-09-29 PROCEDURE — 74011250636 HC RX REV CODE- 250/636: Performed by: HOSPITALIST

## 2020-09-29 PROCEDURE — 74011636637 HC RX REV CODE- 636/637: Performed by: FAMILY MEDICINE

## 2020-09-29 RX ORDER — BUMETANIDE 1 MG/1
1 TABLET ORAL 2 TIMES DAILY WITH MEALS
Qty: 60 TAB | Refills: 1 | Status: SHIPPED | OUTPATIENT
Start: 2020-09-29

## 2020-09-29 RX ORDER — DOCUSATE SODIUM 100 MG/1
100 CAPSULE, LIQUID FILLED ORAL 2 TIMES DAILY
Qty: 60 CAP | Refills: 2 | Status: SHIPPED | OUTPATIENT
Start: 2020-09-29 | End: 2020-12-28

## 2020-09-29 RX ORDER — MAGNESIUM SULFATE HEPTAHYDRATE 40 MG/ML
2 INJECTION, SOLUTION INTRAVENOUS ONCE
Status: COMPLETED | OUTPATIENT
Start: 2020-09-29 | End: 2020-09-29

## 2020-09-29 RX ADMIN — MULTIVITAMIN TABLET 1 TABLET: TABLET at 08:28

## 2020-09-29 RX ADMIN — ASPIRIN 81 MG: 81 TABLET, COATED ORAL at 08:28

## 2020-09-29 RX ADMIN — LEVOTHYROXINE SODIUM 200 MCG: 100 TABLET ORAL at 08:28

## 2020-09-29 RX ADMIN — CARVEDILOL 3.12 MG: 3.12 TABLET, FILM COATED ORAL at 08:28

## 2020-09-29 RX ADMIN — GABAPENTIN 100 MG: 100 CAPSULE ORAL at 08:28

## 2020-09-29 RX ADMIN — INSULIN LISPRO 3 UNITS: 100 INJECTION, SOLUTION INTRAVENOUS; SUBCUTANEOUS at 12:02

## 2020-09-29 RX ADMIN — TICAGRELOR 90 MG: 90 TABLET ORAL at 08:30

## 2020-09-29 RX ADMIN — ATORVASTATIN CALCIUM 40 MG: 40 TABLET, FILM COATED ORAL at 08:29

## 2020-09-29 RX ADMIN — DOCUSATE SODIUM 100 MG: 100 CAPSULE, LIQUID FILLED ORAL at 08:28

## 2020-09-29 RX ADMIN — MAGNESIUM OXIDE 400 MG: 400 TABLET ORAL at 08:28

## 2020-09-29 RX ADMIN — FUROSEMIDE 40 MG: 40 TABLET ORAL at 08:27

## 2020-09-29 RX ADMIN — AMIODARONE HYDROCHLORIDE 200 MG: 200 TABLET ORAL at 08:28

## 2020-09-29 RX ADMIN — MAGNESIUM SULFATE 2 G: 2 INJECTION INTRAVENOUS at 08:30

## 2020-09-29 RX ADMIN — POLYETHYLENE GLYCOL 3350 17 G: 17 POWDER, FOR SOLUTION ORAL at 08:27

## 2020-09-29 NOTE — ROUTINE PROCESS
Bedside shift change report given to florentino urban (oncoming nurse) by Gilberto Son nurse). Report included the following information SBAR.

## 2020-09-29 NOTE — DISCHARGE INSTRUCTIONS
Monitor daily weights   Fluid restrict to 1L per day  Cardiac / diabetic diet   Patient Education        Fluid Restriction: Care Instructions  Your Care Instructions     A buildup of fluid in the body can cause low sodium levels in the blood. It may also cause symptoms such as swelling and pain. Your doctor may suggest that you limit liquids, including foods that contain a lot of liquid. Limiting liquids is called fluid restriction. Keeping track of the amount of fluids you take in may help you feel better. Your doctor will tell you how much fluid you can have in a day. Follow-up care is a key part of your treatment and safety. Be sure to make and go to all appointments, and call your doctor if you are having problems. It's also a good idea to know your test results and keep a list of the medicines you take. How can you care for yourself at home? · Find a way of tracking the fluids you take in that works for you. Here are two methods you can try:  ? Write down how much you drink throughout the day. ? Keep a container filled with the amount of liquid allowed for the day. As you drink liquids during the day, such as a 6-ounce cup of coffee, pour that same amount out of the container. When the container is empty, you've had your liquid for the day. · Count any foods that will melt (such as ice cream, gelatin, or flavored ice treats) or liquid foods (such as soup) as part of your fluids for the day. Also count the liquid in canned fruits and vegetables as part of your daily intake, or drain them well before serving. · Space your liquids throughout the day. Then you won't be tempted to drink more than the amount your doctor recommends. · To relieve thirst without taking in extra water, try chewing gum, sucking on hard candy (sugarless if you have diabetes), or rinsing your mouth with water and spitting it out. Where can you learn more?   Go to http://www.gray.com/  Enter F192 in the search box to learn more about \"Fluid Restriction: Care Instructions. \"  Current as of: December 16, 2019               Content Version: 12.6  © 6520-7267 [employer]. Care instructions adapted under license by [employer] (which disclaims liability or warranty for this information). If you have questions about a medical condition or this instruction, always ask your healthcare professional. [address] any warranty or liability for your use of this information. Patient Education        Avoiding Triggers With Heart Failure: Care Instructions  Your Care Instructions     Triggers are anything that make your heart failure flare up. A flare-up is also called \"sudden heart failure\" or \"acute heart failure. \" When you have a flare-up, fluid builds up in your lungs, and you have problems breathing. You might need to go to the hospital. By watching for changes in your condition and avoiding triggers, you can prevent heart failure flare-ups. Follow-up care is a key part of your treatment and safety. Be sure to make and go to all appointments, and call your doctor if you are having problems. It's also a good idea to know your test results and keep a list of the medicines you take. How can you care for yourself at home? Watch for changes in your weight and condition  · Weigh yourself without clothing at the same time each day. Record your weight. Call your doctor if you have sudden weight gain, such as more than 2 to 3 pounds in a day or 5 pounds in a week. (Your doctor may suggest a different range of weight gain.) A sudden weight gain may mean that your heart failure is getting worse. · Keep a daily record of your symptoms. Write down any changes in how you feel, such as new shortness of breath, cough, or problems eating.  Also record if your ankles are more swollen than usual and if you feel more tired than usual. Note anything that you ate or did that could have triggered these changes. Limit sodium  Sodium causes your body to hold on to extra water. This may cause your heart failure symptoms to get worse. People get most of their sodium from processed foods. Fast food and restaurant meals also tend to be very high in sodium. · Your doctor may suggest that you limit sodium. Your doctor can tell you how much sodium is right for you. This includes limiting sodium in cooked and packaged foods. · Read food labels on cans and food packages. They tell you how much sodium you get in one serving. Check the serving size. If you eat more than one serving, you are getting more sodium. · Be aware that sodium can come in forms other than salt, including monosodium glutamate (MSG), sodium citrate, and sodium bicarbonate (baking soda). MSG is often added to Asian food. You can sometimes ask for food without MSG or salt. · Slowly reducing salt will help you adjust to the taste. Take the salt shaker off the table. · Flavor your food with garlic, lemon juice, onion, vinegar, herbs, and spices instead of salt. Do not use soy sauce, steak sauce, onion salt, garlic salt, mustard, or ketchup on your food, unless it is labeled \"low-sodium\" or \"low-salt. \"  · Make your own salad dressings, sauces, and ketchup without adding salt. · Use fresh or frozen ingredients, instead of canned ones, whenever you can. Choose low-sodium canned goods. · Eat less processed food and food from restaurants, including fast food. Exercise as directed  Moderate, regular exercise is very good for your heart. It improves your blood flow and helps control your weight. But too much exercise can stress your heart and cause a heart failure flare-up. · Check with your doctor before you start an exercise program.  · Walking is an easy way to get exercise. Start out slowly. Gradually increase the length and pace of your walk. Swimming, riding a bike, and using a treadmill are also good forms of exercise.   · When you exercise, watch for signs that your heart is working too hard. You are pushing yourself too hard if you cannot talk while you are exercising. If you become short of breath or dizzy or have chest pain, stop, sit down, and rest.  · Do not exercise when you do not feel well. Take medicines correctly  · Take your medicines exactly as prescribed. Call your doctor if you think you are having a problem with your medicine. · Make a list of all the medicines you take. Include those prescribed to you by other doctors and any over-the-counter medicines, vitamins, or supplements you take. Take this list with you when you go to any doctor. · Take your medicines at the same time every day. It may help you to post a list of all the medicines you take every day and what time of day you take them. · Make taking your medicine as simple as you can. Plan times to take your medicines when you are doing other things, such as eating a meal or getting ready for bed. This will make it easier to remember to take your medicines. · Get organized. Use helpful tools, such as daily or weekly pill containers. When should you call for help? Call 911 if you have symptoms of sudden heart failure such as:    · You have severe trouble breathing.     · You cough up pink, foamy mucus.     · You have a new irregular or rapid heartbeat. Call your doctor now or seek immediate medical care if:    · You have new or increased shortness of breath.     · You are dizzy or lightheaded, or you feel like you may faint.     · You have sudden weight gain, such as more than 2 to 3 pounds in a day or 5 pounds in a week. (Your doctor may suggest a different range of weight gain.)     · You have increased swelling in your legs, ankles, or feet.     · You are suddenly so tired or weak that you cannot do your usual activities. Watch closely for changes in your health, and be sure to contact your doctor if you develop new symptoms. Where can you learn more?   Go to http://www.gray.com/  Enter V089 in the search box to learn more about \"Avoiding Triggers With Heart Failure: Care Instructions. \"  Current as of: December 16, 2019               Content Version: 12.6  © 1774-5955 [employer]. Care instructions adapted under license by [employer] (which disclaims liability or warranty for this information). If you have questions about a medical condition or this instruction, always ask your healthcare professional. [address] any warranty or liability for your use of this information. Patient Education        Medicines for Heart Failure: Care Instructions  Your Care Instructions     Most people with heart failure are helped by taking several medicines to protect their heart. These medicines can help you feel better and live longer. It is important to take all your medicines exactly as prescribed to get the best results. They can also cause side effects. If you think that any of your medicines are causing side effects, talk with your doctor. Follow-up care is a key part of your treatment and safety. Be sure to make and go to all appointments. Call your doctor if you are having problems. It's also a good idea to know your test results and keep a list of the medicines you take. What medicines are used for heart failure? · Aldosterone receptor antagonists. These are a type of diuretic. They make the kidneys get rid of extra fluid. · Angiotensin-converting enzyme (ACE) inhibitors help blood flow. They relax the blood vessels and lower your blood pressure. The heart can then pump more blood through your body without working harder. These include benazepril and lisinopril. · Angiotensin II receptor blockers (ARBs) work like ACE inhibitors. Some people use them instead. They include losartan. · Angiotensin receptor neprilysin inhibitor (ARNI) medicine works like ARBs and ACE inhibitors.  Some people take an ARNI medicine instead. An example is sacubitril/valsartan. · Beta-blockers can slow the heart rate and decrease blood pressure. They can help heart failure. They include bisoprolol, carvedilol, and metoprolol. · Digoxin relieves symptoms in some people with heart failure. · Diuretics reduce swelling. They do this by helping the kidneys get rid of excess fluid. They are also called water pills. · Hydralazine. This may be taken with a nitrate to widen blood vessels. It can lower blood pressure and reduce the workload on the heart. · Ivabradine slows the heart rate. What should you know about these medicines? This is not a complete list of medicines used for heart failure. If you have questions about your medicine, ask your doctor or pharmacist.  ACE inhibitors  Before you start to take an ACE inhibitor, talk to your doctor. Tell him or her if:  · You take any anti-inflammatory medicines. These include ibuprofen (Advil, Motrin) and naproxen (Aleve). · You take antacids, potassium pills or a salt substitute, or lithium. · You take a diuretic. · You are pregnant or breastfeeding or you plan to get pregnant. · You have kidney disease. Side effects include:  · A dry cough. If the cough is bad enough to make you stop the medicine, talk to your doctor. You may need to take a different one. · Feeling lightheaded. This may happen when you stand up too fast. It usually gets better with time. Angiotensin II receptor blockers (ARBs)  Before you start to take an ARB, talk to your doctor. Tell him or her if:  · You take any anti-inflammatory medicines. These include ibuprofen (Advil, Motrin) and naproxen (Aleve). · You take antacids, potassium pills or a salt substitute, or lithium. · You have kidney disease. · You take a diuretic. · You are pregnant or breastfeeding or you plan to get pregnant. Side effects include:  · Feeling lightheaded or dizzy. These are the most common side effects.   Angiotensin receptor neprilysin inhibitor (ARNI)  Before you start to take an ARNI, talk to your doctor. Tell him or her if:  · You take any anti-inflammatory medicines. These include ibuprofen (Advil, Motrin) and naproxen (Aleve). · You take antacids, potassium pills or a salt substitute, or lithium. · You take an ACE inhibitor or an ARB. · You have kidney or liver problems. · You take a diuretic. · You are pregnant or breastfeeding or you plan to get pregnant. Side effects include:  · Feeling lightheaded or dizzy. These are the most common side effects. Diuretics (water pills)  Before you start to take a diuretic, talk to your doctor. Tell him or her if:  · You take lithium or anti-inflammatory medicines such as Advil or Aleve. Side effects include:  · Urinating often. Ask your doctor about timing these pills so that you don't have to use the bathroom at a bad time. · Muscle cramps. This may mean that you are losing too much potassium. It is an important mineral. Call your doctor if you get muscle cramps. · Tender breasts. This may occur in men who take spironolactone. Call your doctor if you get tender breasts that bother you. Beta-blockers  Before you start to take a beta-blocker, talk to your doctor. Tell him or her if:  · You have asthma or diabetes. Side effects include:  · Feeling dizzy or tired. This usually gets better with time. Digoxin  Before you start to take digoxin, talk to your doctor. Tell him or her if:  · You have kidney disease. · You take a diuretic or other medicines. This includes over-the-counter medicines. Side effects include:  · Nausea or vomiting. · Confusion, changes in vision, a racing or slowed heartbeat, or dizziness. Call your doctor right away if you have any of these side effects. Where can you learn more? Go to http://www.gray.com/  Enter F132 in the search box to learn more about \"Medicines for Heart Failure: Care Instructions. \"  Current as of: December 16, 2019               Content Version: 12.6  © 4758-7796 [employer]. Care instructions adapted under license by [employer] (which disclaims liability or warranty for this information). If you have questions about a medical condition or this instruction, always ask your healthcare professional. [address] any warranty or liability for your use of this information. Patient Education        Medicines to Avoid With Heart Failure: Care Instructions  Your Care Instructions     Your doctor gave you medicines to help treat your heart failure. But did you know that many other medicines can make heart failure worse? Even medicines and herbs that you buy over the counter (OTC) can harm you. Be sure your doctor knows all of the OTC and prescription drugs you take. And don't start to take any medicine unless your doctor says it's okay. Follow-up care is a key part of your treatment and safety. Be sure to make and go to all appointments, and call your doctor if you are having problems. It's also a good idea to know your test results and keep a list of the medicines you take. How can you care for yourself at home? Over-the-counter drugs  · Before you take any over-the-counter drug, ask your doctor or pharmacist if it's safe. This includes herbs and vitamins. Medicines to avoid include:  ? Pain relievers called NSAIDs. These include ibuprofen and naproxen. Use acetaminophen instead. For example, you can take Tylenol for pain or fever. ? Low-dose aspirin. If your doctor has told you to take aspirin every day for your heart, follow the doctor's instructions on how much to take. Don't take aspirin for pain. ? Antacids or laxatives. Don't take ones that have sodium in them. These include [place]. ? Cold, cough, flu, or sinus medicines. Read the label. Don't take ones that have pseudoephedrine, ephedrine, phenylephrine, or oxymetazoline in them.  And make sure they don't have aspirin or ibuprofen in them. Watch for all of these in allergy medicines, nose sprays, and herbal products too.  ? Supplements and vitamins. These include black cohosh, [place]'s wort, and vitamin E.  Prescription drugs  · Each time you see a doctor, make sure that your doctor knows that you take drugs for heart failure. Before you fill any new prescription, ask the pharmacist if it's okay to take the new drug. Drugs that can make heart failure worse include:  ? Calcium channel blockers. These include nifedipine. If you need to take this type of drug for another health problem, your doctor will closely watch your health. ? Heart rhythm drugs. These include disopyramide and flecainide. These can treat a fast or uneven heart rhythm. ? Prescription NSAIDs. These include celecoxib (Celebrex) and diclofenac.  ? Certain medicines for diabetes. These include pioglitazone, rosiglitazone, and saxagliptin. Where can you learn more? Go to http://www.gray.com/  Enter V390 in the search box to learn more about \"Medicines to Avoid With Heart Failure: Care Instructions. \"  Current as of: December 16, 2019               Content Version: 12.6  © 5682-1268 [employer]. Care instructions adapted under license by [employer] (which disclaims liability or warranty for this information). If you have questions about a medical condition or this instruction, always ask your healthcare professional. [place] [address] any warranty or liability for your use of this information. Patient Education        Learning About Restricting Fluids When You Have Heart Failure  Why is it important to limit fluids when you have heart failure? With heart failure, having too much fluid in your body can lower sodium levels in the blood. It can also cause symptoms such as swelling.  Limiting fluids, if your doctor tells you to, can help balance your body's sodium level. It may also help you feel better. How can you care for yourself at home? · Find a way of tracking the fluids you take in that works for you. Here are two methods you can try:  ? Write down how much you drink throughout the day. ? Keep a container filled with the amount of liquid allowed for the day. As you drink liquids during the day, such as a 6-ounce cup of coffee, pour that same amount out of the container. When the container is empty, you've had your liquid for the day. · Count any foods that will melt (such as ice cream, gelatin, or flavored ice treats) or liquid foods (such as soup) as part of your fluids for the day. Also count the liquid in canned fruits and vegetables as part of your daily intake, or drain them well before serving. · Space your liquids throughout the day. Then you won't be tempted to drink more than the amount your doctor recommends. · To relieve thirst without taking in extra water, try chewing gum, sucking on hard candy (sugarless if you have diabetes), or rinsing your mouth with water and spitting it out. Where can you learn more? Go to http://www.gray.com/  Enter F350 in the search box to learn more about \"Learning About Restricting Fluids When You Have Heart Failure. \"  Current as of: December 16, 2019               Content Version: 12.6  © 5929-0967 [employer]. Care instructions adapted under license by [employer] (which disclaims liability or warranty for this information). If you have questions about a medical condition or this instruction, always ask your healthcare professional. [place] [address] any warranty or liability for your use of this information. Patient Education        Limiting Sodium With Heart Failure: Care Instructions  Your Care Instructions     Sodium causes your body to hold on to extra water. This may cause your heart failure symptoms to get worse.  Limiting sodium may help you feel better. People get most of their sodium from processed foods. Fast food and restaurant meals also tend to be very high in sodium. Your doctor may suggest that you limit sodium. Your doctor can tell you how much sodium is right for you. An example is less than 3,000 mg a day. This includes all the salt you eat in cooked or packaged foods. Follow-up care is a key part of your treatment and safety. Be sure to make and go to all appointments, and call your doctor if you are having problems. It's also a good idea to know your test results and keep a list of the medicines you take. How can you care for yourself at home? Read food labels  · Read food labels on cans and food packages. The labels tell you how much sodium is in each serving. Make sure that you look at the serving size. If you eat more than the serving size, you have eaten more sodium than is listed for one serving. · Food labels also tell you the Percent Daily Value for sodium. Choose products with low Percent Daily Values for sodium. · Be aware that sodium can come in forms other than salt, including monosodium glutamate (MSG), sodium citrate, and sodium bicarbonate (baking soda). MSG is often added to Asian food. You can sometimes ask for food without MSG or salt. Buy low-sodium foods  · Buy foods that are labeled \"unsalted\" (no salt added), \"sodium-free\" (less than 5 mg of sodium per serving), or \"low-sodium\" (less than 140 mg of sodium per serving). A food labeled \"light sodium\" has less than half of the full-sodium version of that food. Foods labeled \"reduced-sodium\" may still have too much sodium. · Buy fresh vegetables or plain, frozen vegetables. Buy low-sodium versions of canned vegetables, soups, and other canned goods. Prepare low-sodium meals  · Use less salt each day when cooking. Reducing salt in this way will help you adjust to the taste. Do not add salt after cooking. Take the salt shaker off the table.   · Flavor your food with garlic, lemon juice, onion, vinegar, herbs, and spices instead of salt. Do not use soy sauce, steak sauce, onion salt, garlic salt, mustard, or ketchup on your food. · Make your own salad dressings, sauces, and ketchup without adding salt. · Use less salt (or none) when recipes call for it. You can often use half the salt a recipe calls for without losing flavor. Other dishes like rice, pasta, and grains do not need added salt. · Rinse canned vegetables. This removes some--but not all--of the salt. · Avoid water that has a naturally high sodium content or that has been treated with water softeners, which add sodium. Call your local water company to find out the sodium content of your water supply. If you buy bottled water, read the label and choose a sodium-free brand. Avoid high-sodium foods, such as:  · Smoked, cured, salted, and canned meat, fish, and poultry. · Ham, emmanuel, hot dogs, and luncheon meats. · Regular, hard, and processed cheese and regular peanut butter. · Crackers with salted tops. · Frozen prepared meals. · Canned and dried soups, broths, and bouillon, unless labeled sodium-free or low-sodium. · Canned vegetables, unless labeled sodium-free or low-sodium. · Salted snack foods such as chips and pretzels. · [place] fries, pizza, tacos, and other fast foods. · Pickles, olives, ketchup, and other condiments, especially soy sauce, unless labeled sodium-free or low-sodium. If you cannot cook for yourself  · Have family members or friends help you, or have someone cook low-sodium meals. · Check with your local senior nutrition program to find out where meals are served and whether they offer a low-sodium option. You can often find these programs through your local health department or hospital.  · Have meals delivered to your home. Most [facility] have a Meals on [employer]. These programs provide one hot meal a day for older adults, delivered to their homes.  Ask whether these meals are low-sodium. Let them know that you are on a low-sodium diet. Where can you learn more? Go to http://www.gray.com/  Enter A166 in the search box to learn more about \"Limiting Sodium With Heart Failure: Care Instructions. \"  Current as of: December 16, 2019               Content Version: 12.6  © 5567-8199 [address]. Care instructions adapted under license by [employer] (which disclaims liability or warranty for this information). If you have questions about a medical condition or this instruction, always ask your healthcare professional. [address] any warranty or liability for your use of this information.

## 2020-09-29 NOTE — PROGRESS NOTES
Progress Note    Patient: Alan Aguero MRN: [id number]  SSN: [id number]    YOB: 1950  Age: 65 y.o. Sex: female      Admit Date: 9/22/2020    LOS: 4 days     Subjective: This is a 65year old female that is followed for Acute on Chronic HFrEF. She has diuresed well and renal function has improved. She denies chest pain, shortness of breath, palpitations, headache, dizziness. She reports feeling better this am after enema yesterday and multiple bowel movements. Telemetry Review: paced rhythm, frequent PVC overnight and this am.     Review of Symptoms:   Reports: dyspnea on exertion    Denies chest pain, palpitations, headache, dizziness, constipation, abdominal distention      Objective:     Vitals:    09/28/20 1942 09/28/20 2326 09/29/20 0758 09/29/20 0825   BP: 103/66 (!) 108/53 (!) 113/48    Pulse: 72 78 81    Resp: 17 18 22    Temp: 97.7 °F (36.5 °C) 97.6 °F (36.4 °C) 96.9 °F (36.1 °C)    SpO2: 100% 96% 97%    Weight:    90.9 kg (200 lb 4.8 oz)   Height:            Intake and Output:  Current Shift: 09/29 0701 - 09/29 1900  In: -   Out: 1001 [Urine:1000]  Last three shifts: 09/27 1901 - 09/29 0700  In: 630 [P.O.:580; I.V.:50]  Out: 3750 [Urine:3750]    Physical Exam:     General: Well nourished chronically ill appearing white female lying in bed, NAD, A&O  HEENT: Normocephalic, PERRL, no drainage  Neck: Supple, Trachea midline, No JVD  RESP: CTA bilaterally, symmetrical chest movement. No SOB or distress. On RA  Cardiovascular: RRR, no RG. + murmur. + device. PVS: No rubor, cyanosis, no edema, Radial, DP, PT pulses equal bilaterally  ABD: obese, distended, soft NT, Normoactive BS  Derm: Warm/Dry/Intact with no lesions  Neuro: A&O PPTS, cranial nerves II- XII grossly intact via interaction with patient. Does have noted expressive aphasia (residual from CVA).    PSYCH: No anxiety or agitation    Lab/Data Review:  BMP:   Lab Results   Component Value Date/Time     09/29/2020 05:24 AM    K 4.0 09/29/2020 05:24 AM     09/29/2020 05:24 AM    CO2 30 09/29/2020 05:24 AM    AGAP 5 09/29/2020 05:24 AM     (H) 09/29/2020 05:24 AM    BUN 30 (H) 09/29/2020 05:24 AM    CREA 1.11 (H) 09/29/2020 05:24 AM    GFRAA 59 (L) 09/29/2020 05:24 AM    GFRNA 48 (L) 09/29/2020 05:24 AM        Echo Results  (Last 48 hours)    None                Current Facility-Administered Medications:     furosemide (LASIX) tablet 40 mg, 40 mg, Oral, ACB&D, Alberto Roberts MD, 40 mg at 09/29/20 0827    senna (SENOKOT) tablet 17.2 mg, 2 Tab, Oral, QHS, Alberto Roberts MD, 17.2 mg at 09/28/20 2048    polyethylene glycol (MIRALAX) packet 17 g, 17 g, Oral, DAILY, Alberto Roberts MD, 17 g at 09/29/20 0827    docusate sodium (COLACE) capsule 100 mg, 100 mg, Oral, BID, Alberto Roberts MD, 100 mg at 09/29/20 0828    insulin lispro (HUMALOG) injection, , SubCUTAneous, AC&HS, Mango Madera MD, Stopped at 09/28/20 2200    glucose chewable tablet 16 g, 4 Tab, Oral, PRN, Mango Madera MD    dextrose (D50W) injection syrg 12.5-25 g, 12.5-25 g, IntraVENous, PRN, Mango Madera MD    glucagon (GLUCAGEN) injection 1 mg, 1 mg, IntraMUSCular, PRN, Mango Madera MD    benzonatate (TESSALON) capsule 100 mg, 100 mg, Oral, TID PRN, Michell SUAREZ NP, 100 mg at 09/25/20 1401    acetaminophen (TYLENOL) tablet 500 mg, 500 mg, Oral, Q6H PRN, Oba Oluwabunmi S, NP    amiodarone (CORDARONE) tablet 200 mg, 200 mg, Oral, DAILY, ObaVitoruwabunmi S, NP, 200 mg at 09/29/20 0828    aspirin delayed-release tablet 81 mg, 81 mg, Oral, DAILY, Oba, Oluwabunmi S, NP, 81 mg at 09/29/20 0828    atorvastatin (LIPITOR) tablet 40 mg, 40 mg, Oral, DAILY, Oba, Oluwabunmi S, NP, 40 mg at 09/29/20 0829    carvediloL (COREG) tablet 3.125 mg, 3.125 mg, Oral, BID WITH MEALS, Oba, Oluwabunmi S, NP, 3.125 mg at 09/29/20 0509    gabapentin (NEURONTIN) capsule 100 mg, 100 mg, Oral, TID, Oba, Oluwabunmi S, NP, 100 mg at 09/29/20 0828    levothyroxine (SYNTHROID) tablet 200 mcg, 200 mcg, Oral, ACB, Oba, Oluwabunmi S, NP, 200 mcg at 09/29/20 0828    magnesium oxide (MAG-OX) tablet 400 mg, 400 mg, Oral, BID, Oba, Oluwabunmi S, NP, 400 mg at 09/29/20 0828    melatonin tablet 5 mg, 5 mg, Oral, QHS PRN, Oba, [place], NP    ticagrelor (BRILINTA) tablet 90 mg, 90 mg, Oral, BID, Oba, Oluwabunmi S, NP, 90 mg at 09/29/20 0830    polyethylene glycol (MIRALAX) packet 17 g, 17 g, Oral, PRN, Oba, Oluwabunmi S, NP, 17 g at 09/27/20 0831    multivitamin (ONE A DAY) tablet 1 Tab, 1 Tab, Oral, DAILY, Alberto Roberts MD, 1 Tab at 09/29/20 0828    sodium chloride (NS) flush 5-40 mL, 5-40 mL, IntraVENous, Q8H, Oba, Oluwabunmi S, NP, 10 mL at 09/28/20 2104    acetaminophen (TYLENOL) tablet 650 mg, 650 mg, Oral, Q6H PRN **OR** acetaminophen (TYLENOL) suppository 650 mg, 650 mg, Rectal, Q6H PRN, Oba, Oluwabunmi S, NP    bisacodyL (DULCOLAX) tablet 5 mg, 5 mg, Oral, DAILY PRN, Oba, Oluwabunmi S, NP, 5 mg at 09/27/20 0830    ondansetron (ZOFRAN ODT) tablet 4 mg, 4 mg, Oral, Q8H PRN **OR** ondansetron (ZOFRAN) injection 4 mg, 4 mg, IntraVENous, Q6H PRN, Oba, Oluwabunmi S, NP, 4 mg at 09/25/20 1618      Assessment:     Active Problems:    CHF exacerbation (HCC) (9/22/2020)      CHF (congestive heart failure) (HCC) (9/25/2020)      Hyperkalemia (9/26/2020)      Hypomagnesemia (9/28/2020)        Plan:     Case discussed with Collaborating physician Dr. Juan Guevara and our recommendations are as follows:     1. Acute on Chronic HFrEF: IVC 9/24 dilated and did not collapse. Was on IV diuresis with 40mg IVP TID and gave 1 dose of Metolazone 2.5mg PO  9/24, however had rise in creatinine, so diuresis was held 9/25 and 9/26, restarted on PO 9/27 and patient has tolerated well. Continue 40mg PO BID Lasix (But plan for transition to Bumex 1mg PO BID @ discharge). Appreciate Nephrology input at this time in regards to diuresis.   Continue telemetry, strict I/O, daily weights. Patient to follow-up next week if discharged today. Will need repeat labs at that time as well. 2. CAD: recent PCI to proximal LAD 9/10. Please continue ASA/Brilinta DAPT, statin, BB. To start cardiac rehab in upcoming weeks. 3. HTN - BP borderline. continue current regimen and continue to monitor. 4. Mixed Ischemic Cardiomyopathy - NYHA IIIC, EF minimally improved on prelim echo, however patient remains overtly hypervolemic, so not accurate at this time. Nephrology input appreciated. Patient does have high PVC load and frequently has Trigeminy which decreases her CRT %. Please continue Amiodarone she was started on by Dr. Yousif Kaiser as this seems to be helping. Continue telemetry. Keep electrolytes at goal.   5. CORIN -likely s/t diuresis. Appreciate nephrology input. Will follow their recommendations. 6. Hypokalemia - replete to goal 4.5 -5. Repeat labs in am.   7. Hypomagnesemia - replete to goal >2. Repeat labs in am.       Thank you for involving us in the care of this patient. Please do not hesitate to call if additional questions arise.  If after hours please call [phone]    Signed By: Kylah Khan NP     September 29, 2020

## 2020-09-29 NOTE — DISCHARGE SUMMARY
Physician Discharge Summary       Patient: Brenda Greer MRN: [id number]     YOB: 1950  Age: 65 y.o. Sex: female    PCP: Mariama Courtney MD    Allergies: Codeine; Novocain [procaine]; and Tramadol    Admit date: 9/22/2020  Admitting Provider: Mary Cameron MD    Discharge date: 9/29/2020  Discharging Provider: Jazmyn Mercedes MD    * Admission Diagnoses:   CHF exacerbation ([facility] [I50.9]  CHF (congestive heart failure) ([facility] [I50.9]      * Discharge Diagnoses:    Hospital Problems as of 9/29/2020 Date Reviewed: 9/22/2020          Codes Class Noted - Resolved POA    Hypomagnesemia ICD-10-CM: E83.42  ICD-9-CM: 275.2  9/28/2020 - Present Unknown        Hyperkalemia ICD-10-CM: E87.5  ICD-9-CM: 276.7  9/26/2020 - Present Unknown        CHF (congestive heart failure) ([facility] ICD-10-CM: I50.9  ICD-9-CM: 428.0  9/25/2020 - Present Unknown        CHF exacerbation ([facility] ICD-10-CM: I50.9  ICD-9-CM: 428.0  9/22/2020 - Present Unknown                * Hospital Course:   Brenda Greer is a 65 y.o. female  significant for Non-Ischemic Cardiomyopathy, HFrEF (EF 20%), HTN, HLP, GERD, DM-II, Biventricular ICD, Fibromyalgia, and a recent CAD/Stent, who presented to the emergency department today as referred by her cardiologist for IV Diuresis in context of dyspnea and CHF exacerbation. Patient recently underwent pacemaker interrogation yesterday 9/21 by Dr Michel Sandoval, she was then followed up by Abdulaziz Weinstein NP who referred her for hospital admission for diuresis due to longstanding dyspnea. ED work-up revealed a BNP of 2738, trop 0.22, CXR consistent with pulmonary venous cephalization, without edema. At the time of my visit, patient seems to be in no respiratory distress, with O2sats 95-96%RA. She denies chest pain, chest pressure, palpitations, difficulty breathing, fever or chills.  She endorsed Shortness of breath on exertion/with activity which has been the same in the last several days to weeks, sleeps with 1-2  Pillows at night, and still with trace peripheral edema in BLE. Hospitalist service was consulted for inpatient admission. Acute on Chronic Systolic Congestive Heart Failure:  -Admit to observation, and monitor on telemetry.  -Lasix 20mg IV q 6 hrs, for gentle diuresis, and was increased to 40 mg IV 3 times daily on 9/23 and metazone was added on 9/24  -CXR consistent with pulmonary venous cephalization, without pulmonary edema, with O2sats have been 94-96% on RA. .-Echo 8/12/20-EF 20-25%, dilated LV, with concentric hypertrophy.  -Continue BB-Carvedilol per home dose.  -Na restricted diet/1200ml Fluid restriction. Strict I&O, daily weight.  -Appreciate cardiology input for further recommendations. -patient has been counseled on medication and dietary compliance. - 9/27: lasix on hold on 9/25 and 9/26 and improved creat to 1.52 so will restart lasix at 40mg IV bid.   - 9/28 tolerating lasix and changed to po bid dosing.    - 9/29 on discharge patient will be changed from lasix bid to equivalent dose of bumex 1mg oral BID, continue to encourage daily weights and fluid restriction to 1L. If >3lb weight gain in 24 hrs or >5lb in one week then take additional dose of bumex and contact cardiologist.     Acute kidney injury  -suspect prerenal, in context of poor perfusion and/or possibly dehydration from chronic diuresis.   -Cr 1.15, Baseline is around 0.8.   -Avoid nephrotoxic agents, renally dose medications, and consider nephrology consult as needed. - creat increased to 1.81 on 9/25 and similar range on 9/26 at 1.8 after TID lasix 40mg and metalazone was started so diruesis on hold   - 9/27 creat now improved to 1.52 so restarted lasix dosing at 40mg iv bid   - 9/28: creat improved to 1.18 and tolerating 40mg bid dosingl avoid hypotension   - 9/29: has kidney fxn return to baseline.  Should continue to monitor as outpatient since changing patient to bumex dosing.      Hyperkalemia:  - has been on upward trend and currently on 9/26 is at 6.7  - stop kcl supplmenetation and give dose of kayexalate. - repeat bmp at 5pm on 9/26 had K+ at 4.1 and on 9/27 similar range at 4.2   - 9/28 K+ level now low at 3.0 and supplmented with 40meq kcl oral q4hrs x 4 doses   - continue to monitor closely on tele.      Hypokalemia:  -   was down to 1.3 and 2gm IV mag sulfate rider x 2 doses given. - continue po supplementation   - patient K+ level stable and will continue home dose of daily KCL        CAD/Stents  -Recent [facility] with stents placed to LAD \"last week\". ED work up shows a Troponin leak of 0.22, ECG unremarkable.   -Continue Brilinta. ID as well as aspirin      Diabetes Mellitus type 2:  -Chronic condition.  -Continue Accucheck ACHS,   - patient metformin and 75/25 28 units sq BID AC have been on hold. Glucose was 166 on AM of 9/27 and 211 for lunch. Will continue to monitor closely   - on discharge patient glucose levels have been stable and will hold patient insulin dosing but restart metformin bid therapy.      Constipation:  - patient notes no bowel movement since last week   - was given biscoydal 5mg dose x 1 on 9/27  - 9/27 start colace 100mg oral bid and miralax 17 gm oral daily first dose now. - 9/28: continues to have no bowel movement and will give senna 2 tabs bid first dose now and if no results will give soap suds enema. - patient finally had bowel movement on 9/29 pm and additional bowel movement this AM. Will continue patient on her home regimen and colace bid.      Biventricular ICD in situ:  -Chronic condition. Stable.      PVCs  -Recently started on Amiodarone by cardiologist, which we will continue while inpatient. * Procedures:   * No surgery found *        Consults:   Cardiology Consult:  Case discussed with collaborating physician Dr. Kirk Ye and our impression and recommendations are as follows:   1. Acute on Chronic HFrEFE: patient currently decompensated.   Attempt IV diuresis with 40mg IVP TID today, replete electrolytes aggressively and repeat labs in am.  If does not make significant progress with output overnight and IVC dilated on limited TTE in then will consider dose of Metolazone in morning 30 minutes prior to Lasix dosing. Continue telemetry, strict I/O, daily weights. 2. CAD: recent PCI to proximal LAD 9/10. Please continue ASA/Brilinta DAPT, statin, BB   3. HTN - BP borderline. Monitor and leave room for diuresis. 4. Mixed Ischemic Cardiomyopathy - will repeat limited echo in am to see if EF has improved since PCI. Continue meds as above. Patient does have high PVC load and frequently has Trigeminy which decreases her CRT %. Please continue Amiodarone she was started on by Dr. Isaura Tabares. Continue telemetry. Keep electrolytes at goal.   5. Hypokalemia  - repletion ordered. Goal 4.5 -5. Repeat labs including magnesium level in am.         Thank you for involving us in the care of this patient. Please do not hesitate to call if additional questions arise. If after hours please call [phone]    Cardiology Follow Up 9/29/2020    Plan:      Case discussed with Collaborating physician Dr. April Sanchez and our recommendations are as follows:      6. Acute on Chronic HFrEF: IVC 9/24 dilated and did not collapse. Was on IV diuresis with 40mg IVP TID and gave 1 dose of Metolazone 2.5mg PO  9/24, however had rise in creatinine, so diuresis was held 9/25 and 9/26, restarted on PO 9/27 and patient has tolerated well. Continue 40mg PO BID Lasix (But plan for transition to Bumex 1mg PO BID @ discharge). Appreciate Nephrology input at this time in regards to diuresis.  Continue telemetry, strict I/O, daily weights. Patient to follow-up next week if discharged today. Will need repeat labs at that time as well. 7. CAD: recent PCI to proximal LAD 9/10. Please continue ASA/Brilinta DAPT, statin, BB. To start cardiac rehab in upcoming weeks.    8. HTN - BP borderline.  continue current regimen and continue to monitor.    9. Mixed Ischemic Cardiomyopathy - NYHA IIIC, EF minimally improved on prelim echo, however patient remains overtly hypervolemic, so not accurate at this time. Nephrology input appreciated. Skyler Cardenas does have high PVC load and frequently has Trigeminy which decreases her CRT %.  Please continue Amiodarone she was started on by Dr. Bere Bello as this seems to be helping. Continue telemetry. Keep electrolytes at goal.   10. CORIN -likely s/t diuresis. Appreciate nephrology input. Will follow their recommendations. 11. Hypokalemia - replete to goal 4.5 -5. Repeat labs in am.   12. Hypomagnesemia - replete to goal >2. Repeat labs in am.         Thank you for involving us in the care of this patient. Please do not hesitate to call if additional questions arise. If after hours please call [phone]       NEPHROLOGY Consult  :ASSESSMENT AND PLAN     # Oliguric CORIN  - Acute cardiorenal syndrome in the setting of diuretic use  - Patient's presenting creatinine of 0.98 may indicate underlying hyperfiltration or reduced GFR with apparently normal creatinine on account of very low muscle mass  - UA: 1.010/5/100 protein/small blood  - Recent inpatient BP in 945-672 systolic range     # CHF with reduced EF  Presents with 19 lb above target weight  On furosemide 80mg at home at baseline  Improved urine output with addition of metolazone     # Hypotension  - May indicate reduced effective arterial blood volume in the setting of diuretic use     Plan:   Can hold diuretic dosing for 1 day, can be restared at 40mg bid dose IV to be transitioned to PO  Obtain Cystatin-C if available  Continued fluid restriction at <1.2 liters/day  Reduce Neurontin while inpatient given risks of altered mentation if possible  Hold Oxybutynin while inpatient.      Nephrology follow UP 9/28/2020  Assessment//Plan   Active Problems:    CHF exacerbation ([facility] [facility] (9/22/2020)       CHF (congestive heart failure) ([address] [facility] (9/25/2020)    Hyperkalemia (9/26/2020)        Assessment & Plan      1. Non oliguric CORIN - resolved  2. CKD stage III - currently at baseline w/ SCR within 1.18 ranges  C/w to avoid nephrotoxins as much as possible  Avoid volume depletion  Strict I/O while in patient  Daily BMP for electrolyte monitoring     2. Hx of CHF - c/w fluid restriction <1L/d and furosemide 40mg PO BID   3. Secondary hyperparathyroidism - parameters within acceptable ranges. Monitor phosp levels periodically.      4. Hypokalemia - agree w/ KCl 40meq PO. New BMP post supplementation recommended.      5. HTN - c/w current management.      6. DM - glycemic control as per primary team     Vitals Last 24 Hours:  Patient Vitals for the past 24 hrs:   Temp Pulse Resp BP SpO2   09/29/20 1135 97.2 °F (36.2 °C) 74 22 (!) 105/59 94 %   09/29/20 0758 96.9 °F (36.1 °C) 81 22 (!) 113/48 97 %   09/28/20 2326 97.6 °F (36.4 °C) 78 18 (!) 108/53 96 %   09/28/20 1942 97.7 °F (36.5 °C) 72 17 103/66 100 %   09/28/20 1639 97.1 °F (36.2 °C) 73 22 (!) 102/57 99 %        Discharge Exam:  General: Alert, cooperative, no distress, appears stated age. Head:  Normocephalic, without obvious abnormality, atraumatic. Eyes:  Conjunctivae/corneas clear. Pupils equal, round, reactive to light. Extraocular movements intact. Lungs:  Clear to auscultation bilaterally. no wheeze, rales, crackles, rhonchi   Chest wall: No tenderness or deformity. Heart:  Regular rate and rhythm, S1, S2 normal, no murmur, click, rub or gallop. Abdomen:  Soft, non-tender. Bowel sounds normal. No masses,  No organomegaly. Extremities: Extremities normal, atraumatic, no cyanosis or edema. Pulses: 2+ and symmetric all extremities. Skin: Skin color, texture, turgor normal. No rashes or lesions  Neurologic: Awake, Alert, oriented.  No obvious gross sensory or motor deficits    Labs:  Recent Results (from the past 24 hour(s))   GLUCOSE, POC    Collection Time: 09/28/20 11:52 AM   Result Value Ref Range Glucose (POC) 212 (H) 65 - 100 mg/dL    Performed by Cici Escobedo, POC    Collection Time: 09/28/20  4:58 PM   Result Value Ref Range    Glucose (POC) 211 (H) 65 - 100 mg/dL    Performed by Sarah KIM, POC    Collection Time: 09/28/20  8:54 PM   Result Value Ref Range    Glucose (POC) 182 (H) 65 - 100 mg/dL    Performed by Ermias Agrawal, BASIC    Collection Time: 09/29/20  5:24 AM   Result Value Ref Range    Sodium 136 136 - 145 mmol/L    Potassium 4.0 3.5 - 5.1 mmol/L    Chloride 101 97 - 108 mmol/L    CO2 30 21 - 32 mmol/L    Anion gap 5 5 - 15 mmol/L    Glucose 143 (H) 65 - 100 mg/dL    BUN 30 (H) 6 - 20 mg/dL    Creatinine 1.11 (H) 0.55 - 1.02 mg/dL    BUN/Creatinine ratio 27 (H) 12 - 20      GFR est AA 59 (L) >60 ml/min/1.73m2    GFR est non-AA 48 (L) >60 ml/min/1.73m2    Calcium 9.0 8.5 - 10.1 mg/dL   MAGNESIUM    Collection Time: 09/29/20  5:24 AM   Result Value Ref Range    Magnesium 1.8 1.6 - 2.4 mg/dL   GLUCOSE, POC    Collection Time: 09/29/20  7:42 AM   Result Value Ref Range    Glucose (POC) 131 (H) 65 - 100 mg/dL    Performed by Leighton Peres    GLUCOSE, POC    Collection Time: 09/29/20 11:06 AM   Result Value Ref Range    Glucose (POC) 207 (H) 65 - 100 mg/dL    Performed by Leighton Peres      Results for Mauro Payne (MRN [id number]) as of 9/29/2020 11:25   Ref.  Range 9/22/2020 17:50 9/23/2020 07:01 9/24/2020 05:28 9/25/2020 05:22   WBC Latest Ref Range: 4.4 - 11.3 K/uL 5.5 4.7 4.7 5.8   NRBC Latest Units:  WBC 0.0 10.0 0.0 10.0   RBC Latest Ref Range: 4.50 - 5.90 M/uL 3.23 (L) 3.03 (L) 3.12 (L) 3.16 (L)   HGB Latest Ref Range: 13.5 - 17.5 % 9.7 (L) 8.9 (L) 9.4 (L) 9.4 (L)   HCT Latest Ref Range: 36 - 46 % 29.2 (L) 27.2 (L) 28.3 (L) 28.6 (L)   MCV Latest Ref Range: 80 - 100 FL 90.4 89.8 90.9 90.5   MCH Latest Ref Range: 31 - 34 PG 29.9 (L) 29.5 (L) 30.2 (L) 29.7 (L)   MCHC Latest Ref Range: 31.0 - 36.0 g/dL 33.1 32.9 33.2 32.8   RDW Latest Ref Range: 11.5 - 14.5 % 14.5 14.6 (H) 14.4 14.7 (H)   PLATELET Latest Units: K/uL 162 132 131 126   MPV Latest Ref Range: 6.5 - 11.5 FL 8.4 8.0 8.7 9.1   NEUTROPHILS Latest Ref Range: 42 - 75 % 61 64 59 68   LYMPHOCYTES Latest Ref Range: 20.5 - 51.1 % 26 23 27 18 (L)   MONOCYTES Latest Ref Range: 1.7 - 9.3 % 10 (H) 10 (H) 12 (H) 11 (H)   EOSINOPHILS Latest Ref Range: 0.9 - 2.9 % 2 2 2 2   BASOPHILS Latest Ref Range: 0.0 - 2.5 % 1 1 0 1   ABSOLUTE NRBC Latest Units: K/uL 0.00 0.00 0.00 0.01   ABS. NEUTROPHILS Latest Ref Range: 1.8 - 7.7 K/UL 3.4 3.0 2.8 4.0   ABS. LYMPHOCYTES Latest Ref Range: 1.0 - 4.8 K/UL 1.4 1.1 1.2 1.0   ABS. MONOCYTES Latest Ref Range: 0.2 - 2.4 K/UL 0.6 0.5 0.6 0.6   ABS. EOSINOPHILS Latest Ref Range: 0.0 - 0.7 K/UL 0.1 0.1 0.1 0.1   ABS. BASOPHILS Latest Ref Range: 0.0 - 0.2 K/UL 0.0 0.0 0.0 0.0     Results for Paula Alatorre (MRN [id number]) as of 9/29/2020 11:25   Ref.  Range 9/26/2020 05:21 9/26/2020 18:25 9/27/2020 06:25 9/28/2020 05:20 9/29/2020 05:24   Sodium Latest Ref Range: 136 - 145 mmol/L 135 (L) 137 138 138 136   Potassium Latest Ref Range: 3.5 - 5.1 mmol/L 6.7 (HH) 4.1 4.2 3.0 (L) 4.0   Chloride Latest Ref Range: 97 - 108 mmol/L 104 102 103 100 101   CO2 Latest Ref Range: 21 - 32 mmol/L 25 27 24 29 30   Anion gap Latest Ref Range: 5 - 15 mmol/L 6 8 11 9 5   Glucose Latest Ref Range: 65 - 100 mg/dL 124 (H) 184 (H) 166 (H) 118 (H) 143 (H)   BUN Latest Ref Range: 6 - 20 mg/dL 47 (H) 43 (H) 40 (H) 34 (H) 30 (H)   Creatinine Latest Ref Range: 0.55 - 1.02 mg/dL 1.80 (H) 1.52 (H) 1.25 (H) 1.18 (H) 1.11 (H)   BUN/Creatinine ratio Latest Ref Range: 12 - 20   26 (H) 28 (H) 32 (H) 29 (H) 27 (H)   Calcium Latest Ref Range: 8.5 - 10.1 mg/dL 9.3 9.1 9.4 9.1 9.0   Phosphorus Latest Ref Range: 2.6 - 4.7 mg/dL   4.2 4.2    Magnesium Latest Ref Range: 1.6 - 2.4 mg/dL    1.3 (L) 1.8   GFR est non-AA Latest Ref Range: >60 ml/min/1.73m2 28 (L) 34 (L) 42 (L) 45 (L) 48 (L)   GFR est AA Latest Ref Range: >60 ml/min/1.73m2 34 (L) 41 (L) 51 (L) 55 (L) 59 (L)   Albumin Latest Ref Range: 3.5 - 5.0 g/dL   2.7 (L) 2.6 (L)    Results for Gali Guaman (MRN [id number]) as of 9/29/2020 11:25   Ref. Range 9/26/2020 05:21 9/26/2020 18:25 9/27/2020 06:25 9/28/2020 05:20 9/29/2020 05:24   Glucose Latest Ref Range: 65 - 100 mg/dL 124 (H) 184 (H) 166 (H) 118 (H) 143 (H)     Imaging:  Xr Chest Port    Result Date: 9/22/2020  Impression: Evidence of chronic left-sided heart disease, without active edema demonstrated at this time. Atherosclerosis. * Discharge Condition: improved  * Disposition: Home w/Family and HH PT, OT, RN      Discharge Medications:  Current Discharge Medication List      START taking these medications    Details   docusate sodium (COLACE) 100 mg capsule Take 1 Cap by mouth two (2) times a day for 90 days. Qty: 60 Cap, Refills: 2      bumetanide (BUMEX) 1 mg tablet Take 1 Tab by mouth two (2) times daily (with meals). Qty: 60 Tab, Refills: 1         CONTINUE these medications which have NOT CHANGED    Details   ticagrelor (Brilinta) 90 mg tablet Take  by mouth two (2) times a day. carvediloL (Coreg) 3.125 mg tablet Take  by mouth two (2) times daily (with meals). multivitamin,tx-iron-minerals (Complete Multivitamin) tab Take  by mouth daily. amiodarone (CORDARONE) 200 mg tablet Take 1 Tab by mouth daily. Qty: 30 Tab, Refills: 1      aspirin delayed-release 81 mg tablet Take 1 Tab by mouth daily. Qty: 30 Tab, Refills: 2    Associated Diagnoses: Cerebral aneurysm      magnesium oxide (MAG-OX) 400 mg tablet Take 400 mg by mouth two (2) times a day. metFORMIN ER (GLUCOPHAGE XR) 750 mg tablet Take 750 mg by mouth two (2) times daily (with meals). melatonin 3 mg tablet Take 3 mg by mouth nightly as needed.       acetaminophen (TYLENOL EXTRA STRENGTH) 500 mg tablet Take  by mouth every six (6) hours as needed for Pain.      polyethylene glycol (MIRALAX) 17 gram packet Take 17 g by mouth as needed. gabapentin (NEURONTIN) 100 mg capsule Take 100 mg by mouth three (3) times daily. oxybutynin chloride XL (DITROPAN XL) 10 mg CR tablet Take 10 mg by mouth two (2) times a day. levothyroxine (SYNTHROID) 200 mcg tablet Take 200 mcg by mouth Daily (before breakfast). omeprazole (PRILOSEC) 40 mg capsule Take 40 mg by mouth daily. atorvastatin (LIPITOR) 40 mg tablet Take 40 mg by mouth daily. Associated Diagnoses: Type II or unspecified type diabetes mellitus without mention of complication, uncontrolled; Other specified acquired hypothyroidism; Unspecified vitamin D deficiency      potassium chloride SR (KLOR-CON 10) 10 mEq tablet Take 10 mEq by mouth daily. Associated Diagnoses: Type II or unspecified type diabetes mellitus without mention of complication, uncontrolled         STOP taking these medications       furosemide (LASIX) 40 mg tablet Comments:   Reason for Stopping:         insulin lispro protamin-lispro (HumaLOG Mix 75-25 KwikPen) flexpen Comments:   Reason for Stopping:                 * Follow-up Care/Patient Instructions: Activity: Activity as tolerated  Diet: Cardiac Diet, Diabetic Diet and fluid restriction 1L per day      Follow-up Information     Follow up With Specialties Details Why Contact Info    [facility] Cardiac Rehabilitation Go to  [address] [place] [address]  [phone]    Umesh Earl NP [facility] Cardiology 10/2/2020 at 12pm         followup     Matilde Martinez MD Family Medicine 10/1/2020 at 3:30pm followup [id number] [phone]          Spent 35 minutes evaluting and coordinating patient care and discharge to home with [facility] of which >50% was spent coordinating and counseling.      Signed:  Amy Peña MD  9/29/2020  11:51 AM

## 2020-10-01 ENCOUNTER — APPOINTMENT (OUTPATIENT)
Dept: CARDIAC REHAB | Age: 70
End: 2020-10-01
Attending: STUDENT IN AN ORGANIZED HEALTH CARE EDUCATION/TRAINING PROGRAM

## 2020-10-05 ENCOUNTER — HOSPITAL ENCOUNTER (OUTPATIENT)
Dept: CT IMAGING | Age: 70
Discharge: HOME | End: 2020-10-05
Attending: STUDENT IN AN ORGANIZED HEALTH CARE EDUCATION/TRAINING PROGRAM
Payer: MEDICARE

## 2020-10-05 DIAGNOSIS — I67.1 CEREBRAL ANEURYSM: ICD-10-CM

## 2020-10-05 PROCEDURE — 74011000258 HC RX REV CODE- 258: Performed by: RADIOLOGY

## 2020-10-05 PROCEDURE — 70498 CT ANGIOGRAPHY NECK: CPT

## 2020-10-05 PROCEDURE — 74011000636 HC RX REV CODE- 636: Performed by: RADIOLOGY

## 2020-10-05 PROCEDURE — 70496 CT ANGIOGRAPHY HEAD: CPT

## 2020-10-05 RX ORDER — SODIUM CHLORIDE 0.9 % (FLUSH) 0.9 %
10 SYRINGE (ML) INJECTION
Status: COMPLETED | OUTPATIENT
Start: 2020-10-05 | End: 2020-10-05

## 2020-10-05 RX ADMIN — SODIUM CHLORIDE 100 ML: 900 INJECTION, SOLUTION INTRAVENOUS at 11:31

## 2020-10-05 RX ADMIN — IOPAMIDOL 100 ML: 755 INJECTION, SOLUTION INTRAVENOUS at 11:31

## 2020-10-05 RX ADMIN — Medication 10 ML: at 11:31

## 2020-10-13 ENCOUNTER — HOSPITAL ENCOUNTER (OUTPATIENT)
Dept: CARDIAC REHAB | Age: 70
Discharge: HOME | End: 2020-10-13
Payer: MEDICARE

## 2020-10-13 VITALS — WEIGHT: 191.2 LBS | BODY MASS INDEX: 35.19 KG/M2 | HEIGHT: 62 IN

## 2020-10-13 PROCEDURE — 93798 PHYS/QHP OP CAR RHAB W/ECG: CPT

## 2020-10-13 NOTE — CARDIO/PULMONARY
Erica Solorzano  
65 y.o. 
 
presented to cardiac rehab for orientation and exercise tolerance test today with a primary diagnosis of PTCA w/ stent. Erica Solorzano has a history of (MI, PCI, CABG, HF, CVA etc). Cardiac risk factors include heart failure, AICD, HTN, obesity, MI x2, hyperlipidemia, DM. Erica Solorzano is  and lives with  Joanna Light and handicapped son. PHQ9, depression score, is 17 and is considered moderate/ severe. The result was discussed with patient who affirms score to be accurate and will fax to her PCP who she reports is already aware. She has been on antidepressants in the past, but refuses to take currently b/c she feels that she is on enough medications. Patient denied chest pain during 6 minute bike ride  and was in BBB w/ paced beats. Exercise prescription developed around patient stated goals, to be supplemented with home exercise recommendations. EF is 10%. Education manual given to patient and reviewed. Patient will attend cardiac rehab 2-3 times/week and education

## 2020-10-13 NOTE — PROGRESS NOTES
I have reviewed the notes, assessments, and/or procedures performed, I concur with her/his documentation of Leslie Gage.

## 2020-10-15 ENCOUNTER — APPOINTMENT (OUTPATIENT)
Dept: CARDIAC REHAB | Age: 70
End: 2020-10-15
Payer: MEDICARE

## 2020-10-16 ENCOUNTER — HOSPITAL ENCOUNTER (OUTPATIENT)
Dept: CARDIAC REHAB | Age: 70
Discharge: HOME | End: 2020-10-16
Payer: MEDICARE

## 2020-10-16 VITALS — BODY MASS INDEX: 34.02 KG/M2 | WEIGHT: 186 LBS

## 2020-10-16 PROCEDURE — 93798 PHYS/QHP OP CAR RHAB W/ECG: CPT

## 2020-10-19 ENCOUNTER — APPOINTMENT (OUTPATIENT)
Dept: CARDIAC REHAB | Age: 70
End: 2020-10-19
Payer: MEDICARE

## 2020-10-19 ENCOUNTER — CLINICAL SUPPORT (OUTPATIENT)
Dept: CARDIOLOGY CLINIC | Age: 70
End: 2020-10-19
Payer: MEDICARE

## 2020-10-19 ENCOUNTER — OFFICE VISIT (OUTPATIENT)
Dept: CARDIOLOGY CLINIC | Age: 70
End: 2020-10-19
Payer: MEDICARE

## 2020-10-19 VITALS
DIASTOLIC BLOOD PRESSURE: 84 MMHG | RESPIRATION RATE: 18 BRPM | WEIGHT: 184.6 LBS | HEART RATE: 68 BPM | BODY MASS INDEX: 33.97 KG/M2 | HEIGHT: 62 IN | SYSTOLIC BLOOD PRESSURE: 122 MMHG | OXYGEN SATURATION: 97 %

## 2020-10-19 DIAGNOSIS — Z79.4 TYPE 2 DIABETES MELLITUS WITH DIABETIC NEPHROPATHY, WITH LONG-TERM CURRENT USE OF INSULIN (HCC): ICD-10-CM

## 2020-10-19 DIAGNOSIS — I42.9 CARDIOMYOPATHY, UNSPECIFIED TYPE (HCC): ICD-10-CM

## 2020-10-19 DIAGNOSIS — Z95.810 AUTOMATIC IMPLANTABLE CARDIAC DEFIBRILLATOR IN SITU: Primary | ICD-10-CM

## 2020-10-19 DIAGNOSIS — I49.3 PVC'S (PREMATURE VENTRICULAR CONTRACTIONS): ICD-10-CM

## 2020-10-19 DIAGNOSIS — E11.21 TYPE 2 DIABETES MELLITUS WITH DIABETIC NEPHROPATHY, WITH LONG-TERM CURRENT USE OF INSULIN (HCC): ICD-10-CM

## 2020-10-19 DIAGNOSIS — Z95.810 PRESENCE OF BIVENTRICULAR AUTOMATIC CARDIOVERTER/DEFIBRILLATOR (AICD): Primary | ICD-10-CM

## 2020-10-19 DIAGNOSIS — I10 HYPERTENSION, UNSPECIFIED TYPE: ICD-10-CM

## 2020-10-19 DIAGNOSIS — Z95.810 BIVENTRICULAR ICD (IMPLANTABLE CARDIOVERTER-DEFIBRILLATOR) IN PLACE: ICD-10-CM

## 2020-10-19 PROCEDURE — G8752 SYS BP LESS 140: HCPCS | Performed by: INTERNAL MEDICINE

## 2020-10-19 PROCEDURE — 1101F PT FALLS ASSESS-DOCD LE1/YR: CPT | Performed by: INTERNAL MEDICINE

## 2020-10-19 PROCEDURE — G8432 DEP SCR NOT DOC, RNG: HCPCS | Performed by: INTERNAL MEDICINE

## 2020-10-19 PROCEDURE — 93284 PRGRMG EVAL IMPLANTABLE DFB: CPT

## 2020-10-19 PROCEDURE — 2022F DILAT RTA XM EVC RTNOPTHY: CPT | Performed by: INTERNAL MEDICINE

## 2020-10-19 PROCEDURE — G8536 NO DOC ELDER MAL SCRN: HCPCS | Performed by: INTERNAL MEDICINE

## 2020-10-19 PROCEDURE — G8754 DIAS BP LESS 90: HCPCS | Performed by: INTERNAL MEDICINE

## 2020-10-19 PROCEDURE — G8427 DOCREV CUR MEDS BY ELIG CLIN: HCPCS | Performed by: INTERNAL MEDICINE

## 2020-10-19 PROCEDURE — G8417 CALC BMI ABV UP PARAM F/U: HCPCS | Performed by: INTERNAL MEDICINE

## 2020-10-19 PROCEDURE — G8399 PT W/DXA RESULTS DOCUMENT: HCPCS | Performed by: INTERNAL MEDICINE

## 2020-10-19 PROCEDURE — 99215 OFFICE O/P EST HI 40 MIN: CPT | Performed by: INTERNAL MEDICINE

## 2020-10-19 PROCEDURE — 3046F HEMOGLOBIN A1C LEVEL >9.0%: CPT | Performed by: INTERNAL MEDICINE

## 2020-10-19 PROCEDURE — G0463 HOSPITAL OUTPT CLINIC VISIT: HCPCS | Performed by: INTERNAL MEDICINE

## 2020-10-19 PROCEDURE — 93288 INTERROG EVL PM/LDLS PM IP: CPT

## 2020-10-19 PROCEDURE — 93005 ELECTROCARDIOGRAM TRACING: CPT | Performed by: INTERNAL MEDICINE

## 2020-10-19 PROCEDURE — 1111F DSCHRG MED/CURRENT MED MERGE: CPT | Performed by: INTERNAL MEDICINE

## 2020-10-19 PROCEDURE — 1090F PRES/ABSN URINE INCON ASSESS: CPT | Performed by: INTERNAL MEDICINE

## 2020-10-19 PROCEDURE — 93010 ELECTROCARDIOGRAM REPORT: CPT | Performed by: INTERNAL MEDICINE

## 2020-10-19 PROCEDURE — 3017F COLORECTAL CA SCREEN DOC REV: CPT | Performed by: INTERNAL MEDICINE

## 2020-10-19 NOTE — PROGRESS NOTES
ROOM # 3  9/22/2020 - 9/29/2020- [facility]: CHF  EKG today  SOB with exertion, occasional brief sharp CP, rare episodes of dizziness    Visit Vitals  /84 (BP 1 Location: Left arm, BP Patient Position: Sitting)   Pulse 68   Resp 18   Ht 5' 2\" (1.575 m)   Wt 184 lb 9.6 oz (83.7 kg)   SpO2 97%   BMI 33.76 kg/m²

## 2020-10-19 NOTE — PROGRESS NOTES
HISTORY OF PRESENTING ILLNESS      Gabe Bennett is a 65 y.o. female with NICM, DM, hypertension, LVEF 20%, LHC 2/16, LBBB (QRS duration 144 msec), NYHA class III symptoms, single chamber ICD s/p upgrade to a biventricular ICD system, frequent PVCs who we started on amiodarone in an attempt to suppress her PVCs to allow optimization of her CRT pacing. Subsequently she was hospitalized for heart failure exacerbation and later again for a GI bleed, she is no longer on anticoagulation d/t GI Bleeding. Watchman was deferred after review of rhythm strips from device showed AT rather than AF. At last follow up, CRT pacing was low. She has noted increased SOB for the past 2 months with some edema occasionally. Interrogation shows LV PVCs based on intracardiac electrograms. Heart Logic was elevated despite compliance with diuretics. She was last seen in 9/2020 and her BIV trigger was turned ON. Amiodarone was restarted. She was inpatient at [facility] and underwent IV diuresis. PVC burden has improved with Amiodarone. She continues to have baseline sob despite increased CRT pacing and fluid management however improved since our prior visit.         PAST MEDICAL HISTORY     Past Medical History:   Diagnosis Date    Acid indigestion     heartburn    Aneurysm (HCC)     Anxiety disorder     Arthritis     Asthma     Back pain     Bladder spasms     Cerebral artery occlusion with cerebral infarction ([facility] [address] 08/2020    Chest pain     Constipation     Cough     Diabetes mellitus     Diarrhea     Fatigue     Fibromyalgia     Frequent episodic tension-type headache     Hearing reduced     Heart failure (HCC)     Hemorrhoids     Hernia of unspecified site of abdominal cavity without mention of obstruction or gangrene     HTN (hypertension)     ICD (implantable cardioverter-defibrillator) in place     Muscle pain     joint pain    N&V (nausea and vomiting)     Pacemaker biventricular ICD 1/26/18    Ringing in ears     Skin cancer     SOB (shortness of breath)     SOB (shortness of breath)     Swallowing difficulty     Thyroid disorder     Vertigo     Wears dentures            PAST SURGICAL HISTORY     Past Surgical History:   Procedure Laterality Date    HX CARPAL TUNNEL RELEASE      right hand    HX GI      HX HEART CATHETERIZATION  2/6/16    HX HYSTERECTOMY      HX ORTHOPAEDIC      HX PACEMAKER Left 6/20/16    [employer] ICD    HX THYROIDECTOMY      REMOVAL GALLBLADDER            ALLERGIES     Allergies   Allergen Reactions    Codeine Other (comments)     Patient states she is not allergic    Novocain [Procaine] Nausea and Vomiting    Tramadol Other (comments)     Headache          FAMILY HISTORY     Family History   Problem Relation Age of Onset    Diabetes Mother     Heart Disease Mother     Heart Disease Father     Cancer Sister     Diabetes Sister     Heart Disease Sister     Hypertension Sister     Lupus Sister     Diabetes Brother     negative for cardiac disease       SOCIAL HISTORY     Social History     Socioeconomic History    Marital status:      Spouse name: John Torrez    Number of children: 10    Years of education: Not on file    Highest education level: 8th grade   Occupational History     Employer: RETIRED   Social Needs    Financial resource strain: Somewhat hard    Food insecurity     Worry: Never true     Inability: Never true    Transportation needs     Medical: Yes     Non-medical: Yes   Tobacco Use    Smoking status: Never Smoker    Smokeless tobacco: Never Used   Substance and Sexual Activity    Alcohol use: Yes     Comment: wine occasionally    Drug use: No   Lifestyle    Physical activity     Days per week: 0 days     Minutes per session: 0 min    Stress: Only a little   Relationships    Social connections     Talks on phone:  Three times a week     Gets together: More than three times a week Attends L[employer] service: Never     Active member of club or organization: No     Attends meetings of clubs or organizations: Never     Relationship status:    Other Topics Concern     Service No    Blood Transfusions No    Caffeine Concern No    Occupational Exposure No    Hobby Hazards No    Sleep Concern Yes    Stress Concern Yes    Weight Concern Yes    Special Diet Yes    Back Care No    Exercise No    Bike Helmet No    Seat Belt Yes    Self-Exams No   Social History Narrative    Pt. Has as son that lives with her that is blind. She only drives around town. MEDICATIONS     Current Outpatient Medications   Medication Sig    docusate sodium (COLACE) 100 mg capsule Take 1 Cap by mouth two (2) times a day for 90 days.  bumetanide (BUMEX) 1 mg tablet Take 1 Tab by mouth two (2) times daily (with meals).  ticagrelor (Brilinta) 90 mg tablet Take  by mouth two (2) times a day.  carvediloL (Coreg) 3.125 mg tablet Take  by mouth two (2) times daily (with meals).  multivitamin,tx-iron-minerals (Complete Multivitamin) tab Take  by mouth daily.  amiodarone (CORDARONE) 200 mg tablet Take 1 Tab by mouth daily.  aspirin delayed-release 81 mg tablet Take 1 Tab by mouth daily.  magnesium oxide (MAG-OX) 400 mg tablet Take 400 mg by mouth three (3) times daily.  metFORMIN ER (GLUCOPHAGE XR) 750 mg tablet Take 750 mg by mouth two (2) times daily (with meals).  melatonin 3 mg tablet Take 3 mg by mouth nightly as needed.  acetaminophen (TYLENOL EXTRA STRENGTH) 500 mg tablet Take  by mouth every six (6) hours as needed for Pain.  polyethylene glycol (MIRALAX) 17 gram packet Take 17 g by mouth as needed.  gabapentin (NEURONTIN) 100 mg capsule Take 100 mg by mouth three (3) times daily.  oxybutynin chloride XL (DITROPAN XL) 10 mg CR tablet Take 10 mg by mouth two (2) times a day.     levothyroxine (SYNTHROID) 200 mcg tablet Take 200 mcg by mouth Daily (before breakfast).  omeprazole (PRILOSEC) 40 mg capsule Take 40 mg by mouth daily.  atorvastatin (LIPITOR) 40 mg tablet Take 40 mg by mouth daily.  potassium chloride SR (KLOR-CON 10) 10 mEq tablet Take 20 mEq by mouth daily. No current facility-administered medications for this visit. I have reviewed the nurses notes, vitals, problem list, allergy list, medical history, family, social history and medications. REVIEW OF SYMPTOMS      General: Pt denies excessive weight gain or loss. Pt is able to conduct ADL's  HEENT: Denies blurred vision, headaches, hearing loss, epistaxis and difficulty swallowing. Respiratory: Denies cough, congestion, shortness of breath, GABRIEL, wheezing or stridor. Cardiovascular: Denies precordial pain, palpitations, edema or PND  Gastrointestinal: Denies poor appetite, indigestion, abdominal pain or blood in stool  Genitourinary: Denies hematuria, dysuria, increased urinary frequency  Musculoskeletal: Denies joint pain or swelling from muscles or joints  Neurologic: Denies tremor, paresthesias, headache, or sensory motor disturbance  Psychiatric: Denies confusion, insomnia, depression  Integumentray: Denies rash, itching or ulcers. Hematologic: Denies easy bruising, bleeding       PHYSICAL EXAMINATION      Vitals: see vitals section  General: Well developed, in no acute distress. HEENT: No jaundice, oral mucosa moist, no oral ulcers  Neck: Supple, no stiffness, no lymphadenopathy, supple  Heart:  Normal S1/S2 negative S3 or S4. Regular, no murmur, gallop or rub, no jugular venous distention  Respiratory: Clear bilaterally x 4, no wheezing or rales  Abdomen:   Soft, non-tender, bowel sounds are active. Extremities:  No edema, normal cap refill, no cyanosis. Musculoskeletal: No clubbing, no deformities  Neuro: A&Ox3, speech clear, gait stable, cooperative, no focal neurologic deficits  Skin: Skin color is normal. No rashes or lesions.  Non diaphoretic, moist.  Vascular: 2+ pulses symmetric in all extremities       DIAGNOSTIC DATA      EKG:        LABORATORY DATA      Lab Results   Component Value Date/Time    WBC 5.8 09/25/2020 05:22 AM    HGB 9.4 (L) 09/25/2020 05:22 AM    HCT 28.6 (L) 09/25/2020 05:22 AM    PLATELET 507 68/38/6359 05:22 AM    MCV 90.5 09/25/2020 05:22 AM      Lab Results   Component Value Date/Time    Sodium 136 09/29/2020 05:24 AM    Potassium 4.0 09/29/2020 05:24 AM    Chloride 101 09/29/2020 05:24 AM    CO2 30 09/29/2020 05:24 AM    Anion gap 5 09/29/2020 05:24 AM    Glucose 143 (H) 09/29/2020 05:24 AM    BUN 30 (H) 09/29/2020 05:24 AM    Creatinine 1.11 (H) 09/29/2020 05:24 AM    BUN/Creatinine ratio 27 (H) 09/29/2020 05:24 AM    GFR est AA 59 (L) 09/29/2020 05:24 AM    GFR est non-AA 48 (L) 09/29/2020 05:24 AM    Calcium 9.0 09/29/2020 05:24 AM    Bilirubin, total 0.8 09/22/2020 05:50 PM    Alk. phosphatase 101 09/22/2020 05:50 PM    Protein, total 8.2 09/22/2020 05:50 PM    Albumin 2.6 (L) 09/28/2020 05:20 AM    Globulin 5.2 (H) 09/22/2020 05:50 PM    A-G Ratio 0.6 (L) 09/22/2020 05:50 PM    ALT (SGPT) 27 09/22/2020 05:50 PM           ASSESSMENT     1. Cardiomyopathy              A. NICM              B. NYHA class II              C. LVEF 30-35%  2. CAD, nonobstructive  3. Asthma  4. Diabetes  5. Hypothyroidism  6. IVCD/LBBB  7. ICD              Charlykonrad Jarrod to CRTD        PLAN     Plan for sotalol loading. Plan to hold amiodarone 2 weeks prior; plan for sotalol loading with targeted dosing of sotalol 120 mg bid if possible. Would continue coreg dosing as is despite addition of sotalol. ICD-10-CM ICD-9-CM    1. Automatic implantable cardiac defibrillator in situ  Z95.810 V45.02 AMB POC EKG ROUTINE W/ 12 LEADS, INTER & REP   2. Biventricular ICD (implantable cardioverter-defibrillator) in place  Z95.810 V45.02 AMB POC EKG ROUTINE W/ 12 LEADS, INTER & REP   3.  Cardiomyopathy, unspecified type (HCC)  I42.9 425.4 AMB POC EKG ROUTINE W/ 12 LEADS, INTER & REP   4. Hypertension, unspecified type  I10 401.9    5. Type 2 diabetes mellitus with diabetic nephropathy, with long-term current use of insulin (HCC)  E11.21 250.40     Z79.4 583.81      V58.67    6. PVC's (premature ventricular contractions)  I49.3 427.69      Orders Placed This Encounter    AMB POC EKG ROUTINE W/ 12 LEADS, INTER & REP     Order Specific Question:   Reason for Exam:     Answer:   irregular heart beat          FOLLOW-UP     1 month after sotalol loading      Thank you, Miky Wolfe MD and Dr. Culver/Noe/Prema for allowing me to participate in the care of this extraordinarily pleasant female. Please do not hesitate to contact me for further questions/concerns. Jorge Desai NP    Patient seen and examined by me with nurse practitioner. I personally performed all components of the history, physical, and medical decision making and agree with the assessment and plan with minor modifications as noted.          Kassandra Negro MD  Cardiac Electrophysiology / Cardiology    [address]  [address] [place] [address], [facility], [address], [facility]  [phone] / [phone] Fax   [phone] / [phone] Fax

## 2020-10-21 ENCOUNTER — HOSPITAL ENCOUNTER (OUTPATIENT)
Dept: CARDIAC REHAB | Age: 70
Discharge: HOME | End: 2020-10-21
Payer: MEDICARE

## 2020-10-21 VITALS — WEIGHT: 185 LBS | BODY MASS INDEX: 33.84 KG/M2

## 2020-10-21 PROCEDURE — 93798 PHYS/QHP OP CAR RHAB W/ECG: CPT

## 2020-10-21 PROCEDURE — 93797 PHYS/QHP OP CAR RHAB WO ECG: CPT

## 2020-10-23 ENCOUNTER — HOSPITAL ENCOUNTER (OUTPATIENT)
Dept: CARDIAC REHAB | Age: 70
Discharge: HOME | End: 2020-10-23
Payer: MEDICARE

## 2020-10-23 VITALS — BODY MASS INDEX: 33.43 KG/M2 | WEIGHT: 182.8 LBS

## 2020-10-23 PROCEDURE — 93798 PHYS/QHP OP CAR RHAB W/ECG: CPT

## 2020-10-26 ENCOUNTER — HOSPITAL ENCOUNTER (OUTPATIENT)
Dept: CARDIAC REHAB | Age: 70
Discharge: HOME | End: 2020-10-26
Payer: MEDICARE

## 2020-10-26 VITALS — BODY MASS INDEX: 33.32 KG/M2 | WEIGHT: 182.2 LBS

## 2020-10-26 PROCEDURE — 93798 PHYS/QHP OP CAR RHAB W/ECG: CPT

## 2020-10-27 ENCOUNTER — TELEPHONE (OUTPATIENT)
Dept: CARDIOLOGY CLINIC | Age: 70
End: 2020-10-27

## 2020-10-27 NOTE — TELEPHONE ENCOUNTER
Called patient, ID verified using two patient identifiers. Informed patient that the medication is Sotalol that Dr. Oswaldo Squires wants to start. Patient is agreeable to the 3 day hospital admission for Sotalol loading. She states Dr. Oswaldo Squires had told her she could have it done at [address] [place] [address] and be followed by [facility]. Advised patient that I would relay information to Dr. Oswaldo Squires for him to review. Patient agreeable to this plan.

## 2020-10-27 NOTE — TELEPHONE ENCOUNTER
Serafin Granado NP  You; Aracelis Guerrero MD 47 minutes ago (12:32 PM)       Message sent to [facility] to arrange    Message text        Malik Gunter MD; Serafin Granado NP 2 hours ago (11:11 AM)       Patient wishes to proceed with Sotalol loading. She states she was told that she could be admitted to [facility] and be followed by [facility]. Please advise.

## 2020-10-27 NOTE — TELEPHONE ENCOUNTER
Patient is calling as she was to call Dr. Carlos Erickson and let him know if she decided to move forward with taking the new medication. Patient is unsure of the name of the new medication but states that she was advised that she would have to be in the hospital for three days when she starts it. Please advise.     Phone: [phone]

## 2020-10-28 ENCOUNTER — HOSPITAL ENCOUNTER (OUTPATIENT)
Dept: CARDIAC REHAB | Age: 70
Discharge: HOME | End: 2020-10-28
Payer: MEDICARE

## 2020-10-28 ENCOUNTER — APPOINTMENT (OUTPATIENT)
Dept: CARDIAC REHAB | Age: 70
End: 2020-10-28
Payer: MEDICARE

## 2020-10-28 VITALS — WEIGHT: 182.2 LBS | BODY MASS INDEX: 33.32 KG/M2

## 2020-10-28 PROCEDURE — 93797 PHYS/QHP OP CAR RHAB WO ECG: CPT

## 2020-10-28 PROCEDURE — 93798 PHYS/QHP OP CAR RHAB W/ECG: CPT

## 2020-10-29 ENCOUNTER — TELEPHONE (OUTPATIENT)
Dept: CARDIOLOGY CLINIC | Age: 70
End: 2020-10-29

## 2020-10-29 ENCOUNTER — TRANSCRIBE ORDER (OUTPATIENT)
Dept: SCHEDULING | Age: 70
End: 2020-10-29

## 2020-10-29 DIAGNOSIS — E11.69 OBESITY, DIABETES, AND HYPERTENSION SYNDROME (HCC): Primary | ICD-10-CM

## 2020-10-29 DIAGNOSIS — E11.59 OBESITY, DIABETES, AND HYPERTENSION SYNDROME (HCC): Primary | ICD-10-CM

## 2020-10-29 DIAGNOSIS — I15.2 OBESITY, DIABETES, AND HYPERTENSION SYNDROME (HCC): Primary | ICD-10-CM

## 2020-10-29 DIAGNOSIS — E66.9 OBESITY, DIABETES, AND HYPERTENSION SYNDROME (HCC): Primary | ICD-10-CM

## 2020-10-29 NOTE — TELEPHONE ENCOUNTER
Patient is requesting to speak with Reina to follow up on her request to start sotalol. Please advise. Patient states that she has spoken with [facility] and they state they that they have not received anything.      Phone: [phone]

## 2020-10-29 NOTE — TELEPHONE ENCOUNTER
Called patient, ID verified using two patient identifiers. [facility] called [facility] today and they advised her that they were not aware of Dr. Hardeep Barajas request to have her admitted for Sotalol loading. Advised patient that we had forwarded  Last office note to Dr. Zeb Santos and that CHRIS Sims NP has contacted [facility] as well . Patient will contact [facility] again tomorrow to check on Sotalol loading.

## 2020-10-30 ENCOUNTER — HOSPITAL ENCOUNTER (OUTPATIENT)
Dept: CARDIAC REHAB | Age: 70
Discharge: HOME | End: 2020-10-30
Payer: MEDICARE

## 2020-10-30 ENCOUNTER — TELEPHONE (OUTPATIENT)
Dept: CARDIOLOGY CLINIC | Age: 70
End: 2020-10-30

## 2020-10-30 VITALS — BODY MASS INDEX: 33.65 KG/M2 | WEIGHT: 184 LBS

## 2020-10-30 PROCEDURE — 93798 PHYS/QHP OP CAR RHAB W/ECG: CPT

## 2020-10-30 NOTE — TELEPHONE ENCOUNTER
Returned patient call, ID verified using two patient identifiers. Informed patient that I had spoken with Justin from [facility] and reviewed Dr. Damir Prince recommendations for Sotalol loading. Justin is going to contact patient and set her up for admission to [facility] in [place] [address]. Patient verbalizes understanding and is agreeable to this plan.

## 2020-10-30 NOTE — TELEPHONE ENCOUNTER
Pt requesting to speak with the nurse in regards to going in the hosp for 3 days to have Sotalol. Pt states she has some questions about what hosp she can go to.  Please advise      Phone:[phone]

## 2020-11-02 ENCOUNTER — HOSPITAL ENCOUNTER (OUTPATIENT)
Dept: CARDIAC REHAB | Age: 70
Discharge: HOME | End: 2020-11-02
Payer: MEDICARE

## 2020-11-02 VITALS — BODY MASS INDEX: 33.22 KG/M2 | WEIGHT: 181.6 LBS

## 2020-11-02 PROCEDURE — 93798 PHYS/QHP OP CAR RHAB W/ECG: CPT

## 2020-11-03 NOTE — PROGRESS NOTES
I have reviewed the notes, assessments, and/or procedures performed, I concur with her/his documentation of Allie Velásquez.

## 2020-11-04 ENCOUNTER — APPOINTMENT (OUTPATIENT)
Dept: CARDIAC REHAB | Age: 70
End: 2020-11-04
Payer: MEDICARE

## 2020-11-06 ENCOUNTER — APPOINTMENT (OUTPATIENT)
Dept: CARDIAC REHAB | Age: 70
End: 2020-11-06
Payer: MEDICARE

## 2020-11-09 ENCOUNTER — HOSPITAL ENCOUNTER (OUTPATIENT)
Dept: CARDIAC REHAB | Age: 70
Discharge: HOME | End: 2020-11-09
Payer: MEDICARE

## 2020-11-09 VITALS — WEIGHT: 180.4 LBS | BODY MASS INDEX: 33 KG/M2

## 2020-11-09 PROCEDURE — 93798 PHYS/QHP OP CAR RHAB W/ECG: CPT

## 2020-11-11 ENCOUNTER — OFFICE VISIT (OUTPATIENT)
Dept: NEUROSURGERY | Age: 70
End: 2020-11-11
Payer: MEDICARE

## 2020-11-11 ENCOUNTER — APPOINTMENT (OUTPATIENT)
Dept: CARDIAC REHAB | Age: 70
End: 2020-11-11
Payer: MEDICARE

## 2020-11-11 VITALS
SYSTOLIC BLOOD PRESSURE: 102 MMHG | HEIGHT: 63 IN | OXYGEN SATURATION: 97 % | BODY MASS INDEX: 32.6 KG/M2 | DIASTOLIC BLOOD PRESSURE: 60 MMHG | WEIGHT: 184 LBS | TEMPERATURE: 97.6 F | HEART RATE: 80 BPM

## 2020-11-11 DIAGNOSIS — I67.1 BRAIN ANEURYSM: Primary | ICD-10-CM

## 2020-11-11 PROCEDURE — 99214 OFFICE O/P EST MOD 30 MIN: CPT | Performed by: STUDENT IN AN ORGANIZED HEALTH CARE EDUCATION/TRAINING PROGRAM

## 2020-11-11 RX ORDER — INSULIN LISPRO 100 [IU]/ML
28 INJECTION, SUSPENSION SUBCUTANEOUS 2 TIMES DAILY
COMMUNITY

## 2020-11-11 NOTE — PROGRESS NOTES
NeuroInterventional Surgery Note  Osiel Pineda MD    Patient: Kenzie Mccormick MRN: [id number]  SSN: [id number]    YOB: 1950  Age: 65 y.o. Sex: female      Chief Complaint:    Subjective:      Kenzie Mccormick is a 65 y.o. female who is being seen for intracranial athersoclerosis and incidental fidning of intracranial aneurysm    Past Medical History:   Diagnosis Date    Acid indigestion     heartburn    [facility]neur[facility]m ([facility]     Anxiety disorder     Arthritis     Asthma     Back pain     Bladder spasms     Cerebral artery occlusion with cerebral infarction ([facility] [address] 08/2020    Chest pain     Constipation     Cough     Diabetes mellitus     Diarrhea     Fatigue     Fibromyalgia     Frequent episodic tension-type headache     Hearing reduced     Heart failure (HCC)     Hemorrhoids     Hernia of unspecified site of abdominal cavity without mention of obstruction or gangrene     HTN (hypertension)     ICD (implantable cardioverter-defibrillator) in place     Muscle pain     joint pain    N&V (nausea and vomiting)     Pacemaker     biventricular ICD 1/26/18    Ringing in ears     Skin cancer     SOB (shortness of breath)     SOB (shortness of breath)     Swallowing difficulty     Thyroid disorder     Vertigo     Wears dentures      Family History   Problem Relation Age of Onset    Diabetes Mother     Heart Disease Mother     Heart Disease Father    [place] Sister     Diabetes Sister     Heart Disease Sister     Hypertension Sister     Lupus Sister     Diabetes Brother      Social History     Tobacco Use    Smoking status: Never Smoker    Smokeless tobacco: Never Used   Substance Use Topics    Alcohol use: Yes     Comment: wine occasionally      Cannot display prior to admission medications because the patient has not been admitted in this contact.        Allergies   Allergen Reactions    Codeine Other (comments)     Patient states she is not allergic    Novocain [Procaine] Nausea and Vomiting    Tramadol Other (comments)     Headache       Review of Systems:  A comprehensive review of systems was negative except for that written in the History of Present Illness. Denies numbness, tingling, chest pain, leg pain, nausea, vomiting, difficulty swallowing, headache, and dyspnea. Objective:     Vitals:    11/11/20 1121   BP: 102/60   Pulse: 80   Temp: 97.6 °F (36.4 °C)   TempSrc: Temporal   SpO2: 97%   Weight: 184 lb (83.5 kg)   Height: 5' 3\" (1.6 m)      Physical Exam:  GENERAL: Calm, cooperative, NAD  SKIN: Warm, dry, color appropriate for ethnicity. Groin site soft, with no odor, bleeding or drainage noted. Mild ecchymosis present. Neurologic Exam:  Mental Status:  Alert and oriented x 4. Appropriate affect, mood and behavior. Language:    Normal fluency, repetition, comprehension and naming. Cranial Nerves:   Pupils 3 mm, equal, round and reactive to light. Visual fields full to confrontation. Extraocular movements intact. Facial sensation intact. Full facial strength, no asymmetry. Hearing grossly intact bilaterally. No dysarthria. Tongue protrudes to midline, palate elevates symmetrically. Shoulder shrug 5/5 bilaterally. Motor:    No pronator drift. Bulk and tone normal.      5/5 power in all extremities proximally and distally. No involuntary movements.     Sensation:    Sensation intact throughout to light touch    Reflexes:    Reflexes are 2+ at the biceps    Coordination & Gait: Normal. FTN    Labs:  Lab Results   Component Value Date/Time    WBC 5.8 09/25/2020 05:22 AM    HGB 9.4 (L) 09/25/2020 05:22 AM    HCT 28.6 (L) 09/25/2020 05:22 AM    PLATELET 626 41/76/2513 05:22 AM    MCV 90.5 09/25/2020 05:22 AM      Lab Results   Component Value Date/Time    Sodium 136 09/29/2020 05:24 AM    Potassium 4.0 09/29/2020 05:24 AM    Chloride 101 09/29/2020 05:24 AM    CO2 30 09/29/2020 05:24 AM    Anion gap 5 09/29/2020 05:24 AM    Glucose 143 (H) 09/29/2020 05:24 AM    BUN 30 (H) 09/29/2020 05:24 AM    Creatinine 1.11 (H) 09/29/2020 05:24 AM    BUN/Creatinine ratio 27 (H) 09/29/2020 05:24 AM    GFR est AA 59 (L) 09/29/2020 05:24 AM    GFR est non-AA 48 (L) 09/29/2020 05:24 AM    Calcium 9.0 09/29/2020 05:24 AM     Lab Results   Component Value Date/Time    Troponin-I, Qt. 0.22 (H) 09/22/2020 08:50 PM       Imaging:    CT Results (maximum last 3):  cta 8/10/20  EXAM:  CTA HEAD, CTA NECK  INDICATION:  TECHNIQUE:  Axial spiral acquired CT angiography was performed from the aortic arch up  through the intracranial vessels. This was performed during intravenous bolus  infusion 100 mL of Isovue 370. Post-processing with  MIP reconstructions as well  as 3 D surface shaded reconstructions  from aortic arch up through the  calvarium. Standard sagittal and coronal reconstructions. CT dose reduction was  achieved through use of a standardized protocol tailored for this examination  and automatic exposure control for dose modulation. COMPARISON: MRI head 8/12/20, CTA 8/10/20  FINDINGS:  Atherosclerotic calcification aortic arch and brachiocephalic arteries most  prominent related to the left subclavian artery and origin. This is resulted in  at least a mild stenosis at the origin of the left subclavian artery. Undiluted contrast in left sided veins is causing some the morning artifact and  limited detail of the proximal vertebral arteries. Within these limitations, vertebral artery origins may have mild stenosis but  are otherwise adequately maintained. The left vertebral artery is dominant. Cervical vertebral arteries are  relatively on remarkable. Minimal atherosclerotic calcification left V4 segment. The right vertebral artery supplies PICA and has a minimal contribution to the  basilar artery.  The basilar artery is relatively small and does contribute  supply to both posterior cerebral arteries which also are supplied from  posterior communicating arteries. Common carotid arteries are relatively unremarkable up to the bifurcations other  than atherosclerotic calcification greater on the left. There is atherosclerotic calcification at both carotid bifurcations. Minimum  stenosis on the right near its origin approximately 3.6 mm corresponding to less  than 30% stenosis by NASCET criteria. On the left there is no hemodynamically  significant stenosis. 0% stenosis by NASCET criteria. Again demonstrated is prominent tortuosity of the right greater than left  cervical internal carotid arteries. Carotid siphons have atherosclerotic calcification with minimal stenosis. Small less than 3 mm aneurysm distal right internal carotid artery just proximal  to the origin of the right posterior communicating artery. There may also be a  tiny 1-2 mm focal outpouching related the left internal carotid artery. These  are likely incidental and of no acute clinical significance. Middle cerebral arteries are relatively symmetric and unremarkable proximally as  are anterior cerebral arteries. There has been interval improvement in the stenosis at the left middle cerebral  artery especially involving the posterior division of the left middle cerebral  artery. Therefore this may have represented reversible thrombus. Our minimal  narrowings demonstrated in middle and anterior cerebral artery branches. Interval increase in gliosis and left posterior superior frontal subcortical and  cortical region when compared to the CT and MRI and August likely related to  prior infarction at that time. No particular findings to suggest an acute infarct at this time. Evidence of  small vessel ischemic changes basal ganglia. No abnormal intracranial  enhancement. Chronic cervical spondylosis. Evidence of prior left thyroid lobectomy. Prominence of the right thyroid lobe  similar to prior exams.     IMPRESSION  IMPRESSION:  1.  Interval improvement and near resolution of stenosis left middle cerebral  artery bifurcation involving posterior division. 2. 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 3. Atherosclerotic calcification carotid bifurcations with less than 30%  stenosis on the right and 0% stenosis on the left by NASCET criteria. 4. Atherosclerosis aortic arch and proximal left subclavian at least mild  stenosis. Minimal stenosis origin right subclavian artery. 5. Interval progression of gliosis left posterior superior lateral frontal  cortex consistent with previous ischemic injury. 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 Patient was found to have a left MCA stenosis as cause of her symptoms. Refers feeling well no slurred speech. Patient had a coronary stent placed on 9/10/20 and was started on ASA 81 mg and Brilinta 90 mg BID. Patient initially had severe stenosis of the M2 division of the left MCA on CTA 10/5/20 that improved with CTA on 8/10/20. Patient does not need to be on DAPT from neurological stand point but from cardiology. Her intracranial aneurysm is the same size with no changes. Will follow her aneurysm in 1 year with an MRA. Plan:  Follow up MRA head on 10/5/21  Will call patient with MRA results    Thank you for this consult and participating in the care of this patient.   Signed By: Jian Barcenas MD     November 11, 2020

## 2020-11-11 NOTE — PATIENT INSTRUCTIONS
A Healthy Lifestyle: Care Instructions Your Care Instructions A healthy lifestyle can help you feel good, stay at a healthy weight, and have plenty of energy for both work and play. A healthy lifestyle is something you can share with your whole family. A healthy lifestyle also can lower your risk for serious health problems, such as high blood pressure, heart disease, and diabetes. You can follow a few steps listed below to improve your health and the health of your family. Follow-up care is a key part of your treatment and safety. Be sure to make and go to all appointments, and call your doctor if you are having problems. It's also a good idea to know your test results and keep a list of the medicines you take. How can you care for yourself at home? · Do not eat too much sugar, fat, or fast foods. You can still have dessert and treats now and then. The goal is moderation. · Start small to improve your eating habits. Pay attention to portion sizes, drink less juice and soda pop, and eat more fruits and vegetables. ? Eat a healthy amount of food. A 3-ounce serving of meat, for example, is about the size of a deck of cards. Fill the rest of your plate with vegetables and whole grains. ? Limit the amount of soda and sports drinks you have every day. Drink more water when you are thirsty. ? Eat at least 5 servings of fruits and vegetables every day. It may seem like a lot, but it is not hard to reach this goal. A serving or helping is 1 piece of fruit, 1 cup of vegetables, or 2 cups of leafy, raw vegetables. Have an apple or some carrot sticks as an afternoon snack instead of a candy bar. Try to have fruits and/or vegetables at every meal. 
· Make exercise part of your daily routine. You may want to start with simple activities, such as walking, bicycling, or slow swimming. Try to be active 30 to 60 minutes every day.  You do not need to do all 30 to 60 minutes all at once. For example, you can exercise 3 times a day for 10 or 20 minutes. Moderate exercise is safe for most people, but it is always a good idea to talk to your doctor before starting an exercise program. 
· Keep moving. Tracey Massing the lawn, work in the garden, or [employer]. Take the stairs instead of the elevator at work. · If you smoke, quit. People who smoke have an increased risk for heart attack, stroke, cancer, and other lung illnesses. Quitting is hard, but there are ways to boost your chance of quitting tobacco for good. ? Use nicotine gum, patches, or lozenges. ? Ask your doctor about stop-smoking programs and medicines. ? Keep trying. In addition to reducing your risk of diseases in the future, you will notice some benefits soon after you stop using tobacco. If you have shortness of breath or asthma symptoms, they will likely get better within a few weeks after you quit. · Limit how much alcohol you drink. Moderate amounts of alcohol (up to 2 drinks a day for men, 1 drink a day for women) are okay. But drinking too much can lead to liver problems, high blood pressure, and other health problems. Family health If you have a family, there are many things you can do together to improve your health. · Eat meals together as a family as often as possible. · Eat healthy foods. This includes fruits, vegetables, lean meats and dairy, and whole grains. · Include your family in your fitness plan. Most people think of activities such as jogging or tennis as the way to fitness, but there are many ways you and your family can be more active. Anything that makes you breathe hard and gets your heart pumping is exercise. Here are some tips: 
? Walk to do errands or to take your child to school or the bus. 
? Go for a family bike ride after dinner instead of watching TV. Where can you learn more? Go to http://www.[employer].kwiry/ Enter K558 in the search box to learn more about \"A Healthy Lifestyle: Care Instructions. \" Current as of: January 31, 2020               Content Version: 12.6 © 9515-9404 [employer]. Care instructions adapted under license by [employer] (which disclaims liability or warranty for this information). If you have questions about a medical condition or this instruction, always ask your healthcare professional. [place] [address] any warranty or liability for your use of this information.

## 2020-11-11 NOTE — PROGRESS NOTES
Follow up for imaging results for Cerebral aneurysm. Patient reports occasional headaches, blurred vision  and dizziness. Continues to have decline in cognition. Reports she has difficulty spelling works and forgetfulness. Patient complain of SOB on exertion. Advised patient to discuss with PCP or seek urgent care if needed. No SOB noted at this visit.

## 2020-11-13 ENCOUNTER — HOSPITAL ENCOUNTER (OUTPATIENT)
Dept: CARDIAC REHAB | Age: 70
Discharge: HOME | End: 2020-11-13
Payer: MEDICARE

## 2020-11-13 VITALS — WEIGHT: 181.8 LBS | BODY MASS INDEX: 32.2 KG/M2

## 2020-11-13 PROCEDURE — 93798 PHYS/QHP OP CAR RHAB W/ECG: CPT

## 2020-11-16 ENCOUNTER — HOSPITAL ENCOUNTER (INPATIENT)
Age: 70
LOS: 4 days | Discharge: HOME | DRG: 308 | End: 2020-11-20
Attending: INTERNAL MEDICINE | Admitting: INTERNAL MEDICINE
Payer: MEDICARE

## 2020-11-16 ENCOUNTER — APPOINTMENT (OUTPATIENT)
Dept: CARDIAC REHAB | Age: 70
End: 2020-11-16
Payer: MEDICARE

## 2020-11-16 ENCOUNTER — APPOINTMENT (OUTPATIENT)
Dept: GENERAL RADIOLOGY | Age: 70
DRG: 308 | End: 2020-11-16
Attending: INTERNAL MEDICINE
Payer: MEDICARE

## 2020-11-16 ENCOUNTER — APPOINTMENT (OUTPATIENT)
Dept: NON INVASIVE DIAGNOSTICS | Age: 70
DRG: 308 | End: 2020-11-16
Attending: INTERNAL MEDICINE
Payer: MEDICARE

## 2020-11-16 PROBLEM — I50.9 ACUTE ON CHRONIC CONGESTIVE HEART FAILURE (HCC): Status: ACTIVE | Noted: 2020-11-16

## 2020-11-16 PROBLEM — I48.91 ATRIAL FIBRILLATION (HCC): Status: ACTIVE | Noted: 2020-11-16

## 2020-11-16 LAB
ALBUMIN SERPL-MCNC: 2.7 G/DL (ref 3.5–5)
ALBUMIN/GLOB SERPL: 0.6 {RATIO} (ref 1.1–2.2)
ALP SERPL-CCNC: 120 U/L (ref 45–117)
ALT SERPL-CCNC: 25 U/L (ref 12–78)
ANION GAP SERPL CALC-SCNC: 6 MMOL/L (ref 5–15)
AST SERPL W P-5'-P-CCNC: 30 U/L (ref 15–37)
ATRIAL RATE: 65 BPM
ATRIAL RATE: 76 BPM
BASOPHILS # BLD: 0 K/UL (ref 0–0.1)
BASOPHILS NFR BLD: 0 % (ref 0–1)
BILIRUB SERPL-MCNC: 0.5 MG/DL (ref 0.2–1)
BNP SERPL-MCNC: 849 PG/ML
BUN SERPL-MCNC: 31 MG/DL (ref 6–20)
BUN/CREAT SERPL: 31 (ref 12–20)
CA-I BLD-MCNC: 9 MG/DL (ref 8.5–10.1)
CALCULATED P AXIS, ECG09: 55 DEGREES
CALCULATED P AXIS, ECG09: 79 DEGREES
CALCULATED R AXIS, ECG10: -53 DEGREES
CALCULATED R AXIS, ECG10: -55 DEGREES
CALCULATED T AXIS, ECG11: -5 DEGREES
CALCULATED T AXIS, ECG11: 25 DEGREES
CHLORIDE SERPL-SCNC: 99 MMOL/L (ref 97–108)
CO2 SERPL-SCNC: 33 MMOL/L (ref 21–32)
CREAT SERPL-MCNC: 1 MG/DL (ref 0.55–1.02)
DIAGNOSIS, 93000: NORMAL
DIAGNOSIS, 93000: NORMAL
DIFFERENTIAL METHOD BLD: ABNORMAL
EOSINOPHIL # BLD: 0.1 K/UL (ref 0–0.4)
EOSINOPHIL NFR BLD: 2 % (ref 0–7)
ERYTHROCYTE [DISTWIDTH] IN BLOOD BY AUTOMATED COUNT: 16.7 % (ref 11.5–14.5)
GLOBULIN SER CALC-MCNC: 4.9 G/DL (ref 2–4)
GLUCOSE SERPL-MCNC: 235 MG/DL (ref 65–100)
HCT VFR BLD AUTO: 34.5 % (ref 35–47)
HGB BLD-MCNC: 11 G/DL (ref 11.5–16)
IMM GRANULOCYTES # BLD AUTO: 0 K/UL (ref 0–0.04)
IMM GRANULOCYTES NFR BLD AUTO: 0 % (ref 0–0.5)
LYMPHOCYTES # BLD: 1.7 K/UL (ref 0.8–3.5)
LYMPHOCYTES NFR BLD: 28 % (ref 12–49)
MAGNESIUM SERPL-MCNC: 1.4 MG/DL (ref 1.6–2.4)
MCH RBC QN AUTO: 27.8 PG (ref 26–34)
MCHC RBC AUTO-ENTMCNC: 31.9 G/DL (ref 30–36.5)
MCV RBC AUTO: 87.1 FL (ref 80–99)
MONOCYTES # BLD: 0.6 K/UL (ref 0–1)
MONOCYTES NFR BLD: 11 % (ref 5–13)
NEUTS SEG # BLD: 3.4 K/UL (ref 1.8–8)
NEUTS SEG NFR BLD: 59 % (ref 32–75)
P-R INTERVAL, ECG05: 172 MS
PHOSPHATE SERPL-MCNC: 3.9 MG/DL (ref 2.6–4.7)
PLATELET # BLD AUTO: 169 K/UL (ref 150–400)
PMV BLD AUTO: 10.6 FL (ref 8.9–12.9)
POTASSIUM SERPL-SCNC: 3.3 MMOL/L (ref 3.5–5.1)
PROT SERPL-MCNC: 7.6 G/DL (ref 6.4–8.2)
Q-T INTERVAL, ECG07: 504 MS
Q-T INTERVAL, ECG07: 538 MS
QRS DURATION, ECG06: 160 MS
QRS DURATION, ECG06: 190 MS
QTC CALCULATION (BEZET), ECG08: 559 MS
QTC CALCULATION (BEZET), ECG08: 567 MS
RBC # BLD AUTO: 3.96 M/UL (ref 3.8–5.2)
SODIUM SERPL-SCNC: 138 MMOL/L (ref 136–145)
VENTRICULAR RATE, ECG03: 65 BPM
VENTRICULAR RATE, ECG03: 76 BPM
WBC # BLD AUTO: 5.9 K/UL (ref 3.6–11)

## 2020-11-16 PROCEDURE — 65270000029 HC RM PRIVATE

## 2020-11-16 PROCEDURE — 83880 ASSAY OF NATRIURETIC PEPTIDE: CPT

## 2020-11-16 PROCEDURE — 93010 ELECTROCARDIOGRAM REPORT: CPT | Performed by: INTERNAL MEDICINE

## 2020-11-16 PROCEDURE — 80053 COMPREHEN METABOLIC PANEL: CPT

## 2020-11-16 PROCEDURE — 93308 TTE F-UP OR LMTD: CPT

## 2020-11-16 PROCEDURE — 84100 ASSAY OF PHOSPHORUS: CPT

## 2020-11-16 PROCEDURE — 74011250637 HC RX REV CODE- 250/637: Performed by: INTERNAL MEDICINE

## 2020-11-16 PROCEDURE — 93005 ELECTROCARDIOGRAM TRACING: CPT

## 2020-11-16 PROCEDURE — 83735 ASSAY OF MAGNESIUM: CPT

## 2020-11-16 PROCEDURE — 74011000250 HC RX REV CODE- 250: Performed by: INTERNAL MEDICINE

## 2020-11-16 PROCEDURE — 74011250637 HC RX REV CODE- 250/637: Performed by: NURSE PRACTITIONER

## 2020-11-16 PROCEDURE — 71045 X-RAY EXAM CHEST 1 VIEW: CPT

## 2020-11-16 PROCEDURE — 36415 COLL VENOUS BLD VENIPUNCTURE: CPT

## 2020-11-16 PROCEDURE — 85025 COMPLETE CBC W/AUTO DIFF WBC: CPT

## 2020-11-16 RX ORDER — SOTALOL HYDROCHLORIDE 80 MG/1
120 TABLET ORAL EVERY 12 HOURS
Status: DISCONTINUED | OUTPATIENT
Start: 2020-11-16 | End: 2020-11-17

## 2020-11-16 RX ORDER — SOTALOL HYDROCHLORIDE 80 MG/1
80 TABLET ORAL EVERY 12 HOURS
Status: DISCONTINUED | OUTPATIENT
Start: 2020-11-16 | End: 2020-11-16

## 2020-11-16 RX ORDER — ATORVASTATIN CALCIUM 40 MG/1
40 TABLET, FILM COATED ORAL DAILY
Status: DISCONTINUED | OUTPATIENT
Start: 2020-11-17 | End: 2020-11-20 | Stop reason: HOSPADM

## 2020-11-16 RX ORDER — BUMETANIDE 0.25 MG/ML
1 INJECTION INTRAMUSCULAR; INTRAVENOUS 2 TIMES DAILY
Status: DISCONTINUED | OUTPATIENT
Start: 2020-11-16 | End: 2020-11-20 | Stop reason: HOSPADM

## 2020-11-16 RX ORDER — GUAIFENESIN 100 MG/5ML
81 LIQUID (ML) ORAL DAILY
Status: DISCONTINUED | OUTPATIENT
Start: 2020-11-17 | End: 2020-11-20 | Stop reason: HOSPADM

## 2020-11-16 RX ADMIN — SOTALOL HYDROCHLORIDE 120 MG: 80 TABLET ORAL at 21:55

## 2020-11-16 RX ADMIN — SOTALOL HYDROCHLORIDE 80 MG: 80 TABLET ORAL at 12:20

## 2020-11-16 RX ADMIN — TICAGRELOR 90 MG: 90 TABLET ORAL at 21:55

## 2020-11-16 RX ADMIN — BUMETANIDE 1 MG: 0.25 INJECTION INTRAMUSCULAR; INTRAVENOUS at 12:20

## 2020-11-16 RX ADMIN — BUMETANIDE 1 MG: 0.25 INJECTION INTRAMUSCULAR; INTRAVENOUS at 21:55

## 2020-11-16 NOTE — H&P
Cardiology History and Physical    Patient: Kenzie cMcormick MRN: [id number]  SSN: [id number]    YOB: 1950  Age: 65 y.o. Sex: female        Subjective:      Date of  Admission: 11/16/2020     Kenzie Mccormick is a 65 y.o. female admitted for initiating of Sotalol treatment. Her history includes: heart failure, atrial fibrillation, TIA, hypertension, DM, fibromyalgia, CAD with stent placement in August, PPM/ICD, hyperlipidemia, slight cerebral aneurysm and Rosario's esophagus. On examination she endorses, shortness of breath. Denies chest pain, dizziness, palpitations, or edema. She reports no other symptoms.      Primary Care Provider: Linette Galan MD  Past Medical History:   Diagnosis Date    Acid indigestion     heartburn    Aneurysm ([facility] [address]     Anxiety disorder     Arthritis     Asthma     Back pain     Bladder spasms     Cerebral artery occlusion with cerebral infarction ([facility] [address] 08/2020    Chest pain     Constipation     Cough     Diabetes mellitus     Diarrhea     Fatigue     Fibromyalgia     Frequent episodic tension-type headache     Hearing reduced     Heart failure (HCC)     Hemorrhoids     Hernia of unspecified site of abdominal cavity without mention of obstruction or gangrene     HTN (hypertension)     ICD (implantable cardioverter-defibrillator) in place     Muscle pain     joint pain    N&V (nausea and vomiting)     Pacemaker     biventricular ICD 1/26/18    Ringing in ears     Skin cancer     SOB (shortness of breath)     SOB (shortness of breath)     Swallowing difficulty     Thyroid disorder     Vertigo     Wears dentures       Past Surgical History:   Procedure Laterality Date    HX CARPAL TUNNEL RELEASE      right hand    HX GI      HX HEART CATHETERIZATION  2/6/16    HX HYSTERECTOMY      HX ORTHOPAEDIC      HX PACEMAKER Left 6/20/16    [employer] ICD    HX THYROIDECTOMY      REMOVAL GALLBLADDER       Family History Problem Relation Age of Onset    Diabetes Mother     Heart Disease Mother     Heart Disease Father     Cancer Sister     Diabetes Sister     Heart Disease Sister     Hypertension Sister     Lupus Sister     Diabetes Brother      Social History     Tobacco Use    Smoking status: Never Smoker    Smokeless tobacco: Never Used   Substance Use Topics    Alcohol use: Yes     Comment: wine occasionally         Prior to Admission medications    Medication Sig Start Date End Date Taking? Authorizing Provider   insulin lispro protamin-lispro (HumaLOG Mix 75-25 KwikPen) flexpen Humalog Mix 75-25 KwikPen U-100 insulin 100 unit/mL subcutaneous pen    Provider, Historical   docusate sodium (COLACE) 100 mg capsule Take 1 Cap by mouth two (2) times a day for 90 days. 9/29/20 12/28/20  Qi Roberts MD   bumetanide (BUMEX) 1 mg tablet Take 1 Tab by mouth two (2) times daily (with meals). 9/29/20   Sonal Santana MD   ticagrelor (Brilinta) 90 mg tablet Take  by mouth two (2) times a day. Provider, Historical   carvediloL (Coreg) 3.125 mg tablet Take  by mouth two (2) times daily (with meals). Provider, Historical   multivitamin,tx-iron-minerals (Complete Multivitamin) tab Take  by mouth daily. Provider, Historical   aspirin delayed-release 81 mg tablet Take 1 Tab by mouth daily. 9/1/20   Jenene Homans, MD   magnesium oxide (MAG-OX) 400 mg tablet Take 400 mg by mouth three (3) times daily. Provider, Historical   metFORMIN ER (GLUCOPHAGE XR) 750 mg tablet Take 750 mg by mouth two (2) times daily (with meals). Provider, Historical   melatonin 3 mg tablet Take 3 mg by mouth nightly as needed. Provider, Historical   acetaminophen (TYLENOL EXTRA STRENGTH) 500 mg tablet Take  by mouth every six (6) hours as needed for Pain. Provider, Historical   polyethylene glycol (MIRALAX) 17 gram packet Take 17 g by mouth as needed.     Provider, Historical   gabapentin (NEURONTIN) 100 mg capsule Take 100 mg by mouth three (3) times daily. 4/27/16   Provider, Historical   oxybutynin chloride XL (DITROPAN XL) 10 mg CR tablet Take 10 mg by mouth two (2) times a day. 3/30/16   Provider, Historical   levothyroxine (SYNTHROID) 200 mcg tablet Take 200 mcg by mouth Daily (before breakfast). 4/27/16   Provider, Historical   omeprazole (PRILOSEC) 40 mg capsule Take 40 mg by mouth daily. 4/27/16   Provider, Historical   atorvastatin (LIPITOR) 40 mg tablet Take 40 mg by mouth daily. Provider, Historical   potassium chloride SR (KLOR-CON 10) 10 mEq tablet Take 20 mEq by mouth daily. Provider, Historical        Allergies   Allergen Reactions    Codeine Other (comments)     Patient states she is not allergic    Novocain [Procaine] Nausea and Vomiting    Tramadol Other (comments)     Headache        Review of Systems   Constitutional: Negative. Respiratory: Positive for shortness of breath. Negative for cough and chest tightness. Cardiovascular: Negative for chest pain, palpitations and leg swelling. Gastrointestinal: Negative for abdominal pain, nausea and vomiting. Neurological: Negative for dizziness and syncope. Subjective:      Physical Exam:   Vital signs are stable, Blood pressure 126/58  Pulse 77  No apparent distress. Heart is regular, rate and rhythm. Normal S1, S2, no murmurs are appreciated. Lungs are clear bilaterally. Abdomen is soft, nontender, normal bowel sounds. Extremities have trace edema      Cardiographics  Telemetry: V-paced on telemetry. ECG: V- paced with prolonged QT  Echocardiogram: Pending       Plan:Discussed case with Dr. Yumi Laguerre. This our impression and recommendation:     1. Atrial dysrhythmia: Initiating sotalol therapy. Amiodarone has been stopped for 2 weeks. Serial EKGs to monitor QT interval. Do not administer medications that prolong QT interval  i.e. albuterol. Continue close monitoring of telemetry and electrolytes.  Keep potassium between 4-5 and magnesium >2. After administration of 2200 Sotalol, obtain EKG 2 hours after. Call MD before giving 9am dose. Admission labs pending. 2. Cardiomyopathy: Recent EF 10% prior to cardiac catheterization with proximal LAD ORALIA insertion in August. She has AICD placed. Continue Bumex . Monitor I&Os and telemetry. 3. Coronary artery disease: Cardiac catheterization with proximal LAD X1 ORALIA insertion in August. Continue aspirin Brilinta and statin therapy. 4. Hypertension: Blood pressure is at goal, continue antihypertensive medication regimen. 5. Hyperlipidemia: Continue Atorvastatin therapy. Please do not hesitate to call me or Dr. Roxanne Mazariegos if additional questions arise.

## 2020-11-17 LAB
ALBUMIN SERPL-MCNC: 2.9 G/DL (ref 3.5–5)
ALBUMIN/GLOB SERPL: 0.5 {RATIO} (ref 1.1–2.2)
ALP SERPL-CCNC: 137 U/L (ref 45–117)
ALT SERPL-CCNC: 31 U/L (ref 12–78)
ANION GAP SERPL CALC-SCNC: 7 MMOL/L (ref 5–15)
AST SERPL W P-5'-P-CCNC: 39 U/L (ref 15–37)
ATRIAL RATE: 60 BPM
ATRIAL RATE: 60 BPM
BILIRUB SERPL-MCNC: 0.6 MG/DL (ref 0.2–1)
BUN SERPL-MCNC: 29 MG/DL (ref 6–20)
BUN/CREAT SERPL: 28 (ref 12–20)
CA-I BLD-MCNC: 9.3 MG/DL (ref 8.5–10.1)
CALCULATED P AXIS, ECG09: 81 DEGREES
CALCULATED P AXIS, ECG09: 97 DEGREES
CALCULATED R AXIS, ECG10: -45 DEGREES
CALCULATED R AXIS, ECG10: -53 DEGREES
CALCULATED T AXIS, ECG11: -34 DEGREES
CHLORIDE SERPL-SCNC: 98 MMOL/L (ref 97–108)
CO2 SERPL-SCNC: 32 MMOL/L (ref 21–32)
CREAT SERPL-MCNC: 1.04 MG/DL (ref 0.55–1.02)
DIAGNOSIS, 93000: NORMAL
DIAGNOSIS, 93000: NORMAL
ECHO EST RA PRESSURE: 3 MMHG
ECHO LV EDV A2C: 210 CM3
ECHO LV EJECTION FRACTION A2C: 25 %
ECHO LV EJECTION FRACTION BIPLANE: 49.1 % (ref 55–100)
ECHO LV ESV A2C: 87.5 CM3
ECHO LV INTERNAL DIMENSION DIASTOLIC: 5.94 CM (ref 3.9–5.3)
ECHO LV INTERNAL DIMENSION SYSTOLIC: 4.44 CM
ECHO LV IVSD: 0.75 CM (ref 0.6–0.9)
ECHO LV MASS 2D: 162 G (ref 67–162)
ECHO LV MASS INDEX 2D: 88.9 G/M2 (ref 43–95)
ECHO LV POSTERIOR WALL DIASTOLIC: 0.7 CM (ref 0.6–0.9)
ECHO LVOT DIAM: 1.8 CM
ECHO RIGHT VENTRICULAR SYSTOLIC PRESSURE (RVSP): 26 MMHG
ECHO TV MAX VELOCITY: 241 CM/S
ECHO TV REGURGITANT PEAK GRADIENT: 23 MMHG
GLOBULIN SER CALC-MCNC: 6 G/DL (ref 2–4)
GLUCOSE SERPL-MCNC: 222 MG/DL (ref 65–100)
P-R INTERVAL, ECG05: 174 MS
P-R INTERVAL, ECG05: 184 MS
POTASSIUM SERPL-SCNC: 3.3 MMOL/L (ref 3.5–5.1)
PROT SERPL-MCNC: 8.9 G/DL (ref 6.4–8.2)
Q-T INTERVAL, ECG07: 588 MS
Q-T INTERVAL, ECG07: 618 MS
QRS DURATION, ECG06: 192 MS
QRS DURATION, ECG06: 194 MS
QTC CALCULATION (BEZET), ECG08: 588 MS
QTC CALCULATION (BEZET), ECG08: 618 MS
SODIUM SERPL-SCNC: 137 MMOL/L (ref 136–145)
VENTRICULAR RATE, ECG03: 60 BPM
VENTRICULAR RATE, ECG03: 60 BPM

## 2020-11-17 PROCEDURE — 93005 ELECTROCARDIOGRAM TRACING: CPT

## 2020-11-17 PROCEDURE — 74011250637 HC RX REV CODE- 250/637: Performed by: NURSE PRACTITIONER

## 2020-11-17 PROCEDURE — 80053 COMPREHEN METABOLIC PANEL: CPT

## 2020-11-17 PROCEDURE — 65270000029 HC RM PRIVATE

## 2020-11-17 PROCEDURE — 74011250636 HC RX REV CODE- 250/636: Performed by: NURSE PRACTITIONER

## 2020-11-17 PROCEDURE — 74011000250 HC RX REV CODE- 250: Performed by: INTERNAL MEDICINE

## 2020-11-17 PROCEDURE — 36415 COLL VENOUS BLD VENIPUNCTURE: CPT

## 2020-11-17 RX ORDER — MAGNESIUM SULFATE HEPTAHYDRATE 40 MG/ML
2 INJECTION, SOLUTION INTRAVENOUS ONCE
Status: COMPLETED | OUTPATIENT
Start: 2020-11-17 | End: 2020-11-17

## 2020-11-17 RX ORDER — POTASSIUM CHLORIDE 20 MEQ/1
40 TABLET, EXTENDED RELEASE ORAL 2 TIMES DAILY
Status: COMPLETED | OUTPATIENT
Start: 2020-11-17 | End: 2020-11-17

## 2020-11-17 RX ORDER — SOTALOL HYDROCHLORIDE 80 MG/1
80 TABLET ORAL EVERY 12 HOURS
Status: DISCONTINUED | OUTPATIENT
Start: 2020-11-17 | End: 2020-11-18

## 2020-11-17 RX ADMIN — SOTALOL HYDROCHLORIDE 120 MG: 80 TABLET ORAL at 08:46

## 2020-11-17 RX ADMIN — TICAGRELOR 90 MG: 90 TABLET ORAL at 08:46

## 2020-11-17 RX ADMIN — POTASSIUM CHLORIDE 40 MEQ: 1500 TABLET, EXTENDED RELEASE ORAL at 16:13

## 2020-11-17 RX ADMIN — SOTALOL HYDROCHLORIDE 80 MG: 80 TABLET ORAL at 19:57

## 2020-11-17 RX ADMIN — TICAGRELOR 90 MG: 90 TABLET ORAL at 19:56

## 2020-11-17 RX ADMIN — MAGNESIUM SULFATE HEPTAHYDRATE 2 G: 40 INJECTION, SOLUTION INTRAVENOUS at 16:13

## 2020-11-17 RX ADMIN — BUMETANIDE 1 MG: 0.25 INJECTION INTRAMUSCULAR; INTRAVENOUS at 08:47

## 2020-11-17 RX ADMIN — ATORVASTATIN CALCIUM 40 MG: 40 TABLET, FILM COATED ORAL at 08:46

## 2020-11-17 RX ADMIN — ASPIRIN 81 MG CHEWABLE TABLET 81 MG: 81 TABLET CHEWABLE at 08:47

## 2020-11-17 RX ADMIN — BUMETANIDE 1 MG: 0.25 INJECTION INTRAMUSCULAR; INTRAVENOUS at 19:57

## 2020-11-17 NOTE — PROGRESS NOTES
Problem: Falls - Risk of  Goal: *Absence of Falls  Description: Document Janice Gillette Fall Risk and appropriate interventions in the flowsheet.   Outcome: Progressing Towards Goal  Note: Fall Risk Interventions:            Medication Interventions: Bed/chair exit alarm

## 2020-11-17 NOTE — PROGRESS NOTES
Problem: Falls - Risk of  Goal: *Absence of Falls  Description: Document German Apo Fall Risk and appropriate interventions in the flowsheet.   Outcome: Progressing Towards Goal  Note: Fall Risk Interventions:            Medication Interventions: Bed/chair exit alarm, Patient to call before getting OOB, Teach patient to arise slowly                   Problem: Patient Education: Go to Patient Education Activity  Goal: Patient/Family Education  Outcome: Progressing Towards Goal

## 2020-11-17 NOTE — PROGRESS NOTES
[address] [facility] CARDIOLOGY   PROGRESS NOTE    Patient seen and examined. This is a patient who is followed for atrial fibrillation, receiving sotalol loading dose. Patient denies chest pain/ discomfort or palpitations. Endorses shortness of breath with with exertion. No other complaints reported. Telemetry reviewed, V-paced at 79 there were no events noted in the past 24 hours. Pertinent review of system items noted above, all other systems are negative. Current medications reviewed. Physical examination:  Vital signs are stable, Blood pressure 127/64,  Pulse 62  No apparent distress. Heart is regular, rate and rhythm. Normal S1, S2, no murmurs are appreciated. Lungs are clear bilaterally. Abdomen is soft, nontender, normal bowel sounds. Extremities have no edema. Labs reviewed. K 3.3  Mg 1.4     Impression and recommendations:    1. 1. Atrial dysrhythmia: Last EKG showed QTc 618, will decrease sotalol dose to 80 BID, repeat ekg at 2200. Do not administer medications that prolong QT interval  i.e. albuterol. Continue close monitoring of telemetry and electrolytes. Will give potassium 80 mg and magnesium 2gm, repeat labs in the am. After administration of 2200 Sotalol, obtain EKG 2 hours after. Call MD before giving 9am dose.      2. Cardiomyopathy: Recent EF 10% prior to cardiac catheterization with proximal LAD ORALIA insertion in August. She has AICD placed. Continue Bumex . Monitor I&Os and telemetry.     3. Coronary artery disease: Cardiac catheterization with proximal LAD X1 ORALIA insertion in August. Continue aspirin Brilinta and statin therapy.      4. Hypertension: Blood pressure is at goal, continue antihypertensive medication regimen.     5. Hyperlipidemia: Continue Atorvastatin therapy. Please do not hesitate to call if additional questions arise.

## 2020-11-17 NOTE — PROGRESS NOTES
RD completed DM ed . A1c 10.4 (8/11/2020). Spoke to pt at bedside. Pt endorsed somewhat following DM diet at home. Typical meals are protein/starch/veg; snacks on SF pudding or skip. RD discussed DM diet in detail; discussed topics like various food groups, counting carbs at meals and snack, pairing CHO with protein, label reading, scheduled meals, appropriate portions, long term complications etc. Pt denied any Qs, verbalized understanding. RD encouraged to reach out with Qs; provided contact info.

## 2020-11-18 ENCOUNTER — APPOINTMENT (OUTPATIENT)
Dept: CARDIAC REHAB | Age: 70
End: 2020-11-18
Payer: MEDICARE

## 2020-11-18 LAB
ATRIAL RATE: 60 BPM
ATRIAL RATE: 66 BPM
CALCULATED P AXIS, ECG09: 12 DEGREES
CALCULATED P AXIS, ECG09: 27 DEGREES
CALCULATED R AXIS, ECG10: -50 DEGREES
CALCULATED R AXIS, ECG10: -60 DEGREES
CALCULATED T AXIS, ECG11: -18 DEGREES
CALCULATED T AXIS, ECG11: 84 DEGREES
DIAGNOSIS, 93000: NORMAL
DIAGNOSIS, 93000: NORMAL
GLUCOSE BLD STRIP.AUTO-MCNC: 206 MG/DL (ref 65–100)
GLUCOSE BLD STRIP.AUTO-MCNC: 264 MG/DL (ref 65–100)
P-R INTERVAL, ECG05: 174 MS
P-R INTERVAL, ECG05: 182 MS
PERFORMED BY, TECHID: ABNORMAL
PERFORMED BY, TECHID: ABNORMAL
Q-T INTERVAL, ECG07: 500 MS
Q-T INTERVAL, ECG07: 588 MS
QRS DURATION, ECG06: 162 MS
QRS DURATION, ECG06: 186 MS
QTC CALCULATION (BEZET), ECG08: 524 MS
QTC CALCULATION (BEZET), ECG08: 588 MS
VENTRICULAR RATE, ECG03: 60 BPM
VENTRICULAR RATE, ECG03: 66 BPM

## 2020-11-18 PROCEDURE — 93005 ELECTROCARDIOGRAM TRACING: CPT

## 2020-11-18 PROCEDURE — 74011250637 HC RX REV CODE- 250/637: Performed by: NURSE PRACTITIONER

## 2020-11-18 PROCEDURE — 74011636637 HC RX REV CODE- 636/637: Performed by: NURSE PRACTITIONER

## 2020-11-18 PROCEDURE — 82962 GLUCOSE BLOOD TEST: CPT

## 2020-11-18 PROCEDURE — 65270000029 HC RM PRIVATE

## 2020-11-18 PROCEDURE — 93010 ELECTROCARDIOGRAM REPORT: CPT | Performed by: INTERNAL MEDICINE

## 2020-11-18 PROCEDURE — 74011000250 HC RX REV CODE- 250: Performed by: INTERNAL MEDICINE

## 2020-11-18 RX ORDER — INSULIN LISPRO 100 [IU]/ML
0.05 INJECTION, SOLUTION INTRAVENOUS; SUBCUTANEOUS
Status: DISCONTINUED | OUTPATIENT
Start: 2020-11-18 | End: 2020-11-20 | Stop reason: HOSPADM

## 2020-11-18 RX ORDER — MAGNESIUM SULFATE 100 %
4 CRYSTALS MISCELLANEOUS AS NEEDED
Status: DISCONTINUED | OUTPATIENT
Start: 2020-11-18 | End: 2020-11-20 | Stop reason: HOSPADM

## 2020-11-18 RX ORDER — SOTALOL HYDROCHLORIDE 80 MG/1
40 TABLET ORAL EVERY 12 HOURS
Status: DISCONTINUED | OUTPATIENT
Start: 2020-11-18 | End: 2020-11-19

## 2020-11-18 RX ORDER — DEXTROSE 50 % IN WATER (D50W) INTRAVENOUS SYRINGE
25-50 AS NEEDED
Status: DISCONTINUED | OUTPATIENT
Start: 2020-11-18 | End: 2020-11-20 | Stop reason: HOSPADM

## 2020-11-18 RX ORDER — INSULIN GLARGINE 100 [IU]/ML
0.2 INJECTION, SOLUTION SUBCUTANEOUS DAILY
Status: DISCONTINUED | OUTPATIENT
Start: 2020-11-19 | End: 2020-11-20 | Stop reason: HOSPADM

## 2020-11-18 RX ORDER — POTASSIUM CHLORIDE 20 MEQ/1
40 TABLET, EXTENDED RELEASE ORAL 2 TIMES DAILY
Status: COMPLETED | OUTPATIENT
Start: 2020-11-18 | End: 2020-11-19

## 2020-11-18 RX ADMIN — POTASSIUM CHLORIDE 40 MEQ: 1500 TABLET, EXTENDED RELEASE ORAL at 21:51

## 2020-11-18 RX ADMIN — BUMETANIDE 1 MG: 0.25 INJECTION INTRAMUSCULAR; INTRAVENOUS at 09:40

## 2020-11-18 RX ADMIN — BUMETANIDE 1 MG: 0.25 INJECTION INTRAMUSCULAR; INTRAVENOUS at 21:51

## 2020-11-18 RX ADMIN — ASPIRIN 81 MG CHEWABLE TABLET 81 MG: 81 TABLET CHEWABLE at 09:39

## 2020-11-18 RX ADMIN — SOTALOL HYDROCHLORIDE 40 MG: 80 TABLET ORAL at 21:51

## 2020-11-18 RX ADMIN — TICAGRELOR 90 MG: 90 TABLET ORAL at 09:39

## 2020-11-18 RX ADMIN — SOTALOL HYDROCHLORIDE 80 MG: 80 TABLET ORAL at 09:46

## 2020-11-18 RX ADMIN — ATORVASTATIN CALCIUM 40 MG: 40 TABLET, FILM COATED ORAL at 09:40

## 2020-11-18 RX ADMIN — TICAGRELOR 90 MG: 90 TABLET ORAL at 21:51

## 2020-11-18 RX ADMIN — INSULIN LISPRO 4 UNITS: 100 INJECTION, SOLUTION INTRAVENOUS; SUBCUTANEOUS at 16:46

## 2020-11-18 NOTE — PROGRESS NOTES
Problem: Falls - Risk of  Goal: *Absence of Falls  Description: Document Nevaeh Solo Fall Risk and appropriate interventions in the flowsheet.   Outcome: Progressing Towards Goal  Note: Fall Risk Interventions:            Medication Interventions: Teach patient to arise slowly

## 2020-11-18 NOTE — PROGRESS NOTES
MANUELA plan: Home/ Self care  Patient will like to continue with Cardiac Rehab    RUR score: 20%    Spoke with patient and , Brain Saenz, at bedside about discharge planning. Per patient, they reside together in a one story home with three steps to get into home. Patient reports that she regularly uses a cane for ambulation, and has a walker to use if needed. Patient denies any recent falls at home. Patient reports that she is independent with ADLs and medication management. Patient reports that she has a good support system, and denies any challenges with self care or affording medications at this time. CM discussed need for home health for heart failure management. Patient reports that she recently had home health and is currently enrolled in cardiac rehab. Patient expresses that she is willing to follow cardiologist's recommendations for heart failure management. CM reached out to Maki Diaz NP to discuss recommendations for home health vs cardiac rehab. Per Dr. Aracelis Segura, it is recommended that patient continue with cardiac rehab at this time. CM discussed cardiologist's recommendations with patient and , and they both agree to continue with cardiac rehab. Patient does not voice any concerns for discharge at this time. CM will follow if any needs arise. Confirmed PCP: Dr. She Marquez   transports to doctor appointments.     Pharmacy: [id number] [facility] in [place], [place]

## 2020-11-18 NOTE — PROGRESS NOTES
[address] [facility] CARDIOLOGY   PROGRESS NOTE    Patient seen and examined. Resting comfortably this am. This is a patient who is followed for atrial fibrillation, receiving sotalol loading dose. Patient denies chest pain/ discomfort or palpitations. Endorses shortness of breath with with exertion. No other complaints reported. Telemetry reviewed, V-paced at 58 there were no events noted in the past 24 hours. Pertinent review of system items noted above, all other systems are negative. Current medications reviewed. Physical examination:  Vital signs are stable, Blood pressure 127/64,  Pulse 62  No apparent distress. Heart is regular, rate and rhythm. Normal S1, S2, no murmurs are appreciated. Lungs are clear bilaterally. Abdomen is soft, nontender, normal bowel sounds. Extremities have no edema. Labs pending. Impression and recommendations:    1. 1. Atrial dysrhythmia: Last EKG showed QTc 524, will continue sotalol 80 BID, repeat ekg. Do not administer medications that prolong QT interval  i.e. albuterol. Continue close monitoring of telemetry and electrolytes. Will give potassium 80 mg and magnesium 2gm, repeat labs pending.      2. Cardiomyopathy: Recent EF 10% prior to cardiac catheterization with proximal LAD ORALIA insertion in August. She has AICD placed. Continue Bumex . Monitor I&Os and telemetry.     3. Coronary artery disease: Cardiac catheterization with proximal LAD X1 ORALIA insertion in August. Continue aspirin Brilinta and statin therapy.      4. Hypertension: Blood pressure is at goal, continue antihypertensive medication regimen.     5. Hyperlipidemia: Continue Atorvastatin therapy. Plan for discharge tomorrow in the am. Patient will follow-up in the office in 1 week. Please do not hesitate to call if additional questions arise.

## 2020-11-18 NOTE — PROGRESS NOTES
Problem: Falls - Risk of  Goal: *Absence of Falls  Description: Document Janice Gillette Fall Risk and appropriate interventions in the flowsheet.   Outcome: Progressing Towards Goal  Note: Fall Risk Interventions:            Medication Interventions: Teach patient to arise slowly

## 2020-11-19 LAB
ALBUMIN SERPL-MCNC: 2.6 G/DL (ref 3.5–5)
ALBUMIN/GLOB SERPL: 0.5 {RATIO} (ref 1.1–2.2)
ALP SERPL-CCNC: 127 U/L (ref 45–117)
ALT SERPL-CCNC: 24 U/L (ref 12–78)
ANION GAP SERPL CALC-SCNC: 5 MMOL/L (ref 5–15)
AST SERPL W P-5'-P-CCNC: 29 U/L (ref 15–37)
ATRIAL RATE: 63 BPM
BILIRUB SERPL-MCNC: 0.8 MG/DL (ref 0.2–1)
BUN SERPL-MCNC: 29 MG/DL (ref 6–20)
BUN/CREAT SERPL: 34 (ref 12–20)
CA-I BLD-MCNC: 9.1 MG/DL (ref 8.5–10.1)
CALCULATED P AXIS, ECG09: 106 DEGREES
CALCULATED R AXIS, ECG10: -67 DEGREES
CALCULATED T AXIS, ECG11: 27 DEGREES
CHLORIDE SERPL-SCNC: 102 MMOL/L (ref 97–108)
CO2 SERPL-SCNC: 30 MMOL/L (ref 21–32)
CREAT SERPL-MCNC: 0.85 MG/DL (ref 0.55–1.02)
DIAGNOSIS, 93000: NORMAL
GLOBULIN SER CALC-MCNC: 5 G/DL (ref 2–4)
GLUCOSE BLD STRIP.AUTO-MCNC: 193 MG/DL (ref 65–100)
GLUCOSE SERPL-MCNC: 192 MG/DL (ref 65–100)
P-R INTERVAL, ECG05: 168 MS
PERFORMED BY, TECHID: ABNORMAL
POTASSIUM SERPL-SCNC: 3.4 MMOL/L (ref 3.5–5.1)
PROT SERPL-MCNC: 7.6 G/DL (ref 6.4–8.2)
Q-T INTERVAL, ECG07: 544 MS
QRS DURATION, ECG06: 178 MS
QTC CALCULATION (BEZET), ECG08: 556 MS
SODIUM SERPL-SCNC: 137 MMOL/L (ref 136–145)
VENTRICULAR RATE, ECG03: 63 BPM

## 2020-11-19 PROCEDURE — 74011636637 HC RX REV CODE- 636/637: Performed by: NURSE PRACTITIONER

## 2020-11-19 PROCEDURE — 74011250637 HC RX REV CODE- 250/637: Performed by: NURSE PRACTITIONER

## 2020-11-19 PROCEDURE — 74011000250 HC RX REV CODE- 250: Performed by: INTERNAL MEDICINE

## 2020-11-19 PROCEDURE — 65270000029 HC RM PRIVATE

## 2020-11-19 PROCEDURE — 82962 GLUCOSE BLOOD TEST: CPT

## 2020-11-19 PROCEDURE — 36415 COLL VENOUS BLD VENIPUNCTURE: CPT

## 2020-11-19 PROCEDURE — 80053 COMPREHEN METABOLIC PANEL: CPT

## 2020-11-19 RX ORDER — SOTALOL HYDROCHLORIDE 80 MG/1
40 TABLET ORAL DAILY
Status: DISCONTINUED | OUTPATIENT
Start: 2020-11-20 | End: 2020-11-20 | Stop reason: HOSPADM

## 2020-11-19 RX ORDER — POTASSIUM CHLORIDE 750 MG/1
40 TABLET, FILM COATED, EXTENDED RELEASE ORAL DAILY
Status: DISCONTINUED | OUTPATIENT
Start: 2020-11-20 | End: 2020-11-20 | Stop reason: HOSPADM

## 2020-11-19 RX ADMIN — SOTALOL HYDROCHLORIDE 40 MG: 80 TABLET ORAL at 09:27

## 2020-11-19 RX ADMIN — INSULIN LISPRO 4 UNITS: 100 INJECTION, SOLUTION INTRAVENOUS; SUBCUTANEOUS at 09:22

## 2020-11-19 RX ADMIN — BUMETANIDE 1 MG: 0.25 INJECTION INTRAMUSCULAR; INTRAVENOUS at 09:21

## 2020-11-19 RX ADMIN — ASPIRIN 81 MG CHEWABLE TABLET 81 MG: 81 TABLET CHEWABLE at 09:22

## 2020-11-19 RX ADMIN — TICAGRELOR 90 MG: 90 TABLET ORAL at 22:43

## 2020-11-19 RX ADMIN — POTASSIUM CHLORIDE 40 MEQ: 1500 TABLET, EXTENDED RELEASE ORAL at 09:21

## 2020-11-19 RX ADMIN — BUMETANIDE 1 MG: 0.25 INJECTION INTRAMUSCULAR; INTRAVENOUS at 22:43

## 2020-11-19 RX ADMIN — INSULIN GLARGINE 16 UNITS: 100 INJECTION, SOLUTION SUBCUTANEOUS at 09:22

## 2020-11-19 RX ADMIN — TICAGRELOR 90 MG: 90 TABLET ORAL at 09:21

## 2020-11-19 RX ADMIN — INSULIN LISPRO 4 UNITS: 100 INJECTION, SOLUTION INTRAVENOUS; SUBCUTANEOUS at 11:58

## 2020-11-19 RX ADMIN — INSULIN LISPRO 4 UNITS: 100 INJECTION, SOLUTION INTRAVENOUS; SUBCUTANEOUS at 17:26

## 2020-11-19 RX ADMIN — ATORVASTATIN CALCIUM 40 MG: 40 TABLET, FILM COATED ORAL at 09:22

## 2020-11-19 NOTE — PROGRESS NOTES
[address] [facility] CARDIOLOGY     PROGRESS NOTE   Patient seen and examined. Resting comfortably this am. This is a patient who is followed for atrial fibrillation, receiving sotalol loading dose. Patient denies chest pain/ discomfort or palpitations. Endorses shortness of breath with with exertion. No other complaints reported. Telemetry reviewed, V-paced at 58 there were no events noted in the past 24 hours. Pertinent review of system items noted above, all other systems are negative. Current medications reviewed. Physical examination:   Vital signs are stable, Blood pressure 107/59, Pulse 62   No apparent distress. Heart is regular, rate and rhythm. Normal S1, S2, no murmurs are appreciated. Lungs are clear bilaterally. Abdomen is soft, nontender, normal bowel sounds. Extremities have no edema. Labs pending. Impression and recommendations:   1. Atrial dysrhythmia: Last EKG showed QTc 540, will lower sotalol to 40 mg po daily. 2. Cardiomyopathy with acute on chronic systolic CHF: Recent EF 59% prior to cardiac catheterization with proximal LAD ORALIA insertion in August. She has AICD placed. Continue Bumex AND WILL GIVE ONE TIME DOSE OF 1 MG. Monitor I&Os and telemetry. 3. Hypokalemia: Will give one time and daily dose of 40 meq po daily. 4. Coronary artery disease: Cardiac catheterization with proximal LAD X1 ORALIA insertion in August. Continue aspirin Brilinta and statin therapy. 5. Hypertension: Blood pressure is at goal, continue antihypertensive medication regimen. 6. Hyperlipidemia: Continue Atorvastatin therapy.

## 2020-11-19 NOTE — PROGRESS NOTES
Problem: Falls - Risk of  Goal: *Absence of Falls  Description: Document Vijay Click Fall Risk and appropriate interventions in the flowsheet.   Outcome: Progressing Towards Goal  Note: Fall Risk Interventions:            Medication Interventions: Patient to call before getting OOB

## 2020-11-19 NOTE — PROGRESS NOTES
[address] [facility] CARDIOLOGY   PROGRESS NOTE    Patient seen and examined. Resting comfortably this am. This is a patient who is followed for atrial fibrillation, receiving sotalol loading dose. Patient denies chest pain/ discomfort or palpitations. Endorses shortness of breath with with exertion. No other complaints reported. Telemetry reviewed, V-paced at 72 there were no events noted in the past 24 hours. Pertinent review of system items noted above, all other systems are negative. Current medications reviewed. Physical examination:  Vital signs are stable, Blood pressure 133/61,  Pulse 65  No apparent distress. Heart is regular, rate and rhythm. Normal S1, S2, no murmurs are appreciated. Lungs are clear bilaterally. Abdomen is soft, nontender, normal bowel sounds. Extremities have no edema. Impression and recommendations:    1. 1. Atrial dysrhythmia: Last EKG showed QTc 588, decreased sotalol to 40 BID, repeat ekg. Do not administer medications that prolong QT interval  i.e. albuterol. Continue close monitoring of telemetry and electrolytes. Will give potassium 80 mg and magnesium 2gm, repeat labs pending.      2. Cardiomyopathy: Recent EF 10% prior to cardiac catheterization with proximal LAD ORALIA insertion in August. She has AICD placed. Continue Bumex . Monitor I&Os and telemetry.     3. Coronary artery disease: Cardiac catheterization with proximal LAD X1 ORALIA insertion in August. Continue aspirin Brilinta and statin therapy.      4. Hypertension: Blood pressure is at goal, continue antihypertensive medication regimen.     5. Hyperlipidemia: Continue Atorvastatin therapy. Plan for discharge tomorrow in the am. Patient will follow-up in the office in 1 week. Please do not hesitate to call if additional questions arise.

## 2020-11-19 NOTE — PROGRESS NOTES
Problem: Falls - Risk of  Goal: *Absence of Falls  Description: Document Nereida Kennedy Fall Risk and appropriate interventions in the flowsheet.   Outcome: Progressing Towards Goal  Note: Fall Risk Interventions:            Medication Interventions: Patient to call before getting OOB

## 2020-11-20 VITALS
BODY MASS INDEX: 32.9 KG/M2 | SYSTOLIC BLOOD PRESSURE: 107 MMHG | HEIGHT: 62 IN | WEIGHT: 178.79 LBS | HEART RATE: 60 BPM | OXYGEN SATURATION: 94 % | TEMPERATURE: 97.8 F | DIASTOLIC BLOOD PRESSURE: 58 MMHG | RESPIRATION RATE: 20 BRPM

## 2020-11-20 LAB
ATRIAL RATE: 60 BPM
CALCULATED P AXIS, ECG09: 82 DEGREES
CALCULATED R AXIS, ECG10: -61 DEGREES
CALCULATED T AXIS, ECG11: 3 DEGREES
DIAGNOSIS, 93000: NORMAL
GLUCOSE BLD STRIP.AUTO-MCNC: 151 MG/DL (ref 65–100)
GLUCOSE BLD STRIP.AUTO-MCNC: 226 MG/DL (ref 65–100)
P-R INTERVAL, ECG05: 174 MS
PERFORMED BY, TECHID: ABNORMAL
PERFORMED BY, TECHID: ABNORMAL
Q-T INTERVAL, ECG07: 548 MS
QRS DURATION, ECG06: 172 MS
QTC CALCULATION (BEZET), ECG08: 548 MS
VENTRICULAR RATE, ECG03: 60 BPM

## 2020-11-20 PROCEDURE — 93005 ELECTROCARDIOGRAM TRACING: CPT

## 2020-11-20 PROCEDURE — 74011250637 HC RX REV CODE- 250/637: Performed by: INTERNAL MEDICINE

## 2020-11-20 PROCEDURE — 74011250637 HC RX REV CODE- 250/637: Performed by: NURSE PRACTITIONER

## 2020-11-20 PROCEDURE — 74011636637 HC RX REV CODE- 636/637: Performed by: NURSE PRACTITIONER

## 2020-11-20 PROCEDURE — 74011000250 HC RX REV CODE- 250: Performed by: INTERNAL MEDICINE

## 2020-11-20 PROCEDURE — 82962 GLUCOSE BLOOD TEST: CPT

## 2020-11-20 RX ORDER — SOTALOL HYDROCHLORIDE 80 MG/1
40 TABLET ORAL DAILY
Qty: 30 TAB | Refills: 0 | Status: SHIPPED | OUTPATIENT
Start: 2020-11-21 | End: 2021-01-01 | Stop reason: ALTCHOICE

## 2020-11-20 RX ADMIN — POTASSIUM CHLORIDE 40 MEQ: 750 TABLET, FILM COATED, EXTENDED RELEASE ORAL at 08:36

## 2020-11-20 RX ADMIN — INSULIN GLARGINE 16 UNITS: 100 INJECTION, SOLUTION SUBCUTANEOUS at 08:37

## 2020-11-20 RX ADMIN — TICAGRELOR 90 MG: 90 TABLET ORAL at 12:02

## 2020-11-20 RX ADMIN — SOTALOL HYDROCHLORIDE 40 MG: 80 TABLET ORAL at 08:45

## 2020-11-20 RX ADMIN — BUMETANIDE 1 MG: 0.25 INJECTION INTRAMUSCULAR; INTRAVENOUS at 08:36

## 2020-11-20 RX ADMIN — INSULIN LISPRO 4 UNITS: 100 INJECTION, SOLUTION INTRAVENOUS; SUBCUTANEOUS at 08:37

## 2020-11-20 RX ADMIN — INSULIN LISPRO 4 UNITS: 100 INJECTION, SOLUTION INTRAVENOUS; SUBCUTANEOUS at 12:26

## 2020-11-20 RX ADMIN — ATORVASTATIN CALCIUM 40 MG: 40 TABLET, FILM COATED ORAL at 08:36

## 2020-11-20 RX ADMIN — ASPIRIN 81 MG CHEWABLE TABLET 81 MG: 81 TABLET CHEWABLE at 08:36

## 2020-11-20 NOTE — PROGRESS NOTES
Problem: Falls - Risk of  Goal: *Absence of Falls  Description: Document Heather Forbes Fall Risk and appropriate interventions in the flowsheet. 11/20/2020 0343 by Vinicio Alvares RN  Outcome: Progressing Towards Goal  Note: Fall Risk Interventions:            Medication Interventions: Patient to call before getting OOB, Offered the use of the pure wick since she was getting bumex. She declined. 11/20/2020 0340 by Vinicio Alvares RN  Outcome: Progressing Towards Goal  Note: Fall Risk Interventions:            Medication Interventions: Patient to call before getting OOB. Informed patient that she is receiving a powerful diuretic and offered the pure wick for safety. She declined.

## 2020-11-20 NOTE — DISCHARGE SUMMARY
Patient ID:  Corwin Figueroa  [id number]  66 y.o.  1950    Allergies: Codeine; Novocain [procaine]; and Tramadol    Admit Date: 11/16/2020    Discharge Date: 11/20/2020     Admitting Physician: Bc Zuluaga MD     Discharge Physician: Pito Duran NP    * Admission Diagnoses: Acute on chronic congestive heart failure [facility]) [I50.9]  Atrial fibrillation [facility]) [I48.91]    * Discharge Diagnoses:   Hospital Problems as of 11/20/2020 Date Reviewed: 10/21/2020          Codes Class Noted - Resolved POA    Atrial fibrillation ([facility] ICD-10-CM: I48.91  ICD-9-CM: 427.31  11/16/2020 - Present Unknown        Acute on chronic congestive heart failure ([facility] ICD-10-CM: I50.9  ICD-9-CM: 428.0  11/16/2020 - Present Unknown              * Discharge Condition: stable    *Hospital Course: Corwin Figueroa is a 65 y.o. female admitted for initiating of Sotalol treatment. Her history includes: heart failure, atrial fibrillation, TIA, hypertension, DM, fibromyalgia, CAD with stent placement in August, PPM/ICD, hyperlipidemia, slight cerebral aneurysm and Rosario's esophagus. Last EKG showed QTc 548. Patient to be discharged home on 40 mg sotalol daily. Patient has follow-up with Dr. Theoplis Alpers and Dr. Mecca Zendejas within 2 weeks of discharge. Discharge Exam: . Physical Exam   Constitutional: She is oriented to person, place, and time. She appears well-developed. HENT:   Head: Normocephalic. Cardiovascular: Normal rate. Musculoskeletal: Normal range of motion. Neurological: She is alert and oriented to person, place, and time. Skin: Skin is warm and dry. Psychiatric: She has a normal mood and affect. * Disposition: home    Discharge Medications:   Current Discharge Medication List      START taking these medications    Details   sotaloL (BETAPACE) 80 mg tablet Take 0.5 Tabs by mouth daily.   Qty: 30 Tab, Refills: 0         CONTINUE these medications which have NOT CHANGED    Details   insulin lispro protamin-lispro (HumaLOG Mix 75-25 KwikPen) flexpen Humalog Mix 75-25 KwikPen U-100 insulin 100 unit/mL subcutaneous pen      docusate sodium (COLACE) 100 mg capsule Take 1 Cap by mouth two (2) times a day for 90 days. Qty: 60 Cap, Refills: 2      bumetanide (BUMEX) 1 mg tablet Take 1 Tab by mouth two (2) times daily (with meals). Qty: 60 Tab, Refills: 1      ticagrelor (Brilinta) 90 mg tablet Take  by mouth two (2) times a day. multivitamin,tx-iron-minerals (Complete Multivitamin) tab Take  by mouth daily. aspirin delayed-release 81 mg tablet Take 1 Tab by mouth daily. Qty: 30 Tab, Refills: 2    Associated Diagnoses: Cerebral aneurysm      magnesium oxide (MAG-OX) 400 mg tablet Take 400 mg by mouth three (3) times daily. metFORMIN ER (GLUCOPHAGE XR) 750 mg tablet Take 750 mg by mouth two (2) times daily (with meals). melatonin 3 mg tablet Take 3 mg by mouth nightly as needed. acetaminophen (TYLENOL EXTRA STRENGTH) 500 mg tablet Take  by mouth every six (6) hours as needed for Pain.      polyethylene glycol (MIRALAX) 17 gram packet Take 17 g by mouth as needed. gabapentin (NEURONTIN) 100 mg capsule Take 100 mg by mouth three (3) times daily. oxybutynin chloride XL (DITROPAN XL) 10 mg CR tablet Take 10 mg by mouth two (2) times a day. levothyroxine (SYNTHROID) 200 mcg tablet Take 200 mcg by mouth Daily (before breakfast). omeprazole (PRILOSEC) 40 mg capsule Take 40 mg by mouth daily. atorvastatin (LIPITOR) 40 mg tablet Take 40 mg by mouth daily. Associated Diagnoses: Type II or unspecified type diabetes mellitus without mention of complication, uncontrolled; Other specified acquired hypothyroidism; Unspecified vitamin D deficiency      potassium chloride SR (KLOR-CON 10) 10 mEq tablet Take 20 mEq by mouth daily.     Associated Diagnoses: Type II or unspecified type diabetes mellitus without mention of complication, uncontrolled         STOP taking these medications       carvediloL (Coreg) 3.125 mg tablet Comments:   Reason for Stopping:               * Follow-up Care/Patient Instructions: Activity: as tolerated  Diet: diabetic diet          Follow-up Information     Follow up With Specialties Details Why Contact Info    Arlene Laboy MD [address]  [phone]            Signed:   Darrius Pandya NP  11/20/2020  2:12 PM

## 2020-11-20 NOTE — PROGRESS NOTES
MANUELA plan: Home/self care    IMM form completed with patient, and a copy provided to her. Patient is cleared for discharge to home/self care today.

## 2020-11-20 NOTE — DISCHARGE INSTRUCTIONS
Patient Education        Heart Failure: Care Instructions  Your Care Instructions     Heart failure occurs when your heart does not pump as much blood as the body needs. Failure does not mean that the heart has stopped pumping but rather that it is not pumping as well as it should. Over time, this causes fluid buildup in your lungs and other parts of your body. Fluid buildup can cause shortness of breath, fatigue, swollen ankles, and other problems. By taking medicines regularly, reducing sodium (salt) in your diet, checking your weight every day, and making lifestyle changes, you can feel better and live longer. Follow-up care is a key part of your treatment and safety. Be sure to make and go to all appointments, and call your doctor if you are having problems. It's also a good idea to know your test results and keep a list of the medicines you take. How can you care for yourself at home? Medicines    · Be safe with medicines. Take your medicines exactly as prescribed. Call your doctor if you think you are having a problem with your medicine.     · Do not take any vitamins, over-the-counter medicine, or herbal products without talking to your doctor first. Corinne Score not take ibuprofen (Advil or Motrin) and naproxen (Aleve) without talking to your doctor first. They could make your heart failure worse.     · You may take some of the following medicine. ? Angiotensin-converting enzyme inhibitors (ACEIs) or angiotensin II receptor blockers (ARBs) reduce the heart's workload, lower blood pressure, and reduce swelling. Taking an ACEI or ARB may lower your chance of needing to be hospitalized. ? Beta-blockers can slow heart rate, decrease blood pressure, and improve your condition. Taking a beta-blocker may lower your chance of needing to be hospitalized. ? Diuretics, also called water pills, reduce swelling. You will get more details on the specific medicines your doctor prescribes.   Diet    · Your doctor may suggest that you limit sodium. Your doctor can tell you how much sodium is right for you. An example is less than 3,000 mg a day. This includes all the salt you eat in cooking or in packaged foods. People get most of their sodium from processed foods. Fast food and restaurant meals also tend to be very high in sodium.     · Ask your doctor how much liquid you can drink each day. You may have to limit liquids. Weight    · Weigh yourself without clothing at the same time each day. Record your weight. Call your doctor if you have a sudden weight gain, such as more than 2 to 3 pounds in a day or 5 pounds in a week. (Your doctor may suggest a different range of weight gain.) A sudden weight gain may mean that your heart failure is getting worse. Activity level    · Start light exercise (if your doctor says it is okay). Even if you can only do a small amount, exercise will help you get stronger, have more energy, and manage your weight and your stress. Walking is an easy way to get exercise. Start out by walking a little more than you did before. Bit by bit, increase the amount you walk.     · When you exercise, watch for signs that your heart is working too hard. You are pushing yourself too hard if you cannot talk while you are exercising. If you become short of breath or dizzy or have chest pain, stop, sit down, and rest.     · If you feel \"wiped out\" the day after you exercise, walk slower or for a shorter distance until you can work up to a better pace.     · Get enough rest at night. Sleeping with 1 or 2 pillows under your upper body and head may help you breathe easier. Lifestyle changes    · Do not smoke. Smoking can make a heart condition worse. If you need help quitting, talk to your doctor about stop-smoking programs and medicines. These can increase your chances of quitting for good.  Quitting smoking may be the most important step you can take to protect your heart.     · Limit alcohol to 2 drinks a day for men and 1 drink a day for women. Too much alcohol can cause health problems.     · Avoid getting sick from colds and the flu. Get a pneumococcal vaccine shot. If you have had one before, ask your doctor whether you need another dose. Get a flu shot each year. If you must be around people with colds or the flu, wash your hands often. When should you call for help? Call 911 if you have symptoms of sudden heart failure such as:    · You have severe trouble breathing.     · You cough up pink, foamy mucus.     · You have a new irregular or rapid heartbeat. Call your doctor now or seek immediate medical care if:    · You have new or increased shortness of breath.     · You are dizzy or lightheaded, or you feel like you may faint.     · You have sudden weight gain, such as more than 2 to 3 pounds in a day or 5 pounds in a week. (Your doctor may suggest a different range of weight gain.)     · You have increased swelling in your legs, ankles, or feet.     · You are suddenly so tired or weak that you cannot do your usual activities. Watch closely for changes in your health, and be sure to contact your doctor if you develop new symptoms. Where can you learn more? Go to http://www.gray.com/  Enter A049 in the search box to learn more about \"Heart Failure: Care Instructions. \"  Current as of: December 16, 2019               Content Version: 12.6  © 9084-5232 [employer]. Care instructions adapted under license by [employer] (which disclaims liability or warranty for this information). If you have questions about a medical condition or this instruction, always ask your healthcare professional. [place] [address] any warranty or liability for your use of this information.

## 2020-11-23 ENCOUNTER — APPOINTMENT (OUTPATIENT)
Dept: CARDIAC REHAB | Age: 70
End: 2020-11-23
Payer: MEDICARE

## 2020-11-25 ENCOUNTER — APPOINTMENT (OUTPATIENT)
Dept: CARDIAC REHAB | Age: 70
End: 2020-11-25
Payer: MEDICARE

## 2020-11-27 ENCOUNTER — APPOINTMENT (OUTPATIENT)
Dept: CARDIAC REHAB | Age: 70
End: 2020-11-27
Payer: MEDICARE

## 2020-11-30 ENCOUNTER — TRANSCRIBE ORDER (OUTPATIENT)
Dept: SCHEDULING | Age: 70
End: 2020-11-30

## 2020-11-30 ENCOUNTER — APPOINTMENT (OUTPATIENT)
Dept: CARDIAC REHAB | Age: 70
End: 2020-11-30
Payer: MEDICARE

## 2020-11-30 DIAGNOSIS — Z12.31 SCREENING MAMMOGRAM, ENCOUNTER FOR: Primary | ICD-10-CM

## 2020-11-30 DIAGNOSIS — E01.0 THYROMEGALY: Primary | ICD-10-CM

## 2020-12-02 ENCOUNTER — APPOINTMENT (OUTPATIENT)
Dept: CARDIAC REHAB | Age: 70
End: 2020-12-02
Payer: MEDICARE

## 2020-12-04 ENCOUNTER — APPOINTMENT (OUTPATIENT)
Dept: CARDIAC REHAB | Age: 70
End: 2020-12-04
Payer: MEDICARE

## 2020-12-07 ENCOUNTER — HOSPITAL ENCOUNTER (OUTPATIENT)
Dept: CARDIAC REHAB | Age: 70
Discharge: HOME | End: 2020-12-07
Payer: MEDICARE

## 2020-12-07 VITALS — BODY MASS INDEX: 32.44 KG/M2 | WEIGHT: 177.4 LBS

## 2020-12-07 PROCEDURE — 93798 PHYS/QHP OP CAR RHAB W/ECG: CPT

## 2020-12-08 ENCOUNTER — HOSPITAL ENCOUNTER (OUTPATIENT)
Dept: MAMMOGRAPHY | Age: 70
Discharge: HOME | End: 2020-12-08
Payer: MEDICARE

## 2020-12-08 ENCOUNTER — HOSPITAL ENCOUNTER (OUTPATIENT)
Dept: ULTRASOUND IMAGING | Age: 70
Discharge: HOME | End: 2020-12-08
Payer: MEDICARE

## 2020-12-08 DIAGNOSIS — E01.0 THYROMEGALY: ICD-10-CM

## 2020-12-08 DIAGNOSIS — Z12.31 SCREENING MAMMOGRAM, ENCOUNTER FOR: ICD-10-CM

## 2020-12-08 PROCEDURE — 76536 US EXAM OF HEAD AND NECK: CPT

## 2020-12-08 PROCEDURE — 77067 SCR MAMMO BI INCL CAD: CPT

## 2020-12-08 NOTE — PROGRESS NOTES
I have reviewed the notes, assessments, and/or procedures performed, I concur with her/his documentation of Jonnie Salgado.

## 2020-12-09 ENCOUNTER — HOSPITAL ENCOUNTER (OUTPATIENT)
Dept: CARDIAC REHAB | Age: 70
Discharge: HOME | End: 2020-12-09
Payer: MEDICARE

## 2020-12-09 ENCOUNTER — HOSPITAL ENCOUNTER (OUTPATIENT)
Dept: CARDIAC REHAB | Age: 70
End: 2020-12-09
Payer: MEDICARE

## 2020-12-09 PROCEDURE — 93797 PHYS/QHP OP CAR RHAB WO ECG: CPT

## 2020-12-11 ENCOUNTER — HOSPITAL ENCOUNTER (OUTPATIENT)
Dept: CARDIAC REHAB | Age: 70
Discharge: HOME | End: 2020-12-11
Payer: MEDICARE

## 2020-12-11 VITALS — BODY MASS INDEX: 32.22 KG/M2 | WEIGHT: 176.2 LBS

## 2020-12-11 PROCEDURE — 93798 PHYS/QHP OP CAR RHAB W/ECG: CPT

## 2020-12-14 ENCOUNTER — HOSPITAL ENCOUNTER (OUTPATIENT)
Dept: CARDIAC REHAB | Age: 70
Discharge: HOME | End: 2020-12-14
Payer: MEDICARE

## 2020-12-14 VITALS — WEIGHT: 176.2 LBS | BODY MASS INDEX: 32.22 KG/M2

## 2020-12-14 PROCEDURE — 93798 PHYS/QHP OP CAR RHAB W/ECG: CPT

## 2020-12-16 ENCOUNTER — HOSPITAL ENCOUNTER (OUTPATIENT)
Dept: CARDIAC REHAB | Age: 70
Discharge: HOME | End: 2020-12-16
Payer: MEDICARE

## 2020-12-16 VITALS — BODY MASS INDEX: 32.55 KG/M2 | WEIGHT: 178 LBS

## 2020-12-16 PROCEDURE — 93797 PHYS/QHP OP CAR RHAB WO ECG: CPT

## 2020-12-16 PROCEDURE — 93798 PHYS/QHP OP CAR RHAB W/ECG: CPT

## 2020-12-18 ENCOUNTER — HOSPITAL ENCOUNTER (OUTPATIENT)
Dept: CARDIAC REHAB | Age: 70
Discharge: HOME | End: 2020-12-18
Payer: MEDICARE

## 2020-12-18 VITALS — BODY MASS INDEX: 32.37 KG/M2 | WEIGHT: 177 LBS

## 2020-12-18 PROCEDURE — 93798 PHYS/QHP OP CAR RHAB W/ECG: CPT

## 2020-12-21 ENCOUNTER — HOSPITAL ENCOUNTER (OUTPATIENT)
Dept: CARDIAC REHAB | Age: 70
Discharge: HOME | End: 2020-12-21
Payer: MEDICARE

## 2020-12-21 VITALS — BODY MASS INDEX: 32.35 KG/M2 | WEIGHT: 176.9 LBS

## 2020-12-21 PROCEDURE — 93798 PHYS/QHP OP CAR RHAB W/ECG: CPT

## 2020-12-23 ENCOUNTER — HOSPITAL ENCOUNTER (OUTPATIENT)
Dept: CARDIAC REHAB | Age: 70
Discharge: HOME | End: 2020-12-23
Payer: MEDICARE

## 2020-12-23 ENCOUNTER — APPOINTMENT (OUTPATIENT)
Dept: CARDIAC REHAB | Age: 70
End: 2020-12-23
Payer: MEDICARE

## 2020-12-23 VITALS — BODY MASS INDEX: 32.37 KG/M2 | WEIGHT: 177 LBS

## 2020-12-23 PROCEDURE — 93798 PHYS/QHP OP CAR RHAB W/ECG: CPT

## 2020-12-28 ENCOUNTER — HOSPITAL ENCOUNTER (OUTPATIENT)
Dept: CARDIAC REHAB | Age: 70
Discharge: HOME | End: 2020-12-28
Payer: MEDICARE

## 2020-12-28 VITALS — WEIGHT: 173.8 LBS | BODY MASS INDEX: 31.78 KG/M2

## 2020-12-28 PROCEDURE — 93798 PHYS/QHP OP CAR RHAB W/ECG: CPT

## 2020-12-30 ENCOUNTER — HOSPITAL ENCOUNTER (OUTPATIENT)
Dept: CARDIAC REHAB | Age: 70
Discharge: HOME | End: 2020-12-30
Payer: MEDICARE

## 2020-12-30 VITALS — BODY MASS INDEX: 31.93 KG/M2 | WEIGHT: 174.6 LBS

## 2020-12-30 PROCEDURE — 93797 PHYS/QHP OP CAR RHAB WO ECG: CPT

## 2020-12-30 PROCEDURE — 93798 PHYS/QHP OP CAR RHAB W/ECG: CPT

## 2021-01-01 ENCOUNTER — APPOINTMENT (OUTPATIENT)
Dept: VASCULAR SURGERY | Age: 71
DRG: 293 | End: 2021-01-01
Attending: NURSE PRACTITIONER
Payer: MEDICARE

## 2021-01-01 ENCOUNTER — VIRTUAL VISIT (OUTPATIENT)
Dept: NEUROSURGERY | Age: 71
End: 2021-01-01
Payer: MEDICARE

## 2021-01-01 ENCOUNTER — HOSPITAL ENCOUNTER (OUTPATIENT)
Dept: MRI IMAGING | Age: 71
Discharge: HOME | End: 2021-10-22
Attending: STUDENT IN AN ORGANIZED HEALTH CARE EDUCATION/TRAINING PROGRAM
Payer: MEDICARE

## 2021-01-01 ENCOUNTER — TELEPHONE (OUTPATIENT)
Dept: INTERNAL MEDICINE | Age: 71
End: 2021-01-01

## 2021-01-01 ENCOUNTER — APPOINTMENT (OUTPATIENT)
Dept: GENERAL RADIOLOGY | Age: 71
DRG: 293 | End: 2021-01-01
Attending: EMERGENCY MEDICINE
Payer: MEDICARE

## 2021-01-01 ENCOUNTER — TELEPHONE (OUTPATIENT)
Dept: ENDOCRINOLOGY | Age: 71
End: 2021-01-01

## 2021-01-01 ENCOUNTER — OFFICE VISIT (OUTPATIENT)
Dept: ENDOCRINOLOGY | Age: 71
End: 2021-01-01
Payer: MEDICARE

## 2021-01-01 ENCOUNTER — TELEPHONE (OUTPATIENT)
Dept: NEUROSURGERY | Age: 71
End: 2021-01-01

## 2021-01-01 ENCOUNTER — TRANSCRIBE ORDER (OUTPATIENT)
Dept: SCHEDULING | Age: 71
End: 2021-01-01

## 2021-01-01 ENCOUNTER — HOSPITAL ENCOUNTER (INPATIENT)
Age: 71
LOS: 2 days | Discharge: HOME | DRG: 293 | End: 2021-06-17
Attending: EMERGENCY MEDICINE | Admitting: HOSPITALIST
Payer: MEDICARE

## 2021-01-01 VITALS
BODY MASS INDEX: 30.62 KG/M2 | OXYGEN SATURATION: 97 % | HEIGHT: 62 IN | DIASTOLIC BLOOD PRESSURE: 61 MMHG | SYSTOLIC BLOOD PRESSURE: 105 MMHG | TEMPERATURE: 97.9 F | WEIGHT: 166.4 LBS | HEART RATE: 69 BPM | RESPIRATION RATE: 18 BRPM

## 2021-01-01 VITALS
WEIGHT: 170.6 LBS | SYSTOLIC BLOOD PRESSURE: 137 MMHG | TEMPERATURE: 97.3 F | BODY MASS INDEX: 31.39 KG/M2 | DIASTOLIC BLOOD PRESSURE: 65 MMHG | HEART RATE: 78 BPM | HEIGHT: 62 IN | OXYGEN SATURATION: 99 %

## 2021-01-01 DIAGNOSIS — E11.65 UNCONTROLLED TYPE 2 DIABETES MELLITUS WITH HYPERGLYCEMIA (HCC): Primary | ICD-10-CM

## 2021-01-01 DIAGNOSIS — I67.1 CEREBRAL ANEURYSM: Primary | ICD-10-CM

## 2021-01-01 DIAGNOSIS — E03.9 ACQUIRED HYPOTHYROIDISM: Primary | ICD-10-CM

## 2021-01-01 DIAGNOSIS — I50.9 CHRONIC CONGESTIVE HEART FAILURE, UNSPECIFIED HEART FAILURE TYPE (HCC): Primary | ICD-10-CM

## 2021-01-01 DIAGNOSIS — Z12.31 SCREENING MAMMOGRAM FOR HIGH-RISK PATIENT: Primary | ICD-10-CM

## 2021-01-01 DIAGNOSIS — I67.1 CEREBRAL ANEURYSM: ICD-10-CM

## 2021-01-01 DIAGNOSIS — E03.9 ACQUIRED HYPOTHYROIDISM: ICD-10-CM

## 2021-01-01 DIAGNOSIS — I66.02 STENOSIS OF LEFT MIDDLE CEREBRAL ARTERY: Primary | ICD-10-CM

## 2021-01-01 LAB
ALBUMIN SERPL-MCNC: 2.8 G/DL (ref 3.5–5)
ALBUMIN SERPL-MCNC: 3.1 G/DL (ref 3.5–5)
ALBUMIN/GLOB SERPL: 0.6 {RATIO} (ref 1.1–2.2)
ALBUMIN/GLOB SERPL: 0.7 {RATIO} (ref 1.1–2.2)
ALP SERPL-CCNC: 102 U/L (ref 45–117)
ALP SERPL-CCNC: 114 U/L (ref 45–117)
ALT SERPL-CCNC: 20 U/L (ref 12–78)
ALT SERPL-CCNC: 26 U/L (ref 12–78)
ANION GAP SERPL CALC-SCNC: 5 MMOL/L (ref 5–15)
ANION GAP SERPL CALC-SCNC: 6 MMOL/L (ref 5–15)
AST SERPL W P-5'-P-CCNC: 24 U/L (ref 15–37)
AST SERPL W P-5'-P-CCNC: 28 U/L (ref 15–37)
ATRIAL RATE: 73 BPM
BASOPHILS # BLD: 0 K/UL (ref 0–0.2)
BASOPHILS # BLD: 0 K/UL (ref 0–0.2)
BASOPHILS NFR BLD: 1 % (ref 0–2.5)
BASOPHILS NFR BLD: 1 % (ref 0–2.5)
BILIRUB SERPL-MCNC: 0.5 MG/DL (ref 0.2–1)
BILIRUB SERPL-MCNC: 0.7 MG/DL (ref 0.2–1)
BNP SERPL-MCNC: 283 PG/ML
BNP SERPL-MCNC: 499 PG/ML
BUN SERPL-MCNC: 24 MG/DL (ref 6–20)
BUN SERPL-MCNC: 28 MG/DL (ref 6–20)
BUN/CREAT SERPL: 24 (ref 12–20)
BUN/CREAT SERPL: 29 (ref 12–20)
CA-I BLD-MCNC: 8.8 MG/DL (ref 8.5–10.1)
CA-I BLD-MCNC: 9.1 MG/DL (ref 8.5–10.1)
CALCULATED P AXIS, ECG09: 21 DEGREES
CALCULATED R AXIS, ECG10: -151 DEGREES
CALCULATED T AXIS, ECG11: 89 DEGREES
CHLORIDE SERPL-SCNC: 98 MMOL/L (ref 97–108)
CHLORIDE SERPL-SCNC: 99 MMOL/L (ref 97–108)
CK SERPL-CCNC: 49 U/L (ref 26–192)
CO2 SERPL-SCNC: 32 MMOL/L (ref 21–32)
CO2 SERPL-SCNC: 33 MMOL/L (ref 21–32)
CREAT SERPL-MCNC: 0.84 MG/DL (ref 0.55–1.02)
CREAT SERPL-MCNC: 1.15 MG/DL (ref 0.55–1.02)
CREATININE, EXTERNAL: 1.09
DIAGNOSIS, 93000: NORMAL
ECHO EST RA PRESSURE: 3 MMHG
ECHO LA VOL 4C: 74.98 ML (ref 22–52)
ECHO LA VOLUME INDEX A4C: 42.12 ML/M2 (ref 16–28)
ECHO LV EDV A2C: 138.46 ML
ECHO LV EDV A2C: 138.46 ML
ECHO LV EDV BP: 75.9 ML (ref 56–104)
ECHO LV EDV BP: 75.9 ML (ref 56–104)
ECHO LV EJECTION FRACTION A2C: 28 PERCENT
ECHO LV EJECTION FRACTION A2C: 28 PERCENT
ECHO LV EJECTION FRACTION A4C: 21 PERCENT
ECHO LV EJECTION FRACTION A4C: 21 PERCENT
ECHO LV EJECTION FRACTION BIPLANE: 28.1 PERCENT (ref 55–100)
ECHO LV ESV A2C: 54.3 ML
ECHO LV ESV BP: 54.54 ML (ref 19–49)
ECHO LV ESV BP: 54.54 ML (ref 19–49)
ECHO LV ESV INDEX A2C: 30.5 ML/M2
ECHO LV INTERNAL DIMENSION DIASTOLIC: 5.35 CM (ref 3.9–5.3)
ECHO LV INTERNAL DIMENSION SYSTOLIC: 4.59 CM
ECHO LV IVSD: 1.13 CM (ref 0.6–0.9)
ECHO LV MASS 2D: 235.5 G (ref 67–162)
ECHO LV MASS INDEX 2D: 132.3 G/M2 (ref 43–95)
ECHO LV POSTERIOR WALL DIASTOLIC: 1.1 CM (ref 0.6–0.9)
ECHO LVOT SV: 56.8 ML
ECHO LVOT SV: 56.8 ML
ECHO RIGHT VENTRICULAR SYSTOLIC PRESSURE (RVSP): 18 MMHG
ECHO TV REGURGITANT MAX VELOCITY: 198.16 CM/S
ECHO TV REGURGITANT PEAK GRADIENT: 15.71 MMHG
EOSINOPHIL # BLD: 0.1 K/UL (ref 0–0.7)
EOSINOPHIL # BLD: 0.2 K/UL (ref 0–0.7)
EOSINOPHIL NFR BLD: 2 % (ref 0.9–2.9)
EOSINOPHIL NFR BLD: 4 % (ref 0.9–2.9)
ERYTHROCYTE [DISTWIDTH] IN BLOOD BY AUTOMATED COUNT: 13.4 % (ref 11.5–14.5)
ERYTHROCYTE [DISTWIDTH] IN BLOOD BY AUTOMATED COUNT: 13.5 % (ref 11.5–14.5)
EST. AVERAGE GLUCOSE BLD GHB EST-MCNC: 246 MG/DL
GLOBULIN SER CALC-MCNC: 4.3 G/DL (ref 2–4)
GLOBULIN SER CALC-MCNC: 5 G/DL (ref 2–4)
GLUCOSE BLD STRIP.AUTO-MCNC: 108 MG/DL (ref 65–117)
GLUCOSE BLD STRIP.AUTO-MCNC: 115 MG/DL (ref 65–117)
GLUCOSE BLD STRIP.AUTO-MCNC: 180 MG/DL (ref 65–117)
GLUCOSE BLD STRIP.AUTO-MCNC: 190 MG/DL (ref 65–117)
GLUCOSE BLD STRIP.AUTO-MCNC: 210 MG/DL (ref 65–117)
GLUCOSE BLD STRIP.AUTO-MCNC: 249 MG/DL (ref 65–117)
GLUCOSE BLD STRIP.AUTO-MCNC: 249 MG/DL (ref 65–117)
GLUCOSE SERPL-MCNC: 169 MG/DL (ref 65–100)
GLUCOSE SERPL-MCNC: 365 MG/DL (ref 65–100)
HBA1C MFR BLD: 10.2 % (ref 4–5.6)
HCT VFR BLD AUTO: 35.1 % (ref 36–46)
HCT VFR BLD AUTO: 38.3 % (ref 36–46)
HGB BLD-MCNC: 12 G/DL (ref 13.5–17.5)
HGB BLD-MCNC: 12.8 G/DL (ref 13.5–17.5)
LDL-C, EXTERNAL: 106
LYMPHOCYTES # BLD: 1.5 K/UL (ref 1–4.8)
LYMPHOCYTES # BLD: 2.1 K/UL (ref 1–4.8)
LYMPHOCYTES NFR BLD: 25 % (ref 20.5–51.1)
LYMPHOCYTES NFR BLD: 39 % (ref 20.5–51.1)
MAGNESIUM SERPL-MCNC: 1.3 MG/DL (ref 1.6–2.4)
MAGNESIUM SERPL-MCNC: 2.1 MG/DL (ref 1.6–2.4)
MCH RBC QN AUTO: 31.3 PG (ref 31–34)
MCH RBC QN AUTO: 31.9 PG (ref 31–34)
MCHC RBC AUTO-ENTMCNC: 33.3 G/DL (ref 31–36)
MCHC RBC AUTO-ENTMCNC: 34.1 G/DL (ref 31–36)
MCV RBC AUTO: 93.6 FL (ref 80–100)
MCV RBC AUTO: 94.1 FL (ref 80–100)
MONOCYTES # BLD: 0.6 K/UL (ref 0.2–2.4)
MONOCYTES # BLD: 0.6 K/UL (ref 0.2–2.4)
MONOCYTES NFR BLD: 11 % (ref 1.7–9.3)
MONOCYTES NFR BLD: 11 % (ref 1.7–9.3)
NEUTS SEG # BLD: 2.4 K/UL (ref 1.8–7.7)
NEUTS SEG # BLD: 3.7 K/UL (ref 1.8–7.7)
NEUTS SEG NFR BLD: 45 % (ref 42–75)
NEUTS SEG NFR BLD: 61 % (ref 42–75)
NRBC # BLD: 0 K/UL
NRBC # BLD: 0.01 K/UL
NRBC BLD-RTO: 0.1 PER 100 WBC
NRBC BLD-RTO: 0.1 PER 100 WBC
P-R INTERVAL, ECG05: 50 MS
PERFORMED BY, TECHID: ABNORMAL
PERFORMED BY, TECHID: NORMAL
PERFORMED BY, TECHID: NORMAL
PLATELET # BLD AUTO: 148 K/UL (ref 150–400)
PLATELET # BLD AUTO: 170 K/UL (ref 150–400)
PMV BLD AUTO: 8.3 FL (ref 6.5–11.5)
PMV BLD AUTO: 8.3 FL (ref 6.5–11.5)
POTASSIUM SERPL-SCNC: 3 MMOL/L (ref 3.5–5.1)
POTASSIUM SERPL-SCNC: 3.9 MMOL/L (ref 3.5–5.1)
PROT SERPL-MCNC: 7.1 G/DL (ref 6.4–8.2)
PROT SERPL-MCNC: 8.1 G/DL (ref 6.4–8.2)
Q-T INTERVAL, ECG07: 448 MS
QRS DURATION, ECG06: 130 MS
QTC CALCULATION (BEZET), ECG08: 508 MS
RBC # BLD AUTO: 3.75 M/UL (ref 4.5–5.9)
RBC # BLD AUTO: 4.07 M/UL (ref 4.5–5.9)
SODIUM SERPL-SCNC: 136 MMOL/L (ref 136–145)
SODIUM SERPL-SCNC: 137 MMOL/L (ref 136–145)
T4 FREE SERPL-MCNC: 1.57 NG/DL (ref 0.82–1.77)
TROPONIN I SERPL-MCNC: 0.26 NG/ML
TROPONIN I SERPL-MCNC: 0.28 NG/ML
TROPONIN I SERPL-MCNC: 0.29 NG/ML
TSH SERPL DL<=0.005 MIU/L-ACNC: 0.27 UIU/ML (ref 0.45–4.5)
VENTRICULAR RATE, ECG03: 77 BPM
WBC # BLD AUTO: 5.3 K/UL (ref 4.4–11.3)
WBC # BLD AUTO: 5.9 K/UL (ref 4.4–11.3)

## 2021-01-01 PROCEDURE — 74011250636 HC RX REV CODE- 250/636: Performed by: PHYSICIAN ASSISTANT

## 2021-01-01 PROCEDURE — 74011250637 HC RX REV CODE- 250/637: Performed by: HOSPITALIST

## 2021-01-01 PROCEDURE — 65270000029 HC RM PRIVATE

## 2021-01-01 PROCEDURE — 80053 COMPREHEN METABOLIC PANEL: CPT

## 2021-01-01 PROCEDURE — 84484 ASSAY OF TROPONIN QUANT: CPT

## 2021-01-01 PROCEDURE — 70544 MR ANGIOGRAPHY HEAD W/O DYE: CPT

## 2021-01-01 PROCEDURE — 85025 COMPLETE CBC W/AUTO DIFF WBC: CPT

## 2021-01-01 PROCEDURE — G8399 PT W/DXA RESULTS DOCUMENT: HCPCS | Performed by: INTERNAL MEDICINE

## 2021-01-01 PROCEDURE — 74011636637 HC RX REV CODE- 636/637: Performed by: PHYSICIAN ASSISTANT

## 2021-01-01 PROCEDURE — 74011250637 HC RX REV CODE- 250/637: Performed by: PHYSICIAN ASSISTANT

## 2021-01-01 PROCEDURE — 2022F DILAT RTA XM EVC RTNOPTHY: CPT | Performed by: INTERNAL MEDICINE

## 2021-01-01 PROCEDURE — 93321 DOPPLER ECHO F-UP/LMTD STD: CPT

## 2021-01-01 PROCEDURE — 93005 ELECTROCARDIOGRAM TRACING: CPT

## 2021-01-01 PROCEDURE — 83735 ASSAY OF MAGNESIUM: CPT

## 2021-01-01 PROCEDURE — 74011250636 HC RX REV CODE- 250/636: Performed by: NURSE PRACTITIONER

## 2021-01-01 PROCEDURE — 99214 OFFICE O/P EST MOD 30 MIN: CPT | Performed by: INTERNAL MEDICINE

## 2021-01-01 PROCEDURE — 94762 N-INVAS EAR/PLS OXIMTRY CONT: CPT

## 2021-01-01 PROCEDURE — 3046F HEMOGLOBIN A1C LEVEL >9.0%: CPT | Performed by: INTERNAL MEDICINE

## 2021-01-01 PROCEDURE — 1101F PT FALLS ASSESS-DOCD LE1/YR: CPT | Performed by: INTERNAL MEDICINE

## 2021-01-01 PROCEDURE — 71046 X-RAY EXAM CHEST 2 VIEWS: CPT

## 2021-01-01 PROCEDURE — 36415 COLL VENOUS BLD VENIPUNCTURE: CPT

## 2021-01-01 PROCEDURE — 82962 GLUCOSE BLOOD TEST: CPT

## 2021-01-01 PROCEDURE — G8536 NO DOC ELDER MAL SCRN: HCPCS | Performed by: INTERNAL MEDICINE

## 2021-01-01 PROCEDURE — 83036 HEMOGLOBIN GLYCOSYLATED A1C: CPT

## 2021-01-01 PROCEDURE — G8752 SYS BP LESS 140: HCPCS | Performed by: INTERNAL MEDICINE

## 2021-01-01 PROCEDURE — 3017F COLORECTAL CA SCREEN DOC REV: CPT | Performed by: INTERNAL MEDICINE

## 2021-01-01 PROCEDURE — G8754 DIAS BP LESS 90: HCPCS | Performed by: INTERNAL MEDICINE

## 2021-01-01 PROCEDURE — 83880 ASSAY OF NATRIURETIC PEPTIDE: CPT

## 2021-01-01 PROCEDURE — 99284 EMERGENCY DEPT VISIT MOD MDM: CPT

## 2021-01-01 PROCEDURE — 99212 OFFICE O/P EST SF 10 MIN: CPT | Performed by: STUDENT IN AN ORGANIZED HEALTH CARE EDUCATION/TRAINING PROGRAM

## 2021-01-01 PROCEDURE — 82550 ASSAY OF CK (CPK): CPT

## 2021-01-01 PROCEDURE — G8417 CALC BMI ABV UP PARAM F/U: HCPCS | Performed by: INTERNAL MEDICINE

## 2021-01-01 PROCEDURE — 1090F PRES/ABSN URINE INCON ASSESS: CPT | Performed by: INTERNAL MEDICINE

## 2021-01-01 PROCEDURE — G8510 SCR DEP NEG, NO PLAN REQD: HCPCS | Performed by: INTERNAL MEDICINE

## 2021-01-01 PROCEDURE — G8427 DOCREV CUR MEDS BY ELIG CLIN: HCPCS | Performed by: INTERNAL MEDICINE

## 2021-01-01 PROCEDURE — G9899 SCRN MAM PERF RSLTS DOC: HCPCS | Performed by: INTERNAL MEDICINE

## 2021-01-01 RX ORDER — POTASSIUM CHLORIDE 750 MG/1
20 TABLET, FILM COATED, EXTENDED RELEASE ORAL DAILY
Status: DISCONTINUED | OUTPATIENT
Start: 2021-01-01 | End: 2021-01-01

## 2021-01-01 RX ORDER — ATORVASTATIN CALCIUM 40 MG/1
40 TABLET, FILM COATED ORAL DAILY
Status: DISCONTINUED | OUTPATIENT
Start: 2021-01-01 | End: 2021-01-01 | Stop reason: HOSPADM

## 2021-01-01 RX ORDER — DEXTROSE 50 % IN WATER (D50W) INTRAVENOUS SYRINGE
25-50 AS NEEDED
Status: DISCONTINUED | OUTPATIENT
Start: 2021-01-01 | End: 2021-01-01 | Stop reason: HOSPADM

## 2021-01-01 RX ORDER — MAGNESIUM SULFATE HEPTAHYDRATE 40 MG/ML
2 INJECTION, SOLUTION INTRAVENOUS ONCE
Status: COMPLETED | OUTPATIENT
Start: 2021-01-01 | End: 2021-01-01

## 2021-01-01 RX ORDER — DULOXETIN HYDROCHLORIDE 30 MG/1
30 CAPSULE, DELAYED RELEASE ORAL DAILY
Status: DISCONTINUED | OUTPATIENT
Start: 2021-01-01 | End: 2021-01-01 | Stop reason: HOSPADM

## 2021-01-01 RX ORDER — PANTOPRAZOLE SODIUM 40 MG/1
40 TABLET, DELAYED RELEASE ORAL
Status: DISCONTINUED | OUTPATIENT
Start: 2021-01-01 | End: 2021-01-01 | Stop reason: HOSPADM

## 2021-01-01 RX ORDER — MAGNESIUM SULFATE 100 %
4 CRYSTALS MISCELLANEOUS AS NEEDED
Status: DISCONTINUED | OUTPATIENT
Start: 2021-01-01 | End: 2021-01-01 | Stop reason: HOSPADM

## 2021-01-01 RX ORDER — BLOOD SUGAR DIAGNOSTIC
STRIP MISCELLANEOUS
Qty: 300 STRIP | Refills: 1 | Status: SHIPPED | OUTPATIENT
Start: 2021-01-01 | End: 2022-01-01

## 2021-01-01 RX ORDER — METFORMIN HYDROCHLORIDE 500 MG/1
500 TABLET ORAL
Status: DISCONTINUED | OUTPATIENT
Start: 2021-01-01 | End: 2021-01-01 | Stop reason: HOSPADM

## 2021-01-01 RX ORDER — LEVOTHYROXINE SODIUM 125 UG/1
125 TABLET ORAL
Qty: 90 TABLET | Refills: 1 | Status: SHIPPED | OUTPATIENT
Start: 2021-01-01 | End: 2021-01-01

## 2021-01-01 RX ORDER — CETIRIZINE HCL 10 MG
10 TABLET ORAL DAILY
Status: DISCONTINUED | OUTPATIENT
Start: 2021-01-01 | End: 2021-01-01 | Stop reason: HOSPADM

## 2021-01-01 RX ORDER — LANOLIN ALCOHOL/MO/W.PET/CERES
400 CREAM (GRAM) TOPICAL 3 TIMES DAILY
Status: DISCONTINUED | OUTPATIENT
Start: 2021-01-01 | End: 2021-01-01 | Stop reason: HOSPADM

## 2021-01-01 RX ORDER — INSULIN LISPRO 100 [IU]/ML
INJECTION, SOLUTION INTRAVENOUS; SUBCUTANEOUS
Status: DISCONTINUED | OUTPATIENT
Start: 2021-01-01 | End: 2021-01-01 | Stop reason: HOSPADM

## 2021-01-01 RX ORDER — SODIUM CHLORIDE 0.9 % (FLUSH) 0.9 %
5-40 SYRINGE (ML) INJECTION EVERY 8 HOURS
Status: DISCONTINUED | OUTPATIENT
Start: 2021-01-01 | End: 2021-01-01 | Stop reason: HOSPADM

## 2021-01-01 RX ORDER — DOCUSATE SODIUM 100 MG/1
100 CAPSULE, LIQUID FILLED ORAL 2 TIMES DAILY
COMMUNITY

## 2021-01-01 RX ORDER — FUROSEMIDE 10 MG/ML
40 INJECTION INTRAMUSCULAR; INTRAVENOUS EVERY 8 HOURS
Status: DISCONTINUED | OUTPATIENT
Start: 2021-01-01 | End: 2021-01-01

## 2021-01-01 RX ORDER — SACUBITRIL AND VALSARTAN 24; 26 MG/1; MG/1
1 TABLET, FILM COATED ORAL 2 TIMES DAILY
COMMUNITY

## 2021-01-01 RX ORDER — LEVOTHYROXINE SODIUM 150 UG/1
TABLET ORAL
COMMUNITY
End: 2021-01-01 | Stop reason: DRUGHIGH

## 2021-01-01 RX ORDER — SODIUM CHLORIDE 0.9 % (FLUSH) 0.9 %
5-40 SYRINGE (ML) INJECTION AS NEEDED
Status: DISCONTINUED | OUTPATIENT
Start: 2021-01-01 | End: 2021-01-01 | Stop reason: HOSPADM

## 2021-01-01 RX ORDER — BLOOD-GLUCOSE METER
EACH MISCELLANEOUS
Qty: 1 EACH | Refills: 0 | Status: SHIPPED | OUTPATIENT
Start: 2021-01-01 | End: 2022-01-01

## 2021-01-01 RX ORDER — POTASSIUM CHLORIDE 20 MEQ/1
40 TABLET, EXTENDED RELEASE ORAL 2 TIMES DAILY
Status: DISCONTINUED | OUTPATIENT
Start: 2021-01-01 | End: 2021-01-01 | Stop reason: HOSPADM

## 2021-01-01 RX ORDER — DULOXETIN HYDROCHLORIDE 30 MG/1
30 CAPSULE, DELAYED RELEASE ORAL DAILY
COMMUNITY
End: 2022-01-01

## 2021-01-01 RX ORDER — ACETAMINOPHEN 325 MG/1
650 TABLET ORAL
Status: DISCONTINUED | OUTPATIENT
Start: 2021-01-01 | End: 2021-01-01 | Stop reason: HOSPADM

## 2021-01-01 RX ORDER — ENOXAPARIN SODIUM 100 MG/ML
40 INJECTION SUBCUTANEOUS DAILY
Status: DISCONTINUED | OUTPATIENT
Start: 2021-01-01 | End: 2021-01-01 | Stop reason: HOSPADM

## 2021-01-01 RX ORDER — ALBUTEROL SULFATE 90 UG/1
2 AEROSOL, METERED RESPIRATORY (INHALATION)
COMMUNITY
Start: 2021-04-30

## 2021-01-01 RX ORDER — DOCUSATE SODIUM 100 MG/1
100 CAPSULE, LIQUID FILLED ORAL 2 TIMES DAILY
Status: DISCONTINUED | OUTPATIENT
Start: 2021-01-01 | End: 2021-01-01 | Stop reason: HOSPADM

## 2021-01-01 RX ORDER — BISMUTH SUBSALICYLATE 262 MG
1 TABLET,CHEWABLE ORAL DAILY
Status: DISCONTINUED | OUTPATIENT
Start: 2021-01-01 | End: 2021-01-01 | Stop reason: HOSPADM

## 2021-01-01 RX ORDER — ASPIRIN 81 MG/1
81 TABLET ORAL DAILY
Status: DISCONTINUED | OUTPATIENT
Start: 2021-01-01 | End: 2021-01-01 | Stop reason: HOSPADM

## 2021-01-01 RX ORDER — POTASSIUM CHLORIDE 20 MEQ/1
20 TABLET, EXTENDED RELEASE ORAL 2 TIMES DAILY
Qty: 4 TABLET | Refills: 0 | Status: SHIPPED | OUTPATIENT
Start: 2021-01-01 | End: 2021-01-01 | Stop reason: ALTCHOICE

## 2021-01-01 RX ORDER — GABAPENTIN 300 MG/1
300 CAPSULE ORAL 2 TIMES DAILY
Status: DISCONTINUED | OUTPATIENT
Start: 2021-01-01 | End: 2021-01-01 | Stop reason: HOSPADM

## 2021-01-01 RX ORDER — SOTALOL HYDROCHLORIDE 80 MG/1
40 TABLET ORAL DAILY
Status: DISCONTINUED | OUTPATIENT
Start: 2021-01-01 | End: 2021-01-01 | Stop reason: HOSPADM

## 2021-01-01 RX ORDER — AMIODARONE HYDROCHLORIDE 200 MG/1
200 TABLET ORAL DAILY
COMMUNITY
Start: 2021-01-01

## 2021-01-01 RX ORDER — FAMOTIDINE 20 MG/1
20 TABLET, FILM COATED ORAL DAILY
Status: DISCONTINUED | OUTPATIENT
Start: 2021-01-01 | End: 2021-01-01

## 2021-01-01 RX ORDER — ACETAMINOPHEN 650 MG/1
650 SUPPOSITORY RECTAL
Status: DISCONTINUED | OUTPATIENT
Start: 2021-01-01 | End: 2021-01-01 | Stop reason: HOSPADM

## 2021-01-01 RX ORDER — POLYETHYLENE GLYCOL 3350 17 G/17G
17 POWDER, FOR SOLUTION ORAL DAILY PRN
Status: DISCONTINUED | OUTPATIENT
Start: 2021-01-01 | End: 2021-01-01 | Stop reason: HOSPADM

## 2021-01-01 RX ORDER — FUROSEMIDE 10 MG/ML
20 INJECTION INTRAMUSCULAR; INTRAVENOUS EVERY 8 HOURS
Status: DISCONTINUED | OUTPATIENT
Start: 2021-01-01 | End: 2021-01-01 | Stop reason: HOSPADM

## 2021-01-01 RX ORDER — FLUTICASONE PROPIONATE 50 MCG
2 SPRAY, SUSPENSION (ML) NASAL DAILY
Status: DISCONTINUED | OUTPATIENT
Start: 2021-01-01 | End: 2021-01-01 | Stop reason: HOSPADM

## 2021-01-01 RX ORDER — LANCETS 33 GAUGE
EACH MISCELLANEOUS
Qty: 300 LANCET | Refills: 2 | Status: SHIPPED | OUTPATIENT
Start: 2021-01-01 | End: 2022-01-01

## 2021-01-01 RX ADMIN — MAGNESIUM SULFATE HEPTAHYDRATE 2 G: 40 INJECTION, SOLUTION INTRAVENOUS at 02:28

## 2021-01-01 RX ADMIN — DOCUSATE SODIUM 100 MG: 100 CAPSULE ORAL at 09:25

## 2021-01-01 RX ADMIN — TICAGRELOR 90 MG: 90 TABLET ORAL at 21:18

## 2021-01-01 RX ADMIN — Medication 10 ML: at 05:17

## 2021-01-01 RX ADMIN — ACETAMINOPHEN 650 MG: 325 TABLET ORAL at 19:33

## 2021-01-01 RX ADMIN — Medication 10 ML: at 14:15

## 2021-01-01 RX ADMIN — MAGNESIUM OXIDE 400 MG: 400 TABLET ORAL at 21:18

## 2021-01-01 RX ADMIN — INSULIN LISPRO 108 UNITS: 100 INJECTION, SOLUTION INTRAVENOUS; SUBCUTANEOUS at 07:30

## 2021-01-01 RX ADMIN — MAGNESIUM OXIDE 400 MG: 400 TABLET ORAL at 09:24

## 2021-01-01 RX ADMIN — INSULIN LISPRO 2 UNITS: 100 INJECTION, SOLUTION INTRAVENOUS; SUBCUTANEOUS at 21:20

## 2021-01-01 RX ADMIN — Medication 10 ML: at 21:17

## 2021-01-01 RX ADMIN — SOTALOL HYDROCHLORIDE 40 MG: 80 TABLET ORAL at 09:24

## 2021-01-01 RX ADMIN — TICAGRELOR 90 MG: 90 TABLET ORAL at 09:24

## 2021-01-01 RX ADMIN — MAGNESIUM OXIDE 400 MG: 400 TABLET ORAL at 09:27

## 2021-01-01 RX ADMIN — TICAGRELOR 90 MG: 90 TABLET ORAL at 21:15

## 2021-01-01 RX ADMIN — FAMOTIDINE 20 MG: 20 TABLET ORAL at 09:24

## 2021-01-01 RX ADMIN — Medication 10 ML: at 21:19

## 2021-01-01 RX ADMIN — FLUTICASONE PROPIONATE 2 SPRAY: 50 SPRAY, METERED NASAL at 09:27

## 2021-01-01 RX ADMIN — SOTALOL HYDROCHLORIDE 40 MG: 80 TABLET ORAL at 09:25

## 2021-01-01 RX ADMIN — MAGNESIUM OXIDE 400 MG: 400 TABLET ORAL at 17:02

## 2021-01-01 RX ADMIN — METFORMIN HYDROCHLORIDE 500 MG: 500 TABLET ORAL at 09:24

## 2021-01-01 RX ADMIN — CETIRIZINE HYDROCHLORIDE 10 MG: 10 TABLET, FILM COATED ORAL at 12:11

## 2021-01-01 RX ADMIN — INSULIN LISPRO 2 UNITS: 100 INJECTION, SOLUTION INTRAVENOUS; SUBCUTANEOUS at 11:27

## 2021-01-01 RX ADMIN — ASPIRIN 81 MG: 81 TABLET, COATED ORAL at 09:24

## 2021-01-01 RX ADMIN — POTASSIUM CHLORIDE 40 MEQ: 1500 TABLET, EXTENDED RELEASE ORAL at 09:27

## 2021-01-01 RX ADMIN — DOCUSATE SODIUM 100 MG: 100 CAPSULE ORAL at 09:23

## 2021-01-01 RX ADMIN — POTASSIUM CHLORIDE 40 MEQ: 1500 TABLET, EXTENDED RELEASE ORAL at 09:24

## 2021-01-01 RX ADMIN — MAGNESIUM OXIDE 400 MG: 400 TABLET ORAL at 21:15

## 2021-01-01 RX ADMIN — PANTOPRAZOLE SODIUM 40 MG: 40 TABLET, DELAYED RELEASE ORAL at 07:58

## 2021-01-01 RX ADMIN — ENOXAPARIN SODIUM 40 MG: 40 INJECTION SUBCUTANEOUS at 09:27

## 2021-01-01 RX ADMIN — FUROSEMIDE 20 MG: 10 INJECTION, SOLUTION INTRAMUSCULAR; INTRAVENOUS at 05:17

## 2021-01-01 RX ADMIN — GABAPENTIN 300 MG: 300 CAPSULE ORAL at 21:15

## 2021-01-01 RX ADMIN — METFORMIN HYDROCHLORIDE 500 MG: 500 TABLET ORAL at 09:37

## 2021-01-01 RX ADMIN — MULTIVITAMIN TABLET 1 TABLET: TABLET at 09:26

## 2021-01-01 RX ADMIN — GABAPENTIN 300 MG: 300 CAPSULE ORAL at 21:18

## 2021-01-01 RX ADMIN — FUROSEMIDE 40 MG: 10 INJECTION, SOLUTION INTRAVENOUS at 19:33

## 2021-01-01 RX ADMIN — ENOXAPARIN SODIUM 40 MG: 40 INJECTION SUBCUTANEOUS at 09:24

## 2021-01-01 RX ADMIN — Medication 10 ML: at 05:49

## 2021-01-01 RX ADMIN — MULTIVITAMIN TABLET 1 TABLET: TABLET at 09:24

## 2021-01-01 RX ADMIN — ATORVASTATIN CALCIUM 40 MG: 40 TABLET, FILM COATED ORAL at 09:24

## 2021-01-01 RX ADMIN — Medication 10 ML: at 18:29

## 2021-01-01 RX ADMIN — INSULIN LISPRO 2 UNITS: 100 INJECTION, SOLUTION INTRAVENOUS; SUBCUTANEOUS at 21:16

## 2021-01-01 RX ADMIN — CETIRIZINE HYDROCHLORIDE 10 MG: 10 TABLET, FILM COATED ORAL at 09:26

## 2021-01-01 RX ADMIN — POTASSIUM CHLORIDE 40 MEQ: 1500 TABLET, EXTENDED RELEASE ORAL at 21:15

## 2021-01-01 RX ADMIN — GABAPENTIN 300 MG: 300 CAPSULE ORAL at 09:24

## 2021-01-01 RX ADMIN — FUROSEMIDE 20 MG: 10 INJECTION, SOLUTION INTRAMUSCULAR; INTRAVENOUS at 14:15

## 2021-01-01 RX ADMIN — INSULIN LISPRO 3 UNITS: 100 INJECTION, SOLUTION INTRAVENOUS; SUBCUTANEOUS at 07:58

## 2021-01-01 RX ADMIN — PANTOPRAZOLE SODIUM 40 MG: 40 TABLET, DELAYED RELEASE ORAL at 09:36

## 2021-01-01 RX ADMIN — FLUTICASONE PROPIONATE 2 SPRAY: 50 SPRAY, METERED NASAL at 09:37

## 2021-01-01 RX ADMIN — INSULIN LISPRO 24 UNITS: 100 INJECTION, SUSPENSION SUBCUTANEOUS at 09:00

## 2021-01-01 RX ADMIN — TICAGRELOR 90 MG: 90 TABLET ORAL at 09:27

## 2021-01-01 RX ADMIN — DULOXETINE HYDROCHLORIDE 30 MG: 30 CAPSULE, DELAYED RELEASE ORAL at 09:27

## 2021-01-01 RX ADMIN — GABAPENTIN 300 MG: 300 CAPSULE ORAL at 09:27

## 2021-01-01 RX ADMIN — FUROSEMIDE 20 MG: 10 INJECTION, SOLUTION INTRAMUSCULAR; INTRAVENOUS at 21:16

## 2021-01-01 RX ADMIN — INSULIN LISPRO 24 UNITS: 100 INJECTION, SUSPENSION SUBCUTANEOUS at 09:27

## 2021-01-01 RX ADMIN — DOCUSATE SODIUM 100 MG: 100 CAPSULE ORAL at 21:18

## 2021-01-01 RX ADMIN — INSULIN LISPRO 2 UNITS: 100 INJECTION, SOLUTION INTRAVENOUS; SUBCUTANEOUS at 12:11

## 2021-01-01 RX ADMIN — INSULIN LISPRO 24 UNITS: 100 INJECTION, SUSPENSION SUBCUTANEOUS at 21:17

## 2021-01-01 RX ADMIN — FUROSEMIDE 40 MG: 10 INJECTION, SOLUTION INTRAVENOUS at 05:49

## 2021-01-01 RX ADMIN — ATORVASTATIN CALCIUM 40 MG: 40 TABLET, FILM COATED ORAL at 09:25

## 2021-01-01 RX ADMIN — DOCUSATE SODIUM 100 MG: 100 CAPSULE ORAL at 21:15

## 2021-01-01 RX ADMIN — ASPIRIN 81 MG: 81 TABLET, COATED ORAL at 09:27

## 2021-01-01 RX ADMIN — DULOXETINE HYDROCHLORIDE 30 MG: 30 CAPSULE, DELAYED RELEASE ORAL at 09:24

## 2021-01-04 ENCOUNTER — HOSPITAL ENCOUNTER (OUTPATIENT)
Dept: CARDIAC REHAB | Age: 71
Discharge: HOME | End: 2021-01-04
Payer: MEDICARE

## 2021-01-04 VITALS — WEIGHT: 173.2 LBS | BODY MASS INDEX: 31.67 KG/M2

## 2021-01-04 PROCEDURE — 93798 PHYS/QHP OP CAR RHAB W/ECG: CPT

## 2021-01-04 NOTE — PROGRESS NOTES
I have reviewed the notes, assessments, and/or procedures performed, I concur with her/his documentation of Yony Kenny.

## 2021-01-06 ENCOUNTER — HOSPITAL ENCOUNTER (OUTPATIENT)
Dept: CARDIAC REHAB | Age: 71
Discharge: HOME | End: 2021-01-06
Payer: MEDICARE

## 2021-01-06 VITALS — WEIGHT: 173.2 LBS | BODY MASS INDEX: 31.67 KG/M2

## 2021-01-06 PROCEDURE — 93797 PHYS/QHP OP CAR RHAB WO ECG: CPT

## 2021-01-06 PROCEDURE — 93798 PHYS/QHP OP CAR RHAB W/ECG: CPT

## 2021-01-08 ENCOUNTER — HOSPITAL ENCOUNTER (OUTPATIENT)
Dept: CARDIAC REHAB | Age: 71
Discharge: HOME | End: 2021-01-08
Payer: MEDICARE

## 2021-01-08 VITALS — WEIGHT: 174.2 LBS | BODY MASS INDEX: 31.85 KG/M2

## 2021-01-08 PROCEDURE — 93798 PHYS/QHP OP CAR RHAB W/ECG: CPT

## 2021-01-11 ENCOUNTER — APPOINTMENT (OUTPATIENT)
Dept: CARDIAC REHAB | Age: 71
End: 2021-01-11
Payer: MEDICARE

## 2021-01-13 ENCOUNTER — HOSPITAL ENCOUNTER (OUTPATIENT)
Dept: CARDIAC REHAB | Age: 71
Discharge: HOME | End: 2021-01-13
Payer: MEDICARE

## 2021-01-13 ENCOUNTER — APPOINTMENT (OUTPATIENT)
Dept: CARDIAC REHAB | Age: 71
End: 2021-01-13
Payer: MEDICARE

## 2021-01-13 VITALS — BODY MASS INDEX: 31.45 KG/M2 | WEIGHT: 172 LBS

## 2021-01-13 PROCEDURE — 93798 PHYS/QHP OP CAR RHAB W/ECG: CPT

## 2021-01-15 ENCOUNTER — HOSPITAL ENCOUNTER (OUTPATIENT)
Dept: CARDIAC REHAB | Age: 71
Discharge: HOME | End: 2021-01-15
Payer: MEDICARE

## 2021-01-15 VITALS — BODY MASS INDEX: 31.63 KG/M2 | WEIGHT: 173 LBS

## 2021-01-15 PROCEDURE — 93798 PHYS/QHP OP CAR RHAB W/ECG: CPT

## 2021-01-18 ENCOUNTER — HOSPITAL ENCOUNTER (OUTPATIENT)
Dept: CARDIAC REHAB | Age: 71
Discharge: HOME | End: 2021-01-18
Payer: MEDICARE

## 2021-01-18 VITALS — BODY MASS INDEX: 31.67 KG/M2 | WEIGHT: 173.2 LBS

## 2021-01-18 PROCEDURE — 93797 PHYS/QHP OP CAR RHAB WO ECG: CPT

## 2021-01-18 PROCEDURE — 93798 PHYS/QHP OP CAR RHAB W/ECG: CPT

## 2021-01-20 ENCOUNTER — APPOINTMENT (OUTPATIENT)
Dept: CARDIAC REHAB | Age: 71
End: 2021-01-20
Payer: MEDICARE

## 2021-01-20 ENCOUNTER — TRANSCRIBE ORDER (OUTPATIENT)
Dept: SCHEDULING | Age: 71
End: 2021-01-20

## 2021-01-20 DIAGNOSIS — R06.00 DYSPNEA: Primary | ICD-10-CM

## 2021-01-21 ENCOUNTER — HOSPITAL ENCOUNTER (INPATIENT)
Age: 71
LOS: 6 days | Discharge: HOME HEALTH | DRG: 177 | End: 2021-01-27
Attending: STUDENT IN AN ORGANIZED HEALTH CARE EDUCATION/TRAINING PROGRAM | Admitting: INTERNAL MEDICINE
Payer: MEDICARE

## 2021-01-21 ENCOUNTER — APPOINTMENT (OUTPATIENT)
Dept: GENERAL RADIOLOGY | Age: 71
DRG: 177 | End: 2021-01-21
Attending: STUDENT IN AN ORGANIZED HEALTH CARE EDUCATION/TRAINING PROGRAM
Payer: MEDICARE

## 2021-01-21 DIAGNOSIS — R09.02 HYPOXIA: ICD-10-CM

## 2021-01-21 DIAGNOSIS — R77.8 ELEVATED TROPONIN: Primary | ICD-10-CM

## 2021-01-21 LAB
ALBUMIN SERPL-MCNC: 2.7 G/DL (ref 3.5–5)
ALBUMIN/GLOB SERPL: 0.5 {RATIO} (ref 1.1–2.2)
ALP SERPL-CCNC: 127 U/L (ref 45–117)
ALT SERPL-CCNC: 37 U/L (ref 12–78)
ANION GAP SERPL CALC-SCNC: 9 MMOL/L (ref 5–15)
AST SERPL W P-5'-P-CCNC: 56 U/L (ref 15–37)
ATRIAL RATE: 79 BPM
BASOPHILS # BLD: 0 K/UL (ref 0–0.1)
BASOPHILS NFR BLD: 0 % (ref 0–1)
BILIRUB SERPL-MCNC: 0.8 MG/DL (ref 0.2–1)
BNP SERPL-MCNC: 3069 PG/ML
BUN SERPL-MCNC: 20 MG/DL (ref 6–20)
BUN/CREAT SERPL: 22 (ref 12–20)
CA-I BLD-MCNC: 9 MG/DL (ref 8.5–10.1)
CALCULATED P AXIS, ECG09: 69 DEGREES
CALCULATED R AXIS, ECG10: -58 DEGREES
CALCULATED T AXIS, ECG11: 122 DEGREES
CHLORIDE SERPL-SCNC: 99 MMOL/L (ref 97–108)
CO2 SERPL-SCNC: 26 MMOL/L (ref 21–32)
COVID-19 RAPID TEST, COVR: DETECTED
CREAT SERPL-MCNC: 0.91 MG/DL (ref 0.55–1.02)
DIAGNOSIS, 93000: NORMAL
DIFFERENTIAL METHOD BLD: ABNORMAL
EOSINOPHIL # BLD: 0 K/UL (ref 0–0.4)
EOSINOPHIL NFR BLD: 0 % (ref 0–7)
ERYTHROCYTE [DISTWIDTH] IN BLOOD BY AUTOMATED COUNT: 17 % (ref 11.5–14.5)
GLOBULIN SER CALC-MCNC: 5.2 G/DL (ref 2–4)
GLUCOSE BLD STRIP.AUTO-MCNC: 225 MG/DL (ref 65–100)
GLUCOSE SERPL-MCNC: 273 MG/DL (ref 65–100)
HCT VFR BLD AUTO: 38.1 % (ref 35–47)
HGB BLD-MCNC: 12.6 G/DL (ref 11.5–16)
IMM GRANULOCYTES # BLD AUTO: 0 K/UL (ref 0–0.04)
IMM GRANULOCYTES NFR BLD AUTO: 0 % (ref 0–0.5)
LYMPHOCYTES # BLD: 1.4 K/UL (ref 0.8–3.5)
LYMPHOCYTES NFR BLD: 15 % (ref 12–49)
MCH RBC QN AUTO: 29.6 PG (ref 26–34)
MCHC RBC AUTO-ENTMCNC: 33.1 G/DL (ref 30–36.5)
MCV RBC AUTO: 89.6 FL (ref 80–99)
MONOCYTES # BLD: 0.8 K/UL (ref 0–1)
MONOCYTES NFR BLD: 9 % (ref 5–13)
NEUTS SEG # BLD: 6.9 K/UL (ref 1.8–8)
NEUTS SEG NFR BLD: 76 % (ref 32–75)
P-R INTERVAL, ECG05: 156 MS
PERFORMED BY, TECHID: ABNORMAL
PLATELET # BLD AUTO: 119 K/UL (ref 150–400)
PMV BLD AUTO: 10.7 FL (ref 8.9–12.9)
POTASSIUM SERPL-SCNC: 3.6 MMOL/L (ref 3.5–5.1)
PROT SERPL-MCNC: 7.9 G/DL (ref 6.4–8.2)
Q-T INTERVAL, ECG07: 444 MS
QRS DURATION, ECG06: 164 MS
QTC CALCULATION (BEZET), ECG08: 509 MS
RBC # BLD AUTO: 4.25 M/UL (ref 3.8–5.2)
SARS-COV-2, COV2: NORMAL
SODIUM SERPL-SCNC: 134 MMOL/L (ref 136–145)
SPECIMEN SOURCE: ABNORMAL
TROPONIN I SERPL-MCNC: 0.37 NG/ML
TROPONIN I SERPL-MCNC: 0.38 NG/ML
VENTRICULAR RATE, ECG03: 79 BPM
WBC # BLD AUTO: 9.1 K/UL (ref 3.6–11)

## 2021-01-21 PROCEDURE — 36415 COLL VENOUS BLD VENIPUNCTURE: CPT

## 2021-01-21 PROCEDURE — 93005 ELECTROCARDIOGRAM TRACING: CPT

## 2021-01-21 PROCEDURE — 99285 EMERGENCY DEPT VISIT HI MDM: CPT

## 2021-01-21 PROCEDURE — 96374 THER/PROPH/DIAG INJ IV PUSH: CPT

## 2021-01-21 PROCEDURE — 84484 ASSAY OF TROPONIN QUANT: CPT

## 2021-01-21 PROCEDURE — 80053 COMPREHEN METABOLIC PANEL: CPT

## 2021-01-21 PROCEDURE — 83880 ASSAY OF NATRIURETIC PEPTIDE: CPT

## 2021-01-21 PROCEDURE — 87635 SARS-COV-2 COVID-19 AMP PRB: CPT

## 2021-01-21 PROCEDURE — 74011636637 HC RX REV CODE- 636/637: Performed by: INTERNAL MEDICINE

## 2021-01-21 PROCEDURE — 74011250636 HC RX REV CODE- 250/636: Performed by: INTERNAL MEDICINE

## 2021-01-21 PROCEDURE — 82962 GLUCOSE BLOOD TEST: CPT

## 2021-01-21 PROCEDURE — 85025 COMPLETE CBC W/AUTO DIFF WBC: CPT

## 2021-01-21 PROCEDURE — 74011250637 HC RX REV CODE- 250/637: Performed by: STUDENT IN AN ORGANIZED HEALTH CARE EDUCATION/TRAINING PROGRAM

## 2021-01-21 PROCEDURE — 74011250636 HC RX REV CODE- 250/636: Performed by: STUDENT IN AN ORGANIZED HEALTH CARE EDUCATION/TRAINING PROGRAM

## 2021-01-21 PROCEDURE — 65270000029 HC RM PRIVATE

## 2021-01-21 PROCEDURE — 71045 X-RAY EXAM CHEST 1 VIEW: CPT

## 2021-01-21 RX ORDER — ACETAMINOPHEN 325 MG/1
650 TABLET ORAL
Status: CANCELLED | OUTPATIENT
Start: 2021-01-21

## 2021-01-21 RX ORDER — DOCUSATE SODIUM 100 MG/1
100 CAPSULE, LIQUID FILLED ORAL AS NEEDED
Status: DISCONTINUED | OUTPATIENT
Start: 2021-01-21 | End: 2021-01-28 | Stop reason: HOSPADM

## 2021-01-21 RX ORDER — MAGNESIUM SULFATE 100 %
4 CRYSTALS MISCELLANEOUS AS NEEDED
Status: DISCONTINUED | OUTPATIENT
Start: 2021-01-21 | End: 2021-01-28 | Stop reason: HOSPADM

## 2021-01-21 RX ORDER — ONDANSETRON 2 MG/ML
4 INJECTION INTRAMUSCULAR; INTRAVENOUS
Status: DISCONTINUED | OUTPATIENT
Start: 2021-01-21 | End: 2021-01-28 | Stop reason: HOSPADM

## 2021-01-21 RX ORDER — INSULIN LISPRO 100 [IU]/ML
INJECTION, SOLUTION INTRAVENOUS; SUBCUTANEOUS
Status: DISCONTINUED | OUTPATIENT
Start: 2021-01-21 | End: 2021-01-26

## 2021-01-21 RX ORDER — GUAIFENESIN 100 MG/5ML
325 LIQUID (ML) ORAL
Status: COMPLETED | OUTPATIENT
Start: 2021-01-21 | End: 2021-01-21

## 2021-01-21 RX ORDER — FUROSEMIDE 10 MG/ML
40 INJECTION INTRAMUSCULAR; INTRAVENOUS EVERY 12 HOURS
Status: DISCONTINUED | OUTPATIENT
Start: 2021-01-21 | End: 2021-01-24

## 2021-01-21 RX ORDER — ACETAMINOPHEN 325 MG/1
650 TABLET ORAL
Status: DISCONTINUED | OUTPATIENT
Start: 2021-01-21 | End: 2021-01-28 | Stop reason: HOSPADM

## 2021-01-21 RX ORDER — DEXTROSE 50 % IN WATER (D50W) INTRAVENOUS SYRINGE
25-50 AS NEEDED
Status: DISCONTINUED | OUTPATIENT
Start: 2021-01-21 | End: 2021-01-28 | Stop reason: HOSPADM

## 2021-01-21 RX ORDER — FUROSEMIDE 10 MG/ML
40 INJECTION INTRAMUSCULAR; INTRAVENOUS
Status: COMPLETED | OUTPATIENT
Start: 2021-01-21 | End: 2021-01-21

## 2021-01-21 RX ADMIN — ASPIRIN 324 MG: 81 TABLET, CHEWABLE ORAL at 18:50

## 2021-01-21 RX ADMIN — INSULIN LISPRO 2 UNITS: 100 INJECTION, SOLUTION INTRAVENOUS; SUBCUTANEOUS at 23:50

## 2021-01-21 RX ADMIN — FUROSEMIDE 40 MG: 10 INJECTION, SOLUTION INTRAMUSCULAR; INTRAVENOUS at 18:51

## 2021-01-21 RX ADMIN — FUROSEMIDE 40 MG: 10 INJECTION, SOLUTION INTRAMUSCULAR; INTRAVENOUS at 23:50

## 2021-01-21 NOTE — ED PROVIDER NOTES
EMERGENCY DEPARTMENT HISTORY AND PHYSICAL EXAM      Date: 1/21/2021  Patient Name: Hazel Coronado    History of Presenting Illness     Chief Complaint   Patient presents with    Shortness of Breath       History Provided By: Patient    HPI: Hazel Coronado, 65 y.o. female with a past medical history significant hypertension and A. fib, pacemaker, CAD presents to the ED with cc of shortness of breath. States over the last 2 3 days has noticed worsening shortness of breath. Denies any chest pains, no fevers chills. Patient does complain of dry cough. Denies any lower extremity edema, no abdominal pain nausea vomiting. Patient is not dependent on O2 at home, has pulse oximetry at home was noted to be low 80s. Patient called EMS on arrival was given 2 L nasal cannula with improvement. Currently patient states dyspnea relieved by nasal cannula    There are no other complaints, changes, or physical findings at this time. PCP: Quang Lal MD    Current Facility-Administered Medications   Medication Dose Route Frequency Provider Last Rate Last Admin    aspirin chewable tablet 324 mg  324 mg Oral NOW Bishop Collin MD        furosemide (LASIX) injection 40 mg  40 mg IntraVENous NOW Bishop Collin MD        ondansetron [facility]) injection 4 mg  4 mg IntraVENous Q6H PRN Maryan Jaquez MD        acetaminophen (TYLENOL) tablet 650 mg  650 mg Oral Q6H PRN Maryan Jaquez MD        furosemide (LASIX) injection 40 mg  40 mg IntraVENous Q12H Maryan Jaquez MD        docusate sodium (COLACE) capsule 100 mg  100 mg Oral PRN Maryan Jaquez MD         Current Outpatient Medications   Medication Sig Dispense Refill    sotaloL (BETAPACE) 80 mg tablet Take 0.5 Tabs by mouth daily.  30 Tab 0    insulin lispro protamin-lispro (HumaLOG Mix 75-25 KwikPen) flexpen Humalog Mix 75-25 KwikPen U-100 insulin 100 unit/mL subcutaneous pen      bumetanide (BUMEX) 1 mg tablet Take 1 Tab by mouth two (2) times daily (with meals). 60 Tab 1    ticagrelor (Brilinta) 90 mg tablet Take  by mouth two (2) times a day.  multivitamin,tx-iron-minerals (Complete Multivitamin) tab Take  by mouth daily.  aspirin delayed-release 81 mg tablet Take 1 Tab by mouth daily. 30 Tab 2    magnesium oxide (MAG-OX) 400 mg tablet Take 400 mg by mouth three (3) times daily.  metFORMIN ER (GLUCOPHAGE XR) 750 mg tablet Take 750 mg by mouth two (2) times daily (with meals).  melatonin 3 mg tablet Take 3 mg by mouth nightly as needed.  acetaminophen (TYLENOL EXTRA STRENGTH) 500 mg tablet Take  by mouth every six (6) hours as needed for Pain.  polyethylene glycol (MIRALAX) 17 gram packet Take 17 g by mouth as needed.  gabapentin (NEURONTIN) 100 mg capsule Take 100 mg by mouth three (3) times daily.  oxybutynin chloride XL (DITROPAN XL) 10 mg CR tablet Take 10 mg by mouth two (2) times a day.  levothyroxine (SYNTHROID) 200 mcg tablet Take 200 mcg by mouth Daily (before breakfast).  omeprazole (PRILOSEC) 40 mg capsule Take 40 mg by mouth daily.  atorvastatin (LIPITOR) 40 mg tablet Take 40 mg by mouth daily.  potassium chloride SR (KLOR-CON 10) 10 mEq tablet Take 20 mEq by mouth daily.          Past History     Past Medical History:  Past Medical History:   Diagnosis Date    Acid indigestion     heartburn    Aneurysm (HCC)     Anxiety disorder     Arthritis     Asthma     Back pain     Bladder spasms     [facility] 08/2020    Chest pain     Constipation     Cough     Diabetes mellitus     Diarrhea     Fatigue     Fibromyalgia     Frequent episodic tension-type headache     Hearing reduced     Heart failure (HCC)     Hemorrhoids     Hernia of unspecified site of abdominal cavity without mention of obstruction or gangrene     HTN (hypertension)     Hypothyroidism     ICD (implantable cardioverter-defibrillator) in place  Muscle pain     joint pain    N&V (nausea and vomiting)     Pacemaker     biventricular ICD 1/26/18    Ringing in ears     Skin cancer     SOB (shortness of breath)     SOB (shortness of breath)     Swallowing difficulty     Thyroid disorder     Vertigo     Wears dentures        Past Surgical History:  Past Surgical History:   Procedure Laterality Date    HX CARPAL TUNNEL RELEASE      right hand    HX GI      HX HEART CATHETERIZATION  2/6/16    HX HYSTERECTOMY      HX ORTHOPAEDIC      HX PACEMAKER Left 6/20/16    [employer] ICD    HX THYROIDECTOMY      OK REMOVAL GALLBLADDER         Family History:  Family History   Problem Relation Age of Onset    Diabetes Mother     Heart Disease Mother     Heart Disease Father     Cancer Sister     Diabetes Sister     Heart Disease Sister     Hypertension Sister     Lupus Sister     Breast Cancer Sister     Diabetes Brother        Social History:  Social History     Tobacco Use    Smoking status: Never Smoker    Smokeless tobacco: Never Used   Substance Use Topics    Alcohol use: Yes     Comment: wine occasionally    Drug use: No       Allergies: Allergies   Allergen Reactions    Codeine Other (comments)     Patient states she is not allergic    Novocain [Procaine] Nausea and Vomiting    Tramadol Other (comments)     Headache         Review of Systems     Review of Systems   Constitutional: Negative for activity change, appetite change, chills and fever. HENT: Negative for ear pain, rhinorrhea and sore throat. Eyes: Negative for photophobia and visual disturbance. Respiratory: Positive for cough and shortness of breath. Cardiovascular: Negative for chest pain and palpitations. Gastrointestinal: Negative for abdominal pain and nausea. Genitourinary: Negative for dysuria and hematuria. Musculoskeletal: Negative for arthralgias and myalgias. Skin: Negative for color change and pallor.    Neurological: Negative for dizziness, weakness, numbness and headaches. Physical Exam     Physical Exam  Vitals signs and nursing note reviewed. Constitutional:       General: She is not in acute distress. Appearance: Normal appearance. She is normal weight. She is not ill-appearing. HENT:      Head: Normocephalic and atraumatic. Nose: Nose normal.      Mouth/Throat:      Mouth: Mucous membranes are moist.   Eyes:      Extraocular Movements: Extraocular movements intact. Pupils: Pupils are equal, round, and reactive to light. Neck:      Musculoskeletal: Normal range of motion and neck supple. No muscular tenderness. Cardiovascular:      Rate and Rhythm: Normal rate and regular rhythm. Pulses: Normal pulses. Pulmonary:      Effort: Pulmonary effort is normal.   Abdominal:      General: Abdomen is flat. Bowel sounds are normal.      Palpations: Abdomen is soft. Tenderness: There is no abdominal tenderness. There is no guarding. Musculoskeletal: Normal range of motion. General: No tenderness. Skin:     General: Skin is warm and dry. Capillary Refill: Capillary refill takes less than 2 seconds. Neurological:      General: No focal deficit present. Mental Status: She is alert and oriented to person, place, and time. Sensory: No sensory deficit. Motor: No weakness. Diagnostic Study Results     Labs -     Recent Results (from the past 12 hour(s))   EKG, 12 LEAD, INITIAL    Collection Time: 01/21/21  2:06 PM   Result Value Ref Range    Ventricular Rate 79 BPM    Atrial Rate 79 BPM    P-R Interval 156 ms    QRS Duration 164 ms    Q-T Interval 444 ms    QTC Calculation (Bezet) 509 ms    Calculated P Axis 69 degrees    Calculated R Axis -58 degrees    Calculated T Axis 122 degrees    Diagnosis       Atrial-sensed ventricular-paced rhythm  Abnormal ECG  When compared with ECG of 20-NOV-2020 10:57,  Vent.  rate has increased BY  19 BPM  Confirmed by [facility] YANIRABismarckDontrell ([id number]) on 1/21/2021 5:07:24 PM     CBC WITH AUTOMATED DIFF    Collection Time: 01/21/21  3:30 PM   Result Value Ref Range    WBC 9.1 3.6 - 11.0 K/uL    RBC 4.25 3.80 - 5.20 M/uL    HGB 12.6 11.5 - 16.0 g/dL    HCT 38.1 35.0 - 47.0 %    MCV 89.6 80.0 - 99.0 FL    MCH 29.6 26.0 - 34.0 PG    MCHC 33.1 30.0 - 36.5 g/dL    RDW 17.0 (H) 11.5 - 14.5 %    PLATELET 380 (L) 607 - 400 K/uL    MPV 10.7 8.9 - 12.9 FL    NEUTROPHILS 76 (H) 32 - 75 %    LYMPHOCYTES 15 12 - 49 %    MONOCYTES 9 5 - 13 %    EOSINOPHILS 0 0 - 7 %    BASOPHILS 0 0 - 1 %    IMMATURE GRANULOCYTES 0 0.0 - 0.5 %    ABS. NEUTROPHILS 6.9 1.8 - 8.0 K/UL    ABS. LYMPHOCYTES 1.4 0.8 - 3.5 K/UL    ABS. MONOCYTES 0.8 0.0 - 1.0 K/UL    ABS. EOSINOPHILS 0.0 0.0 - 0.4 K/UL    ABS. BASOPHILS 0.0 0.0 - 0.1 K/UL    ABS. IMM. GRANS. 0.0 0.00 - 0.04 K/UL    DF AUTOMATED     METABOLIC PANEL, COMPREHENSIVE    Collection Time: 01/21/21  3:30 PM   Result Value Ref Range    Sodium 134 (L) 136 - 145 mmol/L    Potassium 3.6 3.5 - 5.1 mmol/L    Chloride 99 97 - 108 mmol/L    CO2 26 21 - 32 mmol/L    Anion gap 9 5 - 15 mmol/L    Glucose 273 (H) 65 - 100 mg/dL    BUN 20 6 - 20 mg/dL    Creatinine 0.91 0.55 - 1.02 mg/dL    BUN/Creatinine ratio 22 (H) 12 - 20      GFR est AA >60 >60 ml/min/1.73m2    GFR est non-AA >60 >60 ml/min/1.73m2    Calcium 9.0 8.5 - 10.1 mg/dL    Bilirubin, total 0.8 0.2 - 1.0 mg/dL    AST (SGOT) 56 (H) 15 - 37 U/L    ALT (SGPT) 37 12 - 78 U/L    Alk.  phosphatase 127 (H) 45 - 117 U/L    Protein, total 7.9 6.4 - 8.2 g/dL    Albumin 2.7 (L) 3.5 - 5.0 g/dL    Globulin 5.2 (H) 2.0 - 4.0 g/dL    A-G Ratio 0.5 (L) 1.1 - 2.2     TROPONIN I    Collection Time: 01/21/21  3:30 PM   Result Value Ref Range    Troponin-I, Qt. 0.38 (H) <0.05 ng/mL   BNP    Collection Time: 01/21/21  4:06 PM   Result Value Ref Range    NT pro-BNP 3,069 (H) <125 pg/mL       Radiologic Studies -   [unfilled]  CT Results  (Last 48 hours)    None        CXR Results  (Last 48 hours) 01/21/21 1441  XR CHEST SNGL V Final result    Narrative:  Chest single view. Comparison single view chest November 16, 2020       Reduced lung volumes. Hazy coarse reticular markings through the lungs advanced   compared to prior imaging. Findings concerning for mild interstitial edema   superimposed upon chronic change. Left-sided cardiac device with similar positioned leads overlying the heart. Thoracic aorta atherosclerosis. No pneumothorax or sizable pleural effusion. Medical Decision Making and ED Course   I am the first provider for this patient. I reviewed the vital signs, available nursing notes, past medical history, past surgical history, family history and social history. Vital Signs-Reviewed the patient's vital signs. Patient Vitals for the past 12 hrs:   Temp Pulse Resp BP SpO2   01/21/21 1747  69 20 (!) 116/55 95 %   01/21/21 1500     95 %   01/21/21 1405 98.9 °F (37.2 °C) 78 18 (!) 118/55 (!) 88 %       EKG interpretation: (Preliminary)  Ventricular paced rhythm, 79, no ST elevations    Records Reviewed: Nursing Notes    The patient presents with shortness of breath with a differential diagnosis of congestive heart failure, pneumonia, COVID-19 infection, pulmonary edema      Provider Notes (Medical Decision Making):     MDM   73-year-old female, past medical history of pacemaker dependence, CAD, hypertension, presents to the emergency department for shortness of breath for 3 days. Patient noted to be hypoxic on triage 88%, improved oxygenation on 2 L. Physical exam unremarkable otherwise. In no distress, clear breath sounds bilateral    Basic lab work drawn including cardiac enzymes, COVID-19 test sent, chest x-ray ordered    ED Course:   Initial assessment performed. The patients presenting problems have been discussed, and they are in agreement with the care plan formulated and outlined with them.   I have encouraged them to ask questions as they arise throughout their visit. ED Course as of Jan 21 1819   Thu Jan 21, 2021   1707 Patient's chest x-ray concerning for increased interstitial edema. [PZ]   [phone] Patient's lab work resulted, troponin elevated at 0.38, CBC shows no leukocytosis, metabolic panel unremarkable, BNP pending, Covid swab pending.        [PZ]   1805 Discussed case with Dr. Felix Uriostegui, will admit patient for hypoxia, elevated cardiac enzymes. [PZ]      ED Course User Index  [PZ] Juliet Mcclendon MD     Discussed results with patient, amenable to admission. Patient takes asa 80 daily, will order asa 325mg, lasix 40mg IV. Procedures       Royce Foy MD  Procedures               Disposition     Admit to hospital          Diagnosis     Clinical Impression:   1. Elevated troponin    2. Hypoxia        Attestations:    Royce Foy MD    Please note that this dictation was completed with [employer], the computer voice recognition software. Quite often unanticipated grammatical, syntax, homophones, and other interpretive errors are inadvertently transcribed by the computer software. Please disregard these errors. Please excuse any errors that have escaped final proofreading. Thank you.

## 2021-01-21 NOTE — H&P
History & Physical    Primary Care Provider: Gloria Malcolm MD  Source of Information: Patient/family     History of Presenting Illness:   Ashlee Perkins is a 65 y.o. female with a myriad of medical problems but most notably chronic systolic heart failure with EF 45%, paroxysmal atrial fibrillation, status post ICD, coronary artery disease with history of PCI, CVA, diabetes and hypertension who presents to the ED today with complaints of shortness of breath. She reports this initially started back in November after she was hospitalized for 5 days for initiation of sotalol. She followed up with Dr. Puma Hall who told her to give it some more time. Meanwhile, she has continued to complain of dyspnea on exertion. She has been doing cardiac rehab and just walking to the building she has to stop and rest because of her breathing problems. In general she felt very poor yesterday overall. Today her symptoms were much worse. She has a pulse oximeter at home and says her saturations were 82%. .  Also was complaining of some chest tightness earlier today. Denies any significant cough. No fever. She was seen at Dr. Rohan Holt office 2 days ago and there was apparently talked of repeating a cardiac catheterization. Her last was in September when she had stents placed    She made an appointment to get a Covid test this afternoon but ended up coming here instead       Review of Systems:  A comprehensive review of systems was negative except for that written in the History of Present Illness.      Past Medical History:   Diagnosis Date    Acid indigestion     heartburn    Aneurysm (HCC)     Anxiety disorder     Arthritis     Asthma     Back pain     Bladder spasms     [facility]    Chest pain     Constipation     Cough     Diabetes mellitus     Diarrhea     Fatigue     Fibromyalgia     Frequent episodic tension-type headache  Hearing reduced     Heart failure (HCC)     Hemorrhoids     Hernia of unspecified site of abdominal cavity without mention of obstruction or gangrene     HTN (hypertension)     Hypothyroidism     ICD (implantable cardioverter-defibrillator) in place     Muscle pain     joint pain    N&V (nausea and vomiting)     Pacemaker     biventricular ICD 1/26/18    Ringing in ears     Skin cancer     SOB (shortness of breath)     SOB (shortness of breath)     Swallowing difficulty     Thyroid disorder     Vertigo     Wears dentures         Past Surgical History:   Procedure Laterality Date    HX CARPAL TUNNEL RELEASE      right hand    HX GI      HX HEART CATHETERIZATION  2/6/16    HX HYSTERECTOMY      HX ORTHOPAEDIC      HX PACEMAKER Left 6/20/16    [employer] ICD    HX THYROIDECTOMY      IA REMOVAL GALLBLADDER         Prior to Admission medications    Medication Sig Start Date End Date Taking? Authorizing Provider   sotaloL (BETAPACE) 80 mg tablet Take 0.5 Tabs by mouth daily. 11/21/20   Minal Mackay MD   insulin lispro protamin-lispro (HumaLOG Mix 75-25 KwikPen) flexpen 24 Units two (2) times a day. Provider, Historical   bumetanide (BUMEX) 1 mg tablet Take 1 Tab by mouth two (2) times daily (with meals). 9/29/20   Lia Keller MD   ticagrelor (Brilinta) 90 mg tablet Take  by mouth two (2) times a day. Provider, Historical   multivitamin,tx-iron-minerals (Complete Multivitamin) tab Take  by mouth daily. Provider, Historical   aspirin delayed-release 81 mg tablet Take 1 Tab by mouth daily. 9/1/20   Macy Harrison MD   magnesium oxide (MAG-OX) 400 mg tablet Take 400 mg by mouth three (3) times daily. Provider, Historical   metFORMIN ER (GLUCOPHAGE XR) 750 mg tablet Take 750 mg by mouth two (2) times daily (with meals). Provider, Historical   melatonin 3 mg tablet Take 3 mg by mouth nightly as needed.     Provider, Historical   acetaminophen (TYLENOL EXTRA STRENGTH) 500 mg tablet Take  by mouth every six (6) hours as needed for Pain. Provider, Historical   polyethylene glycol (MIRALAX) 17 gram packet Take 17 g by mouth as needed. Provider, Historical   gabapentin (NEURONTIN) 100 mg capsule Take 100 mg by mouth three (3) times daily. 4/27/16   Provider, Historical   levothyroxine (SYNTHROID) 200 mcg tablet Take 150 mcg by mouth Daily (before breakfast). 4/27/16   Provider, Historical   omeprazole (PRILOSEC) 40 mg capsule Take 40 mg by mouth daily. 4/27/16   Provider, Historical   atorvastatin (LIPITOR) 40 mg tablet Take 40 mg by mouth daily. Provider, Historical   potassium chloride SR (KLOR-CON 10) 10 mEq tablet Take 20 mEq by mouth daily.     Provider, Historical       Allergies   Allergen Reactions    Codeine Other (comments)     Patient states she is not allergic    Novocain [Procaine] Nausea and Vomiting    Tramadol Other (comments)     Headache        Family History   Problem Relation Age of Onset    Diabetes Mother     Heart Disease Mother     Heart Disease Father    Ellen Harps Sister     Diabetes Sister     Heart Disease Sister     Hypertension Sister     Lupus Sister     Breast Cancer Sister     Diabetes Brother         Social History     Socioeconomic History    Marital status:      Spouse name: Maya Jacome    Number of children: 10    Years of education: Not on file    Highest education level: 8th grade   Occupational History     Employer: RETIRED   Social Needs    Financial resource strain: Somewhat hard    Food insecurity     Worry: Never true     Inability: Never true    Transportation needs     Medical: Yes     Non-medical: Yes   Tobacco Use    Smoking status: Never Smoker    Smokeless tobacco: Never Used   Substance and Sexual Activity    Alcohol use: Yes     Comment: wine occasionally    Drug use: No   Lifestyle    Physical activity     Days per week: 0 days     Minutes per session: 0 min    Stress: Only a little   Relationships    Social connections     Talks on phone: Three times a week     Gets together: More than three times a week     Attends [employer] service: Never     Active member of club or organization: No     Attends meetings of clubs or organizations: Never     Relationship status:    Other Topics Concern     Service No    Blood Transfusions No    Caffeine Concern No    Occupational Exposure No    Hobby Hazards No    Sleep Concern Yes    Stress Concern Yes    Weight Concern Yes    Special Diet Yes    Back Care No    Exercise No    Bike Helmet No    Seat Belt Yes    Self-Exams No   Social History Narrative    Pt. Has as son that lives with her that is blind. She only drives around town. CODE STATUS:  DNR x   Full    Other      Objective:     Physical Exam:     Visit Vitals  BP (!) 116/55   Pulse 69   Temp 98.9 °F (37.2 °C)   Resp 20   Ht 5' 2\" (1.575 m)   Wt 78 kg (172 lb)   SpO2 95%   BMI 31.46 kg/m²      O2 Device: Nasal cannula    General:  Alert, cooperative, no distress, appears stated age. Head:  Normocephalic, without obvious abnormality, atraumatic. Eyes:  Conjunctivae/corneas clear. PERRL, EOMs intact. Nose: Nares normal. Septum midline. Mucosa normal. No drainage or sinus tenderness. Throat: Lips, mucosa, and tongue normal. Teeth and gums normal.   Neck: Supple, symmetrical, trachea midline, no adenopathy, thyroid: no enlargement/tenderness/nodules, no carotid bruit and no JVD. Back:   Symmetric, no curvature. ROM normal. No CVA tenderness. Lungs:    Crackles at the bases bilaterally   Chest wall:  No tenderness or deformity. Heart:  Regular rate and rhythm, S1, S2 normal, no murmur, click, rub or gallop. Abdomen:   Soft, non-tender. Bowel sounds normal. No masses,  No organomegaly. Extremities: Extremities normal, atraumatic, no cyanosis, trace pitting edema bilateral   Pulses: 2+ and symmetric all extremities.    Skin: Skin color, texture, turgor normal. No rashes or lesions   Neurologic: CNII-XII intact. No motor or sensory deficits. 24 Hour Results:    Recent Results (from the past 24 hour(s))   EKG, 12 LEAD, INITIAL    Collection Time: 01/21/21  2:06 PM   Result Value Ref Range    Ventricular Rate 79 BPM    Atrial Rate 79 BPM    P-R Interval 156 ms    QRS Duration 164 ms    Q-T Interval 444 ms    QTC Calculation (Bezet) 509 ms    Calculated P Axis 69 degrees    Calculated R Axis -58 degrees    Calculated T Axis 122 degrees    Diagnosis       Atrial-sensed ventricular-paced rhythm  Abnormal ECG  When compared with ECG of 20-NOV-2020 10:57,  Vent. rate has increased BY  19 BPM  Confirmed by [facility] YANIRABarn[facility] HospitalDontrell BAIRD ([id number]) on 1/21/2021 5:07:24 PM     CBC WITH AUTOMATED DIFF    Collection Time: 01/21/21  3:30 PM   Result Value Ref Range    WBC 9.1 3.6 - 11.0 K/uL    RBC 4.25 3.80 - 5.20 M/uL    HGB 12.6 11.5 - 16.0 g/dL    HCT 38.1 35.0 - 47.0 %    MCV 89.6 80.0 - 99.0 FL    MCH 29.6 26.0 - 34.0 PG    MCHC 33.1 30.0 - 36.5 g/dL    RDW 17.0 (H) 11.5 - 14.5 %    PLATELET 861 (L) 768 - 400 K/uL    MPV 10.7 8.9 - 12.9 FL    NEUTROPHILS 76 (H) 32 - 75 %    LYMPHOCYTES 15 12 - 49 %    MONOCYTES 9 5 - 13 %    EOSINOPHILS 0 0 - 7 %    BASOPHILS 0 0 - 1 %    IMMATURE GRANULOCYTES 0 0.0 - 0.5 %    ABS. NEUTROPHILS 6.9 1.8 - 8.0 K/UL    ABS. LYMPHOCYTES 1.4 0.8 - 3.5 K/UL    ABS. MONOCYTES 0.8 0.0 - 1.0 K/UL    ABS. EOSINOPHILS 0.0 0.0 - 0.4 K/UL    ABS. BASOPHILS 0.0 0.0 - 0.1 K/UL    ABS. IMM.  GRANS. 0.0 0.00 - 0.04 K/UL    DF AUTOMATED     METABOLIC PANEL, COMPREHENSIVE    Collection Time: 01/21/21  3:30 PM   Result Value Ref Range    Sodium 134 (L) 136 - 145 mmol/L    Potassium 3.6 3.5 - 5.1 mmol/L    Chloride 99 97 - 108 mmol/L    CO2 26 21 - 32 mmol/L    Anion gap 9 5 - 15 mmol/L    Glucose 273 (H) 65 - 100 mg/dL    BUN 20 6 - 20 mg/dL    Creatinine 0.91 0.55 - 1.02 mg/dL    BUN/Creatinine ratio 22 (H) 12 - 20      GFR est AA >60 >60 ml/min/1.73m2    GFR est non-AA >60 >60 ml/min/1.73m2    Calcium 9.0 8.5 - 10.1 mg/dL    Bilirubin, total 0.8 0.2 - 1.0 mg/dL    AST (SGOT) 56 (H) 15 - 37 U/L    ALT (SGPT) 37 12 - 78 U/L    Alk. phosphatase 127 (H) 45 - 117 U/L    Protein, total 7.9 6.4 - 8.2 g/dL    Albumin 2.7 (L) 3.5 - 5.0 g/dL    Globulin 5.2 (H) 2.0 - 4.0 g/dL    A-G Ratio 0.5 (L) 1.1 - 2.2     TROPONIN I    Collection Time: 01/21/21  3:30 PM   Result Value Ref Range    Troponin-I, Qt. 0.38 (H) <0.05 ng/mL   BNP    Collection Time: 01/21/21  4:06 PM   Result Value Ref Range    NT pro-BNP 3,069 (H) <125 pg/mL         Imaging:   XR CHEST SNGL V   Final Result      Chest x-ray shows mild interstitial edema    EKG shows an atrial sensed ventricular paced rhythm       Assessment:   Acute on chronic systolic heart failure, EF 45%    Acute non-STEMI    Coronary artery disease with previous PCI    Paroxysmal atrial fibrillation    AICD in situ    Hypertension    Diabetes mellitus type 2    GERD with history of Rosario's esophagus      Plan:   Admit to cardiac telemetry  Serial troponins  Continue home medications  Cardiology consultation, possible cardiac catheterization.   Discussed with Dr. Ana Maria Calles n.p.o. after midnight tonight  IV furosemide  COVID-19 testing will be sent although I have low suspicion for this  Patient request DO NOT RESUSCITATE CODE STATUS        Home medications were reviewed    Signed By: Domingo Ortiz MD     January 21, 2021

## 2021-01-22 ENCOUNTER — APPOINTMENT (OUTPATIENT)
Dept: CT IMAGING | Age: 71
DRG: 177 | End: 2021-01-22
Attending: INTERNAL MEDICINE
Payer: MEDICARE

## 2021-01-22 ENCOUNTER — APPOINTMENT (OUTPATIENT)
Dept: CARDIAC REHAB | Age: 71
End: 2021-01-22
Payer: MEDICARE

## 2021-01-22 LAB
CRP SERPL-MCNC: 11.8 MG/DL (ref 0–0.6)
GLUCOSE BLD STRIP.AUTO-MCNC: 208 MG/DL (ref 65–100)
GLUCOSE BLD STRIP.AUTO-MCNC: 273 MG/DL (ref 65–100)
GLUCOSE BLD STRIP.AUTO-MCNC: 337 MG/DL (ref 65–100)
GLUCOSE BLD STRIP.AUTO-MCNC: 415 MG/DL (ref 65–100)
GLUCOSE BLD STRIP.AUTO-MCNC: 417 MG/DL (ref 65–100)
LDH SERPL L TO P-CCNC: 522 U/L (ref 81–246)
PERFORMED BY, TECHID: ABNORMAL
PROCALCITONIN SERPL-MCNC: 0.59 NG/ML
TROPONIN I SERPL-MCNC: 0.37 NG/ML

## 2021-01-22 PROCEDURE — 84145 PROCALCITONIN (PCT): CPT

## 2021-01-22 PROCEDURE — 83615 LACTATE (LD) (LDH) ENZYME: CPT

## 2021-01-22 PROCEDURE — 82728 ASSAY OF FERRITIN: CPT

## 2021-01-22 PROCEDURE — 74011000250 HC RX REV CODE- 250: Performed by: INTERNAL MEDICINE

## 2021-01-22 PROCEDURE — 77010033711 HC HIGH FLOW OXYGEN

## 2021-01-22 PROCEDURE — 86140 C-REACTIVE PROTEIN: CPT

## 2021-01-22 PROCEDURE — 74011250636 HC RX REV CODE- 250/636: Performed by: INTERNAL MEDICINE

## 2021-01-22 PROCEDURE — 71250 CT THORAX DX C-: CPT

## 2021-01-22 PROCEDURE — 84484 ASSAY OF TROPONIN QUANT: CPT

## 2021-01-22 PROCEDURE — 74011250637 HC RX REV CODE- 250/637: Performed by: INTERNAL MEDICINE

## 2021-01-22 PROCEDURE — 94762 N-INVAS EAR/PLS OXIMTRY CONT: CPT

## 2021-01-22 PROCEDURE — 74011250636 HC RX REV CODE- 250/636: Performed by: PHYSICIAN ASSISTANT

## 2021-01-22 PROCEDURE — 65270000029 HC RM PRIVATE

## 2021-01-22 PROCEDURE — 36415 COLL VENOUS BLD VENIPUNCTURE: CPT

## 2021-01-22 PROCEDURE — 82962 GLUCOSE BLOOD TEST: CPT

## 2021-01-22 PROCEDURE — 74011636637 HC RX REV CODE- 636/637: Performed by: INTERNAL MEDICINE

## 2021-01-22 PROCEDURE — XW033E5 INTRODUCTION OF REMDESIVIR ANTI-INFECTIVE INTO PERIPHERAL VEIN, PERCUTANEOUS APPROACH, NEW TECHNOLOGY GROUP 5: ICD-10-PCS | Performed by: INTERNAL MEDICINE

## 2021-01-22 PROCEDURE — 74011000258 HC RX REV CODE- 258: Performed by: INTERNAL MEDICINE

## 2021-01-22 RX ORDER — DEXAMETHASONE SODIUM PHOSPHATE 4 MG/ML
4 INJECTION, SOLUTION INTRA-ARTICULAR; INTRALESIONAL; INTRAMUSCULAR; INTRAVENOUS; SOFT TISSUE EVERY 6 HOURS
Status: DISCONTINUED | OUTPATIENT
Start: 2021-01-22 | End: 2021-01-22

## 2021-01-22 RX ORDER — LEVOTHYROXINE SODIUM 75 UG/1
150 TABLET ORAL
Status: DISCONTINUED | OUTPATIENT
Start: 2021-01-22 | End: 2021-01-28 | Stop reason: HOSPADM

## 2021-01-22 RX ORDER — LANOLIN ALCOHOL/MO/W.PET/CERES
3 CREAM (GRAM) TOPICAL
Status: DISCONTINUED | OUTPATIENT
Start: 2021-01-22 | End: 2021-01-28 | Stop reason: HOSPADM

## 2021-01-22 RX ORDER — ASCORBIC ACID 500 MG
1000 TABLET ORAL DAILY
Status: DISCONTINUED | OUTPATIENT
Start: 2021-01-22 | End: 2021-01-28 | Stop reason: HOSPADM

## 2021-01-22 RX ORDER — POLYETHYLENE GLYCOL 3350 17 G/17G
17 POWDER, FOR SOLUTION ORAL AS NEEDED
Status: DISCONTINUED | OUTPATIENT
Start: 2021-01-22 | End: 2021-01-28 | Stop reason: HOSPADM

## 2021-01-22 RX ORDER — DEXAMETHASONE SODIUM PHOSPHATE 4 MG/ML
6 INJECTION, SOLUTION INTRA-ARTICULAR; INTRALESIONAL; INTRAMUSCULAR; INTRAVENOUS; SOFT TISSUE EVERY 24 HOURS
Status: DISCONTINUED | OUTPATIENT
Start: 2021-01-23 | End: 2021-01-24

## 2021-01-22 RX ORDER — MELATONIN
2000 DAILY
Status: DISCONTINUED | OUTPATIENT
Start: 2021-01-22 | End: 2021-01-28 | Stop reason: HOSPADM

## 2021-01-22 RX ORDER — SOTALOL HYDROCHLORIDE 80 MG/1
40 TABLET ORAL DAILY
Status: DISCONTINUED | OUTPATIENT
Start: 2021-01-22 | End: 2021-01-28 | Stop reason: HOSPADM

## 2021-01-22 RX ORDER — METFORMIN HYDROCHLORIDE 750 MG/1
750 TABLET, EXTENDED RELEASE ORAL 2 TIMES DAILY WITH MEALS
Status: DISCONTINUED | OUTPATIENT
Start: 2021-01-22 | End: 2021-01-26

## 2021-01-22 RX ORDER — DEXAMETHASONE SODIUM PHOSPHATE 4 MG/ML
2 INJECTION, SOLUTION INTRA-ARTICULAR; INTRALESIONAL; INTRAMUSCULAR; INTRAVENOUS; SOFT TISSUE ONCE
Status: DISCONTINUED | OUTPATIENT
Start: 2021-01-22 | End: 2021-01-22

## 2021-01-22 RX ORDER — DEXAMETHASONE SODIUM PHOSPHATE 4 MG/ML
6 INJECTION, SOLUTION INTRA-ARTICULAR; INTRALESIONAL; INTRAMUSCULAR; INTRAVENOUS; SOFT TISSUE EVERY 24 HOURS
Status: DISCONTINUED | OUTPATIENT
Start: 2021-01-22 | End: 2021-01-22

## 2021-01-22 RX ORDER — GABAPENTIN 100 MG/1
100 CAPSULE ORAL 3 TIMES DAILY
Status: DISCONTINUED | OUTPATIENT
Start: 2021-01-22 | End: 2021-01-28 | Stop reason: HOSPADM

## 2021-01-22 RX ORDER — METFORMIN HYDROCHLORIDE 750 MG/1
750 TABLET, EXTENDED RELEASE ORAL 2 TIMES DAILY WITH MEALS
Status: DISCONTINUED | OUTPATIENT
Start: 2021-01-22 | End: 2021-01-22

## 2021-01-22 RX ORDER — LANOLIN ALCOHOL/MO/W.PET/CERES
400 CREAM (GRAM) TOPICAL 3 TIMES DAILY
Status: DISCONTINUED | OUTPATIENT
Start: 2021-01-22 | End: 2021-01-28 | Stop reason: HOSPADM

## 2021-01-22 RX ORDER — PANTOPRAZOLE SODIUM 40 MG/1
40 TABLET, DELAYED RELEASE ORAL DAILY
Status: DISCONTINUED | OUTPATIENT
Start: 2021-01-22 | End: 2021-01-28 | Stop reason: HOSPADM

## 2021-01-22 RX ORDER — ASPIRIN 81 MG/1
81 TABLET ORAL DAILY
Status: DISCONTINUED | OUTPATIENT
Start: 2021-01-22 | End: 2021-01-28 | Stop reason: HOSPADM

## 2021-01-22 RX ORDER — ZINC SULFATE 50(220)MG
1 CAPSULE ORAL DAILY
Status: DISCONTINUED | OUTPATIENT
Start: 2021-01-22 | End: 2021-01-28 | Stop reason: HOSPADM

## 2021-01-22 RX ORDER — ATORVASTATIN CALCIUM 40 MG/1
40 TABLET, FILM COATED ORAL DAILY
Status: DISCONTINUED | OUTPATIENT
Start: 2021-01-22 | End: 2021-01-28 | Stop reason: HOSPADM

## 2021-01-22 RX ORDER — POTASSIUM CHLORIDE 750 MG/1
20 TABLET, FILM COATED, EXTENDED RELEASE ORAL DAILY
Status: DISCONTINUED | OUTPATIENT
Start: 2021-01-22 | End: 2021-01-28 | Stop reason: HOSPADM

## 2021-01-22 RX ADMIN — PANTOPRAZOLE SODIUM 40 MG: 40 TABLET, DELAYED RELEASE ORAL at 10:40

## 2021-01-22 RX ADMIN — OXYCODONE HYDROCHLORIDE AND ACETAMINOPHEN 1000 MG: 500 TABLET ORAL at 10:45

## 2021-01-22 RX ADMIN — AZITHROMYCIN DIHYDRATE 500 MG: 500 INJECTION, POWDER, LYOPHILIZED, FOR SOLUTION INTRAVENOUS at 04:01

## 2021-01-22 RX ADMIN — ZINC SULFATE 220 MG (50 MG) CAPSULE 1 CAPSULE: CAPSULE at 10:45

## 2021-01-22 RX ADMIN — GABAPENTIN 100 MG: 100 CAPSULE ORAL at 16:21

## 2021-01-22 RX ADMIN — TICAGRELOR 90 MG: 90 TABLET ORAL at 21:58

## 2021-01-22 RX ADMIN — Medication 2 TABLET: at 10:46

## 2021-01-22 RX ADMIN — INSULIN LISPRO 24 UNITS: 100 INJECTION, SUSPENSION SUBCUTANEOUS at 10:38

## 2021-01-22 RX ADMIN — TICAGRELOR 90 MG: 90 TABLET ORAL at 10:40

## 2021-01-22 RX ADMIN — INSULIN LISPRO 7 UNITS: 100 INJECTION, SOLUTION INTRAVENOUS; SUBCUTANEOUS at 17:22

## 2021-01-22 RX ADMIN — ASPIRIN 81 MG: 81 TABLET, COATED ORAL at 10:40

## 2021-01-22 RX ADMIN — REMDESIVIR 200 MG: 100 INJECTION, POWDER, LYOPHILIZED, FOR SOLUTION INTRAVENOUS at 10:27

## 2021-01-22 RX ADMIN — MAGNESIUM OXIDE TAB 400 MG (241.3 MG ELEMENTAL MG) 400 MG: 400 (241.3 MG) TAB at 21:58

## 2021-01-22 RX ADMIN — INSULIN LISPRO 24 UNITS: 100 INJECTION, SUSPENSION SUBCUTANEOUS at 21:59

## 2021-01-22 RX ADMIN — INSULIN LISPRO 10 UNITS: 100 INJECTION, SOLUTION INTRAVENOUS; SUBCUTANEOUS at 21:59

## 2021-01-22 RX ADMIN — MAGNESIUM OXIDE TAB 400 MG (241.3 MG ELEMENTAL MG) 400 MG: 400 (241.3 MG) TAB at 16:21

## 2021-01-22 RX ADMIN — FUROSEMIDE 40 MG: 10 INJECTION, SOLUTION INTRAMUSCULAR; INTRAVENOUS at 08:54

## 2021-01-22 RX ADMIN — POTASSIUM CHLORIDE 20 MEQ: 750 TABLET, FILM COATED, EXTENDED RELEASE ORAL at 10:40

## 2021-01-22 RX ADMIN — LEVOTHYROXINE SODIUM 150 MCG: 75 TABLET ORAL at 06:23

## 2021-01-22 RX ADMIN — GABAPENTIN 100 MG: 100 CAPSULE ORAL at 21:58

## 2021-01-22 RX ADMIN — DEXAMETHASONE SODIUM PHOSPHATE 4 MG: 4 INJECTION, SOLUTION INTRAMUSCULAR; INTRAVENOUS at 04:01

## 2021-01-22 RX ADMIN — INSULIN LISPRO 7 UNITS: 100 INJECTION, SOLUTION INTRAVENOUS; SUBCUTANEOUS at 12:52

## 2021-01-22 RX ADMIN — MAGNESIUM OXIDE TAB 400 MG (241.3 MG ELEMENTAL MG) 400 MG: 400 (241.3 MG) TAB at 10:40

## 2021-01-22 RX ADMIN — GABAPENTIN 100 MG: 100 CAPSULE ORAL at 10:40

## 2021-01-22 RX ADMIN — ATORVASTATIN CALCIUM 40 MG: 40 TABLET, FILM COATED ORAL at 21:58

## 2021-01-22 RX ADMIN — SOTALOL HYDROCHLORIDE 40 MG: 80 TABLET ORAL at 10:40

## 2021-01-22 NOTE — PROGRESS NOTES
Myself and Justyna Mullen RN performed the initial skin assessment. Pt skin is clean,dry, and intact with no signs of breakdown.

## 2021-01-22 NOTE — PROGRESS NOTES
Reason for Admission:   Hypoxia                    RUR Score:     20             PCP: First and Last name:  Anat Drake   Name of Practice:    Are you a current patient: Yes/No: yes   Approximate date of last visit: November   Can you participate in a virtual visit if needed: yes    Do you (patient/family) have any concerns for transition/discharge? None at this time. Plan for utilizing home health:   Patient has had [facility] prior. Currently patient has cardiac rehab at [facility] which is considered out patient therapy. Current Advanced Directive/Advance Care Plan:  Patients surrogate decision maker is Spouse Jersey Callaway [phone]            Transition of Care Plan:      CM met with patient at bedside. Patient lives at home with spouse, son and her sister. She uses a walker and cane at home. She is mostly independent at home although the last 2 months she has had to take frequent rest breaks due to SOB. She is in out patient cardiac rehab in [facility] currently. She is open to having home health if needed upon discharge, her choice is [facility]. Patient will not be able to have home health and out patient therapy as it conflicts with medicare billing. CM team will continue to monitor case for needs as they arise.

## 2021-01-22 NOTE — CONSULTS
[facility] CARDIOLOGY  CARDIOLOGY CONSULTATION    REASON FOR CONSULT: Heart failure     REQUESTING PROVIDER: Dr. Henson Carlos:  Dyspnea     HISTORY OF PRESENT ILLNESS:  Tish Henson is a 65y.o. year-old female with past medical history significant for heart failure, hypertension, atrial fibrillation, cad, and diabetes,  who was evaluated today due to worsening shortness of breath and desaturations. On ambulation she noted her SpO2 level would drop to the 70s. She tested positive for COVID. She remains dyspneic at rest. Denies and chest pain. Discussed with nursing. Records from hospital admission course thus far reviewed. Telemetry reviewed. No events overnight. Vpaced at 76. Crystal Rekha INPATIENT MEDICATIONS:  Home medications reviewed.     Current Facility-Administered Medications:     aspirin delayed-release tablet 81 mg, 81 mg, Oral, DAILY, Khurram Borges MD    atorvastatin (LIPITOR) tablet 40 mg, 40 mg, Oral, DAILY, Khurram Borges MD    gabapentin (NEURONTIN) capsule 100 mg, 100 mg, Oral, TID, Lucy KEBEDE MD    insulin lispro protamine/insulin lispro (HUMALOG MIX 75/25) injection 24 Units, 24 Units, SubCUTAneous, BID, Khurram Borges MD    levothyroxine (SYNTHROID) tablet 150 mcg, 150 mcg, Oral, ACB, Khurram Patel MD, 150 mcg at 01/22/21 6646    magnesium oxide (MAG-OX) tablet 400 mg, 400 mg, Oral, TID, Khurram Borges MD    melatonin tablet 3 mg, 3 mg, Oral, QHS PRN, Khurram Patel MD    metFORMIN ER (GLUCOPHAGE XR) tablet 750 mg, 750 mg, Oral, BID WITH MEALS, Khurram Borges MD    pantoprazole (PROTONIX) tablet 40 mg, 40 mg, Oral, DAILY, Khurram Borges MD    potassium chloride SR (KLOR-CON 10) tablet 20 mEq, 20 mEq, Oral, DAILY, Khurram Borges MD    polyethylene glycol (MIRALAX) packet 17 g, 17 g, Oral, PRN, Khurram Patel MD    sotaloL (BETAPACE) tablet 40 mg, 40 mg, Oral, DAILY, Khurram Borges MD    azithromycin (ZITHROMAX) 500 mg in 0.9% sodium chloride 250 mL (VIAL-MATE), 500 mg, IntraVENous, Q24H, Jenniffer Haynes PA-C, 500 mg at 01/22/21 0401    ticagrelor (BRILINTA) tablet 90 mg, 90 mg, Oral, Q12H, Andrew Borges MD    remdesivir 200 mg in 0.9% sodium chloride 250 mL IVPB, 200 mg, IntraVENous, ONCE **FOLLOWED BY** [START ON 1/23/2021] remdesivir 100 mg in 0.9% sodium chloride 250 mL IVPB, 100 mg, IntraVENous, Q24H, Andrew Borges MD    [START ON 1/23/2021] dexamethasone (DECADRON) 4 mg/mL injection 6 mg, 6 mg, IntraVENous, Q24H, Andrew Borges MD    cholecalciferol (VITAMIN D3) (1000 Units /25 mcg) tablet 2 Tab, 2,000 Units, Oral, DAILY, Andrew Borges MD    ascorbic acid (vitamin C) (VITAMIN C) tablet 1,000 mg, 1,000 mg, Oral, DAILY, Andrew Borges MD    zinc sulfate (ZINCATE) 220 (50) mg capsule 1 Cap, 1 Cap, Oral, DAILY, Andrew Borges MD    [facility]etron [facility]) injection 4 mg, 4 mg, IntraVENous, Q6H PRN, Andrew Hickman MD    acetaminophen (TYLENOL) tablet 650 mg, 650 mg, Oral, Q6H PRN, Andrew Hickman MD    furosemide (LASIX) injection 40 mg, 40 mg, IntraVENous, Q12H, Andrew Hickman MD, 40 mg at 01/22/21 0854    docusate sodium (COLACE) capsule 100 mg, 100 mg, Oral, PRN, Andrew Hickman MD    insulin lispro (HUMALOG) injection, , SubCUTAneous, AC&HS, Zari Chowdary MD, 2 Units at 01/21/21 2350    glucose chewable tablet 16 g, 4 Tab, Oral, PRN, Andrew Hickman MD    dextrose (D50W) injection syrg 12.5-25 g, 25-50 mL, IntraVENous, PRN, Melony Willis, Andrew Henderson MD    glucagon [facility]) injection 1 mg, 1 mg, IntraMUSCular, PRN, Zari Chowdary MD     ALLERGIES:  Allergies reviewed with the patient,  Allergies   Allergen Reactions    Codeine Other (comments)     Patient states she is not allergic    Novocain [Procaine] Nausea and Vomiting    Tramadol Other (comments)     Headache    .          REVIEW OF SYSTEMS:  Complete review of systems performed, pertinents noted above, all other systems are negative. PHYSICAL EXAMINATION:  Patient seen but not fully examined due to COVID status. Vital sign assessment reveal a blood pressure of  141/92  and pulse rate of 79. Cardiovascular exam has a heart with a paced rate and rhythm. Respiratory exam reveals laborded breathing. Neurologic exam is nonfocal.  Musculoskeletal exam is notable for a normal gait. Recent labs results and imaging reviewed. Notable findings include   Lab Results   Component Value Date/Time    WBC 9.1 01/21/2021 03:30 PM    HGB 12.6 01/21/2021 03:30 PM    HCT 38.1 01/21/2021 03:30 PM    PLATELET 977 (L) 14/25/1689 03:30 PM    MCV 89.6 01/21/2021 03:30 PM     Lab Results   Component Value Date/Time    Troponin-I, Qt. 0.37 (H) 01/21/2021 06:30 PM      Lab Results   Component Value Date/Time    NT pro-BNP 3,069 (H) 01/21/2021 04:06 PM    NT pro- (H) 11/16/2020 07:15 PM    NT pro-BNP 2,738 (H) 09/22/2020 05:50 PM    NT pro-BNP 1,862 (H) 08/10/2020 05:54 PM    PROBNP 216 01/03/2018 01:02 PM    PROBNP 376 (H) 11/29/2017 12:00 AM      Lab Results   Component Value Date/Time    Sodium 134 (L) 01/21/2021 03:30 PM    Potassium 3.6 01/21/2021 03:30 PM    Chloride 99 01/21/2021 03:30 PM    CO2 26 01/21/2021 03:30 PM    Anion gap 9 01/21/2021 03:30 PM    Glucose 273 (H) 01/21/2021 03:30 PM    BUN 20 01/21/2021 03:30 PM    Creatinine 0.91 01/21/2021 03:30 PM    BUN/Creatinine ratio 22 (H) 01/21/2021 03:30 PM    GFR est AA >60 01/21/2021 03:30 PM    GFR est non-AA >60 01/21/2021 03:30 PM    Calcium 9.0 01/21/2021 03:30 PM    Bilirubin, total 0.8 01/21/2021 03:30 PM    ALT (SGPT) 37 01/21/2021 03:30 PM    Alk.  phosphatase 127 (H) 01/21/2021 03:30 PM    Protein, total 7.9 01/21/2021 03:30 PM    Albumin 2.7 (L) 01/21/2021 03:30 PM    Globulin 5.2 (H) 01/21/2021 03:30 PM    A-G Ratio 0.5 (L) 01/21/2021 03:30 PM     .       Discussed case with Dr. Leighton Lemon  and our impression and recommendations are as follows:  1. Coronary artery diseasen:  s/p stenting proximal LAD 9/10. Continue DAPT. Due to her increasing dyspnea she was scheduled to have a catheterization however this was cancelled due to her COVID status. We will monitor. Trend troponin     2. Cardiomyopathy:  EF 10% on echo prior to cath 9/10. MOST RECENT echo 11/16/2020 showing EF 45-50%. Has some noncompliance with diuretic regimen outpatient. Continue 40mg IV lasix BID for diuresis. Continue daily weights, strict I&Os, and 1500 ml fluid restrictions. Would avoid volume overload. Continue telemetry monitoring. Please keep serum potassium between 4-5 and serum magnesium > 2.     3. Atrial tachycardia:  Most recent interrogation showed no arrhythmias. Continue sotalol and continue to monitor. 4.  Hypertension:  Blood pressure acceptable. 5. Hyperlipidemia: We will continue the statin therapy as prescribed. 6. COVID pneumonia: Per pulmonary and primary, requiring 5L nasal cannula and non rebreather intermittently. Thank you for involving us in the care of this patient. Please do not hesitate to call me or Dr. Zev Mcgovern if additional questions arise.

## 2021-01-22 NOTE — CONSULTS
PULMONARY CONSULT  [facility]    Name: Amy Bernstein MRN: [id number]   : 1950 Hospital: [address] [place] [address]   Date: 2021  Admission date: 2021 Hospital Day: 2       HPI:     Hospital Problems  Date Reviewed: 10/21/2020          Codes Class Noted POA    Hypoxia ICD-10-CM: R09.02  ICD-9-CM: 799.02  2021 Unknown        Elevated troponin ICD-10-CM: R77.8  ICD-9-CM: 790.6  2021 Unknown                   [x] High complexity decision making was performed  [x] See my orders for details      Subjective/Initial History:     I was asked by Scarlett Canavan, MD to see Amy Bernstein  a 65 y.o.  female in consultation     Excerpts from admission 2021 or consult notes as follows:   40-year-old lady came in because of shortness of breath and dyspnea she has significant past medical history of congestive heart failure ejection fraction about 45% history of atrial fibrillation status post ICD coronary artery disease diabetes hypertension she was having shortness of breath for the past couple of days seen by cardiologist but her condition got worse came to the hospital and getting treatment for congestive heart failure saturation was low in 80s now her COVID-19 test came back positive chest x-ray shows bilateral groundglass opacities confirmed with CAT scan of the chest so pulmonary consult was called for further evaluation.       Allergies   Allergen Reactions    Codeine Other (comments)     Patient states she is not allergic    Novocain [Procaine] Nausea and Vomiting    Tramadol Other (comments)     Headache        MAR reviewed and pertinent medications noted or modified as needed     Current Facility-Administered Medications   Medication    aspirin delayed-release tablet 81 mg    atorvastatin (LIPITOR) tablet 40 mg    gabapentin (NEURONTIN) capsule 100 mg    insulin lispro protamine/insulin lispro (HUMALOG MIX 75/25) injection 24 Units    levothyroxine (SYNTHROID) tablet 150 mcg    magnesium oxide (MAG-OX) tablet 400 mg    melatonin tablet 3 mg    metFORMIN ER (GLUCOPHAGE XR) tablet 750 mg    pantoprazole (PROTONIX) tablet 40 mg    potassium chloride SR (KLOR-CON 10) tablet 20 mEq    polyethylene glycol (MIRALAX) packet 17 g    sotaloL (BETAPACE) tablet 40 mg    azithromycin (ZITHROMAX) 500 mg in 0.9% sodium chloride 250 mL (VIAL-MATE)    ticagrelor (BRILINTA) tablet 90 mg    remdesivir 200 mg in 0.9% sodium chloride 250 mL IVPB    Followed by   Paul Eng ON 1/23/2021] remdesivir 100 mg in 0.9% sodium chloride 250 mL IVPB    [START ON 1/23/2021] dexamethasone (DECADRON) 4 mg/mL injection 6 mg    cholecalciferol (VITAMIN D3) (1000 Units /25 mcg) tablet 2 Tab    ascorbic acid (vitamin C) (VITAMIN C) tablet 1,000 mg    zinc sulfate (ZINCATE) 220 (50) mg capsule 1 Cap    ondansetron (ZOFRAN) injection 4 mg    acetaminophen (TYLENOL) tablet 650 mg    furosemide (LASIX) injection 40 mg    docusate sodium (COLACE) capsule 100 mg    insulin lispro (HUMALOG) injection    glucose chewable tablet 16 g    dextrose (D50W) injection syrg 12.5-25 g    glucagon (GLUCAGEN) injection 1 mg      Patient PCP: Wei Arce MD  [facility]:  has a past medical history of Acid indigestion, Aneurysm ([address] Anxiety disorder, Arthritis, Asthma, Back pain, Bladder spasms, Cerebral artery occlusion with cerebral infarction ([address] (08/2020), Chest pain, Constipation, Cough, Diabetes mellitus, Diarrhea, Fatigue, Fibromyalgia, Frequent episodic tension-type headache, Hearing reduced, Heart failure ([address] Hemorrhoids, Hernia of unspecified site of abdominal cavity without mention of obstruction or gangrene, HTN (hypertension), Hypothyroidism, ICD (implantable cardioverter-defibrillator) in place, Muscle pain, N&V (nausea and vomiting), Pacemaker, Ringing in ears, Skin cancer, SOB (shortness of breath), SOB (shortness of breath), Swallowing difficulty, Thyroid disorder, Vertigo, and Wears dentures. PSH:   has a past surgical history that includes pr removal gallbladder; hx thyroidectomy; hx hysterectomy; hx carpal tunnel release; hx heart catheterization (16); hx pacemaker (Left, 16); hx gi; and hx orthopaedic. FHX: family history includes Breast Cancer in her sister; Cancer in her sister; Diabetes in her brother, mother, and sister; Heart Disease in her father, mother, and sister; Hypertension in her sister; Lupus in her sister. SHX:  reports that she has never smoked. She has never used smokeless tobacco. She reports current alcohol use. She reports that she does not use drugs. ROS:    Review of Systems   Constitutional: Positive for malaise/fatigue. HENT: Negative. Eyes: Negative. Respiratory: Positive for cough, sputum production and shortness of breath. Cardiovascular: Positive for orthopnea. Gastrointestinal: Negative. Genitourinary: Negative. Musculoskeletal: Negative. Neurological: Negative. Endo/Heme/Allergies: Negative. Psychiatric/Behavioral: Negative. Objective:     Vital Signs: Telemetry:    AFIB Intake/Output:   Visit Vitals  BP (!) 141/92 (BP 1 Location: Right arm, BP Patient Position: At rest)   Pulse 80   Temp 98.1 °F (36.7 °C)   Resp 20   Ht 5' 2\" (1.575 m)   Wt 78 kg (172 lb)   SpO2 94%   Breastfeeding No   BMI 31.46 kg/m²       Temp (24hrs), Av.5 °F (36.9 °C), Min:98.1 °F (36.7 °C), Max:98.9 °F (37.2 °C)        O2 Device: Hi flow nasal cannula O2 Flow Rate (L/min): 15 l/min       Wt Readings from Last 4 Encounters:   21 78 kg (172 lb)   21 78.6 kg (173 lb 3.2 oz)   01/15/21 78.5 kg (173 lb)   21 78 kg (172 lb)          Intake/Output Summary (Last 24 hours) at 2021 1047  Last data filed at 2021 0634  Gross per 24 hour   Intake 0 ml   Output 450 ml   Net -450 ml       Last shift:      No intake/output data recorded.   Last 3 shifts: 1901 -  07  In: 0   Out: 450 [Urine:450]       Physical Exam:     Physical Exam   Constitutional: She appears well-developed and well-nourished. HENT:   Head: Normocephalic and atraumatic. Eyes: Pupils are equal, round, and reactive to light. Conjunctivae are normal.   Neck: Normal range of motion. Neck supple. Cardiovascular: Normal rate and regular rhythm. Pulmonary/Chest: She is in respiratory distress. Abdominal: Soft. Musculoskeletal: Normal range of motion. Neurological: She is alert. Skin: Skin is warm. Labs:    Recent Labs     01/21/21  1530   WBC 9.1   HGB 12.6   *     Recent Labs     01/21/21  1530   *   K 3.6   CL 99   CO2 26   *   BUN 20   CREA 0.91   CA 9.0   ALB 2.7*   ALT 37     No results for input(s): PH, PCO2, PO2, HCO3, FIO2 in the last 72 hours. Recent Labs     01/21/21  1830 01/21/21  1530   TROIQ 0.37* 0.38*     No results found for: BNPP, BNP   No results found for: CULT  Lab Results   Component Value Date/Time    TSH 6.58 (H) 08/10/2020 05:54 PM       Imaging:    CXR Results  (Last 48 hours)               01/21/21 1441  XR CHEST SNGL V Final result    Narrative:  Chest single view. Comparison single view chest November 16, 2020       Reduced lung volumes. Hazy coarse reticular markings through the lungs advanced   compared to prior imaging. Findings concerning for mild interstitial edema   superimposed upon chronic change. Left-sided cardiac device with similar positioned leads overlying the heart. Thoracic aorta atherosclerosis. No pneumothorax or sizable pleural effusion. Results from [place] encounter on 01/21/21   XR CHEST SNGL V    Narrative Chest single view. Comparison single view chest November 16, 2020    Reduced lung volumes. Hazy coarse reticular markings through the lungs advanced  compared to prior imaging. Findings concerning for mild interstitial edema  superimposed upon chronic change.      Left-sided cardiac device with similar positioned leads overlying the heart. Thoracic aorta atherosclerosis. No pneumothorax or sizable pleural effusion. Results from [place] encounter on 11/16/20   XR CHEST PORT    Narrative Chest single view. Comparison single view chest September 22, 2020. Lungs clear. No interstitial or alveolar pulmonary edema. Left-sided cardiac  device with leads overlying the right heart. Cardiac and mediastinal structures  are unchanged. Thoracic aorta atherosclerosis. No pneumothorax or sizable  pleural effusion. Impression Impression: No CHF. No sizable pleural effusion. Results from [place] encounter on 09/22/20   XR CHEST PORT    Narrative Exam: Frontal chest    Comparison: 9/8/2020. 8/10/2020. Number of views: 1    Findings: Left subclavian central venous cardiac electrode device leads in  apparent satisfactory position. There is calcific atherosclerotic disease of the  thoracic aorta. The configuration of the cardiopericardial silhouette suggests  enlarged cardiac chambers. There is pulmonary venous cephalization, compatible  with chronic pulmonary venous hypertension. There is, however, no demonstrated  pulmonary edema at this time. There is no demonstrated significant pulmonary  parenchymal lesion. There is no evidence of pleural disease. Impression Impression: Evidence of chronic left-sided heart disease, without active edema  demonstrated at this time. Atherosclerosis. Results from [place] encounter on 01/21/21   CT CHEST WO CONT    Narrative CT chest without IV contrast    Axial images are reviewed along with reformatted sagittal/coronal/MIP images. No  IV contrast administered.    Dose reduction: All CT scans at this facility are performed using dose reduction  optimization techniques as appropriate to a performed exam including the  following-  automated exposure control, adjustments of mA and/or Kv according to patient  size, or use of iterative reconstructive technique. Coarse peripheral reticular markings along with groundglass opacity throughout  lungs. Although findings are nonspecific, consider inflammatory change. No  pleural effusion. Left side cardiac device. Nonenhanced images reveal atherosclerotic change  thoracic aorta. Normal caliber of pulmonary arterial trunk. Small mediastinal  lymph nodes present. Tram track calcification present along the course of the  coronary arteries, evidence for coronary artery disease. No pericardial  effusion. Atherosclerosis continues into the imaged upper abdominal aorta. Impression Coarse peripheral reticular markings along with groundglass opacity. Small mediastinal lymph nodes. Findings would fit with inflammatory change  involving the lungs. No pleural or pericardial effusion. Thoracoabdominal aorta atherosclerosis. CAD. IMPRESSION:   1. Acute hypoxic respiratory failure  2. COVID-19 pneumonia  3. Decompensated congestive heart failure  4. Paroxysmal atrial fibrillation  5. History of AICD coronary artery disease diabetes mellitus GERD with Rosario's esophagus  6. Pt is requiring Drug therapy requiring intensive monitoring for toxicity  7. Pt is unstable, unpredictable needing inpatient monitoring; is acutely ill and at high risk of sudden decline and decompensation with severe consequenses and continued end organ dysfunction and failure  8. Prognosis guarded       RECOMMENDATIONS/PLAN:     1. High flow nasal Cannula oxygen as salvage oxygen delivery device to provide high concentration of oxygen to overcome refractory hypoxia; will get ABG   2. She  is on Decadron remdesivir Zithromax and also will continue with IV Lasix because of congestion CAT scan of the chest shows bilateral groundglass opacities consistent with COVID-19 along with CHF  3. Intubate and place on vent if NIV fails  4. Agree with Empiric IV antibiotics pending culture results   5.  Follow culture results  6. Supplemental O2 to keep sats > 93%  7. Aspiration precautions  8. Labs to follow electrolytes, renal function and and blood counts  9. Glucose monitoring and SSI  10. Bronchial hygiene with respiratory therapy techniques, bronchodilators  11. DVT, SUP prophylaxis       This care involved high complexity medical decision making: I personally:  · Reviewed the flowsheet and previous days notes  · Reviewed and summarized records or history from previous days note or discussions with staff, family  · High Risk Drug therapy requiring intensive monitoring for toxicity: eg steroids, pressors, antibiotics  · Reviewed and/or ordered Clinical lab tests  · Reviewed images and/or ordered Radiology tests  · Reviewed the patients ECG / Telemetry  · Reviewed and/or adjusted NiPPV settings  · Called and arranged for Radiologic procedures or interventions  · performed or ordered Diagnostic endoscopies with identified risk factors.   · discussed my assessment/management with : Nursing, Hospitalist and Family for coordination of care        Time spent 55 min Discussed with ever Alejandro MD

## 2021-01-22 NOTE — PROGRESS NOTES
Oxygen sats on 5 liters this am was 85%. Oxygen increased to 6 liters nasal cannulla, sats only86%.  respiratort therapy called and patient placed on hi flow nasal cannulla, sats now 94%
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
## 2021-01-23 LAB
ANION GAP SERPL CALC-SCNC: 7 MMOL/L (ref 5–15)
BASOPHILS # BLD: 0 K/UL (ref 0–0.1)
BASOPHILS NFR BLD: 0 % (ref 0–1)
BUN SERPL-MCNC: 46 MG/DL (ref 6–20)
BUN/CREAT SERPL: 41 (ref 12–20)
CA-I BLD-MCNC: 8.6 MG/DL (ref 8.5–10.1)
CHLORIDE SERPL-SCNC: 99 MMOL/L (ref 97–108)
CO2 SERPL-SCNC: 29 MMOL/L (ref 21–32)
CREAT SERPL-MCNC: 1.11 MG/DL (ref 0.55–1.02)
CRP SERPL-MCNC: 11.2 MG/DL (ref 0–0.6)
DIFFERENTIAL METHOD BLD: ABNORMAL
EOSINOPHIL # BLD: 0 K/UL (ref 0–0.4)
EOSINOPHIL NFR BLD: 0 % (ref 0–7)
ERYTHROCYTE [DISTWIDTH] IN BLOOD BY AUTOMATED COUNT: 17.1 % (ref 11.5–14.5)
FERRITIN SERPL-MCNC: 1577 NG/ML (ref 8–252)
GLUCOSE BLD STRIP.AUTO-MCNC: 191 MG/DL (ref 65–100)
GLUCOSE BLD STRIP.AUTO-MCNC: 323 MG/DL (ref 65–100)
GLUCOSE BLD STRIP.AUTO-MCNC: 336 MG/DL (ref 65–100)
GLUCOSE BLD STRIP.AUTO-MCNC: 365 MG/DL (ref 65–100)
GLUCOSE BLD STRIP.AUTO-MCNC: 366 MG/DL (ref 65–100)
GLUCOSE SERPL-MCNC: 364 MG/DL (ref 65–100)
HCT VFR BLD AUTO: 38.4 % (ref 35–47)
HGB BLD-MCNC: 12.7 G/DL (ref 11.5–16)
IMM GRANULOCYTES # BLD AUTO: 0 K/UL (ref 0–0.04)
IMM GRANULOCYTES NFR BLD AUTO: 0 % (ref 0–0.5)
LDH SERPL L TO P-CCNC: 574 U/L (ref 81–246)
LYMPHOCYTES # BLD: 0.7 K/UL (ref 0.8–3.5)
LYMPHOCYTES NFR BLD: 5 % (ref 12–49)
MCH RBC QN AUTO: 29.9 PG (ref 26–34)
MCHC RBC AUTO-ENTMCNC: 33.1 G/DL (ref 30–36.5)
MCV RBC AUTO: 90.4 FL (ref 80–99)
MONOCYTES # BLD: 0.4 K/UL (ref 0–1)
MONOCYTES NFR BLD: 3 % (ref 5–13)
NEUTS SEG # BLD: 14.1 K/UL (ref 1.8–8)
NEUTS SEG NFR BLD: 92 % (ref 32–75)
PERFORMED BY, TECHID: ABNORMAL
PLATELET # BLD AUTO: 120 K/UL (ref 150–400)
PMV BLD AUTO: 10.7 FL (ref 8.9–12.9)
POTASSIUM SERPL-SCNC: 3.6 MMOL/L (ref 3.5–5.1)
RBC # BLD AUTO: 4.25 M/UL (ref 3.8–5.2)
SODIUM SERPL-SCNC: 135 MMOL/L (ref 136–145)
WBC # BLD AUTO: 15.3 K/UL (ref 3.6–11)

## 2021-01-23 PROCEDURE — 65270000029 HC RM PRIVATE

## 2021-01-23 PROCEDURE — 82962 GLUCOSE BLOOD TEST: CPT

## 2021-01-23 PROCEDURE — 74011250637 HC RX REV CODE- 250/637: Performed by: INTERNAL MEDICINE

## 2021-01-23 PROCEDURE — 86140 C-REACTIVE PROTEIN: CPT

## 2021-01-23 PROCEDURE — 85025 COMPLETE CBC W/AUTO DIFF WBC: CPT

## 2021-01-23 PROCEDURE — 74011636637 HC RX REV CODE- 636/637: Performed by: INTERNAL MEDICINE

## 2021-01-23 PROCEDURE — 74011000258 HC RX REV CODE- 258: Performed by: INTERNAL MEDICINE

## 2021-01-23 PROCEDURE — 80048 BASIC METABOLIC PNL TOTAL CA: CPT

## 2021-01-23 PROCEDURE — 83615 LACTATE (LD) (LDH) ENZYME: CPT

## 2021-01-23 PROCEDURE — 36415 COLL VENOUS BLD VENIPUNCTURE: CPT

## 2021-01-23 PROCEDURE — 74011000250 HC RX REV CODE- 250: Performed by: INTERNAL MEDICINE

## 2021-01-23 PROCEDURE — 82728 ASSAY OF FERRITIN: CPT

## 2021-01-23 PROCEDURE — 77010033678 HC OXYGEN DAILY

## 2021-01-23 PROCEDURE — 74011250636 HC RX REV CODE- 250/636: Performed by: INTERNAL MEDICINE

## 2021-01-23 PROCEDURE — 94762 N-INVAS EAR/PLS OXIMTRY CONT: CPT

## 2021-01-23 PROCEDURE — 74011250636 HC RX REV CODE- 250/636: Performed by: PHYSICIAN ASSISTANT

## 2021-01-23 RX ADMIN — TICAGRELOR 90 MG: 90 TABLET ORAL at 21:47

## 2021-01-23 RX ADMIN — TICAGRELOR 90 MG: 90 TABLET ORAL at 09:11

## 2021-01-23 RX ADMIN — MAGNESIUM OXIDE TAB 400 MG (241.3 MG ELEMENTAL MG) 400 MG: 400 (241.3 MG) TAB at 09:11

## 2021-01-23 RX ADMIN — AZITHROMYCIN DIHYDRATE 500 MG: 500 INJECTION, POWDER, LYOPHILIZED, FOR SOLUTION INTRAVENOUS at 03:22

## 2021-01-23 RX ADMIN — INSULIN LISPRO 10 UNITS: 100 INJECTION, SOLUTION INTRAVENOUS; SUBCUTANEOUS at 22:00

## 2021-01-23 RX ADMIN — INSULIN LISPRO 24 UNITS: 100 INJECTION, SUSPENSION SUBCUTANEOUS at 21:00

## 2021-01-23 RX ADMIN — POTASSIUM CHLORIDE 20 MEQ: 750 TABLET, FILM COATED, EXTENDED RELEASE ORAL at 09:10

## 2021-01-23 RX ADMIN — DEXAMETHASONE SODIUM PHOSPHATE 6 MG: 4 INJECTION, SOLUTION INTRA-ARTICULAR; INTRALESIONAL; INTRAMUSCULAR; INTRAVENOUS; SOFT TISSUE at 09:12

## 2021-01-23 RX ADMIN — GABAPENTIN 100 MG: 100 CAPSULE ORAL at 15:52

## 2021-01-23 RX ADMIN — OXYCODONE HYDROCHLORIDE AND ACETAMINOPHEN 1000 MG: 500 TABLET ORAL at 09:11

## 2021-01-23 RX ADMIN — GABAPENTIN 100 MG: 100 CAPSULE ORAL at 09:12

## 2021-01-23 RX ADMIN — ZINC SULFATE 220 MG (50 MG) CAPSULE 1 CAPSULE: CAPSULE at 09:12

## 2021-01-23 RX ADMIN — INSULIN LISPRO 7 UNITS: 100 INJECTION, SOLUTION INTRAVENOUS; SUBCUTANEOUS at 16:30

## 2021-01-23 RX ADMIN — MAGNESIUM OXIDE TAB 400 MG (241.3 MG ELEMENTAL MG) 400 MG: 400 (241.3 MG) TAB at 15:52

## 2021-01-23 RX ADMIN — SOTALOL HYDROCHLORIDE 40 MG: 80 TABLET ORAL at 09:12

## 2021-01-23 RX ADMIN — REMDESIVIR 100 MG: 100 INJECTION, POWDER, LYOPHILIZED, FOR SOLUTION INTRAVENOUS at 16:26

## 2021-01-23 RX ADMIN — FUROSEMIDE 40 MG: 10 INJECTION, SOLUTION INTRAMUSCULAR; INTRAVENOUS at 09:12

## 2021-01-23 RX ADMIN — INSULIN LISPRO 24 UNITS: 100 INJECTION, SUSPENSION SUBCUTANEOUS at 09:00

## 2021-01-23 RX ADMIN — ATORVASTATIN CALCIUM 40 MG: 40 TABLET, FILM COATED ORAL at 21:47

## 2021-01-23 RX ADMIN — PANTOPRAZOLE SODIUM 40 MG: 40 TABLET, DELAYED RELEASE ORAL at 09:11

## 2021-01-23 RX ADMIN — INSULIN LISPRO 7 UNITS: 100 INJECTION, SOLUTION INTRAVENOUS; SUBCUTANEOUS at 11:30

## 2021-01-23 RX ADMIN — Medication 2 TABLET: at 09:11

## 2021-01-23 RX ADMIN — LEVOTHYROXINE SODIUM 150 MCG: 75 TABLET ORAL at 05:18

## 2021-01-23 RX ADMIN — ASPIRIN 81 MG: 81 TABLET, COATED ORAL at 09:11

## 2021-01-23 RX ADMIN — GABAPENTIN 100 MG: 100 CAPSULE ORAL at 21:47

## 2021-01-23 RX ADMIN — FUROSEMIDE 40 MG: 10 INJECTION, SOLUTION INTRAMUSCULAR; INTRAVENOUS at 21:47

## 2021-01-23 RX ADMIN — MAGNESIUM OXIDE TAB 400 MG (241.3 MG ELEMENTAL MG) 400 MG: 400 (241.3 MG) TAB at 21:47

## 2021-01-23 NOTE — PROGRESS NOTES
Hospitalist Progress Note               Daily Progress Note: 1/23/2021      Subjective: The patient is seen for follow up. She is now on a 100% nonrebreather mask as the high flow nasal oxygen was irritating her nose. Saturations are 96% currently    Labs from yesterday showed an LDH of 522, procalcitonin 0.59, CRP 11.8.   Troponins have been unchanged at 0.37    CT showed diffuse groundglass opacities consistent with COVID-19 and probably a component of edema60    Problem List:  Problem List as of 1/23/2021 Date Reviewed: 10/21/2020          Codes Class Noted - Resolved    Biventricular ICD (implantable cardioverter-defibrillator) in place ICD-10-CM: Z95.810  ICD-9-CM: V45.02  1/26/2018 - Present        Hypoxia ICD-10-CM: R09.02  ICD-9-CM: 799.02  1/21/2021 - Present        Elevated troponin ICD-10-CM: R77.8  ICD-9-CM: 790.6  1/21/2021 - Present        Atrial fibrillation ([email or web address].) ICD-10-CM: I48.91  ICD-9-CM: 427.31  11/16/2020 - Present        Acute on chronic congestive heart failure ([facility] [[email or web address].) ICD-10-CM: I50.9  ICD-9-CM: 428.0  11/16/2020 - Present        Hypomagnesemia ICD-10-CM: E83.42  ICD-9-CM: 275.2  9/28/2020 - Present        Hyperkalemia ICD-10-CM: E87.5  ICD-9-CM: 276.7  9/26/2020 - Present        CHF (congestive heart failure) (HCC) ICD-10-CM: I50.9  ICD-9-CM: 428.0  9/25/2020 - Present        CHF exacerbation ([facility] [[email or web address] 75.) ICD-10-CM: I50.9  ICD-9-CM: 428.0  9/22/2020 - Present        Severe obesity ([facility] [[email or web address] 75.) ICD-10-CM: E66.01  ICD-9-CM: 278.01  9/2/2020 - Present        Dysarthria ICD-10-CM: R47.1  ICD-9-CM: 784.51  8/11/2020 - Present        TIA (transient ischemic attack) ICD-10-CM: G45.9  ICD-9-CM: 435.9  8/10/2020 - Present        Cardiomyopathy ([address] ICD-10-CM: I42.9  ICD-9-CM: 425.4  5/23/2016 - Present        Skin cancer ICD-10-CM: C44.90  ICD-9-CM: 173.90  Unknown - Present        Acid indigestion ICD-10-CM: K30  ICD-9-CM: 536.8  Unknown - Present        Asthma ICD-10-CM: 425  ICD-9-CM: 059  Unknown - Present        Back pain ICD-10-CM: M54.9  ICD-9-CM: 724.5  Unknown - Present        Wears dentures ICD-10-CM: Z97.2  ICD-9-CM: V45.84  Unknown - Present        Constipation ICD-10-CM: 564.0  ICD-9-CM: 564.0  Unknown - Present        Diabetes mellitus ICD-10-CM: 250  ICD-9-CM: 250  Unknown - Present        Diarrhea ICD-10-CM: R19.7  ICD-9-CM: 787.91  Unknown - Present        Frequent episodic tension-type headache ICD-10-CM: L70.313  ICD-9-CM: 339.11  Unknown - Present        Hemorrhoids ICD-10-CM: 804  ICD-9-CM: 854  Unknown - Present        Hernia of unspecified site of abdominal cavity without mention of obstruction or gangrene ICD-10-CM: K46.9  ICD-9-CM: 553.9  Unknown - Present        HTN (hypertension) ICD-10-CM: I10  ICD-9-CM: 401.9  Unknown - Present        Muscle pain ICD-10-CM: M79.10  ICD-9-CM: 729.1  Unknown - Present        SOB (shortness of breath) ICD-10-CM: R06.02  ICD-9-CM: 786.05  Unknown - Present        Thyroid disorder ICD-10-CM: E07.9  ICD-9-CM: 246. 9  Unknown - Present              Medications reviewed  Current Facility-Administered Medications   Medication Dose Route Frequency    aspirin delayed-release tablet 81 mg  81 mg Oral DAILY    atorvastatin (LIPITOR) tablet 40 mg  40 mg Oral DAILY    gabapentin (NEURONTIN) capsule 100 mg  100 mg Oral TID    insulin lispro protamine/insulin lispro (HUMALOG MIX 75/25) injection 24 Units  24 Units SubCUTAneous BID    levothyroxine (SYNTHROID) tablet 150 mcg  150 mcg Oral ACB    magnesium oxide (MAG-OX) tablet 400 mg  400 mg Oral TID    melatonin tablet 3 mg  3 mg Oral QHS PRN    pantoprazole (PROTONIX) tablet 40 mg  40 mg Oral DAILY    potassium chloride SR (KLOR-CON 10) tablet 20 mEq  20 mEq Oral DAILY    polyethylene glycol (MIRALAX) packet 17 g  17 g Oral PRN    sotaloL (BETAPACE) tablet 40 mg  40 mg Oral DAILY    azithromycin (ZITHROMAX) 500 mg in 0.9% sodium chloride 250 mL (VIAL-MATE)  500 mg IntraVENous Q24H    ticagrelor (BRILINTA) tablet 90 mg  90 mg Oral Q12H    remdesivir 100 mg in 0.9% sodium chloride 250 mL IVPB  100 mg IntraVENous Q24H    dexamethasone (DECADRON) 4 mg/mL injection 6 mg  6 mg IntraVENous Q24H    cholecalciferol (VITAMIN D3) (1000 Units /25 mcg) tablet 2 Tab  2,000 Units Oral DAILY    ascorbic acid (vitamin C) (VITAMIN C) tablet 1,000 mg  1,000 mg Oral DAILY    zinc sulfate (ZINCATE) 220 (50) mg capsule 1 Cap  1 Cap Oral DAILY    metFORMIN ER (GLUCOPHAGE XR) tablet 750 mg  750 mg Oral BID WITH MEALS    ondansetron (ZOFRAN) injection 4 mg  4 mg IntraVENous Q6H PRN    acetaminophen (TYLENOL) tablet 650 mg  650 mg Oral Q6H PRN    furosemide (LASIX) injection 40 mg  40 mg IntraVENous Q12H    docusate sodium (COLACE) capsule 100 mg  100 mg Oral PRN    insulin lispro (HUMALOG) injection   SubCUTAneous AC&HS    glucose chewable tablet 16 g  4 Tab Oral PRN    dextrose (D50W) injection syrg 12.5-25 g  25-50 mL IntraVENous PRN    glucagon (GLUCAGEN) injection 1 mg  1 mg IntraMUSCular PRN       Review of Systems:   A comprehensive review of systems was negative except for that written in the HPI. Objective:   Physical Exam:     Visit Vitals  /60 (BP 1 Location: Left arm, BP Patient Position: At rest)   Pulse 86   Temp 97.6 °F (36.4 °C)   Resp 21   Ht 5' 2\" (1.575 m)   Wt 78 kg (172 lb)   SpO2 95%   Breastfeeding No   BMI 31.46 kg/m²    O2 Flow Rate (L/min): 15 l/min O2 Device: Non-rebreather mask    Temp (24hrs), Av.1 °F (36.7 °C), Min:97.6 °F (36.4 °C), Max:98.4 °F (36.9 °C)    No intake/output data recorded.  1901 -  0700  In: 300 [P.O.:300]  Out: 450 [Urine:450]    General:   Awake and alert   Lungs:   Clear to auscultation bilaterally. Chest wall:  No tenderness or deformity. Heart:  Regular rate and rhythm, S1, S2 normal, no murmur, click, rub or gallop. Abdomen:   Soft, non-tender. Bowel sounds normal. No masses,  No organomegaly.    Extremities: Extremities normal, atraumatic, no cyanosis or edema. Pulses: 2+ and symmetric all extremities. Skin: Skin color, texture, turgor normal. No rashes or lesions   Neurologic: CNII-XII intact. No gross focal deficits         Data Review:       Recent Days:  Recent Labs     01/21/21  1530   WBC 9.1   HGB 12.6   HCT 38.1   *     Recent Labs     01/21/21  1530   *   K 3.6   CL 99   CO2 26   *   BUN 20   CREA 0.91   CA 9.0   ALB 2.7*   TBILI 0.8   ALT 37     No results for input(s): PH, PCO2, PO2, HCO3, FIO2 in the last 72 hours. 24 Hour Results:  Recent Results (from the past 24 hour(s))   C REACTIVE PROTEIN, QT    Collection Time: 01/22/21 11:04 AM   Result Value Ref Range    C-Reactive protein 11.80 (H) 0.00 - 0.60 mg/dL   LD    Collection Time: 01/22/21 11:04 AM   Result Value Ref Range     (H) 81 - 246 U/L   PROCALCITONIN    Collection Time: 01/22/21 11:04 AM   Result Value Ref Range    Procalcitonin 0.59 (H) 0 ng/mL   TROPONIN I    Collection Time: 01/22/21 11:04 AM   Result Value Ref Range    Troponin-I, Qt. 0.37 (H) <0.05 ng/mL   GLUCOSE, POC    Collection Time: 01/22/21 11:32 AM   Result Value Ref Range    Glucose (POC) 337 (H) 65 - 100 mg/dL    Performed by [address], POC    Collection Time: 01/22/21  3:21 PM   Result Value Ref Range    Glucose (POC) 417 (H) 65 - 100 mg/dL    Performed by [address], POC    Collection Time: 01/22/21  9:47 PM   Result Value Ref Range    Glucose (POC) 415 (H) 65 - 100 mg/dL    Performed by Areli Jaramillo, POC    Collection Time: 01/23/21 12:15 AM   Result Value Ref Range    Glucose (POC) 336 (H) 65 - 100 mg/dL    Performed by Areli Jaramillo, POC    Collection Time: 01/23/21  7:18 AM   Result Value Ref Range    Glucose (POC) 191 (H) 65 - 100 mg/dL    Performed by Angella Kuhn        CT CHEST WO CONT   Final Result   Coarse peripheral reticular markings along with groundglass opacity. Small mediastinal lymph nodes.  Findings would fit with inflammatory change   involving the lungs. No pleural or pericardial effusion. Thoracoabdominal aorta atherosclerosis. CAD. XR CHEST SNGL V   Final Result           Assessment:  COVID-19 with pneumonitis    Acute respiratory failure with hypoxia    Acute on chronic systolic heart failure, EF 45%     Elevated troponin, likely due to hypoxia/CHF     Coronary artery disease with previous PCI     Paroxysmal atrial fibrillation     AICD in situ     Hypertension     Diabetes mellitus type 2     GERD with history of Rosario's esophagus    Plan:  Continue Decadron, remdesivir day #2    Continue IV furosemide    I left a message with her sister to call for an update    Care Plan discussed with: Patient/Family    Total time spent with patient: 30 minutes.     Jessica Fontenot MD

## 2021-01-23 NOTE — PROGRESS NOTES
CARDIOLOGY PROGRESS NOTE    Patient not examined due to being COVID positive. On 15L NRB. Per nursing staff, she remains dyspneic at rest. Denies chest pain. Telemetry reviewed. No events overnight. V-paced at 76s. .      PHYSICAL EXAMINATION:    Patient seen but not fully examined due to COVID status. Vital sign assessment reveal a blood pressure of 108/60  and pulse rate of 76. Cardiovascular exam has a heart with a paced rate and rhythm. Respiratory exam reveals mild respiratory distress          Recent labs results and imaging reviewed. Discussed case with Dr. Sulma Arango  and our impression and recommendations are as follows:    1. Coronary artery disease: s/p stenting proximal LAD 9/10. Continue DAPT. Cardiac catheterization recommended but unable to perform due to COVID and respiratory status. We will continue monitor. 2. Cardiomyopathy:  Recent echo 11/16/2020 showing EF 45-50%. Has some noncompliance with diuretic regimen outpatient. Continue Lasix for diuresis. Continue daily weights, strict I&Os, and 1500 ml fluid restrictions. Would avoid volume overload. Continue telemetry monitoring. Please keep serum potassium between 4-5 and serum magnesium > 2.     3. Atrial tachycardia:  Most recent interrogation showed no arrhythmias. Continue sotalol and continue to monitor. 4.  Hypertension: Blood pressure acceptable. 5. Hyperlipidemia: We will continue the statin therapy as prescribed. 6. COVID pneumonia: Per pulmonary and primary, requiring 15L on non-rebreather. Please do not hesitate to call me or Dr. Sulma Arango if additional questions arise.

## 2021-01-23 NOTE — PROGRESS NOTES
Bedside shift change report given to Koko Rahman (oncoming nurse) by Krysta Galindo LPN (offgoing nurse). Report included the following information SBAR.

## 2021-01-23 NOTE — CONSULTS
PULMONARY NOTE  [facility]    Name: Amy Bernstein MRN: [id number]   : 1950 Hospital: [facility]   Date: 2021  Admission date: 2021 Hospital Day: 3       HPI:     Hospital Problems  Date Reviewed: 10/21/2020          Codes Class Noted POA    Hypoxia ICD-10-CM: R09.02  ICD-9-CM: 799.02  2021 Unknown        Elevated troponin ICD-10-CM: R77.8  ICD-9-CM: 790.6  2021 Unknown                   [x] High complexity decision making was performed  [x] See my orders for details      Subjective/Initial History:     I was asked by Scarlett Canavan, MD to see Amy Bernstein  a 65 y.o.  female in consultation     Excerpts from admission 2021 or consult notes as follows:   78-year-old lady came in because of shortness of breath and dyspnea she has significant past medical history of congestive heart failure ejection fraction about 45% history of atrial fibrillation status post ICD coronary artery disease diabetes hypertension she was having shortness of breath for the past couple of days seen by cardiologist but her condition got worse came to the hospital and getting treatment for congestive heart failure saturation was low in 80s now her COVID-19 test came back positive chest x-ray shows bilateral groundglass opacities confirmed with CAT scan of the chest so pulmonary consult was called for further evaluation.       Allergies   Allergen Reactions    Codeine Other (comments)     Patient states she is not allergic    Novocain [Procaine] Nausea and Vomiting    Tramadol Other (comments)     Headache        MAR reviewed and pertinent medications noted or modified as needed     Current Facility-Administered Medications   Medication    aspirin delayed-release tablet 81 mg    atorvastatin (LIPITOR) tablet 40 mg    gabapentin (NEURONTIN) capsule 100 mg    insulin lispro protamine/insulin lispro (HUMALOG MIX 75/25) injection 24 Units    levothyroxine (SYNTHROID) tablet 150 mcg    magnesium oxide (MAG-OX) tablet 400 mg    melatonin tablet 3 mg    pantoprazole (PROTONIX) tablet 40 mg    potassium chloride SR (KLOR-CON 10) tablet 20 mEq    polyethylene glycol (MIRALAX) packet 17 g    sotaloL (BETAPACE) tablet 40 mg    azithromycin (ZITHROMAX) 500 mg in 0.9% sodium chloride 250 mL (VIAL-MATE)    ticagrelor (BRILINTA) tablet 90 mg    remdesivir 100 mg in 0.9% sodium chloride 250 mL IVPB    dexamethasone (DECADRON) 4 mg/mL injection 6 mg    cholecalciferol (VITAMIN D3) (1000 Units /25 mcg) tablet 2 Tab    ascorbic acid (vitamin C) (VITAMIN C) tablet 1,000 mg    zinc sulfate (ZINCATE) 220 (50) mg capsule 1 Cap    metFORMIN ER (GLUCOPHAGE XR) tablet 750 mg    ondansetron (ZOFRAN) injection 4 mg    acetaminophen (TYLENOL) tablet 650 mg    furosemide (LASIX) injection 40 mg    docusate sodium (COLACE) capsule 100 mg    insulin lispro (HUMALOG) injection    glucose chewable tablet 16 g    dextrose (D50W) injection syrg 12.5-25 g    glucagon (GLUCAGEN) injection 1 mg      Patient PCP: Ricky Stern MD  PMH:  has a past medical history of Acid indigestion, Aneurysm ([address] Anxiety disorder, Arthritis, Asthma, Back pain, Bladder spasms, Cerebral artery occlusion with cerebral infarction ([address] (08/2020), Chest pain, Constipation, Cough, Diabetes mellitus, Diarrhea, Fatigue, Fibromyalgia, Frequent episodic tension-type headache, Hearing reduced, Heart failure ([address] Hemorrhoids, Hernia of unspecified site of abdominal cavity without mention of obstruction or gangrene, HTN (hypertension), Hypothyroidism, ICD (implantable cardioverter-defibrillator) in place, Muscle pain, N&V (nausea and vomiting), Pacemaker, Ringing in ears, Skin cancer, SOB (shortness of breath), SOB (shortness of breath), Swallowing difficulty, Thyroid disorder, Vertigo, and Wears dentures.   PSH:   has a past surgical history that includes pr removal gallbladder; hx thyroidectomy; hx hysterectomy; hx carpal tunnel release; hx heart catheterization (16); hx pacemaker (Left, 16); hx gi; and hx orthopaedic. FHX: family history includes Breast Cancer in her sister; Cancer in her sister; Diabetes in her brother, mother, and sister; Heart Disease in her father, mother, and sister; Hypertension in her sister; Lupus in her sister. SHX:  reports that she has never smoked. She has never used smokeless tobacco. She reports current alcohol use. She reports that she does not use drugs. ROS:    Review of Systems   Constitutional: Positive for malaise/fatigue. HENT: Negative. Eyes: Negative. Respiratory: Positive for cough, sputum production and shortness of breath. Cardiovascular: Positive for orthopnea. Gastrointestinal: Negative. Genitourinary: Negative. Musculoskeletal: Negative. Neurological: Negative. Endo/Heme/Allergies: Negative. Psychiatric/Behavioral: Negative. Objective:     Vital Signs: Telemetry:    AFIB Intake/Output:   Visit Vitals  /60 (BP 1 Location: Left arm, BP Patient Position: At rest)   Pulse 86   Temp 97.6 °F (36.4 °C)   Resp 21   Ht 5' 2\" (1.575 m)   Wt 78 kg (172 lb)   SpO2 95%   Breastfeeding No   BMI 31.46 kg/m²       Temp (24hrs), Av.1 °F (36.7 °C), Min:97.6 °F (36.4 °C), Max:98.4 °F (36.9 °C)        O2 Device: Non-rebreather mask O2 Flow Rate (L/min): 15 l/min       Wt Readings from Last 4 Encounters:   21 78 kg (172 lb)   21 78.6 kg (173 lb 3.2 oz)   01/15/21 78.5 kg (173 lb)   21 78 kg (172 lb)          Intake/Output Summary (Last 24 hours) at 2021 1013  Last data filed at 2021 1653  Gross per 24 hour   Intake 300 ml   Output    Net 300 ml       Last shift:      No intake/output data recorded. Last 3 shifts:  1901 -  0700  In: 300 [P.O.:300]  Out: 450 [Urine:450]       Physical Exam:     Physical Exam   Constitutional: She appears well-developed and well-nourished.    HENT: Head: Normocephalic and atraumatic. Eyes: Pupils are equal, round, and reactive to light. Conjunctivae are normal.   Neck: Normal range of motion. Neck supple. Cardiovascular: Normal rate and regular rhythm. Pulmonary/Chest: She is in respiratory distress. Abdominal: Soft. Musculoskeletal: Normal range of motion. Neurological: She is alert. Skin: Skin is warm. Labs:    Recent Labs     01/21/21  1530   WBC 9.1   HGB 12.6   *     Recent Labs     01/21/21  1530   *   K 3.6   CL 99   CO2 26   *   BUN 20   CREA 0.91   CA 9.0   ALB 2.7*   ALT 37     No results for input(s): PH, PCO2, PO2, HCO3, FIO2 in the last 72 hours. Recent Labs     01/22/21  1104 01/21/21  1830 01/21/21  1530   TROIQ 0.37* 0.37* 0.38*     No results found for: BNPP, BNP   No results found for: CULT  Lab Results   Component Value Date/Time    TSH 6.58 (H) 08/10/2020 05:54 PM       Imaging:    CXR Results  (Last 48 hours)               01/21/21 1441  XR CHEST SNGL V Final result    Narrative:  Chest single view. Comparison single view chest November 16, 2020       Reduced lung volumes. Hazy coarse reticular markings through the lungs advanced   compared to prior imaging. Findings concerning for mild interstitial edema   superimposed upon chronic change. Left-sided cardiac device with similar positioned leads overlying the heart. Thoracic aorta atherosclerosis. No pneumothorax or sizable pleural effusion. Results from [place] encounter on 01/21/21   XR CHEST SNGL V    Narrative Chest single view. Comparison single view chest November 16, 2020    Reduced lung volumes. Hazy coarse reticular markings through the lungs advanced  compared to prior imaging. Findings concerning for mild interstitial edema  superimposed upon chronic change. Left-sided cardiac device with similar positioned leads overlying the heart. Thoracic aorta atherosclerosis.  No pneumothorax or sizable pleural effusion. Results from [place] encounter on 11/16/20   XR CHEST PORT    Narrative Chest single view. Comparison single view chest September 22, 2020. Lungs clear. No interstitial or alveolar pulmonary edema. Left-sided cardiac  device with leads overlying the right heart. Cardiac and mediastinal structures  are unchanged. Thoracic aorta atherosclerosis. No pneumothorax or sizable  pleural effusion. Impression Impression: No CHF. No sizable pleural effusion. Results from [place] encounter on 09/22/20   XR CHEST PORT    Narrative Exam: Frontal chest    Comparison: 9/8/2020. 8/10/2020. Number of views: 1    Findings: Left subclavian central venous cardiac electrode device leads in  apparent satisfactory position. There is calcific atherosclerotic disease of the  thoracic aorta. The configuration of the cardiopericardial silhouette suggests  enlarged cardiac chambers. There is pulmonary venous cephalization, compatible  with chronic pulmonary venous hypertension. There is, however, no demonstrated  pulmonary edema at this time. There is no demonstrated significant pulmonary  parenchymal lesion. There is no evidence of pleural disease. Impression Impression: Evidence of chronic left-sided heart disease, without active edema  demonstrated at this time. Atherosclerosis. Results from [place] encounter on 01/21/21   CT CHEST WO CONT    Narrative CT chest without IV contrast    Axial images are reviewed along with reformatted sagittal/coronal/MIP images. No  IV contrast administered. Dose reduction: All CT scans at this facility are performed using dose reduction  optimization techniques as appropriate to a performed exam including the  following-  automated exposure control, adjustments of mA and/or Kv according to patient  size, or use of iterative reconstructive technique.     Coarse peripheral reticular markings along with groundglass opacity throughout  lungs. Although findings are nonspecific, consider inflammatory change. No  pleural effusion. Left side cardiac device. Nonenhanced images reveal atherosclerotic change  thoracic aorta. Normal caliber of pulmonary arterial trunk. Small mediastinal  lymph nodes present. Tram track calcification present along the course of the  coronary arteries, evidence for coronary artery disease. No pericardial  effusion. Atherosclerosis continues into the imaged upper abdominal aorta. Impression Coarse peripheral reticular markings along with groundglass opacity. Small mediastinal lymph nodes. Findings would fit with inflammatory change  involving the lungs. No pleural or pericardial effusion. Thoracoabdominal aorta atherosclerosis. CAD. IMPRESSION:   1. Acute hypoxic respiratory failure  2. COVID-19 pneumonia  3. Decompensated congestive heart failure  4. Paroxysmal atrial fibrillation  5. History of AICD coronary artery disease diabetes mellitus GERD with Rosario's esophagus  6. Pt is requiring Drug therapy requiring intensive monitoring for toxicity  7. Pt is unstable, unpredictable needing inpatient monitoring; is acutely ill and at high risk of sudden decline and decompensation with severe consequenses and continued end organ dysfunction and failure  8. Prognosis guarded       RECOMMENDATIONS/PLAN:     1. Now she is 100% nonrebreather mask will try to wean as tolerated  2. She  is on Decadron remdesivir Zithromax and also will continue with IV Lasix because of congestion CAT scan of the chest shows bilateral groundglass opacities consistent with COVID-19 along with CHF  3. Intubate and place on vent if NIV fails  4. Agree with Empiric IV antibiotics pending culture results   5. Follow culture results  6. Supplemental O2 to keep sats > 93%  7. Aspiration precautions  8. Labs to follow electrolytes, renal function and and blood counts  9. Glucose monitoring and SSI  10.  Bronchial hygiene with respiratory therapy techniques, bronchodilators  11.  DVT, SUP prophylaxis         Izaiah Sullivan MD

## 2021-01-24 LAB
ANION GAP SERPL CALC-SCNC: 9 MMOL/L (ref 5–15)
BASOPHILS # BLD: 0 K/UL (ref 0–0.1)
BASOPHILS NFR BLD: 0 % (ref 0–1)
BUN SERPL-MCNC: 56 MG/DL (ref 6–20)
BUN/CREAT SERPL: 61 (ref 12–20)
CA-I BLD-MCNC: 8.9 MG/DL (ref 8.5–10.1)
CHLORIDE SERPL-SCNC: 100 MMOL/L (ref 97–108)
CO2 SERPL-SCNC: 30 MMOL/L (ref 21–32)
CREAT SERPL-MCNC: 0.92 MG/DL (ref 0.55–1.02)
CRP SERPL-MCNC: 7.27 MG/DL (ref 0–0.6)
DIFFERENTIAL METHOD BLD: ABNORMAL
EOSINOPHIL # BLD: 0 K/UL (ref 0–0.4)
EOSINOPHIL NFR BLD: 0 % (ref 0–7)
ERYTHROCYTE [DISTWIDTH] IN BLOOD BY AUTOMATED COUNT: 17 % (ref 11.5–14.5)
FERRITIN SERPL-MCNC: 2195 NG/ML (ref 26–388)
GLUCOSE BLD STRIP.AUTO-MCNC: 200 MG/DL (ref 65–100)
GLUCOSE BLD STRIP.AUTO-MCNC: 254 MG/DL (ref 65–100)
GLUCOSE BLD STRIP.AUTO-MCNC: 279 MG/DL (ref 65–100)
GLUCOSE BLD STRIP.AUTO-MCNC: 279 MG/DL (ref 65–100)
GLUCOSE SERPL-MCNC: 194 MG/DL (ref 65–100)
HCT VFR BLD AUTO: 38.8 % (ref 35–47)
HGB BLD-MCNC: 12.7 G/DL (ref 11.5–16)
IMM GRANULOCYTES # BLD AUTO: 0 K/UL (ref 0–0.04)
IMM GRANULOCYTES NFR BLD AUTO: 0 % (ref 0–0.5)
LDH SERPL L TO P-CCNC: 594 U/L (ref 81–246)
LYMPHOCYTES # BLD: 1.2 K/UL (ref 0.8–3.5)
LYMPHOCYTES NFR BLD: 8 % (ref 12–49)
MCH RBC QN AUTO: 29.7 PG (ref 26–34)
MCHC RBC AUTO-ENTMCNC: 32.7 G/DL (ref 30–36.5)
MCV RBC AUTO: 90.7 FL (ref 80–99)
MONOCYTES # BLD: 0.6 K/UL (ref 0–1)
MONOCYTES NFR BLD: 4 % (ref 5–13)
NEUTS SEG # BLD: 13.2 K/UL (ref 1.8–8)
NEUTS SEG NFR BLD: 88 % (ref 32–75)
PERFORMED BY, TECHID: ABNORMAL
PLATELET # BLD AUTO: 126 K/UL (ref 150–400)
PMV BLD AUTO: 10.9 FL (ref 8.9–12.9)
POTASSIUM SERPL-SCNC: 3.6 MMOL/L (ref 3.5–5.1)
RBC # BLD AUTO: 4.28 M/UL (ref 3.8–5.2)
SODIUM SERPL-SCNC: 139 MMOL/L (ref 136–145)
WBC # BLD AUTO: 15 K/UL (ref 3.6–11)

## 2021-01-24 PROCEDURE — 74011000250 HC RX REV CODE- 250: Performed by: INTERNAL MEDICINE

## 2021-01-24 PROCEDURE — 86140 C-REACTIVE PROTEIN: CPT

## 2021-01-24 PROCEDURE — XW033H5 INTRODUCTION OF TOCILIZUMAB INTO PERIPHERAL VEIN, PERCUTANEOUS APPROACH, NEW TECHNOLOGY GROUP 5: ICD-10-PCS | Performed by: INTERNAL MEDICINE

## 2021-01-24 PROCEDURE — 83615 LACTATE (LD) (LDH) ENZYME: CPT

## 2021-01-24 PROCEDURE — 74011250637 HC RX REV CODE- 250/637: Performed by: INTERNAL MEDICINE

## 2021-01-24 PROCEDURE — 74011250636 HC RX REV CODE- 250/636: Performed by: INTERNAL MEDICINE

## 2021-01-24 PROCEDURE — 82962 GLUCOSE BLOOD TEST: CPT

## 2021-01-24 PROCEDURE — 36415 COLL VENOUS BLD VENIPUNCTURE: CPT

## 2021-01-24 PROCEDURE — 74011000258 HC RX REV CODE- 258: Performed by: INTERNAL MEDICINE

## 2021-01-24 PROCEDURE — 77010033678 HC OXYGEN DAILY

## 2021-01-24 PROCEDURE — 65270000029 HC RM PRIVATE

## 2021-01-24 PROCEDURE — 85025 COMPLETE CBC W/AUTO DIFF WBC: CPT

## 2021-01-24 PROCEDURE — 74011250636 HC RX REV CODE- 250/636: Performed by: PHYSICIAN ASSISTANT

## 2021-01-24 PROCEDURE — 94762 N-INVAS EAR/PLS OXIMTRY CONT: CPT

## 2021-01-24 PROCEDURE — 74011636637 HC RX REV CODE- 636/637: Performed by: INTERNAL MEDICINE

## 2021-01-24 PROCEDURE — 80048 BASIC METABOLIC PNL TOTAL CA: CPT

## 2021-01-24 RX ORDER — IVERMECTIN 3 MG/1
12 TABLET ORAL ONCE
Status: COMPLETED | OUTPATIENT
Start: 2021-01-24 | End: 2021-01-24

## 2021-01-24 RX ORDER — DEXAMETHASONE SODIUM PHOSPHATE 4 MG/ML
4 INJECTION, SOLUTION INTRA-ARTICULAR; INTRALESIONAL; INTRAMUSCULAR; INTRAVENOUS; SOFT TISSUE EVERY 6 HOURS
Status: DISCONTINUED | OUTPATIENT
Start: 2021-01-24 | End: 2021-01-27

## 2021-01-24 RX ADMIN — GABAPENTIN 100 MG: 100 CAPSULE ORAL at 09:09

## 2021-01-24 RX ADMIN — TICAGRELOR 90 MG: 90 TABLET ORAL at 22:17

## 2021-01-24 RX ADMIN — TOCILIZUMAB 400 MG: 20 INJECTION, SOLUTION, CONCENTRATE INTRAVENOUS at 22:13

## 2021-01-24 RX ADMIN — LEVOTHYROXINE SODIUM 150 MCG: 75 TABLET ORAL at 05:27

## 2021-01-24 RX ADMIN — ASPIRIN 81 MG: 81 TABLET, COATED ORAL at 09:09

## 2021-01-24 RX ADMIN — ZINC SULFATE 220 MG (50 MG) CAPSULE 1 CAPSULE: CAPSULE at 09:09

## 2021-01-24 RX ADMIN — DEXAMETHASONE SODIUM PHOSPHATE 4 MG: 4 INJECTION, SOLUTION INTRA-ARTICULAR; INTRALESIONAL; INTRAMUSCULAR; INTRAVENOUS; SOFT TISSUE at 22:13

## 2021-01-24 RX ADMIN — MAGNESIUM OXIDE TAB 400 MG (241.3 MG ELEMENTAL MG) 400 MG: 400 (241.3 MG) TAB at 16:30

## 2021-01-24 RX ADMIN — POTASSIUM CHLORIDE 20 MEQ: 750 TABLET, FILM COATED, EXTENDED RELEASE ORAL at 09:08

## 2021-01-24 RX ADMIN — SODIUM CHLORIDE 500 ML: 9 INJECTION, SOLUTION INTRAVENOUS at 10:00

## 2021-01-24 RX ADMIN — OXYCODONE HYDROCHLORIDE AND ACETAMINOPHEN 1000 MG: 500 TABLET ORAL at 09:08

## 2021-01-24 RX ADMIN — GABAPENTIN 100 MG: 100 CAPSULE ORAL at 16:30

## 2021-01-24 RX ADMIN — INSULIN LISPRO 3 UNITS: 100 INJECTION, SOLUTION INTRAVENOUS; SUBCUTANEOUS at 09:09

## 2021-01-24 RX ADMIN — SOTALOL HYDROCHLORIDE 40 MG: 80 TABLET ORAL at 09:09

## 2021-01-24 RX ADMIN — DOCUSATE SODIUM 100 MG: 100 CAPSULE, LIQUID FILLED ORAL at 22:12

## 2021-01-24 RX ADMIN — MAGNESIUM OXIDE TAB 400 MG (241.3 MG ELEMENTAL MG) 400 MG: 400 (241.3 MG) TAB at 09:09

## 2021-01-24 RX ADMIN — ATORVASTATIN CALCIUM 40 MG: 40 TABLET, FILM COATED ORAL at 22:12

## 2021-01-24 RX ADMIN — ACETAMINOPHEN 650 MG: 325 TABLET, FILM COATED ORAL at 22:15

## 2021-01-24 RX ADMIN — ACETAMINOPHEN 650 MG: 325 TABLET, FILM COATED ORAL at 06:14

## 2021-01-24 RX ADMIN — INSULIN LISPRO 24 UNITS: 100 INJECTION, SUSPENSION SUBCUTANEOUS at 09:00

## 2021-01-24 RX ADMIN — AZITHROMYCIN DIHYDRATE 500 MG: 500 INJECTION, POWDER, LYOPHILIZED, FOR SOLUTION INTRAVENOUS at 03:00

## 2021-01-24 RX ADMIN — TICAGRELOR 90 MG: 90 TABLET ORAL at 09:09

## 2021-01-24 RX ADMIN — INSULIN LISPRO 5 UNITS: 100 INJECTION, SOLUTION INTRAVENOUS; SUBCUTANEOUS at 16:30

## 2021-01-24 RX ADMIN — MAGNESIUM OXIDE TAB 400 MG (241.3 MG ELEMENTAL MG) 400 MG: 400 (241.3 MG) TAB at 22:13

## 2021-01-24 RX ADMIN — MELATONIN TAB 3 MG 3 MG: 3 TAB at 22:12

## 2021-01-24 RX ADMIN — Medication 2 TABLET: at 09:08

## 2021-01-24 RX ADMIN — INSULIN LISPRO 7 UNITS: 100 INJECTION, SOLUTION INTRAVENOUS; SUBCUTANEOUS at 11:30

## 2021-01-24 RX ADMIN — INSULIN LISPRO 24 UNITS: 100 INJECTION, SUSPENSION SUBCUTANEOUS at 21:00

## 2021-01-24 RX ADMIN — REMDESIVIR 100 MG: 100 INJECTION, POWDER, LYOPHILIZED, FOR SOLUTION INTRAVENOUS at 09:10

## 2021-01-24 RX ADMIN — IVERMECTIN 12 MG: 3 TABLET ORAL at 18:26

## 2021-01-24 RX ADMIN — DEXAMETHASONE SODIUM PHOSPHATE 6 MG: 4 INJECTION, SOLUTION INTRA-ARTICULAR; INTRALESIONAL; INTRAMUSCULAR; INTRAVENOUS; SOFT TISSUE at 09:10

## 2021-01-24 RX ADMIN — PANTOPRAZOLE SODIUM 40 MG: 40 TABLET, DELAYED RELEASE ORAL at 09:09

## 2021-01-24 RX ADMIN — GABAPENTIN 100 MG: 100 CAPSULE ORAL at 22:13

## 2021-01-24 RX ADMIN — INSULIN LISPRO 5 UNITS: 100 INJECTION, SOLUTION INTRAVENOUS; SUBCUTANEOUS at 22:13

## 2021-01-24 NOTE — PROGRESS NOTES
Bedside shift change report given to Koko Rahman (oncoming nurse) by Bandar Fry LPN (offgoing nurse). Report included the following information SBAR.

## 2021-01-24 NOTE — CONSULTS
PULMONARY NOTE  [facility]    Name: Linda Waters MRN: [id number]   : 1950 Hospital: [address] [place] [address]   Date: 2021  Admission date: 2021 Hospital Day: 4       HPI:     Hospital Problems  Date Reviewed: 10/21/2020          Codes Class Noted POA    Hypoxia ICD-10-CM: R09.02  ICD-9-CM: 799.02  2021 Unknown        Elevated troponin ICD-10-CM: R77.8  ICD-9-CM: 790.6  2021 Unknown                   [x] High complexity decision making was performed  [x] See my orders for details      Subjective/Initial History:     I was asked by Charles Chanel MD to see Linda Waters  a 65 y.o.  female in consultation     Excerpts from admission 2021 or consult notes as follows:   73-year-old lady came in because of shortness of breath and dyspnea she has significant past medical history of congestive heart failure ejection fraction about 45% history of atrial fibrillation status post ICD coronary artery disease diabetes hypertension she was having shortness of breath for the past couple of days seen by cardiologist but her condition got worse came to the hospital and getting treatment for congestive heart failure saturation was low in 80s now her COVID-19 test came back positive chest x-ray shows bilateral groundglass opacities confirmed with CAT scan of the chest so pulmonary consult was called for further evaluation.      worsening hypoxia now 100% nonrebreather she remains short of breath mild cough mostly complains about constipation  Allergies   Allergen Reactions    Codeine Other (comments)     Patient states she is not allergic    Novocain [Procaine] Nausea and Vomiting    Tramadol Other (comments)     Headache        MAR reviewed and pertinent medications noted or modified as needed     Current Facility-Administered Medications   Medication    dexamethasone (DECADRON) 4 mg/mL injection 4 mg    tocilizumab (ACTEMRA) 400 mg in 0.9% sodium chloride 100 mL infusion    ivermectin (STROMECTOL) tablet 12 mg    aspirin delayed-release tablet 81 mg    atorvastatin (LIPITOR) tablet 40 mg    gabapentin (NEURONTIN) capsule 100 mg    insulin lispro protamine/insulin lispro (HUMALOG MIX 75/25) injection 24 Units    levothyroxine (SYNTHROID) tablet 150 mcg    magnesium oxide (MAG-OX) tablet 400 mg    melatonin tablet 3 mg    pantoprazole (PROTONIX) tablet 40 mg    potassium chloride SR (KLOR-CON 10) tablet 20 mEq    polyethylene glycol (MIRALAX) packet 17 g    sotaloL (BETAPACE) tablet 40 mg    azithromycin (ZITHROMAX) 500 mg in 0.9% sodium chloride 250 mL (VIAL-MATE)    ticagrelor (BRILINTA) tablet 90 mg    remdesivir 100 mg in 0.9% sodium chloride 250 mL IVPB    cholecalciferol (VITAMIN D3) (1000 Units /25 mcg) tablet 2 Tab    ascorbic acid (vitamin C) (VITAMIN C) tablet 1,000 mg    zinc sulfate (ZINCATE) 220 (50) mg capsule 1 Cap    metFORMIN ER (GLUCOPHAGE XR) tablet 750 mg    ondansetron (ZOFRAN) injection 4 mg    acetaminophen (TYLENOL) tablet 650 mg    docusate sodium (COLACE) capsule 100 mg    insulin lispro (HUMALOG) injection    glucose chewable tablet 16 g    dextrose (D50W) injection syrg 12.5-25 g    glucagon (GLUCAGEN) injection 1 mg      Patient PCP: Bolivar Mackenzie MD  [facility]:  has a past medical history of Acid indigestion, Aneurysm ([address] Anxiety disorder, Arthritis, Asthma, Back pain, Bladder spasms, Cerebral artery occlusion with cerebral infarction ([address] (08/2020), Chest pain, Constipation, Cough, Diabetes mellitus, Diarrhea, Fatigue, Fibromyalgia, Frequent episodic tension-type headache, Hearing reduced, Heart failure ([address] Hemorrhoids, Hernia of unspecified site of abdominal cavity without mention of obstruction or gangrene, HTN (hypertension), Hypothyroidism, ICD (implantable cardioverter-defibrillator) in place, Muscle pain, N&V (nausea and vomiting), Pacemaker, Ringing in ears, Skin cancer, SOB (shortness of breath), SOB (shortness of breath), Swallowing difficulty, Thyroid disorder, Vertigo, and Wears dentures. PSH:   has a past surgical history that includes pr removal gallbladder; hx thyroidectomy; hx hysterectomy; hx carpal tunnel release; hx heart catheterization (16); hx pacemaker (Left, 16); hx gi; and hx orthopaedic. FHX: family history includes Breast Cancer in her sister; Cancer in her sister; Diabetes in her brother, mother, and sister; Heart Disease in her father, mother, and sister; Hypertension in her sister; Lupus in her sister. SHX:  reports that she has never smoked. She has never used smokeless tobacco. She reports current alcohol use. She reports that she does not use drugs. ROS:    Review of Systems   Constitutional: Positive for malaise/fatigue. HENT: Negative. Eyes: Negative. Respiratory: Positive for cough, sputum production and shortness of breath. Cardiovascular: Positive for orthopnea. Gastrointestinal: Negative. Genitourinary: Negative. Musculoskeletal: Negative. Neurological: Negative. Endo/Heme/Allergies: Negative. Psychiatric/Behavioral: Negative.          Objective:     Vital Signs: Telemetry:    AFIB Intake/Output:   Visit Vitals  BP (!) 103/49 (BP 1 Location: Left arm, BP Patient Position: At rest)   Pulse 72   Temp 98.2 °F (36.8 °C)   Resp 20   Ht 5' 2\" (1.575 m)   Wt 78 kg (172 lb)   SpO2 97%   Breastfeeding No   BMI 31.46 kg/m²       Temp (24hrs), Av.4 °F (36.9 °C), Min:98.1 °F (36.7 °C), Max:98.7 °F (37.1 °C)        O2 Device: Heated, Non-rebreather mask O2 Flow Rate (L/min): 15 l/min       Wt Readings from Last 4 Encounters:   21 78 kg (172 lb)   21 78.6 kg (173 lb 3.2 oz)   01/15/21 78.5 kg (173 lb)   21 78 kg (172 lb)          Intake/Output Summary (Last 24 hours) at 2021 1651  Last data filed at 2021 1438  Gross per 24 hour   Intake 1522 ml   Output 1900 ml   Net -378 ml       Last shift:       07 -  1900  In: 222 [P.O.:222]  Out: -   Last 3 shifts: 01/22 1901 - 01/24 0700  In: 1300 [P.O.:1300]  Out: 1900 [Urine:1900]       Physical Exam:     Physical Exam   Constitutional: She is oriented to person, place, and time. She appears well-developed and well-nourished. She appears distressed. HENT:   Head: Normocephalic and atraumatic. Eyes: Pupils are equal, round, and reactive to light. Conjunctivae are normal.   Neck: Normal range of motion. Neck supple. Cardiovascular: Normal rate and regular rhythm. Pulmonary/Chest: She is in respiratory distress. Has rales posteriorly no wheezing   Abdominal: Soft. Bowel sounds are normal.   Musculoskeletal: Normal range of motion. General: No edema. Neurological: She is alert and oriented to person, place, and time. Skin: Skin is warm and dry. Labs:    Recent Labs     01/24/21  0743 01/23/21  1251   WBC 15.0* 15.3*   HGB 12.7 12.7   * 120*     Recent Labs     01/24/21  0743 01/23/21  1251    135*   K 3.6 3.6    99   CO2 30 29   * 364*   BUN 56* 46*   CREA 0.92 1.11*   CA 8.9 8.6     100% nonrebreather mask with oxygen saturation 96%  Recent Labs     01/22/21  1104 01/21/21  1830   TROIQ 0.37* 0.37*       Lab Results   Component Value Date/Time    TSH 6.58 (H) 08/10/2020 05:54 PM       Imaging:    CXR Results  (Last 48 hours)    None        Results from [place] encounter on 01/21/21   XR CHEST SNGL V    Narrative Chest single view. Comparison single view chest November 16, 2020    Reduced lung volumes. Hazy coarse reticular markings through the lungs advanced  compared to prior imaging. Findings concerning for mild interstitial edema  superimposed upon chronic change. Left-sided cardiac device with similar positioned leads overlying the heart. Thoracic aorta atherosclerosis. No pneumothorax or sizable pleural effusion.    Results from [place] encounter on 11/16/20   XR CHEST PORT    Narrative Chest single view. Comparison single view chest September 22, 2020. Lungs clear. No interstitial or alveolar pulmonary edema. Left-sided cardiac  device with leads overlying the right heart. Cardiac and mediastinal structures  are unchanged. Thoracic aorta atherosclerosis. No pneumothorax or sizable  pleural effusion. Impression Impression: No CHF. No sizable pleural effusion. Results from [place] encounter on 09/22/20   XR CHEST PORT    Narrative Exam: Frontal chest    Comparison: 9/8/2020. 8/10/2020. Number of views: 1    Findings: Left subclavian central venous cardiac electrode device leads in  apparent satisfactory position. There is calcific atherosclerotic disease of the  thoracic aorta. The configuration of the cardiopericardial silhouette suggests  enlarged cardiac chambers. There is pulmonary venous cephalization, compatible  with chronic pulmonary venous hypertension. There is, however, no demonstrated  pulmonary edema at this time. There is no demonstrated significant pulmonary  parenchymal lesion. There is no evidence of pleural disease. Impression Impression: Evidence of chronic left-sided heart disease, without active edema  demonstrated at this time. Atherosclerosis. Results from [place] encounter on 01/21/21   CT CHEST WO CONT    Narrative CT chest without IV contrast    Axial images are reviewed along with reformatted sagittal/coronal/MIP images. No  IV contrast administered. Dose reduction: All CT scans at this facility are performed using dose reduction  optimization techniques as appropriate to a performed exam including the  following-  automated exposure control, adjustments of mA and/or Kv according to patient  size, or use of iterative reconstructive technique. Coarse peripheral reticular markings along with groundglass opacity throughout  lungs. Although findings are nonspecific, consider inflammatory change.  No  pleural effusion. Left side cardiac device. Nonenhanced images reveal atherosclerotic change  thoracic aorta. Normal caliber of pulmonary arterial trunk. Small mediastinal  lymph nodes present. Tram track calcification present along the course of the  coronary arteries, evidence for coronary artery disease. No pericardial  effusion. Atherosclerosis continues into the imaged upper abdominal aorta. Impression Coarse peripheral reticular markings along with groundglass opacity. Small mediastinal lymph nodes. Findings would fit with inflammatory change  involving the lungs. No pleural or pericardial effusion. Thoracoabdominal aorta atherosclerosis. CAD. IMPRESSION:   1. Acute hypoxic respiratory failure now on nonrebreather mask  2. COVID-19 pneumonia currently on remdesivir will change Decadron to every 6 hours dosing and add Actemra and 1 dose ivermectin  3. Decompensated congestive heart failure  4. Paroxysmal atrial fibrillation  5. History of AICD coronary artery disease diabetes mellitus GERD with Rosario's esophagus  6. RECOMMENDATIONS/PLAN:     1. Ins on nonrebreather mask  2. She  is on  remdesivir Zithromax will increase Decadron and give ivermectin and Actemra  3. Intubate and place on vent if NIV fails  4. Agree with Empiric IV antibiotics pending culture results   5.           Feliciano Gómez MD

## 2021-01-24 NOTE — PROGRESS NOTES
CARDIOLOGY PROGRESS NOTE    Patient not examined due to being COVID positive. Remains on 15L NRB and becomes hypoxic when taken off. Per nursing staff, she remains dyspneic at rest. Denies chest pain. Telemetry reviewed. No events overnight. AV-paced at [de-identified]. .      PHYSICAL EXAMINATION:    Patient seen but not examined due to COVID status. Vital sign assessment reveal a blood pressure of 107/56  and pulse rate of 85. Cardiovascular exam has a heart with a paced rate and rhythm. Respiratory exam reveals mild respiratory distress          Recent labs results and imaging reviewed. Discussed case with Dr. Deirdre Franks  and our impression and recommendations are as follows:    1. Coronary artery disease: s/p stenting proximal LAD 9/10. Continue DAPT. Cardiac catheterization recommended but unable to perform due to COVID and respiratory status. We will continue monitor. 2. Cardiomyopathy:  Recent echo 11/16/2020 showing EF 45-50%. Lasix held due to hypotension. Continue daily weights, strict I&Os, and 1500 ml fluid restrictions. Would avoid volume overload. Continue telemetry monitoring. Please keep serum potassium between 4-5 and serum magnesium > 2.     3. Atrial tachycardia:  AV- paced. Most recent interrogation showed no arrhythmias. Continue sotalol and continue to monitor. 4.  Hypertension: Blood pressure acceptable after holding Lasix and receiving bolus of fluids. Continue to monitor and maintain euvolemia. 5. Hyperlipidemia: Continue statin therapy. 6. COVID pneumonia: Per pulmonary and primary, requiring 15L on non-rebreather. Please do not hesitate to call me or Dr. Deirdre Franks if additional questions arise.

## 2021-01-24 NOTE — PROGRESS NOTES
Hospitalist Progress Note               Daily Progress Note: 1/24/2021      Subjective: The patient is seen for follow up. Patient continues on a 100% nonrebreather mask. When she takes this off even briefly to take her medications she quickly desaturates and takes a long time to recover. , slightly up from yesterday. CRP trending down at 7.27. Blood pressure is soft today at 87/60.   Diuretics are on hold     Problem List:  Problem List as of 1/24/2021 Date Reviewed: 10/21/2020          Codes Class Noted - Resolved    Biventricular ICD (implantable cardioverter-defibrillator) in place ICD-10-CM: Z95.810  ICD-9-CM: V45.02  1/26/2018 - Present        Hypoxia ICD-10-CM: R09.02  ICD-9-CM: 799.02  1/21/2021 - Present        Elevated troponin ICD-10-CM: R77.8  ICD-9-CM: 790.6  1/21/2021 - Present        Atrial fibrillation ([facility] ICD-10-CM: I48.91  ICD-9-CM: 427.31  11/16/2020 - Present        Acute on chronic congestive heart failure ([facility] ICD-10-CM: I50.9  ICD-9-CM: 428.0  11/16/2020 - Present        Hypomagnesemia ICD-10-CM: E83.42  ICD-9-CM: 275.2  9/28/2020 - Present        Hyperkalemia ICD-10-CM: E87.5  ICD-9-CM: 276.7  9/26/2020 - Present        CHF (congestive heart failure) ([facility] ICD-10-CM: I50.9  ICD-9-CM: 428.0  9/25/2020 - Present        CHF exacerbation ([facility] ICD-10-CM: I50.9  ICD-9-CM: 428.0  9/22/2020 - Present        Severe obesity ([facility] ICD-10-CM: E66.01  ICD-9-CM: 278.01  9/2/2020 - Present        Dysarthria ICD-10-CM: R47.1  ICD-9-CM: 784.51  8/11/2020 - Present        TIA (transient ischemic attack) ICD-10-CM: G45.9  ICD-9-CM: 435.9  8/10/2020 - Present        Cardiomyopathy ([facility] ICD-10-CM: I42.9  ICD-9-CM: 425.4  5/23/2016 - Present        Skin cancer ICD-10-CM: C44.90  ICD-9-CM: 173.90  Unknown - Present        Acid indigestion ICD-10-CM: K30  ICD-9-CM: 536.8  Unknown - Present        Asthma ICD-10-CM: 436  ICD-9-CM: 472  Unknown - Present        Back pain ICD-10-CM: M54.9  ICD-9-CM: 724.5  Unknown - Present        Wears dentures ICD-10-CM: Z97.2  ICD-9-CM: V45.84  Unknown - Present        Constipation ICD-10-CM: 564.0  ICD-9-CM: 564.0  Unknown - Present        Diabetes mellitus ICD-10-CM: 250  ICD-9-CM: 250  Unknown - Present        Diarrhea ICD-10-CM: R19.7  ICD-9-CM: 787.91  Unknown - Present        Frequent episodic tension-type headache ICD-10-CM: N13.827  ICD-9-CM: 339.11  Unknown - Present        Hemorrhoids ICD-10-CM: 019  ICD-9-CM: 275  Unknown - Present        Hernia of unspecified site of abdominal cavity without mention of obstruction or gangrene ICD-10-CM: K46.9  ICD-9-CM: 553.9  Unknown - Present        HTN (hypertension) ICD-10-CM: I10  ICD-9-CM: 401.9  Unknown - Present        Muscle pain ICD-10-CM: M79.10  ICD-9-CM: 729.1  Unknown - Present        SOB (shortness of breath) ICD-10-CM: R06.02  ICD-9-CM: 786.05  Unknown - Present        Thyroid disorder ICD-10-CM: E07.9  ICD-9-CM: 246. 9  Unknown - Present              Medications reviewed  Current Facility-Administered Medications   Medication Dose Route Frequency    aspirin delayed-release tablet 81 mg  81 mg Oral DAILY    atorvastatin (LIPITOR) tablet 40 mg  40 mg Oral DAILY    gabapentin (NEURONTIN) capsule 100 mg  100 mg Oral TID    insulin lispro protamine/insulin lispro (HUMALOG MIX 75/25) injection 24 Units  24 Units SubCUTAneous BID    levothyroxine (SYNTHROID) tablet 150 mcg  150 mcg Oral ACB    magnesium oxide (MAG-OX) tablet 400 mg  400 mg Oral TID    melatonin tablet 3 mg  3 mg Oral QHS PRN    pantoprazole (PROTONIX) tablet 40 mg  40 mg Oral DAILY    potassium chloride SR (KLOR-CON 10) tablet 20 mEq  20 mEq Oral DAILY    polyethylene glycol (MIRALAX) packet 17 g  17 g Oral PRN    sotaloL (BETAPACE) tablet 40 mg  40 mg Oral DAILY    azithromycin (ZITHROMAX) 500 mg in 0.9% sodium chloride 250 mL (VIAL-MATE)  500 mg IntraVENous Q24H    ticagrelor (BRILINTA) tablet 90 mg  90 mg Oral Q12H    remdesivir 100 mg in 0.9% sodium chloride 250 mL IVPB  100 mg IntraVENous Q24H    dexamethasone (DECADRON) 4 mg/mL injection 6 mg  6 mg IntraVENous Q24H    cholecalciferol (VITAMIN D3) (1000 Units /25 mcg) tablet 2 Tab  2,000 Units Oral DAILY    ascorbic acid (vitamin C) (VITAMIN C) tablet 1,000 mg  1,000 mg Oral DAILY    zinc sulfate (ZINCATE) 220 (50) mg capsule 1 Cap  1 Cap Oral DAILY    metFORMIN ER (GLUCOPHAGE XR) tablet 750 mg  750 mg Oral BID WITH MEALS    ondansetron (ZOFRAN) injection 4 mg  4 mg IntraVENous Q6H PRN    acetaminophen (TYLENOL) tablet 650 mg  650 mg Oral Q6H PRN    furosemide (LASIX) injection 40 mg  40 mg IntraVENous Q12H    docusate sodium (COLACE) capsule 100 mg  100 mg Oral PRN    insulin lispro (HUMALOG) injection   SubCUTAneous AC&HS    glucose chewable tablet 16 g  4 Tab Oral PRN    dextrose (D50W) injection syrg 12.5-25 g  25-50 mL IntraVENous PRN    glucagon (GLUCAGEN) injection 1 mg  1 mg IntraMUSCular PRN       Review of Systems:   A comprehensive review of systems was negative except for that written in the HPI. Objective:   Physical Exam:     Visit Vitals  BP (!) 87/46 (BP 1 Location: Right arm)   Pulse (!) 104   Temp 98.7 °F (37.1 °C)   Resp 18   Ht 5' 2\" (1.575 m)   Wt 78 kg (172 lb)   SpO2 100%   Breastfeeding No   BMI 31.46 kg/m²    O2 Flow Rate (L/min): 15 l/min O2 Device: Non-rebreather mask    Temp (24hrs), Av.2 °F (36.8 °C), Min:97.5 °F (36.4 °C), Max:98.7 °F (37.1 °C)    No intake/output data recorded.  1901 -  0700  In: 1300 [P.O.:1300]  Out: 1900 [Urine:1900]    General:   Awake and alert   Lungs:   Clear to auscultation bilaterally. Chest wall:  No tenderness or deformity. Heart:  Regular rate and rhythm, S1, S2 normal, no murmur, click, rub or gallop. Abdomen:   Soft, non-tender. Bowel sounds normal. No masses,  No organomegaly. Extremities: Extremities normal, atraumatic, no cyanosis or edema.    Pulses: 2+ and symmetric all extremities. Skin: Skin color, texture, turgor normal. No rashes or lesions   Neurologic: CNII-XII intact. No gross focal deficits         Data Review:       Recent Days:  Recent Labs     01/24/21  0743 01/23/21  1251 01/21/21  1530   WBC 15.0* 15.3* 9.1   HGB 12.7 12.7 12.6   HCT 38.8 38.4 38.1   * 120* 119*     Recent Labs     01/24/21  0743 01/23/21  1251 01/21/21  1530    135* 134*   K 3.6 3.6 3.6    99 99   CO2 30 29 26   * 364* 273*   BUN 56* 46* 20   CREA 0.92 1.11* 0.91   CA 8.9 8.6 9.0   ALB  --   --  2.7*   TBILI  --   --  0.8   ALT  --   --  37     No results for input(s): PH, PCO2, PO2, HCO3, FIO2 in the last 72 hours. 24 Hour Results:  Recent Results (from the past 24 hour(s))   GLUCOSE, POC    Collection Time: 01/23/21 11:34 AM   Result Value Ref Range    Glucose (POC) 366 (H) 65 - 100 mg/dL    Performed by Willard Schwartz Dr, QT    Collection Time: 01/23/21 12:51 PM   Result Value Ref Range    C-Reactive protein 11.20 (H) 0.00 - 0.60 mg/dL   CBC WITH AUTOMATED DIFF    Collection Time: 01/23/21 12:51 PM   Result Value Ref Range    WBC 15.3 (H) 3.6 - 11.0 K/uL    RBC 4.25 3.80 - 5.20 M/uL    HGB 12.7 11.5 - 16.0 g/dL    HCT 38.4 35.0 - 47.0 %    MCV 90.4 80.0 - 99.0 FL    MCH 29.9 26.0 - 34.0 PG    MCHC 33.1 30.0 - 36.5 g/dL    RDW 17.1 (H) 11.5 - 14.5 %    PLATELET 953 (L) 095 - 400 K/uL    MPV 10.7 8.9 - 12.9 FL    NEUTROPHILS 92 (H) 32 - 75 %    LYMPHOCYTES 5 (L) 12 - 49 %    MONOCYTES 3 (L) 5 - 13 %    EOSINOPHILS 0 0 - 7 %    BASOPHILS 0 0 - 1 %    IMMATURE GRANULOCYTES 0 0.0 - 0.5 %    ABS. NEUTROPHILS 14.1 (H) 1.8 - 8.0 K/UL    ABS. LYMPHOCYTES 0.7 (L) 0.8 - 3.5 K/UL    ABS. MONOCYTES 0.4 0.0 - 1.0 K/UL    ABS. EOSINOPHILS 0.0 0.0 - 0.4 K/UL    ABS. BASOPHILS 0.0 0.0 - 0.1 K/UL    ABS. IMM.  GRANS. 0.0 0.00 - 0.04 K/UL    DF AUTOMATED     METABOLIC PANEL, BASIC    Collection Time: 01/23/21 12:51 PM   Result Value Ref Range    Sodium 135 (L) 136 - 145 mmol/L    Potassium 3.6 3.5 - 5.1 mmol/L    Chloride 99 97 - 108 mmol/L    CO2 29 21 - 32 mmol/L    Anion gap 7 5 - 15 mmol/L    Glucose 364 (H) 65 - 100 mg/dL    BUN 46 (H) 6 - 20 mg/dL    Creatinine 1.11 (H) 0.55 - 1.02 mg/dL    BUN/Creatinine ratio 41 (H) 12 - 20      GFR est AA 59 (L) >60 ml/min/1.73m2    GFR est non-AA 48 (L) >60 ml/min/1.73m2    Calcium 8.6 8.5 - 10.1 mg/dL   LD    Collection Time: 01/23/21 12:51 PM   Result Value Ref Range     (H) 81 - 246 U/L   GLUCOSE, POC    Collection Time: 01/23/21  4:56 PM   Result Value Ref Range    Glucose (POC) 323 (H) 65 - 100 mg/dL    Performed by Merari, POC    Collection Time: 01/23/21  9:36 PM   Result Value Ref Range    Glucose (POC) 365 (H) 65 - 100 mg/dL    Performed by ADRIANA ORONA    C REACTIVE PROTEIN, QT    Collection Time: 01/24/21  7:43 AM   Result Value Ref Range    C-Reactive protein 7.27 (H) 0.00 - 0.60 mg/dL   CBC WITH AUTOMATED DIFF    Collection Time: 01/24/21  7:43 AM   Result Value Ref Range    WBC 15.0 (H) 3.6 - 11.0 K/uL    RBC 4.28 3.80 - 5.20 M/uL    HGB 12.7 11.5 - 16.0 g/dL    HCT 38.8 35.0 - 47.0 %    MCV 90.7 80.0 - 99.0 FL    MCH 29.7 26.0 - 34.0 PG    MCHC 32.7 30.0 - 36.5 g/dL    RDW 17.0 (H) 11.5 - 14.5 %    PLATELET 108 (L) 661 - 400 K/uL    MPV 10.9 8.9 - 12.9 FL    NEUTROPHILS 88 (H) 32 - 75 %    LYMPHOCYTES 8 (L) 12 - 49 %    MONOCYTES 4 (L) 5 - 13 %    EOSINOPHILS 0 0 - 7 %    BASOPHILS 0 0 - 1 %    IMMATURE GRANULOCYTES 0 0.0 - 0.5 %    ABS. NEUTROPHILS 13.2 (H) 1.8 - 8.0 K/UL    ABS. LYMPHOCYTES 1.2 0.8 - 3.5 K/UL    ABS. MONOCYTES 0.6 0.0 - 1.0 K/UL    ABS. EOSINOPHILS 0.0 0.0 - 0.4 K/UL    ABS. BASOPHILS 0.0 0.0 - 0.1 K/UL    ABS. IMM.  GRANS. 0.0 0.00 - 0.04 K/UL    DF AUTOMATED     METABOLIC PANEL, BASIC    Collection Time: 01/24/21  7:43 AM   Result Value Ref Range    Sodium 139 136 - 145 mmol/L    Potassium 3.6 3.5 - 5.1 mmol/L    Chloride 100 97 - 108 mmol/L    CO2 30 21 - 32 mmol/L    Anion gap 9 5 - 15 mmol/L    Glucose 194 (H) 65 - 100 mg/dL    BUN 56 (H) 6 - 20 mg/dL    Creatinine 0.92 0.55 - 1.02 mg/dL    BUN/Creatinine ratio 61 (H) 12 - 20      GFR est AA >60 >60 ml/min/1.73m2    GFR est non-AA >60 >60 ml/min/1.73m2    Calcium 8.9 8.5 - 10.1 mg/dL   LD    Collection Time: 01/24/21  7:43 AM   Result Value Ref Range     (H) 81 - 246 U/L   GLUCOSE, POC    Collection Time: 01/24/21  7:59 AM   Result Value Ref Range    Glucose (POC) 200 (H) 65 - 100 mg/dL    Performed by Marily RASMUSSEN        CT CHEST WO CONT   Final Result   Coarse peripheral reticular markings along with groundglass opacity. Small mediastinal lymph nodes. Findings would fit with inflammatory change   involving the lungs. No pleural or pericardial effusion. Thoracoabdominal aorta atherosclerosis. CAD. XR CHEST SNGL V   Final Result           Assessment:  COVID-19 with pneumonitis    Acute respiratory failure with hypoxia    Acute on chronic systolic heart failure, EF 45%. Improved     Elevated troponin, likely due to hypoxia/CHF     Coronary artery disease with previous PCI     Paroxysmal atrial fibrillation     AICD in situ     Hypertension     Diabetes mellitus type 2     GERD with history of Rosario's esophagus    Plan:  Continue Decadron, remdesivir day #3  I suspect patient is now on the dry side as evidenced by her increasing BUN and hypotension  We will give a 500 mL saline bolus now    Care Plan discussed with: Patient/Family    Total time spent with patient: 30 minutes.     Dane Giordano MD

## 2021-01-25 ENCOUNTER — APPOINTMENT (OUTPATIENT)
Dept: CARDIAC REHAB | Age: 71
End: 2021-01-25
Payer: MEDICARE

## 2021-01-25 ENCOUNTER — APPOINTMENT (OUTPATIENT)
Dept: GENERAL RADIOLOGY | Age: 71
DRG: 177 | End: 2021-01-25
Attending: INTERNAL MEDICINE
Payer: MEDICARE

## 2021-01-25 LAB
ALBUMIN SERPL-MCNC: 2 G/DL (ref 3.5–5)
ANION GAP SERPL CALC-SCNC: 4 MMOL/L (ref 5–15)
BASOPHILS # BLD: 0 K/UL (ref 0–0.1)
BASOPHILS NFR BLD: 0 % (ref 0–1)
BNP SERPL-MCNC: 1664 PG/ML
BUN SERPL-MCNC: 59 MG/DL (ref 6–20)
BUN/CREAT SERPL: 61 (ref 12–20)
CA-I BLD-MCNC: 8.7 MG/DL (ref 8.5–10.1)
CHLORIDE SERPL-SCNC: 103 MMOL/L (ref 97–108)
CO2 SERPL-SCNC: 30 MMOL/L (ref 21–32)
CREAT SERPL-MCNC: 0.96 MG/DL (ref 0.55–1.02)
CRP SERPL-MCNC: 3.92 MG/DL (ref 0–0.6)
DIFFERENTIAL METHOD BLD: ABNORMAL
EOSINOPHIL # BLD: 0 K/UL (ref 0–0.4)
EOSINOPHIL NFR BLD: 0 % (ref 0–7)
ERYTHROCYTE [DISTWIDTH] IN BLOOD BY AUTOMATED COUNT: 17.1 % (ref 11.5–14.5)
GLUCOSE BLD STRIP.AUTO-MCNC: 147 MG/DL (ref 65–100)
GLUCOSE BLD STRIP.AUTO-MCNC: 244 MG/DL (ref 65–100)
GLUCOSE BLD STRIP.AUTO-MCNC: 310 MG/DL (ref 65–100)
GLUCOSE BLD STRIP.AUTO-MCNC: 411 MG/DL (ref 65–100)
GLUCOSE SERPL-MCNC: 362 MG/DL (ref 65–100)
HCT VFR BLD AUTO: 38.3 % (ref 35–47)
HGB BLD-MCNC: 12.4 G/DL (ref 11.5–16)
IMM GRANULOCYTES # BLD AUTO: 0 K/UL (ref 0–0.04)
IMM GRANULOCYTES NFR BLD AUTO: 0 % (ref 0–0.5)
LDH SERPL L TO P-CCNC: 534 U/L (ref 81–246)
LYMPHOCYTES # BLD: 0.7 K/UL (ref 0.8–3.5)
LYMPHOCYTES NFR BLD: 6 % (ref 12–49)
MCH RBC QN AUTO: 29.5 PG (ref 26–34)
MCHC RBC AUTO-ENTMCNC: 32.4 G/DL (ref 30–36.5)
MCV RBC AUTO: 91.2 FL (ref 80–99)
MONOCYTES # BLD: 0.2 K/UL (ref 0–1)
MONOCYTES NFR BLD: 2 % (ref 5–13)
NEUTS SEG # BLD: 11.4 K/UL (ref 1.8–8)
NEUTS SEG NFR BLD: 92 % (ref 32–75)
PERFORMED BY, TECHID: ABNORMAL
PHOSPHATE SERPL-MCNC: 4.1 MG/DL (ref 2.6–4.7)
PLATELET # BLD AUTO: 130 K/UL (ref 150–400)
PMV BLD AUTO: 10.7 FL (ref 8.9–12.9)
POTASSIUM SERPL-SCNC: 4.2 MMOL/L (ref 3.5–5.1)
RBC # BLD AUTO: 4.2 M/UL (ref 3.8–5.2)
SODIUM SERPL-SCNC: 137 MMOL/L (ref 136–145)
WBC # BLD AUTO: 12.3 K/UL (ref 3.6–11)

## 2021-01-25 PROCEDURE — 36415 COLL VENOUS BLD VENIPUNCTURE: CPT

## 2021-01-25 PROCEDURE — 94762 N-INVAS EAR/PLS OXIMTRY CONT: CPT

## 2021-01-25 PROCEDURE — 74011636637 HC RX REV CODE- 636/637: Performed by: INTERNAL MEDICINE

## 2021-01-25 PROCEDURE — 82962 GLUCOSE BLOOD TEST: CPT

## 2021-01-25 PROCEDURE — 83615 LACTATE (LD) (LDH) ENZYME: CPT

## 2021-01-25 PROCEDURE — 85025 COMPLETE CBC W/AUTO DIFF WBC: CPT

## 2021-01-25 PROCEDURE — 74011250636 HC RX REV CODE- 250/636: Performed by: INTERNAL MEDICINE

## 2021-01-25 PROCEDURE — 74011000258 HC RX REV CODE- 258: Performed by: INTERNAL MEDICINE

## 2021-01-25 PROCEDURE — 74011000250 HC RX REV CODE- 250: Performed by: INTERNAL MEDICINE

## 2021-01-25 PROCEDURE — 86140 C-REACTIVE PROTEIN: CPT

## 2021-01-25 PROCEDURE — 65270000032 HC RM SEMIPRIVATE

## 2021-01-25 PROCEDURE — 71045 X-RAY EXAM CHEST 1 VIEW: CPT

## 2021-01-25 PROCEDURE — 77010033678 HC OXYGEN DAILY

## 2021-01-25 PROCEDURE — 74011250636 HC RX REV CODE- 250/636: Performed by: PHYSICIAN ASSISTANT

## 2021-01-25 PROCEDURE — 99285 EMERGENCY DEPT VISIT HI MDM: CPT

## 2021-01-25 PROCEDURE — 74011250637 HC RX REV CODE- 250/637: Performed by: INTERNAL MEDICINE

## 2021-01-25 PROCEDURE — 80069 RENAL FUNCTION PANEL: CPT

## 2021-01-25 PROCEDURE — 94760 N-INVAS EAR/PLS OXIMETRY 1: CPT

## 2021-01-25 PROCEDURE — 83880 ASSAY OF NATRIURETIC PEPTIDE: CPT

## 2021-01-25 RX ADMIN — POLYETHYLENE GLYCOL 3350 17 G: 17 POWDER, FOR SOLUTION ORAL at 13:38

## 2021-01-25 RX ADMIN — INSULIN LISPRO 4 UNITS: 100 INJECTION, SOLUTION INTRAVENOUS; SUBCUTANEOUS at 22:00

## 2021-01-25 RX ADMIN — PANTOPRAZOLE SODIUM 40 MG: 40 TABLET, DELAYED RELEASE ORAL at 10:24

## 2021-01-25 RX ADMIN — TICAGRELOR 90 MG: 90 TABLET ORAL at 22:18

## 2021-01-25 RX ADMIN — POTASSIUM CHLORIDE 20 MEQ: 750 TABLET, FILM COATED, EXTENDED RELEASE ORAL at 10:25

## 2021-01-25 RX ADMIN — DEXAMETHASONE SODIUM PHOSPHATE 4 MG: 4 INJECTION, SOLUTION INTRA-ARTICULAR; INTRALESIONAL; INTRAMUSCULAR; INTRAVENOUS; SOFT TISSUE at 05:31

## 2021-01-25 RX ADMIN — MAGNESIUM OXIDE TAB 400 MG (241.3 MG ELEMENTAL MG) 400 MG: 400 (241.3 MG) TAB at 10:24

## 2021-01-25 RX ADMIN — Medication 2 TABLET: at 10:24

## 2021-01-25 RX ADMIN — AZITHROMYCIN DIHYDRATE 500 MG: 500 INJECTION, POWDER, LYOPHILIZED, FOR SOLUTION INTRAVENOUS at 05:31

## 2021-01-25 RX ADMIN — DEXAMETHASONE SODIUM PHOSPHATE 4 MG: 4 INJECTION, SOLUTION INTRA-ARTICULAR; INTRALESIONAL; INTRAMUSCULAR; INTRAVENOUS; SOFT TISSUE at 11:50

## 2021-01-25 RX ADMIN — ATORVASTATIN CALCIUM 40 MG: 40 TABLET, FILM COATED ORAL at 22:31

## 2021-01-25 RX ADMIN — SOTALOL HYDROCHLORIDE 40 MG: 80 TABLET ORAL at 13:38

## 2021-01-25 RX ADMIN — OXYCODONE HYDROCHLORIDE AND ACETAMINOPHEN 1000 MG: 500 TABLET ORAL at 10:24

## 2021-01-25 RX ADMIN — INSULIN LISPRO 24 UNITS: 100 INJECTION, SUSPENSION SUBCUTANEOUS at 21:00

## 2021-01-25 RX ADMIN — DEXAMETHASONE SODIUM PHOSPHATE 4 MG: 4 INJECTION, SOLUTION INTRA-ARTICULAR; INTRALESIONAL; INTRAMUSCULAR; INTRAVENOUS; SOFT TISSUE at 17:30

## 2021-01-25 RX ADMIN — GABAPENTIN 100 MG: 100 CAPSULE ORAL at 10:24

## 2021-01-25 RX ADMIN — TICAGRELOR 90 MG: 90 TABLET ORAL at 10:24

## 2021-01-25 RX ADMIN — INSULIN LISPRO 3 UNITS: 100 INJECTION, SOLUTION INTRAVENOUS; SUBCUTANEOUS at 16:30

## 2021-01-25 RX ADMIN — MAGNESIUM OXIDE TAB 400 MG (241.3 MG ELEMENTAL MG) 400 MG: 400 (241.3 MG) TAB at 22:18

## 2021-01-25 RX ADMIN — REMDESIVIR 100 MG: 100 INJECTION, POWDER, LYOPHILIZED, FOR SOLUTION INTRAVENOUS at 17:29

## 2021-01-25 RX ADMIN — INSULIN LISPRO 24 UNITS: 100 INJECTION, SUSPENSION SUBCUTANEOUS at 10:24

## 2021-01-25 RX ADMIN — ZINC SULFATE 220 MG (50 MG) CAPSULE 1 CAPSULE: CAPSULE at 10:24

## 2021-01-25 RX ADMIN — INSULIN LISPRO 8 UNITS: 100 INJECTION, SOLUTION INTRAVENOUS; SUBCUTANEOUS at 11:30

## 2021-01-25 RX ADMIN — ASPIRIN 81 MG: 81 TABLET, COATED ORAL at 10:24

## 2021-01-25 RX ADMIN — LEVOTHYROXINE SODIUM 150 MCG: 75 TABLET ORAL at 05:31

## 2021-01-25 RX ADMIN — GABAPENTIN 100 MG: 100 CAPSULE ORAL at 16:53

## 2021-01-25 RX ADMIN — TOCILIZUMAB 400 MG: 20 INJECTION, SOLUTION, CONCENTRATE INTRAVENOUS at 21:00

## 2021-01-25 RX ADMIN — MAGNESIUM OXIDE TAB 400 MG (241.3 MG ELEMENTAL MG) 400 MG: 400 (241.3 MG) TAB at 16:53

## 2021-01-25 NOTE — PROGRESS NOTES
CARDIOLOGY PROGRESS NOTE    Patient appears comfortable this am. Continues to complain of dyspnea with minimal exertion, desaturates into the 80s. On 6L NC. Per nursing staff, she remains dyspneic at rest. Denies chest pain. Telemetry reviewed. No events overnight. AV-paced at [de-identified]. .      PHYSICAL EXAMINATION:      Visit Vitals  BP 98/63 (BP 1 Location: Left arm, BP Patient Position: At rest)   Pulse 80   Temp 98.2 °F (36.8 °C)   Resp 20   Ht 5' 2\" (1.575 m)   Wt 78 kg (172 lb)   SpO2 95%   Breastfeeding No   BMI 31.46 kg/m²     General:  Alert, cooperative, no distress. Head:  Normocephalic, without obvious abnormality, atraumatic. Lungs:   Dimished bilaterally no wheezing. Rales in the bases. Chest wall:  No tenderness or deformity. Heart:  Regular rate and rhythm, S1, S2 normal, no murmur, click, rub, or gallop. Abdomen:   Soft, non-tender. Bowel sounds normal. No masses. No organomegaly. Extremities: Extremities normal, atraumatic, no cyanosis or edema. Pulses: 2+ and symmetric all extremities. Skin: Skin color, texture, turgor normal. No rashes or lesions. Lymph nodes: Cervical, supraclavicular, and axillary nodes normal.   Neurologic: Awake and alert         Recent labs results and imaging reviewed. Discussed case with Dr. Leighton Lemon  and our impression and recommendations are as follows:    1. Coronary artery disease: s/p stenting proximal LAD 9/10. Continue DAPT. Cardiac catheterization recommended but unable to perform due to COVID and respiratory status. We will continue monitor. 2. Cardiomyopathy:  Recent echo 11/16/2020 showing EF 45-50%. Continue daily weights, strict I&Os, and 1500 ml fluid restrictions. Would avoid volume overload. Continue telemetry monitoring. Please keep serum potassium between 4-5 and serum magnesium > 2.     3. Atrial tachycardia:  AV- paced. Most recent interrogation showed no arrhythmias. Continue sotalol and continue to monitor.     4. Hypertension: Blood pressure acceptable after holding Lasix and receiving bolus of fluids. Continue to monitor and maintain euvolemia. 5. Hyperlipidemia: Continue statin therapy. 6. COVID pneumonia: Per pulmonary and primary, requiring supplemental O2 6L NC. Please do not hesitate to call me or Dr. Brenda Ram if additional questions arise.

## 2021-01-25 NOTE — PROGRESS NOTES
Hospitalist Progress Note               Daily Progress Note: 1/25/2021      Subjective: The patient is seen for follow up. Patient is feeling better overall.     Pulmonology increased her Decadron yesterday, gave her Actemra and 1 dose of ivermectin    This morning, she is off the nonrebreather and on 6 L nasal cannula saturations of 100%    Problem List:  Problem List as of 1/25/2021 Date Reviewed: 10/21/2020          Codes Class Noted - Resolved    Biventricular ICD (implantable cardioverter-defibrillator) in place ICD-10-CM: Z95.810  ICD-9-CM: V45.02  1/26/2018 - Present        Hypoxia ICD-10-CM: R09.02  ICD-9-CM: 799.02  1/21/2021 - Present        Elevated troponin ICD-10-CM: R77.8  ICD-9-CM: 790.6  1/21/2021 - Present        Atrial fibrillation ([facility] ICD-10-CM: I48.91  ICD-9-CM: 427.31  11/16/2020 - Present        Acute on chronic congestive heart failure ([facility] ICD-10-CM: I50.9  ICD-9-CM: 428.0  11/16/2020 - Present        Hypomagnesemia ICD-10-CM: E83.42  ICD-9-CM: 275.2  9/28/2020 - Present        Hyperkalemia ICD-10-CM: E87.5  ICD-9-CM: 276.7  9/26/2020 - Present        CHF (congestive heart failure) (HCC) ICD-10-CM: I50.9  ICD-9-CM: 428.0  9/25/2020 - Present        CHF exacerbation ([facility] ICD-10-CM: I50.9  ICD-9-CM: 428.0  9/22/2020 - Present        Severe obesity ([facility] ICD-10-CM: E66.01  ICD-9-CM: 278.01  9/2/2020 - Present        Dysarthria ICD-10-CM: R47.1  ICD-9-CM: 784.51  8/11/2020 - Present        TIA (transient ischemic attack) ICD-10-CM: G45.9  ICD-9-CM: 435.9  8/10/2020 - Present        Cardiomyopathy ([facility] ICD-10-CM: I42.9  ICD-9-CM: 425.4  5/23/2016 - Present        Skin cancer ICD-10-CM: C44.90  ICD-9-CM: 173.90  Unknown - Present        Acid indigestion ICD-10-CM: K30  ICD-9-CM: 536.8  Unknown - Present        Asthma ICD-10-CM: 493  ICD-9-CM: 656  Unknown - Present        Back pain ICD-10-CM: M54.9  ICD-9-CM: 724.5  Unknown - Present        Wears dentures ICD-10-CM: Z97.2  ICD-9-CM: V45.84  Unknown - Present        Constipation ICD-10-CM: 564.0  ICD-9-CM: 564.0  Unknown - Present        Diabetes mellitus ICD-10-CM: 250  ICD-9-CM: 250  Unknown - Present        Diarrhea ICD-10-CM: R19.7  ICD-9-CM: 787.91  Unknown - Present        Frequent episodic tension-type headache ICD-10-CM: U98.145  ICD-9-CM: 339.11  Unknown - Present        Hemorrhoids ICD-10-CM: 011  ICD-9-CM: 520  Unknown - Present        Hernia of unspecified site of abdominal cavity without mention of obstruction or gangrene ICD-10-CM: K46.9  ICD-9-CM: 553.9  Unknown - Present        HTN (hypertension) ICD-10-CM: I10  ICD-9-CM: 401.9  Unknown - Present        Muscle pain ICD-10-CM: M79.10  ICD-9-CM: 729.1  Unknown - Present        SOB (shortness of breath) ICD-10-CM: R06.02  ICD-9-CM: 786.05  Unknown - Present        Thyroid disorder ICD-10-CM: E07.9  ICD-9-CM: 246. 9  Unknown - Present              Medications reviewed  Current Facility-Administered Medications   Medication Dose Route Frequency    dexamethasone (DECADRON) 4 mg/mL injection 4 mg  4 mg IntraVENous Q6H    tocilizumab 400 mg in 0.9% sodium chloride 100 mL infusion  400 mg IntraVENous DAILY    aspirin delayed-release tablet 81 mg  81 mg Oral DAILY    atorvastatin (LIPITOR) tablet 40 mg  40 mg Oral DAILY    gabapentin (NEURONTIN) capsule 100 mg  100 mg Oral TID    insulin lispro protamine/insulin lispro (HUMALOG MIX 75/25) injection 24 Units  24 Units SubCUTAneous BID    levothyroxine (SYNTHROID) tablet 150 mcg  150 mcg Oral ACB    magnesium oxide (MAG-OX) tablet 400 mg  400 mg Oral TID    melatonin tablet 3 mg  3 mg Oral QHS PRN    pantoprazole (PROTONIX) tablet 40 mg  40 mg Oral DAILY    potassium chloride SR (KLOR-CON 10) tablet 20 mEq  20 mEq Oral DAILY    polyethylene glycol (MIRALAX) packet 17 g  17 g Oral PRN    sotaloL (BETAPACE) tablet 40 mg  40 mg Oral DAILY    azithromycin (ZITHROMAX) 500 mg in 0.9% sodium chloride 250 mL (VIAL-MATE) 500 mg IntraVENous Q24H    ticagrelor (BRILINTA) tablet 90 mg  90 mg Oral Q12H    remdesivir 100 mg in 0.9% sodium chloride 250 mL IVPB  100 mg IntraVENous Q24H    cholecalciferol (VITAMIN D3) (1000 Units /25 mcg) tablet 2 Tab  2,000 Units Oral DAILY    ascorbic acid (vitamin C) (VITAMIN C) tablet 1,000 mg  1,000 mg Oral DAILY    zinc sulfate (ZINCATE) 220 (50) mg capsule 1 Cap  1 Cap Oral DAILY    metFORMIN ER (GLUCOPHAGE XR) tablet 750 mg  750 mg Oral BID WITH MEALS    ondansetron (ZOFRAN) injection 4 mg  4 mg IntraVENous Q6H PRN    acetaminophen (TYLENOL) tablet 650 mg  650 mg Oral Q6H PRN    docusate sodium (COLACE) capsule 100 mg  100 mg Oral PRN    insulin lispro (HUMALOG) injection   SubCUTAneous AC&HS    glucose chewable tablet 16 g  4 Tab Oral PRN    dextrose (D50W) injection syrg 12.5-25 g  25-50 mL IntraVENous PRN    glucagon (GLUCAGEN) injection 1 mg  1 mg IntraMUSCular PRN       Review of Systems:   A comprehensive review of systems was negative except for that written in the HPI. Objective:   Physical Exam:     Visit Vitals  BP 98/63 (BP 1 Location: Left arm, BP Patient Position: At rest)   Pulse 80   Temp 98.2 °F (36.8 °C)   Resp 20   Ht 5' 2\" (1.575 m)   Wt 78 kg (172 lb)   SpO2 95%   Breastfeeding No   BMI 31.46 kg/m²    O2 Flow Rate (L/min): 6 l/min O2 Device: Nasal cannula    Temp (24hrs), Av.2 °F (36.8 °C), Min:98.2 °F (36.8 °C), Max:98.2 °F (36.8 °C)    No intake/output data recorded.  1901 -  0700  In: 1022 [P.O.:1022]  Out: 1200 [Urine:1200]    General:   Awake and alert   Lungs:    crackles at bases   Chest wall:  No tenderness or deformity. Heart:  Regular rate and rhythm, S1, S2 normal, no murmur, click, rub or gallop. Abdomen:   Soft, non-tender. Bowel sounds normal. No masses,  No organomegaly. Extremities: Extremities normal, atraumatic, no cyanosis or edema. Pulses: 2+ and symmetric all extremities.    Skin: Skin color, texture, turgor normal. No rashes or lesions   Neurologic: CNII-XII intact. No gross focal deficits         Data Review:       Recent Days:  Recent Labs     01/24/21  0743 01/23/21  1251   WBC 15.0* 15.3*   HGB 12.7 12.7   HCT 38.8 38.4   * 120*     Recent Labs     01/24/21  0743 01/23/21  1251    135*   K 3.6 3.6    99   CO2 30 29   * 364*   BUN 56* 46*   CREA 0.92 1.11*   CA 8.9 8.6     No results for input(s): PH, PCO2, PO2, HCO3, FIO2 in the last 72 hours. 24 Hour Results:  Recent Results (from the past 24 hour(s))   GLUCOSE, POC    Collection Time: 01/24/21 11:48 AM   Result Value Ref Range    Glucose (POC) 279 (H) 65 - 100 mg/dL    Performed by Rama Magallanes, POC    Collection Time: 01/24/21  4:10 PM   Result Value Ref Range    Glucose (POC) 279 (H) 65 - 100 mg/dL    Performed by Annia Richmond, POC    Collection Time: 01/24/21  8:09 PM   Result Value Ref Range    Glucose (POC) 254 (H) 65 - 100 mg/dL    Performed by Jovon Evangelista    GLUCOSE, POC    Collection Time: 01/25/21  7:55 AM   Result Value Ref Range    Glucose (POC) 147 (H) 65 - 100 mg/dL    Performed by Aamir JOHNS        CT CHEST WO CONT   Final Result   Coarse peripheral reticular markings along with groundglass opacity. Small mediastinal lymph nodes. Findings would fit with inflammatory change   involving the lungs. No pleural or pericardial effusion. Thoracoabdominal aorta atherosclerosis. CAD. XR CHEST SNGL V   Final Result           Assessment:  COVID-19 with pneumonitis    Acute respiratory failure with hypoxia improving    Acute on chronic systolic heart failure, EF 45%.   Improved     Elevated troponin, likely due to hypoxia/CHF     Coronary artery disease with previous PCI     Paroxysmal atrial fibrillation     AICD in situ     Hypertension     Diabetes mellitus type 2     GERD with history of Rosario's esophagus    Plan:  Continue Decadron, remdesivir day #4        Care Plan discussed with: Patient/Family    Total time spent with patient: 30 minutes.     Annabella Zamora MD

## 2021-01-25 NOTE — PROGRESS NOTES
Chart reviewed. Patient remains acute at this time. Her current discharge plan remains either to resume Outpatient Cardiac Rehab in [facility] or have home health with [facility]. Either of these is an option depending upon patient's need closer to discharge.

## 2021-01-25 NOTE — CONSULTS
PULMONARY NOTE  [facility]    Name: Felisa Cummings MRN: [id number]   : 1950 Hospital: [facility]   Date: 2021  Admission date: 2021 Hospital Day: 5       HPI:     Hospital Problems  Date Reviewed: 10/21/2020          Codes Class Noted POA    Hypoxia ICD-10-CM: R09.02  ICD-9-CM: 799.02  2021 Unknown        Elevated troponin ICD-10-CM: R77.8  ICD-9-CM: 790.6  2021 Unknown                   [x] High complexity decision making was performed  [x] See my orders for details      Subjective/Initial History:     I was asked by Cornelius Jimenez MD to see Felisa Cummings  a 65 y.o.  female in consultation     Excerpts from admission 2021 or consult notes as follows:   68-year-old lady came in because of shortness of breath and dyspnea she has significant past medical history of congestive heart failure ejection fraction about 45% history of atrial fibrillation status post ICD coronary artery disease diabetes hypertension she was having shortness of breath for the past couple of days seen by cardiologist but her condition got worse came to the hospital and getting treatment for congestive heart failure saturation was low in 80s now her COVID-19 test came back positive chest x-ray shows bilateral groundglass opacities confirmed with CAT scan of the chest so pulmonary consult was called for further evaluation.      worsening hypoxia now 100% nonrebreather she remains short of breath mild cough mostly complains about constipation  Allergies   Allergen Reactions    Codeine Other (comments)     Patient states she is not allergic    Novocain [Procaine] Nausea and Vomiting    Tramadol Other (comments)     Headache        MAR reviewed and pertinent medications noted or modified as needed     Current Facility-Administered Medications   Medication    dexamethasone (DECADRON) 4 mg/mL injection 4 mg    tocilizumab 400 mg in 0.9% sodium chloride 100 mL infusion    aspirin delayed-release tablet 81 mg    atorvastatin (LIPITOR) tablet 40 mg    gabapentin (NEURONTIN) capsule 100 mg    insulin lispro protamine/insulin lispro (HUMALOG MIX 75/25) injection 24 Units    levothyroxine (SYNTHROID) tablet 150 mcg    magnesium oxide (MAG-OX) tablet 400 mg    melatonin tablet 3 mg    pantoprazole (PROTONIX) tablet 40 mg    potassium chloride SR (KLOR-CON 10) tablet 20 mEq    polyethylene glycol (MIRALAX) packet 17 g    sotaloL (BETAPACE) tablet 40 mg    azithromycin (ZITHROMAX) 500 mg in 0.9% sodium chloride 250 mL (VIAL-MATE)    ticagrelor (BRILINTA) tablet 90 mg    remdesivir 100 mg in 0.9% sodium chloride 250 mL IVPB    cholecalciferol (VITAMIN D3) (1000 Units /25 mcg) tablet 2 Tab    ascorbic acid (vitamin C) (VITAMIN C) tablet 1,000 mg    zinc sulfate (ZINCATE) 220 (50) mg capsule 1 Cap    metFORMIN ER (GLUCOPHAGE XR) tablet 750 mg    ondansetron (ZOFRAN) injection 4 mg    acetaminophen (TYLENOL) tablet 650 mg    docusate sodium (COLACE) capsule 100 mg    insulin lispro (HUMALOG) injection    glucose chewable tablet 16 g    dextrose (D50W) injection syrg 12.5-25 g    glucagon (GLUCAGEN) injection 1 mg      Patient PCP: Josafat Pemberton MD  [facility]:  has a past medical history of Acid indigestion, Aneurysm ([address] Anxiety disorder, Arthritis, Asthma, Back pain, Bladder spasms, Cerebral artery occlusion with cerebral infarction ([address] (08/2020), Chest pain, Constipation, Cough, Diabetes mellitus, Diarrhea, Fatigue, Fibromyalgia, Frequent episodic tension-type headache, Hearing reduced, Heart failure ([address] Hemorrhoids, Hernia of unspecified site of abdominal cavity without mention of obstruction or gangrene, HTN (hypertension), Hypothyroidism, ICD (implantable cardioverter-defibrillator) in place, Muscle pain, N&V (nausea and vomiting), Pacemaker, Ringing in ears, Skin cancer, SOB (shortness of breath), SOB (shortness of breath), Swallowing difficulty, Thyroid disorder, Vertigo, and Wears dentures. PSH:   has a past surgical history that includes pr removal gallbladder; hx thyroidectomy; hx hysterectomy; hx carpal tunnel release; hx heart catheterization (16); hx pacemaker (Left, 16); hx gi; and hx orthopaedic. FHX: family history includes Breast Cancer in her sister; Cancer in her sister; Diabetes in her brother, mother, and sister; Heart Disease in her father, mother, and sister; Hypertension in her sister; Lupus in her sister. SHX:  reports that she has never smoked. She has never used smokeless tobacco. She reports current alcohol use. She reports that she does not use drugs. ROS:    Review of Systems   Constitutional: Positive for malaise/fatigue. HENT: Negative. Eyes: Negative. Respiratory: Positive for cough, sputum production and shortness of breath. Cardiovascular: Positive for orthopnea. Gastrointestinal: Negative. Genitourinary: Negative. Musculoskeletal: Negative. Neurological: Negative. Endo/Heme/Allergies: Negative. Psychiatric/Behavioral: Negative. Objective:     Vital Signs: Telemetry:    AFIB Intake/Output:   Visit Vitals  BP 98/63 (BP 1 Location: Left arm, BP Patient Position: At rest)   Pulse 80   Temp 98.2 °F (36.8 °C)   Resp 20   Ht 5' 2\" (1.575 m)   Wt 78 kg (172 lb)   SpO2 95%   Breastfeeding No   BMI 31.46 kg/m²       Temp (24hrs), Av.2 °F (36.8 °C), Min:98.2 °F (36.8 °C), Max:98.2 °F (36.8 °C)        O2 Device: Nasal cannula O2 Flow Rate (L/min): 6 l/min       Wt Readings from Last 4 Encounters:   21 78 kg (172 lb)   21 78.6 kg (173 lb 3.2 oz)   01/15/21 78.5 kg (173 lb)   21 78 kg (172 lb)          Intake/Output Summary (Last 24 hours) at 2021 0952  Last data filed at 2021 1438  Gross per 24 hour   Intake 222 ml   Output    Net 222 ml       Last shift:      No intake/output data recorded.   Last 3 shifts: 011901 -  0700  In: 1021 [P.O.:1022]  Out: 1200 [Urine:1200]       Physical Exam:     Physical Exam   Constitutional: She is oriented to person, place, and time. She appears well-developed and well-nourished. She appears distressed. HENT:   Head: Normocephalic and atraumatic. Eyes: Pupils are equal, round, and reactive to light. Conjunctivae are normal.   Neck: Normal range of motion. Neck supple. Cardiovascular: Normal rate and regular rhythm. Pulmonary/Chest: She is in respiratory distress. Has rales posteriorly no wheezing   Abdominal: Soft. Bowel sounds are normal.   Musculoskeletal: Normal range of motion. General: No edema. Neurological: She is alert and oriented to person, place, and time. Skin: Skin is warm and dry. Labs:    Recent Labs     01/24/21  0743 01/23/21  1251   WBC 15.0* 15.3*   HGB 12.7 12.7   * 120*     Recent Labs     01/24/21  0743 01/23/21  1251    135*   K 3.6 3.6    99   CO2 30 29   * 364*   BUN 56* 46*   CREA 0.92 1.11*   CA 8.9 8.6     100% nonrebreather mask with oxygen saturation 96%  Recent Labs     01/22/21  1104   TROIQ 0.37*       Lab Results   Component Value Date/Time    TSH 6.58 (H) 08/10/2020 05:54 PM       Imaging:    CXR Results  (Last 48 hours)    None        Results from Hospital Encounter encounter on 01/21/21   XR CHEST SNGL V    Narrative Chest single view. Comparison single view chest November 16, 2020    Reduced lung volumes. Hazy coarse reticular markings through the lungs advanced  compared to prior imaging. Findings concerning for mild interstitial edema  superimposed upon chronic change. Left-sided cardiac device with similar positioned leads overlying the heart. Thoracic aorta atherosclerosis. No pneumothorax or sizable pleural effusion. Results from [place] encounter on 11/16/20   XR CHEST PORT    Narrative Chest single view. Comparison single view chest September 22, 2020. Lungs clear.  No interstitial or alveolar pulmonary edema. Left-sided cardiac  device with leads overlying the right heart. Cardiac and mediastinal structures  are unchanged. Thoracic aorta atherosclerosis. No pneumothorax or sizable  pleural effusion. Impression Impression: No CHF. No sizable pleural effusion. Results from [place] encounter on 09/22/20   XR CHEST PORT    Narrative Exam: Frontal chest    Comparison: 9/8/2020. 8/10/2020. Number of views: 1    Findings: Left subclavian central venous cardiac electrode device leads in  apparent satisfactory position. There is calcific atherosclerotic disease of the  thoracic aorta. The configuration of the cardiopericardial silhouette suggests  enlarged cardiac chambers. There is pulmonary venous cephalization, compatible  with chronic pulmonary venous hypertension. There is, however, no demonstrated  pulmonary edema at this time. There is no demonstrated significant pulmonary  parenchymal lesion. There is no evidence of pleural disease. Impression Impression: Evidence of chronic left-sided heart disease, without active edema  demonstrated at this time. Atherosclerosis. Results from [place] encounter on 01/21/21   CT CHEST WO CONT    Narrative CT chest without IV contrast    Axial images are reviewed along with reformatted sagittal/coronal/MIP images. No  IV contrast administered. Dose reduction: All CT scans at this facility are performed using dose reduction  optimization techniques as appropriate to a performed exam including the  following-  automated exposure control, adjustments of mA and/or Kv according to patient  size, or use of iterative reconstructive technique. Coarse peripheral reticular markings along with groundglass opacity throughout  lungs. Although findings are nonspecific, consider inflammatory change. No  pleural effusion. Left side cardiac device. Nonenhanced images reveal atherosclerotic change  thoracic aorta.  Normal caliber of pulmonary arterial trunk. Small mediastinal  lymph nodes present. Tram track calcification present along the course of the  coronary arteries, evidence for coronary artery disease. No pericardial  effusion. Atherosclerosis continues into the imaged upper abdominal aorta. Impression Coarse peripheral reticular markings along with groundglass opacity. Small mediastinal lymph nodes. Findings would fit with inflammatory change  involving the lungs. No pleural or pericardial effusion. Thoracoabdominal aorta atherosclerosis. CAD. IMPRESSION:   1. Acute hypoxic respiratory failure now on nasal cannula  2. COVID-19 pneumonia currently on remdesivir will change Decadron to every 6 hours dosing and add Actemra and 1 dose ivermectin  3. congestive heart failure  4. Paroxysmal atrial fibrillation  5. History of AICD coronary artery disease diabetes mellitus GERD with Rosario's esophagus  6. RECOMMENDATIONS/PLAN:     1. Now she is on 6 L nasal cannula we will continue to wean  2. She  is on  remdesivir Zithromax Decadron and she recieved ivermectin and Actemra  3. We will continue to monitor renal function after giving IV fluid creatinine improved  4. Received fluid challenge her ejection fraction is about 45% history of paroxysmal A. fib and AICD in place  5. Agree with [facility]ir[facility] IV antibiotics pending culture results   6.  We will get chest x-ray today         Renay Duque MD

## 2021-01-26 LAB
GLUCOSE BLD STRIP.AUTO-MCNC: 248 MG/DL (ref 65–100)
GLUCOSE BLD STRIP.AUTO-MCNC: 280 MG/DL (ref 65–100)
GLUCOSE BLD STRIP.AUTO-MCNC: 294 MG/DL (ref 65–100)
GLUCOSE BLD STRIP.AUTO-MCNC: 382 MG/DL (ref 65–100)
PERFORMED BY, TECHID: ABNORMAL

## 2021-01-26 PROCEDURE — 82962 GLUCOSE BLOOD TEST: CPT

## 2021-01-26 PROCEDURE — 74011636637 HC RX REV CODE- 636/637: Performed by: PHYSICIAN ASSISTANT

## 2021-01-26 PROCEDURE — 74011250636 HC RX REV CODE- 250/636: Performed by: PHYSICIAN ASSISTANT

## 2021-01-26 PROCEDURE — 74011636637 HC RX REV CODE- 636/637: Performed by: INTERNAL MEDICINE

## 2021-01-26 PROCEDURE — 65270000032 HC RM SEMIPRIVATE

## 2021-01-26 PROCEDURE — 74011000250 HC RX REV CODE- 250: Performed by: INTERNAL MEDICINE

## 2021-01-26 PROCEDURE — 77010033678 HC OXYGEN DAILY

## 2021-01-26 PROCEDURE — 74011000258 HC RX REV CODE- 258: Performed by: INTERNAL MEDICINE

## 2021-01-26 PROCEDURE — 74011250636 HC RX REV CODE- 250/636: Performed by: INTERNAL MEDICINE

## 2021-01-26 PROCEDURE — 74011250637 HC RX REV CODE- 250/637: Performed by: INTERNAL MEDICINE

## 2021-01-26 PROCEDURE — 94760 N-INVAS EAR/PLS OXIMETRY 1: CPT

## 2021-01-26 RX ORDER — METFORMIN HYDROCHLORIDE 500 MG/1
500 TABLET ORAL 2 TIMES DAILY WITH MEALS
Status: DISCONTINUED | OUTPATIENT
Start: 2021-01-26 | End: 2021-01-28 | Stop reason: HOSPADM

## 2021-01-26 RX ORDER — INSULIN LISPRO 100 [IU]/ML
15 INJECTION, SOLUTION INTRAVENOUS; SUBCUTANEOUS ONCE
Status: COMPLETED | OUTPATIENT
Start: 2021-01-26 | End: 2021-01-26

## 2021-01-26 RX ORDER — FUROSEMIDE 10 MG/ML
40 INJECTION INTRAMUSCULAR; INTRAVENOUS ONCE
Status: COMPLETED | OUTPATIENT
Start: 2021-01-26 | End: 2021-01-26

## 2021-01-26 RX ORDER — DEXTROSE 50 % IN WATER (D50W) INTRAVENOUS SYRINGE
25-50 AS NEEDED
Status: DISCONTINUED | OUTPATIENT
Start: 2021-01-26 | End: 2021-01-28 | Stop reason: HOSPADM

## 2021-01-26 RX ORDER — INSULIN LISPRO 100 [IU]/ML
INJECTION, SOLUTION INTRAVENOUS; SUBCUTANEOUS
Status: DISCONTINUED | OUTPATIENT
Start: 2021-01-27 | End: 2021-01-28 | Stop reason: HOSPADM

## 2021-01-26 RX ORDER — INSULIN GLARGINE 100 [IU]/ML
35 INJECTION, SOLUTION SUBCUTANEOUS
Status: DISCONTINUED | OUTPATIENT
Start: 2021-01-26 | End: 2021-01-28 | Stop reason: HOSPADM

## 2021-01-26 RX ADMIN — REMDESIVIR 100 MG: 100 INJECTION, POWDER, LYOPHILIZED, FOR SOLUTION INTRAVENOUS at 13:05

## 2021-01-26 RX ADMIN — INSULIN LISPRO 5 UNITS: 100 INJECTION, SOLUTION INTRAVENOUS; SUBCUTANEOUS at 12:53

## 2021-01-26 RX ADMIN — MAGNESIUM OXIDE TAB 400 MG (241.3 MG ELEMENTAL MG) 400 MG: 400 (241.3 MG) TAB at 22:20

## 2021-01-26 RX ADMIN — DEXAMETHASONE SODIUM PHOSPHATE 4 MG: 4 INJECTION, SOLUTION INTRA-ARTICULAR; INTRALESIONAL; INTRAMUSCULAR; INTRAVENOUS; SOFT TISSUE at 05:29

## 2021-01-26 RX ADMIN — DEXAMETHASONE SODIUM PHOSPHATE 4 MG: 4 INJECTION, SOLUTION INTRA-ARTICULAR; INTRALESIONAL; INTRAMUSCULAR; INTRAVENOUS; SOFT TISSUE at 23:40

## 2021-01-26 RX ADMIN — ATORVASTATIN CALCIUM 40 MG: 40 TABLET, FILM COATED ORAL at 22:26

## 2021-01-26 RX ADMIN — TICAGRELOR 90 MG: 90 TABLET ORAL at 22:20

## 2021-01-26 RX ADMIN — INSULIN LISPRO 3 UNITS: 100 INJECTION, SOLUTION INTRAVENOUS; SUBCUTANEOUS at 10:18

## 2021-01-26 RX ADMIN — INSULIN LISPRO 15 UNITS: 100 INJECTION, SOLUTION INTRAVENOUS; SUBCUTANEOUS at 22:20

## 2021-01-26 RX ADMIN — DEXAMETHASONE SODIUM PHOSPHATE 4 MG: 4 INJECTION, SOLUTION INTRA-ARTICULAR; INTRALESIONAL; INTRAMUSCULAR; INTRAVENOUS; SOFT TISSUE at 17:50

## 2021-01-26 RX ADMIN — ZINC SULFATE 220 MG (50 MG) CAPSULE 1 CAPSULE: CAPSULE at 10:20

## 2021-01-26 RX ADMIN — GABAPENTIN 100 MG: 100 CAPSULE ORAL at 17:50

## 2021-01-26 RX ADMIN — LEVOTHYROXINE SODIUM 150 MCG: 75 TABLET ORAL at 10:20

## 2021-01-26 RX ADMIN — MELATONIN TAB 3 MG 3 MG: 3 TAB at 00:38

## 2021-01-26 RX ADMIN — POTASSIUM CHLORIDE 20 MEQ: 750 TABLET, FILM COATED, EXTENDED RELEASE ORAL at 10:19

## 2021-01-26 RX ADMIN — DEXAMETHASONE SODIUM PHOSPHATE 4 MG: 4 INJECTION, SOLUTION INTRA-ARTICULAR; INTRALESIONAL; INTRAMUSCULAR; INTRAVENOUS; SOFT TISSUE at 12:53

## 2021-01-26 RX ADMIN — MAGNESIUM OXIDE TAB 400 MG (241.3 MG ELEMENTAL MG) 400 MG: 400 (241.3 MG) TAB at 17:49

## 2021-01-26 RX ADMIN — MELATONIN TAB 3 MG 3 MG: 3 TAB at 23:40

## 2021-01-26 RX ADMIN — PANTOPRAZOLE SODIUM 40 MG: 40 TABLET, DELAYED RELEASE ORAL at 10:20

## 2021-01-26 RX ADMIN — INSULIN LISPRO 24 UNITS: 100 INJECTION, SUSPENSION SUBCUTANEOUS at 09:00

## 2021-01-26 RX ADMIN — SOTALOL HYDROCHLORIDE 40 MG: 80 TABLET ORAL at 12:56

## 2021-01-26 RX ADMIN — DEXAMETHASONE SODIUM PHOSPHATE 4 MG: 4 INJECTION, SOLUTION INTRA-ARTICULAR; INTRALESIONAL; INTRAMUSCULAR; INTRAVENOUS; SOFT TISSUE at 00:22

## 2021-01-26 RX ADMIN — ASPIRIN 81 MG: 81 TABLET, COATED ORAL at 10:20

## 2021-01-26 RX ADMIN — FUROSEMIDE 40 MG: 10 INJECTION, SOLUTION INTRAMUSCULAR; INTRAVENOUS at 12:56

## 2021-01-26 RX ADMIN — AZITHROMYCIN DIHYDRATE 500 MG: 500 INJECTION, POWDER, LYOPHILIZED, FOR SOLUTION INTRAVENOUS at 05:30

## 2021-01-26 RX ADMIN — METFORMIN HYDROCHLORIDE 500 MG: 500 TABLET ORAL at 17:49

## 2021-01-26 RX ADMIN — OXYCODONE HYDROCHLORIDE AND ACETAMINOPHEN 1000 MG: 500 TABLET ORAL at 10:20

## 2021-01-26 RX ADMIN — INSULIN LISPRO 5 UNITS: 100 INJECTION, SOLUTION INTRAVENOUS; SUBCUTANEOUS at 16:30

## 2021-01-26 RX ADMIN — INSULIN GLARGINE 35 UNITS: 100 INJECTION, SOLUTION SUBCUTANEOUS at 22:24

## 2021-01-26 RX ADMIN — Medication 2 TABLET: at 10:20

## 2021-01-26 RX ADMIN — MAGNESIUM OXIDE TAB 400 MG (241.3 MG ELEMENTAL MG) 400 MG: 400 (241.3 MG) TAB at 10:20

## 2021-01-26 RX ADMIN — TICAGRELOR 90 MG: 90 TABLET ORAL at 10:20

## 2021-01-26 NOTE — PROGRESS NOTES
CARDIOLOGY PROGRESS NOTE    Patient appears comfortable this am. Offers no new cardiac complaints. Continues on supplemental O2 via NC. Telemetry reviewed. No events overnight. AV-paced at [de-identified]. .      PHYSICAL EXAMINATION:      Visit Vitals  /61   Pulse 60   Temp 97.5 °F (36.4 °C)   Resp 18   Ht 5' 2\" (1.575 m)   Wt 78 kg (172 lb)   SpO2 95%   Breastfeeding No   BMI 31.46 kg/m²     General:  Alert, cooperative, no distress. Head:  Normocephalic, without obvious abnormality, atraumatic. Lungs:   Dimished bilaterally no wheezing. Rales in the bases. Chest wall:  No tenderness or deformity. Heart:  Regular rate and rhythm, S1, S2 normal, no murmur, click, rub, or gallop. Abdomen:   Soft, non-tender. Bowel sounds normal. No masses. No organomegaly. Extremities: Extremities normal, atraumatic, no cyanosis or edema. Pulses: 2+ and symmetric all extremities. Skin: Skin color, texture, turgor normal. No rashes or lesions. Lymph nodes: Cervical, supraclavicular, and axillary nodes normal.   Neurologic: Awake and alert         Recent labs results and imaging reviewed. Discussed case with Dr. Sean Adkins  and our impression and recommendations are as follows:    1. Coronary artery disease: s/p stenting proximal LAD 9/10. Continue DAPT. Possible cardiac cath as an outpatient or if COVID status improves. We will continue monitor. 2. Cardiomyopathy:  Recent echo 11/16/2020 showing EF 45-50%. Continue daily weights, strict I&Os, and 1500 ml fluid restrictions. Would avoid volume overload. Continue telemetry monitoring. Please keep serum potassium between 4-5 and serum magnesium > 2.     3. Atrial tachycardia:  AV- paced. Most recent interrogation showed no arrhythmias. Continue sotalol and continue to monitor. 4.  Hypertension: Blood pressure acceptable after holding Lasix and receiving bolus of fluids. Continue to monitor and maintain euvolemia.      5. Hyperlipidemia: Continue statin therapy. 6. COVID pneumonia: Per pulmonary and primary, requiring supplemental O2 3L NC. Please do not hesitate to call me or Dr. Storm De Los Santos if additional questions arise.

## 2021-01-26 NOTE — PROGRESS NOTES
Hospitalist Progress Note               Daily Progress Note: 1/26/2021      Subjective: The patient is seen for follow up. She is doing about the same overall. She is currently on 5 L nasal cannula saturations of 99%. I turned her down to 3 L and saturations remained 96% or greater.   She says her saturations drop when she gets up or coughs a lot    Problem List:  Problem List as of 1/26/2021 Date Reviewed: 10/21/2020          Codes Class Noted - Resolved    Biventricular ICD (implantable cardioverter-defibrillator) in place ICD-10-CM: Z95.810  ICD-9-CM: V45.02  1/26/2018 - Present        Hypoxia ICD-10-CM: R09.02  ICD-9-CM: 799.02  1/21/2021 - Present        Elevated troponin ICD-10-CM: R77.8  ICD-9-CM: 790.6  1/21/2021 - Present        Atrial fibrillation ([facility] ICD-10-CM: I48.91  ICD-9-CM: 427.31  11/16/2020 - Present        Acute on chronic congestive heart failure ([facility] ICD-10-CM: I50.9  ICD-9-CM: 428.0  11/16/2020 - Present        Hypomagnesemia ICD-10-CM: E83.42  ICD-9-CM: 275.2  9/28/2020 - Present        Hyperkalemia ICD-10-CM: E87.5  ICD-9-CM: 276.7  9/26/2020 - Present        CHF (congestive heart failure) ([facility] ICD-10-CM: I50.9  ICD-9-CM: 428.0  9/25/2020 - Present        CHF exacerbation ([facility] ICD-10-CM: I50.9  ICD-9-CM: 428.0  9/22/2020 - Present        Severe obesity ([facility] ICD-10-CM: E66.01  ICD-9-CM: 278.01  9/2/2020 - Present        Dysarthria ICD-10-CM: R47.1  ICD-9-CM: 784.51  8/11/2020 - Present        TIA (transient ischemic attack) ICD-10-CM: G45.9  ICD-9-CM: 435.9  8/10/2020 - Present        Cardiomyopathy ([facility] ICD-10-CM: I42.9  ICD-9-CM: 425.4  5/23/2016 - Present        Skin cancer ICD-10-CM: C44.90  ICD-9-CM: 173.90  Unknown - Present        Acid indigestion ICD-10-CM: K30  ICD-9-CM: 536.8  Unknown - Present        Asthma ICD-10-CM: 493  ICD-9-CM: 315  Unknown - Present        Back pain ICD-10-CM: M54.9  ICD-9-CM: 724.5  Unknown - Present        Wears dentures ICD-10-CM: Z97.2  ICD-9-CM: V45.84  Unknown - Present        Constipation ICD-10-CM: 564.0  ICD-9-CM: 564.0  Unknown - Present        Diabetes mellitus ICD-10-CM: 250  ICD-9-CM: 250  Unknown - Present        Diarrhea ICD-10-CM: R19.7  ICD-9-CM: 787.91  Unknown - Present        Frequent episodic tension-type headache ICD-10-CM: B46.523  ICD-9-CM: 339.11  Unknown - Present        Hemorrhoids ICD-10-CM: 278  ICD-9-CM: 390  Unknown - Present        Hernia of unspecified site of abdominal cavity without mention of obstruction or gangrene ICD-10-CM: K46.9  ICD-9-CM: 553.9  Unknown - Present        HTN (hypertension) ICD-10-CM: I10  ICD-9-CM: 401.9  Unknown - Present        Muscle pain ICD-10-CM: M79.10  ICD-9-CM: 729.1  Unknown - Present        SOB (shortness of breath) ICD-10-CM: R06.02  ICD-9-CM: 786.05  Unknown - Present        Thyroid disorder ICD-10-CM: E07.9  ICD-9-CM: 246. 9  Unknown - Present              Medications reviewed  Current Facility-Administered Medications   Medication Dose Route Frequency    furosemide (LASIX) injection 40 mg  40 mg IntraVENous ONCE    dexamethasone (DECADRON) 4 mg/mL injection 4 mg  4 mg IntraVENous Q6H    aspirin delayed-release tablet 81 mg  81 mg Oral DAILY    atorvastatin (LIPITOR) tablet 40 mg  40 mg Oral DAILY    gabapentin (NEURONTIN) capsule 100 mg  100 mg Oral TID    insulin lispro protamine/insulin lispro (HUMALOG MIX 75/25) injection 24 Units  24 Units SubCUTAneous BID    levothyroxine (SYNTHROID) tablet 150 mcg  150 mcg Oral ACB    magnesium oxide (MAG-OX) tablet 400 mg  400 mg Oral TID    melatonin tablet 3 mg  3 mg Oral QHS PRN    pantoprazole (PROTONIX) tablet 40 mg  40 mg Oral DAILY    potassium chloride SR (KLOR-CON 10) tablet 20 mEq  20 mEq Oral DAILY    polyethylene glycol (MIRALAX) packet 17 g  17 g Oral PRN    sotaloL (BETAPACE) tablet 40 mg  40 mg Oral DAILY    azithromycin (ZITHROMAX) 500 mg in 0.9% sodium chloride 250 mL (VIAL-MATE)  500 mg IntraVENous Q24H  ticagrelor (BRILINTA) tablet 90 mg  90 mg Oral Q12H    remdesivir 100 mg in 0.9% sodium chloride 250 mL IVPB  100 mg IntraVENous Q24H    cholecalciferol (VITAMIN D3) (1000 Units /25 mcg) tablet 2 Tab  2,000 Units Oral DAILY    ascorbic acid (vitamin C) (VITAMIN C) tablet 1,000 mg  1,000 mg Oral DAILY    zinc sulfate (ZINCATE) 220 (50) mg capsule 1 Cap  1 Cap Oral DAILY    metFORMIN ER (GLUCOPHAGE XR) tablet 750 mg (Patient Supplied)  750 mg Oral BID WITH MEALS    ondansetron (ZOFRAN) injection 4 mg  4 mg IntraVENous Q6H PRN    acetaminophen (TYLENOL) tablet 650 mg  650 mg Oral Q6H PRN    docusate sodium (COLACE) capsule 100 mg  100 mg Oral PRN    insulin lispro (HUMALOG) injection   SubCUTAneous AC&HS    glucose chewable tablet 16 g  4 Tab Oral PRN    dextrose (D50W) injection syrg 12.5-25 g  25-50 mL IntraVENous PRN    glucagon (GLUCAGEN) injection 1 mg  1 mg IntraMUSCular PRN       Review of Systems:   A comprehensive review of systems was negative except for that written in the HPI. Objective:   Physical Exam:     Visit Vitals  BP (!) 97/46 (BP 1 Location: Right arm, BP Patient Position: At rest;Head of bed elevated (Comment degrees))   Pulse 60   Temp 97.5 °F (36.4 °C)   Resp 18   Ht 5' 2\" (1.575 m)   Wt 78 kg (172 lb)   SpO2 95%   Breastfeeding No   BMI 31.46 kg/m²    O2 Flow Rate (L/min): 5 l/min O2 Device: Nasal cannula    Temp (24hrs), Av.6 °F (36.4 °C), Min:97.5 °F (36.4 °C), Max:97.8 °F (36.6 °C)    No intake/output data recorded.  1901 -  0700  In: -   Out: 500 [Urine:500]    General:   Awake and alert   Lungs:    crackles at bases   Chest wall:  No tenderness or deformity. Heart:  Regular rate and rhythm, S1, S2 normal, no murmur, click, rub or gallop. Abdomen:   Soft, non-tender. Bowel sounds normal. No masses,  No organomegaly. Extremities: Extremities normal, atraumatic, no cyanosis or edema. Pulses: 2+ and symmetric all extremities.    Skin: Skin color, texture, turgor normal. No rashes or lesions   Neurologic: CNII-XII intact. No gross focal deficits         Data Review:       Recent Days:  Recent Labs     01/25/21  1000 01/24/21  0743 01/23/21  1251   WBC 12.3* 15.0* 15.3*   HGB 12.4 12.7 12.7   HCT 38.3 38.8 38.4   * 126* 120*     Recent Labs     01/25/21  1000 01/24/21  0743 01/23/21  1251    139 135*   K 4.2 3.6 3.6    100 99   CO2 30 30 29   * 194* 364*   BUN 59* 56* 46*   CREA 0.96 0.92 1.11*   CA 8.7 8.9 8.6   PHOS 4.1  --   --    ALB 2.0*  --   --      No results for input(s): PH, PCO2, PO2, HCO3, FIO2 in the last 72 hours. 24 Hour Results:  Recent Results (from the past 24 hour(s))   GLUCOSE, POC    Collection Time: 01/25/21  4:51 PM   Result Value Ref Range    Glucose (POC) 244 (H) 65 - 100 mg/dL    Performed by [address], POC    Collection Time: 01/25/21  8:38 PM   Result Value Ref Range    Glucose (POC) 310 (H) 65 - 100 mg/dL    Performed by [facility]    GLUCOSE, POC    Collection Time: 01/26/21  8:56 AM   Result Value Ref Range    Glucose (POC) 248 (H) 65 - 100 mg/dL    Performed by Martha Sebastian    GLUCOSE, POC    Collection Time: 01/26/21 11:21 AM   Result Value Ref Range    Glucose (POC) 294 (H) 65 - 100 mg/dL    Performed by [employer]        XR CHEST PORT   Final Result   Saturday increased bilateral airspace opacities. CT CHEST WO CONT   Final Result   Coarse peripheral reticular markings along with groundglass opacity. Small mediastinal lymph nodes. Findings would fit with inflammatory change   involving the lungs. No pleural or pericardial effusion. Thoracoabdominal aorta atherosclerosis. CAD. XR CHEST SNGL V   Final Result           Assessment:  COVID-19 with pneumonitis    Acute respiratory failure with hypoxia improving    Acute on chronic systolic heart failure, EF 45%.   Improved     Elevated troponin, likely due to hypoxia/CHF     Coronary artery disease with previous PCI     Paroxysmal atrial fibrillation     AICD in situ     Hypertension     Diabetes mellitus type 2     GERD with history of Rosario's esophagus    Plan:  Continue Decadron, completes remdesivir today    Possible discharge home in the next 48 hours, likely with home oxygen      Care Plan discussed with: Patient/Family    Total time spent with patient: 30 minutes.     Abbey Maxwell MD

## 2021-01-26 NOTE — CONSULTS
PULMONARY NOTE  [facility]    Name: Sal Mcgraw MRN: [id number]   : 1950 Hospital: [facility]   Date: 2021  Admission date: 2021 Hospital Day: 6       HPI:     Hospital Problems  Date Reviewed: 10/21/2020          Codes Class Noted POA    Hypoxia ICD-10-CM: R09.02  ICD-9-CM: 799.02  2021 Unknown        Elevated troponin ICD-10-CM: R77.8  ICD-9-CM: 790.6  2021 Unknown                   [x] High complexity decision making was performed  [x] See my orders for details      Subjective/Initial History:     I was asked by Keenan Guevara MD to see Sal Mcgraw  a 65 y.o.  female in consultation     Excerpts from admission 2021 or consult notes as follows:   68-year-old lady came in because of shortness of breath and dyspnea she has significant past medical history of congestive heart failure ejection fraction about 45% history of atrial fibrillation status post ICD coronary artery disease diabetes hypertension she was having shortness of breath for the past couple of days seen by cardiologist but her condition got worse came to the hospital and getting treatment for congestive heart failure saturation was low in 80s now her COVID-19 test came back positive chest x-ray shows bilateral groundglass opacities confirmed with CAT scan of the chest so pulmonary consult was called for further evaluation.      worsening hypoxia now 100% nonrebreather she remains short of breath mild cough mostly complains about constipation  Allergies   Allergen Reactions    Codeine Other (comments)     Patient states she is not allergic    Novocain [Procaine] Nausea and Vomiting    Tramadol Other (comments)     Headache        MAR reviewed and pertinent medications noted or modified as needed     Current Facility-Administered Medications   Medication    dexamethasone (DECADRON) 4 mg/mL injection 4 mg    aspirin delayed-release tablet 81 mg    atorvastatin (LIPITOR) tablet 40 mg    gabapentin (NEURONTIN) capsule 100 mg    insulin lispro protamine/insulin lispro (HUMALOG MIX 75/25) injection 24 Units    levothyroxine (SYNTHROID) tablet 150 mcg    magnesium oxide (MAG-OX) tablet 400 mg    melatonin tablet 3 mg    pantoprazole (PROTONIX) tablet 40 mg    potassium chloride SR (KLOR-CON 10) tablet 20 mEq    polyethylene glycol (MIRALAX) packet 17 g    sotaloL (BETAPACE) tablet 40 mg    azithromycin (ZITHROMAX) 500 mg in 0.9% sodium chloride 250 mL (VIAL-MATE)    ticagrelor (BRILINTA) tablet 90 mg    remdesivir 100 mg in 0.9% sodium chloride 250 mL IVPB    cholecalciferol (VITAMIN D3) (1000 Units /25 mcg) tablet 2 Tab    ascorbic acid (vitamin C) (VITAMIN C) tablet 1,000 mg    zinc sulfate (ZINCATE) 220 (50) mg capsule 1 Cap    metFORMIN ER (GLUCOPHAGE XR) tablet 750 mg (Patient Supplied)    ondansetron (ZOFRAN) injection 4 mg    acetaminophen (TYLENOL) tablet 650 mg    docusate sodium (COLACE) capsule 100 mg    insulin lispro (HUMALOG) injection    glucose chewable tablet 16 g    dextrose (D50W) injection syrg 12.5-25 g    glucagon (GLUCAGEN) injection 1 mg      Patient PCP: Maki Shannon MD  [facility]:  has a past medical history of Acid indigestion, Aneurysm ([address] Anxiety disorder, Arthritis, Asthma, Back pain, Bladder spasms, Cerebral artery occlusion with cerebral infarction ([address] (08/2020), Chest pain, Constipation, Cough, Diabetes mellitus, Diarrhea, Fatigue, Fibromyalgia, Frequent episodic tension-type headache, Hearing reduced, Heart failure ([address] Hemorrhoids, Hernia of unspecified site of abdominal cavity without mention of obstruction or gangrene, HTN (hypertension), Hypothyroidism, ICD (implantable cardioverter-defibrillator) in place, Muscle pain, N&V (nausea and vomiting), Pacemaker, Ringing in ears, Skin cancer, SOB (shortness of breath), SOB (shortness of breath), Swallowing difficulty, Thyroid disorder, Vertigo, and Wears dentures. PSH:   has a past surgical history that includes pr removal gallbladder; hx thyroidectomy; hx hysterectomy; hx carpal tunnel release; hx heart catheterization (16); hx pacemaker (Left, 16); hx gi; and hx orthopaedic. FHX: family history includes Breast Cancer in her sister; Cancer in her sister; Diabetes in her brother, mother, and sister; Heart Disease in her father, mother, and sister; Hypertension in her sister; Lupus in her sister. SHX:  reports that she has never smoked. She has never used smokeless tobacco. She reports current alcohol use. She reports that she does not use drugs. ROS:    Review of Systems   Constitutional: Positive for malaise/fatigue. HENT: Negative. Eyes: Negative. Respiratory: Positive for cough, sputum production and shortness of breath. Cardiovascular: Positive for orthopnea. Gastrointestinal: Negative. Genitourinary: Negative. Musculoskeletal: Negative. Neurological: Negative. Endo/Heme/Allergies: Negative. Psychiatric/Behavioral: Negative. Objective:     Vital Signs: Telemetry:    AFIB Intake/Output:   Visit Vitals  BP (!) 97/46 (BP 1 Location: Right arm, BP Patient Position: At rest;Head of bed elevated (Comment degrees))   Pulse 60   Temp 97.5 °F (36.4 °C)   Resp 18   Ht 5' 2\" (1.575 m)   Wt 78 kg (172 lb)   SpO2 95%   Breastfeeding No   BMI 31.46 kg/m²       Temp (24hrs), Av.6 °F (36.4 °C), Min:97.5 °F (36.4 °C), Max:97.8 °F (36.6 °C)        O2 Device: Nasal cannula O2 Flow Rate (L/min): 5 l/min       Wt Readings from Last 4 Encounters:   21 78 kg (172 lb)   21 78.6 kg (173 lb 3.2 oz)   01/15/21 78.5 kg (173 lb)   21 78 kg (172 lb)          Intake/Output Summary (Last 24 hours) at 2021 1013  Last data filed at 2021 0600  Gross per 24 hour   Intake    Output 500 ml   Net -500 ml       Last shift:      No intake/output data recorded.   Last 3 shifts: 1901 -  07  In: - Out: 500 [Urine:500]       Physical Exam:     Physical Exam   Constitutional: She is oriented to person, place, and time. She appears well-developed and well-nourished. She appears distressed. HENT:   Head: Normocephalic and atraumatic. Eyes: Pupils are equal, round, and reactive to light. Conjunctivae are normal.   Neck: Normal range of motion. Neck supple. Cardiovascular: Normal rate and regular rhythm. Pulmonary/Chest: She is in respiratory distress. Has rales posteriorly no wheezing   Abdominal: Soft. Bowel sounds are normal.   Musculoskeletal: Normal range of motion. General: No edema. Neurological: She is alert and oriented to person, place, and time. Skin: Skin is warm and dry. Labs:    Recent Labs     01/25/21  1000 01/24/21  0743 01/23/21  1251   WBC 12.3* 15.0* 15.3*   HGB 12.4 12.7 12.7   * 126* 120*     Recent Labs     01/25/21  1000 01/24/21  0743 01/23/21  1251    139 135*   K 4.2 3.6 3.6    100 99   CO2 30 30 29   * 194* 364*   BUN 59* 56* 46*   CREA 0.96 0.92 1.11*   CA 8.7 8.9 8.6   PHOS 4.1  --   --    ALB 2.0*  --   --      100% nonrebreather mask with oxygen saturation 96%  No results for input(s): CPK, CKNDX, TROIQ in the last 72 hours. No lab exists for component: CPKMB    Lab Results   Component Value Date/Time    TSH 6.58 (H) 08/10/2020 05:54 PM       Imaging:    CXR Results  (Last 48 hours)               01/25/21 1058  XR CHEST PORT Final result    Impression:  Saturday increased bilateral airspace opacities. Narrative:  EXAM: XR CHEST PORT       INDICATION: chf       COMPARISON: January 21 and 22 2021       FINDINGS: Redemonstration of the prominent interstitial markings with slightly   increased bilateral patchy opacities. Unchanged support hardware. Unchanged   cardiomediastinal contours. No pneumothorax or pleural effusion. No acute   fracture or dislocation.                Results from [place] encounter on 01/21/21   XR CHEST PORT    Narrative EXAM: XR CHEST PORT    INDICATION: chf    COMPARISON: January 21 and 22 2021    FINDINGS: Redemonstration of the prominent interstitial markings with slightly  increased bilateral patchy opacities. Unchanged support hardware. Unchanged  cardiomediastinal contours. No pneumothorax or pleural effusion. No acute  fracture or dislocation. Impression Saturday increased bilateral airspace opacities. XR CHEST SNGL V    Narrative Chest single view. Comparison single view chest November 16, 2020    Reduced lung volumes. Hazy coarse reticular markings through the lungs advanced  compared to prior imaging. Findings concerning for mild interstitial edema  superimposed upon chronic change. Left-sided cardiac device with similar positioned leads overlying the heart. Thoracic aorta atherosclerosis. No pneumothorax or sizable pleural effusion. Results from [place] encounter on 11/16/20   XR CHEST PORT    Narrative Chest single view. Comparison single view chest September 22, 2020. Lungs clear. No interstitial or alveolar pulmonary edema. Left-sided cardiac  device with leads overlying the right heart. Cardiac and mediastinal structures  are unchanged. Thoracic aorta atherosclerosis. No pneumothorax or sizable  pleural effusion. Impression Impression: No CHF. No sizable pleural effusion. Results from [place] encounter on 01/21/21   CT CHEST WO CONT    Narrative CT chest without IV contrast    Axial images are reviewed along with reformatted sagittal/coronal/MIP images. No  IV contrast administered. Dose reduction: All CT scans at this facility are performed using dose reduction  optimization techniques as appropriate to a performed exam including the  following-  automated exposure control, adjustments of mA and/or Kv according to patient  size, or use of iterative reconstructive technique.     Coarse peripheral reticular markings along with groundglass opacity throughout  lungs. Although findings are nonspecific, consider inflammatory change. No  pleural effusion. Left side cardiac device. Nonenhanced images reveal atherosclerotic change  thoracic aorta. Normal caliber of pulmonary arterial trunk. Small mediastinal  lymph nodes present. Tram track calcification present along the course of the  coronary arteries, evidence for coronary artery disease. No pericardial  effusion. Atherosclerosis continues into the imaged upper abdominal aorta. Impression Coarse peripheral reticular markings along with groundglass opacity. Small mediastinal lymph nodes. Findings would fit with inflammatory change  involving the lungs. No pleural or pericardial effusion. Thoracoabdominal aorta atherosclerosis. CAD. IMPRESSION:   1. Acute hypoxic respiratory failure now on nasal cannula  2. COVID-19 pneumonia currently on remdesivir will change Decadron to every 6 hours dosing and recieved Actemra   3. congestive heart failure  4. Paroxysmal atrial fibrillation  5. History of AICD coronary artery disease diabetes mellitus GERD with Rosario's esophagus  6. RECOMMENDATIONS/PLAN:     1. Now she is on 6 L nasal cannula we will continue to wean we will continue to wean she was on 100% nonrebreather mask now on nasal cannula  2. She is on remdesivir Zithromax Decadron and she recieved ivermectin and Actemra  3. We will continue to monitor renal function after giving IV fluid creatinine improved  4. Received fluid challenge her ejection fraction is about 45% history of paroxysmal A. fib and AICD in place we will give one dose of Lasix labs in am  5. Agree with Empiric IV antibiotics pending culture results   6.  CXR still shows persistent changes consistent with COVID-19 and CHF         Brendan Moreno MD

## 2021-01-27 ENCOUNTER — APPOINTMENT (OUTPATIENT)
Dept: CARDIAC REHAB | Age: 71
End: 2021-01-27
Payer: MEDICARE

## 2021-01-27 VITALS
TEMPERATURE: 97.5 F | OXYGEN SATURATION: 95 % | DIASTOLIC BLOOD PRESSURE: 64 MMHG | RESPIRATION RATE: 16 BRPM | HEART RATE: 62 BPM | WEIGHT: 172 LBS | SYSTOLIC BLOOD PRESSURE: 117 MMHG | BODY MASS INDEX: 31.65 KG/M2 | HEIGHT: 62 IN

## 2021-01-27 LAB
ALBUMIN SERPL-MCNC: 2.2 G/DL (ref 3.5–5)
ALBUMIN/GLOB SERPL: 0.5 {RATIO} (ref 1.1–2.2)
ALP SERPL-CCNC: 122 U/L (ref 45–117)
ALT SERPL-CCNC: 42 U/L (ref 12–78)
ANION GAP SERPL CALC-SCNC: 8 MMOL/L (ref 5–15)
AST SERPL W P-5'-P-CCNC: 45 U/L (ref 15–37)
BASOPHILS # BLD: 0 K/UL (ref 0–0.1)
BASOPHILS NFR BLD: 0 % (ref 0–1)
BILIRUB SERPL-MCNC: 0.7 MG/DL (ref 0.2–1)
BUN SERPL-MCNC: 55 MG/DL (ref 6–20)
BUN/CREAT SERPL: 54 (ref 12–20)
CA-I BLD-MCNC: 9.3 MG/DL (ref 8.5–10.1)
CHLORIDE SERPL-SCNC: 101 MMOL/L (ref 97–108)
CO2 SERPL-SCNC: 29 MMOL/L (ref 21–32)
CREAT SERPL-MCNC: 1.01 MG/DL (ref 0.55–1.02)
DIFFERENTIAL METHOD BLD: ABNORMAL
EOSINOPHIL # BLD: 0 K/UL (ref 0–0.4)
EOSINOPHIL NFR BLD: 0 % (ref 0–7)
ERYTHROCYTE [DISTWIDTH] IN BLOOD BY AUTOMATED COUNT: 16.8 % (ref 11.5–14.5)
GLOBULIN SER CALC-MCNC: 4.8 G/DL (ref 2–4)
GLUCOSE BLD STRIP.AUTO-MCNC: 244 MG/DL (ref 65–100)
GLUCOSE BLD STRIP.AUTO-MCNC: 250 MG/DL (ref 65–100)
GLUCOSE BLD STRIP.AUTO-MCNC: 297 MG/DL (ref 65–100)
GLUCOSE BLD STRIP.AUTO-MCNC: 365 MG/DL (ref 65–100)
GLUCOSE SERPL-MCNC: 303 MG/DL (ref 65–100)
HCT VFR BLD AUTO: 39.9 % (ref 35–47)
HGB BLD-MCNC: 13.5 G/DL (ref 11.5–16)
IMM GRANULOCYTES # BLD AUTO: 0.1 K/UL (ref 0–0.04)
IMM GRANULOCYTES NFR BLD AUTO: 0 % (ref 0–0.5)
LYMPHOCYTES # BLD: 0.6 K/UL (ref 0.8–3.5)
LYMPHOCYTES NFR BLD: 4 % (ref 12–49)
MCH RBC QN AUTO: 30.1 PG (ref 26–34)
MCHC RBC AUTO-ENTMCNC: 33.8 G/DL (ref 30–36.5)
MCV RBC AUTO: 89.1 FL (ref 80–99)
MONOCYTES # BLD: 0.7 K/UL (ref 0–1)
MONOCYTES NFR BLD: 5 % (ref 5–13)
NEUTS SEG # BLD: 13.2 K/UL (ref 1.8–8)
NEUTS SEG NFR BLD: 91 % (ref 32–75)
PERFORMED BY, TECHID: ABNORMAL
PLATELET # BLD AUTO: 170 K/UL (ref 150–400)
PMV BLD AUTO: 11.1 FL (ref 8.9–12.9)
POTASSIUM SERPL-SCNC: 4.8 MMOL/L (ref 3.5–5.1)
PROT SERPL-MCNC: 7 G/DL (ref 6.4–8.2)
RBC # BLD AUTO: 4.48 M/UL (ref 3.8–5.2)
SODIUM SERPL-SCNC: 138 MMOL/L (ref 136–145)
WBC # BLD AUTO: 14.5 K/UL (ref 3.6–11)

## 2021-01-27 PROCEDURE — 85025 COMPLETE CBC W/AUTO DIFF WBC: CPT

## 2021-01-27 PROCEDURE — 94762 N-INVAS EAR/PLS OXIMTRY CONT: CPT

## 2021-01-27 PROCEDURE — 36415 COLL VENOUS BLD VENIPUNCTURE: CPT

## 2021-01-27 PROCEDURE — 74011250636 HC RX REV CODE- 250/636: Performed by: PHYSICIAN ASSISTANT

## 2021-01-27 PROCEDURE — 82962 GLUCOSE BLOOD TEST: CPT

## 2021-01-27 PROCEDURE — 97530 THERAPEUTIC ACTIVITIES: CPT

## 2021-01-27 PROCEDURE — 74011250636 HC RX REV CODE- 250/636: Performed by: INTERNAL MEDICINE

## 2021-01-27 PROCEDURE — 74011250637 HC RX REV CODE- 250/637: Performed by: INTERNAL MEDICINE

## 2021-01-27 PROCEDURE — 77010033678 HC OXYGEN DAILY

## 2021-01-27 PROCEDURE — 97165 OT EVAL LOW COMPLEX 30 MIN: CPT

## 2021-01-27 PROCEDURE — 97161 PT EVAL LOW COMPLEX 20 MIN: CPT

## 2021-01-27 PROCEDURE — 80053 COMPREHEN METABOLIC PANEL: CPT

## 2021-01-27 PROCEDURE — 74011636637 HC RX REV CODE- 636/637: Performed by: PHYSICIAN ASSISTANT

## 2021-01-27 RX ORDER — DEXAMETHASONE 6 MG/1
TABLET ORAL
Qty: 5 TAB | Refills: 0 | Status: SHIPPED | OUTPATIENT
Start: 2021-01-27 | End: 2021-02-25

## 2021-01-27 RX ORDER — AZITHROMYCIN 500 MG/1
500 TABLET, FILM COATED ORAL DAILY
Qty: 3 TAB | Refills: 0 | Status: SHIPPED | OUTPATIENT
Start: 2021-01-27 | End: 2021-03-17 | Stop reason: ALTCHOICE

## 2021-01-27 RX ORDER — MELATONIN
2000 DAILY
Qty: 30 TAB | Refills: 0 | Status: SHIPPED
Start: 2021-01-28 | End: 2021-03-17 | Stop reason: ALTCHOICE

## 2021-01-27 RX ORDER — VITAMIN E 1000 UNIT
1000 CAPSULE ORAL DAILY
Qty: 30 TAB | Refills: 0 | Status: SHIPPED
Start: 2021-01-28 | End: 2021-03-17 | Stop reason: ALTCHOICE

## 2021-01-27 RX ORDER — DEXAMETHASONE 4 MG/1
4 TABLET ORAL EVERY 12 HOURS
Status: DISCONTINUED | OUTPATIENT
Start: 2021-01-27 | End: 2021-01-28 | Stop reason: HOSPADM

## 2021-01-27 RX ADMIN — Medication 2 TABLET: at 11:07

## 2021-01-27 RX ADMIN — DEXAMETHASONE SODIUM PHOSPHATE 4 MG: 4 INJECTION, SOLUTION INTRA-ARTICULAR; INTRALESIONAL; INTRAMUSCULAR; INTRAVENOUS; SOFT TISSUE at 06:50

## 2021-01-27 RX ADMIN — AZITHROMYCIN DIHYDRATE 500 MG: 500 INJECTION, POWDER, LYOPHILIZED, FOR SOLUTION INTRAVENOUS at 06:56

## 2021-01-27 RX ADMIN — DEXAMETHASONE 4 MG: 4 TABLET ORAL at 11:07

## 2021-01-27 RX ADMIN — INSULIN LISPRO 4 UNITS: 100 INJECTION, SOLUTION INTRAVENOUS; SUBCUTANEOUS at 12:41

## 2021-01-27 RX ADMIN — SOTALOL HYDROCHLORIDE 40 MG: 80 TABLET ORAL at 11:04

## 2021-01-27 RX ADMIN — LEVOTHYROXINE SODIUM 150 MCG: 75 TABLET ORAL at 08:04

## 2021-01-27 RX ADMIN — ASPIRIN 81 MG: 81 TABLET, COATED ORAL at 11:07

## 2021-01-27 RX ADMIN — PANTOPRAZOLE SODIUM 40 MG: 40 TABLET, DELAYED RELEASE ORAL at 11:06

## 2021-01-27 RX ADMIN — OXYCODONE HYDROCHLORIDE AND ACETAMINOPHEN 1000 MG: 500 TABLET ORAL at 11:07

## 2021-01-27 RX ADMIN — METFORMIN HYDROCHLORIDE 500 MG: 500 TABLET ORAL at 17:31

## 2021-01-27 RX ADMIN — POTASSIUM CHLORIDE 20 MEQ: 750 TABLET, FILM COATED, EXTENDED RELEASE ORAL at 11:06

## 2021-01-27 RX ADMIN — GABAPENTIN 100 MG: 100 CAPSULE ORAL at 11:14

## 2021-01-27 RX ADMIN — METFORMIN HYDROCHLORIDE 500 MG: 500 TABLET ORAL at 08:04

## 2021-01-27 RX ADMIN — GABAPENTIN 100 MG: 100 CAPSULE ORAL at 17:31

## 2021-01-27 RX ADMIN — MAGNESIUM OXIDE TAB 400 MG (241.3 MG ELEMENTAL MG) 400 MG: 400 (241.3 MG) TAB at 09:00

## 2021-01-27 RX ADMIN — INSULIN LISPRO 7 UNITS: 100 INJECTION, SOLUTION INTRAVENOUS; SUBCUTANEOUS at 08:04

## 2021-01-27 RX ADMIN — MAGNESIUM OXIDE TAB 400 MG (241.3 MG ELEMENTAL MG) 400 MG: 400 (241.3 MG) TAB at 17:31

## 2021-01-27 RX ADMIN — INSULIN LISPRO 7 UNITS: 100 INJECTION, SOLUTION INTRAVENOUS; SUBCUTANEOUS at 17:31

## 2021-01-27 RX ADMIN — TICAGRELOR 90 MG: 90 TABLET ORAL at 11:07

## 2021-01-27 RX ADMIN — ZINC SULFATE 220 MG (50 MG) CAPSULE 1 CAPSULE: CAPSULE at 11:07

## 2021-01-27 NOTE — PROGRESS NOTES
Discharge instructions reviewed with patient voice understanding. Expecting ride  About 6:30 to 6:45. Portable oxygen delivered.

## 2021-01-27 NOTE — PROGRESS NOTES
Spo2 on room air at rest 95%.   Spo2 on room air with ambulation 85%  Spo2 on oxygen at 2 lpm nc with ambulation 95%

## 2021-01-27 NOTE — PROGRESS NOTES
Bedside shift change report given to [facility] (oncoming nurse) by Vincent Shaw LPN (offgoing nurse). Report included the following information SBAR.

## 2021-01-27 NOTE — PROGRESS NOTES
Comprehensive Nutrition Assessment    Type and Reason for Visit: RD nutrition re-screen/LOS    Nutrition Recommendations/Plan:   Continue Diabetic Consistent Carb, regular diet  Honor pt preferences within diet restriction  Adjust insulin regimen for goal of euglycemia  Nursing to document % p.o. meal and snack intake, BMs in I/Os      Nutrition Assessment:  Admitted for hypoxia, elevated troponin, COVID-19 positive; pulmonary following, cardiology following. Per EMR pt eating 75% of trays. Diet order - Diabetic Consistent Carb, regular. O2 2L via nasal cannula. Labs and meds reviewed. Malnutrition Assessment:  Malnutrition Status:  No malnutrition      Nutrition Related Findings:  NFPE not performed 2/2 isolation precautions; nutrition information obtained from EMR and from nsg via phone (). No chewing or swallowing difficulties noted. No n/v. Last BM 1/21, loose. Nursing advised, will follow up with pt. No edema noted in EMR. Wounds:    None       Current Nutrition Therapies:  DIET DIABETIC CONSISTENT CARB Regular    Anthropometric Measures:  · Height:  5' 2\" (157.5 cm)  · Current Body Wt:  78 kg (171 lb 15.3 oz)   · Ideal Body Wt:  110 lbs:  156.3 %   · BMI Category:  Obese class 1 (BMI 30.0-34. 9)       Nutrition Diagnosis:   No nutrition diagnosis at this time     Nutrition Interventions:   Food and/or Nutrient Delivery: Continue current diet  Nutrition Education and Counseling: No recommendations at this time  Coordination of Nutrition Care: Continue to monitor while inpatient    Nutrition Monitoring and Evaluation:   Behavioral-Environmental Outcomes: None identified  Food/Nutrient Intake Outcomes: Food and nutrient intake  Physical Signs/Symptoms Outcomes: Weight    Discharge Planning:    No discharge needs at this time     Electronically signed by Nnamdi Valdes RD on 1/27/2021 at 11:07 AM    Contact:

## 2021-01-27 NOTE — PROGRESS NOTES
Pulmonary  Progress Note      NAME: Krista Renner   :  1950  MRM:  [id number]    Date/Time: 2021  9:05 AM         Subjective:     I was asked by Timothy Jj MD to see Krista Renner  a 65 y.o.    female in consultation      Excerpts from admission 2021 or consult notes as follows:   18-year-old lady came in because of shortness of breath and dyspnea she has significant past medical history of congestive heart failure ejection fraction about 45% history of atrial fibrillation status post ICD coronary artery disease diabetes hypertension she was having shortness of breath for the past couple of days seen by cardiologist but her condition got worse came to the hospital and getting treatment for congestive heart failure saturation was low in 80s now her COVID-19 test came back positive chest x-ray shows bilateral groundglass opacities confirmed with CAT scan of the chest so pulmonary consult was called for further evaluation.      worsening hypoxia now 100% nonrebreather she remains short of breath mild cough mostly complains about constipation        Allergies   Allergen Reactions    Codeine Other (comments)       Patient states she is not allergic    Novocain [Procaine] Nausea and Vomiting    Tramadol Other (comments)       Headache         MAR reviewed and pertinent medications noted or modified as needed          Current Facility-Administered Medications   Medication    dexamethasone (DECADRON) 4 mg/mL injection 4 mg    aspirin delayed-release tablet 81 mg    atorvastatin (LIPITOR) tablet 40 mg    gabapentin (NEURONTIN) capsule 100 mg    insulin lispro protamine/insulin lispro (HUMALOG MIX 75/25) injection 24 Units    levothyroxine (SYNTHROID) tablet 150 mcg    magnesium oxide (MAG-OX) tablet 400 mg    melatonin tablet 3 mg    pantoprazole (PROTONIX) tablet 40 mg    potassium chloride SR (KLOR-CON 10) tablet 20 mEq    polyethylene glycol (MIRALAX) packet 17 g  sotaloL (BETAPACE) tablet 40 mg    azithromycin (ZITHROMAX) 500 mg in 0.9% sodium chloride 250 mL (VIAL-MATE)    ticagrelor (BRILINTA) tablet 90 mg    remdesivir 100 mg in 0.9% sodium chloride 250 mL IVPB    cholecalciferol (VITAMIN D3) (1000 Units /25 mcg) tablet 2 Tab    ascorbic acid (vitamin C) (VITAMIN C) tablet 1,000 mg    zinc sulfate (ZINCATE) 220 (50) mg capsule 1 Cap    metFORMIN ER (GLUCOPHAGE XR) tablet 750 mg (Patient Supplied)    ondansetron (ZOFRAN) injection 4 mg    acetaminophen (TYLENOL) tablet 650 mg    docusate sodium (COLACE) capsule 100 mg    insulin lispro (HUMALOG) injection    glucose chewable tablet 16 g    dextrose (D50W) injection syrg 12.5-25 g    glucagon (GLUCAGEN) injection 1 mg      Patient PCP: Gregor Barreto MD  PM[facility]:  has a past medical history of Acid indigestion, Aneurysm ([address] Anxiety disorder, Arthritis, Asthma, Back pain, Bladder spasms, Cerebral artery occlusion with cerebral infarction ([address] (08/2020), Chest pain, Constipation, Cough, Diabetes mellitus, Diarrhea, Fatigue, Fibromyalgia, Frequent episodic tension-type headache, Hearing reduced, Heart failure ([address] Hemorrhoids, Hernia of unspecified site of abdominal cavity without mention of obstruction or gangrene, HTN (hypertension), Hypothyroidism, ICD (implantable cardioverter-defibrillator) in place, Muscle pain, N&V (nausea and vomiting), Pacemaker, Ringing in ears, Skin cancer, SOB (shortness of breath), SOB (shortness of breath), Swallowing difficulty, Thyroid disorder, Vertigo, and Wears dentures. PSH:   has a past surgical history that includes pr removal gallbladder; hx thyroidectomy; hx hysterectomy; hx carpal tunnel release; hx heart catheterization (2/6/16); hx pacemaker (Left, 6/20/16); hx gi; and hx orthopaedic. FHX: family history includes Breast Cancer in her sister; Cancer in her sister; Diabetes in her brother, mother, and sister;  Heart Disease in her father, mother, and sister; Hypertension in her sister; Lupus in her sister. SHX:  reports that she has never smoked. She has never used smokeless tobacco. She reports current alcohol use. She reports that she does not use drugs. ROS:     Review of Systems   Constitutional: Positive for malaise/fatigue. HENT: Negative. Eyes: Negative. Respiratory: Positive for cough, sputum production and shortness of breath. Cardiovascular: Positive for orthopnea. Gastrointestinal: Negative. Genitourinary: Negative. Musculoskeletal: Negative. Neurological: Negative. Endo/Heme/Allergies: Negative. Psychiatric/Behavioral: Negative. Objective:       Vitals:      Last 24hrs VS reviewed since prior progress note. Most recent are:    Visit Vitals  BP (!) 100/54 (BP 1 Location: Left arm)   Pulse 60   Temp 97.5 °F (36.4 °C)   Resp 16   Ht 5' 2\" (1.575 m)   Wt 78 kg (172 lb)   SpO2 95%   Breastfeeding No   BMI 31.46 kg/m²     SpO2 Readings from Last 6 Encounters:   01/27/21 95%   11/20/20 94%   11/11/20 97%   10/19/20 97%   09/29/20 94%   09/21/20 98%    O2 Flow Rate (L/min): 2 l/min(decreased o2 to 1 lpm nc)       Intake/Output Summary (Last 24 hours) at 1/27/2021 1010  Last data filed at 1/26/2021 1949  Gross per 24 hour   Intake 700 ml   Output 1790 ml   Net -1090 ml          Exam:     Physical Exam   Constitutional: She is oriented to person, place, and time. She appears well-developed and well-nourished. She appears distressed. HENT:   Head: Normocephalic and atraumatic. Eyes: Pupils are equal, round, and reactive to light. Conjunctivae are normal.   Neck: Normal range of motion. Neck supple. Cardiovascular: Normal rate and regular rhythm. Pulmonary/Chest: She is in respiratory distress. Has rales posteriorly no wheezing   Abdominal: Soft. Bowel sounds are normal.   Musculoskeletal: Normal range of motion. General: No edema. Neurological: She is alert and oriented to person, place, and time. Skin: Skin is warm and dry.             Medications:  Current Facility-Administered Medications   Medication Dose Route Frequency    dexAMETHasone (DECADRON) tablet 4 mg  4 mg Oral Q12H    metFORMIN (GLUCOPHAGE) tablet 500 mg  500 mg Oral BID WITH MEALS    dextrose (D50W) injection syrg 12.5-25 g  25-50 mL IntraVENous PRN    insulin lispro (HUMALOG) injection   SubCUTAneous AC&HS    insulin glargine (LANTUS) injection 35 Units  35 Units SubCUTAneous QHS    aspirin delayed-release tablet 81 mg  81 mg Oral DAILY    atorvastatin (LIPITOR) tablet 40 mg  40 mg Oral DAILY    gabapentin (NEURONTIN) capsule 100 mg  100 mg Oral TID    levothyroxine (SYNTHROID) tablet 150 mcg  150 mcg Oral ACB    magnesium oxide (MAG-OX) tablet 400 mg  400 mg Oral TID    melatonin tablet 3 mg  3 mg Oral QHS PRN    pantoprazole (PROTONIX) tablet 40 mg  40 mg Oral DAILY    potassium chloride SR (KLOR-CON 10) tablet 20 mEq  20 mEq Oral DAILY    polyethylene glycol (MIRALAX) packet 17 g  17 g Oral PRN    sotaloL (BETAPACE) tablet 40 mg  40 mg Oral DAILY    azithromycin (ZITHROMAX) 500 mg in 0.9% sodium chloride 250 mL (VIAL-MATE)  500 mg IntraVENous Q24H    ticagrelor (BRILINTA) tablet 90 mg  90 mg Oral Q12H    cholecalciferol (VITAMIN D3) (1000 Units /25 mcg) tablet 2 Tab  2,000 Units Oral DAILY    ascorbic acid (vitamin C) (VITAMIN C) tablet 1,000 mg  1,000 mg Oral DAILY    zinc sulfate (ZINCATE) 220 (50) mg capsule 1 Cap  1 Cap Oral DAILY    ondansetron (ZOFRAN) injection 4 mg  4 mg IntraVENous Q6H PRN    acetaminophen (TYLENOL) tablet 650 mg  650 mg Oral Q6H PRN    docusate sodium (COLACE) capsule 100 mg  100 mg Oral PRN    glucose chewable tablet 16 g  4 Tab Oral PRN    dextrose (D50W) injection syrg 12.5-25 g  25-50 mL IntraVENous PRN    glucagon (GLUCAGEN) injection 1 mg  1 mg IntraMUSCular PRN       ______________________________________________________________________      Lab Review:     Recent Labs 01/25/21  1000   WBC 12.3*   HGB 12.4   HCT 38.3   *     Recent Labs     01/25/21  1000      K 4.2      CO2 30   *   BUN 59*   CREA 0.96   CA 8.7   PHOS 4.1   ALB 2.0*     No components found for: GLPOC  No results for input(s): PH, PCO2, PO2, HCO3, FIO2 in the last 72 hours. No results for input(s): INR, INREXT, INREXT, INREXT in the last 72 hours. IMPRESSION:   1. Acute hypoxic respiratory failure now on nasal cannula  2. COVID-19 pneumonia currently on remdesivir and Decadron to every 6 hours dosing and recieved Actemra   3. congestive heart failure  4. Paroxysmal atrial fibrillation  5. History of AICD coronary artery disease diabetes mellitus GERD with Rosario's esophagus       RECOMMENDATIONS/PLAN:      1. Now she is on 2 L nasal cannula we will continue to wean   2. She is on remdesivir Zithromax Decadron and she recieved ivermectin and Actemra  3. We will continue to monitor renal function after giving IV fluid creatinine improved  4. Received fluid challenge her ejection fraction is about 45% history of paroxysmal A. fib and AICD in place we will give one dose of Lasix labs in am  5. Agree with Empiric IV antibiotics pending culture results   6.  CXR still shows persistent changes consistent with COVID-19 and CHF

## 2021-01-27 NOTE — PROGRESS NOTES
Visit attempted for patient in  [facility] unit during rounds. Per current COVID-19 isolation protocol in effect, I provided silent support and prayer outside patient room. There were no family members present. Chaplains will follow up if further referrals are requested. Chaplain Tiburcio Ricci M.Div.    can be reached by calling the  at [facility]  [phone]

## 2021-01-27 NOTE — PROGRESS NOTES
Medical Progress Note      NAME: Mercedes Tavarez   :  1950  MRM:  [id number]    Date/Time: 2021  9:05 AM         Subjective:     I was asked by Jessica Fontenot MD to see Mercedes Tavarez  a 65 y.o.    female in consultation      Excerpts from admission 2021 or consult notes as follows:   64-year-old lady came in because of shortness of breath and dyspnea she has significant past medical history of congestive heart failure ejection fraction about 45% history of atrial fibrillation status post ICD coronary artery disease diabetes hypertension she was having shortness of breath for the past couple of days seen by cardiologist but her condition got worse came to the hospital and getting treatment for congestive heart failure saturation was low in 80s now her COVID-19 test came back positive chest x-ray shows bilateral groundglass opacities confirmed with CAT scan of the chest so pulmonary consult was called for further evaluation.      worsening hypoxia now 100% nonrebreather she remains short of breath mild cough mostly complains about constipation        Allergies   Allergen Reactions    Codeine Other (comments)       Patient states she is not allergic    Novocain [Procaine] Nausea and Vomiting    Tramadol Other (comments)       Headache         MAR reviewed and pertinent medications noted or modified as needed          Current Facility-Administered Medications   Medication    dexamethasone (DECADRON) 4 mg/mL injection 4 mg    aspirin delayed-release tablet 81 mg    atorvastatin (LIPITOR) tablet 40 mg    gabapentin (NEURONTIN) capsule 100 mg    insulin lispro protamine/insulin lispro (HUMALOG MIX 75/25) injection 24 Units    levothyroxine (SYNTHROID) tablet 150 mcg    magnesium oxide (MAG-OX) tablet 400 mg    melatonin tablet 3 mg    pantoprazole (PROTONIX) tablet 40 mg    potassium chloride SR (KLOR-CON 10) tablet 20 mEq    polyethylene glycol (MIRALAX) packet 17 g    sotaloL (BETAPACE) tablet 40 mg    azithromycin (ZITHROMAX) 500 mg in 0.9% sodium chloride 250 mL (VIAL-MATE)    ticagrelor (BRILINTA) tablet 90 mg    remdesivir 100 mg in 0.9% sodium chloride 250 mL IVPB    cholecalciferol (VITAMIN D3) (1000 Units /25 mcg) tablet 2 Tab    ascorbic acid (vitamin C) (VITAMIN C) tablet 1,000 mg    zinc sulfate (ZINCATE) 220 (50) mg capsule 1 Cap    metFORMIN ER (GLUCOPHAGE XR) tablet 750 mg (Patient Supplied)    ondansetron (ZOFRAN) injection 4 mg    acetaminophen (TYLENOL) tablet 650 mg    docusate sodium (COLACE) capsule 100 mg    insulin lispro (HUMALOG) injection    glucose chewable tablet 16 g    dextrose (D50W) injection syrg 12.5-25 g    glucagon (GLUCAGEN) injection 1 mg      Patient PCP: Sommer Roberson MD  PMH:  has a past medical history of Acid indigestion, Aneurysm ([address] Anxiety disorder, Arthritis, Asthma, Back pain, Bladder spasms, Cerebral artery occlusion with cerebral infarction ([address] (08/2020), Chest pain, Constipation, Cough, Diabetes mellitus, Diarrhea, Fatigue, Fibromyalgia, Frequent episodic tension-type headache, Hearing reduced, Heart failure ([address] Hemorrhoids, Hernia of unspecified site of abdominal cavity without mention of obstruction or gangrene, HTN (hypertension), Hypothyroidism, ICD (implantable cardioverter-defibrillator) in place, Muscle pain, N&V (nausea and vomiting), Pacemaker, Ringing in ears, Skin cancer, SOB (shortness of breath), SOB (shortness of breath), Swallowing difficulty, Thyroid disorder, Vertigo, and Wears dentures. PSH:   has a past surgical history that includes pr removal gallbladder; hx thyroidectomy; hx hysterectomy; hx carpal tunnel release; hx heart catheterization (2/6/16); hx pacemaker (Left, 6/20/16); hx gi; and hx orthopaedic. FHX: family history includes Breast Cancer in her sister; Cancer in her sister; Diabetes in her brother, mother, and sister;  Heart Disease in her father, mother, and sister; Hypertension in her sister; Lupus in her sister. SHX:  reports that she has never smoked. She has never used smokeless tobacco. She reports current alcohol use. She reports that she does not use drugs. ROS:     Review of Systems   Constitutional: Positive for malaise/fatigue. HENT: Negative. Eyes: Negative. Respiratory: Positive for cough, sputum production and shortness of breath. Cardiovascular: Positive for orthopnea. Gastrointestinal: Negative. Genitourinary: Negative. Musculoskeletal: Negative. Neurological: Negative. Endo/Heme/Allergies: Negative. Psychiatric/Behavioral: Negative. Objective:       Vitals:      Last 24hrs VS reviewed since prior progress note. Most recent are:    Visit Vitals  BP (!) 100/54 (BP 1 Location: Left arm)   Pulse 60   Temp 97.5 °F (36.4 °C)   Resp 16   Ht 5' 2\" (1.575 m)   Wt 172 lb (78 kg)   SpO2 95%   Breastfeeding No   BMI 31.46 kg/m²     SpO2 Readings from Last 6 Encounters:   01/27/21 95%   11/20/20 94%   11/11/20 97%   10/19/20 97%   09/29/20 94%   09/21/20 98%    O2 Flow Rate (L/min): 2 l/min(decreased o2 to 1 lpm nc)       Intake/Output Summary (Last 24 hours) at 1/27/2021 1051  Last data filed at 1/26/2021 1949  Gross per 24 hour   Intake 700 ml   Output 1790 ml   Net -1090 ml          Exam:     Physical Exam   Constitutional: She is oriented to person, place, and time. She appears well-developed and well-nourished. She appears distressed. HENT:   Head: Normocephalic and atraumatic. Eyes: Pupils are equal, round, and reactive to light. Conjunctivae are normal.   Neck: Normal range of motion. Neck supple. Cardiovascular: Normal rate and regular rhythm. Pulmonary/Chest: She is in respiratory distress. Has rales posteriorly no wheezing   Abdominal: Soft. Bowel sounds are normal.   Musculoskeletal: Normal range of motion. General: No edema. Neurological: She is alert and oriented to person, place, and time.    Skin: Skin is warm and dry.             Medications:  Current Facility-Administered Medications   Medication Dose Route Frequency    dexAMETHasone (DECADRON) tablet 4 mg  4 mg Oral Q12H    metFORMIN (GLUCOPHAGE) tablet 500 mg  500 mg Oral BID WITH MEALS    dextrose (D50W) injection syrg 12.5-25 g  25-50 mL IntraVENous PRN    insulin lispro (HUMALOG) injection   SubCUTAneous AC&HS    insulin glargine (LANTUS) injection 35 Units  35 Units SubCUTAneous QHS    aspirin delayed-release tablet 81 mg  81 mg Oral DAILY    atorvastatin (LIPITOR) tablet 40 mg  40 mg Oral DAILY    gabapentin (NEURONTIN) capsule 100 mg  100 mg Oral TID    levothyroxine (SYNTHROID) tablet 150 mcg  150 mcg Oral ACB    magnesium oxide (MAG-OX) tablet 400 mg  400 mg Oral TID    melatonin tablet 3 mg  3 mg Oral QHS PRN    pantoprazole (PROTONIX) tablet 40 mg  40 mg Oral DAILY    potassium chloride SR (KLOR-CON 10) tablet 20 mEq  20 mEq Oral DAILY    polyethylene glycol (MIRALAX) packet 17 g  17 g Oral PRN    sotaloL (BETAPACE) tablet 40 mg  40 mg Oral DAILY    azithromycin (ZITHROMAX) 500 mg in 0.9% sodium chloride 250 mL (VIAL-MATE)  500 mg IntraVENous Q24H    ticagrelor (BRILINTA) tablet 90 mg  90 mg Oral Q12H    cholecalciferol (VITAMIN D3) (1000 Units /25 mcg) tablet 2 Tab  2,000 Units Oral DAILY    ascorbic acid (vitamin C) (VITAMIN C) tablet 1,000 mg  1,000 mg Oral DAILY    zinc sulfate (ZINCATE) 220 (50) mg capsule 1 Cap  1 Cap Oral DAILY    ondansetron (ZOFRAN) injection 4 mg  4 mg IntraVENous Q6H PRN    acetaminophen (TYLENOL) tablet 650 mg  650 mg Oral Q6H PRN    docusate sodium (COLACE) capsule 100 mg  100 mg Oral PRN    glucose chewable tablet 16 g  4 Tab Oral PRN    dextrose (D50W) injection syrg 12.5-25 g  25-50 mL IntraVENous PRN    glucagon (GLUCAGEN) injection 1 mg  1 mg IntraMUSCular PRN       ______________________________________________________________________      Lab Review:     Recent Labs     01/25/21  1000 WBC 12.3*   HGB 12.4   HCT 38.3   *     Recent Labs     01/25/21  1000      K 4.2      CO2 30   *   BUN 59*   CREA 0.96   CA 8.7   PHOS 4.1   ALB 2.0*     No components found for: GLPOC  No results for input(s): PH, PCO2, PO2, HCO3, FIO2 in the last 72 hours. No results for input(s): INR, INREXT, INREXT in the last 72 hours. IMPRESSION:   1. Acute hypoxic respiratory failure now on nasal cannula  2. COVID-19 pneumonia currently on remdesivir will change Decadron to every 6 hours dosing and recieved Actemra   3. congestive heart failure  4. Paroxysmal atrial fibrillation  5. History of AICD coronary artery disease diabetes mellitus GERD with Rosario's esophagus       RECOMMENDATIONS/PLAN:      1. Now she is on 2 L nasal cannula we will continue to wean   2. She is on remdesivir Zithromax Decadron and she recieved ivermectin and Actemra  3. We will continue to monitor renal function after giving IV fluid creatinine improved  4. Received fluid challenge her ejection fraction is about 45% history of paroxysmal A. fib and AICD in place we will give one dose of Lasix labs in am  5. Agree with Empiric IV antibiotics pending culture results   6.  CXR still shows persistent changes consistent with COVID-19 and CHF

## 2021-01-27 NOTE — PROGRESS NOTES
Hospitalist Progress Note               Daily Progress Note: 1/27/2021      Subjective: The patient is seen for follow up. She is doing about the same overall.        Oxygen has been normal weaned down to 1 L    Complaining about her diet which apparently is cardiac instead of ADA    Problem List:  Problem List as of 1/27/2021 Date Reviewed: 10/21/2020          Codes Class Noted - Resolved    Biventricular ICD (implantable cardioverter-defibrillator) in place ICD-10-CM: Z95.810  ICD-9-CM: V45.02  1/26/2018 - Present        Hypoxia ICD-10-CM: R09.02  ICD-9-CM: 799.02  1/21/2021 - Present        Elevated troponin ICD-10-CM: R77.8  ICD-9-CM: 790.6  1/21/2021 - Present        Atrial fibrillation ([facility] ICD-10-CM: I48.91  ICD-9-CM: 427.31  11/16/2020 - Present        Acute on chronic congestive heart failure ([facility] ICD-10-CM: I50.9  ICD-9-CM: 428.0  11/16/2020 - Present        Hypomagnesemia ICD-10-CM: E83.42  ICD-9-CM: 275.2  9/28/2020 - Present        Hyperkalemia ICD-10-CM: E87.5  ICD-9-CM: 276.7  9/26/2020 - Present        CHF (congestive heart failure) ([facility] ICD-10-CM: I50.9  ICD-9-CM: 428.0  9/25/2020 - Present        CHF exacerbation ([facility] ICD-10-CM: I50.9  ICD-9-CM: 428.0  9/22/2020 - Present        Severe obesity ([facility] ICD-10-CM: E66.01  ICD-9-CM: 278.01  9/2/2020 - Present        Dysarthria ICD-10-CM: R47.1  ICD-9-CM: 784.51  8/11/2020 - Present        TIA (transient ischemic attack) ICD-10-CM: G45.9  ICD-9-CM: 435.9  8/10/2020 - Present        Cardiomyopathy ([facility] ICD-10-CM: I42.9  ICD-9-CM: 425.4  5/23/2016 - Present        Skin cancer ICD-10-CM: C44.90  ICD-9-CM: 173.90  Unknown - Present        Acid indigestion ICD-10-CM: K30  ICD-9-CM: 536.8  Unknown - Present        Asthma ICD-10-CM: 493  ICD-9-CM: 615  Unknown - Present        Back pain ICD-10-CM: M54.9  ICD-9-CM: 724.5  Unknown - Present        Wears dentures ICD-10-CM: Z97.2  ICD-9-CM: V45.84  Unknown - Present        Constipation ICD-10-CM: 564.0  ICD-9-CM: 564.0  Unknown - Present        Diabetes mellitus ICD-10-CM: 250  ICD-9-CM: 250  Unknown - Present        Diarrhea ICD-10-CM: R19.7  ICD-9-CM: 787.91  Unknown - Present        Frequent episodic tension-type headache ICD-10-CM: D40.283  ICD-9-CM: 339.11  Unknown - Present        Hemorrhoids ICD-10-CM: 562  ICD-9-CM: 716  Unknown - Present        Hernia of unspecified site of abdominal cavity without mention of obstruction or gangrene ICD-10-CM: K46.9  ICD-9-CM: 553.9  Unknown - Present        HTN (hypertension) ICD-10-CM: I10  ICD-9-CM: 401.9  Unknown - Present        Muscle pain ICD-10-CM: M79.10  ICD-9-CM: 729.1  Unknown - Present        SOB (shortness of breath) ICD-10-CM: R06.02  ICD-9-CM: 786.05  Unknown - Present        Thyroid disorder ICD-10-CM: E07.9  ICD-9-CM: 246. 9  Unknown - Present              Medications reviewed  Current Facility-Administered Medications   Medication Dose Route Frequency    metFORMIN (GLUCOPHAGE) tablet 500 mg  500 mg Oral BID WITH MEALS    dextrose (D50W) injection syrg 12.5-25 g  25-50 mL IntraVENous PRN    insulin lispro (HUMALOG) injection   SubCUTAneous AC&HS    insulin glargine (LANTUS) injection 35 Units  35 Units SubCUTAneous QHS    dexamethasone (DECADRON) 4 mg/mL injection 4 mg  4 mg IntraVENous Q6H    aspirin delayed-release tablet 81 mg  81 mg Oral DAILY    atorvastatin (LIPITOR) tablet 40 mg  40 mg Oral DAILY    gabapentin (NEURONTIN) capsule 100 mg  100 mg Oral TID    levothyroxine (SYNTHROID) tablet 150 mcg  150 mcg Oral ACB    magnesium oxide (MAG-OX) tablet 400 mg  400 mg Oral TID    melatonin tablet 3 mg  3 mg Oral QHS PRN    pantoprazole (PROTONIX) tablet 40 mg  40 mg Oral DAILY    potassium chloride SR (KLOR-CON 10) tablet 20 mEq  20 mEq Oral DAILY    polyethylene glycol (MIRALAX) packet 17 g  17 g Oral PRN    sotaloL (BETAPACE) tablet 40 mg  40 mg Oral DAILY    azithromycin (ZITHROMAX) 500 mg in 0.9% sodium chloride 250 mL (VIAL-MATE)  500 mg IntraVENous Q24H    ticagrelor (BRILINTA) tablet 90 mg  90 mg Oral Q12H    cholecalciferol (VITAMIN D3) (1000 Units /25 mcg) tablet 2 Tab  2,000 Units Oral DAILY    ascorbic acid (vitamin C) (VITAMIN C) tablet 1,000 mg  1,000 mg Oral DAILY    zinc sulfate (ZINCATE) 220 (50) mg capsule 1 Cap  1 Cap Oral DAILY    ondansetron (ZOFRAN) injection 4 mg  4 mg IntraVENous Q6H PRN    acetaminophen (TYLENOL) tablet 650 mg  650 mg Oral Q6H PRN    docusate sodium (COLACE) capsule 100 mg  100 mg Oral PRN    glucose chewable tablet 16 g  4 Tab Oral PRN    dextrose (D50W) injection syrg 12.5-25 g  25-50 mL IntraVENous PRN    glucagon (GLUCAGEN) injection 1 mg  1 mg IntraMUSCular PRN       Review of Systems:   A comprehensive review of systems was negative except for that written in the HPI. Objective:   Physical Exam:     Visit Vitals  BP (!) 100/54 (BP 1 Location: Left arm)   Pulse 60   Temp 97.5 °F (36.4 °C)   Resp 16   Ht 5' 2\" (1.575 m)   Wt 78 kg (172 lb)   SpO2 95%   Breastfeeding No   BMI 31.46 kg/m²    O2 Flow Rate (L/min): 2 l/min(decreased o2 to 1 lpm nc) O2 Device: Nasal cannula    Temp (24hrs), Av.4 °F (36.3 °C), Min:97.4 °F (36.3 °C), Max:97.5 °F (36.4 °C)    No intake/output data recorded.  1901 -  0700  In: 700 [P.O.:700]  Out: 2290 [Urine:2290]    General:   Awake and alert   Lungs:    crackles at bases   Chest wall:  No tenderness or deformity. Heart:  Regular rate and rhythm, S1, S2 normal, no murmur, click, rub or gallop. Abdomen:   Soft, non-tender. Bowel sounds normal. No masses,  No organomegaly. Extremities: Extremities normal, atraumatic, no cyanosis or edema. Pulses: 2+ and symmetric all extremities. Skin: Skin color, texture, turgor normal. No rashes or lesions   Neurologic: CNII-XII intact.   No gross focal deficits         Data Review:       Recent Days:  Recent Labs     21  1000   WBC 12.3*   HGB 12.4   HCT 38.3   *     Recent Labs 01/25/21  1000      K 4.2      CO2 30   *   BUN 59*   CREA 0.96   CA 8.7   PHOS 4.1   ALB 2.0*     No results for input(s): PH, PCO2, PO2, HCO3, FIO2 in the last 72 hours. 24 Hour Results:  Recent Results (from the past 24 hour(s))   GLUCOSE, POC    Collection Time: 01/26/21  8:56 AM   Result Value Ref Range    Glucose (POC) 248 (H) 65 - 100 mg/dL    Performed by Pablo Moreno    GLUCOSE, POC    Collection Time: 01/26/21 11:21 AM   Result Value Ref Range    Glucose (POC) 294 (H) 65 - 100 mg/dL    Performed by Pablo Moreno    GLUCOSE, POC    Collection Time: 01/26/21  5:05 PM   Result Value Ref Range    Glucose (POC) 280 (H) 65 - 100 mg/dL    Performed by Shelia Sally, POC    Collection Time: 01/26/21  9:27 PM   Result Value Ref Range    Glucose (POC) 382 (H) 65 - 100 mg/dL    Performed by Shelia Sally, POC    Collection Time: 01/27/21 12:42 AM   Result Value Ref Range    Glucose (POC) 365 (H) 65 - 100 mg/dL    Performed by Albaro Jamison    GLUCOSE, POC    Collection Time: 01/27/21  7:13 AM   Result Value Ref Range    Glucose (POC) 250 (H) 65 - 100 mg/dL    Performed by CHELSIE RINCON        XR CHEST PORT   Final Result   Saturday increased bilateral airspace opacities. CT CHEST WO CONT   Final Result   Coarse peripheral reticular markings along with groundglass opacity. Small mediastinal lymph nodes. Findings would fit with inflammatory change   involving the lungs. No pleural or pericardial effusion. Thoracoabdominal aorta atherosclerosis. CAD. XR CHEST SNGL V   Final Result           Assessment:  COVID-19 with pneumonitis    Acute respiratory failure with hypoxia improving    Acute on chronic systolic heart failure, EF 45%.   Improved     Elevated troponin, likely due to hypoxia/CHF     Coronary artery disease with previous PCI     Paroxysmal atrial fibrillation     AICD in situ     Hypertension     Diabetes mellitus type 2     GERD with history of Rosario's esophagus    Plan:  Change to oral Decadron  Home oxygen assessment  PT and OT evaluations  Possible discharge today or within 24 hours      Care Plan discussed with: Patient/Family    Total time spent with patient: 30 minutes.     Go Zhang MD

## 2021-01-27 NOTE — PROGRESS NOTES
0530- Patient transferred to [facility] via bed. Assumed care from nurse on [place] [id number]. Patient alert and oriented x4, 0600 meds given. Patient resting comfortably at this time.

## 2021-01-27 NOTE — PROGRESS NOTES
CARDIOLOGY PROGRESS NOTE  Patient was seen and examined. Patient was working with PT this am, desaturated into the 80s with exertion. Increased O2 to 3L NC. She offers no new cardiac complaints. Telemetry reviewed. No events overnight. AV-paced at [de-identified]. .      PHYSICAL EXAMINATION:      Visit Vitals  BP (!) 100/54 (BP 1 Location: Left arm)   Pulse 60   Temp 97.5 °F (36.4 °C)   Resp 16   Ht 5' 2\" (1.575 m)   Wt 78 kg (172 lb)   SpO2 95%   Breastfeeding No   BMI 31.46 kg/m²     General:  Alert, cooperative, no distress. Head:  Normocephalic, without obvious abnormality, atraumatic. Lungs:   Dimished bilaterally no wheezing. Rales in the bases. Chest wall:  No tenderness or deformity. Heart:  Regular rate and rhythm, S1, S2 normal, no murmur, click, rub, or gallop. Abdomen:   Soft, non-tender. Bowel sounds normal. No masses. No organomegaly. Extremities: Extremities normal, atraumatic, no cyanosis or edema. Pulses: 2+ and symmetric all extremities. Skin: Skin color, texture, turgor normal. No rashes or lesions. Lymph nodes: Cervical, supraclavicular, and axillary nodes normal.   Neurologic: Awake and alert         Recent labs results and imaging reviewed. Discussed case with Dr. Angelina Atkins  and our impression and recommendations are as follows:    1. Coronary artery disease: s/p stenting proximal LAD 9/10. Continue DAPT. Further ischemic workup as an outpatient. cardiac cath as an outpatient or if COVID status improves. We will continue monitor. 2. Cardiomyopathy:  Recent echo 11/16/2020 showing EF 45-50%. Continue daily weights, strict I&Os, and 1500 ml fluid restrictions. Would avoid volume overload. Continue telemetry monitoring. Please keep serum potassium between 4-5 and serum magnesium > 2.     3. Atrial tachycardia:  AV- paced. Most recent interrogation showed no arrhythmias. Continue sotalol and continue to monitor.     4.  Hypertension: Blood pressure appears below goal. Will continue to monitor. Continue current medications. 5. Hyperlipidemia: Continue statin therapy. 6. COVID pneumonia: Treatment per pulmonary and primary, requiring supplemental O2 2L NC. Continues to desaturate with minimal exertion. Please do not hesitate to call me or Dr. Elizabeth Lauren if additional questions arise.

## 2021-01-27 NOTE — DISCHARGE INSTRUCTIONS
Patient Education        Learning About Hypoxemia  What is hypoxemia? Hypoxemia means that you don't have enough oxygen in your blood. It's a result of diseases that affect your heart or lungs. These include heart failure, COPD, and pulmonary fibrosis (scarring of the lungs). Being at high altitudes can also lead to hypoxemia. What happens when you have hypoxemia? Oxygen gets into your blood through your lungs. Your blood carries the oxygen to all parts of your body. When you have too little oxygen in your blood, your body doesn't get enough of it. With too little oxygen, your heart and other parts of your body don't work very well. What are the symptoms? In addition to the symptoms of whatever is causing your hypoxemia, you may:  · Get tired quickly. · Be short of breath when you are active. · Feel like your heart is pounding or racing. · Feel weak or dizzy. · Become confused. How is hypoxemia treated? Your doctor will do tests to find out how much oxygen is in your blood. He or she will look for the cause of your hypoxemia and treat that problem. For example, if you have heart failure, you may need medicines that help your heart pump better. · If your hypoxemia is not severe, your doctor may give you oxygen through a mask or nasal cannula (say \"IZABELLA-katelynh-fab\"). A cannula is a thin tube with two openings that fit just inside your nose. · If your hypoxemia is severe, you may have a breathing tube put into your windpipe. The breathing tube is attached to a machine that pushes air into your lungs. This machine is called a ventilator. · If you have a long-term problem with hypoxemia, your doctor may recommend that you use oxygen regularly. Some people need it all the time. Others need it from time to time throughout the day or overnight. Your doctor will tell you how much oxygen you need and how often to use it. Follow-up care is a key part of your treatment and safety.  Be sure to make and go to all appointments, and call your doctor if you are having problems. It's also a good idea to know your test results and keep a list of the medicines you take. Where can you learn more? Go to http://www.gray.com/  Enter M375 in the search box to learn more about \"Learning About Hypoxemia. \"  Current as of: February 24, 2020               Content Version: 12.6  © 2006-2020 [employer]. Care instructions adapted under license by [employer] (which disclaims liability or warranty for this information). If you have questions about a medical condition or this instruction, always ask your healthcare professional. [address] any warranty or liability for your use of this information.

## 2021-01-27 NOTE — PROGRESS NOTES
MANUELA Plan:    -d/c home  -PCP follow up  -[facility] via [facility]              Accepted for [facility] via [facility]. SOC x 24-48 hours. O2 order faxed over to [id number] to hear back from [facility]. Requires Medicare 2nd IM letter prior to discharge. SHERI Barrera          Patient to discharge home when medically stable. PCP follow up for this inpatient admission. Follow up w/patient re: if she desires to continue cardiac rehab outpatient therapy vs HH. If [facility] will clarify her [address] for [facility] agency. Requires Medicare 2nd IM letter prior to discharge.       SHERI Barrera

## 2021-01-27 NOTE — PROGRESS NOTES
PHYSICAL THERAPY EVALUATION  Patient: Bard Ayon (32 y.o. female)  Date: 1/27/2021  Primary Diagnosis: Hypoxia [R09.02]  Elevated troponin [R77.8]        Precautions: droplet plus (COVID +), falls       ASSESSMENT  Based on the objective data described below, the patient presents with generalized weakness, impaired functional mobility, impaired amb, impaired balance, and decreased activity tolerance. Pt A&O x 4. Per pt report pt lives with  and son in a single story house with 3 LORENZA and everett handrails. Patient reports walking with a cane primarily PTA, DME includes: canes, RW, and tub transfer bench. Patient was independent for ADLs and IADLs PTA and was receiving outpatient cardiac rehab several times per week. Patient does not wear oxygen at baseline, is currently 2L O2 via NC. Pt semi-supine in bed upon PT arrival, agreeable to evaluation. Pt required CGA sit <> stand transfers. Pt amb 20 feet with gt belt, RW, and CGA; demonstrating increased ER on right LE with slow, steady, step through gt pattern with no LOB or knee buckling noted. Pt did fair with session today with c/o SOB following mobility with decreased sats from 98% at rest to 92%. Pt will benefit from continued skilled PT to address above deficits and return to PLOF. Current PT DC recommendation HHPT with family assistance. Current Level of Function Impacting Discharge (mobility/balance): CGA with increased time    Other factors to consider for discharge: new reliance on O2      PLAN :  Recommendations and Planned Interventions: bed mobility training, transfer training, gait training, therapeutic exercises, patient and family training/education and therapeutic activities      Frequency/Duration: Patient will be followed by physical therapy:  5 times a week to address goals.     Recommendation for discharge: (in order for the patient to meet his/her long term goals)  HHPT    This discharge recommendation:  Has been made in collaboration with the attending provider and/or case management    IF patient discharges home will need the following DME: none         SUBJECTIVE:   Patient stated I'm waiting for my breakfast.    OBJECTIVE DATA SUMMARY:   HISTORY:    Past Medical History:   Diagnosis Date    Acid indigestion     heartburn    Aneurysm ([facility] [address]     Anxiety disorder     Arthritis     Asthma     Back pain     Bladder spasms     Cerebral artery occlusion with cerebral infarction ([facility] [address] 08/2020    Chest pain     Constipation     Cough     Diabetes mellitus     Diarrhea     Fatigue     Fibromyalgia     Frequent episodic tension-type headache     Hearing reduced     Heart failure ([facility] [address]     Hemorrhoids     Hernia of unspecified site of abdominal cavity without mention of obstruction or gangrene     HTN (hypertension)     Hypothyroidism     ICD (implantable cardioverter-defibrillator) in place     Muscle pain     joint pain    N&V (nausea and vomiting)     Pacemaker     biventricular ICD 1/26/18    Ringing in ears     Skin cancer     SOB (shortness of breath)     SOB (shortness of breath)     Swallowing difficulty     Thyroid disorder     Vertigo     Wears dentures      Past Surgical History:   Procedure Laterality Date    HX CARPAL TUNNEL RELEASE      right hand    HX GI      HX HEART CATHETERIZATION  2/6/16    HX HYSTERECTOMY      HX ORTHOPAEDIC      HX PACEMAKER Left 6/20/16    [employer] ICD    HX THYROIDECTOMY      [place] REMOVAL GALLBLADDER           Home Situation  Home Environment: Private residence  # Steps to Enter: 3  Rails to Enter: Yes  Hand Rails : Bilateral  Wheelchair Ramp: No  One/Two Story Residence: One story  Living Alone: No  Support Systems: Spouse/Significant Other/Partner, Child(juana)(lives with  and son)  Patient Expects to be Discharged to[de-identified] Private residence  Current DME Used/Available at Home: Walker, rolling, Cane, straight, Tub transfer bench  Tub or Shower Type: Tub/Shower combination    EXAMINATION/PRESENTATION/DECISION MAKING:   Critical Behavior:  Neurologic State: Alert  Orientation Level: Oriented X4  Cognition: Appropriate decision making  Safety/Judgement: Good awareness of safety precautions  Hearing:  Auditory  Auditory Impairment: None  Skin:  Intact where visible   Edema: none noted   Range Of Motion:  AROM: Within functional limits           PROM: Within functional limits           Strength:    Strength: Generally decreased, functional                    Tone & Sensation:   Tone: Normal                              Coordination:     Vision:      Functional Mobility:  Bed Mobility:  Rolling: Stand-by assistance  Supine to Sit: Stand-by assistance     Scooting: Stand-by assistance  Transfers:  Sit to Stand: Stand-by assistance  Stand to Sit: Stand-by assistance        Bed to Chair: Contact guard assistance              Balance:   Sitting: Intact;Without support  Standing: Intact;With support  Ambulation/Gait Training:  Distance (ft): 20 Feet (ft)  Assistive Device: Gait belt;Walker, rolling  Ambulation - Level of Assistance: Contact guard assistance     Gait Description (WDL): Exceptions to WDL           Base of Support: Widened     Speed/Aleah: Shuffled;Slow    Therapeutic Exercises:   Not completed     Functional Measure:  [facility]™ “6 Clicks”         Basic Mobility Inpatient Short Form  How much difficulty does the patient currently have... Unable A Lot A Little None   1.  Turning over in bed (including adjusting bedclothes, sheets and blankets)?   [] 1   [] 2   [x] 3   [] 4   2.  Sitting down on and standing up from a chair with arms ( e.g., wheelchair, bedside commode, etc.)   [] 1   [] 2   [x] 3   [] 4   3.  Moving from lying on back to sitting on the side of the bed?   [] 1   [] 2   [x] 3   [] 4          How much help from another person does the patient currently need... Total A Lot A Little None   4.  Moving to and from a bed to a chair (including  a wheelchair)? [] 1   [] 2   [x] 3   [] 4   5. Need to walk in hospital room? [] 1   [] 2   [x] 3   [] 4   6. Climbing 3-5 steps with a railing? [] 1   [] 2   [x] 3   [] 4   © , Trustees of [address] [place] [address] [address] [address], under license to [employer]. All rights reserved     Score:  Initial:  Most Recent: X (Date: 2021 )   Interpretation of Tool:  Represents activities that are increasingly more difficult (i.e. Bed mobility, Transfers, Gait). Score 24 23 22-20 19-15 14-10 9-7 6   Modifier CH CI CJ CK CL CM CN         Physical Therapy Evaluation Charge Determination   History Examination Presentation Decision-Making   HIGH Complexity :3+ comorbidities / personal factors will impact the outcome/ POC  HIGH Complexity : 4+ Standardized tests and measures addressing body structure, function, activity limitation and / or participation in recreation  LOW Complexity : Stable, uncomplicated  Other outcome measures ampac 6  mod      Based on the above components, the patient evaluation is determined to be of the following complexity level: LOW     Pain Ratin/10    Activity Tolerance:   Fair, desaturates with exertion and requires oxygen and requires rest breaks    After treatment patient left in no apparent distress:   Sitting in chair and Call bell within reach and nsg updated. GOALS:    Problem: Mobility Impaired (Adult and Pediatric)  Goal: *Acute Goals and Plan of Care (Insert Text)  Description: Pt will be I with LE HEP in 7 days. Pt will perform bed mobility with mod I in 7 days. Pt will perform transfers with mod I in 7 days. Pt will amb  feet with LRAD safely with mod I in 7 days. Outcome: Not Met       COMMUNICATION/EDUCATION:   The patients plan of care was discussed with: Occupational therapist and Registered nurse.      Fall prevention education was provided and the patient/caregiver indicated understanding., Patient/family have participated as able in goal setting and plan of care., and Patient/family agree to work toward stated goals and plan of care.      Thank you for this referral.  Marina Merlin, PT, DPT   Time Calculation: 25 mins

## 2021-01-27 NOTE — PROGRESS NOTES
OCCUPATIONAL THERAPY EVALUATION  Patient: Robi Peres (18 y.o. female)  Date: 1/27/2021  Primary Diagnosis: Hypoxia [R09.02]  Elevated troponin [R77.8]        Precautions: COVID +, falls    ASSESSMENT  Based on the objective data described below, the patient presents with oxygen desaturation with activity, decreased activity tolerance, generalized deconditioning, decreased general strength, increased need for A with self care and functional mobility/transfers. Patient semi supine in bed upon OT arrival and initially frustrated with quality of care at hospital, emotional support provided and call made to dietary per pts request and then pt agreeable to working with therapy. Patient A&O x4 and per pt report, pt lives with  and son in a single story house with 3 LORENZA and everett handrails. Patient reports walking with a cane primarily PTA, DME includes: canes, RW, and tub transfer bench. Patient was independent for ADLs and IADLs PTA and was receiving outpatient cardiac rehab several times per week. Patient does not wear oxygen at baseline, oxygen saturation at 95% at rest on room air, dropped to 89% getting to EOB so oxygen increased to 1L via nasal cannula, oxygen dropped to 87% when standing to pull on briefs so oxygen increased to 2L (nursing made aware) and patient at 94% on 2L after ambulating to chair. Patient SBA with increased time bed mobility, sup -> sit and scooting, SBA sit <> stand, CGA bed to chair transfer. Patient requiring SBA for LE dressing to don socks and briefs sitting EOB/standing to pull up briefs, once sitting in chair patient setup A grooming to brush teeth, brush hair and wash face, patient setup A UE bathing sitting in chair Patient would benefit from continued skilled OT services to address above deficits and improve safety and independence with self care and functional mobility/transfers. Recommend discharge to [facility] when medically appropriate.     Current Level of Function Impacting Discharge (ADLs/self-care): SBA to setup A for ADL tasks. Other factors to consider for discharge: time since onset, PLOF, family support, COVID +        PLAN :  Recommendations and Planned Interventions: self care training, functional mobility training, therapeutic exercise, balance training, therapeutic activities, endurance activities, patient education, home safety training, and family training/education    Frequency/Duration: Patient will be followed by occupational therapy 5 times a week to address goals. Recommendation for discharge: (in order for the patient to meet his/her long term goals)  HHOT    This discharge recommendation:  Has been made in collaboration with the attending provider and/or case management    IF patient discharges home will need the following DME: patient owns DME required for discharge       SUBJECTIVE:   Patient stated I haven't gotten out of the bed since Thursday.     OBJECTIVE DATA SUMMARY:   HISTORY:   Past Medical History:   Diagnosis Date    Acid indigestion     heartburn    Aneurysm (HCC)     Anxiety disorder     Arthritis     Asthma     Back pain     Bladder spasms     Cerebral artery occlusion with cerebral infarction ([facility] [address] 08/2020    Chest pain     Constipation     Cough     Diabetes mellitus     Diarrhea     Fatigue     Fibromyalgia     Frequent episodic tension-type headache     Hearing reduced     Heart failure (HCC)     Hemorrhoids     Hernia of unspecified site of abdominal cavity without mention of obstruction or gangrene     HTN (hypertension)     Hypothyroidism     ICD (implantable cardioverter-defibrillator) in place     Muscle pain     joint pain    N&V (nausea and vomiting)     Pacemaker     biventricular ICD 1/26/18    Ringing in ears     Skin cancer     SOB (shortness of breath)     SOB (shortness of breath)     Swallowing difficulty     Thyroid disorder     Vertigo     Wears dentures      Past Surgical History:   Procedure Laterality Date    HX CARPAL TUNNEL RELEASE      right hand    HX GI      HX HEART CATHETERIZATION  2/6/16    HX HYSTERECTOMY      HX ORTHOPAEDIC      HX PACEMAKER Left 6/20/16    [employer] ICD    HX THYROIDECTOMY      [place] REMOVAL GALLBLADDER         Expanded or extensive additional review of patient history:     Home Situation  Home Environment: Private residence  # Steps to Enter: 3  Rails to Enter: Yes  Hand Rails : Bilateral  Wheelchair Ramp: No  One/Two Story Residence: One story  Living Alone: No  Support Systems: Spouse/Significant Other/Partner, Child(juana)(lives with  and son)  Patient Expects to be Discharged to[de-identified] Private residence  Current DME Used/Available at Home: Ronnald Real, rolling, Cane, straight, Tub transfer bench  Tub or Shower Type: Tub/Shower combination    PLOF: Pt I for ADLS/IADLS, mod I with mobility prior to admission. Hand dominance: Right    EXAMINATION OF PERFORMANCE DEFICITS:  Cognitive/Behavioral Status:  Neurologic State: Alert  Orientation Level: Oriented X4  Cognition: Appropriate decision making  Safety/Judgement: Good awareness of safety precautions    Skin: intact where visible    Edema: none noted    Hearing: Auditory  Auditory Impairment: None    Vision/Perceptual:    No deficits noted    Range of Motion:  AROM: Within functional limits  PROM: Within functional limits    Strength:  Strength: Generally decreased, functional     RUE Strength  Observation: grossly observed to be 4-/5     LUE Strength  Observation: grossly observed to be 4-/5    Coordination:  Generally intact    Tone & Sensation:  Tone: Normal    Balance:  Sitting: Intact; Without support  Standing: Intact; With support    Functional Mobility and Transfers for ADLs:  Bed Mobility:  Rolling: Stand-by assistance  Supine to Sit: Stand-by assistance  Scooting: Stand-by assistance    Transfers:  Sit to Stand: Stand-by assistance  Stand to Sit: Stand-by assistance  Bed to Chair: Contact guard assistance  Assistive Device : Gait Belt;Walker, rolling    ADL Assessment:    Oral Facial Hygiene/Grooming: Setup    Bathing: Setup    Lower Body Dressing: Stand-by assistance    ADL Intervention and task modifications:    Grooming  Grooming Assistance: Set-up  Position Performed: Seated in chair  Washing Face: Set-up  Brushing Teeth: Set-up  Brushing/Combing Hair: Set-up    Upper Body Bathing  Bathing Assistance: Set-up  Position Performed: Seated in chair    Lower Body Dressing Assistance  Dressing Assistance: Stand-by assistance  Protective Undergarmet: Stand-by assistance  Socks: Stand-by assistance  Leg Crossed Method Used: Yes  Position Performed: Seated edge of bed    Cognitive Retraining  Safety/Judgement: Good awareness of safety precautions    Therapeutic Exercise:  Patient may benefit from UE HEP to be initiated at next session as able     Functional Measure:    [address] [place] [id number] AM-PACTM \"6 Clicks\"                                                       Daily Activity Inpatient Short Form  How much help from another person does the patient currently need. .. Total; A Lot A Little None   1. Putting on and taking off regular lower body clothing? []  1 []  2 [x]  3 []  4   2. Bathing (including washing, rinsing, drying)? []  1 []  2 [x]  3 []  4   3. Toileting, which includes using toilet, bedpan or urinal? [] 1 []  2 [x]  3 []  4   4. Putting on and taking off regular upper body clothing? []  1 []  2 []  3 [x]  4   5. Taking care of personal grooming such as brushing teeth? []  1 []  2 [x]  3 []  4   6. Eating meals? []  1 []  2 []  3 [x]  4   © 2007, Trustees of [address] [place] [id number], under license to [employer]. All rights reserved     Score: 20/24     Interpretation of Tool:  Represents clinically-significant functional categories (i.e. Activities of daily living).   Percentage of Impairment CH    0%   CI    1-19% CJ    20-39% CK    40-59% CL    60-79% CM    80-99% CN     100%   AMPA  Score 6-24 24 23 20-22 15-19 10-14 7-9 6 Occupational Therapy Evaluation Charge Determination   History Examination Decision-Making   LOW Complexity : Brief history review  LOW Complexity : 1-3 performance deficits relating to physical, cognitive , or psychosocial skils that result in activity limitations and / or participation restrictions  LOW Complexity : No comorbidities that affect functional and no verbal or physical assistance needed to complete eval tasks       Based on the above components, the patient evaluation is determined to be of the following complexity level: LOW     Pain Ratin/10    Activity Tolerance:   Fair, desaturates with exertion and requires oxygen, and requires frequent rest breaks    After treatment patient left in no apparent distress:    Sitting in chair, Call bell within reach, and nursing notified    COMMUNICATION/EDUCATION:   The patients plan of care was discussed with: Physical therapist and Registered nurse. Home safety education was provided and the patient/caregiver indicated understanding., Patient/family have participated as able in goal setting and plan of care. , and Patient/family agree to work toward stated goals and plan of care. This patients plan of care is appropriate for delegation to [facility].     Problem: Self Care Deficits Care Plan (Adult)  Goal: *Acute Goals and Plan of Care (Insert Text)  Description: Pt will be mod I sup <> sit in prep for EOB ADLs  Pt will be mod I grooming sitting EOB/ standing at sink LRAD  Pt will be mod I LE dressing sitting EOB/long sit  Pt will be mod I sit <>  prep for toileting LRAD  Pt will be mod I toileting/toilet transfer/cloth mgmt LRAD  Pt will be I following UE HEP in prep for self care tasks     Outcome: Not Met     Thank you for this referral.  Shakeel Garcia, OTR/L  Time Calculation: 47 mins

## 2021-01-27 NOTE — DISCHARGE SUMMARY
Physician Discharge Summary     Patient ID:    Rodo Torre  [id number]  84 y.o.  1950    Admit date: 1/21/2021    Discharge date : 1/27/2021    Chronic Diagnoses:    Problem List as of 1/27/2021 Date Reviewed: 10/21/2020          Codes Class Noted - Resolved    Biventricular ICD (implantable cardioverter-defibrillator) in place ICD-10-CM: Z95.810  ICD-9-CM: V45.02  1/26/2018 - Present        Hypoxia ICD-10-CM: R09.02  ICD-9-CM: 799.02  1/21/2021 - Present        Elevated troponin ICD-10-CM: R77.8  ICD-9-CM: 790.6  1/21/2021 - Present        Atrial fibrillation ([facility] ICD-10-CM: I48.91  ICD-9-CM: 427.31  11/16/2020 - Present        Acute on chronic congestive heart failure ([facility] ICD-10-CM: I50.9  ICD-9-CM: 428.0  11/16/2020 - Present        Hypomagnesemia ICD-10-CM: E83.42  ICD-9-CM: 275.2  9/28/2020 - Present        Hyperkalemia ICD-10-CM: E87.5  ICD-9-CM: 276.7  9/26/2020 - Present        CHF (congestive heart failure) (HCC) ICD-10-CM: I50.9  ICD-9-CM: 428.0  9/25/2020 - Present        CHF exacerbation ([facility] ICD-10-CM: I50.9  ICD-9-CM: 428.0  9/22/2020 - Present        Severe obesity ([facility] ICD-10-CM: E66.01  ICD-9-CM: 278.01  9/2/2020 - Present        Dysarthria ICD-10-CM: R47.1  ICD-9-CM: 784.51  8/11/2020 - Present        TIA (transient ischemic attack) ICD-10-CM: G45.9  ICD-9-CM: 435.9  8/10/2020 - Present        Cardiomyopathy ([facility] ICD-10-CM: I42.9  ICD-9-CM: 425.4  5/23/2016 - Present        Skin cancer ICD-10-CM: C44.90  ICD-9-CM: 173.90  Unknown - Present        Acid indigestion ICD-10-CM: K30  ICD-9-CM: 536.8  Unknown - Present        Asthma ICD-10-CM: 289  ICD-9-CM: 377  Unknown - Present        Back pain ICD-10-CM: M54.9  ICD-9-CM: 724.5  Unknown - Present        Wears dentures ICD-10-CM: Z97.2  ICD-9-CM: V45.84  Unknown - Present        Constipation ICD-10-CM: 564.0  ICD-9-CM: 564.0  Unknown - Present        Diabetes mellitus ICD-10-CM: 250  ICD-9-CM: 250  Unknown - Present Diarrhea ICD-10-CM: R19.7  ICD-9-CM: 787.91  Unknown - Present        Frequent episodic tension-type headache ICD-10-CM: B88.409  ICD-9-CM: 339.11  Unknown - Present        Hemorrhoids ICD-10-CM: 996  ICD-9-CM: 103  Unknown - Present        Hernia of unspecified site of abdominal cavity without mention of obstruction or gangrene ICD-10-CM: K46.9  ICD-9-CM: 553.9  Unknown - Present        HTN (hypertension) ICD-10-CM: I10  ICD-9-CM: 401.9  Unknown - Present        Muscle pain ICD-10-CM: M79.10  ICD-9-CM: 729.1  Unknown - Present        SOB (shortness of breath) ICD-10-CM: R06.02  ICD-9-CM: 786.05  Unknown - Present        Thyroid disorder ICD-10-CM: E07.9  ICD-9-CM: 246. 9  Unknown - Present          22    Final Diagnoses:   [id number] with pneumonitis     Acute respiratory failure with hypoxia improving     Acute on chronic systolic heart failure, EF 45%. Improved     Elevated troponin, likely due to hypoxia/CHF     Coronary artery disease with previous PCI     Paroxysmal atrial fibrillation     AICD in situ     Hypertension     Diabetes mellitus type 2     GERD with history of Rosario's esophagus    Reason for Hospitalization:  Mercedes Tavarez is a 65 y.o. female with a myriad of medical problems but most notably chronic systolic heart failure with EF 45%, paroxysmal atrial fibrillation, status post ICD, coronary artery disease with history of PCI, CVA, diabetes and hypertension who presents to the ED today with complaints of shortness of breath. She reports this initially started back in November after she was hospitalized for 5 days for initiation of sotalol. She followed up with Dr. Deirdre Franks who told her to give it some more time. Meanwhile, she has continued to complain of dyspnea on exertion. She has been doing cardiac rehab and just walking to the building she has to stop and rest because of her breathing problems. In general she felt very poor yesterday overall. Today her symptoms were much worse. She has a pulse oximeter at home and says her saturations were 82%. .  Also was complaining of some chest tightness earlier today. Denies any significant cough. No fever.     She was seen at Dr. Ambar Charels office 2 days ago and there was apparently talked of repeating a cardiac catheterization. Her last was in September when she had stents placed     She made an appointment to get a Covid test this afternoon but ended up coming here instead         Hospital Course:   Patient was admitted to medical telemetry. She was tested for COVID-19 and this came back positive. CT of the chest was obtained which did confirm diffuse groundglass infiltrates. She was started on IV Decadron, remdesivir, vitamin C, zinc and azithromycin    She completed 5 days of remdesivir    At one point she was on a nonrebreather but after about 48 hours this was slowly weaned down to a nasal cannula    She was weaned down to 2 L nasal cannula. Home oxygen assessment did reveal she qualified for home oxygen    She was seen by cardiology who will defer any further cardiac work-up to the outpatient setting    Physical therapy felt she was appropriate for home health    She was felt stable for discharge home with home health on 1/27            Discharge Medications:   Current Discharge Medication List      START taking these medications    Details   dexAMETHasone (Decadron) 6 mg tablet One tab daily  Qty: 5 Tab, Refills: 0      ascorbic acid, vitamin C, (VITAMIN C) 1,000 mg tablet Take 1 Tab by mouth daily. Qty: 30 Tab, Refills: 0      cholecalciferol (VITAMIN D3) (1000 Units /25 mcg) tablet Take 2 Tabs by mouth daily. Indications: OTC  Qty: 30 Tab, Refills: 0      azithromycin (Zithromax) 500 mg tab Take 1 Tab by mouth daily. Qty: 3 Tab, Refills: 0         CONTINUE these medications which have NOT CHANGED    Details   sotaloL (BETAPACE) 80 mg tablet Take 0.5 Tabs by mouth daily.   Qty: 30 Tab, Refills: 0      insulin lispro protamin-lispro (HumaLOG Mix 75-25 KwikPen) flexpen 24 Units two (2) times a day. bumetanide (BUMEX) 1 mg tablet Take 1 Tab by mouth two (2) times daily (with meals). Qty: 60 Tab, Refills: 1      ticagrelor (Brilinta) 90 mg tablet Take  by mouth two (2) times a day. multivitamin,tx-iron-minerals (Complete Multivitamin) tab Take  by mouth daily. aspirin delayed-release 81 mg tablet Take 1 Tab by mouth daily. Qty: 30 Tab, Refills: 2    Associated Diagnoses: Cerebral aneurysm      magnesium oxide (MAG-OX) 400 mg tablet Take 400 mg by mouth three (3) times daily. metFORMIN ER (GLUCOPHAGE XR) 750 mg tablet Take 750 mg by mouth two (2) times daily (with meals). melatonin 3 mg tablet Take 3 mg by mouth nightly as needed. acetaminophen (TYLENOL EXTRA STRENGTH) 500 mg tablet Take  by mouth every six (6) hours as needed for Pain.      polyethylene glycol (MIRALAX) 17 gram packet Take 17 g by mouth as needed. gabapentin (NEURONTIN) 100 mg capsule Take 100 mg by mouth three (3) times daily. levothyroxine (SYNTHROID) 200 mcg tablet Take 150 mcg by mouth Daily (before breakfast). omeprazole (PRILOSEC) 40 mg capsule Take 40 mg by mouth daily. atorvastatin (LIPITOR) 40 mg tablet Take 40 mg by mouth daily. Associated Diagnoses: Type II or unspecified type diabetes mellitus without mention of complication, uncontrolled; Other specified acquired hypothyroidism; Unspecified vitamin D deficiency      potassium chloride SR (KLOR-CON 10) 10 mEq tablet Take 20 mEq by mouth daily. Associated Diagnoses: Type II or unspecified type diabetes mellitus without mention of complication, uncontrolled               Follow up Care:    1. Ricky Stern MD in 1-2 weeks. Please call to set up an appointment shortly after discharge. Diet:  Cardiac Diet    Disposition:  Home. Advanced Directive:   FULL    DNR      Discharge Exam:  General:  Alert, cooperative, no distress, appears stated age. Lungs:   Clear to auscultation bilaterally. Chest wall:  No tenderness or deformity. Heart:  Regular rate and rhythm, S1, S2 normal, no murmur, click, rub or gallop. Abdomen:   Soft, non-tender. Bowel sounds normal. No masses,  No organomegaly. Extremities: Extremities normal, atraumatic, no cyanosis or edema. Pulses: 2+ and symmetric all extremities. Skin: Skin color, texture, turgor normal. No rashes or lesions   Neurologic: CNII-XII intact. No gross sensory or motor deficits        CONSULTATIONS: Cardiology    Significant Diagnostic Studies:   1/21/2021: BUN 20 mg/dL (Ref range: 6 - 20 mg/dL); Calcium 9.0 mg/dL (Ref range: 8.5 - 10.1 mg/dL); CO2 26 mmol/L (Ref range: 21 - 32 mmol/L); Creatinine 0.91 mg/dL (Ref range: 0.55 - 1.02 mg/dL); Glucose 273 mg/dL* (Ref range: 65 - 100 mg/dL); HCT 38.1 % (Ref range: 35.0 - 47.0 %); HGB 12.6 g/dL (Ref range: 11.5 - 16.0 g/dL); Potassium 3.6 mmol/L (Ref range: 3.5 - 5.1 mmol/L); Sodium 134 mmol/L* (Ref range: 136 - 145 mmol/L)  Recent Labs     01/25/21  1000   WBC 12.3*   HGB 12.4   HCT 38.3   *     Recent Labs     01/25/21  1000      K 4.2      CO2 30   BUN 59*   CREA 0.96   *   CA 8.7   PHOS 4.1     Recent Labs     01/25/21  1000   ALB 2.0*     No results for input(s): INR, PTP, APTT, INREXT in the last 72 hours. No results for input(s): FE, TIBC, PSAT, FERR in the last 72 hours. No results for input(s): PH, PCO2, PO2 in the last 72 hours. No results for input(s): CPK, CKMB in the last 72 hours.     No lab exists for component: TROPONINI  Lab Results   Component Value Date/Time    Glucose (POC) 244 (H) 01/27/2021 11:41 AM    Glucose (POC) 250 (H) 01/27/2021 07:13 AM    Glucose (POC) 365 (H) 01/27/2021 12:42 AM    Glucose (POC) 382 (H) 01/26/2021 09:27 PM    Glucose (POC) 280 (H) 01/26/2021 05:05 PM       Discharge time spent 35 minutes    Signed:  Jaya Parrish MD  1/27/2021  12:41 PM

## 2021-01-28 NOTE — PROGRESS NOTES
Spoke with Eliana Soto with [facility] to confirm delivery of patient's home equipment. Per Eliana Soto, equipment delivered at 5:08pm. Delivery address verified in patients chart.

## 2021-01-28 NOTE — PROGRESS NOTES
18:45 Patient discharge to home. 19:30 330 Franca CARSON, sent to answering service. Message   Left  with [place] person answering after hours call. States Tremayne Stover will call back in 20 minutes.

## 2021-01-29 ENCOUNTER — APPOINTMENT (OUTPATIENT)
Dept: CARDIAC REHAB | Age: 71
End: 2021-01-29
Payer: MEDICARE

## 2021-02-03 ENCOUNTER — APPOINTMENT (OUTPATIENT)
Dept: CARDIAC REHAB | Age: 71
End: 2021-02-03

## 2021-02-10 ENCOUNTER — APPOINTMENT (OUTPATIENT)
Dept: CARDIAC REHAB | Age: 71
End: 2021-02-10

## 2021-02-17 ENCOUNTER — HOSPITAL ENCOUNTER (OUTPATIENT)
Dept: VASCULAR SURGERY | Age: 71
Discharge: HOME | End: 2021-02-17
Attending: NURSE PRACTITIONER
Payer: MEDICARE

## 2021-02-17 DIAGNOSIS — R06.00 DYSPNEA: ICD-10-CM

## 2021-02-17 LAB
ECHO AO ROOT DIAM: 2.77 CM
ECHO AV AREA PEAK VELOCITY: 1.53 CM2
ECHO AV AREA PEAK VELOCITY: 1.53 CM2
ECHO AV AREA VTI: 1.64 CM2
ECHO AV AREA VTI: 1.64 CM2
ECHO AV MEAN GRADIENT: 7.71 MMHG
ECHO AV MEAN VELOCITY: 126.98 CM/S
ECHO AV PEAK GRADIENT: 17.67 MMHG
ECHO AV PEAK VELOCITY: 210.18 CM/S
ECHO AV VTI: 45.06 CM
ECHO LA VOL 2C: 84.62 ML (ref 22–52)
ECHO LA VOL 4C: 80.48 ML (ref 22–52)
ECHO LA VOL BP: 92.49 ML (ref 22–52)
ECHO LA VOL BP: 92.49 ML (ref 22–52)
ECHO LV EDV A2C: 157.64 ML
ECHO LV EDV A2C: 157.64 ML
ECHO LV EDV A2C: 163.11 ML
ECHO LV EDV A2C: 163.11 ML
ECHO LV EDV BP: 93.89 ML (ref 56–104)
ECHO LV EDV BP: 93.89 ML (ref 56–104)
ECHO LV EJECTION FRACTION A2C: 59 PERCENT
ECHO LV EJECTION FRACTION A2C: 59 PERCENT
ECHO LV EJECTION FRACTION A4C: 43 PERCENT
ECHO LV EJECTION FRACTION A4C: 43 PERCENT
ECHO LV EJECTION FRACTION BIPLANE: 49.6 PERCENT (ref 55–100)
ECHO LV EJECTION FRACTION BIPLANE: 49.6 PERCENT (ref 55–100)
ECHO LV ESV A2C: 43.58 ML
ECHO LV ESV BP: 47.33 ML (ref 19–49)
ECHO LV ESV BP: 47.33 ML (ref 19–49)
ECHO LV INTERNAL DIMENSION DIASTOLIC: 5.66 CM (ref 3.9–5.3)
ECHO LV INTERNAL DIMENSION DIASTOLIC: 5.75 CM (ref 3.9–5.3)
ECHO LV INTERNAL DIMENSION SYSTOLIC: 4.49 CM
ECHO LV IVSD: 1.08 CM (ref 0.6–0.9)
ECHO LV POSTERIOR WALL DIASTOLIC: 0.74 CM (ref 0.6–0.9)
ECHO LVOT DIAM: 2.23 CM
ECHO LVOT PEAK GRADIENT: 2.71 MMHG
ECHO LVOT PEAK VELOCITY: 82.38 CM/S
ECHO LVOT SV: 106.7 ML
ECHO LVOT SV: 99.1 ML
ECHO LVOT VTI: 19.03 CM
ECHO MV A VELOCITY: 122.67 CM/S
ECHO MV AREA PHT: 3.36 CM2
ECHO MV AREA PHT: 3.36 CM2
ECHO MV E DECELERATION TIME (DT): 225.74 MS
ECHO MV E VELOCITY: 94.19 CM/S
ECHO MV E/A RATIO: 0.77
ECHO MV PRESSURE HALF TIME (PHT): 65.46 MS
ECHO RIGHT VENTRICULAR SYSTOLIC PRESSURE (RVSP): 29 MMHG
ECHO RV INTERNAL DIMENSION: 2.75 CM
ECHO TV REGURGITANT MAX VELOCITY: 254.57 CM/S
ECHO TV REGURGITANT PEAK GRADIENT: 25.92 MMHG
LVOT MG: 1.49 MMHG

## 2021-02-17 PROCEDURE — 93306 TTE W/DOPPLER COMPLETE: CPT

## 2021-03-03 ENCOUNTER — TRANSCRIBE ORDER (OUTPATIENT)
Dept: SCHEDULING | Age: 71
End: 2021-03-03

## 2021-03-03 DIAGNOSIS — I73.9 PERIPHERAL VASCULAR DISEASE, UNSPECIFIED (HCC): Primary | ICD-10-CM

## 2021-03-11 ENCOUNTER — HOSPITAL ENCOUNTER (OUTPATIENT)
Age: 71
Discharge: HOME | End: 2021-03-11
Attending: INTERNAL MEDICINE | Admitting: INTERNAL MEDICINE
Payer: MEDICARE

## 2021-03-11 VITALS
HEIGHT: 62 IN | DIASTOLIC BLOOD PRESSURE: 56 MMHG | BODY MASS INDEX: 30.36 KG/M2 | HEART RATE: 78 BPM | OXYGEN SATURATION: 97 % | WEIGHT: 165 LBS | RESPIRATION RATE: 12 BRPM | TEMPERATURE: 98 F | SYSTOLIC BLOOD PRESSURE: 95 MMHG

## 2021-03-11 DIAGNOSIS — R07.9 CHEST PAIN, UNSPECIFIED TYPE: ICD-10-CM

## 2021-03-11 LAB
ALBUMIN SERPL-MCNC: 2.9 G/DL (ref 3.5–5)
ALBUMIN/GLOB SERPL: 0.7 {RATIO} (ref 1.1–2.2)
ALP SERPL-CCNC: 114 U/L (ref 45–117)
ALT SERPL-CCNC: 37 U/L (ref 12–78)
ANION GAP SERPL CALC-SCNC: 7 MMOL/L (ref 5–15)
APTT PPP: 24.4 SEC (ref 23–35.7)
AST SERPL W P-5'-P-CCNC: 39 U/L (ref 15–37)
ATRIAL RATE: 78 BPM
BILIRUB SERPL-MCNC: 0.8 MG/DL (ref 0.2–1)
BUN SERPL-MCNC: 16 MG/DL (ref 6–20)
BUN/CREAT SERPL: 25 (ref 12–20)
CA-I BLD-MCNC: 9.1 MG/DL (ref 8.5–10.1)
CALCULATED P AXIS, ECG09: 56 DEGREES
CALCULATED R AXIS, ECG10: -52 DEGREES
CALCULATED T AXIS, ECG11: 7 DEGREES
CHLORIDE SERPL-SCNC: 105 MMOL/L (ref 97–108)
CO2 SERPL-SCNC: 28 MMOL/L (ref 21–32)
CREAT SERPL-MCNC: 0.63 MG/DL (ref 0.55–1.02)
DIAGNOSIS, 93000: NORMAL
ERYTHROCYTE [DISTWIDTH] IN BLOOD BY AUTOMATED COUNT: 15.4 % (ref 11.5–14.5)
GLOBULIN SER CALC-MCNC: 3.9 G/DL (ref 2–4)
GLUCOSE BLD STRIP.AUTO-MCNC: 115 MG/DL (ref 65–100)
GLUCOSE BLD STRIP.AUTO-MCNC: 159 MG/DL (ref 65–100)
GLUCOSE SERPL-MCNC: 154 MG/DL (ref 65–100)
HCT VFR BLD AUTO: 36.3 % (ref 35–47)
HGB BLD-MCNC: 12.1 G/DL (ref 11.5–16)
INR PPP: 1.1 (ref 0.9–1.1)
MCH RBC QN AUTO: 31.5 PG (ref 26–34)
MCHC RBC AUTO-ENTMCNC: 33.3 G/DL (ref 30–36.5)
MCV RBC AUTO: 94.5 FL (ref 80–99)
P-R INTERVAL, ECG05: 168 MS
PERFORMED BY, TECHID: ABNORMAL
PERFORMED BY, TECHID: ABNORMAL
PLATELET # BLD AUTO: 140 K/UL (ref 150–400)
PMV BLD AUTO: 10.3 FL (ref 8.9–12.9)
POTASSIUM SERPL-SCNC: 3.6 MMOL/L (ref 3.5–5.1)
PROT SERPL-MCNC: 6.8 G/DL (ref 6.4–8.2)
PROTHROMBIN TIME: 14.6 SEC (ref 11.9–14.7)
Q-T INTERVAL, ECG07: 466 MS
QRS DURATION, ECG06: 166 MS
QTC CALCULATION (BEZET), ECG08: 531 MS
RBC # BLD AUTO: 3.84 M/UL (ref 3.8–5.2)
SODIUM SERPL-SCNC: 140 MMOL/L (ref 136–145)
THERAPEUTIC RANGE,PTTT: NORMAL SEC (ref 68–109)
VENTRICULAR RATE, ECG03: 78 BPM
WBC # BLD AUTO: 6.3 K/UL (ref 3.6–11)

## 2021-03-11 PROCEDURE — 85610 PROTHROMBIN TIME: CPT

## 2021-03-11 PROCEDURE — 76210000021 HC REC RM PH II 0.5 TO 1 HR: Performed by: INTERNAL MEDICINE

## 2021-03-11 PROCEDURE — C1894 INTRO/SHEATH, NON-LASER: HCPCS | Performed by: INTERNAL MEDICINE

## 2021-03-11 PROCEDURE — 77030016699 HC CATH ANGI DX INFN1 CARD -A: Performed by: INTERNAL MEDICINE

## 2021-03-11 PROCEDURE — 80053 COMPREHEN METABOLIC PANEL: CPT

## 2021-03-11 PROCEDURE — 77030019698 HC SYR ANGI MDLON MRTM -A: Performed by: INTERNAL MEDICINE

## 2021-03-11 PROCEDURE — 77030003394 HC NDL ART COOK -A: Performed by: INTERNAL MEDICINE

## 2021-03-11 PROCEDURE — 93010 ELECTROCARDIOGRAM REPORT: CPT | Performed by: INTERNAL MEDICINE

## 2021-03-11 PROCEDURE — 85027 COMPLETE CBC AUTOMATED: CPT

## 2021-03-11 PROCEDURE — 82962 GLUCOSE BLOOD TEST: CPT

## 2021-03-11 PROCEDURE — 93005 ELECTROCARDIOGRAM TRACING: CPT

## 2021-03-11 PROCEDURE — 93458 L HRT ARTERY/VENTRICLE ANGIO: CPT | Performed by: INTERNAL MEDICINE

## 2021-03-11 PROCEDURE — 74011250636 HC RX REV CODE- 250/636: Performed by: INTERNAL MEDICINE

## 2021-03-11 PROCEDURE — 2709999900 HC NON-CHARGEABLE SUPPLY: Performed by: INTERNAL MEDICINE

## 2021-03-11 PROCEDURE — 74011000250 HC RX REV CODE- 250: Performed by: INTERNAL MEDICINE

## 2021-03-11 PROCEDURE — 85730 THROMBOPLASTIN TIME PARTIAL: CPT

## 2021-03-11 PROCEDURE — 76210000017 HC OR PH I REC 1.5 TO 2 HR: Performed by: INTERNAL MEDICINE

## 2021-03-11 PROCEDURE — C1769 GUIDE WIRE: HCPCS | Performed by: INTERNAL MEDICINE

## 2021-03-11 PROCEDURE — 74011000636 HC RX REV CODE- 636: Performed by: INTERNAL MEDICINE

## 2021-03-11 PROCEDURE — 99152 MOD SED SAME PHYS/QHP 5/>YRS: CPT | Performed by: INTERNAL MEDICINE

## 2021-03-11 PROCEDURE — 36415 COLL VENOUS BLD VENIPUNCTURE: CPT

## 2021-03-11 RX ORDER — LIDOCAINE HYDROCHLORIDE 10 MG/ML
INJECTION INFILTRATION; PERINEURAL AS NEEDED
Status: DISCONTINUED | OUTPATIENT
Start: 2021-03-11 | End: 2021-03-11 | Stop reason: HOSPADM

## 2021-03-11 RX ORDER — FENTANYL CITRATE 50 UG/ML
INJECTION, SOLUTION INTRAMUSCULAR; INTRAVENOUS AS NEEDED
Status: DISCONTINUED | OUTPATIENT
Start: 2021-03-11 | End: 2021-03-11 | Stop reason: HOSPADM

## 2021-03-11 RX ORDER — HEPARIN SODIUM 200 [USP'U]/100ML
INJECTION, SOLUTION INTRAVENOUS
Status: COMPLETED | OUTPATIENT
Start: 2021-03-11 | End: 2021-03-11

## 2021-03-11 RX ORDER — MIDAZOLAM HYDROCHLORIDE 1 MG/ML
INJECTION INTRAMUSCULAR; INTRAVENOUS AS NEEDED
Status: DISCONTINUED | OUTPATIENT
Start: 2021-03-11 | End: 2021-03-11 | Stop reason: HOSPADM

## 2021-03-11 RX ORDER — SODIUM CHLORIDE 0.9 % (FLUSH) 0.9 %
5-40 SYRINGE (ML) INJECTION EVERY 8 HOURS
Status: CANCELLED | OUTPATIENT
Start: 2021-03-11

## 2021-03-11 RX ORDER — HEPARIN SODIUM 1000 [USP'U]/ML
INJECTION, SOLUTION INTRAVENOUS; SUBCUTANEOUS AS NEEDED
Status: DISCONTINUED | OUTPATIENT
Start: 2021-03-11 | End: 2021-03-11 | Stop reason: HOSPADM

## 2021-03-11 RX ORDER — SODIUM CHLORIDE 0.9 % (FLUSH) 0.9 %
5-40 SYRINGE (ML) INJECTION AS NEEDED
Status: CANCELLED | OUTPATIENT
Start: 2021-03-11

## 2021-03-11 RX ORDER — SODIUM CHLORIDE 9 MG/ML
30 INJECTION, SOLUTION INTRAVENOUS CONTINUOUS
Status: DISCONTINUED | OUTPATIENT
Start: 2021-03-11 | End: 2021-03-11 | Stop reason: HOSPADM

## 2021-03-11 RX ORDER — NICARDIPINE HYDROCHLORIDE 2.5 MG/ML
INJECTION INTRAVENOUS AS NEEDED
Status: DISCONTINUED | OUTPATIENT
Start: 2021-03-11 | End: 2021-03-11 | Stop reason: HOSPADM

## 2021-03-11 NOTE — DISCHARGE INSTRUCTIONS
Patient Education        Sedation for a Medical Procedure: Care Instructions  Your Care Instructions     For a minor procedure or surgery, you will get a sedative to help you relax. This drug will make you sleepy. It is usually given in a vein (by IV). It may be used with anesthesia. There are different types of anesthesia. You and your doctor or anesthesia specialist will work together to choose the best anesthesia for you. It is usually based on your health, the procedure, and your preference. Local anesthesia is a shot given to numb a small part of the body. Regional anesthesia is a shot that blocks pain to a larger area of the body. General anesthesia affects the brain and the whole body. You get it through a small tube placed in a vein (IV). Or you may breathe it in. You are unconscious and will not feel pain. You may get monitored anesthesia care (MAC). This means that an anesthesia specialist will care for you during your surgery. He or she will make sure that you get only the level of anesthesia care you need to prevent pain for your specific case. If you had anesthesia, you may feel some pain and discomfort as it wears off. If you have pain, don't be afraid to say so. Pain medicine works better if you take it before the pain gets bad. Common side effects from sedation include:  Feeling sleepy. (Your doctors and nurses will make sure you are not too sleepy to go home.)  Nausea and vomiting. This usually does not last long. Feeling tired. Follow-up care is a key part of your treatment and safety. Be sure to make and go to all appointments, and call your doctor if you are having problems. It's also a good idea to know your test results and keep a list of the medicines you take. How can you care for yourself at home? Activity    Don't do anything for 24 hours that requires attention to detail. This includes going to work, making important decisions, or signing any legal documents.  It takes time for the medicine effects to completely wear off.     For your safety, you should not drive or operate any machinery that could be dangerous until the medicine wears off and you can think clearly and react easily.     When you get home, it is important to rest until the anesthesia has worn off. Some people will feel drowsy or dizzy for up to a few hours after leaving the hospital.     Take your time and walk slowly. Sudden changes in position may also cause nausea.     Rest when you feel tired. Getting enough sleep will help you recover.   Diet    You can eat your normal diet, unless your doctor gives you other instructions. If your stomach is upset, try clear liquids and bland, low-fat foods like plain toast or rice.     Drink plenty of fluids (unless your doctor tells you not to).     Don't drink alcohol for 24 hours.   Medicines    Be safe with medicines. Read and follow all instructions on the label.  If the doctor gave you a prescription medicine for pain, take it as prescribed.  If you are taking opioids for pain, it is very important to take them as prescribed. Opioids can easily be misused. Misuse can lead to opioid use disorder and even death. Because of this, it is best to get off them as soon as possible. As soon as you don't need them, talk to your doctor about how to safely stop taking them. Also talk with your doctor about how to safely store and get rid of opioids.  If you are not taking a prescription pain medicine, ask your doctor if you can take an over-the-counter medicine.     If you think your pain medicine is making you sick to your stomach, you can try these things.  Take your medicine after meals (unless your doctor has told you not to).  Ask your doctor for a different pain medicine.   When should you call for help?   Call 911 anytime you think you may need emergency care. For example, call if:    You have severe trouble breathing.     You passed out (lost consciousness).   Call your doctor now  or seek immediate medical care if:    You have trouble breathing.     You have ongoing or worsening nausea or vomiting.     You have a fever.     You have a new or worse headache.     The medicine is not wearing off and you can't think clearly. Watch closely for changes in your health, and be sure to contact your doctor if:    You do not get better as expected. Where can you learn more? Go to http://www.gray.com/  Enter G817 in the search box to learn more about \"Sedation for a Medical Procedure: Care Instructions. \"  Current as of: August 22, 2019               Content Version: 12.6  © 2368-4158 [employer]. Care instructions adapted under license by [employer] (which disclaims liability or warranty for this information). If you have questions about a medical condition or this instruction, always ask your healthcare professional. [address] any warranty or liability for your use of this information. RADIAL CATHETERIZATION AND INTERVENTIONAL DISCHARGE INSTRUCTIONS       MEDICATIONS:  Take only medications ordered by your provider. ACTIVITIES:  While the wound is healing; bleeding or swelling can occur as a result of stress or strain. Carefully follow these guidelines:    · DO NOT DRIVE for 24 hours after the procedure or as instructed by your provider. · You may shower 24 hours after your procedure. No soaking the wrist for 3 days (i.e., bath tub, swimming, washing dishes). · You may return to work in (***) days. · It is essential that you DO NOT SMOKE. · Do not bend your wrist for 24 hours. · Do not lift more than 3-5 pounds with affected wrist for 1 week. SITE CARE:  · Use a clean Band-Aid® for 48 hours. · Keep site clean and dry. · Avoid lotions, ointments or powders at the wound site for I week. · Check the procedural site for any signs of infection (redness, drainage, swelling).   · You may notice a small, hard lump or small bruise at the puncture site. This is normal and will clear in about 2 weeks. · If the lump increases in size; apply firm, direct pressure over the site. Notify your physician. · If there is a small amount of bleeding at the site; apply firm, direct continuous pressure over the site with your thumb against the puncture site and your finger against the back of the wrist for 10 minutes. Your nurse will review this with you. Notify your physician. · If bleeding does not stop after 10 minutes or if there is a large amount of bleeding, call for an ambulance immediately. Continue to hold pressure until help arrives. WHEN TO CALL YOUR DOCTOR:  · Angina - if your angina symptoms return; use your nitroglycerin as instructed by your provider. If you do not have nitroglycerin or if your symptoms do not completely resolve after 10 -15 minutes, call an ambulance. Do not drive yourself to the hospital.  · Warmth, redness, drainage and/or pain at the procedural site or temperature over 101°F.  · Persistent tenderness or pain at procedural site. · Swelling, coolness, numbness and /or tingling of the fingers, hand, wrist or arm. · Lightheadedness, dizziness, fainting or rapid heart beating. · If you have any other questions or concerns, or you are experiencing a problem. FOLLOW-UP CARE:  · Follow up with your provider and /or cardiologist as recommended. · If you had a Stent implanted, carry your Stent ID card with you at all times. · Never stop taking your prescribed Brilinta (Ticagrelor),Plavix (Clopidogrel), Aspirin, Coumadin (Warfarin) or Effient (Prasugrel) without speaking with your cardiologist.     I understand and can verbalize these instructions, and have participated in the development of this discharge plan. I have read and received a copy of this plan.

## 2021-03-12 NOTE — ROUTINE PROCESS
TRANSFER - OUT REPORT: 
 
Verbal report given to [facility] on Vernerner Kanner  being transferred to [facility] for routine post - op Report consisted of patients Situation, Background, Assessment and  
Recommendations(SBAR). Information from the following report(s) SBAR, Procedure Summary, Cardiac Rhythm NSR and Dual Neuro Assessment was reviewed with the receiving nurse. Opportunity for questions and clarification was provided. Patient transported with: 
 Registered Nurse

## 2021-03-16 ENCOUNTER — HOSPITAL ENCOUNTER (OUTPATIENT)
Dept: NON INVASIVE DIAGNOSTICS | Age: 71
Discharge: HOME | End: 2021-03-16
Payer: MEDICARE

## 2021-03-16 DIAGNOSIS — I73.9 PERIPHERAL VASCULAR DISEASE, UNSPECIFIED (HCC): ICD-10-CM

## 2021-03-16 LAB
LEFT ABI: 1.38
LEFT ANTERIOR TIBIAL: 204 MMHG
LEFT ARM BP: 136 MMHG
LEFT POSTERIOR TIBIAL: 144 MMHG
LEFT TBI: 0.78
LEFT TOE PRESSURE: 116 MMHG
RIGHT ABI: 1.09
RIGHT ANTERIOR TIBIAL: 159 MMHG
RIGHT ARM BP: 148 MMHG
RIGHT POSTERIOR TIBIAL: 161 MMHG
RIGHT TBI: 0.89
RIGHT TOE PRESSURE: 131 MMHG

## 2021-03-16 PROCEDURE — 93923 UPR/LXTR ART STDY 3+ LVLS: CPT

## 2021-03-17 ENCOUNTER — OFFICE VISIT (OUTPATIENT)
Dept: ENDOCRINOLOGY | Age: 71
End: 2021-03-17
Payer: MEDICARE

## 2021-03-17 VITALS
TEMPERATURE: 97.2 F | HEART RATE: 75 BPM | DIASTOLIC BLOOD PRESSURE: 65 MMHG | HEIGHT: 62 IN | BODY MASS INDEX: 31.73 KG/M2 | OXYGEN SATURATION: 98 % | SYSTOLIC BLOOD PRESSURE: 127 MMHG | WEIGHT: 172.4 LBS

## 2021-03-17 DIAGNOSIS — Z90.09 HISTORY OF LOBECTOMY OF THYROID: ICD-10-CM

## 2021-03-17 DIAGNOSIS — E03.9 ACQUIRED HYPOTHYROIDISM: Primary | ICD-10-CM

## 2021-03-17 PROCEDURE — G8752 SYS BP LESS 140: HCPCS | Performed by: INTERNAL MEDICINE

## 2021-03-17 PROCEDURE — G8427 DOCREV CUR MEDS BY ELIG CLIN: HCPCS | Performed by: INTERNAL MEDICINE

## 2021-03-17 PROCEDURE — 3017F COLORECTAL CA SCREEN DOC REV: CPT | Performed by: INTERNAL MEDICINE

## 2021-03-17 PROCEDURE — G8510 SCR DEP NEG, NO PLAN REQD: HCPCS | Performed by: INTERNAL MEDICINE

## 2021-03-17 PROCEDURE — G8536 NO DOC ELDER MAL SCRN: HCPCS | Performed by: INTERNAL MEDICINE

## 2021-03-17 PROCEDURE — G8399 PT W/DXA RESULTS DOCUMENT: HCPCS | Performed by: INTERNAL MEDICINE

## 2021-03-17 PROCEDURE — 99204 OFFICE O/P NEW MOD 45 MIN: CPT | Performed by: INTERNAL MEDICINE

## 2021-03-17 PROCEDURE — 1090F PRES/ABSN URINE INCON ASSESS: CPT | Performed by: INTERNAL MEDICINE

## 2021-03-17 PROCEDURE — 1101F PT FALLS ASSESS-DOCD LE1/YR: CPT | Performed by: INTERNAL MEDICINE

## 2021-03-17 PROCEDURE — G9899 SCRN MAM PERF RSLTS DOC: HCPCS | Performed by: INTERNAL MEDICINE

## 2021-03-17 PROCEDURE — G8417 CALC BMI ABV UP PARAM F/U: HCPCS | Performed by: INTERNAL MEDICINE

## 2021-03-17 PROCEDURE — G8754 DIAS BP LESS 90: HCPCS | Performed by: INTERNAL MEDICINE

## 2021-03-17 RX ORDER — METFORMIN HYDROCHLORIDE 500 MG/1
500 TABLET ORAL 2 TIMES DAILY WITH MEALS
COMMUNITY
Start: 2021-01-28

## 2021-03-17 RX ORDER — BUTALBITAL, ACETAMINOPHEN AND CAFFEINE 300; 40; 50 MG/1; MG/1; MG/1
1 CAPSULE ORAL
COMMUNITY
Start: 2021-02-17

## 2021-03-17 NOTE — PROGRESS NOTES
History and Physical    Patient: Tish Henson MRN: [id number]  SSN: [id number]    YOB: 1950  Age: 65 y.o. Sex: female      Subjective:      Tish Henson is a 65 y.o. female with past medical history of hypertension, hyperlipidemia, coronary artery disease s/p stent, congestive heart failure with low ejection fraction s/p ICD, ventricular tachycardia is sent to me by primary care physician Dr. Ronda Wood for hypothyroidism. Patient was diagnosed with hypothyroidism a very long time back. She has been on levothyroxine since a long time. She takes this regularly and appropriately. However, she gets hospitalized frequently for different issues and she is not sure if she gets levothyroxine every time when she is hospitalized. She tells me that her dose has been fluctuating from 150 to 175 mcg in the past year. Currently she has been taking levothyroxine 150 mcg daily. She has been on it for the past few months. She has started taking a multivitamin recently. Does not take iron or calcium. Does not take biotin. She was on amiodarone for low ejection fraction and ventricular tachycardia but it has been stopped and now she is on sotalol. Regarding symptoms, energy level is poor, she has chronic constipation, weight has been stable, she has chronic insomnia, denies any hair or skin changes. She has history of nodule in left thyroid lobe for which she had lobectomy many years back. No cancer was found. Recently she has been feeling like there is a lump on the right side of her neck. She denies any difficulty in swallowing, breathing or hoarseness of voice. She had thyroid ultrasound in December 2020.   Past Medical History:   Diagnosis Date    Acid indigestion     heartburn    Aneurysm (HCC)     Anxiety disorder     Arthritis     Asthma     Back pain     Bladder spasms     Cerebral artery occlusion with cerebral infarction ([facility] [address] 08/2020    Chest pain     Constipation     Cough     Diabetes mellitus     Diarrhea     Fatigue     Fibromyalgia     Frequent episodic tension-type headache     GERD (gastroesophageal reflux disease)     Hearing reduced     Heart failure (HCC)     Hemorrhoids     Hernia of unspecified site of abdominal cavity without mention of obstruction or gangrene     HTN (hypertension)     Hypothyroidism     ICD (implantable cardioverter-defibrillator) in place     Muscle pain     joint pain    N&V (nausea and vomiting)     Pacemaker     biventricular ICD 1/26/18    Ringing in ears     Skin cancer     Sleep apnea     cant tolerate the machine    SOB (shortness of breath)     SOB (shortness of breath)     Swallowing difficulty     Thyroid disorder     Vertigo     Wears dentures      Past Surgical History:   Procedure Laterality Date    HX CARPAL TUNNEL RELEASE      right hand    HX GI      HX HEART CATHETERIZATION  2/6/16    HX HEENT      thyroid     HX HYSTERECTOMY      HX ORTHOPAEDIC      HX PACEMAKER Left 6/20/16    [employer] ICD    HX THYROIDECTOMY      [place] REMOVAL GALLBLADDER        Family History   Problem Relation Age of Onset    Diabetes Mother     Heart Disease Mother     Heart Disease Father     Cancer Sister     Diabetes Sister     Heart Disease Sister     Hypertension Sister     Lupus Sister     Breast Cancer Sister     Diabetes Brother      Social History     Tobacco Use    Smoking status: Never Smoker    Smokeless tobacco: Never Used   Substance Use Topics    Alcohol use: Yes     Comment: wine occasionally      Prior to Admission medications    Medication Sig Start Date End Date Taking? Authorizing Provider   butalbital-acetaminophen-caff (FIORICET) -40 mg per capsule TAKE 1 CAPSULE BY MOUTH EVERY DAY 2/17/21  Yes Provider, Historical   metFORMIN (GLUCOPHAGE) 500 mg tablet  1/28/21  Yes Provider, Historical   sotaloL (BETAPACE) 80 mg tablet Take 0.5 Tabs by mouth daily.  11/21/20 Yes Dean Cali MD   insulin lispro protamin-lispro (HumaLOG Mix 75-25 KwikPen) flexpen 24 Units two (2) times a day. Yes Provider, Historical   bumetanide (BUMEX) 1 mg tablet Take 1 Tab by mouth two (2) times daily (with meals). 9/29/20  Yes Luis Rojas MD   ticagrelor (Brilinta) 90 mg tablet Take  by mouth two (2) times a day. Yes Provider, Historical   multivitamin,tx-iron-minerals (Complete Multivitamin) tab Take  by mouth daily. Yes Provider, Historical   aspirin delayed-release 81 mg tablet Take 1 Tab by mouth daily. 9/1/20  Yes Jacinto Ny MD   magnesium oxide (MAG-OX) 400 mg tablet Take 400 mg by mouth three (3) times daily. Yes Provider, Historical   melatonin 3 mg tablet Take 3 mg by mouth nightly as needed. Yes Provider, Historical   acetaminophen (TYLENOL EXTRA STRENGTH) 500 mg tablet Take  by mouth every six (6) hours as needed for Pain. Yes Provider, Historical   polyethylene glycol (MIRALAX) 17 gram packet Take 17 g by mouth as needed. Yes Provider, Historical   gabapentin (NEURONTIN) 100 mg capsule Take 100 mg by mouth three (3) times daily. 4/27/16  Yes Provider, Historical   levothyroxine (SYNTHROID) 200 mcg tablet Take 150 mcg by mouth Daily (before breakfast). 4/27/16  Yes Provider, Historical   omeprazole (PRILOSEC) 40 mg capsule Take 40 mg by mouth daily. 4/27/16  Yes Provider, Historical   atorvastatin (LIPITOR) 40 mg tablet Take 40 mg by mouth daily. Yes Provider, Historical   potassium chloride SR (KLOR-CON 10) 10 mEq tablet Take 20 mEq by mouth daily.    Yes Provider, Historical        Allergies   Allergen Reactions    Codeine Other (comments)     Patient states she is not allergic    Novocain [Procaine] Nausea and Vomiting    Tramadol Other (comments)     Headache       Review of Systems:  ROS    A comprehensive review of systems was preformed and it is negative except mentioned in HPI    Objective:     Vitals:    03/17/21 1338 03/17/21 1339   BP: (!) 148/76 127/65   Pulse: 89 75   Temp: 97.2 °F (36.2 °C)    TempSrc: Temporal    SpO2: 98%    Weight: 172 lb 6.4 oz (78.2 kg)    Height: 5' 2\" (1.575 m)         Physical Exam:    Physical Exam  Vitals signs and nursing note reviewed. Constitutional:       Appearance: She is obese. HENT:      Head: Normocephalic. Eyes:      Extraocular Movements: Extraocular movements intact. Pupils: Pupils are equal, round, and reactive to light. Neck:      Musculoskeletal: Neck supple. Comments: Right thyroid lobe smooth and enlarged, left lobe not palpated  Cardiovascular:      Rate and Rhythm: Normal rate and regular rhythm. Pulmonary:      Effort: Pulmonary effort is normal.      Breath sounds: Normal breath sounds. Abdominal:      General: Bowel sounds are normal.      Palpations: Abdomen is soft. Musculoskeletal: Normal range of motion. Skin:     General: Skin is warm. Neurological:      General: No focal deficit present. Mental Status: She is alert and oriented to person, place, and time. Psychiatric:         Mood and Affect: Mood normal.         Behavior: Behavior normal.          Labs and Imaging:  Results for Jeanne Hallman (MRN [id number]) as of 3/17/2021 13:38   Ref. Range 8/27/2012 00:00 2/28/2013 13:44 11/29/2017 00:00 8/10/2020 17:54   TSH Latest Ref Range: 0.36 - 3.74 uIU/mL 2.410 5.340 (H) 5.720 (H) 6.58 (H)     Results for Jeanne Hallman (MRN [id number]) as of 3/17/2021 13:53   Ref.  Range 3/11/2021 12:15   Sodium Latest Ref Range: 136 - 145 mmol/L 140   Potassium Latest Ref Range: 3.5 - 5.1 mmol/L 3.6   Chloride Latest Ref Range: 97 - 108 mmol/L 105   CO2 Latest Ref Range: 21 - 32 mmol/L 28   Anion gap Latest Ref Range: 5 - 15 mmol/L 7   Glucose Latest Ref Range: 65 - 100 mg/dL 154 (H)   BUN Latest Ref Range: 6 - 20 mg/dL 16   Creatinine Latest Ref Range: 0.55 - 1.02 mg/dL 0.63   BUN/Creatinine ratio Latest Ref Range: 12 - 20   25 (H)   Calcium Latest Ref Range: 8.5 - 10.1 mg/dL 9.1   GFR est non-AA Latest Ref Range: >60 ml/min/1.73m2 >60   GFR est AA Latest Ref Range: >60 ml/min/1.73m2 >60   Bilirubin, total Latest Ref Range: 0.2 - 1.0 mg/dL 0.8   Protein, total Latest Ref Range: 6.4 - 8.2 g/dL 6.8   Albumin Latest Ref Range: 3.5 - 5.0 g/dL 2.9 (L)   Globulin Latest Ref Range: 2.0 - 4.0 g/dL 3.9   A-G Ratio Latest Ref Range: 1.1 - 2.2   0.7 (L)   ALT Latest Ref Range: 12 - 78 U/L 37   AST Latest Ref Range: 15 - 37 U/L 39 (H)   Alk. phosphatase Latest Ref Range: 45 - 117 U/L 114     Thyroid US 12-8-2020:  FINDINGS: Thyroid ultrasound. Isthmus thickened 0.6 cm. Right lobe enlarged,  measuring at least 5 cm in length by 2.4 cm in width by 3.1 cm in thickness. The  right lobe is homogeneous without focal nodule. The left thyroid lobe is not  identified.     IMPRESSION  IMPRESSION:  1. Enlarged right thyroid lobe and isthmus. 2. Left thyroid lobe not identified. Technologist provides patient history of  left thyroid removed 10 years ago. 3. No focal nodule.   Last 3 Recorded Weights in this Encounter    03/17/21 1338   Weight: 172 lb 6.4 oz (78.2 kg)        Lab Results   Component Value Date/Time    Hemoglobin A1c 10.4 (H) 08/11/2020 12:17 AM    Hemoglobin A1c, External 10.4 05/12/2015    Hemoglobin A1c, External 7.9 02/13/2015        Assessment:     Patient Active Problem List   Diagnosis Code    Acid indigestion K30    Asthma 80    Back pain M54.9    Wears dentures Z97.2    Constipation 564.0    Diabetes mellitus 250    Diarrhea R19.7    Frequent episodic tension-type headache G44.219    Hemorrhoids 65    Hernia of unspecified site of abdominal cavity without mention of obstruction or gangrene K46.9    HTN (hypertension) I10    Muscle pain M79.10    SOB (shortness of breath) R06.02    Thyroid disorder E07.9    Skin cancer C44.90    Cardiomyopathy (HCC) I42.9    Biventricular ICD (implantable cardioverter-defibrillator) in place Z95.810    TIA (transient ischemic attack) G45.9    Dysarthria R47.1    Severe obesity (HCC) E66.01    CHF exacerbation (HCC) I50.9    CHF (congestive heart failure) (HCC) I50.9    Hyperkalemia E87.5    Hypomagnesemia E83.42    Atrial fibrillation (HCC) I48.91    Acute on chronic congestive heart failure (HCC) I50.9    Hypoxia R09.02    Elevated troponin R77.8    Chest pain R07.9    Acquired hypothyroidism E03.9    History of lobectomy of thyroid Z90.09           Plan:     Hypothyroidism:  I reviewed labs and notes from the referring provider's office. 8-:  TSH elevated at 6.58  Patient is currently on levothyroxine 150 mcg daily. Not sure what dose she was on when she had the above labs done. Clinically she has nonspecific symptoms, but she has multiple other health issues as well which could be attributing to it. Plan:  Check thyroid function test today. Discussed with patient about the correct way of taking levothyroxine. Advised patient to take multivitamin with dinner, so it does not interfere with levothyroxine absorption. I will see her back in my office in 3 months. History of left thyroid lobectomy:  I reviewed ultrasound from 12-8-2020. Right lobe appears enlarged but there are no distinct nodules. Discussed with patient about this finding. No further follow-up required unless there is any change in her symptoms. Congestive heart failure with low ejection fraction:  S/p biventricular ICD. Previously on amiodarone which has been switched to sotalol since November 2020.     Orders Placed This Encounter    TSH AND FREE T4        Signed By: Cyrus Hernandez MD     March 17, 2021      Return to clinic 3 months

## 2021-03-17 NOTE — LETTER
3/17/2021 Patient: Anali Oneil YOB: 1950 Date of Visit: 3/17/2021 Melany Rosenberg MD 
[address] [place] [address] Via Fax: [phone] Dear Melany Rosenberg MD, Thank you for referring Ms. Selam Bass to [address] [place] [address] for evaluation. My notes for this consultation are attached. If you have questions, please do not hesitate to call me. I look forward to following your patient along with you. Sincerely, Cyrus Hernandez MD

## 2021-03-18 ENCOUNTER — TELEPHONE (OUTPATIENT)
Dept: ENDOCRINOLOGY | Age: 71
End: 2021-03-18

## 2021-03-18 DIAGNOSIS — E03.9 ACQUIRED HYPOTHYROIDISM: Primary | ICD-10-CM

## 2021-03-18 LAB
T4 FREE SERPL-MCNC: 3.05 NG/DL (ref 0.82–1.77)
TSH SERPL DL<=0.005 MIU/L-ACNC: <0.005 UIU/ML (ref 0.45–4.5)

## 2021-03-18 RX ORDER — LEVOTHYROXINE SODIUM 125 UG/1
125 TABLET ORAL
Qty: 30 TAB | Refills: 3 | Status: SHIPPED | OUTPATIENT
Start: 2021-03-18 | End: 2021-04-17

## 2021-03-18 NOTE — PROGRESS NOTES
Please inform patient that her thyroid labs indicate that we need to go down on her dose of levothyroxine. Since she is taking 150 mcg, I am going to decrease her dose to 125 mcg daily. Please remember to get labs done prior to next visit. I am making an order (please mail it to patient).

## 2021-03-18 NOTE — TELEPHONE ENCOUNTER
Patient notified    ----- Message from Zoran Self MD sent at 3/18/2021 11:37 AM EDT -----  Please inform patient that her thyroid labs indicate that we need to go down on her dose of levothyroxine. Since she is taking 150 mcg, I am going to decrease her dose to 125 mcg daily. Please remember to get labs done prior to next visit. I am making an order (please mail it to patient).

## 2021-06-15 PROBLEM — R09.81 SINUS CONGESTION: Status: ACTIVE | Noted: 2021-01-01

## 2021-06-15 PROBLEM — I50.9 ACUTE CHF (CONGESTIVE HEART FAILURE) (HCC): Status: ACTIVE | Noted: 2021-01-01

## 2021-06-15 NOTE — PROGRESS NOTES
Problem: Falls - Risk of  Goal: *Absence of Falls  Description: Document Americo Oh Fall Risk and appropriate interventions in the flowsheet.   Outcome: Progressing Towards Goal  Note: Fall Risk Interventions:                                Problem: Patient Education: Go to Patient Education Activity  Goal: Patient/Family Education  Outcome: Progressing Towards Goal     Problem: General Medical Care Plan  Goal: *Vital signs within specified parameters  Outcome: Progressing Towards Goal  Goal: *Labs within defined limits  Outcome: Progressing Towards Goal  Goal: *Absence of infection signs and symptoms  Outcome: Progressing Towards Goal  Goal: *Optimal pain control at patient's stated goal  Outcome: Progressing Towards Goal  Goal: *Skin integrity maintained  Outcome: Progressing Towards Goal  Goal: *Fluid volume balance  Outcome: Progressing Towards Goal  Goal: *Optimize nutritional status  Outcome: Progressing Towards Goal  Goal: *Anxiety reduced or absent  Outcome: Progressing Towards Goal  Goal: *Progressive mobility and function (eg: ADL's)  Outcome: Progressing Towards Goal     Problem: Patient Education: Go to Patient Education Activity  Goal: Patient/Family Education  Outcome: Progressing Towards Goal

## 2021-06-15 NOTE — ED PROVIDER NOTES
HPI   Patient is being monitored closely by cardiology for heart failure. Patient has apparently been noncompliant with Lasix over the last few days and cardiology has referred her for admission for diuresis. Patient reports feeling short of breath and with weight gain. She denies chest pain, constitutional symptoms.   Past Medical History:   Diagnosis Date    Acid indigestion     heartburn    Aneurysm (HCC)     Anxiety disorder     Arthritis     Asthma     Back pain     Bladder spasms     Cerebral artery occlusion with cerebral infarction ([facility] [address] 08/2020    Chest pain     Constipation     Cough     Diabetes mellitus     Diarrhea     Fatigue     Fibromyalgia     Frequent episodic tension-type headache     GERD (gastroesophageal reflux disease)     Hearing reduced     Heart failure (HCC)     Hemorrhoids     Hernia of unspecified site of abdominal cavity without mention of obstruction or gangrene     HTN (hypertension)     Hypothyroidism     ICD (implantable cardioverter-defibrillator) in place     Muscle pain     joint pain    N&V (nausea and vomiting)     Pacemaker     biventricular ICD 1/26/18    Ringing in ears     Skin cancer     Sleep apnea     cant tolerate the machine    SOB (shortness of breath)     SOB (shortness of breath)     Swallowing difficulty     Thyroid disorder     Vertigo     Wears dentures        Past Surgical History:   Procedure Laterality Date    HX CARPAL TUNNEL RELEASE      right hand    HX GI      HX HEART CATHETERIZATION  2/6/16    HX HEENT      thyroid     HX HYSTERECTOMY      HX ORTHOPAEDIC      HX PACEMAKER Left 6/20/16    [employer] ICD    HX THYROIDECTOMY      [place] REMOVAL GALLBLADDER           Family History:   Problem Relation Age of Onset    Diabetes Mother     Heart Disease Mother     Heart Disease Father    Elsi Diaz Sister     Diabetes Sister     Heart Disease Sister     Hypertension Sister     Lupus Sister     Breast Cancer Sister     Diabetes Brother        Social History     Socioeconomic History    Marital status:      Spouse name: Brian Xavier    Number of children: 10    Years of education: Not on file    Highest education level: 8th grade   Occupational History     Employer: RETIRED   Tobacco Use    Smoking status: Never Smoker    Smokeless tobacco: Never Used   Vaping Use    Vaping Use: Never used   Substance and Sexual Activity    Alcohol use: Yes     Comment: wine occasionally    Drug use: No    Sexual activity: Not on file   Other Topics Concern     Service No    Blood Transfusions No    Caffeine Concern No    Occupational Exposure No    Hobby Hazards No    Sleep Concern Yes    Stress Concern Yes    Weight Concern Yes    Special Diet Yes    Back Care No    Exercise No    Bike Helmet No    Seat Belt Yes    Self-Exams No   Social History Narrative    Pt. Has as son that lives with her that is blind. She only drives around town. Social Determinants of Health     Financial Resource Strain: Medium Risk    Difficulty of Paying Living Expenses: Somewhat hard   Food Insecurity: No Food Insecurity    Worried About Running Out of Food in the Last Year: Never true    [place] of Food in the Last Year: Never true   Transportation Needs: Unmet Transportation Needs    Lack of Transportation (Medical): Yes    Lack of Transportation (Non-Medical): Yes   Physical Activity: Inactive    Days of Exercise per Week: 0 days    Minutes of Exercise per Session: 0 min   Stress: No Stress Concern Present    Feeling of Stress : Only a little   Social Connections: Moderately Isolated    Frequency of Communication with Friends and Family:  Three times a week    Frequency of Social Gatherings with Friends and Family: More than three times a week    Attends [employer] Services: Never    Active Member of Clubs or Organizations: No    Attends Club or Organization Meetings: Never    Marital Status:    Intimate Partner Violence: Not At Risk    Fear of Current or Ex-Partner: No    Emotionally Abused: No    Physically Abused: No    Sexually Abused: No         ALLERGIES: Codeine, Novocain [procaine], and Tramadol    Review of Systems   Constitutional: Negative. HENT: Negative. Eyes: Negative. Respiratory: Positive for shortness of breath. Cardiovascular: Positive for leg swelling. Gastrointestinal: Positive for abdominal distention. Endocrine: Negative. Genitourinary: Negative. Musculoskeletal: Negative. Allergic/Immunologic: Negative. Neurological: Negative. Hematological: Negative. Psychiatric/Behavioral: Negative. All other systems reviewed and are negative. Vitals:    06/15/21 1434   BP: 137/73   Pulse: (!) 104   Resp: 18   Temp: 98 °F (36.7 °C)   SpO2: 96%   Weight: 75.8 kg (167 lb)   Height: 5' 2\" (1.575 m)            Physical Exam  Vitals and nursing note reviewed. Constitutional:       General: She is not in acute distress. Appearance: Normal appearance. She is not ill-appearing, toxic-appearing or diaphoretic. HENT:      Head: Normocephalic and atraumatic. Nose: Nose normal.      Mouth/Throat:      Mouth: Mucous membranes are moist.   Eyes:      Extraocular Movements: Extraocular movements intact. Pupils: Pupils are equal, round, and reactive to light. Neck:      Vascular: No carotid bruit. Cardiovascular:      Rate and Rhythm: Normal rate and regular rhythm. Pulses: Normal pulses. Heart sounds: Normal heart sounds. No murmur heard. No friction rub. No gallop. Pulmonary:      Effort: Pulmonary effort is normal. No respiratory distress. Breath sounds: Normal breath sounds. No stridor. No wheezing, rhonchi or rales. Chest:      Chest wall: No tenderness. Abdominal:      General: Abdomen is flat. There is no distension. Palpations: Abdomen is soft.    Musculoskeletal:         General: No swelling, tenderness, deformity or signs of injury. Normal range of motion. Cervical back: Normal range of motion and neck supple. No rigidity or tenderness. Right lower leg: No edema. Left lower leg: No edema. Lymphadenopathy:      Cervical: No cervical adenopathy. Skin:     General: Skin is warm. Neurological:      General: No focal deficit present. Mental Status: She is alert and oriented to person, place, and time. Mental status is at baseline. Cranial Nerves: No cranial nerve deficit. Sensory: No sensory deficit. Motor: No weakness. Coordination: Coordination normal.      Gait: Gait normal.   Psychiatric:         Mood and Affect: Mood normal.         Behavior: Behavior normal.          MDM     Patient's vital signs and exam not suggestive of volume overload, however cardiology reports monitoring this patient very closely and will admit based on their recommendation.   Procedures

## 2021-06-15 NOTE — ED NOTES
TRANSFER - OUT REPORT:    Verbal report given to [address] on [address]  being transferred to [facility] for routine progression of care       Report consisted of patients Situation, Background, Assessment and   Recommendations(SBAR). Information from the following report(s) SBAR was reviewed with the receiving nurse. Opportunity for questions and clarification was provided.       Patient transported with:   Registered Nurse

## 2021-06-15 NOTE — H&P
History and Physical      Chief Complaints:     Chief Complaint   Patient presents with    Headache         Subjective:     Erickson Rodriguez is a 65 y.o. female followed by Shann Claude, MD with past medical history significant for non ischemic cardiomyopathy, CAD s/p cardiac cath 3/11/21 noting a patent stent in the proximal LAD with 40% disease in the mid LAD and 30% in the mid RCA, CHF with echo 21 with LVEF 40-45% and moderate LV diastolic dysfunction, s/p AICD placement, HTN, DM, stroke 2020 who was referred to the ER today by her cardiology NP, Nadeem Abdullahi, due to persistent SOB with a doubling of her HeartLogic score from 20 to 40. The patient does have a history of noncompliance with her Bumex. She is prescribed 2mg qam and 0.5mg qpm but does not take the night time dose and alters the morning dose frequently. She notes the shortness of breath starting to bother her more early in last week and feels like there is fluid collecting in her abdomen. No leg edema which is normal for her, even in fluid overload. She began taking the prescribed 2mg of Bumex 4 days ago which did bring her weight down 4 lbs but no relief of dyspnea. She reports being 2lbs less than what is typically her baseline of 165 now at 163lbs however she has had decreased appetite and intake for a couple of weeks which may be related to situational depression as her sister  suddenly 3 weeks ago then her friend passed away. She also endorses nasal congestion, headache, some mild cough, intermittent nausea, alternating diarrhea and constipation. She was COVID positive 21. She has received the first COVID vaccine and second is due 21.       Past Medical History:   Diagnosis Date    Acid indigestion     heartburn    Aneurysm (HCC)     Anxiety disorder     Arthritis     Asthma     Back pain     Bladder spasms     Cerebral artery occlusion with cerebral infarction ([facility] [address] 2020    Chest pain     Constipation     Cough     Diabetes mellitus     Diarrhea     Fatigue     Fibromyalgia     Frequent episodic tension-type headache     GERD (gastroesophageal reflux disease)     Hearing reduced     Heart failure (HCC)     Hemorrhoids     Hernia of unspecified site of abdominal cavity without mention of obstruction or gangrene     HTN (hypertension)     Hypothyroidism     ICD (implantable cardioverter-defibrillator) in place     Muscle pain     joint pain    N&V (nausea and vomiting)     Pacemaker     biventricular ICD 1/26/18    Ringing in ears     Skin cancer     Sleep apnea     cant tolerate the machine    SOB (shortness of breath)     SOB (shortness of breath)     Swallowing difficulty     Thyroid disorder     Vertigo     Wears dentures       Past Surgical History:   Procedure Laterality Date    HX CARPAL TUNNEL RELEASE      right hand    HX GI      HX HEART CATHETERIZATION  2/6/16    HX HEENT      thyroid     HX HYSTERECTOMY      HX ORTHOPAEDIC      HX PACEMAKER Left 6/20/16    [employer] ICD    HX THYROIDECTOMY      CA REMOVAL GALLBLADDER       Family History   Problem Relation Age of Onset    Diabetes Mother     Heart Disease Mother     Heart Disease Father     Cancer Sister     Diabetes Sister     Heart Disease Sister     Hypertension Sister     Lupus Sister     Breast Cancer Sister     Diabetes Brother       Social History     Tobacco Use    Smoking status: Never Smoker    Smokeless tobacco: Never Used   Substance Use Topics    Alcohol use: Yes     Comment: wine occasionally       Prior to Admission medications    Medication Sig Start Date End Date Taking? Authorizing Provider   butalbital-acetaminophen-caff (FIORICET) -40 mg per capsule TAKE 1 CAPSULE BY MOUTH EVERY DAY 2/17/21   Provider, Historical   metFORMIN (GLUCOPHAGE) 500 mg tablet  1/28/21   Provider, Historical   sotaloL (BETAPACE) 80 mg tablet Take 0.5 Tabs by mouth daily.  11/21/20 Pankaj Maier MD   insulin lispro protamin-lispro (HumaLOG Mix 75-25 KwikPen) flexpen 24 Units two (2) times a day. Provider, Historical   bumetanide (BUMEX) 1 mg tablet Take 1 Tab by mouth two (2) times daily (with meals). 9/29/20   Kevin Bass MD   ticagrelor (Brilinta) 90 mg tablet Take  by mouth two (2) times a day. Provider, Historical   multivitamin,tx-iron-minerals (Complete Multivitamin) tab Take  by mouth daily. Provider, Historical   aspirin delayed-release 81 mg tablet Take 1 Tab by mouth daily. 9/1/20   David Stewart MD   magnesium oxide (MAG-OX) 400 mg tablet Take 400 mg by mouth three (3) times daily. Provider, Historical   melatonin 3 mg tablet Take 3 mg by mouth nightly as needed. Provider, Historical   acetaminophen (TYLENOL EXTRA STRENGTH) 500 mg tablet Take  by mouth every six (6) hours as needed for Pain. Provider, Historical   polyethylene glycol (MIRALAX) 17 gram packet Take 17 g by mouth as needed. Provider, Historical   gabapentin (NEURONTIN) 100 mg capsule Take 100 mg by mouth three (3) times daily. 4/27/16   Provider, Historical   omeprazole (PRILOSEC) 40 mg capsule Take 40 mg by mouth daily. 4/27/16   Provider, Historical   atorvastatin (LIPITOR) 40 mg tablet Take 40 mg by mouth daily. Provider, Historical   potassium chloride SR (KLOR-CON 10) 10 mEq tablet Take 20 mEq by mouth daily. Provider, Historical     Allergies   Allergen Reactions    Codeine Other (comments)     Patient states she is not allergic    Novocain [Procaine] Nausea and Vomiting    Tramadol Other (comments)     Headache        Review of Systems:  Review of Systems   Constitutional: Positive for activity change, appetite change, fatigue and unexpected weight change. Negative for chills, diaphoresis and fever. HENT: Positive for congestion. Negative for ear pain, rhinorrhea and sore throat. Respiratory: Positive for cough and shortness of breath.  Negative for chest tightness and wheezing. Cardiovascular: Negative for chest pain, palpitations and leg swelling. Gastrointestinal: Positive for constipation, diarrhea and nausea. Negative for abdominal distention, abdominal pain and vomiting. Genitourinary: Negative for difficulty urinating. Musculoskeletal: Negative for joint swelling. Skin: Negative for rash. Neurological: Positive for weakness (generalized) and headaches. Negative for dizziness and numbness. Objective:     Vitals:  Visit Vitals  /73 (BP 1 Location: Left upper arm, BP Patient Position: At rest)   Pulse (!) 104   Temp 98 °F (36.7 °C)   Resp 18   Ht 5' 2\" (1.575 m)   Wt 75.8 kg (167 lb)   SpO2 96%   BMI 30.54 kg/m²       Physical Exam:  General: Alert, cooperative, no distress. Appears fatigued. Head:  Normocephalic, without obvious abnormality, atraumatic. Eyes:  Conjunctivae/corneas clear. Pupils equal, round, reactive to light. Extraocular movements intact. Lungs:  Clear to auscultation bilaterally, no wheezes, crackles  Chest wall: No tenderness or deformity. Heart:  Regular rate and rhythm, S1, S2 normal, no murmur, click, rub, or gallop. Abdomen:   Obese abdomen with no obvious fluid retention, Soft, non-tender. Bowel sounds normal. No masses. No organomegaly. Back:  No spine tenderness to palpation  Extremities: Extremities normal, atraumatic, no cyanosis or edema. Pulses: Symmetric all extremities. Skin: Skin color, texture, turgor normal.   Lymph nodes: Cervical nodes normal.  Neurologic: CNII-XII intact.  Normal strength, sensation    Labs:  Recent Results (from the past 24 hour(s))   CBC WITH AUTOMATED DIFF    Collection Time: 06/15/21  3:20 PM   Result Value Ref Range    WBC 5.9 4.4 - 11.3 K/uL    RBC 4.07 (L) 4.50 - 5.90 M/uL    HGB 12.8 (L) 13.5 - 17.5 g/dL    HCT 38.3 36 - 46 %    MCV 94.1 80 - 100 FL    MCH 31.3 31 - 34 PG    MCHC 33.3 31.0 - 36.0 g/dL    RDW 13.4 11.5 - 14.5 %    PLATELET 322 828 - 797 K/uL MPV 8.3 6.5 - 11.5 FL    NRBC 0.1  WBC    ABSOLUTE NRBC 0.00 K/uL    NEUTROPHILS 61 42 - 75 %    LYMPHOCYTES 25 20.5 - 51.1 %    MONOCYTES 11 (H) 1.7 - 9.3 %    EOSINOPHILS 2 0.9 - 2.9 %    BASOPHILS 1 0.0 - 2.5 %    ABS. NEUTROPHILS 3.7 1.8 - 7.7 K/UL    ABS. LYMPHOCYTES 1.5 1.0 - 4.8 K/UL    ABS. MONOCYTES 0.6 0.2 - 2.4 K/UL    ABS. EOSINOPHILS 0.1 0.0 - 0.7 K/UL    ABS. BASOPHILS 0.0 0.0 - 0.2 K/UL   METABOLIC PANEL, COMPREHENSIVE    Collection Time: 06/15/21  3:20 PM   Result Value Ref Range    Sodium 136 136 - 145 mmol/L    Potassium 3.9 3.5 - 5.1 mmol/L    Chloride 98 97 - 108 mmol/L    CO2 33 (H) 21 - 32 mmol/L    Anion gap 5 5 - 15 mmol/L    Glucose 365 (H) 65 - 100 mg/dL    BUN 28 (H) 6 - 20 mg/dL    Creatinine 1.15 (H) 0.55 - 1.02 mg/dL    BUN/Creatinine ratio 24 (H) 12 - 20      GFR est AA 56 (L) >60 ml/min/1.73m2    GFR est non-AA 46 (L) >60 ml/min/1.73m2    Calcium 9.1 8.5 - 10.1 mg/dL    Bilirubin, total 0.5 0.2 - 1.0 mg/dL    AST (SGOT) 28 15 - 37 U/L    ALT (SGPT) 26 12 - 78 U/L    Alk.  phosphatase 114 45 - 117 U/L    Protein, total 8.1 6.4 - 8.2 g/dL    Albumin 3.1 (L) 3.5 - 5.0 g/dL    Globulin 5.0 (H) 2.0 - 4.0 g/dL    A-G Ratio 0.6 (L) 1.1 - 2.2     CK    Collection Time: 06/15/21  3:20 PM   Result Value Ref Range    CK 49 26 - 192 U/L   TROPONIN I    Collection Time: 06/15/21  3:20 PM   Result Value Ref Range    Troponin-I, Qt. 0.26 (H) <0.05 ng/mL   BNP    Collection Time: 06/15/21  3:20 PM   Result Value Ref Range    NT pro- (H) <125 pg/mL   EKG, 12 LEAD, INITIAL    Collection Time: 06/15/21  3:25 PM   Result Value Ref Range    Ventricular Rate 77 BPM    Atrial Rate 73 BPM    P-R Interval 50 ms    QRS Duration 130 ms    Q-T Interval 448 ms    QTC Calculation (Bezet) 508 ms    Calculated P Axis 21 degrees    Calculated R Axis -151 degrees    Calculated T Axis 89 degrees    Diagnosis       Atrial-sensed ventricular-paced complexes  No further analysis attempted due to paced rhythm         Imaging:  XR CHEST PA LAT    Result Date: 6/15/2021  The cardiomediastinal silhouette is appropriate for age, technique, and lung expansion. Pulmonary vasculature is not congested. The lungs are essentially clear. No effusion or pneumothorax is seen. Assessment & Plan:      1. CHF. Patient has persistent dyspnea which is not improving with outpatient diuresis per cardiology. Case discussed with cardiology after ER work up complete and they recommended admission for IV diuresis based on increased HeartLogic score and symptomology. Lasix 40mg IV q8h. Daily weights. Sodium restriction. Cardiology consult ordered. Place on telemetry. 2. DM. Continue home medications. Carb restriction. Antonette Darinel with SSI coverage and hypoglycemic protocol prn.  3. Sinus congestion. Add flonase. Pepcid for GI prophylaxis. Lovenox for DVT prophylaxis.           Electronically signed by Ofelia Rojas PA-C on 6/15/2021 at 5:39 PM

## 2021-06-15 NOTE — ED TRIAGE NOTES
Chief Complaint   Patient presents with    Headache     Pt sent by Louie Bueno, Cardiology for noncompliance with Lasix. Pt reports headache x 1 day, pt reports chronic SOB.

## 2021-06-16 NOTE — PROGRESS NOTES
Comments:  I visited patient in the  acute care [place] [address] unit for initial assessment. Patient was alone during the visit. Patient engaged in storytelling and shared about her difficult previous month when she lost a family member, friends and a pet. She seemed to find strength from her trust in God along with the support of her family members. She stated she is doing much better and will likely go home tomorrow. I provided chaplaincy education and listened with empathy and reflection. I facilitated storytelling while exploring her needs and resources. I normalized her losses and provided grief support. I affirmed her trust in God's will and supportive relationships. I provided prayer per her request and advised of  availability. Chaplains will follow up if further referrals are requested. Chaplain Andrés Mccormack M.Div.     can be reached by calling the  at   [facility] [phone]

## 2021-06-16 NOTE — PROGRESS NOTES
Reason for Admission: CHF                    RUR Score:  26              PCP: First and Last name:   Michael Vora MD     Name of Practice: [facility]   Are you a current patient: Yes/No: Yes   Approximate date of last visit: 2 weeks ago   Can you participate in a virtual visit if needed: No    Do you (patient/family) have any concerns for transition/discharge? No               Plan for utilizing home health: No. Patient stated she was just discharged from home health at the end of May. States she does not need home health services at this time. Current Advanced Directive/Advance Care Plan:  Full Code      Healthcare Decision Maker:   Click here to complete HealthCare Decision Makers including selection of the Healthcare Decision Maker Relationship (ie \"Primary\")            Primary Decision Maker: Nancy Abel - Spouse - [phone]    Transition of Care Plan: Plan to discharge home to self care.

## 2021-06-16 NOTE — PROGRESS NOTES
Problem: Falls - Risk of  Goal: *Absence of Falls  Description: Document [place] Fall Risk and appropriate interventions in the flowsheet.   Outcome: Progressing Towards Goal  Note: Fall Risk Interventions:                                Problem: Patient Education: Go to Patient Education Activity  Goal: Patient/Family Education  Outcome: Progressing Towards Goal     Problem: General Medical Care Plan  Goal: *Vital signs within specified parameters  Outcome: Progressing Towards Goal  Goal: *Labs within defined limits  Outcome: Progressing Towards Goal  Goal: *Absence of infection signs and symptoms  Outcome: Progressing Towards Goal  Goal: *Optimal pain control at patient's stated goal  Outcome: Progressing Towards Goal  Goal: *Skin integrity maintained  Outcome: Progressing Towards Goal  Goal: *Fluid volume balance  Outcome: Progressing Towards Goal  Goal: *Optimize nutritional status  Outcome: Progressing Towards Goal  Goal: *Anxiety reduced or absent  Outcome: Progressing Towards Goal  Goal: *Progressive mobility and function (eg: ADL's)  Outcome: Progressing Towards Goal     Problem: Patient Education: Go to Patient Education Activity  Goal: Patient/Family Education  Outcome: Progressing Towards Goal     Problem: Fluid Volume - Risk of, Imbalanced  Goal: *Balanced intake and output  Outcome: Progressing Towards Goal     Problem: Patient Education: Go to Patient Education Activity  Goal: Patient/Family Education  Outcome: Progressing Towards Goal

## 2021-06-16 NOTE — PROGRESS NOTES
Progress Note  Date:2021       Room:[facility]  Patient Cammy Walters     YOB: 1950     Age:70 y.o. Subjective    HPI:  Erickson Rodriguez is a 65 y.o. female followed by Shann Claude, MD with past medical history significant for non ischemic cardiomyopathy, CAD s/p cardiac cath 3/11/21 noting a patent stent in the proximal LAD with 40% disease in the mid LAD and 30% in the mid RCA, CHF with echo 21 with LVEF 40-45% and moderate LV diastolic dysfunction, s/p AICD placement, HTN, DM, stroke 2020 who was referred to the ER today by her cardiology NP, Nadeem Abdullahi, due to persistent SOB with a doubling of her HeartLogic score from 20 to 40. The patient does have a history of noncompliance with her Bumex. She is prescribed 2mg qam and 0.5mg qpm but does not take the night time dose and alters the morning dose frequently. She notes the shortness of breath starting to bother her more early in last week and feels like there is fluid collecting in her abdomen. No leg edema which is normal for her, even in fluid overload. She began taking the prescribed 2mg of Bumex 4 days ago which did bring her weight down 4 lbs but no relief of dyspnea. She reports being 2lbs less than what is typically her baseline of 165 now at 163lbs however she has had decreased appetite and intake for a couple of weeks which may be related to situational depression as her sister  suddenly 3 weeks ago then her friend passed away. She also endorses nasal congestion, headache, some mild cough, intermittent nausea, alternating diarrhea and constipation. She was COVID positive 21. She has received the first COVID vaccine and second is due 21.     On follow-up patient seen and evaluated sitting up in bed eating breakfast no acute distress is awake alert and oriented denies any shortness of breath or chest pain.   Patient had approximately 600 cc of urine output since admission and weight noted to be 168 pounds and he states it was 167 pounds at cardiology office. Tolerating current Lasix dosing follow-up cardiology recommendations    Review of Systems   Constitutional: Negative. HENT: Negative. Eyes: Negative. Respiratory: Negative. Cardiovascular: Positive for leg swelling. Gastrointestinal: Negative. Endocrine: Negative. Genitourinary: Negative. Musculoskeletal: Negative. Skin: Negative. Allergic/Immunologic: Negative. Neurological: Negative. Hematological: Negative. Psychiatric/Behavioral: Negative. Objective           Vitals Last 24 Hours:  Patient Vitals for the past 24 hrs:   Temp Pulse Resp BP SpO2   06/16/21 0800  70      06/16/21 0704 97.6 °F (36.4 °C) 70 18 (!) 146/72 98 %   06/16/21 0331 97.6 °F (36.4 °C) 77 18 130/66 95 %   06/15/21 2324 98.1 °F (36.7 °C) 66 18 (!) 114/53 98 %   06/15/21 1915     97 %   06/15/21 1827 97.6 °F (36.4 °C) 73 18 (!) 145/72    06/15/21 1809  72  133/68 99 %   06/15/21 1730  75  (!) 126/59 100 %   06/15/21 1630  76  128/76 97 %   06/15/21 1530  77  (!) 143/61 99 %   06/15/21 1434 98 °F (36.7 °C) (!) 104 18 137/73 96 %        I/O (24Hr): Intake/Output Summary (Last 24 hours) at 6/16/2021 0919  Last data filed at 6/15/2021 2325  Gross per 24 hour   Intake 150 ml   Output 600 ml   Net -450 ml       Physical Exam:  General: Alert, cooperative, no distress, appears stated age. Head:  Normocephalic, without obvious abnormality, atraumatic. Eyes:  Conjunctivae/corneas clear. Pupils equal, round, reactive to light. Extraocular movements intact. Lungs:  Clear to auscultation bilaterally. no wheeze, rales, crackles, rhonchi   Chest wall: No tenderness or deformity. Heart:  Regular rate and rhythm, S1, S2 normal, no murmur, click, rub or gallop. Abdomen:  Soft, non-tender. Bowel sounds normal. No masses,  No organomegaly. Extremities: Extremities normal, atraumatic, no cyanosis or edema.   Pulses: 2+ and symmetric all extremities. Skin: Skin color, texture, turgor normal. No rashes or lesions  Neurologic: Awake, Alert, oriented.  No obvious gross sensory or motor deficits      Medications           Current Facility-Administered Medications   Medication Dose Route Frequency    potassium chloride (K-DUR, KLOR-CON) SR tablet 40 mEq  40 mEq Oral BID    sodium chloride (NS) flush 5-40 mL  5-40 mL IntraVENous Q8H    sodium chloride (NS) flush 5-40 mL  5-40 mL IntraVENous PRN    acetaminophen (TYLENOL) tablet 650 mg  650 mg Oral Q6H PRN    Or    acetaminophen (TYLENOL) suppository 650 mg  650 mg Rectal Q6H PRN    polyethylene glycol (MIRALAX) packet 17 g  17 g Oral DAILY PRN    enoxaparin (LOVENOX) injection 40 mg  40 mg SubCUTAneous DAILY    famotidine (PEPCID) tablet 20 mg  20 mg Oral DAILY    furosemide (LASIX) injection 40 mg  40 mg IntraVENous Q8H    glucose chewable tablet 16 g  4 Tablet Oral PRN    dextrose (D50W) injection syrg 12.5-25 g  25-50 mL IntraVENous PRN    glucagon (GLUCAGEN) injection 1 mg  1 mg IntraMUSCular PRN    insulin lispro (HUMALOG) injection   SubCUTAneous AC&HS    fluticasone propionate (FLONASE) 50 mcg/actuation nasal spray 2 Spray  2 Spray Both Nostrils DAILY    aspirin delayed-release tablet 81 mg  81 mg Oral DAILY    atorvastatin (LIPITOR) tablet 40 mg  40 mg Oral DAILY    docusate sodium (COLACE) capsule 100 mg  100 mg Oral BID    DULoxetine (CYMBALTA) capsule 30 mg  30 mg Oral DAILY    gabapentin (NEURONTIN) capsule 300 mg  300 mg Oral BID    insulin lispro protamine/insulin lispro (HUMALOG MIX 75/25) injection 24 Units  24 Units SubCUTAneous BID    magnesium oxide (MAG-OX) tablet 400 mg  400 mg Oral TID    metFORMIN (GLUCOPHAGE) tablet 500 mg  500 mg Oral DAILY WITH BREAKFAST    multivitamin (ONE A DAY) tablet 1 Tablet  1 Tablet Oral DAILY    pantoprazole (PROTONIX) tablet 40 mg  40 mg Oral ACB    ticagrelor (BRILINTA) tablet 90 mg  90 mg Oral BID    sotaloL (BETAPACE) tablet 40 mg  40 mg Oral DAILY         Allergies         Codeine, Novocain [procaine], and Tramadol       Labs/Imaging/Diagnostics      Labs:  Recent Results (from the past 48 hour(s))   CBC WITH AUTOMATED DIFF    Collection Time: 06/15/21  3:20 PM   Result Value Ref Range    WBC 5.9 4.4 - 11.3 K/uL    RBC 4.07 (L) 4.50 - 5.90 M/uL    HGB 12.8 (L) 13.5 - 17.5 g/dL    HCT 38.3 36 - 46 %    MCV 94.1 80 - 100 FL    MCH 31.3 31 - 34 PG    MCHC 33.3 31.0 - 36.0 g/dL    RDW 13.4 11.5 - 14.5 %    PLATELET 189 373 - 539 K/uL    MPV 8.3 6.5 - 11.5 FL    NRBC 0.1  WBC    ABSOLUTE NRBC 0.00 K/uL    NEUTROPHILS 61 42 - 75 %    LYMPHOCYTES 25 20.5 - 51.1 %    MONOCYTES 11 (H) 1.7 - 9.3 %    EOSINOPHILS 2 0.9 - 2.9 %    BASOPHILS 1 0.0 - 2.5 %    ABS. NEUTROPHILS 3.7 1.8 - 7.7 K/UL    ABS. LYMPHOCYTES 1.5 1.0 - 4.8 K/UL    ABS. MONOCYTES 0.6 0.2 - 2.4 K/UL    ABS. EOSINOPHILS 0.1 0.0 - 0.7 K/UL    ABS. BASOPHILS 0.0 0.0 - 0.2 K/UL   METABOLIC PANEL, COMPREHENSIVE    Collection Time: 06/15/21  3:20 PM   Result Value Ref Range    Sodium 136 136 - 145 mmol/L    Potassium 3.9 3.5 - 5.1 mmol/L    Chloride 98 97 - 108 mmol/L    CO2 33 (H) 21 - 32 mmol/L    Anion gap 5 5 - 15 mmol/L    Glucose 365 (H) 65 - 100 mg/dL    BUN 28 (H) 6 - 20 mg/dL    Creatinine 1.15 (H) 0.55 - 1.02 mg/dL    BUN/Creatinine ratio 24 (H) 12 - 20      GFR est AA 56 (L) >60 ml/min/1.73m2    GFR est non-AA 46 (L) >60 ml/min/1.73m2    Calcium 9.1 8.5 - 10.1 mg/dL    Bilirubin, total 0.5 0.2 - 1.0 mg/dL    AST (SGOT) 28 15 - 37 U/L    ALT (SGPT) 26 12 - 78 U/L    Alk.  phosphatase 114 45 - 117 U/L    Protein, total 8.1 6.4 - 8.2 g/dL    Albumin 3.1 (L) 3.5 - 5.0 g/dL    Globulin 5.0 (H) 2.0 - 4.0 g/dL    A-G Ratio 0.6 (L) 1.1 - 2.2     CK    Collection Time: 06/15/21  3:20 PM   Result Value Ref Range    CK 49 26 - 192 U/L   TROPONIN I    Collection Time: 06/15/21  3:20 PM   Result Value Ref Range    Troponin-I, Qt. 0.26 (H) <0.05 ng/mL   BNP    Collection Time: 06/15/21  3:20 PM   Result Value Ref Range    NT pro- (H) <125 pg/mL   EKG, 12 LEAD, INITIAL    Collection Time: 06/15/21  3:25 PM   Result Value Ref Range    Ventricular Rate 77 BPM    Atrial Rate 73 BPM    P-R Interval 50 ms    QRS Duration 130 ms    Q-T Interval 448 ms    QTC Calculation (Bezet) 508 ms    Calculated P Axis 21 degrees    Calculated R Axis -151 degrees    Calculated T Axis 89 degrees    Diagnosis       Atrial-sensed ventricular-paced complexes  No further analysis attempted due to paced rhythm     GLUCOSE, POC    Collection Time: 06/15/21  9:17 PM   Result Value Ref Range    Glucose (POC) 249 (H) 65 - 117 mg/dL    Performed by Diallo Beyer    TROPONIN I    Collection Time: 06/15/21  9:34 PM   Result Value Ref Range    Troponin-I, Qt. 0.29 (H) <0.05 ng/mL   MAGNESIUM    Collection Time: 06/15/21  9:34 PM   Result Value Ref Range    Magnesium 1.3 (L) 1.6 - 2.4 mg/dL   BNP    Collection Time: 06/16/21  5:05 AM   Result Value Ref Range    NT pro- (H) <125 pg/mL   TROPONIN I    Collection Time: 06/16/21  5:05 AM   Result Value Ref Range    Troponin-I, Qt. 0.28 (H) <0.05 ng/mL   CBC WITH AUTOMATED DIFF    Collection Time: 06/16/21  5:05 AM   Result Value Ref Range    WBC 5.3 4.4 - 11.3 K/uL    RBC 3.75 (L) 4.50 - 5.90 M/uL    HGB 12.0 (L) 13.5 - 17.5 g/dL    HCT 35.1 (L) 36 - 46 %    MCV 93.6 80 - 100 FL    MCH 31.9 31 - 34 PG    MCHC 34.1 31.0 - 36.0 g/dL    RDW 13.5 11.5 - 14.5 %    PLATELET 816 (L) 142 - 400 K/uL    MPV 8.3 6.5 - 11.5 FL    NRBC 0.1  WBC    ABSOLUTE NRBC 0.01 K/uL    NEUTROPHILS 45 42 - 75 %    LYMPHOCYTES 39 20.5 - 51.1 %    MONOCYTES 11 (H) 1.7 - 9.3 %    EOSINOPHILS 4 (H) 0.9 - 2.9 %    BASOPHILS 1 0.0 - 2.5 %    ABS. NEUTROPHILS 2.4 1.8 - 7.7 K/UL    ABS. LYMPHOCYTES 2.1 1.0 - 4.8 K/UL    ABS. MONOCYTES 0.6 0.2 - 2.4 K/UL    ABS. EOSINOPHILS 0.2 0.0 - 0.7 K/UL    ABS.  BASOPHILS 0.0 0.0 - 0.2 K/UL   METABOLIC PANEL, COMPREHENSIVE    Collection Time: 06/16/21  5:05 AM   Result Value Ref Range    Sodium 137 136 - 145 mmol/L    Potassium 3.0 (L) 3.5 - 5.1 mmol/L    Chloride 99 97 - 108 mmol/L    CO2 32 21 - 32 mmol/L    Anion gap 6 5 - 15 mmol/L    Glucose 169 (H) 65 - 100 mg/dL    BUN 24 (H) 6 - 20 mg/dL    Creatinine 0.84 0.55 - 1.02 mg/dL    BUN/Creatinine ratio 29 (H) 12 - 20      GFR est AA >60 >60 ml/min/1.73m2    GFR est non-AA >60 >60 ml/min/1.73m2    Calcium 8.8 8.5 - 10.1 mg/dL    Bilirubin, total 0.7 0.2 - 1.0 mg/dL    AST (SGOT) 24 15 - 37 U/L    ALT (SGPT) 20 12 - 78 U/L    Alk. phosphatase 102 45 - 117 U/L    Protein, total 7.1 6.4 - 8.2 g/dL    Albumin 2.8 (L) 3.5 - 5.0 g/dL    Globulin 4.3 (H) 2.0 - 4.0 g/dL    A-G Ratio 0.7 (L) 1.1 - 2.2     MAGNESIUM    Collection Time: 06/16/21  5:05 AM   Result Value Ref Range    Magnesium 2.1 1.6 - 2.4 mg/dL   GLUCOSE, POC    Collection Time: 06/16/21  7:54 AM   Result Value Ref Range    Glucose (POC) 210 (H) 65 - 117 mg/dL    Performed by Ravi Moser         Imaging:  XR CHEST PA LAT    Result Date: 6/15/2021  The cardiomediastinal silhouette is appropriate for age, technique, and lung expansion. Pulmonary vasculature is not congested. The lungs are essentially clear. No effusion or pneumothorax is seen.         Assessment//Plan           Problem List:  Hospital Problems  Date Reviewed: 3/17/2021        Codes Class Noted POA    Acute CHF (congestive heart failure) (HCC) ICD-10-CM: I50.9  ICD-9-CM: 428.0  6/15/2021 Unknown        Sinus congestion ICD-10-CM: R09.81  ICD-9-CM: 478.19  6/15/2021 Unknown        Hyperglycemia due to type 2 diabetes mellitus ([facility] [address] ICD-10-CM: E11.65  ICD-9-CM: 250.00  Unknown Yes        HTN (hypertension) ICD-10-CM: I10  ICD-9-CM: 401.9  Unknown Yes              Acute on chronic systolic and diastolic heart failure exacerbation  -Most recent echo in February 2021 has an EF of 40- 45% and moderate grade 2 diastolic dysfunction  -On Bumex dosing at home currently is on Lasix 40 mg IV 3 times daily and was changed to 20 mg IV 3 times daily on 6/16 and possible discharge home in a.m. tomorrow  -Monitor input output and daily weights  -Fluid restriction to about 1 L/day  -Cardiology evaluation  -Hold repeat 2D echo as recently repeated in February unless cardiology wishes for a limited echo to be done  -Troponins chronically elevated and was 0.26 and trended up to 0.29 on repeat and back down to 0.28 this morning  -Continue monitor on telemetry  -Continue maintain potassium above 4 and magnesium above 2    Hypomagnesemia:  -Was low at 1.3 and a 2 g IV magnesium sulfate rider was given and repeat magnesium on 6/16 was at 2.1  -Continue oral supplementation    Hypokalemia:  -Was 3.9 on admission but decreased down to 3.0 with current diuresis  -Increased home 20 meq KCl supplementation to 40 meq and KCl twice daily  -We will repeat labs in a.m. tomorrow    Diabetes  -Monitor on regular insulin sliding scale with Accu-Cheks before meals and at bedtime  -Restart patient's home Metformin therapy and 7525  -Nutrition consultation  -Obtain A1c as last A1c was in August 2020 noted to be 10.4 and pending result at this time    Sinus congestion  -Patient presented initially with a headache and noted to have some sinus congestion so continue cetirizine 10 mg daily as well as Flonase daily and monitor    GI prophylaxis  -Protonix 40 mg oral daily    DVT prophylaxis  Lovenox 40 mg subcu daily    Full Code   Patient designates her  as her medical decision-maker if for some reason she is unable to make decisions for herself    Spent 30 minutes evaluting and coordinating patient care of which >50% was spent coordinating and counseling.      Disposition: Possible discharge in a.m. after a.m. dose of Lasix and to be resumed on her home Bumex dosing with close follow-up with cardiology and as scheduled with EP  Electronically signed by Messi Lang MD on 6/16/2021 at 9:19 AM

## 2021-06-16 NOTE — ACP (ADVANCE CARE PLANNING)
Advance Care Planning   Healthcare Decision Maker:       Primary Decision Maker: Akilah Ion  [phone]    Click here to complete [address] including selection of the Healthcare Decision Maker Relationship (ie \"Primary\")

## 2021-06-16 NOTE — ROUTINE PROCESS
Bedside shift change report given to Oz Parkinson LPN (oncoming nurse) by Shanita Merchant RN (offgoing nurse). Report included the following information SBAR.

## 2021-06-16 NOTE — PROGRESS NOTES
Care Management Interventions  PCP Verified by CM: Yes (Dr. Amaya Cotter)  Mode of Transport at Discharge: Other (see comment) ()  Transition of Care Consult (CM Consult): Discharge Planning  Current Support Network: Lives with Spouse, Own Home  Confirm Follow Up Transport: Self  The Plan for Transition of Care is Related to the Following Treatment Goals : Plan to discharge patient home to self care after acute illness has resolved. Patient was recently discharge from home health services, and states that she does not need home health at this time.    Discharge Location  Discharge Placement: Home

## 2021-06-16 NOTE — CONSULTS
CONSULTATION    REASON FOR CONSULT:  Acute on Chronic HFrEF Exacerbation    REQUESTING PROVIDER:  LAURIE Barfield [facility] HM Team)    CHIEF COMPLAINT:    Chief Complaint   Patient presents with    Headache         HISTORY OF PRESENT ILLNESS:  Casey Luther is a 65y.o. year-old female with past medical history significant for HTN, HLD, GERD, DM, Fibromyalgia, obesity, atrial tachycardia, hypothyroidism s/p left thyroid lobe removal, 3mm right posterior communicating artery aneurysm, Plavix Nonresponder, stenosis vs thrombus left M2 anterior division (causing CVA) Mixed Ischemic and Nonischemic Cardiomyopathy s/p BI-V-CRT [employer] Device (with CRT remaining below goal despite device changes), High PVC burden of 7% on most recent holter, HFrEF (EF 40-45% last echo 2/2021), and CAD s/p PCI proximal LAD 9/2020 that presented to ED per my instruction given failed OP diuresis plan over the past 2 weeks. Patient presented to clinic 5/27 and was noted to be hypervolemic and [employer] Heart Index was elevated. She reported recent death of sister and stress involving grief had caused significant noncompliance with diuretic regimen. Compliance was reiterated. Last week patient heart index on device continued to rise and patient noted further weight gain and signs of decompensation, but refused to be seen in clinic or go to ED for further evaluation. When heart index numbers worsened again this week called to have more candid conversation with patient, however she reported worsening GABRIEL, poor appetite, abdominal distention (despite weight loss over the weekend with more compliant diuretic therapy) so she presented to ED for evaluation. Workup in ED showed Trop 0.2 (flat over multiple trends), K 3.9, , Glucose 365, Magnesium 1.3. Patient reports good results with IV diuresis overnight and reports feeling mildly better this am.  She has no new complaints.       Records from [facility] admission course thus far reviewed. Telemetry Review: vpaced with frequent ventricular ectopy.        PAST MEDICAL HISTORY:    Past Medical History:   Diagnosis Date    Acid indigestion     heartburn    Aneurysm (HCC)     Anxiety disorder     Arthritis     Asthma     Back pain     Bladder spasms     Cerebral artery occlusion with cerebral infarction ([facility] [address] 08/2020    Chest pain     Constipation     Cough     Diabetes mellitus     Diarrhea     Fatigue     Fibromyalgia     Frequent episodic tension-type headache     GERD (gastroesophageal reflux disease)     Hearing reduced     Heart failure (HCC)     Hemorrhoids     Hernia of unspecified site of abdominal cavity without mention of obstruction or gangrene     HTN (hypertension)     Hypothyroidism     ICD (implantable cardioverter-defibrillator) in place     Muscle pain     joint pain    N&V (nausea and vomiting)     Pacemaker     biventricular ICD 1/26/18    Ringing in ears     Skin cancer     Sleep apnea     cant tolerate the machine    SOB (shortness of breath)     SOB (shortness of breath)     Swallowing difficulty     Thyroid disorder     Vertigo     Wears dentures        PAST SURGICAL HISTORY:   Past Surgical History:   Procedure Laterality Date    HX CARPAL TUNNEL RELEASE      right hand    HX GI      HX HEART CATHETERIZATION  2/6/16    HX HEENT      thyroid     HX HYSTERECTOMY      HX ORTHOPAEDIC      HX PACEMAKER Left 6/20/16    [employer] ICD    HX THYROIDECTOMY      MN REMOVAL GALLBLADDER         ALLERGIES:    Allergies   Allergen Reactions    Codeine Other (comments)     Patient states she is not allergic    Novocain [Procaine] Nausea and Vomiting    Tramadol Other (comments)     Headache       FAMILY HISTORY:    Family History   Problem Relation Age of Onset    Diabetes Mother     Heart Disease Mother     Heart Disease Father     Cancer Sister     Diabetes Sister     Heart Disease Sister    Skyler Ruelas Hypertension Sister     Lupus Sister     Breast Cancer Sister     Diabetes Brother        SOCIAL HISTORY:    Tobacco: Denies  Drugs: Denies  ETOH: Denies    HOME MEDICATIONS:    Prior to Admission Medications   Prescriptions Last Dose Informant Patient Reported? Taking? DULoxetine (Cymbalta) 30 mg capsule 2021 at Unknown time  Yes Yes   Sig: Take 30 mg by mouth daily. acetaminophen (TYLENOL EXTRA STRENGTH) 500 mg tablet Unknown at Unknown time  Yes No   Sig: Take  by mouth every six (6) hours as needed for Pain. aspirin delayed-release 81 mg tablet 2021 at Unknown time  No Yes   Sig: Take 1 Tab by mouth daily. atorvastatin (LIPITOR) 40 mg tablet 2021 at Unknown time  Yes Yes   Sig: Take 40 mg by mouth daily. bumetanide (BUMEX) 1 mg tablet 2021 at Unknown time  No Yes   Sig: Take 1 Tab by mouth two (2) times daily (with meals). butalbital-acetaminophen-caff (FIORICET) -40 mg per capsule 5/15/2021 at Unknown time  Yes Yes   Sig: TAKE 1 CAPSULE BY MOUTH EVERY DAY   docusate sodium (Colace) 100 mg capsule 2021 at Unknown time  Yes Yes   Sig: Take 100 mg by mouth two (2) times a day.   gabapentin (NEURONTIN) 100 mg capsule 2021 at Unknown time  Yes Yes   Sig: Take 300 mg by mouth two (2) times a day. insulin lispro protamin-lispro (HumaLOG Mix 75-25 KwikPen) flexpen 2021 at Unknown time  Yes Yes   Si Units two (2) times a day. magnesium oxide (MAG-OX) 400 mg tablet 2021 at Unknown time  Yes Yes   Sig: Take 400 mg by mouth three (3) times daily. melatonin 3 mg tablet Unknown at Unknown time  Yes No   Sig: Take 3 mg by mouth nightly as needed. metFORMIN (GLUCOPHAGE) 500 mg tablet 2021 at Unknown time  Yes Yes   multivitamin,tx-iron-minerals (Complete Multivitamin) tab 2021 at Unknown time  Yes Yes   Sig: Take  by mouth daily. omeprazole (PRILOSEC) 40 mg capsule 2021 at Unknown time  Yes Yes   Sig: Take 40 mg by mouth daily. polyethylene glycol (MIRALAX) 17 gram packet 5/15/2021 at Unknown time  Yes Yes   Sig: Take 17 g by mouth as needed. potassium chloride SR (KLOR-CON 10) 10 mEq tablet 6/14/2021 at Unknown time  Yes Yes   Sig: Take 20 mEq by mouth daily. sotaloL (BETAPACE) 80 mg tablet 6/14/2021 at Unknown time  No Yes   Sig: Take 0.5 Tabs by mouth daily. ticagrelor (Brilinta) 90 mg tablet 6/14/2021 at Unknown time  Yes Yes   Sig: Take  by mouth two (2) times a day. Facility-Administered Medications: None       REVIEW OF SYSTEMS:  Complete review of systems performed, pertinents noted above, all other systems are negative. Patient Vitals for the past 24 hrs:   Temp Pulse Resp BP SpO2   06/16/21 0800  70      06/16/21 0704 97.6 °F (36.4 °C) 70 18 (!) 146/72 98 %   06/16/21 0331 97.6 °F (36.4 °C) 77 18 130/66 95 %   06/15/21 2324 98.1 °F (36.7 °C) 66 18 (!) 114/53 98 %   06/15/21 1915     97 %   06/15/21 1827 97.6 °F (36.4 °C) 73 18 (!) 145/72    06/15/21 1809  72  133/68 99 %   06/15/21 1730  75  (!) 126/59 100 %   06/15/21 1630  76  128/76 97 %   06/15/21 1530  77  (!) 143/61 99 %   06/15/21 1434 98 °F (36.7 °C) (!) 104 18 137/73 96 %       PHYSICAL EXAMINATION:    General: Well nourished chronically ill appearing female, generally weak, NAD, A&O  HEENT: Normocephalic, PERRL, no drainage  Neck: Supple, Trachea midline, No JVD  RESP: CTA bilaterally with symmetrical chest movement. No SOB or distress. On RA  Cardiovascular: RRR no RG. + murmur. + device. PVS: No rubor, cyanosis, trace edema, Radial, DP, PT pulses equal bilaterally  ABD: obese (mildly distended from baseline) , soft, NT, Normoactive BS  Derm: Warm/Dry/Intact with no lesions  Neuro: A&O PPTS, cranial nerves II- XII grossly intact via interaction with patient. No focal deficits  PSYCH: No anxiety or agitation      Electrocardiogram performed earlier reviewed, it shows v-paced rhythm. Recent labs results and imaging reviewed. Recent Results (from the past 24 hour(s))   CBC WITH AUTOMATED DIFF    Collection Time: 06/15/21  3:20 PM   Result Value Ref Range    WBC 5.9 4.4 - 11.3 K/uL    RBC 4.07 (L) 4.50 - 5.90 M/uL    HGB 12.8 (L) 13.5 - 17.5 g/dL    HCT 38.3 36 - 46 %    MCV 94.1 80 - 100 FL    MCH 31.3 31 - 34 PG    MCHC 33.3 31.0 - 36.0 g/dL    RDW 13.4 11.5 - 14.5 %    PLATELET 698 547 - 588 K/uL    MPV 8.3 6.5 - 11.5 FL    NRBC 0.1  WBC    ABSOLUTE NRBC 0.00 K/uL    NEUTROPHILS 61 42 - 75 %    LYMPHOCYTES 25 20.5 - 51.1 %    MONOCYTES 11 (H) 1.7 - 9.3 %    EOSINOPHILS 2 0.9 - 2.9 %    BASOPHILS 1 0.0 - 2.5 %    ABS. NEUTROPHILS 3.7 1.8 - 7.7 K/UL    ABS. LYMPHOCYTES 1.5 1.0 - 4.8 K/UL    ABS. MONOCYTES 0.6 0.2 - 2.4 K/UL    ABS. EOSINOPHILS 0.1 0.0 - 0.7 K/UL    ABS. BASOPHILS 0.0 0.0 - 0.2 K/UL   METABOLIC PANEL, COMPREHENSIVE    Collection Time: 06/15/21  3:20 PM   Result Value Ref Range    Sodium 136 136 - 145 mmol/L    Potassium 3.9 3.5 - 5.1 mmol/L    Chloride 98 97 - 108 mmol/L    CO2 33 (H) 21 - 32 mmol/L    Anion gap 5 5 - 15 mmol/L    Glucose 365 (H) 65 - 100 mg/dL    BUN 28 (H) 6 - 20 mg/dL    Creatinine 1.15 (H) 0.55 - 1.02 mg/dL    BUN/Creatinine ratio 24 (H) 12 - 20      GFR est AA 56 (L) >60 ml/min/1.73m2    GFR est non-AA 46 (L) >60 ml/min/1.73m2    Calcium 9.1 8.5 - 10.1 mg/dL    Bilirubin, total 0.5 0.2 - 1.0 mg/dL    AST (SGOT) 28 15 - 37 U/L    ALT (SGPT) 26 12 - 78 U/L    Alk.  phosphatase 114 45 - 117 U/L    Protein, total 8.1 6.4 - 8.2 g/dL    Albumin 3.1 (L) 3.5 - 5.0 g/dL    Globulin 5.0 (H) 2.0 - 4.0 g/dL    A-G Ratio 0.6 (L) 1.1 - 2.2     CK    Collection Time: 06/15/21  3:20 PM   Result Value Ref Range    CK 49 26 - 192 U/L   TROPONIN I    Collection Time: 06/15/21  3:20 PM   Result Value Ref Range    Troponin-I, Qt. 0.26 (H) <0.05 ng/mL   BNP    Collection Time: 06/15/21  3:20 PM   Result Value Ref Range    NT pro- (H) <125 pg/mL   EKG, 12 LEAD, INITIAL    Collection Time: 06/15/21  3:25 PM Result Value Ref Range    Ventricular Rate 77 BPM    Atrial Rate 73 BPM    P-R Interval 50 ms    QRS Duration 130 ms    Q-T Interval 448 ms    QTC Calculation (Bezet) 508 ms    Calculated P Axis 21 degrees    Calculated R Axis -151 degrees    Calculated T Axis 89 degrees    Diagnosis       Atrial-sensed ventricular-paced complexes  No further analysis attempted due to paced rhythm     GLUCOSE, POC    Collection Time: 06/15/21  9:17 PM   Result Value Ref Range    Glucose (POC) 249 (H) 65 - 117 mg/dL    Performed by Earlene Mantilla    TROPONIN I    Collection Time: 06/15/21  9:34 PM   Result Value Ref Range    Troponin-I, Qt. 0.29 (H) <0.05 ng/mL   MAGNESIUM    Collection Time: 06/15/21  9:34 PM   Result Value Ref Range    Magnesium 1.3 (L) 1.6 - 2.4 mg/dL   BNP    Collection Time: 06/16/21  5:05 AM   Result Value Ref Range    NT pro- (H) <125 pg/mL   TROPONIN I    Collection Time: 06/16/21  5:05 AM   Result Value Ref Range    Troponin-I, Qt. 0.28 (H) <0.05 ng/mL   CBC WITH AUTOMATED DIFF    Collection Time: 06/16/21  5:05 AM   Result Value Ref Range    WBC 5.3 4.4 - 11.3 K/uL    RBC 3.75 (L) 4.50 - 5.90 M/uL    HGB 12.0 (L) 13.5 - 17.5 g/dL    HCT 35.1 (L) 36 - 46 %    MCV 93.6 80 - 100 FL    MCH 31.9 31 - 34 PG    MCHC 34.1 31.0 - 36.0 g/dL    RDW 13.5 11.5 - 14.5 %    PLATELET 205 (L) 011 - 400 K/uL    MPV 8.3 6.5 - 11.5 FL    NRBC 0.1  WBC    ABSOLUTE NRBC 0.01 K/uL    NEUTROPHILS 45 42 - 75 %    LYMPHOCYTES 39 20.5 - 51.1 %    MONOCYTES 11 (H) 1.7 - 9.3 %    EOSINOPHILS 4 (H) 0.9 - 2.9 %    BASOPHILS 1 0.0 - 2.5 %    ABS. NEUTROPHILS 2.4 1.8 - 7.7 K/UL    ABS. LYMPHOCYTES 2.1 1.0 - 4.8 K/UL    ABS. MONOCYTES 0.6 0.2 - 2.4 K/UL    ABS. EOSINOPHILS 0.2 0.0 - 0.7 K/UL    ABS.  BASOPHILS 0.0 0.0 - 0.2 K/UL   METABOLIC PANEL, COMPREHENSIVE    Collection Time: 06/16/21  5:05 AM   Result Value Ref Range    Sodium 137 136 - 145 mmol/L    Potassium 3.0 (L) 3.5 - 5.1 mmol/L    Chloride 99 97 - 108 mmol/L CO2 32 21 - 32 mmol/L    Anion gap 6 5 - 15 mmol/L    Glucose 169 (H) 65 - 100 mg/dL    BUN 24 (H) 6 - 20 mg/dL    Creatinine 0.84 0.55 - 1.02 mg/dL    BUN/Creatinine ratio 29 (H) 12 - 20      GFR est AA >60 >60 ml/min/1.73m2    GFR est non-AA >60 >60 ml/min/1.73m2    Calcium 8.8 8.5 - 10.1 mg/dL    Bilirubin, total 0.7 0.2 - 1.0 mg/dL    AST (SGOT) 24 15 - 37 U/L    ALT (SGPT) 20 12 - 78 U/L    Alk. phosphatase 102 45 - 117 U/L    Protein, total 7.1 6.4 - 8.2 g/dL    Albumin 2.8 (L) 3.5 - 5.0 g/dL    Globulin 4.3 (H) 2.0 - 4.0 g/dL    A-G Ratio 0.7 (L) 1.1 - 2.2     MAGNESIUM    Collection Time: 06/16/21  5:05 AM   Result Value Ref Range    Magnesium 2.1 1.6 - 2.4 mg/dL   GLUCOSE, POC    Collection Time: 06/16/21  7:54 AM   Result Value Ref Range    Glucose (POC) 210 (H) 65 - 117 mg/dL    Performed by Adiel Calhoun        XR Results (maximum last 3): Results from Hospital Encounter encounter on 06/15/21    XR CHEST PA LAT    Impression  The cardiomediastinal silhouette is appropriate for age, technique,  and lung expansion. Pulmonary vasculature is not congested. The lungs are  essentially clear. No effusion or pneumothorax is seen. Results from Hospital Encounter encounter on 01/21/21    XR CHEST PORT    Impression  Saturday increased bilateral airspace opacities.       XR CHEST SNGL V          Current Facility-Administered Medications:     potassium chloride (K-DUR, KLOR-CON) SR tablet 40 mEq, 40 mEq, Oral, BID, Alberto Roberts MD, 40 mEq at 06/16/21 0924    cetirizine (ZYRTEC) tablet 10 mg, 10 mg, Oral, DAILY, Alberto Roberts MD    furosemide (LASIX) injection 20 mg, 20 mg, IntraVENous, Q8H, Mari Molina Miko, NP    sodium chloride (NS) flush 5-40 mL, 5-40 mL, IntraVENous, Q8H, Vonda Haynes PA-C, 10 mL at 06/16/21 0549    sodium chloride (NS) flush 5-40 mL, 5-40 mL, IntraVENous, PRN, Vonda Haynes PA-C    acetaminophen (TYLENOL) tablet 650 mg, 650 mg, Oral, Q6H PRN, 650 mg at 06/15/21 1934 **OR** acetaminophen (TYLENOL) suppository 650 mg, 650 mg, Rectal, Q6H PRN, Vonda Haynes PA-C    polyethylene glycol (MIRALAX) packet 17 g, 17 g, Oral, DAILY PRN, Vonda Haynes PA-C    enoxaparin (LOVENOX) injection 40 mg, 40 mg, SubCUTAneous, DAILY, Vonda Haynes PA-C, 40 mg at 06/16/21 0924    famotidine (PEPCID) tablet 20 mg, 20 mg, Oral, DAILY, Vonda Haynes PA-C, 20 mg at 06/16/21 0924    glucose chewable tablet 16 g, 4 Tablet, Oral, PRN, Vonda Haynes PA-C    dextrose (D50W) injection syrg 12.5-25 g, 25-50 mL, IntraVENous, PRN, Vonda Haynes PA-C    glucagon (GLUCAGEN) injection 1 mg, 1 mg, IntraMUSCular, PRN, Vonda Haynes PA-C    insulin lispro (HUMALOG) injection, , SubCUTAneous, AC&HS, Vonda Haynes PA-C, 3 Units at 06/16/21 0758    fluticasone propionate (FLONASE) 50 mcg/actuation nasal spray 2 Spray, 2 Spray, Both Nostrils, DAILY, Vonda Haynes PA-C, 2 [place] at 06/16/21 0927    aspirin delayed-release tablet 81 mg, 81 mg, Oral, DAILY, Vonda Haynes PA-C, 81 mg at 06/16/21 0924    atorvastatin (LIPITOR) tablet 40 mg, 40 mg, Oral, DAILY, Vonda Haynes PA-C, 40 mg at 06/16/21 0924    docusate sodium (COLACE) capsule 100 mg, 100 mg, Oral, BID, Vonda Haynes PA-C, 100 mg at 06/16/21 3370    DULoxetine (CYMBALTA) capsule 30 mg, 30 mg, Oral, DAILY, Vonda Haynes PA-C, 30 mg at 06/16/21 3842    gabapentin (NEURONTIN) capsule 300 mg, 300 mg, Oral, BID, Vonda Haynes PA-C, 300 mg at 06/16/21 0924    insulin lispro protamine/insulin lispro (HUMALOG MIX 75/25) injection 24 Units, 24 Units, SubCUTAneous, BID, Vonda Haynes PA-C, 24 Units at 06/16/21 0900    magnesium oxide (MAG-OX) tablet 400 mg, 400 mg, Oral, TID, Vonda Haynes PA-C, 400 mg at 06/16/21 5468    metFORMIN (GLUCOPHAGE) tablet 500 mg, 500 mg, Oral, DAILY WITH BREAKFAST, Vonda Haynes PA-C, 500 mg at 06/16/21 8541    multivitamin (ONE A DAY) tablet 1 Tablet, 1 Tablet, Oral, DAILY, Vonda Haynes PA-C, 1 Tablet at 06/16/21 0924    pantoprazole (PROTONIX) tablet 40 mg, 40 mg, Oral, ACB, Haynes, Vonda, PA-C, 40 mg at 06/16/21 0758    ticagrelor (BRILINTA) tablet 90 mg, 90 mg, Oral, BID, Haynes, Vonda, PA-C, 90 mg at 06/16/21 7151    sotaloL (BETAPACE) tablet 40 mg, 40 mg, Oral, DAILY, Haynes, Vonda, PA-C, 40 mg at 06/16/21 1404          Case discussed with collaborating physician Dr. Margarita Matthew and our impression and recommendations are as follows:   1. CAD:  s/p stenting proximal LAD 9/10/20, Cath 3/11/2021 showing stable stent and no other obstructive CAD. continue DAPT with Brilinta as she is a Plavix non responder. Not candidate for Effient s/t Neurological history. Continue Statin. Continue current regimen as denies change in sx of recent. 2. Mixed Cardiomyopathy with Acute on Chronic HFrEF Exacerbation:  S/p BS BiV-CRT Device 2018. EF 10% on echo prior to cath 9/10/20. TTE 2/2021 showing EF 40-45%. CRT @ 88% on most recent transmission. Continue GDMT. Doing well with Sotalol 40mg daily. She was unable to tolerate BID dosing during hospitalization for load s/t increased QTc. QTc @ 508 on EKG yesterday. Would monitor closely and avoid further prolonging medications. Patient having frequent ectopy on telemetry and PVC burden on most recent Holter in office @ 7%. She is scheduled with EP in upcoming weeks for further input given suboptimal CRT % and Higher PVC burden. She remains mildly decompensated after IV diuresis overnight. Plan to continue Lasix 20mg IV TID for rest of today and then can be discharge in am after dose. Should continue home dosing of Bumex. Was previously on Entresto, but it was stopped during a hospitalization (for unknown reasons). Will attempt to restart in OP setting once patient more compensated. Continue Strict I/O. Daily Weights. 3. Hypokalemia - replete to goal >4. Repeat labs in am. Continue telemetry monitoring. 4. Hypomagnesemia - replete to goal >2.  Repeat labs in am. Continue telemetry monitoring. 5. Hypertension:  Blood pressure is at goal, continue current medication regimen with IV diuresis. 6. Hyperlipidemia: We will continue the statin therapy as prescribed. Thank you for involving us in the care of this patient. Please do not hesitate to call if additional questions arise.  If after hours please call [phone]

## 2021-06-17 NOTE — ROUTINE PROCESS
D/c appt given with Dr. Buddy Guillory. Pt escorted in w/c off floor with SN Dru Erazo and her daughter.

## 2021-06-17 NOTE — DISCHARGE SUMMARY
Discharge Summary       PATIENT ID: Nicole Iyer  MRN: [id number]   YOB: 1950    DATE OF ADMISSION: 6/15/2021  2:36 PM    DATE OF DISCHARGE: 2021   PRIMARY CARE PROVIDER: Triston Ortega MD     ATTENDING PHYSICIAN: Cheo Herman  DISCHARGING PROVIDER: Ayad Vargas MD    To contact this individual call [phone] and ask the  to page. If unavailable ask to be transferred the Adult Hospitalist Department. CONSULTATIONS: IP CONSULT TO HOSPITALIST  IP CONSULT TO CARDIOLOGY    PROCEDURES/SURGERIES: * No surgery found *    ADMITTING [facility] COURSE:     HPI  Nicole Iyer is a 65 y.o. female followed by Triston Ortega MD with past medical history significant for non ischemic cardiomyopathy, CAD s/p cardiac cath 3/11/21 noting a patent stent in the proximal LAD with 40% disease in the mid LAD and 30% in the mid RCA, CHF with echo 21 with LVEF 40-45% and moderate LV diastolic dysfunction, s/p AICD placement, HTN, DM, stroke 2020 who was referred to the ER today by her cardiology NP, Edmar Blanca, due to persistent SOB with a doubling of her HeartLogic score from 20 to 40. The patient does have a history of noncompliance with her Bumex. She is prescribed 2mg qam and 0.5mg qpm but does not take the night time dose and alters the morning dose frequently. She notes the shortness of breath starting to bother her more early in last week and feels like there is fluid collecting in her abdomen. No leg edema which is normal for her, even in fluid overload. She began taking the prescribed 2mg of Bumex 4 days ago which did bring her weight down 4 lbs but no relief of dyspnea. She reports being 2lbs less than what is typically her baseline of 165 now at 163lbs however she has had decreased appetite and intake for a couple of weeks which may be related to situational depression as her sister  suddenly 3 weeks ago then her friend passed away.  She also endorses nasal congestion, headache, some mild cough, intermittent nausea, alternating diarrhea and constipation. She was COVID positive 1/21/21. She has received the first COVID vaccine and second is due 6/25/21. Hospital Course  Patient presents with decompensated congestive heart failure. Patient follows with cardiology as outpatient. Patient with mixed cardiomyopathy and recent echocardiogram in February showing EF of 40 to 45%. Patient is s/p AICD placement. Upon admission, patient was diuresed with IV Lasix. It was noted that patient has hypokalemia and needing potassium replacement of 80 M EQ each day. Cardiology follows the patient during this admission. Patient to follow-up outpatient with cardiology and EP as scheduled. Her home potassium regimen was increased to 20 MDQ twice daily for 2 days and then to continue her regular potassium supplement 10 M EQ twice daily. Patient will need BMP rechecked 6/21 either with PCP or cardiology's office to reevaluate her electrolytes. DISCHARGE DIAGNOSES / PLAN:      1. Acute on chronic systolic congestive heart failure  2. Cardiomyopathy  3. Coronary artery disease  4. Hypertension  5. Dyslipidemia     ADDITIONAL CARE RECOMMENDATIONS:     Follow-up with cardiology as scheduled.     PENDING TEST RESULTS:   At the time of discharge the following test results are still pending: None    FOLLOW UP APPOINTMENTS:    Follow-up Information     Follow up With Specialties Details Why Contact Info    Urmila Martinez MD [place] [id number]  [phone]               DIET: Cardiac Diet  Oral Nutritional Supplements: No Oral Supplement prescribed    ACTIVITY: Activity as tolerated    WOUND CARE: None    EQUIPMENT needed: None      DISCHARGE MEDICATIONS:  Current Discharge Medication List      START taking these medications    Details   potassium chloride (K-DUR, KLOR-CON) 20 mEq tablet Take 1 Tablet by mouth two (2) times a day.  Qty: 4 Tablet, Refills: 0  Start date: 6/17/2021         CONTINUE these medications which have NOT CHANGED    Details   DULoxetine (Cymbalta) 30 mg capsule Take 30 mg by mouth daily. docusate sodium (Colace) 100 mg capsule Take 100 mg by mouth two (2) times a day. butalbital-acetaminophen-caff (FIORICET) -40 mg per capsule TAKE 1 CAPSULE BY MOUTH EVERY DAY      metFORMIN (GLUCOPHAGE) 500 mg tablet       sotaloL (BETAPACE) 80 mg tablet Take 0.5 Tabs by mouth daily. Qty: 30 Tab, Refills: 0      insulin lispro protamin-lispro (HumaLOG Mix 75-25 KwikPen) flexpen 24 Units two (2) times a day. bumetanide (BUMEX) 1 mg tablet Take 1 Tab by mouth two (2) times daily (with meals). Qty: 60 Tab, Refills: 1      ticagrelor (Brilinta) 90 mg tablet Take  by mouth two (2) times a day. multivitamin,tx-iron-minerals (Complete Multivitamin) tab Take  by mouth daily. aspirin delayed-release 81 mg tablet Take 1 Tab by mouth daily. Qty: 30 Tab, Refills: 2    Associated Diagnoses: Cerebral aneurysm      magnesium oxide (MAG-OX) 400 mg tablet Take 400 mg by mouth three (3) times daily. polyethylene glycol (MIRALAX) 17 gram packet Take 17 g by mouth as needed. gabapentin (NEURONTIN) 100 mg capsule Take 300 mg by mouth two (2) times a day. omeprazole (PRILOSEC) 40 mg capsule Take 40 mg by mouth daily. atorvastatin (LIPITOR) 40 mg tablet Take 40 mg by mouth daily. Associated Diagnoses: Type II or unspecified type diabetes mellitus without mention of complication, uncontrolled; Other specified acquired hypothyroidism; Unspecified vitamin D deficiency      melatonin 3 mg tablet Take 3 mg by mouth nightly as needed. acetaminophen (TYLENOL EXTRA STRENGTH) 500 mg tablet Take  by mouth every six (6) hours as needed for Pain.          STOP taking these medications       potassium chloride SR (KLOR-CON 10) 10 mEq tablet Comments:   Reason for Stopping:           Addendum to discharge med list  -To resume potassium chloride 10 M EQ twice daily starting on 6/20      NOTIFY YOUR PHYSICIAN FOR ANY OF THE FOLLOWING:   Fever over 101 degrees for 24 hours. Chest pain, shortness of breath, fever, chills, nausea, vomiting, diarrhea, change in mentation, falling, weakness, bleeding. Severe pain or pain not relieved by medications. Or, any other signs or symptoms that you may have questions about. DISPOSITION:    Home With:   OT  PT  HH  RN       Long term SNF/Inpatient Rehab    Independent/assisted living    Hospice    Other:       PATIENT CONDITION AT DISCHARGE:     Functional status    Poor     Deconditioned     Independent      Cognition     Lucid     Forgetful     Dementia      Catheters/lines (plus indication)    Herman     PICC     PEG     None      Code status     Full code     DNR      PHYSICAL EXAMINATION AT DISCHARGE:  General:          Alert, cooperative, no distress, appears stated age. HEENT:           Atraumatic, anicteric sclerae, pink conjunctivae                          No oral ulcers, mucosa moist, throat clear, dentition fair  Neck:               Supple, symmetrical  Lungs:             Clear to auscultation bilaterally. No Wheezing or Rhonchi. No rales. Chest wall:      No tenderness  No Accessory muscle use. Heart:              Regular  rhythm,  No  murmur   No edema  Abdomen:        Soft, non-tender. Not distended. Bowel sounds normal  Extremities:     No cyanosis. No clubbing,                            Skin turgor normal, Capillary refill normal  Skin:                Not pale. Not Jaundiced  No rashes   Psych:             Not anxious or agitated.   Neurologic:      Alert, moves all extremities, answers questions appropriately and responds to commands       CHRONIC MEDICAL DIAGNOSES:  Problem List as of 6/17/2021 Date Reviewed: 3/17/2021        Codes Class Noted - Resolved    Biventricular ICD (implantable cardioverter-defibrillator) in place ICD-10-CM: Z95.810  ICD-9-CM: V45.02  1/26/2018 - Present        * (Principal) Acute CHF (congestive heart failure) (HCC) ICD-10-CM: I50.9  ICD-9-CM: 428.0  6/15/2021 - Present        Sinus congestion ICD-10-CM: R09.81  ICD-9-CM: 478.19  6/15/2021 - Present        Acquired hypothyroidism ICD-10-CM: E03.9  ICD-9-CM: 244.9  3/17/2021 - Present        History of lobectomy of thyroid ICD-10-CM: Z90.09  ICD-9-CM: V45.79  3/17/2021 - Present        Chest pain ICD-10-CM: R07.9  ICD-9-CM: 786.50  3/11/2021 - Present        Hypoxia ICD-10-CM: R09.02  ICD-9-CM: 799.02  1/21/2021 - Present        Elevated troponin ICD-10-CM: R77.8  ICD-9-CM: 790.6  1/21/2021 - Present        Atrial fibrillation (HCC) ICD-10-CM: I48.91  ICD-9-CM: 427.31  11/16/2020 - Present        Acute on chronic congestive heart failure (HCC) ICD-10-CM: I50.9  ICD-9-CM: 428.0  11/16/2020 - Present        Hypomagnesemia ICD-10-CM: E83.42  ICD-9-CM: 275.2  9/28/2020 - Present        Hyperkalemia ICD-10-CM: E87.5  ICD-9-CM: 276.7  9/26/2020 - Present        CHF (congestive heart failure) (HCC) ICD-10-CM: I50.9  ICD-9-CM: 428.0  9/25/2020 - Present        CHF exacerbation ([facility] ICD-10-CM: I50.9  ICD-9-CM: 428.0  9/22/2020 - Present        Severe obesity ([facility] ICD-10-CM: E66.01  ICD-9-CM: 278.01  9/2/2020 - Present        Dysarthria ICD-10-CM: R47.1  ICD-9-CM: 784.51  8/11/2020 - Present        TIA (transient ischemic attack) ICD-10-CM: G45.9  ICD-9-CM: 435.9  8/10/2020 - Present        Cardiomyopathy ([address] ICD-10-CM: I42.9  ICD-9-CM: 425.4  5/23/2016 - Present        Skin cancer ICD-10-CM: C44.90  ICD-9-CM: 173.90  Unknown - Present        Acid indigestion ICD-10-CM: K30  ICD-9-CM: 536.8  Unknown - Present        Asthma ICD-10-CM: 493  ICD-9-CM: 892  Unknown - Present        Back pain ICD-10-CM: M54.9  ICD-9-CM: 724.5  Unknown - Present        Wears dentures ICD-10-CM: Z97.2  ICD-9-CM: V45.84  Unknown - Present        Constipation ICD-10-CM: 564.0  ICD-9-CM: 564.0  Unknown - Present        Hyperglycemia due to type 2 diabetes mellitus ([facility] ICD-10-CM: E11.65  ICD-9-CM: 250.00  Unknown - Present        Diarrhea ICD-10-CM: R19.7  ICD-9-CM: 787.91  Unknown - Present        Frequent episodic tension-type headache ICD-10-CM: P83.777  ICD-9-CM: 339.11  Unknown - Present        Hemorrhoids ICD-10-CM: 455  ICD-9-CM: 423  Unknown - Present        Hernia of unspecified site of abdominal cavity without mention of obstruction or gangrene ICD-10-CM: K46.9  ICD-9-CM: 553.9  Unknown - Present        HTN (hypertension) ICD-10-CM: I10  ICD-9-CM: 401.9  Unknown - Present        Muscle pain ICD-10-CM: M79.10  ICD-9-CM: 729.1  Unknown - Present        SOB (shortness of breath) ICD-10-CM: R06.02  ICD-9-CM: 786.05  Unknown - Present        Thyroid disorder ICD-10-CM: E07.9  ICD-9-CM: 246. 9  Unknown - Present              Greater than 60 minutes were spent with the patient on counseling and coordination of care    Signed:   Marin Allen MD  6/17/2021  9:36 AM

## 2021-06-17 NOTE — PROGRESS NOTES
Progress Note    Patient: Ezekiel Maldonado MRN: [id number]  SSN: [id number]    YOB: 1950  Age: 65 y.o. Sex: female      Admit Date: 6/15/2021    LOS: 2 days     Subjective:     Ezekiel Maldonado is a 65y.o. year-old female with past medical history significant for HTN, HLD, GERD, DM, Fibromyalgia, obesity, atrial tachycardia, hypothyroidism s/p left thyroid lobe removal, 3mm right posterior communicating artery aneurysm, Plavix Nonresponder, stenosis vs thrombus left M2 anterior division (causing CVA) Mixed Ischemic and Nonischemic Cardiomyopathy s/p BI-V-CRT [employer] Device (with CRT remaining below goal despite device changes), High PVC burden of 7% on most recent holter, HFrEF (EF 40-45% last echo 2/2021), and CAD s/p PCI proximal LAD 9/2020 that is followed for Acute on Chronic HFrEF. This am patient reports that she is feeling better and no longer dyspneic. She denies peripheral edema. She is ready for discharge. Telemetry Review: vpaced with frequent PVCs    Review of Symptoms:   Review of Systems   Constitutional: Positive for decreased appetite. HENT: Negative. Eyes: Negative. Cardiovascular: Positive for dyspnea on exertion. Respiratory: Negative. Endocrine: Negative. Hematologic/Lymphatic: Negative. Skin: Negative. Musculoskeletal: Positive for joint pain. Gastrointestinal: Negative. Genitourinary: Negative. Neurological: Negative. Psychiatric/Behavioral: Negative.           Objective:     Vitals:    06/16/21 2318 06/17/21 0323 06/17/21 0745 06/17/21 0933   BP: 106/62 112/72 (!) 122/58    Pulse: 71 76 74    Resp: 20 18 18    Temp: 98 °F (36.7 °C) 98 °F (36.7 °C) 97.6 °F (36.4 °C)    SpO2: 96% 98% 96%    Weight:    75.5 kg (166 lb 6.4 oz)   Height:            Intake and Output:  Current Shift: 06/17 0701 - 06/17 1900  In: -   Out: 200 [Urine:200]  Last three shifts: 06/15 1901 - 06/17 0700  In: 150 [P.O.:150]  Out: 1300 [FGFYL:1315]    Physical Exam:   General: Well nourished chronically ill appearing female, generally weak, NAD, A&O  HEENT: Normocephalic, PERRL, no drainage  Neck: Supple, Trachea midline, No JVD  RESP: CTA bilaterally with symmetrical chest movement. No SOB or distress. On RA  Cardiovascular: RRR no RG. + murmur. + device. PVS: No rubor, cyanosis, no edema, Radial, DP, PT pulses equal bilaterally  ABD: obese, soft, NT, Normoactive BS  Derm: Warm/Dry/Intact with no lesions  Neuro: A&O PPTS, cranial nerves II- XII grossly intact via interaction with patient.  No focal deficits  PSYCH: No anxiety or agitation      Lab/Data Review:  BMP: No results found for: NA, K, CL, CO2, AGAP, GLU, BUN, CREA, GFRAA, GFRNA         Current Facility-Administered Medications:     potassium chloride (K-DUR, KLOR-CON) SR tablet 40 mEq, 40 mEq, Oral, BID, Alberto Roberts MD, 40 mEq at 06/17/21 0927    cetirizine (ZYRTEC) tablet 10 mg, 10 mg, Oral, DAILY, Alberto Robrets MD, 10 mg at 06/17/21 0926    furosemide (LASIX) injection 20 mg, 20 mg, IntraVENous, Q8H, Martha Molina NP, 20 mg at 06/17/21 0517    sodium chloride (NS) flush 5-40 mL, 5-40 mL, IntraVENous, Q8H, Vonda Haynes PA-C, 10 mL at 06/17/21 0517    sodium chloride (NS) flush 5-40 mL, 5-40 mL, IntraVENous, PRN, Vonda Haynes PA-C    acetaminophen (TYLENOL) tablet 650 mg, 650 mg, Oral, Q6H PRN, 650 mg at 06/15/21 1933 **OR** acetaminophen (TYLENOL) suppository 650 mg, 650 mg, Rectal, Q6H PRN, Vonda Haynes PA-C    polyethylene glycol (MIRALAX) packet 17 g, 17 g, Oral, DAILY PRN, Vonda Haynes PA-C    enoxaparin (LOVENOX) injection 40 mg, 40 mg, SubCUTAneous, DAILY, Vonda Haynes PA-C, 40 mg at 06/17/21 3001    glucose chewable tablet 16 g, 4 Tablet, Oral, PRN, Vonda Haynes PA-C    dextrose (D50W) injection syrg 12.5-25 g, 25-50 mL, IntraVENous, PRN, Vonda Haynes PA-C    glucagon (GLUCAGEN) injection 1 mg, 1 mg, IntraMUSCular, PRN, Vonda Haynes, JACQUELINE    insulin lispro (HUMALOG) injection, , SubCUTAneous, AC&HS, Vonda Haynes PA-C, 108 Units at 06/17/21 0730    fluticasone propionate (FLONASE) 50 mcg/actuation nasal spray 2 Spray, 2 Spray, Both Nostrils, DAILY, Vonda Haynes PA-C, 2 Spray at 06/17/21 9231    aspirin delayed-release tablet 81 mg, 81 mg, Oral, DAILY, Vonda Haynes PA-C, 81 mg at 06/17/21 1045    atorvastatin (LIPITOR) tablet 40 mg, 40 mg, Oral, DAILY, Vonda Haynes PA-C, 40 mg at 06/17/21 0925    docusate sodium (COLACE) capsule 100 mg, 100 mg, Oral, BID, Vonda Haynes PA-C, 100 mg at 06/17/21 0925    DULoxetine (CYMBALTA) capsule 30 mg, 30 mg, Oral, DAILY, Vonda Haynes PA-C, 30 mg at 06/17/21 6426    gabapentin (NEURONTIN) capsule 300 mg, 300 mg, Oral, BID, Vonda Haynes PA-C, 300 mg at 06/17/21 0927    insulin lispro protamine/insulin lispro (HUMALOG MIX 75/25) injection 24 Units, 24 Units, SubCUTAneous, BID, Vonda Haynes PA-C, 24 Units at 06/17/21 0927    magnesium oxide (MAG-OX) tablet 400 mg, 400 mg, Oral, TID, Vonda Haynes PA-C, 400 mg at 06/17/21 4485    metFORMIN (GLUCOPHAGE) tablet 500 mg, 500 mg, Oral, DAILY WITH BREAKFAST, Vonda Haynes PA-C, 500 mg at 06/17/21 6349    multivitamin (ONE A DAY) tablet 1 Tablet, 1 Tablet, Oral, DAILY, Vonda Haynes PA-C, 1 Tablet at 06/17/21 0926    pantoprazole (PROTONIX) tablet 40 mg, 40 mg, Oral, ACB, Vonda Haynes PA-C, 40 mg at 06/17/21 0936    ticagrelor (BRILINTA) tablet 90 mg, 90 mg, Oral, BID, Vonda Haynes PA-C, 90 mg at 06/17/21 8337    sotaloL (BETAPACE) tablet 40 mg, 40 mg, Oral, DAILY, Vonda Haynes PA-C, 40 mg at 06/17/21 1202      Assessment:     Principal Problem:    Ac[facility]est[facility] (6/15/2021)    Active Problems:    Hyperglycemia due to type 2 diabetes mellitus (HCC) ()      HTN (hypertension) ()      Sinus congestion (6/15/2021)        Plan:     Case discussed with Collaborating physician Dr. Zhao Cameron and our recommendations are as follows:     1. CAD:  s/p stenting proximal LAD 9/10/20, Cath 3/11/2021 showing stable stent and no other obstructive CAD. continue DAPT with Brilinta as she is a Plavix non responder. Not candidate for Effient s/t Neurological history. Continue Statin. Continue current regimen as denies change in sx of recent. 2. Mixed Cardiomyopathy with Acute on Chronic HFrEF Exacerbation:  S/p BS BiV-CRT Device 2018. EF 10% on echo prior to cath 9/10/20. TTE 2/2021 showing EF 40-45%, However on TTE 6/16 EF 25-30%. CRT @ 88% on most recent transmission. Continue GDMT. Doing well with Sotalol 40mg daily. She was unable to tolerate BID dosing during hospitalization for load s/t increased QTc. QTc @ 508 on EKG on admission. Would monitor closely and avoid further prolonging medications. Patient having frequent ectopy on telemetry and PVC burden on most recent Holter in office @ 7%. She is scheduled with EP in upcoming weeks for further input given suboptimal CRT % and Higher PVC burden. Should continue home dosing of Bumex at discharge. Compliance reiterated heavily this am.  Was previously on Entresto, but it was stopped during a hospitalization (for unknown reasons). Will attempt to restart in OP setting once patient more compensated. Continue Strict I/O. Daily Weights. 3. Hypokalemia - replete to goal >4. Repeat labs next week in OP setting. Continue telemetry monitoring. 4. Hypomagnesemia - replete to goal >2. Consider continued PO daily supplementation at discharge. 5. Hypertension:  Blood pressure is at goal, continue current medication regimen  6. Hyperlipidemia: We will continue the statin therapy as prescribed. Thank you for involving us in the care of this patient. Please do not hesitate to call if additional questions arise.  If after hours please call [phone]    Signed By: Harmony Pittman NP     June 17, 2021

## 2021-06-17 NOTE — DISCHARGE INSTRUCTIONS
Patient Education        Heart Failure: Care Instructions  Your Care Instructions     Heart failure occurs when your heart does not pump as much blood as the body needs. Failure does not mean that the heart has stopped pumping but rather that it is not pumping as well as it should. Over time, this causes fluid buildup in your lungs and other parts of your body. Fluid buildup can cause shortness of breath, fatigue, swollen ankles, and other problems. By taking medicines regularly, reducing sodium (salt) in your diet, checking your weight every day, and making lifestyle changes, you can feel better and live longer. Follow-up care is a key part of your treatment and safety. Be sure to make and go to all appointments, and call your doctor if you are having problems. It's also a good idea to know your test results and keep a list of the medicines you take. How can you care for yourself at home? Medicines    · Be safe with medicines. Take your medicines exactly as prescribed. Call your doctor if you think you are having a problem with your medicine.     · Do not take any vitamins, over-the-counter medicine, or herbal products without talking to your doctor first. Mckenna Braga not take ibuprofen (Advil or Motrin) and naproxen (Aleve) without talking to your doctor first. They could make your heart failure worse.     · You may take some of the following medicine. ? Angiotensin-converting enzyme inhibitors (ACEIs) or angiotensin II receptor blockers (ARBs) reduce the heart's workload, lower blood pressure, and reduce swelling. Taking an ACEI or ARB may lower your chance of needing to be hospitalized. ? Beta-blockers can slow heart rate, decrease blood pressure, and improve your condition. Taking a beta-blocker may lower your chance of needing to be hospitalized. ? Diuretics, also called water pills, reduce swelling. You will get more details on the specific medicines your doctor prescribes.   Diet    · Your doctor may suggest that you limit sodium. Your doctor can tell you how much sodium is right for you. An example is less than 3,000 mg a day. This includes all the salt you eat in cooking or in packaged foods. People get most of their sodium from processed foods. Fast food and restaurant meals also tend to be very high in sodium.     · Ask your doctor how much liquid you can drink each day. You may have to limit liquids. Weight    · Weigh yourself without clothing at the same time each day. Record your weight. Call your doctor if you have a sudden weight gain, such as more than 2 to 3 pounds in a day or 5 pounds in a week. (Your doctor may suggest a different range of weight gain.) A sudden weight gain may mean that your heart failure is getting worse. Activity level    · Start light exercise (if your doctor says it is okay). Even if you can only do a small amount, exercise will help you get stronger, have more energy, and manage your weight and your stress. Walking is an easy way to get exercise. Start out by walking a little more than you did before. Bit by bit, increase the amount you walk.     · When you exercise, watch for signs that your heart is working too hard. You are pushing yourself too hard if you cannot talk while you are exercising. If you become short of breath or dizzy or have chest pain, stop, sit down, and rest.     · If you feel \"wiped out\" the day after you exercise, walk slower or for a shorter distance until you can work up to a better pace.     · Get enough rest at night. Sleeping with 1 or 2 pillows under your upper body and head may help you breathe easier. Lifestyle changes    · Do not smoke. Smoking can make a heart condition worse. If you need help quitting, talk to your doctor about stop-smoking programs and medicines. These can increase your chances of quitting for good.  Quitting smoking may be the most important step you can take to protect your heart.     · Limit alcohol to 2 drinks a day for men and 1 drink a day for women. Too much alcohol can cause health problems.     · Avoid getting sick from colds and the flu. Get a pneumococcal vaccine shot. If you have had one before, ask your doctor whether you need another dose. Get a flu shot each year. If you must be around people with colds or the flu, wash your hands often. When should you call for help? Call 911 if you have symptoms of sudden heart failure such as:    · You have severe trouble breathing.     · You cough up pink, foamy mucus.     · You have a new irregular or rapid heartbeat. Call your doctor now or seek immediate medical care if:    · You have new or increased shortness of breath.     · You are dizzy or lightheaded, or you feel like you may faint.     · You have sudden weight gain, such as more than 2 to 3 pounds in a day or 5 pounds in a week. (Your doctor may suggest a different range of weight gain.)     · You have increased swelling in your legs, ankles, or feet.     · You are suddenly so tired or weak that you cannot do your usual activities. Watch closely for changes in your health, and be sure to contact your doctor if you develop new symptoms. Where can you learn more? Go to http://www.gray.com/  Enter I323 in the search box to learn more about \"Heart Failure: Care Instructions. \"  Current as of: August 31, 2020               Content Version: 12.8  © 8773-6128 [employer]. Care instructions adapted under license by [employer] (which disclaims liability or warranty for this information). If you have questions about a medical condition or this instruction, always ask your healthcare professional. [place] [address] any warranty or liability for your use of this information.

## 2021-06-17 NOTE — PROGRESS NOTES
Discharge instructions reviewed with patient. She verbalizes understanding of all, opportunity to ask questions.

## 2021-06-20 NOTE — TELEPHONE ENCOUNTER
Followed up with Ms. Prudence Zapien after her most recent admission for CHF. Patient states she is overall doing well still gets tired at times but she says that is her baseline. She was 166 pounds on 6/17 and she says that her current weight is 164 pounds. She is discharged on 1 mg of Bumex twice daily and continue to encourage her to watch her daily weights as well as limit her fluid intake. Patient has follow-up with Dr. Renata Santos on July 3 and she states she has follow-up with [facility] cardiology on July 12.   Overall patient states she is doing well and did encourage her to follow closely with her PCP and cardiology and to call with any questions or concerns

## 2021-08-03 PROBLEM — I50.9 CHF (CONGESTIVE HEART FAILURE) (HCC): Status: RESOLVED | Noted: 2020-09-25 | Resolved: 2021-01-01

## 2021-08-04 NOTE — PROGRESS NOTES
History and Physical    Patient: She Elizondo MRN: [id number]  SSN: [id number]    YOB: 1950  Age: 65 y.o. Sex: female      Subjective:      She Elizondo is a 65 y.o. female with past medical history of hypertension, hyperlipidemia, coronary artery disease s/p stent, congestive heart failure with low ejection fraction s/p ICD, ventricular tachycardia is here for follow-up of hypothyroidism. She was ent to me by primary care physician Dr. Radames Hall. Patient was diagnosed with hypothyroidism a very long time back. She has been on levothyroxine since a long time. She takes this regularly and appropriately. However, she gets hospitalized frequently for different issues and she is not sure if she gets levothyroxine every time when she is hospitalized. She tells me that her dose has been fluctuating from 150 to 175 mcg in the past year. Done after the last visit showed TSH was undetectable and free T4 was elevated. So levothyroxine was decreased from 150 mcg to 125 mcg daily. She is taking it regularly and appropriately. She was admitted in mid June for CHF, it does not look like she was given levothyroxine during that admission. But she takes it when she came back home. She is not reporting any changes in her symptoms, continues to feel very tired. She has type 2 diabetes mellitus and she would like to start seeing me for diabetes as well. However she needs a new meter. She takes insulin. She was on amiodarone for low ejection fraction and ventricular tachycardia but it has been stopped and now she is on sotalol. Regarding symptoms, energy level is poor, she has chronic constipation, weight has been stable, she has chronic insomnia, denies any hair or skin changes. She has history of nodule in left thyroid lobe for which she had lobectomy many years back. No cancer was found. Recently she has been feeling like there is a lump on the right side of her neck. She denies any difficulty in swallowing, breathing or hoarseness of voice. She had thyroid ultrasound in December 2020.   Past Medical History:   Diagnosis Date    Acid indigestion     heartburn    Aneurysm ([facility] [address]     Anxiety disorder     Arthritis     Asthma     Back pain     Bladder spasms     Cerebral artery occlusion with cerebral infarction ([facility] [address] 08/2020    Chest pain     Constipation     Cough     Diabetes mellitus     Diarrhea     Fatigue     Fibromyalgia     Frequent episodic tension-type headache     GERD (gastroesophageal reflux disease)     Hearing reduced     Heart failure ([facility] [address]     Hemorrhoids     Hernia of unspecified site of abdominal cavity without mention of obstruction or gangrene     HTN (hypertension)     Hypothyroidism     ICD (implantable cardioverter-defibrillator) in place     Muscle pain     joint pain    N&V (nausea and vomiting)     Pacemaker     biventricular ICD 1/26/18    Ringing in ears     Skin cancer     Sleep apnea     cant tolerate the machine    SOB (shortness of breath)     SOB (shortness of breath)     Swallowing difficulty     Thyroid disorder     Vertigo     Wears dentures      Past Surgical History:   Procedure Laterality Date    HX CARPAL TUNNEL RELEASE      right hand    HX GI      HX HEART CATHETERIZATION  2/6/16    HX HEENT      thyroid     HX HYSTERECTOMY      HX ORTHOPAEDIC      HX PACEMAKER Left 6/20/16    [employer] ICD    HX THYROIDECTOMY      LA REMOVAL GALLBLADDER        Family History   Problem Relation Age of Onset    Diabetes Mother     Heart Disease Mother     Heart Disease Father     Cancer Sister     Diabetes Sister     Heart Disease Sister     Hypertension Sister     Lupus Sister     Breast Cancer Sister     Diabetes Brother      Social History     Tobacco Use    Smoking status: Never Smoker    Smokeless tobacco: Never Used   Substance Use Topics    Alcohol use: Yes     Comment: wine occasionally      Prior to Admission medications    Medication Sig Start Date End Date Taking? Authorizing Provider   albuterol (PROVENTIL HFA, VENTOLIN HFA, PROAIR HFA) 90 mcg/actuation inhaler INHALE 2 PUFFS BY MOUTH EVERY 4 TO 6 HOURS AS NEEDED 4/30/21  Yes Provider, Historical   amiodarone (CORDARONE) 200 mg tablet START WITH 2 TABLETS BY MOUTH ONCE A DAY FOR 30 DAYS THEN TAKE 1 TABLET ONCE DAILY 7/13/21  Yes Provider, Historical   levothyroxine (SYNTHROID) 150 mcg tablet Take  by mouth Daily (before breakfast). Yes Provider, Historical   glucose blood VI test strips (OneTouch Verio test strips) strip Check glucose 3 times a day 8/4/21  Yes Aarti Ba MD   lancets (One Touch Delica) 33 gauge misc Check glucose 3 times a day 8/4/21  Yes Aarti Ba MD   Blood-Glucose Meter (OneTouch Verio Meter) misc Check glucose 3 times a day 8/4/21  Yes Aarti Ba MD   DULoxetine (Cymbalta) 30 mg capsule Take 30 mg by mouth daily. Yes Provider, Historical   docusate sodium (Colace) 100 mg capsule Take 100 mg by mouth two (2) times a day. Yes Provider, Historical   butalbital-acetaminophen-caff (FIORICET) -40 mg per capsule TAKE 1 CAPSULE BY MOUTH EVERY DAY 2/17/21  Yes Provider, Historical   metFORMIN (GLUCOPHAGE) 500 mg tablet  1/28/21  Yes Provider, Historical   insulin lispro protamin-lispro (HumaLOG Mix 75-25 KwikPen) flexpen 24 Units two (2) times a day. Yes Provider, Historical   bumetanide (BUMEX) 1 mg tablet Take 1 Tab by mouth two (2) times daily (with meals). 9/29/20  Yes Lauren Thakur MD   ticagrelor (Brilinta) 90 mg tablet Take  by mouth two (2) times a day. Yes Provider, Historical   multivitamin,tx-iron-minerals (Complete Multivitamin) tab Take  by mouth daily. Yes Provider, Historical   aspirin delayed-release 81 mg tablet Take 1 Tab by mouth daily. 9/1/20  Yes Nella Prado MD   magnesium oxide (MAG-OX) 400 mg tablet Take 400 mg by mouth three (3) times daily. Yes Provider, Historical   melatonin 3 mg tablet Take 3 mg by mouth nightly as needed. Yes Provider, Historical   acetaminophen (TYLENOL EXTRA STRENGTH) 500 mg tablet Take  by mouth every six (6) hours as needed for Pain. Yes Provider, Historical   polyethylene glycol (MIRALAX) 17 gram packet Take 17 g by mouth as needed. Yes Provider, Historical   gabapentin (NEURONTIN) 100 mg capsule Take 300 mg by mouth two (2) times a day. 4/27/16  Yes Provider, Historical   omeprazole (PRILOSEC) 40 mg capsule Take 40 mg by mouth daily. 4/27/16  Yes Provider, Historical   atorvastatin (LIPITOR) 40 mg tablet Take 40 mg by mouth daily. Yes Provider, Historical   potassium chloride SR (KLOR-CON 10) 10 mEq tablet Take 20 mEq by mouth daily. Yes Provider, Historical        Allergies   Allergen Reactions    Codeine Other (comments)     Patient states she is not allergic    Novocain [Procaine] Nausea and Vomiting    Tramadol Other (comments)     Headache       Review of Systems:  ROS    A comprehensive review of systems was preformed and it is negative except mentioned in HPI    Objective:     Vitals:    08/04/21 1020 08/04/21 1023   BP: (!) 141/63 137/65   Pulse: 99 78   Temp: 97.3 °F (36.3 °C)    TempSrc: Temporal    SpO2: 99%    Weight: 170 lb 9.6 oz (77.4 kg)    Height: 5' 2\" (1.575 m)         Physical Exam:    Physical Exam  Vitals and nursing note reviewed. Constitutional:       Appearance: She is obese. HENT:      Head: Normocephalic. Neck:      Comments: Right thyroid lobe smooth and enlarged, left lobe not palpated  Cardiovascular:      Rate and Rhythm: Normal rate and regular rhythm. Pulmonary:      Effort: Pulmonary effort is normal.      Breath sounds: Normal breath sounds. Neurological:      Mental Status: She is alert. Labs and Imaging:  Results for Reina Neumann (MRN [id number]) as of 3/17/2021 13:38   Ref.  Range 8/27/2012 00:00 2/28/2013 13:44 11/29/2017 00:00 8/10/2020 17:54   TSH Latest Ref Range: 0.36 - 3.74 uIU/mL 2.410 5.340 (H) 5.720 (H) 6.58 (H)     Results for Viraj Showers (MRN [id number]) as of 3/17/2021 13:53   Ref. Range 3/11/2021 12:15   Sodium Latest Ref Range: 136 - 145 mmol/L 140   Potassium Latest Ref Range: 3.5 - 5.1 mmol/L 3.6   Chloride Latest Ref Range: 97 - 108 mmol/L 105   CO2 Latest Ref Range: 21 - 32 mmol/L 28   Anion gap Latest Ref Range: 5 - 15 mmol/L 7   Glucose Latest Ref Range: 65 - 100 mg/dL 154 (H)   BUN Latest Ref Range: 6 - 20 mg/dL 16   Creatinine Latest Ref Range: 0.55 - 1.02 mg/dL 0.63   BUN/Creatinine ratio Latest Ref Range: 12 - 20   25 (H)   Calcium Latest Ref Range: 8.5 - 10.1 mg/dL 9.1   GFR est non-AA Latest Ref Range: >60 ml/min/1.73m2 >60   GFR est AA Latest Ref Range: >60 ml/min/1.73m2 >60   Bilirubin, total Latest Ref Range: 0.2 - 1.0 mg/dL 0.8   Protein, total Latest Ref Range: 6.4 - 8.2 g/dL 6.8   Albumin Latest Ref Range: 3.5 - 5.0 g/dL 2.9 (L)   Globulin Latest Ref Range: 2.0 - 4.0 g/dL 3.9   A-G Ratio Latest Ref Range: 1.1 - 2.2   0.7 (L)   ALT Latest Ref Range: 12 - 78 U/L 37   AST Latest Ref Range: 15 - 37 U/L 39 (H)   Alk. phosphatase Latest Ref Range: 45 - 117 U/L 114     Thyroid US 12-8-2020:  FINDINGS: Thyroid ultrasound. Isthmus thickened 0.6 cm. Right lobe enlarged,  measuring at least 5 cm in length by 2.4 cm in width by 3.1 cm in thickness. The  right lobe is homogeneous without focal nodule. The left thyroid lobe is not  identified.     IMPRESSION  IMPRESSION:  1. Enlarged right thyroid lobe and isthmus. 2. Left thyroid lobe not identified. Technologist provides patient history of  left thyroid removed 10 years ago. 3. No focal nodule.   Last 3 Recorded Weights in this Encounter    08/04/21 1020   Weight: 170 lb 9.6 oz (77.4 kg)        Lab Results   Component Value Date/Time    Hemoglobin A1c 10.2 (H) 06/15/2021 07:00 PM    Hemoglobin A1c 10.4 (H) 08/11/2020 12:17 AM    Hemoglobin A1c, External 10.4 05/12/2015 12:00 AM Hemoglobin A1c, External 7.9 02/13/2015 12:00 AM        Assessment:     Patient Active Problem List   Diagnosis Code    Acid indigestion K30    Asthma 80    Back pain M54.9    Wears dentures Z97.2    Constipation 564.0    Hyperglycemia due to type 2 diabetes mellitus ([facility] [address] E11.65    Diarrhea R19.7    Frequent episodic tension-type headache G44.219    Hemorrhoids 65    Hernia of unspecified site of abdominal cavity without mention of obstruction or gangrene K46.9    HTN (hypertension) I10    Muscle pain M79.10    SOB (shortness of breath) R06.02    Thyroid disorder E07.9    Skin cancer C44.90    Cardiomyopathy (HCC) I42.9    Biventricular ICD (implantable cardioverter-defibrillator) in place Z95.810    TIA (transient ischemic attack) G45.9    Dysarthria R47.1    Severe obesity (HCC) E66.01    CHF exacerbation (HCC) I50.9    Hyperkalemia E87.5    Hypomagnesemia E83.42    Atrial fibrillation (HCC) I48.91    Acute on chronic congestive heart failure (HCC) I50.9    Hypoxia R09.02    Elevated troponin R77.8    Chest pain R07.9    Acquired hypothyroidism E03.9    History of lobectomy of thyroid Z90.09    Acute CHF (congestive heart failure) (HCC) I50.9    Sinus congestion R09.81           Plan:     Hypothyroidism:  8-:  TSH elevated at 6.58  Patient is currently on levothyroxine 150 mcg daily. Not sure what dose she was on when she had the above labs done. Clinically she has nonspecific symptoms, but she has multiple other health issues as well which could be attributing to it.      3-:  TSH undetectable  Free T4 elevated at 3.05  Levothyroxine was decreased to 125 mcg daily. Plan:  Check thyroid function test today. Discussed with patient about the correct way of taking levothyroxine. Advised patient to take multivitamin with dinner, so it does not interfere with levothyroxine absorption.   I will see her back in 1 month to discuss about thyroid as well as to establish care for type 2 diabetes. History of left thyroid lobectomy:  I reviewed ultrasound from 12-8-2020. Right lobe appears enlarged but there are no distinct nodules. Discussed with patient about this finding. No further follow-up required unless there is any change in her symptoms. Congestive heart failure with low ejection fraction:  S/p biventricular ICD. Previously on amiodarone which has been switched to sotalol since November 2020.     Orders Placed This Encounter    TSH AND FREE T4    glucose blood VI test strips (OneTouch Verio test strips) strip     Sig: Check glucose 3 times a day     Dispense:  300 Strip     Refill:  1    lancets (One Touch Delica) 33 gauge misc     Sig: Check glucose 3 times a day     Dispense:  300 Lancet     Refill:  2    Blood-Glucose Meter (OneTouch Verio Meter) misc     Sig: Check glucose 3 times a day     Dispense:  1 Each     Refill:  0        Signed By: Barrett West MD     August 4, 2021      Return to clinic 1 month

## 2021-08-04 NOTE — LETTER
8/4/2021    Patient: Mechelle Wyman   YOB: 1950   Date of Visit: 8/4/2021     [id number] [id number]  Via Fax: [phone]    Dear Cierra Vanegas MD,      Thank you for referring Ms. Zayra Vizcarra to [address] [place] [address] ENDOCRINOLOGY for evaluation. My notes for this consultation are attached. If you have questions, please do not hesitate to call me. I look forward to following your patient along with you.       Sincerely,    Michell Schafer MD

## 2021-08-05 NOTE — PROGRESS NOTES
Thyroid labs are looking much better. Continue levothyroxine 125 mcg daily. I am going to send refill to her pharmacy.

## 2021-11-01 NOTE — PATIENT INSTRUCTIONS
No further Neuro Intervention is needed at this time. Learning About How Hospitals and Clinics Keep You Safe From COVID-19 Overview Many hospitals and clinics now are treating people who are infected with COVID-19. So if you're in the hospital or clinic for any other reason, this may be an unsettling time. It's common to be concerned about becoming infected with the virus. But hospitals and clinics have policies to prevent the spread of infections. For example, doctors and nurses are trained to wash their hands before they treat you. Health care centers have stepped up these policies now. They are taking further steps to protect their patients. As long as NWDMB-59 remains a public health problem, things are going to be different when you go to a health care facility. They may have new rules for your safety. These could include having you wear a cloth face cover, meeting you outside the clinic, and having you sit away from others in the waiting room. What are hospitals and clinics doing to keep you safe? Your care team is very aware of the threat of COVID-19. They are doing everything they can to keep you safe. Hospitals and clinics may have different policies. But in general, you may expect many of these measures: 
· You will be screened for COVID-19. When you come to the hospital, you may have your temperature taken. You'll be asked about any symptoms, such as a fever, a cough, or shortness of breath. You may be asked if you've had contact with anyone who's been diagnosed with the virus. And you may be asked if you've traveled to any place that has had an outbreak. · The staff may try to do as much as possible outside the facility. For example, you may be asked to fill out paperwork online or in your car before you come inside. The person giving you a ride home may be asked to wait outside.  These new rules to help protect you may make routine tasks take longer than usual. 
· People who have COVID-19 are treated in a separate area. Many hospitals and clinics have staff members who treat only these patients. This helps limit the spread of the infection. · Visitors may be limited. In some cases, no visitors are allowed. In others, only one healthy visitor is allowed. Children may be limited to having only one adult with them. You can connect with family and friends using your phone or computer. If you need something brought from home, such as glasses or a phone , find out where the item can be dropped off. · The hospital or clinic follows guidelines to prevent infection. These include: 
? Washing hands often. ? Disinfecting high-touch surfaces. ? Wearing face masks or other protective equipment. ? Making extra space for social distancing. What can you do to stay safe? We all have a role to play in keeping ourselves safe and preventing the spread of COVID-19. Here are some things you can do while you're receiving care. If you're in a hospital, stay in your room. This will limit your exposure to the virus. It may be boring, but it's the safest place for you. Wash your hands often. Use soap and water, and scrub for 20 seconds. Then rinse and dry them well. Always wash them after you use the bathroom, before you eat, and after you cough, sneeze, or blow your nose. If you can't wash your hands, use hand . Speak up if you have safety concerns. Don't be shy to correct health care workers if they aren't washing their hands properly, wearing face masks, or taking other precautions. These actions are vital to prevent the spread of infection. Try to be understanding. This is a stressful time for everyone, including your care team. They are doing their best to keep you safe and provide you with good care. Where can you learn more? Go to http://www.gray.com/ Enter [id number] in the search box to learn more about \"Learning About How [facility] and [facility] From COVID-19. \" Current as of: March 26, 2021               Content Version: 13.0 © 1655-0555 [employer]. Care instructions adapted under license by [employer] (which disclaims liability or warranty for this information). If you have questions about a medical condition or this instruction, always ask your healthcare professional. [address] [address] any warranty or liability for your use of this information.

## 2021-11-01 NOTE — PROGRESS NOTES
Phone call to patient in preparation for Virtual/phone visit with provider. Name and  verified. Patient unable to obtain vital signs at home. Annual follow up for imaging review for Cerebral aneurysm. Patient reports occasional headaches and episodes of dizziness for the past few months. No acute problems reported.

## 2021-11-02 NOTE — PROGRESS NOTES
Neurointerventional Surgery Clinic Note    Robi Peres is a 66 y.o. female who was seen by synchronous (real-time) audio-video technology on 11/1/2021. Consent: Robi Peres, who was seen by synchronous (real-time) audio-video technology, and/or her healthcare decision maker, is aware that this patient-initiated, Telehealth encounter on 11/1/2021 is a billable service, with coverage as determined by her insurance carrier. She is aware that she may receive a bill and has provided verbal consent to proceed: Yes. 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 Patient was found to have a left MCA stenosis as cause of her symptoms.  Refers feeling well no slurred speech. Patient had a coronary stent placed on 9/10/20 and was started on ASA 81 mg and Brilinta 90 mg BID. Patient initially had severe stenosis of the M2 division of the left MCA on CTA 8/10/20 that improved with CTA on 10/5/20. Patient does not need to be on DAPT from neurological stand point but from cardiology. Her intracranial aneurysm is the same size with no changes. MRA from 10/22/21 showed stable 3 mm right Pcomm aneurysm versus infundibulum, more likely an infundibulum. There is still stenosis of the left MCA as well as the M1 and M2 segment of the left MCA. Patient does not report any new neurological deficits. At this point, patient was informed to continue with only ASA. No need for Brilinta. Plan: Will sign off at this point  Continue to follow with Cardiology    I spent at least 15 minutes on this visit with this established patient. Subjective:   Robi Peres is a 66 y.o. female who was seen for   Cerebral Aneurysm (Annual follow up)      Prior to Admission medications    Medication Sig Start Date End Date Taking?  Authorizing Provider sacubitriL-valsartan (Entresto) 24-26 mg tablet Entresto 24 mg-26 mg tablet   TAKE 1 TABLET BY MOUTH TWICE DAILY   Yes Provider, Historical   MAGNESIUM PO Take 400 mg by mouth two (2) times a day. Yes Provider, Historical   levothyroxine (SYNTHROID) 125 mcg tablet Take 1 Tablet by mouth Daily (before breakfast) for 90 days. 8/5/21 11/3/21 Yes Yang Slade MD   albuterol (PROVENTIL HFA, VENTOLIN HFA, PROAIR HFA) 90 mcg/actuation inhaler INHALE 2 PUFFS BY MOUTH EVERY 4 TO 6 HOURS AS NEEDED 4/30/21  Yes Provider, Historical   amiodarone (CORDARONE) 200 mg tablet START WITH 2 TABLETS BY MOUTH ONCE A DAY FOR 30 DAYS THEN TAKE 1 TABLET ONCE DAILY 7/13/21  Yes Provider, Historical   glucose blood VI test strips (OneTouch Verio test strips) strip Check glucose 3 times a day 8/4/21  Yes Yang Slade MD   lancets (One Touch Delica) 33 gauge misc Check glucose 3 times a day 8/4/21  Yes Yang Slade MD   Blood-Glucose Meter (OneTouch Verio Meter) misc Check glucose 3 times a day 8/4/21  Yes Yang Slade MD   DULoxetine (Cymbalta) 30 mg capsule Take 30 mg by mouth daily. Yes Provider, Historical   docusate sodium (Colace) 100 mg capsule Take 100 mg by mouth two (2) times a day. Yes Provider, Historical   butalbital-acetaminophen-caff (FIORICET) -40 mg per capsule TAKE 1 CAPSULE BY MOUTH EVERY DAY 2/17/21  Yes Provider, Historical   metFORMIN (GLUCOPHAGE) 500 mg tablet 500 mg two (2) times daily (with meals). 1/28/21  Yes Provider, Historical   insulin lispro protamin-lispro (HumaLOG Mix 75-25 KwikPen) flexpen 24 Units two (2) times a day. Yes Provider, Historical   bumetanide (BUMEX) 1 mg tablet Take 1 Tab by mouth two (2) times daily (with meals). 9/29/20  Yes Austin Flynn MD   ticagrelor (Brilinta) 90 mg tablet Take  by mouth two (2) times a day. Yes Provider, Historical   multivitamin,tx-iron-minerals (Complete Multivitamin) tab Take  by mouth daily.    Yes Provider, Historical   aspirin delayed-release 81 mg tablet Take 1 Tab by mouth daily. 9/1/20  Yes Alex Chen MD   magnesium oxide (MAG-OX) 400 mg tablet Take 400 mg by mouth three (3) times daily. Yes Provider, Historical   melatonin 3 mg tablet Take 3 mg by mouth nightly as needed. Yes Provider, Historical   acetaminophen (TYLENOL EXTRA STRENGTH) 500 mg tablet Take  by mouth every six (6) hours as needed for Pain. Yes Provider, Historical   polyethylene glycol (MIRALAX) 17 gram packet Take 17 g by mouth as needed. Yes Provider, Historical   gabapentin (NEURONTIN) 100 mg capsule Take 300 mg by mouth two (2) times a day. 4/27/16  Yes Provider, Historical   omeprazole (PRILOSEC) 40 mg capsule Take 40 mg by mouth daily. 4/27/16  Yes Provider, Historical   atorvastatin (LIPITOR) 40 mg tablet Take 40 mg by mouth daily. Yes Provider, Historical   potassium chloride SR (KLOR-CON 10) 10 mEq tablet Take 20 mEq by mouth daily.    Yes Provider, Historical     Allergies   Allergen Reactions    Codeine Other (comments)     Patient states she is not allergic    Novocain [Procaine] Nausea and Vomiting    Tramadol Other (comments)     Headache       Patient Active Problem List   Diagnosis Code    Acid indigestion K30    Asthma 493    Back pain M54.9    Wears dentures Z97.2    Constipation 564.0    Hyperglycemia due to type 2 diabetes mellitus (HCC) E11.65    Diarrhea R19.7    Frequent episodic tension-type headache G44.219    Hemorrhoids 65    Hernia of unspecified site of abdominal cavity without mention of obstruction or gangrene K46.9    HTN (hypertension) I10    Muscle pain M79.10    SOB (shortness of breath) R06.02    Thyroid disorder E07.9    Skin cancer C44.90    Cardiomyopathy (HCC) I42.9    Biventricular ICD (implantable cardioverter-defibrillator) in place Z95.810    TIA (transient ischemic attack) G45.9    Dysarthria R47.1    Severe obesity (HCC) E66.01    CHF exacerbation (HCC) I50.9    Hyperkalemia E87.5    Hypomagnesemia E83.42    Atrial fibrillation (HCC) I48.91    Acute on chronic congestive heart failure (HCC) I50.9    Hypoxia R09.02    Elevated troponin R77.8    Chest pain R07.9    Acquired hypothyroidism E03.9    History of lobectomy of thyroid Z90.09    Acute CHF (congestive heart failure) (HCC) I50.9    Sinus congestion R09.81     Patient Active Problem List    Diagnosis Date Noted    Biventricular ICD (implantable cardioverter-defibrillator) in place 01/26/2018    Acute CHF (congestive heart failure) ([address] 06/15/2021    Sinus congestion 06/15/2021    Acquired hypothyroidism 03/17/2021    History of lobectomy of thyroid 03/17/2021    Chest pain 03/11/2021    Hypoxia 01/21/2021    Elevated troponin 01/21/2021    Atrial fibrillation ([address] 11/16/2020    Acute on chronic congestive heart failure ([facility] [address] 11/16/2020    Hypomagnesemia 09/28/2020    Hyperkalemia 09/26/2020    CHF exacerbation ([facility] [address] 09/22/2020    Severe obesity ([facility] [address] 09/02/2020    Dysarthria 08/11/2020    TIA (transient ischemic attack) 08/10/2020    Cardiomyopathy ([facility] [address] 05/23/2016    Skin cancer     Acid indigestion     Asthma     Back pain     Wears dentures     Constipation     Hyperglycemia due to type 2 diabetes mellitus (HCC)     Diarrhea     Frequent episodic tension-type headache     Hemorrhoids     Hernia of unspecified site of abdominal cavity without mention of obstruction or gangrene     HTN (hypertension)     Muscle pain     SOB (shortness of breath)     Thyroid disorder      Current Outpatient Medications   Medication Sig Dispense Refill    sacubitriL-valsartan (Entresto) 24-26 mg tablet Entresto 24 mg-26 mg tablet   TAKE 1 TABLET BY MOUTH TWICE DAILY      MAGNESIUM PO Take 400 mg by mouth two (2) times a day.  levothyroxine (SYNTHROID) 125 mcg tablet Take 1 Tablet by mouth Daily (before breakfast) for 90 days.  90 Tablet 1    albuterol (PROVENTIL HFA, VENTOLIN HFA, PROAIR HFA) 90 mcg/actuation inhaler INHALE 2 PUFFS BY MOUTH EVERY 4 TO 6 HOURS AS NEEDED      amiodarone (CORDARONE) 200 mg tablet START WITH 2 TABLETS BY MOUTH ONCE A DAY FOR 30 DAYS THEN TAKE 1 TABLET ONCE DAILY      glucose blood VI test strips (OneTouch Verio test strips) strip Check glucose 3 times a day 300 Strip 1    lancets (One Touch Delica) 33 gauge misc Check glucose 3 times a day 300 Lancet 2    Blood-Glucose Meter (OneTouch Verio Meter) misc Check glucose 3 times a day 1 Each 0    DULoxetine (Cymbalta) 30 mg capsule Take 30 mg by mouth daily.  docusate sodium (Colace) 100 mg capsule Take 100 mg by mouth two (2) times a day.  butalbital-acetaminophen-caff (FIORICET) -40 mg per capsule TAKE 1 CAPSULE BY MOUTH EVERY DAY      metFORMIN (GLUCOPHAGE) 500 mg tablet 500 mg two (2) times daily (with meals).  insulin lispro protamin-lispro (HumaLOG Mix 75-25 KwikPen) flexpen 24 Units two (2) times a day.  bumetanide (BUMEX) 1 mg tablet Take 1 Tab by mouth two (2) times daily (with meals). 60 Tab 1    ticagrelor (Brilinta) 90 mg tablet Take  by mouth two (2) times a day.  multivitamin,tx-iron-minerals (Complete Multivitamin) tab Take  by mouth daily.  aspirin delayed-release 81 mg tablet Take 1 Tab by mouth daily. 30 Tab 2    magnesium oxide (MAG-OX) 400 mg tablet Take 400 mg by mouth three (3) times daily.  melatonin 3 mg tablet Take 3 mg by mouth nightly as needed.  acetaminophen (TYLENOL EXTRA STRENGTH) 500 mg tablet Take  by mouth every six (6) hours as needed for Pain.  polyethylene glycol (MIRALAX) 17 gram packet Take 17 g by mouth as needed.  gabapentin (NEURONTIN) 100 mg capsule Take 300 mg by mouth two (2) times a day.  omeprazole (PRILOSEC) 40 mg capsule Take 40 mg by mouth daily.  atorvastatin (LIPITOR) 40 mg tablet Take 40 mg by mouth daily.  potassium chloride SR (KLOR-CON 10) 10 mEq tablet Take 20 mEq by mouth daily. Allergies   Allergen Reactions    Codeine Other (comments)     Patient states she is not allergic    Novocain [Procaine] Nausea and Vomiting    Tramadol Other (comments)     Headache     Past Medical History:   Diagnosis Date    Acid indigestion     heartburn    [facility] [address]     Anxiety disorder     Arthritis     Asthma     Back pain     Bladder spasms     Cerebral artery occlusion with cerebral infarction ([facility] [address] 08/2020    Chest pain     Constipation     Cough     Diabetes mellitus     Diarrhea     Fatigue     Fibromyalgia     Frequent episodic tension-type headache     GERD (gastroesophageal reflux disease)     Hearing reduced     Heart failure ([address]     Hemorrhoids     Hernia of unspecified site of abdominal cavity without mention of obstruction or gangrene     HTN (hypertension)     Hypothyroidism     ICD (implantable cardioverter-defibrillator) in place     Muscle pain     joint pain    N&V (nausea and vomiting)     Pacemaker     biventricular ICD 1/26/18    Ringing in ears     Skin cancer     Sleep apnea     cant tolerate the machine    SOB (shortness of breath)     SOB (shortness of breath)     Swallowing difficulty     Thyroid disorder     Vertigo     Wears dentures      Past Surgical History:   Procedure Laterality Date    HX CARPAL TUNNEL RELEASE      right hand    HX GI      HX HEART CATHETERIZATION  2/6/16    HX HEENT      thyroid     HX HYSTERECTOMY      HX ORTHOPAEDIC      HX PACEMAKER Left 6/20/16    Xspand ICD    HX THYROIDECTOMY      IL REMOVAL GALLBLADDER       Family History   Problem Relation Age of Onset    Diabetes Mother     Heart Disease Mother     Heart Disease Father     Cancer Sister     Diabetes Sister     Heart Disease Sister     Hypertension Sister     Lupus Sister     Breast Cancer Sister     Diabetes Brother      Social History     Tobacco Use    Smoking status: Never Smoker    Smokeless tobacco: Never Used Substance Use Topics    Alcohol use: Yes     Comment: wine occasionally       ROS: Pertinent ROS included in HPI    Objective: This was a phone visit       We discussed the expected course, resolution and complications of the diagnosis(es) in detail. Medication risks, benefits, costs, interactions, and alternatives were discussed as indicated. I advised her to contact the office if her condition worsens, changes or fails to improve as anticipated. She expressed understanding with the diagnosis(es) and plan. Felisa Cummings is a 66 y.o. female who was evaluated by a video visit encounter for concerns as above. Patient identification was verified prior to start of the visit. A caregiver was present when appropriate. Due to this being a TeleHealth encounter (During OSLEB-75 public health emergency), evaluation of the following organ systems was limited: Vitals/Constitutional/EENT/Resp/CV/GI//MS/Neuro/Skin/Heme-Lymph-Imm. Pursuant to the emergency declaration under the [facility] waiver authority and the [employer] Act, this Virtual  Visit was conducted, with patient's (and/or legal guardian's) consent, to reduce the patient's risk of exposure to COVID-19 and provide necessary medical care. Services were provided through a video synchronous discussion virtually to substitute for in-person clinic visit. Patient and provider were located at their individual homes. This visit was completed virtually using Doxy. elvia Herrera MD

## 2022-01-01 ENCOUNTER — HOSPITAL ENCOUNTER (OUTPATIENT)
Age: 72
Discharge: HOME | End: 2022-06-02
Attending: INTERNAL MEDICINE | Admitting: INTERNAL MEDICINE
Payer: MEDICARE

## 2022-01-01 ENCOUNTER — HOSPITAL ENCOUNTER (OUTPATIENT)
Dept: PREADMISSION TESTING | Age: 72
Discharge: HOME | End: 2022-05-31
Payer: MEDICARE

## 2022-01-01 ENCOUNTER — HOSPITAL ENCOUNTER (INPATIENT)
Age: 72
LOS: 4 days | DRG: 291 | End: 2022-06-11
Attending: EMERGENCY MEDICINE | Admitting: INTERNAL MEDICINE
Payer: MEDICARE

## 2022-01-01 ENCOUNTER — APPOINTMENT (OUTPATIENT)
Dept: GENERAL RADIOLOGY | Age: 72
DRG: 291 | End: 2022-01-01
Attending: INTERNAL MEDICINE
Payer: MEDICARE

## 2022-01-01 ENCOUNTER — APPOINTMENT (OUTPATIENT)
Dept: CT IMAGING | Age: 72
DRG: 291 | End: 2022-01-01
Attending: INTERNAL MEDICINE
Payer: MEDICARE

## 2022-01-01 ENCOUNTER — APPOINTMENT (OUTPATIENT)
Dept: NON INVASIVE DIAGNOSTICS | Age: 72
DRG: 291 | End: 2022-01-01
Attending: NURSE PRACTITIONER
Payer: MEDICARE

## 2022-01-01 ENCOUNTER — APPOINTMENT (OUTPATIENT)
Dept: GENERAL RADIOLOGY | Age: 72
End: 2022-01-01
Attending: EMERGENCY MEDICINE
Payer: MEDICARE

## 2022-01-01 ENCOUNTER — HOSPITAL ENCOUNTER (OUTPATIENT)
Dept: MAMMOGRAPHY | Age: 72
Discharge: HOME | End: 2022-02-08
Payer: MEDICARE

## 2022-01-01 ENCOUNTER — HOSPITAL ENCOUNTER (EMERGENCY)
Age: 72
Discharge: ANOTHER HOSPITAL | End: 2022-06-07
Attending: EMERGENCY MEDICINE
Payer: MEDICARE

## 2022-01-01 VITALS
TEMPERATURE: 97.6 F | HEART RATE: 80 BPM | DIASTOLIC BLOOD PRESSURE: 72 MMHG | OXYGEN SATURATION: 98 % | SYSTOLIC BLOOD PRESSURE: 113 MMHG | RESPIRATION RATE: 16 BRPM

## 2022-01-01 VITALS
WEIGHT: 174 LBS | SYSTOLIC BLOOD PRESSURE: 109 MMHG | HEIGHT: 62 IN | DIASTOLIC BLOOD PRESSURE: 63 MMHG | TEMPERATURE: 97.6 F | OXYGEN SATURATION: 99 % | BODY MASS INDEX: 32.02 KG/M2 | RESPIRATION RATE: 18 BRPM | HEART RATE: 71 BPM

## 2022-01-01 VITALS
SYSTOLIC BLOOD PRESSURE: 58 MMHG | WEIGHT: 179.68 LBS | RESPIRATION RATE: 9 BRPM | DIASTOLIC BLOOD PRESSURE: 48 MMHG | TEMPERATURE: 97.7 F | BODY MASS INDEX: 33.06 KG/M2 | HEART RATE: 92 BPM | HEIGHT: 62 IN | OXYGEN SATURATION: 87 %

## 2022-01-01 VITALS
WEIGHT: 174 LBS | SYSTOLIC BLOOD PRESSURE: 105 MMHG | DIASTOLIC BLOOD PRESSURE: 55 MMHG | TEMPERATURE: 98.3 F | OXYGEN SATURATION: 95 % | BODY MASS INDEX: 32.02 KG/M2 | RESPIRATION RATE: 18 BRPM | HEIGHT: 62 IN | HEART RATE: 85 BPM

## 2022-01-01 DIAGNOSIS — R06.02 SOB (SHORTNESS OF BREATH): Primary | ICD-10-CM

## 2022-01-01 DIAGNOSIS — R77.8 ELEVATED TROPONIN: ICD-10-CM

## 2022-01-01 DIAGNOSIS — I25.10 CORONARY ARTERY DISEASE INVOLVING NATIVE HEART, UNSPECIFIED VESSEL OR LESION TYPE, UNSPECIFIED WHETHER ANGINA PRESENT: ICD-10-CM

## 2022-01-01 DIAGNOSIS — Z12.31 SCREENING MAMMOGRAM FOR HIGH-RISK PATIENT: ICD-10-CM

## 2022-01-01 DIAGNOSIS — J18.9 PNEUMONIA OF BOTH LUNGS DUE TO INFECTIOUS ORGANISM, UNSPECIFIED PART OF LUNG: Primary | ICD-10-CM

## 2022-01-01 DIAGNOSIS — R09.02 HYPOXIA: ICD-10-CM

## 2022-01-01 LAB
ACT BLD: 220 SEC (ref 74–125)
ACT BLD: 224 SEC (ref 74–125)
ALBUMIN SERPL-MCNC: 1.9 G/DL (ref 3.5–5)
ALBUMIN SERPL-MCNC: 2.1 G/DL (ref 3.5–5)
ALBUMIN SERPL-MCNC: 2.4 G/DL (ref 3.5–5)
ALBUMIN SERPL-MCNC: 2.8 G/DL (ref 3.5–5)
ALBUMIN/GLOB SERPL: 0.3 {RATIO} (ref 1.1–2.2)
ALBUMIN/GLOB SERPL: 0.4 {RATIO} (ref 1.1–2.2)
ALBUMIN/GLOB SERPL: 0.4 {RATIO} (ref 1.1–2.2)
ALBUMIN/GLOB SERPL: 0.5 {RATIO} (ref 1.1–2.2)
ALP SERPL-CCNC: 103 U/L (ref 45–117)
ALP SERPL-CCNC: 75 U/L (ref 45–117)
ALP SERPL-CCNC: 81 U/L (ref 45–117)
ALP SERPL-CCNC: 92 U/L (ref 45–117)
ALT SERPL-CCNC: 14 U/L (ref 12–78)
ALT SERPL-CCNC: 15 U/L (ref 12–78)
ALT SERPL-CCNC: 19 U/L (ref 12–78)
ALT SERPL-CCNC: 20 U/L (ref 12–78)
ANION GAP SERPL CALC-SCNC: 11 MMOL/L (ref 5–15)
ANION GAP SERPL CALC-SCNC: 5 MMOL/L (ref 5–15)
ANION GAP SERPL CALC-SCNC: 6 MMOL/L (ref 5–15)
ANION GAP SERPL CALC-SCNC: 7 MMOL/L (ref 5–15)
APPEARANCE UR: ABNORMAL
APPEARANCE UR: CLEAR
APTT PPP: 116.5 SEC (ref 21.2–34.1)
APTT PPP: 30.1 SEC (ref 21.2–34.1)
APTT PPP: 30.5 SEC (ref 21.2–34.1)
APTT PPP: 42 SEC (ref 21.2–34.1)
APTT PPP: 46.7 SEC (ref 21.2–34.1)
APTT PPP: 53.8 SEC (ref 21.2–34.1)
APTT PPP: 56.4 SEC (ref 21.2–34.1)
APTT PPP: 61.3 SEC (ref 21.2–34.1)
APTT PPP: 64.8 SEC (ref 21.2–34.1)
APTT PPP: 67.8 SEC (ref 21.2–34.1)
APTT PPP: >153 SEC (ref 21.2–34.1)
ARTERIAL PATENCY WRIST A: ABNORMAL
ARTERIAL PATENCY WRIST A: ABNORMAL
AST SERPL W P-5'-P-CCNC: 25 U/L (ref 15–37)
AST SERPL W P-5'-P-CCNC: 29 U/L (ref 15–37)
AST SERPL W P-5'-P-CCNC: 34 U/L (ref 15–37)
AST SERPL W P-5'-P-CCNC: 35 U/L (ref 15–37)
ATRIAL RATE: 83 BPM
ATRIAL RATE: 87 BPM
ATRIAL RATE: 96 BPM
BACTERIA URNS QL MICRO: ABNORMAL /HPF
BACTERIA URNS QL MICRO: NEGATIVE /HPF
BASE EXCESS BLDA CALC-SCNC: 0.1 MMOL/L (ref 0–2)
BASE EXCESS BLDA CALC-SCNC: 1.2 MMOL/L (ref 0–2)
BASOPHILS # BLD: 0 K/UL (ref 0–0.1)
BASOPHILS # BLD: 0.1 K/UL (ref 0–0.2)
BASOPHILS NFR BLD: 0 % (ref 0–1)
BASOPHILS NFR BLD: 1 % (ref 0–2.5)
BDY SITE: ABNORMAL
BDY SITE: ABNORMAL
BILIRUB SERPL-MCNC: 0.6 MG/DL (ref 0.2–1)
BILIRUB SERPL-MCNC: 1 MG/DL (ref 0.2–1)
BILIRUB SERPL-MCNC: 1.1 MG/DL (ref 0.2–1)
BILIRUB SERPL-MCNC: 1.3 MG/DL (ref 0.2–1)
BILIRUB UR QL: NEGATIVE
BILIRUB UR QL: NEGATIVE
BNP SERPL-MCNC: 3829 PG/ML
BNP SERPL-MCNC: 5477 PG/ML
BUN SERPL-MCNC: 29 MG/DL (ref 6–20)
BUN SERPL-MCNC: 30 MG/DL (ref 6–20)
BUN SERPL-MCNC: 31 MG/DL (ref 6–20)
BUN SERPL-MCNC: 32 MG/DL (ref 6–20)
BUN/CREAT SERPL: 22 (ref 12–20)
BUN/CREAT SERPL: 24 (ref 12–20)
BUN/CREAT SERPL: 24 (ref 12–20)
BUN/CREAT SERPL: 28 (ref 12–20)
CA-I BLD-MCNC: 8 MG/DL (ref 8.5–10.1)
CA-I BLD-MCNC: 8 MG/DL (ref 8.5–10.1)
CA-I BLD-MCNC: 8.3 MG/DL (ref 8.5–10.1)
CA-I BLD-MCNC: 8.8 MG/DL (ref 8.5–10.1)
CALCULATED P AXIS, ECG09: 17 DEGREES
CALCULATED P AXIS, ECG09: 53 DEGREES
CALCULATED R AXIS, ECG10: -100 DEGREES
CALCULATED R AXIS, ECG10: -41 DEGREES
CALCULATED R AXIS, ECG10: 119 DEGREES
CALCULATED T AXIS, ECG11: -71 DEGREES
CALCULATED T AXIS, ECG11: 109 DEGREES
CALCULATED T AXIS, ECG11: 9 DEGREES
CHLORIDE SERPL-SCNC: 100 MMOL/L (ref 97–108)
CHLORIDE SERPL-SCNC: 100 MMOL/L (ref 97–108)
CHLORIDE SERPL-SCNC: 96 MMOL/L (ref 97–108)
CHLORIDE SERPL-SCNC: 97 MMOL/L (ref 97–108)
CO2 SERPL-SCNC: 26 MMOL/L (ref 21–32)
CO2 SERPL-SCNC: 27 MMOL/L (ref 21–32)
CO2 SERPL-SCNC: 28 MMOL/L (ref 21–32)
CO2 SERPL-SCNC: 29 MMOL/L (ref 21–32)
COHGB MFR BLD: 0 % (ref 0–5)
COLOR UR: ABNORMAL
COLOR UR: ABNORMAL
COVID-19 RAPID TEST, COVR: NOT DETECTED
CREAT SERPL-MCNC: 1.16 MG/DL (ref 0.55–1.02)
CREAT SERPL-MCNC: 1.2 MG/DL (ref 0.55–1.02)
CREAT SERPL-MCNC: 1.23 MG/DL (ref 0.55–1.02)
CREAT SERPL-MCNC: 1.4 MG/DL (ref 0.55–1.02)
CRP SERPL-MCNC: 14.1 MG/DL (ref 0–0.6)
CRP SERPL-MCNC: 20.3 MG/DL (ref 0–0.6)
D DIMER PPP FEU-MCNC: 1.78 UG/ML(FEU)
DIAGNOSIS, 93000: NORMAL
DIFFERENTIAL METHOD BLD: ABNORMAL
ECHO AO ASC DIAM: 2.5 CM
ECHO AO ASCENDING AORTA INDEX: 1.39 CM/M2
ECHO AO ROOT DIAM: 2.5 CM
ECHO AO ROOT INDEX: 1.39 CM/M2
ECHO AV MEAN GRADIENT: 10 MMHG
ECHO AV MEAN VELOCITY: 1.5 M/S
ECHO AV PEAK GRADIENT: 15 MMHG
ECHO AV PEAK VELOCITY: 2 M/S
ECHO AV VTI: 36.3 CM
ECHO LA AREA 4C: 23.4 CM2
ECHO LA DIAMETER INDEX: 2.72 CM/M2
ECHO LA DIAMETER: 4.9 CM
ECHO LA MAJOR AXIS: 5.5 CM
ECHO LA TO AORTIC ROOT RATIO: 1.96
ECHO LV E' LATERAL VELOCITY: 10 CM/S
ECHO LV E' SEPTAL VELOCITY: 8 CM/S
ECHO LV EDV A2C: 39 ML
ECHO LV EDV A4C: 103 ML
ECHO LV EDV INDEX A4C: 57 ML/M2
ECHO LV EDV NDEX A2C: 22 ML/M2
ECHO LV EJECTION FRACTION A2C: 69 %
ECHO LV EJECTION FRACTION A4C: 19 %
ECHO LV ESV A2C: 12 ML
ECHO LV ESV A4C: 82 ML
ECHO LV ESV INDEX A2C: 7 ML/M2
ECHO LV ESV INDEX A4C: 46 ML/M2
ECHO LV FRACTIONAL SHORTENING: 9 % (ref 28–44)
ECHO LV INTERNAL DIMENSION DIASTOLE INDEX: 2.61 CM/M2
ECHO LV INTERNAL DIMENSION DIASTOLIC: 4.7 CM (ref 3.9–5.3)
ECHO LV INTERNAL DIMENSION SYSTOLIC INDEX: 2.39 CM/M2
ECHO LV INTERNAL DIMENSION SYSTOLIC: 4.3 CM
ECHO LV IVSD: 1 CM (ref 0.6–0.9)
ECHO LV MASS 2D: 164.5 G (ref 67–162)
ECHO LV MASS INDEX 2D: 91.4 G/M2 (ref 43–95)
ECHO LV POSTERIOR WALL DIASTOLIC: 1 CM (ref 0.6–0.9)
ECHO LV RELATIVE WALL THICKNESS RATIO: 0.43
ECHO LVOT AREA: 3.1 CM2
ECHO LVOT DIAM: 2 CM
ECHO MV A VELOCITY: 0.43 M/S
ECHO MV E VELOCITY: 1.24 M/S
ECHO MV E/A RATIO: 2.88
ECHO MV E/E' LATERAL: 12.4
ECHO MV E/E' RATIO (AVERAGED): 13.95
ECHO MV E/E' SEPTAL: 15.5
ECHO MV REGURGITANT PEAK GRADIENT: 29 MMHG
ECHO MV REGURGITANT PEAK VELOCITY: 2.7 M/S
ECHO MV REGURGITANT VTIA: 75.6 CM
ECHO PULMONARY ARTERY END DIASTOLIC PRESSURE: 1 MMHG
ECHO PV MAX VELOCITY: 1.2 M/S
ECHO PV MEAN GRADIENT: 3 MMHG
ECHO PV MEAN VELOCITY: 0.7 M/S
ECHO PV PEAK GRADIENT: 6 MMHG
ECHO PV REGURGITANT MAX VELOCITY: 0.6 M/S
ECHO PV VTI: 17.6 CM
ECHO RV BASAL DIMENSION: 4.1 CM
ECHO RV FREE WALL PEAK S': 11 CM/S
ECHO RV MID DIMENSION: 2.7 CM
ECHO RV TAPSE: 2.5 CM (ref 1.7–?)
ECHO TV REGURGITANT MAX VELOCITY: 2.62 M/S
ECHO TV REGURGITANT PEAK GRADIENT: 27 MMHG
EOSINOPHIL # BLD: 0 K/UL (ref 0–0.4)
EOSINOPHIL # BLD: 0 K/UL (ref 0–0.7)
EOSINOPHIL NFR BLD: 0 % (ref 0.9–2.9)
EOSINOPHIL NFR BLD: 0 % (ref 0–7)
ERYTHROCYTE [DISTWIDTH] IN BLOOD BY AUTOMATED COUNT: 13.9 % (ref 11.5–14.5)
ERYTHROCYTE [DISTWIDTH] IN BLOOD BY AUTOMATED COUNT: 14.1 % (ref 11.5–14.5)
ERYTHROCYTE [DISTWIDTH] IN BLOOD BY AUTOMATED COUNT: 14.3 % (ref 11.5–14.5)
ERYTHROCYTE [DISTWIDTH] IN BLOOD BY AUTOMATED COUNT: 14.3 % (ref 11.5–14.5)
ERYTHROCYTE [DISTWIDTH] IN BLOOD BY AUTOMATED COUNT: 14.4 % (ref 11.5–14.5)
FERRITIN SERPL-MCNC: 367 NG/ML (ref 8–252)
FIO2 ON VENT: 100 %
FIO2 ON VENT: 28 %
FLUAV RNA SPEC QL NAA+PROBE: NOT DETECTED
FLUBV RNA SPEC QL NAA+PROBE: NOT DETECTED
GAS FLOW.O2 O2 DELIVERY SYS: 15 L/MIN
GAS FLOW.O2 O2 DELIVERY SYS: 2 L/MIN
GLOBULIN SER CALC-MCNC: 5.1 G/DL (ref 2–4)
GLOBULIN SER CALC-MCNC: 5.5 G/DL (ref 2–4)
GLOBULIN SER CALC-MCNC: 5.6 G/DL (ref 2–4)
GLOBULIN SER CALC-MCNC: 6 G/DL (ref 2–4)
GLUCOSE BLD STRIP.AUTO-MCNC: 203 MG/DL (ref 65–117)
GLUCOSE BLD STRIP.AUTO-MCNC: 221 MG/DL (ref 65–117)
GLUCOSE BLD STRIP.AUTO-MCNC: 225 MG/DL (ref 65–117)
GLUCOSE BLD STRIP.AUTO-MCNC: 286 MG/DL (ref 65–117)
GLUCOSE SERPL-MCNC: 196 MG/DL (ref 65–100)
GLUCOSE SERPL-MCNC: 224 MG/DL (ref 65–100)
GLUCOSE SERPL-MCNC: 273 MG/DL (ref 65–100)
GLUCOSE SERPL-MCNC: 448 MG/DL (ref 65–100)
GLUCOSE UR STRIP.AUTO-MCNC: 50 MG/DL
GLUCOSE UR STRIP.AUTO-MCNC: 50 MG/DL
HCO3 BLDA-SCNC: 24 MMOL/L (ref 22–26)
HCO3 BLDA-SCNC: 24 MMOL/L (ref 22–26)
HCT VFR BLD AUTO: 26.9 % (ref 35–47)
HCT VFR BLD AUTO: 27.8 % (ref 35–47)
HCT VFR BLD AUTO: 29.2 % (ref 36–46)
HCT VFR BLD AUTO: 30.2 % (ref 35–47)
HCT VFR BLD AUTO: 32.5 % (ref 35–47)
HGB BLD OXIMETRY-MCNC: 10 G/DL (ref 13.5–17.5)
HGB BLD-MCNC: 10 G/DL (ref 13.5–17.5)
HGB BLD-MCNC: 10.3 G/DL (ref 11.5–16)
HGB BLD-MCNC: 8.7 G/DL (ref 11.5–16)
HGB BLD-MCNC: 8.9 G/DL (ref 11.5–16)
HGB BLD-MCNC: 9.7 G/DL (ref 11.5–16)
HGB UR QL STRIP: NEGATIVE
HGB UR QL STRIP: NEGATIVE
HYALINE CASTS URNS QL MICRO: ABNORMAL /LPF (ref 0–5)
HYALINE CASTS URNS QL MICRO: ABNORMAL /LPF (ref 0–5)
IMM GRANULOCYTES # BLD AUTO: 0 K/UL (ref 0–0.04)
IMM GRANULOCYTES # BLD AUTO: 0 K/UL (ref 0–0.04)
IMM GRANULOCYTES # BLD AUTO: 0.1 K/UL (ref 0–0.04)
IMM GRANULOCYTES NFR BLD AUTO: 0 % (ref 0–0.5)
IMM GRANULOCYTES NFR BLD AUTO: 0 % (ref 0–0.5)
IMM GRANULOCYTES NFR BLD AUTO: 1 % (ref 0–0.5)
INR PPP: 1.4 (ref 0.9–1.1)
INR PPP: 1.6 (ref 0.9–1.1)
KETONES UR QL STRIP.AUTO: NEGATIVE MG/DL
KETONES UR QL STRIP.AUTO: NEGATIVE MG/DL
LACTATE SERPL-SCNC: 1.5 MMOL/L (ref 0.4–2)
LDH SERPL L TO P-CCNC: 402 U/L (ref 81–246)
LEUKOCYTE ESTERASE UR QL STRIP.AUTO: ABNORMAL
LEUKOCYTE ESTERASE UR QL STRIP.AUTO: NEGATIVE
LYMPHOCYTES # BLD: 0.5 K/UL (ref 0.8–3.5)
LYMPHOCYTES # BLD: 0.7 K/UL (ref 1–4.8)
LYMPHOCYTES # BLD: 1.1 K/UL (ref 0.8–3.5)
LYMPHOCYTES # BLD: 1.2 K/UL (ref 0.8–3.5)
LYMPHOCYTES NFR BLD: 10 % (ref 12–49)
LYMPHOCYTES NFR BLD: 10 % (ref 12–49)
LYMPHOCYTES NFR BLD: 4 % (ref 12–49)
LYMPHOCYTES NFR BLD: 7 % (ref 20.5–51.1)
MAGNESIUM SERPL-MCNC: 1.3 MG/DL (ref 1.6–2.4)
MCH RBC QN AUTO: 30.3 PG (ref 26–34)
MCH RBC QN AUTO: 30.4 PG (ref 26–34)
MCH RBC QN AUTO: 30.8 PG (ref 26–34)
MCH RBC QN AUTO: 31.4 PG (ref 26–34)
MCH RBC QN AUTO: 31.9 PG (ref 31–34)
MCHC RBC AUTO-ENTMCNC: 31.3 G/DL (ref 30–36.5)
MCHC RBC AUTO-ENTMCNC: 31.7 G/DL (ref 30–36.5)
MCHC RBC AUTO-ENTMCNC: 32.1 G/DL (ref 30–36.5)
MCHC RBC AUTO-ENTMCNC: 33.1 G/DL (ref 30–36.5)
MCHC RBC AUTO-ENTMCNC: 34.3 G/DL (ref 31–36)
MCV RBC AUTO: 93 FL (ref 80–100)
MCV RBC AUTO: 94.7 FL (ref 80–99)
MCV RBC AUTO: 95.1 FL (ref 80–99)
MCV RBC AUTO: 96.9 FL (ref 80–99)
MCV RBC AUTO: 97.3 FL (ref 80–99)
METHGB MFR BLD: 0.3 % (ref 0–5)
MONOCYTES # BLD: 0.4 K/UL (ref 0–1)
MONOCYTES # BLD: 0.7 K/UL (ref 0–1)
MONOCYTES # BLD: 0.7 K/UL (ref 0–1)
MONOCYTES # BLD: 0.8 K/UL (ref 0.2–2.4)
MONOCYTES NFR BLD: 3 % (ref 5–13)
MONOCYTES NFR BLD: 6 % (ref 5–13)
MONOCYTES NFR BLD: 7 % (ref 5–13)
MONOCYTES NFR BLD: 8 % (ref 1.7–9.3)
MUCOUS THREADS URNS QL MICRO: ABNORMAL /LPF
NEUTS SEG # BLD: 11 K/UL (ref 1.8–8)
NEUTS SEG # BLD: 8.9 K/UL (ref 1.8–8)
NEUTS SEG # BLD: 9 K/UL (ref 1.8–7.7)
NEUTS SEG # BLD: 9.6 K/UL (ref 1.8–8)
NEUTS SEG NFR BLD: 83 % (ref 32–75)
NEUTS SEG NFR BLD: 84 % (ref 32–75)
NEUTS SEG NFR BLD: 84 % (ref 42–75)
NEUTS SEG NFR BLD: 92 % (ref 32–75)
NITRITE UR QL STRIP.AUTO: NEGATIVE
NITRITE UR QL STRIP.AUTO: NEGATIVE
NRBC # BLD: 0 K/UL (ref 0–0.01)
NRBC # BLD: 0.01 K/UL
NRBC BLD-RTO: 0 PER 100 WBC
NRBC BLD-RTO: 0.1 PER 100 WBC
OXYHGB MFR BLD: 96.6 % (ref 95–100)
P-R INTERVAL, ECG05: 204 MS
P-R INTERVAL, ECG05: 208 MS
P-R INTERVAL, ECG05: 41 MS
PCO2 BLDA: 31 MMHG (ref 35–45)
PCO2 BLDA: 47 MMHG (ref 35–45)
PERFORMED BY, TECHID: ABNORMAL
PH BLDA: 7.35 [PH] (ref 7.35–7.45)
PH BLDA: 7.5 [PH] (ref 7.35–7.45)
PH UR STRIP: 5 [PH] (ref 5–8)
PH UR STRIP: 5 [PH] (ref 5–8)
PLATELET # BLD AUTO: 159 K/UL (ref 150–400)
PLATELET # BLD AUTO: 169 K/UL (ref 150–400)
PLATELET # BLD AUTO: 171 K/UL (ref 150–400)
PLATELET # BLD AUTO: 188 K/UL (ref 150–400)
PLATELET # BLD AUTO: 204 K/UL (ref 150–400)
PMV BLD AUTO: 10.1 FL (ref 8.9–12.9)
PMV BLD AUTO: 10.2 FL (ref 8.9–12.9)
PMV BLD AUTO: 10.5 FL (ref 8.9–12.9)
PMV BLD AUTO: 10.6 FL (ref 8.9–12.9)
PMV BLD AUTO: 8.2 FL (ref 6.5–11.5)
PO2 BLDA: 60 MMHG (ref 75–100)
PO2 BLDA: 84 MMHG (ref 70–100)
POTASSIUM SERPL-SCNC: 3 MMOL/L (ref 3.5–5.1)
POTASSIUM SERPL-SCNC: 3.2 MMOL/L (ref 3.5–5.1)
POTASSIUM SERPL-SCNC: 3.3 MMOL/L (ref 3.5–5.1)
POTASSIUM SERPL-SCNC: 3.3 MMOL/L (ref 3.5–5.1)
PROCALCITONIN SERPL-MCNC: 0.35 NG/ML
PROCALCITONIN SERPL-MCNC: 0.79 NG/ML
PROT SERPL-MCNC: 7.4 G/DL (ref 6.4–8.2)
PROT SERPL-MCNC: 7.7 G/DL (ref 6.4–8.2)
PROT SERPL-MCNC: 7.9 G/DL (ref 6.4–8.2)
PROT SERPL-MCNC: 8.4 G/DL (ref 6.4–8.2)
PROT UR STRIP-MCNC: 30 MG/DL
PROT UR STRIP-MCNC: NEGATIVE MG/DL
PROTHROMBIN TIME: 17.3 SEC (ref 11.9–14.6)
PROTHROMBIN TIME: 18.8 SEC (ref 11.9–14.6)
Q-T INTERVAL, ECG07: 374 MS
Q-T INTERVAL, ECG07: 424 MS
Q-T INTERVAL, ECG07: 443 MS
QRS DURATION, ECG06: 142 MS
QRS DURATION, ECG06: 194 MS
QRS DURATION, ECG06: 201 MS
QTC CALCULATION (BEZET), ECG08: 439 MS
QTC CALCULATION (BEZET), ECG08: 510 MS
QTC CALCULATION (BEZET), ECG08: 557 MS
RBC # BLD AUTO: 2.83 M/UL (ref 3.8–5.2)
RBC # BLD AUTO: 2.87 M/UL (ref 3.8–5.2)
RBC # BLD AUTO: 3.14 M/UL (ref 4.5–5.9)
RBC # BLD AUTO: 3.19 M/UL (ref 3.8–5.2)
RBC # BLD AUTO: 3.34 M/UL (ref 3.8–5.2)
RBC #/AREA URNS HPF: ABNORMAL /HPF (ref 0–5)
RBC #/AREA URNS HPF: ABNORMAL /HPF (ref 0–5)
SAO2 % BLD: 91 %
SAO2% DEVICE SAO2% SENSOR NAME: ABNORMAL
SAO2% DEVICE SAO2% SENSOR NAME: ABNORMAL
SARS-COV-2, COV2: NOT DETECTED
SODIUM SERPL-SCNC: 131 MMOL/L (ref 136–145)
SODIUM SERPL-SCNC: 133 MMOL/L (ref 136–145)
SODIUM SERPL-SCNC: 134 MMOL/L (ref 136–145)
SODIUM SERPL-SCNC: 134 MMOL/L (ref 136–145)
SP GR UR REFRACTOMETRY: 1.01 (ref 1–1.03)
SP GR UR REFRACTOMETRY: 1.02 (ref 1–1.03)
SPECIMEN SITE: ABNORMAL
SPECIMEN SITE: ABNORMAL
THERAPEUTIC RANGE,PTTT: ABNORMAL SEC (ref 82–109)
THERAPEUTIC RANGE,PTTT: NORMAL SEC (ref 82–109)
THERAPEUTIC RANGE,PTTT: NORMAL SEC (ref 82–109)
TROPONIN-HIGH SENSITIVITY: 256 NG/L (ref 0–51)
TROPONIN-HIGH SENSITIVITY: 276 NG/L (ref 0–51)
TROPONIN-HIGH SENSITIVITY: 294 NG/L (ref 0–51)
TROPONIN-HIGH SENSITIVITY: 318 NG/L (ref 0–51)
UA: UC IF INDICATED,UAUC: ABNORMAL
UROBILINOGEN UR QL STRIP.AUTO: 0.1 EU/DL (ref 0.1–1)
UROBILINOGEN UR QL STRIP.AUTO: 0.1 EU/DL (ref 0.1–1)
VENTRICULAR RATE, ECG03: 83 BPM
VENTRICULAR RATE, ECG03: 87 BPM
VENTRICULAR RATE, ECG03: 95 BPM
WBC # BLD AUTO: 10.6 K/UL (ref 4.4–11.3)
WBC # BLD AUTO: 10.8 K/UL (ref 3.6–11)
WBC # BLD AUTO: 11.5 K/UL (ref 3.6–11)
WBC # BLD AUTO: 11.9 K/UL (ref 3.6–11)
WBC # BLD AUTO: 5.7 K/UL (ref 3.6–11)
WBC URNS QL MICRO: ABNORMAL /HPF (ref 0–4)
WBC URNS QL MICRO: ABNORMAL /HPF (ref 0–4)

## 2022-01-01 PROCEDURE — 71045 X-RAY EXAM CHEST 1 VIEW: CPT

## 2022-01-01 PROCEDURE — 74011250636 HC RX REV CODE- 250/636: Performed by: INTERNAL MEDICINE

## 2022-01-01 PROCEDURE — 74011000250 HC RX REV CODE- 250: Performed by: INTERNAL MEDICINE

## 2022-01-01 PROCEDURE — 83880 ASSAY OF NATRIURETIC PEPTIDE: CPT

## 2022-01-01 PROCEDURE — 99285 EMERGENCY DEPT VISIT HI MDM: CPT

## 2022-01-01 PROCEDURE — C1887 CATHETER, GUIDING: HCPCS | Performed by: INTERNAL MEDICINE

## 2022-01-01 PROCEDURE — 96375 TX/PRO/DX INJ NEW DRUG ADDON: CPT

## 2022-01-01 PROCEDURE — 85730 THROMBOPLASTIN TIME PARTIAL: CPT

## 2022-01-01 PROCEDURE — 87040 BLOOD CULTURE FOR BACTERIA: CPT

## 2022-01-01 PROCEDURE — 36600 WITHDRAWAL OF ARTERIAL BLOOD: CPT

## 2022-01-01 PROCEDURE — 80053 COMPREHEN METABOLIC PANEL: CPT

## 2022-01-01 PROCEDURE — C1769 GUIDE WIRE: HCPCS | Performed by: INTERNAL MEDICINE

## 2022-01-01 PROCEDURE — 93005 ELECTROCARDIOGRAM TRACING: CPT

## 2022-01-01 PROCEDURE — 83605 ASSAY OF LACTIC ACID: CPT

## 2022-01-01 PROCEDURE — 74011250636 HC RX REV CODE- 250/636

## 2022-01-01 PROCEDURE — 82962 GLUCOSE BLOOD TEST: CPT

## 2022-01-01 PROCEDURE — 96366 THER/PROPH/DIAG IV INF ADDON: CPT

## 2022-01-01 PROCEDURE — 96365 THER/PROPH/DIAG IV INF INIT: CPT

## 2022-01-01 PROCEDURE — 36415 COLL VENOUS BLD VENIPUNCTURE: CPT

## 2022-01-01 PROCEDURE — 74011000258 HC RX REV CODE- 258: Performed by: INTERNAL MEDICINE

## 2022-01-01 PROCEDURE — 82803 BLOOD GASES ANY COMBINATION: CPT

## 2022-01-01 PROCEDURE — 84145 PROCALCITONIN (PCT): CPT

## 2022-01-01 PROCEDURE — 86140 C-REACTIVE PROTEIN: CPT

## 2022-01-01 PROCEDURE — 65270000029 HC RM PRIVATE

## 2022-01-01 PROCEDURE — 74011250637 HC RX REV CODE- 250/637: Performed by: INTERNAL MEDICINE

## 2022-01-01 PROCEDURE — C1760 CLOSURE DEV, VASC: HCPCS | Performed by: INTERNAL MEDICINE

## 2022-01-01 PROCEDURE — C1753 CATH, INTRAVAS ULTRASOUND: HCPCS | Performed by: INTERNAL MEDICINE

## 2022-01-01 PROCEDURE — 77030016699 HC CATH ANGI DX INFN1 CARD -A: Performed by: INTERNAL MEDICINE

## 2022-01-01 PROCEDURE — 71250 CT THORAX DX C-: CPT

## 2022-01-01 PROCEDURE — 93308 TTE F-UP OR LMTD: CPT

## 2022-01-01 PROCEDURE — 84484 ASSAY OF TROPONIN QUANT: CPT

## 2022-01-01 PROCEDURE — 97530 THERAPEUTIC ACTIVITIES: CPT

## 2022-01-01 PROCEDURE — 94640 AIRWAY INHALATION TREATMENT: CPT

## 2022-01-01 PROCEDURE — 02H633Z INSERTION OF INFUSION DEVICE INTO RIGHT ATRIUM, PERCUTANEOUS APPROACH: ICD-10-PCS | Performed by: RADIOLOGY

## 2022-01-01 PROCEDURE — 77010033678 HC OXYGEN DAILY

## 2022-01-01 PROCEDURE — 99233 SBSQ HOSP IP/OBS HIGH 50: CPT | Performed by: INTERNAL MEDICINE

## 2022-01-01 PROCEDURE — 99152 MOD SED SAME PHYS/QHP 5/>YRS: CPT | Performed by: INTERNAL MEDICINE

## 2022-01-01 PROCEDURE — 85027 COMPLETE CBC AUTOMATED: CPT

## 2022-01-01 PROCEDURE — 74011000636 HC RX REV CODE- 636: Performed by: INTERNAL MEDICINE

## 2022-01-01 PROCEDURE — 92928 PRQ TCAT PLMT NTRAC ST 1 LES: CPT | Performed by: INTERNAL MEDICINE

## 2022-01-01 PROCEDURE — 99153 MOD SED SAME PHYS/QHP EA: CPT | Performed by: INTERNAL MEDICINE

## 2022-01-01 PROCEDURE — C1894 INTRO/SHEATH, NON-LASER: HCPCS | Performed by: INTERNAL MEDICINE

## 2022-01-01 PROCEDURE — C1725 CATH, TRANSLUMIN NON-LASER: HCPCS | Performed by: INTERNAL MEDICINE

## 2022-01-01 PROCEDURE — 93458 L HRT ARTERY/VENTRICLE ANGIO: CPT | Performed by: INTERNAL MEDICINE

## 2022-01-01 PROCEDURE — 74011000258 HC RX REV CODE- 258: Performed by: EMERGENCY MEDICINE

## 2022-01-01 PROCEDURE — 76937 US GUIDE VASCULAR ACCESS: CPT | Performed by: INTERNAL MEDICINE

## 2022-01-01 PROCEDURE — 77030025703 HC SYR ANGI VACLOK MRTM -A: Performed by: INTERNAL MEDICINE

## 2022-01-01 PROCEDURE — 85025 COMPLETE CBC W/AUTO DIFF WBC: CPT

## 2022-01-01 PROCEDURE — 93571 IV DOP VEL&/PRESS C FLO 1ST: CPT | Performed by: INTERNAL MEDICINE

## 2022-01-01 PROCEDURE — 97165 OT EVAL LOW COMPLEX 30 MIN: CPT

## 2022-01-01 PROCEDURE — 76210000016 HC OR PH I REC 1 TO 1.5 HR: Performed by: INTERNAL MEDICINE

## 2022-01-01 PROCEDURE — 85610 PROTHROMBIN TIME: CPT

## 2022-01-01 PROCEDURE — 77030040934 HC CATH DIAG DXTERITY MEDT -A: Performed by: INTERNAL MEDICINE

## 2022-01-01 PROCEDURE — 96367 TX/PROPH/DG ADDL SEQ IV INF: CPT

## 2022-01-01 PROCEDURE — 77030013516 HC DEV INFL ANGI MRTM -B: Performed by: INTERNAL MEDICINE

## 2022-01-01 PROCEDURE — 65610000006 HC RM INTENSIVE CARE

## 2022-01-01 PROCEDURE — 77030008542 HC TBNG MON PRSS EDWD -A: Performed by: INTERNAL MEDICINE

## 2022-01-01 PROCEDURE — 36569 INSJ PICC 5 YR+ W/O IMAGING: CPT

## 2022-01-01 PROCEDURE — 83735 ASSAY OF MAGNESIUM: CPT

## 2022-01-01 PROCEDURE — 82728 ASSAY OF FERRITIN: CPT

## 2022-01-01 PROCEDURE — 81001 URINALYSIS AUTO W/SCOPE: CPT

## 2022-01-01 PROCEDURE — 74011250636 HC RX REV CODE- 250/636: Performed by: HOSPITALIST

## 2022-01-01 PROCEDURE — 99223 1ST HOSP IP/OBS HIGH 75: CPT | Performed by: INTERNAL MEDICINE

## 2022-01-01 PROCEDURE — C1874 STENT, COATED/COV W/DEL SYS: HCPCS | Performed by: INTERNAL MEDICINE

## 2022-01-01 PROCEDURE — 85379 FIBRIN DEGRADATION QUANT: CPT

## 2022-01-01 PROCEDURE — 77030012468 HC VLV BLEEDBK CNTRL ABBT -B: Performed by: INTERNAL MEDICINE

## 2022-01-01 PROCEDURE — 92978 ENDOLUMINL IVUS OCT C 1ST: CPT | Performed by: INTERNAL MEDICINE

## 2022-01-01 PROCEDURE — 74011250636 HC RX REV CODE- 250/636: Performed by: STUDENT IN AN ORGANIZED HEALTH CARE EDUCATION/TRAINING PROGRAM

## 2022-01-01 PROCEDURE — 83615 LACTATE (LD) (LDH) ENZYME: CPT

## 2022-01-01 PROCEDURE — 76210000030 HC REC RM PH II 5.5 TO 6 HR: Performed by: INTERNAL MEDICINE

## 2022-01-01 PROCEDURE — 74011250637 HC RX REV CODE- 250/637: Performed by: NURSE PRACTITIONER

## 2022-01-01 PROCEDURE — 36573 INSJ PICC RS&I 5 YR+: CPT | Performed by: INTERNAL MEDICINE

## 2022-01-01 PROCEDURE — 97166 OT EVAL MOD COMPLEX 45 MIN: CPT

## 2022-01-01 PROCEDURE — 74011250637 HC RX REV CODE- 250/637: Performed by: HOSPITALIST

## 2022-01-01 PROCEDURE — 93010 ELECTROCARDIOGRAM REPORT: CPT | Performed by: INTERNAL MEDICINE

## 2022-01-01 PROCEDURE — 87635 SARS-COV-2 COVID-19 AMP PRB: CPT

## 2022-01-01 PROCEDURE — 77030019698 HC SYR ANGI MDLON MRTM -A: Performed by: INTERNAL MEDICINE

## 2022-01-01 PROCEDURE — 74011250636 HC RX REV CODE- 250/636: Performed by: EMERGENCY MEDICINE

## 2022-01-01 PROCEDURE — 2709999900 HC NON-CHARGEABLE SUPPLY: Performed by: INTERNAL MEDICINE

## 2022-01-01 PROCEDURE — 87636 SARSCOV2 & INF A&B AMP PRB: CPT

## 2022-01-01 PROCEDURE — 85347 COAGULATION TIME ACTIVATED: CPT

## 2022-01-01 PROCEDURE — 77067 SCR MAMMO BI INCL CAD: CPT

## 2022-01-01 DEVICE — STENT COR DES 3.00X18MM -- DES RESOLUTE ONYX: Type: IMPLANTABLE DEVICE | Status: FUNCTIONAL

## 2022-01-01 RX ORDER — ACETAMINOPHEN 325 MG/1
650 TABLET ORAL
Status: DISCONTINUED | OUTPATIENT
Start: 2022-01-01 | End: 2022-01-01 | Stop reason: HOSPADM

## 2022-01-01 RX ORDER — METOPROLOL SUCCINATE 25 MG/1
12.5 TABLET, EXTENDED RELEASE ORAL DAILY
Status: DISCONTINUED | OUTPATIENT
Start: 2022-01-01 | End: 2022-01-01 | Stop reason: HOSPADM

## 2022-01-01 RX ORDER — LORAZEPAM 2 MG/ML
1 INJECTION INTRAMUSCULAR
Status: DISCONTINUED | OUTPATIENT
Start: 2022-01-01 | End: 2022-01-01 | Stop reason: HOSPADM

## 2022-01-01 RX ORDER — METOPROLOL SUCCINATE 25 MG/1
12.5 TABLET, EXTENDED RELEASE ORAL DAILY
COMMUNITY

## 2022-01-01 RX ORDER — ACETAMINOPHEN 650 MG/1
650 SUPPOSITORY RECTAL
Status: DISCONTINUED | OUTPATIENT
Start: 2022-01-01 | End: 2022-01-01 | Stop reason: HOSPADM

## 2022-01-01 RX ORDER — IPRATROPIUM BROMIDE AND ALBUTEROL SULFATE 2.5; .5 MG/3ML; MG/3ML
3 SOLUTION RESPIRATORY (INHALATION)
Status: DISCONTINUED | OUTPATIENT
Start: 2022-01-01 | End: 2022-01-01 | Stop reason: HOSPADM

## 2022-01-01 RX ORDER — GABAPENTIN 300 MG/1
300 CAPSULE ORAL 2 TIMES DAILY
Status: DISCONTINUED | OUTPATIENT
Start: 2022-01-01 | End: 2022-01-01 | Stop reason: HOSPADM

## 2022-01-01 RX ORDER — ENOXAPARIN SODIUM 100 MG/ML
40 INJECTION SUBCUTANEOUS DAILY
Status: DISCONTINUED | OUTPATIENT
Start: 2022-01-01 | End: 2022-01-01 | Stop reason: HOSPADM

## 2022-01-01 RX ORDER — SODIUM CHLORIDE 0.9 % (FLUSH) 0.9 %
5-40 SYRINGE (ML) INJECTION AS NEEDED
Status: DISCONTINUED | OUTPATIENT
Start: 2022-01-01 | End: 2022-01-01 | Stop reason: HOSPADM

## 2022-01-01 RX ORDER — MIDODRINE HYDROCHLORIDE 5 MG/1
5 TABLET ORAL
Status: COMPLETED | OUTPATIENT
Start: 2022-01-01 | End: 2022-01-01

## 2022-01-01 RX ORDER — LORAZEPAM 2 MG/ML
1 INJECTION INTRAMUSCULAR
Status: DISCONTINUED | OUTPATIENT
Start: 2022-01-01 | End: 2022-01-01

## 2022-01-01 RX ORDER — DULOXETIN HYDROCHLORIDE 60 MG/1
60 CAPSULE, DELAYED RELEASE ORAL DAILY
COMMUNITY

## 2022-01-01 RX ORDER — POLYETHYLENE GLYCOL 3350 17 G/17G
17 POWDER, FOR SOLUTION ORAL DAILY PRN
Status: DISCONTINUED | OUTPATIENT
Start: 2022-01-01 | End: 2022-01-01 | Stop reason: HOSPADM

## 2022-01-01 RX ORDER — OXYBUTYNIN CHLORIDE 5 MG/1
5 TABLET ORAL 2 TIMES DAILY
COMMUNITY

## 2022-01-01 RX ORDER — LANOLIN ALCOHOL/MO/W.PET/CERES
3 CREAM (GRAM) TOPICAL
Status: DISCONTINUED | OUTPATIENT
Start: 2022-01-01 | End: 2022-01-01 | Stop reason: HOSPADM

## 2022-01-01 RX ORDER — HEPARIN SODIUM 10000 [USP'U]/100ML
12-25 INJECTION, SOLUTION INTRAVENOUS
Status: DISCONTINUED | OUTPATIENT
Start: 2022-01-01 | End: 2022-01-01

## 2022-01-01 RX ORDER — ERGOCALCIFEROL 1.25 MG/1
50000 CAPSULE ORAL
COMMUNITY

## 2022-01-01 RX ORDER — DOBUTAMINE HYDROCHLORIDE 200 MG/100ML
0-10 INJECTION INTRAVENOUS
Status: DISCONTINUED | OUTPATIENT
Start: 2022-01-01 | End: 2022-01-01

## 2022-01-01 RX ORDER — NOREPINEPHRINE BIT/0.9 % NACL 8 MG/250ML
.5-16 INFUSION BOTTLE (ML) INTRAVENOUS
Status: DISCONTINUED | OUTPATIENT
Start: 2022-01-01 | End: 2022-01-01

## 2022-01-01 RX ORDER — FENTANYL CITRATE 50 UG/ML
INJECTION, SOLUTION INTRAMUSCULAR; INTRAVENOUS AS NEEDED
Status: DISCONTINUED | OUTPATIENT
Start: 2022-01-01 | End: 2022-01-01 | Stop reason: HOSPADM

## 2022-01-01 RX ORDER — MORPHINE SULFATE 4 MG/ML
4 INJECTION INTRAVENOUS
Status: DISCONTINUED | OUTPATIENT
Start: 2022-01-01 | End: 2022-01-01

## 2022-01-01 RX ORDER — LANOLIN ALCOHOL/MO/W.PET/CERES
400 CREAM (GRAM) TOPICAL 2 TIMES DAILY
Status: DISCONTINUED | OUTPATIENT
Start: 2022-01-01 | End: 2022-01-01 | Stop reason: HOSPADM

## 2022-01-01 RX ORDER — OXYMETAZOLINE HCL 0.05 %
2 SPRAY, NON-AEROSOL (ML) NASAL AS NEEDED
Status: DISCONTINUED | OUTPATIENT
Start: 2022-01-01 | End: 2022-01-01

## 2022-01-01 RX ORDER — AMIODARONE HYDROCHLORIDE 200 MG/1
200 TABLET ORAL DAILY
Status: DISCONTINUED | OUTPATIENT
Start: 2022-01-01 | End: 2022-01-01 | Stop reason: HOSPADM

## 2022-01-01 RX ORDER — SODIUM CHLORIDE 0.9 % (FLUSH) 0.9 %
5-40 SYRINGE (ML) INJECTION EVERY 8 HOURS
Status: DISCONTINUED | OUTPATIENT
Start: 2022-01-01 | End: 2022-01-01 | Stop reason: HOSPADM

## 2022-01-01 RX ORDER — MORPHINE SULFATE 2 MG/ML
2 INJECTION, SOLUTION INTRAMUSCULAR; INTRAVENOUS ONCE
Status: COMPLETED | OUTPATIENT
Start: 2022-01-01 | End: 2022-01-01

## 2022-01-01 RX ORDER — POTASSIUM CHLORIDE 750 MG/1
20 TABLET, FILM COATED, EXTENDED RELEASE ORAL DAILY
Status: DISCONTINUED | OUTPATIENT
Start: 2022-01-01 | End: 2022-01-01

## 2022-01-01 RX ORDER — ONDANSETRON 2 MG/ML
4 INJECTION INTRAMUSCULAR; INTRAVENOUS
Status: DISCONTINUED | OUTPATIENT
Start: 2022-01-01 | End: 2022-01-01 | Stop reason: HOSPADM

## 2022-01-01 RX ORDER — ACETAMINOPHEN 325 MG/1
650 TABLET ORAL
Status: DISCONTINUED | OUTPATIENT
Start: 2022-01-01 | End: 2022-01-01 | Stop reason: SDUPTHER

## 2022-01-01 RX ORDER — ESCITALOPRAM OXALATE 10 MG/1
5 TABLET ORAL DAILY
Status: DISCONTINUED | OUTPATIENT
Start: 2022-01-01 | End: 2022-01-01 | Stop reason: HOSPADM

## 2022-01-01 RX ORDER — LIDOCAINE HYDROCHLORIDE 10 MG/ML
INJECTION INFILTRATION; PERINEURAL AS NEEDED
Status: DISCONTINUED | OUTPATIENT
Start: 2022-01-01 | End: 2022-01-01 | Stop reason: HOSPADM

## 2022-01-01 RX ORDER — MORPHINE SULFATE 4 MG/ML
4 INJECTION INTRAVENOUS
Status: DISCONTINUED | OUTPATIENT
Start: 2022-01-01 | End: 2022-01-01 | Stop reason: HOSPADM

## 2022-01-01 RX ORDER — LEVOTHYROXINE SODIUM 125 UG/1
125 TABLET ORAL
COMMUNITY

## 2022-01-01 RX ORDER — ENOXAPARIN SODIUM 100 MG/ML
40 INJECTION SUBCUTANEOUS DAILY
Status: DISCONTINUED | OUTPATIENT
Start: 2022-01-01 | End: 2022-01-01

## 2022-01-01 RX ORDER — ESCITALOPRAM OXALATE 5 MG/1
5 TABLET ORAL DAILY
COMMUNITY

## 2022-01-01 RX ORDER — FUROSEMIDE 10 MG/ML
20 INJECTION INTRAMUSCULAR; INTRAVENOUS
Status: COMPLETED | OUTPATIENT
Start: 2022-01-01 | End: 2022-01-01

## 2022-01-01 RX ORDER — MORPHINE SULFATE 2 MG/ML
2 INJECTION, SOLUTION INTRAMUSCULAR; INTRAVENOUS
Status: DISCONTINUED | OUTPATIENT
Start: 2022-01-01 | End: 2022-01-01 | Stop reason: HOSPADM

## 2022-01-01 RX ORDER — FUROSEMIDE 10 MG/ML
40 INJECTION INTRAMUSCULAR; INTRAVENOUS 2 TIMES DAILY
Status: DISCONTINUED | OUTPATIENT
Start: 2022-01-01 | End: 2022-01-01 | Stop reason: HOSPADM

## 2022-01-01 RX ORDER — PANTOPRAZOLE SODIUM 40 MG/1
40 TABLET, DELAYED RELEASE ORAL DAILY
Status: DISCONTINUED | OUTPATIENT
Start: 2022-01-01 | End: 2022-01-01 | Stop reason: HOSPADM

## 2022-01-01 RX ORDER — FUROSEMIDE 10 MG/ML
20 INJECTION INTRAMUSCULAR; INTRAVENOUS 2 TIMES DAILY
Status: DISCONTINUED | OUTPATIENT
Start: 2022-01-01 | End: 2022-01-01

## 2022-01-01 RX ORDER — ATORVASTATIN CALCIUM 40 MG/1
40 TABLET, FILM COATED ORAL DAILY
Status: DISCONTINUED | OUTPATIENT
Start: 2022-01-01 | End: 2022-01-01 | Stop reason: HOSPADM

## 2022-01-01 RX ORDER — ACETAMINOPHEN 650 MG/1
650 SUPPOSITORY RECTAL
Status: DISCONTINUED | OUTPATIENT
Start: 2022-01-01 | End: 2022-01-01

## 2022-01-01 RX ORDER — OXYMETAZOLINE HCL 0.05 %
2 SPRAY, NON-AEROSOL (ML) NASAL ONCE
Status: COMPLETED | OUTPATIENT
Start: 2022-01-01 | End: 2022-01-01

## 2022-01-01 RX ORDER — HEPARIN SODIUM 1000 [USP'U]/ML
4000 INJECTION, SOLUTION INTRAVENOUS; SUBCUTANEOUS AS NEEDED
Status: DISCONTINUED | OUTPATIENT
Start: 2022-01-01 | End: 2022-01-01

## 2022-01-01 RX ORDER — LORAZEPAM 2 MG/ML
INJECTION INTRAMUSCULAR
Status: COMPLETED
Start: 2022-01-01 | End: 2022-01-01

## 2022-01-01 RX ORDER — FUROSEMIDE 10 MG/ML
INJECTION INTRAMUSCULAR; INTRAVENOUS AS NEEDED
Status: DISCONTINUED | OUTPATIENT
Start: 2022-01-01 | End: 2022-01-01 | Stop reason: HOSPADM

## 2022-01-01 RX ORDER — POTASSIUM CHLORIDE 750 MG/1
40 TABLET, FILM COATED, EXTENDED RELEASE ORAL DAILY
Status: DISCONTINUED | OUTPATIENT
Start: 2022-01-01 | End: 2022-01-01 | Stop reason: HOSPADM

## 2022-01-01 RX ORDER — HEPARIN SODIUM 1000 [USP'U]/ML
INJECTION, SOLUTION INTRAVENOUS; SUBCUTANEOUS AS NEEDED
Status: DISCONTINUED | OUTPATIENT
Start: 2022-01-01 | End: 2022-01-01 | Stop reason: HOSPADM

## 2022-01-01 RX ORDER — GUAIFENESIN 100 MG/5ML
81 LIQUID (ML) ORAL DAILY
Status: DISCONTINUED | OUTPATIENT
Start: 2022-01-01 | End: 2022-01-01 | Stop reason: HOSPADM

## 2022-01-01 RX ORDER — SODIUM CHLORIDE 9 MG/ML
75 INJECTION, SOLUTION INTRAVENOUS CONTINUOUS
Status: DISCONTINUED | OUTPATIENT
Start: 2022-01-01 | End: 2022-01-01 | Stop reason: HOSPADM

## 2022-01-01 RX ORDER — HEPARIN SODIUM 1000 [USP'U]/ML
2000 INJECTION, SOLUTION INTRAVENOUS; SUBCUTANEOUS AS NEEDED
Status: DISCONTINUED | OUTPATIENT
Start: 2022-01-01 | End: 2022-01-01

## 2022-01-01 RX ORDER — POLYETHYLENE GLYCOL 3350 17 G/17G
17 POWDER, FOR SOLUTION ORAL AS NEEDED
COMMUNITY

## 2022-01-01 RX ORDER — BUTALBITAL, ACETAMINOPHEN AND CAFFEINE 50; 325; 40 MG/1; MG/1; MG/1
1 TABLET ORAL DAILY PRN
Status: DISCONTINUED | OUTPATIENT
Start: 2022-01-01 | End: 2022-01-01

## 2022-01-01 RX ORDER — GUAIFENESIN 100 MG/5ML
81 LIQUID (ML) ORAL DAILY
Qty: 30 TABLET | Refills: 3 | Status: SHIPPED | OUTPATIENT
Start: 2022-01-01

## 2022-01-01 RX ORDER — GUAIFENESIN 100 MG/5ML
LIQUID (ML) ORAL AS NEEDED
Status: DISCONTINUED | OUTPATIENT
Start: 2022-01-01 | End: 2022-01-01 | Stop reason: HOSPADM

## 2022-01-01 RX ORDER — DULOXETIN HYDROCHLORIDE 30 MG/1
60 CAPSULE, DELAYED RELEASE ORAL DAILY
Status: DISCONTINUED | OUTPATIENT
Start: 2022-01-01 | End: 2022-01-01 | Stop reason: HOSPADM

## 2022-01-01 RX ORDER — HEPARIN SODIUM 200 [USP'U]/100ML
INJECTION, SOLUTION INTRAVENOUS
Status: COMPLETED | OUTPATIENT
Start: 2022-01-01 | End: 2022-01-01

## 2022-01-01 RX ORDER — POTASSIUM CHLORIDE 7.45 MG/ML
10 INJECTION INTRAVENOUS
Status: DISCONTINUED | OUTPATIENT
Start: 2022-01-01 | End: 2022-01-01 | Stop reason: HOSPADM

## 2022-01-01 RX ORDER — METOLAZONE 5 MG/1
2.5 TABLET ORAL EVERY 24 HOURS
Status: DISCONTINUED | OUTPATIENT
Start: 2022-01-01 | End: 2022-01-01 | Stop reason: HOSPADM

## 2022-01-01 RX ORDER — MIDAZOLAM HYDROCHLORIDE 1 MG/ML
INJECTION INTRAMUSCULAR; INTRAVENOUS AS NEEDED
Status: DISCONTINUED | OUTPATIENT
Start: 2022-01-01 | End: 2022-01-01 | Stop reason: HOSPADM

## 2022-01-01 RX ORDER — OXYBUTYNIN CHLORIDE 5 MG/1
5 TABLET ORAL 2 TIMES DAILY
Status: DISCONTINUED | OUTPATIENT
Start: 2022-01-01 | End: 2022-01-01 | Stop reason: HOSPADM

## 2022-01-01 RX ORDER — ONDANSETRON 4 MG/1
4 TABLET, ORALLY DISINTEGRATING ORAL
Status: DISCONTINUED | OUTPATIENT
Start: 2022-01-01 | End: 2022-01-01 | Stop reason: HOSPADM

## 2022-01-01 RX ORDER — DOCUSATE SODIUM 100 MG/1
100 CAPSULE, LIQUID FILLED ORAL 2 TIMES DAILY
Status: DISCONTINUED | OUTPATIENT
Start: 2022-01-01 | End: 2022-01-01 | Stop reason: HOSPADM

## 2022-01-01 RX ADMIN — METOLAZONE 2.5 MG: 5 TABLET ORAL at 10:43

## 2022-01-01 RX ADMIN — PIPERACILLIN AND TAZOBACTAM 3.38 G: 3; .375 INJECTION, POWDER, LYOPHILIZED, FOR SOLUTION INTRAVENOUS at 09:51

## 2022-01-01 RX ADMIN — ATORVASTATIN CALCIUM 40 MG: 40 TABLET, FILM COATED ORAL at 10:43

## 2022-01-01 RX ADMIN — AMIODARONE HYDROCHLORIDE 200 MG: 200 TABLET ORAL at 10:50

## 2022-01-01 RX ADMIN — POTASSIUM CHLORIDE 20 MEQ: 750 TABLET, FILM COATED, EXTENDED RELEASE ORAL at 08:24

## 2022-01-01 RX ADMIN — IPRATROPIUM BROMIDE AND ALBUTEROL SULFATE 3 ML: .5; 2.5 SOLUTION RESPIRATORY (INHALATION) at 05:05

## 2022-01-01 RX ADMIN — PIPERACILLIN AND TAZOBACTAM 4.5 G: 4; .5 INJECTION, POWDER, FOR SOLUTION INTRAVENOUS at 11:23

## 2022-01-01 RX ADMIN — IPRATROPIUM BROMIDE AND ALBUTEROL SULFATE 3 ML: .5; 2.5 SOLUTION RESPIRATORY (INHALATION) at 20:41

## 2022-01-01 RX ADMIN — FUROSEMIDE 40 MG: 10 INJECTION, SOLUTION INTRAMUSCULAR; INTRAVENOUS at 10:46

## 2022-01-01 RX ADMIN — HEPARIN SODIUM 18 UNITS/KG/HR: 10000 INJECTION, SOLUTION INTRAVENOUS at 18:02

## 2022-01-01 RX ADMIN — POTASSIUM CHLORIDE 40 MEQ: 750 TABLET, FILM COATED, EXTENDED RELEASE ORAL at 10:53

## 2022-01-01 RX ADMIN — FUROSEMIDE 20 MG: 10 INJECTION, SOLUTION INTRAMUSCULAR; INTRAVENOUS at 20:41

## 2022-01-01 RX ADMIN — SACUBITRIL AND VALSARTAN 1 TABLET: 24; 26 TABLET, FILM COATED ORAL at 10:43

## 2022-01-01 RX ADMIN — HEPARIN SODIUM 20 UNITS/KG/HR: 10000 INJECTION, SOLUTION INTRAVENOUS at 03:53

## 2022-01-01 RX ADMIN — PIPERACILLIN AND TAZOBACTAM 3.38 G: 3; .375 INJECTION, POWDER, LYOPHILIZED, FOR SOLUTION INTRAVENOUS at 10:19

## 2022-01-01 RX ADMIN — IPRATROPIUM BROMIDE AND ALBUTEROL SULFATE 3 ML: .5; 2.5 SOLUTION RESPIRATORY (INHALATION) at 19:52

## 2022-01-01 RX ADMIN — MIDODRINE HYDROCHLORIDE 5 MG: 5 TABLET ORAL at 05:06

## 2022-01-01 RX ADMIN — ASPIRIN 81 MG CHEWABLE TABLET 81 MG: 81 TABLET CHEWABLE at 10:50

## 2022-01-01 RX ADMIN — ASPIRIN 81 MG CHEWABLE TABLET 81 MG: 81 TABLET CHEWABLE at 08:25

## 2022-01-01 RX ADMIN — ENOXAPARIN SODIUM 40 MG: 100 INJECTION SUBCUTANEOUS at 17:00

## 2022-01-01 RX ADMIN — LORAZEPAM 1 MG: 2 INJECTION INTRAMUSCULAR at 21:36

## 2022-01-01 RX ADMIN — LORAZEPAM 1 MG: 2 INJECTION INTRAMUSCULAR at 22:45

## 2022-01-01 RX ADMIN — AZITHROMYCIN MONOHYDRATE 500 MG: 500 INJECTION, POWDER, LYOPHILIZED, FOR SOLUTION INTRAVENOUS at 13:08

## 2022-01-01 RX ADMIN — TICAGRELOR 90 MG: 90 TABLET ORAL at 10:50

## 2022-01-01 RX ADMIN — AMIODARONE HYDROCHLORIDE 200 MG: 200 TABLET ORAL at 08:25

## 2022-01-01 RX ADMIN — Medication 400 MG: at 11:21

## 2022-01-01 RX ADMIN — DOCUSATE SODIUM 100 MG: 100 CAPSULE, LIQUID FILLED ORAL at 04:00

## 2022-01-01 RX ADMIN — Medication 400 MG: at 10:42

## 2022-01-01 RX ADMIN — PIPERACILLIN AND TAZOBACTAM 3.38 G: 3; .375 INJECTION, POWDER, LYOPHILIZED, FOR SOLUTION INTRAVENOUS at 04:01

## 2022-01-01 RX ADMIN — HEPARIN SODIUM 2000 UNITS: 1000 INJECTION, SOLUTION INTRAVENOUS; SUBCUTANEOUS at 03:58

## 2022-01-01 RX ADMIN — Medication 18 MCG/MIN: at 03:52

## 2022-01-01 RX ADMIN — SODIUM CHLORIDE 100 MG: 900 INJECTION INTRAVENOUS at 01:54

## 2022-01-01 RX ADMIN — PANTOPRAZOLE SODIUM 40 MG: 40 TABLET, DELAYED RELEASE ORAL at 11:21

## 2022-01-01 RX ADMIN — HEPARIN SODIUM 2000 UNITS: 1000 INJECTION, SOLUTION INTRAVENOUS; SUBCUTANEOUS at 20:07

## 2022-01-01 RX ADMIN — FUROSEMIDE 40 MG: 10 INJECTION, SOLUTION INTRAMUSCULAR; INTRAVENOUS at 20:08

## 2022-01-01 RX ADMIN — SODIUM CHLORIDE, PRESERVATIVE FREE 10 ML: 5 INJECTION INTRAVENOUS at 05:54

## 2022-01-01 RX ADMIN — TICAGRELOR 90 MG: 90 TABLET ORAL at 08:28

## 2022-01-01 RX ADMIN — Medication 4 MCG/MIN: at 11:30

## 2022-01-01 RX ADMIN — ESCITALOPRAM OXALATE 5 MG: 10 TABLET ORAL at 10:53

## 2022-01-01 RX ADMIN — HEPARIN SODIUM 2000 UNITS: 1000 INJECTION, SOLUTION INTRAVENOUS; SUBCUTANEOUS at 18:01

## 2022-01-01 RX ADMIN — Medication 18 MCG/MIN: at 17:26

## 2022-01-01 RX ADMIN — SODIUM CHLORIDE 75 ML/HR: 9 INJECTION, SOLUTION INTRAVENOUS at 07:51

## 2022-01-01 RX ADMIN — Medication 400 MG: at 10:50

## 2022-01-01 RX ADMIN — PIPERACILLIN AND TAZOBACTAM 3.38 G: 3; .375 INJECTION, POWDER, LYOPHILIZED, FOR SOLUTION INTRAVENOUS at 17:29

## 2022-01-01 RX ADMIN — TICAGRELOR 90 MG: 90 TABLET ORAL at 10:44

## 2022-01-01 RX ADMIN — FUROSEMIDE 20 MG: 10 INJECTION, SOLUTION INTRAMUSCULAR; INTRAVENOUS at 08:28

## 2022-01-01 RX ADMIN — ATORVASTATIN CALCIUM 40 MG: 40 TABLET, FILM COATED ORAL at 08:28

## 2022-01-01 RX ADMIN — DOBUTAMINE HYDROCHLORIDE 2.5 MCG/KG/MIN: 200 INJECTION INTRAVENOUS at 10:31

## 2022-01-01 RX ADMIN — DULOXETINE HYDROCHLORIDE 60 MG: 30 CAPSULE, DELAYED RELEASE ORAL at 10:43

## 2022-01-01 RX ADMIN — SACUBITRIL AND VALSARTAN 1 TABLET: 24; 26 TABLET, FILM COATED ORAL at 20:42

## 2022-01-01 RX ADMIN — IPRATROPIUM BROMIDE AND ALBUTEROL SULFATE 3 ML: .5; 2.5 SOLUTION RESPIRATORY (INHALATION) at 08:18

## 2022-01-01 RX ADMIN — DULOXETINE HYDROCHLORIDE 60 MG: 30 CAPSULE, DELAYED RELEASE ORAL at 08:26

## 2022-01-01 RX ADMIN — HEPARIN SODIUM 14 UNITS/KG/HR: 10000 INJECTION, SOLUTION INTRAVENOUS at 03:59

## 2022-01-01 RX ADMIN — OXYMETAZOLINE HCL 2 SPRAY: 0.05 SPRAY NASAL at 11:10

## 2022-01-01 RX ADMIN — Medication 20 MCG/MIN: at 20:09

## 2022-01-01 RX ADMIN — HEPARIN SODIUM 16 UNITS/KG/HR: 10000 INJECTION, SOLUTION INTRAVENOUS at 11:35

## 2022-01-01 RX ADMIN — OXYBUTYNIN CHLORIDE 5 MG: 5 TABLET ORAL at 10:43

## 2022-01-01 RX ADMIN — VASOPRESSIN 0.01 UNITS/MIN: 20 INJECTION PARENTERAL at 18:43

## 2022-01-01 RX ADMIN — SODIUM CHLORIDE 100 MG: 900 INJECTION INTRAVENOUS at 15:19

## 2022-01-01 RX ADMIN — SODIUM CHLORIDE, PRESERVATIVE FREE 10 ML: 5 INJECTION INTRAVENOUS at 21:56

## 2022-01-01 RX ADMIN — Medication 400 MG: at 04:01

## 2022-01-01 RX ADMIN — IPRATROPIUM BROMIDE AND ALBUTEROL SULFATE 3 ML: .5; 2.5 SOLUTION RESPIRATORY (INHALATION) at 13:43

## 2022-01-01 RX ADMIN — POTASSIUM CHLORIDE 40 MEQ: 750 TABLET, FILM COATED, EXTENDED RELEASE ORAL at 10:41

## 2022-01-01 RX ADMIN — DOBUTAMINE HYDROCHLORIDE 10 MCG/KG/MIN: 200 INJECTION INTRAVENOUS at 08:14

## 2022-01-01 RX ADMIN — DOCUSATE SODIUM 100 MG: 100 CAPSULE, LIQUID FILLED ORAL at 10:44

## 2022-01-01 RX ADMIN — PANTOPRAZOLE SODIUM 40 MG: 40 TABLET, DELAYED RELEASE ORAL at 10:39

## 2022-01-01 RX ADMIN — OXYBUTYNIN CHLORIDE 5 MG: 5 TABLET ORAL at 10:50

## 2022-01-01 RX ADMIN — POTASSIUM CHLORIDE 10 MEQ: 7.46 INJECTION, SOLUTION INTRAVENOUS at 15:26

## 2022-01-01 RX ADMIN — ATORVASTATIN CALCIUM 40 MG: 40 TABLET, FILM COATED ORAL at 10:50

## 2022-01-01 RX ADMIN — CEFTRIAXONE 1 G: 1 INJECTION, POWDER, FOR SOLUTION INTRAMUSCULAR; INTRAVENOUS at 20:41

## 2022-01-01 RX ADMIN — DOBUTAMINE HYDROCHLORIDE 10 MCG/KG/MIN: 200 INJECTION INTRAVENOUS at 20:09

## 2022-01-01 RX ADMIN — IPRATROPIUM BROMIDE AND ALBUTEROL SULFATE 3 ML: .5; 2.5 SOLUTION RESPIRATORY (INHALATION) at 08:33

## 2022-01-01 RX ADMIN — OXYBUTYNIN CHLORIDE 5 MG: 5 TABLET ORAL at 03:59

## 2022-01-01 RX ADMIN — MORPHINE SULFATE 2 MG: 2 INJECTION, SOLUTION INTRAMUSCULAR; INTRAVENOUS at 20:52

## 2022-01-01 RX ADMIN — HEPARIN SODIUM 16 UNITS/KG/HR: 10000 INJECTION, SOLUTION INTRAVENOUS at 01:46

## 2022-01-01 RX ADMIN — TICAGRELOR 90 MG: 90 TABLET ORAL at 20:42

## 2022-01-01 RX ADMIN — SACUBITRIL AND VALSARTAN 1 TABLET: 24; 26 TABLET, FILM COATED ORAL at 03:59

## 2022-01-01 RX ADMIN — Medication 18 MCG/MIN: at 10:35

## 2022-01-01 RX ADMIN — MORPHINE SULFATE 2 MG: 2 INJECTION, SOLUTION INTRAMUSCULAR; INTRAVENOUS at 23:46

## 2022-01-01 RX ADMIN — DOCUSATE SODIUM 100 MG: 100 CAPSULE, LIQUID FILLED ORAL at 08:25

## 2022-01-01 RX ADMIN — OXYBUTYNIN CHLORIDE 5 MG: 5 TABLET ORAL at 20:42

## 2022-01-01 RX ADMIN — PIPERACILLIN AND TAZOBACTAM 3.38 G: 3; .375 INJECTION, POWDER, LYOPHILIZED, FOR SOLUTION INTRAVENOUS at 17:39

## 2022-01-01 RX ADMIN — MORPHINE SULFATE 4 MG: 4 INJECTION INTRAVENOUS at 00:35

## 2022-01-01 RX ADMIN — GABAPENTIN 300 MG: 300 CAPSULE ORAL at 10:50

## 2022-01-01 RX ADMIN — SODIUM CHLORIDE, PRESERVATIVE FREE 10 ML: 5 INJECTION INTRAVENOUS at 15:20

## 2022-01-01 RX ADMIN — DULOXETINE HYDROCHLORIDE 60 MG: 30 CAPSULE, DELAYED RELEASE ORAL at 10:53

## 2022-01-01 RX ADMIN — PIPERACILLIN AND TAZOBACTAM 3.38 G: 3; .375 INJECTION, POWDER, LYOPHILIZED, FOR SOLUTION INTRAVENOUS at 16:33

## 2022-01-01 RX ADMIN — MORPHINE SULFATE 4 MG: 4 INJECTION INTRAVENOUS at 21:35

## 2022-01-01 RX ADMIN — GABAPENTIN 300 MG: 300 CAPSULE ORAL at 08:25

## 2022-01-01 RX ADMIN — SODIUM CHLORIDE 100 MG: 900 INJECTION INTRAVENOUS at 14:38

## 2022-01-01 RX ADMIN — LORAZEPAM 1 MG: 2 INJECTION INTRAMUSCULAR at 23:47

## 2022-01-01 RX ADMIN — GABAPENTIN 300 MG: 300 CAPSULE ORAL at 10:44

## 2022-01-01 RX ADMIN — TICAGRELOR 90 MG: 90 TABLET ORAL at 03:59

## 2022-01-01 RX ADMIN — ASPIRIN 81 MG CHEWABLE TABLET 81 MG: 81 TABLET CHEWABLE at 10:43

## 2022-01-01 RX ADMIN — GABAPENTIN 300 MG: 300 CAPSULE ORAL at 20:42

## 2022-01-01 RX ADMIN — ESCITALOPRAM OXALATE 5 MG: 10 TABLET ORAL at 11:21

## 2022-01-01 RX ADMIN — MELATONIN TAB 3 MG 3 MG: 3 TAB at 04:00

## 2022-01-01 RX ADMIN — HEPARIN SODIUM 2000 UNITS: 1000 INJECTION, SOLUTION INTRAVENOUS; SUBCUTANEOUS at 11:25

## 2022-01-01 RX ADMIN — SODIUM CHLORIDE, PRESERVATIVE FREE 10 ML: 5 INJECTION INTRAVENOUS at 16:35

## 2022-01-01 RX ADMIN — LEVOTHYROXINE SODIUM 125 MCG: 0.03 TABLET ORAL at 10:38

## 2022-01-01 RX ADMIN — OXYBUTYNIN CHLORIDE 5 MG: 5 TABLET ORAL at 08:26

## 2022-01-01 RX ADMIN — DOCUSATE SODIUM 100 MG: 100 CAPSULE, LIQUID FILLED ORAL at 20:42

## 2022-01-01 RX ADMIN — PANTOPRAZOLE SODIUM 40 MG: 40 TABLET, DELAYED RELEASE ORAL at 10:50

## 2022-01-01 RX ADMIN — CEFTRIAXONE SODIUM 1 G: 1 INJECTION, POWDER, FOR SOLUTION INTRAMUSCULAR; INTRAVENOUS at 12:36

## 2022-01-01 RX ADMIN — ESCITALOPRAM OXALATE 5 MG: 10 TABLET ORAL at 10:44

## 2022-01-01 RX ADMIN — FUROSEMIDE 40 MG: 10 INJECTION, SOLUTION INTRAMUSCULAR; INTRAVENOUS at 10:50

## 2022-01-01 RX ADMIN — GABAPENTIN 300 MG: 300 CAPSULE ORAL at 03:59

## 2022-01-01 RX ADMIN — HEPARIN SODIUM 2000 UNITS: 1000 INJECTION, SOLUTION INTRAVENOUS; SUBCUTANEOUS at 11:33

## 2022-01-01 RX ADMIN — LEVOTHYROXINE SODIUM 125 MCG: 0.03 TABLET ORAL at 08:27

## 2022-01-01 RX ADMIN — HEPARIN SODIUM 12 UNITS/KG/HR: 10000 INJECTION, SOLUTION INTRAVENOUS at 20:40

## 2022-01-01 RX ADMIN — FUROSEMIDE 20 MG: 10 INJECTION, SOLUTION INTRAMUSCULAR; INTRAVENOUS at 05:06

## 2022-01-01 RX ADMIN — METOPROLOL SUCCINATE 12.5 MG: 25 TABLET, EXTENDED RELEASE ORAL at 08:28

## 2022-01-01 RX ADMIN — IPRATROPIUM BROMIDE AND ALBUTEROL SULFATE 3 ML: .5; 2.5 SOLUTION RESPIRATORY (INHALATION) at 15:12

## 2022-01-01 RX ADMIN — PIPERACILLIN AND TAZOBACTAM 3.38 G: 3; .375 INJECTION, POWDER, LYOPHILIZED, FOR SOLUTION INTRAVENOUS at 00:01

## 2022-05-31 NOTE — PROGRESS NOTES
1000- Patient stated during PAT that she was instructed by Dr. Jodi Weir office to hold her metformin two days prior to procedure and to continue taking her Brilinta.

## 2022-05-31 NOTE — PROGRESS NOTES
46- Dr. Joseph Dalal called made aware of patient's abnormal labs CMP-- Sodium 134, Potassium 3.3, Glucose 273, BUN 30, Creatinine 1.23, BUN/Creatinine ratio 24, GFR est non-AA 43, Dr. Joseph Dalal stated he did not require the result for Albumin, Globulin, or A-G Ratio, CBC-- RBC 3.34, HGB 10.3, HCT 32.5, PT 17.3, INR 1.4. Also made Dr. Joseph Dalal aware of patient's EKG result from 5-31-22. No new orders received stated ok to proceed with procedure as scheduled.

## 2022-06-02 NOTE — ROUTINE PROCESS
Patient up out of bed. Unable to ambulate, patient states that she has balance issues and is unsafe to walk . Right groin dressing dry and intact. No bleeding or hematoma noted at site. Discharge and post cath instructions given. Patient verbalized understanding. Denies pain or discomfort.

## 2022-06-02 NOTE — PROGRESS NOTES
Called number on chart was home number. Spoke with daughter received correct cell number called no answer.

## 2022-06-02 NOTE — PROGRESS NOTES
Provided patient education about and explained the importance of medication compliance. Patient was given Brilinta coupon. Patient was advised that if the antiplatelet medication becomes unaffordable, she must notify the cardiologist immediately so that an alternate plan for antiplatelet therapy can be made. Patient stated that she has been taking Brilinta and has some at home so that it will be available for the next scheduled dose after discharge. Patient asked appropriate questions and verbalized understanding during this consultation and was given printed teaching materials and my contact information in case I can provide further assistance. Spoke with patient about phase 2 outpatient cardiac rehab. Patient was given a choice of facilities to be enrolled and chose [facility]. Patients information was forwarded to this facility and patient will be contacted by this facility to schedule an initial appointment. Patient was made aware of this verbally and in writing. Patient verbalized understanding and was given printed teaching materials and my contact information in case she needs further assistance, and the address and phone number for the cardiac rehab facility to which she was referred.

## 2022-06-02 NOTE — Clinical Note
TRANSFER - OUT REPORT:     Verbal report given to: Raya, (at bedside). Report consisted of patient's Situation, Background, Assessment and   Recommendations(SBAR). Opportunity for questions and clarification was provided. Patient transported with a Registered Nurse.

## 2022-06-02 NOTE — Clinical Note
Right radial artery. Accessed unsuccessfully. Micropuncture needle used. Using ultrasound guidance. Number of attempts =  2.

## 2022-06-02 NOTE — ROUTINE PROCESS
Hob elevated 30 degrees. Tolerated well. Right groin dressing intact, no bleeding or hematoma noted at site. Pedal pulses intact. Denies pain or discomfort.  present at bedside.

## 2022-06-02 NOTE — Clinical Note
Contrast Dose Calculator:   Patient's age: 66.   Patient's sex: Female. Patient weight (kg) = 78.9. Creatinine level (mg/dL) = 1.23. Creatinine clearance (mL/min): 51. Contrast concentration (mg/mL) = 370. MACD = 260.05 mL. Max Contrast dose per Creatinine Cl calculator = 114.75 mL.

## 2022-06-07 PROBLEM — I25.10 CAD IN NATIVE ARTERY: Status: ACTIVE | Noted: 2022-01-01

## 2022-06-07 PROBLEM — I50.23 ACUTE ON CHRONIC SYSTOLIC (CONGESTIVE) HEART FAILURE (HCC): Status: ACTIVE | Noted: 2022-01-01

## 2022-06-07 NOTE — H&P
History and Physical    Patient: Bell Jorgensen MRN: [id number]  SSN: [id number]    YOB: 1950  Age: 66 y.o. Sex: female      Subjective:      Bell Jorgensen is a 66 y.o. female who is transferred from [facility] ER where she presented with SOB, hypoxia, cough. Patient recently had PCI/LAD Stent 5 days ago by Dr. Chetna Talbot. Denies fever, myalgia but +chills. Has had Covid last year with similar presentation; her Covid test reportedly is negative. She has severe CMP with EF 25% and has PPM/AICD - no discharge. LHC was via Right Femoral; no hematoma noted or peripheral leg changes. Some edema.     Past Medical History:   Diagnosis Date    Acid indigestion     heartburn    Adverse effect of anesthesia     Preop anxiety     Aneurysm (HCC)     cerebral aneurysm patient stated is small     Anxiety disorder     Arthritis     Asthma     Back pain     Bladder spasms     CAD (coronary artery disease)     Patient stated has once stent     Cerebral artery occlusion with cerebral infarction ([facility] [address] 08/2020    Chest pain     Constipation     Cough     Diabetes mellitus     Diarrhea     Fatigue     Fatty liver     Fibromyalgia     Frequent episodic tension-type headache     GERD (gastroesophageal reflux disease)     Hearing reduced     Heart attack (HCC)     mild    Heart failure (HCC)     Hemorrhoids     Hernia of unspecified site of abdominal cavity without mention of obstruction or gangrene     HTN (hypertension)     Hypothyroidism     ICD (implantable cardioverter-defibrillator) in place     Ill-defined condition     Patient has noticed nodules in her neck on both sides stated she is going to make her PCP aware;     Muscle pain     joint pain    N&V (nausea and vomiting)     Pacemaker     biventricular ICD 1/26/18    Ringing in ears     Shortness of breath     Skin cancer     Sleep apnea     cant tolerate the machine    SOB (shortness of breath)     SOB (shortness of breath)     Swallowing difficulty     Thyroid disorder     Vertigo     Wears dentures      Past Surgical History:   Procedure Laterality Date    HX BREAST BIOPSY Left     HX CARPAL TUNNEL RELEASE      right hand    HX GI      HX HEART CATHETERIZATION  2/6/16    placed one stent     HX HEENT      thyroid     HX HYSTERECTOMY      HX ORTHOPAEDIC      HX PACEMAKER Left 6/20/16    Agent Partner ICD    HX THYROIDECTOMY      ME REMOVAL GALLBLADDER        Family History   Problem Relation Age of Onset    Diabetes Mother     Heart Disease Mother     Heart Disease Father     Cancer Sister         breast, bone, and liver     Diabetes Sister     Heart Disease Sister     Hypertension Sister     Lupus Sister     Breast Cancer Sister     Diabetes Brother     Cerebral palsy Brother     Cerebral aneurysm Sister      Social History     Tobacco Use    Smoking status: Never Smoker    Smokeless tobacco: Never Used   Substance Use Topics    Alcohol use: Yes     Comment: wine occasionally      Prior to Admission medications    Medication Sig Start Date End Date Taking? Authorizing Provider   DULoxetine (CYMBALTA) 60 mg capsule Take 60 mg by mouth daily. Yes Provider, Historical   levothyroxine (SYNTHROID) 125 mcg tablet Take 125 mcg by mouth Daily (before breakfast). Yes Provider, Historical   oxybutynin (DITROPAN) 5 mg tablet Take 5 mg by mouth two (2) times a day. Yes Provider, Historical   metoprolol succinate (TOPROL-XL) 25 mg XL tablet Take 12.5 mg by mouth daily. 1/2 tablet daily   Yes Provider, Historical   ergocalciferol (Vitamin D2) 1,250 mcg (50,000 unit) capsule Take 50,000 Units by mouth every seven (7) days. Patient stated takes on Sundays   Yes Provider, Historical   escitalopram oxalate (LEXAPRO) 5 mg tablet Take 5 mg by mouth daily. Yes Provider, Historical   sacubitriL-valsartan (Entresto) 24-26 mg tablet Take 1 Tablet by mouth two (2) times a day.    Yes Provider, Historical   albuterol (PROVENTIL HFA, VENTOLIN HFA, PROAIR HFA) 90 mcg/actuation inhaler Take 2 Puffs by inhalation every four (4) hours as needed. 4/30/21  Yes Provider, Historical   amiodarone (CORDARONE) 200 mg tablet Take 200 mg by mouth daily. 7/13/21  Yes Provider, Historical   docusate sodium (Colace) 100 mg capsule Take 100 mg by mouth two (2) times a day. Yes Provider, Historical   butalbital-acetaminophen-caff (FIORICET) -40 mg per capsule Take 1 Capsule by mouth daily as needed. 2/17/21  Yes Provider, Historical   metFORMIN (GLUCOPHAGE) 500 mg tablet 500 mg two (2) times daily (with meals). 1/28/21  Yes Provider, Historical   insulin lispro protamin-lispro (HumaLOG Mix 75-25 KwikPen) flexpen 28 Units two (2) times a day. Yes Provider, Historical   bumetanide (BUMEX) 1 mg tablet Take 1 Tab by mouth two (2) times daily (with meals). Patient taking differently: Take 1 mg by mouth two (2) times a day. Take two tablets in the morning and 0.5 tablet in the evening 9/29/20  Yes Michelle Jacome MD   ticagrelor (Brilinta) 90 mg tablet Take 90 mg by mouth two (2) times a day. Yes Provider, Historical   magnesium oxide (MAG-OX) 400 mg tablet Take 400 mg by mouth two (2) times a day. Yes Provider, Historical   melatonin 3 mg tablet Take 3 mg by mouth nightly as needed. Yes Provider, Historical   gabapentin (NEURONTIN) 100 mg capsule Take 300 mg by mouth two (2) times a day. 4/27/16  Yes Provider, Historical   omeprazole (PRILOSEC) 40 mg capsule Take 40 mg by mouth daily. 4/27/16  Yes Provider, Historical   atorvastatin (LIPITOR) 40 mg tablet Take 40 mg by mouth daily. Yes Provider, Historical   potassium chloride SR (KLOR-CON 10) 10 mEq tablet Take 20 mEq by mouth daily. Yes Provider, Historical   aspirin 81 mg chewable tablet Take 1 Tablet by mouth daily.  6/3/22   Rachael Garcia MD   glucose blood VI test strips (OneTouch Verio test strips) strip Check glucose 3 times a day 8/4/21 Conor Gandhi MD   lancets (One Touch Delica) 33 gauge misc Check glucose 3 times a day 8/4/21   Conor Gandhi MD   Blood-Glucose Meter (OneTouch Verio Meter) misc Check glucose 3 times a day 8/4/21   Conor Gandhi MD   multivitamin,tx-iron-minerals (Complete Multivitamin) tab Take 1 Tablet by mouth daily. Provider, Historical   acetaminophen (TYLENOL EXTRA STRENGTH) 500 mg tablet Take 1,000 mg by mouth every six (6) hours as needed for Pain. Provider, Historical   polyethylene glycol (MIRALAX) 17 gram packet Take 17 g by mouth as needed.     Provider, Historical        Allergies   Allergen Reactions    Novocain [Procaine] Nausea and Vomiting    Tramadol Other (comments)     Headache    Codeine Other (comments)     Patient states she is not allergic       Review of Systems:  Constitutional: No fevers, + chills, No night sweats, No fatigue, No weakness, No significant weight change  Eyes: No visual disturbance, No loss of vision  HENT: No nasal congestion, No sore throat, No head lesion, No hearing deficit  Respiratory: + cough, No sputum, No wheezing, + SOB, No hemoptysis, No pleuritic CP  Cardiovascular: No chest pain, ~ lower extremity edema, No palpitations, No PND, No orthopnea  Gastrointestinal: No nausea, No vomiting, No diarrhea, No constipation, No abdominal pain, No Melena, No hematemesis, No BRBPR  Genitourinary: No frequency, No dysuria, No hematuria  Integument/Breast: No rash, No new skin lesion(s), No dryness, No new palpable nodule  Musculoskeletal: No arthralgias, No neck pain, No back pain, No joint pain, No fall or injury  Neurological: No headaches, No dizziness, No confusion,  No seizures, No focal weakness, No LOC, No paresthesia  Heme/Onc: No overt bleeding noted, No obvious swollen glands  Behavioral/Psychiatric: No change in mood; no SI; no hallucination      Objective:     Vitals:    06/07/22 1639 06/07/22 1809   BP: 93/63 (!) 103/56   Pulse: 84 87   Resp: 16    Temp: 98.1 °F (36.7 °C)    SpO2: 98% 99%   Weight: 78.9 kg (174 lb)    Height: 5' 2\" (1.575 m)         Physical Exam:    General: Alert and responsive, NAD  Head: atraumatic  Eye: No overt orbital findings, PERRLA   ENT: No overt hearing loss noted; No nasal lesion; Throat lolis  Neck: Supple, No overt palpable mass, No significant palpable lymph nodes  Vascular: No carotid bruit, palpable pulses bilat  Lung: Diminished  Heart: S1S2, Paced; Murmur  Abdomen: Soft, NT, No rigidity, No rebound, Bowel sounds +  Extremities: trace edema  M/S: Right groin unremarkable  Skin: No cyanosis, No rash of significance (other than chronic lesions)  Neurologic: No overt focal motor deficit. Oriented. Psychiatric: Coherent and age appropriate    Recent Results (from the past 24 hour(s))   EKG, 12 LEAD, INITIAL    Collection Time: 06/07/22 10:43 AM   Result Value Ref Range    Ventricular Rate 95 BPM    Atrial Rate 96 BPM    P-R Interval 41 ms    QRS Duration 201 ms    Q-T Interval 443 ms    QTC Calculation (Bezet) 557 ms    Calculated P Axis 17 degrees    Calculated R Axis -100 degrees    Calculated T Axis 109 degrees    Diagnosis       Atrial-sensed ventricular-paced rhythm  No further analysis attempted due to paced rhythm     CBC WITH AUTOMATED DIFF    Collection Time: 06/07/22 11:00 AM   Result Value Ref Range    WBC 10.6 4.4 - 11.3 K/uL    RBC 3.14 (L) 4.50 - 5.90 M/uL    HGB 10.0 (L) 13.5 - 17.5 g/dL    HCT 29.2 (L) 36 - 46 %    MCV 93.0 80 - 100 FL    MCH 31.9 31 - 34 PG    MCHC 34.3 31.0 - 36.0 g/dL    RDW 14.4 11.5 - 14.5 %    PLATELET 207 566 - 067 K/uL    MPV 8.2 6.5 - 11.5 FL    NRBC 0.1  WBC    ABSOLUTE NRBC 0.01 K/uL    NEUTROPHILS 84 (H) 42 - 75 %    LYMPHOCYTES 7 (L) 20.5 - 51.1 %    MONOCYTES 8 1.7 - 9.3 %    EOSINOPHILS 0 (L) 0.9 - 2.9 %    BASOPHILS 1 0.0 - 2.5 %    ABS. NEUTROPHILS 9.0 (H) 1.8 - 7.7 K/UL    ABS. LYMPHOCYTES 0.7 (L) 1.0 - 4.8 K/UL    ABS. MONOCYTES 0.8 0.2 - 2.4 K/UL    ABS.  EOSINOPHILS 0.0 0.0 - 0.7 K/UL ABS. BASOPHILS 0.1 0.0 - 0.2 K/UL   METABOLIC PANEL, COMPREHENSIVE    Collection Time: 06/07/22 11:00 AM   Result Value Ref Range    Sodium 133 (L) 136 - 145 mmol/L    Potassium 3.0 (L) 3.5 - 5.1 mmol/L    Chloride 96 (L) 97 - 108 mmol/L    CO2 26 21 - 32 mmol/L    Anion gap 11 5 - 15 mmol/L    Glucose 196 (H) 65 - 100 mg/dL    BUN 31 (H) 6 - 20 mg/dL    Creatinine 1.40 (H) 0.55 - 1.02 mg/dL    BUN/Creatinine ratio 22 (H) 12 - 20      GFR est AA 45 (L) >60 ml/min/1.73m2    GFR est non-AA 37 (L) >60 ml/min/1.73m2    Calcium 8.3 (L) 8.5 - 10.1 mg/dL    Bilirubin, total 1.3 (H) 0.2 - 1.0 mg/dL    AST (SGOT) 35 15 - 37 U/L    ALT (SGPT) 19 12 - 78 U/L    Alk.  phosphatase 92 45 - 117 U/L    Protein, total 8.4 (H) 6.4 - 8.2 g/dL    Albumin 2.4 (L) 3.5 - 5.0 g/dL    Globulin 6.0 (H) 2.0 - 4.0 g/dL    A-G Ratio 0.4 (L) 1.1 - 2.2     NT-PRO BNP    Collection Time: 06/07/22 11:00 AM   Result Value Ref Range    NT pro-BNP 3,829 (H) <125 pg/mL   TROPONIN-HIGH SENSITIVITY    Collection Time: 06/07/22 11:00 AM   Result Value Ref Range    Troponin-High Sensitivity 318 (HH) 0 - 51 ng/L   PROTHROMBIN TIME + INR    Collection Time: 06/07/22 11:00 AM   Result Value Ref Range    Prothrombin time 18.8 (H) 11.9 - 14.6 sec    INR 1.6 (H) 0.9 - 1.1     LACTIC ACID    Collection Time: 06/07/22 12:00 PM   Result Value Ref Range    Lactic acid 1.5 0.4 - 2.0 mmol/L   BLOOD GAS, ARTERIAL    Collection Time: 06/07/22 12:00 PM   Result Value Ref Range    pH 7.50 (H) 7.35 - 7.45      PCO2 31 (L) 35 - 45 mmHg    PO2 84 70 - 100 mmHg    BICARBONATE 24 22 - 26 mmol/L    BASE EXCESS 1.2 0 - 2 mmol/L    O2 METHOD Nasal Cannula      O2 FLOW RATE 2 L/min    FIO2 28.0 %    Sample source Arterial      SITE Right Brachial      DAINA'S TEST Not Applicable      Carboxy-Hgb 0.0 0 - 5 %    Methemoglobin 0.3 0 - 5 %    tHb 10.0 (L) 13.5 - 17.5 g/dL    Oxyhemoglobin 96.6 95 - 100 %   COVID-19 WITH INFLUENZA A/B    Collection Time: 06/07/22 12:00 PM   Result Value Ref Range    SARS-CoV-2 by PCR Not Detected Not Detected      Influenza A by PCR Not Detected Not Detected      Influenza B by PCR Not Detected Not Detected     TROPONIN-HIGH SENSITIVITY    Collection Time: 06/07/22  1:00 PM   Result Value Ref Range    Troponin-High Sensitivity 294 (HH) 0 - 51 ng/L   TROPONIN-HIGH SENSITIVITY    Collection Time: 06/07/22  6:08 PM   Result Value Ref Range    Troponin-High Sensitivity 276 (HH) 0 - 51 ng/L       XR Results (maximum last 3): Results from [place] encounter on 06/07/22    XR CHEST PORT    Narrative  1 view comparison [address] 15    Mild to moderate patchy mixed infiltrate, right mid lung and left lower lung and  perhaps right base. No effusion. Borderline cardiomegaly without congestion      Results from Hospital Encounter encounter on 06/15/21    XR CHEST PA LAT    Narrative  2 view comparison January 25    Impression  The cardiomediastinal silhouette is appropriate for age, technique,  and lung expansion. Pulmonary vasculature is not congested. The lungs are  essentially clear. No effusion or pneumothorax is seen. Results from [place] encounter on 01/21/21    XR CHEST PORT    Narrative  EXAM: XR CHEST PORT    INDICATION: chf    COMPARISON: January 21 and 22 2021    FINDINGS: Redemonstration of the prominent interstitial markings with slightly  increased bilateral patchy opacities. Unchanged support hardware. Unchanged  cardiomediastinal contours. No pneumothorax or pleural effusion. No acute  fracture or dislocation. Impression  Saturday increased bilateral airspace opacities.           Active Problems:    Hypoxia (1/21/2021)      CAD in native artery (6/7/2022)      Acute on chronic systolic (congestive) heart failure ([address] (6/7/2022)        Assessment/Plan:     71WF s/p PCI/LAD stent x 5d with sob, hypoxia, lung infiltrates and known cardiomyopathy with elevated Trop and BNP:    #1 NSTEMI (?) Type 1 v Type 2  - C/s Cardiologist  - Hep gtt  - Continue chronic meds including Brillinta (patient is very high risk cardiac event)    #2 Acute/Chronic Systolic CHF  - IV Lasix    #3 Lung Infiltrates  - CHF v Pneumonia  - C/s Pulm, ID for 2nd opinion  - Cxs, Procal, Empiric Abx  - Covid test reportedly negative; normal WBC    #4 CORIN/CKD?  - monitor, repeat labs    #5 Anemia  - monitor    #6 DM  - Hold Metformin  - SSI    # Hx Depression, Insomnia      DVT Prophylaxis: N/A; Hep gtt  Code Status: FULL  POA: slef/daughter      Signed By: Luna Butts MD     June 7, 2022

## 2022-06-07 NOTE — ED PROVIDER NOTES
EMERGENCY DEPARTMENT HISTORY AND PHYSICAL EXAM      Date: 6/7/2022  Patient Name: Mino Bird      History of Presenting Illness     Chief Complaint   Patient presents with    Shortness of Breath       History Provided By: Patient    HPI: Mino iBrd, 66 y.o. female with a past medical history significant for CAD s/p stenting of LAD 5d ago presents to the ED with cc of cough, SOB. Onset 3d ago. Presented to [facility], found to have elevated troponins in 300s, downtrending, however given recent intervention, per cardiology could not admit there, transferred here for higher level of care. Pt denies F/C/N/V/D, dysuria, sick contactsc. There are no other complaints, changes, or physical findings at this time. PCP: Benjy Pete MD    Current Outpatient Medications   Medication Sig Dispense Refill    aspirin 81 mg chewable tablet Take 1 Tablet by mouth daily. 30 Tablet 3    DULoxetine (CYMBALTA) 60 mg capsule Take 60 mg by mouth daily.  levothyroxine (SYNTHROID) 125 mcg tablet Take 125 mcg by mouth Daily (before breakfast).  oxybutynin (DITROPAN) 5 mg tablet Take 5 mg by mouth two (2) times a day.  metoprolol succinate (TOPROL-XL) 25 mg XL tablet Take 12.5 mg by mouth daily. 1/2 tablet daily      ergocalciferol (Vitamin D2) 1,250 mcg (50,000 unit) capsule Take 50,000 Units by mouth every seven (7) days. Patient stated takes on Sundays      escitalopram oxalate (LEXAPRO) 5 mg tablet Take 5 mg by mouth daily.  sacubitriL-valsartan (Entresto) 24-26 mg tablet Take 1 Tablet by mouth two (2) times a day.  albuterol (PROVENTIL HFA, VENTOLIN HFA, PROAIR HFA) 90 mcg/actuation inhaler Take 2 Puffs by inhalation every four (4) hours as needed.  amiodarone (CORDARONE) 200 mg tablet Take 200 mg by mouth daily.       glucose blood VI test strips (OneTouch Verio test strips) strip Check glucose 3 times a day 300 Strip 1    lancets (One Touch Delica) 33 gauge misc Check glucose 3 times a day 300 Lancet 2    Blood-Glucose Meter (OneTouch Verio Meter) misc Check glucose 3 times a day 1 Each 0    docusate sodium (Colace) 100 mg capsule Take 100 mg by mouth two (2) times a day.  butalbital-acetaminophen-caff (FIORICET) -40 mg per capsule Take 1 Capsule by mouth daily as needed.  metFORMIN (GLUCOPHAGE) 500 mg tablet 500 mg two (2) times daily (with meals).  insulin lispro protamin-lispro (HumaLOG Mix 75-25 KwikPen) flexpen 28 Units two (2) times a day.  bumetanide (BUMEX) 1 mg tablet Take 1 Tab by mouth two (2) times daily (with meals). (Patient taking differently: Take 1 mg by mouth two (2) times a day. Take two tablets in the morning and 0.5 tablet in the evening) 60 Tab 1    ticagrelor (Brilinta) 90 mg tablet Take 90 mg by mouth two (2) times a day.  multivitamin,tx-iron-minerals (Complete Multivitamin) tab Take 1 Tablet by mouth daily.  magnesium oxide (MAG-OX) 400 mg tablet Take 400 mg by mouth two (2) times a day.  melatonin 3 mg tablet Take 3 mg by mouth nightly as needed.  acetaminophen (TYLENOL EXTRA STRENGTH) 500 mg tablet Take 1,000 mg by mouth every six (6) hours as needed for Pain.  polyethylene glycol (MIRALAX) 17 gram packet Take 17 g by mouth as needed.  gabapentin (NEURONTIN) 100 mg capsule Take 300 mg by mouth two (2) times a day.  omeprazole (PRILOSEC) 40 mg capsule Take 40 mg by mouth daily.  atorvastatin (LIPITOR) 40 mg tablet Take 40 mg by mouth daily.  potassium chloride SR (KLOR-CON 10) 10 mEq tablet Take 20 mEq by mouth daily.          Past History     Past Medical History:  Past Medical History:   Diagnosis Date    Acid indigestion     heartburn    Adverse effect of anesthesia     Preop anxiety     Aneurysm ([facility] [address]     cerebral aneurysm patient stated is small     Anxiety disorder     Arthritis     Asthma     Back pain     Bladder spasms     CAD (coronary artery disease)     Patient stated has once stent     Cerebral artery occlusion with cerebral infarction ([facility] [address] 08/2020    Chest pain     Constipation     Cough     Diabetes mellitus     Diarrhea     Fatigue     Fatty liver     Fibromyalgia     Frequent episodic tension-type headache     GERD (gastroesophageal reflux disease)     Hearing reduced     Heart attack (HCC)     mild    Heart failure (HCC)     Hemorrhoids     Hernia of unspecified site of abdominal cavity without mention of obstruction or gangrene     HTN (hypertension)     Hypothyroidism     ICD (implantable cardioverter-defibrillator) in place     Ill-defined condition     Patient has noticed nodules in her neck on both sides stated she is going to make her PCP aware;     Muscle pain     joint pain    N&V (nausea and vomiting)     Pacemaker     biventricular ICD 1/26/18    Ringing in ears     Shortness of breath     Skin cancer     Sleep apnea     cant tolerate the machine    SOB (shortness of breath)     SOB (shortness of breath)     Swallowing difficulty     Thyroid disorder     Vertigo     Wears dentures        Past Surgical History:  Past Surgical History:   Procedure Laterality Date    HX BREAST BIOPSY Left     HX CARPAL TUNNEL RELEASE      right hand    HX GI      HX HEART CATHETERIZATION  2/6/16    placed one stent     HX HEENT      thyroid     HX HYSTERECTOMY      HX ORTHOPAEDIC      HX PACEMAKER Left 6/20/16    [employer] ICD    HX THYROIDECTOMY      AR REMOVAL GALLBLADDER         Family History:  Family History   Problem Relation Age of Onset    Diabetes Mother     Heart Disease Mother     Heart Disease Father     Cancer Sister         breast, bone, and liver     Diabetes Sister     Heart Disease Sister     Hypertension Sister     Lupus Sister     Breast Cancer Sister     Diabetes Brother     Cerebral palsy Brother     Cerebral aneurysm Sister        Social History:  Social History     Tobacco Use    Smoking status: Never Smoker    Smokeless tobacco: Never Used   Vaping Use    Vaping Use: Never used   Substance Use Topics    Alcohol use: Yes     Comment: wine occasionally    Drug use: No       Allergies: Allergies   Allergen Reactions    Novocain [Procaine] Nausea and Vomiting    Tramadol Other (comments)     Headache    Codeine Other (comments)     Patient states she is not allergic         Review of Systems   Constitutional: Negative except as in HPI. Eyes: Negative except as in HPI.  ENT: Negative except as in HPI. Cardiovascular: Negative except as in HPI. Respiratory: Negative except as in HPI. Gastrointestinal: Negative except as in HPI. Genitourinary: Negative except as in HPI. Musculoskeletal: Negative except as in HPI. Integumentary: Negative except as in HPI. Neurological: Negative except as in HPI. Psychiatric: Negative except as in HPI. Endocrine: Negative except as in HPI. Hematologic/Lymphatic: Negative except as in HPI. Allergic/Immunologic: Negative except as in HPI. Physical Exam   Constitutional: Awake and alert, interactive, NAD  Eyes: PERRL, no injection or scleral icterus, no discharge  HEENT: NCAT, neck supple, MMM, no oropharyngeal exudates  CV: RRR, no m/r/g  Respiratory: CTAB, no r/r/w, SOB on room air, on 2LNC  GI: Abd soft, nondistended, nontender  : Deferred  MSK: FROM, no joint effusions or edema  Skin: No rashes  Neuro: CN2-12 intact, symmetric facies, fluent speech.   Psych: Well-groomed, normal speech, behavior, appropriate mood    Lab and Diagnostic Study Results     Labs -     Recent Results (from the past 12 hour(s))   EKG, 12 LEAD, INITIAL    Collection Time: 06/07/22 10:43 AM   Result Value Ref Range    Ventricular Rate 95 BPM    Atrial Rate 96 BPM    P-R Interval 41 ms    QRS Duration 201 ms    Q-T Interval 443 ms    QTC Calculation (Bezet) 557 ms    Calculated P Axis 17 degrees    Calculated R Axis -100 degrees    Calculated T Axis 109 degrees    Diagnosis Atrial-sensed ventricular-paced rhythm  No further analysis attempted due to paced rhythm     CBC WITH AUTOMATED DIFF    Collection Time: 06/07/22 11:00 AM   Result Value Ref Range    WBC 10.6 4.4 - 11.3 K/uL    RBC 3.14 (L) 4.50 - 5.90 M/uL    HGB 10.0 (L) 13.5 - 17.5 g/dL    HCT 29.2 (L) 36 - 46 %    MCV 93.0 80 - 100 FL    MCH 31.9 31 - 34 PG    MCHC 34.3 31.0 - 36.0 g/dL    RDW 14.4 11.5 - 14.5 %    PLATELET 720 847 - 149 K/uL    MPV 8.2 6.5 - 11.5 FL    NRBC 0.1  WBC    ABSOLUTE NRBC 0.01 K/uL    NEUTROPHILS 84 (H) 42 - 75 %    LYMPHOCYTES 7 (L) 20.5 - 51.1 %    MONOCYTES 8 1.7 - 9.3 %    EOSINOPHILS 0 (L) 0.9 - 2.9 %    BASOPHILS 1 0.0 - 2.5 %    ABS. NEUTROPHILS 9.0 (H) 1.8 - 7.7 K/UL    ABS. LYMPHOCYTES 0.7 (L) 1.0 - 4.8 K/UL    ABS. MONOCYTES 0.8 0.2 - 2.4 K/UL    ABS. EOSINOPHILS 0.0 0.0 - 0.7 K/UL    ABS. BASOPHILS 0.1 0.0 - 0.2 K/UL   METABOLIC PANEL, COMPREHENSIVE    Collection Time: 06/07/22 11:00 AM   Result Value Ref Range    Sodium 133 (L) 136 - 145 mmol/L    Potassium 3.0 (L) 3.5 - 5.1 mmol/L    Chloride 96 (L) 97 - 108 mmol/L    CO2 26 21 - 32 mmol/L    Anion gap 11 5 - 15 mmol/L    Glucose 196 (H) 65 - 100 mg/dL    BUN 31 (H) 6 - 20 mg/dL    Creatinine 1.40 (H) 0.55 - 1.02 mg/dL    BUN/Creatinine ratio 22 (H) 12 - 20      GFR est AA 45 (L) >60 ml/min/1.73m2    GFR est non-AA 37 (L) >60 ml/min/1.73m2    Calcium 8.3 (L) 8.5 - 10.1 mg/dL    Bilirubin, total 1.3 (H) 0.2 - 1.0 mg/dL    AST (SGOT) 35 15 - 37 U/L    ALT (SGPT) 19 12 - 78 U/L    Alk.  phosphatase 92 45 - 117 U/L    Protein, total 8.4 (H) 6.4 - 8.2 g/dL    Albumin 2.4 (L) 3.5 - 5.0 g/dL    Globulin 6.0 (H) 2.0 - 4.0 g/dL    A-G Ratio 0.4 (L) 1.1 - 2.2     NT-PRO BNP    Collection Time: 06/07/22 11:00 AM   Result Value Ref Range    NT pro-BNP 3,829 (H) <125 pg/mL   TROPONIN-HIGH SENSITIVITY    Collection Time: 06/07/22 11:00 AM   Result Value Ref Range    Troponin-High Sensitivity 318 (HH) 0 - 51 ng/L   PROTHROMBIN TIME + INR Collection Time: 06/07/22 11:00 AM   Result Value Ref Range    Prothrombin time 18.8 (H) 11.9 - 14.6 sec    INR 1.6 (H) 0.9 - 1.1     LACTIC ACID    Collection Time: 06/07/22 12:00 PM   Result Value Ref Range    Lactic acid 1.5 0.4 - 2.0 mmol/L   BLOOD GAS, ARTERIAL    Collection Time: 06/07/22 12:00 PM   Result Value Ref Range    pH 7.50 (H) 7.35 - 7.45      PCO2 31 (L) 35 - 45 mmHg    PO2 84 70 - 100 mmHg    BICARBONATE 24 22 - 26 mmol/L    BASE EXCESS 1.2 0 - 2 mmol/L    O2 METHOD Nasal Cannula      O2 FLOW RATE 2 L/min    FIO2 28.0 %    Sample source Arterial      SITE Right Brachial      DAINA'S TEST Not Applicable      Carboxy-Hgb 0.0 0 - 5 %    Methemoglobin 0.3 0 - 5 %    tHb 10.0 (L) 13.5 - 17.5 g/dL    Oxyhemoglobin 96.6 95 - 100 %   COVID-19 WITH INFLUENZA A/B    Collection Time: 06/07/22 12:00 PM   Result Value Ref Range    SARS-CoV-2 by PCR Not Detected Not Detected      Influenza A by PCR Not Detected Not Detected      Influenza B by PCR Not Detected Not Detected     TROPONIN-HIGH SENSITIVITY    Collection Time: 06/07/22  1:00 PM   Result Value Ref Range    Troponin-High Sensitivity 294 (HH) 0 - 51 ng/L       Radiologic Studies -   [unfilled]  CT Results  (Last 48 hours)    None        CXR Results  (Last 48 hours)               06/07/22 1116  XR CHEST PORT Final result    Narrative:  1 view comparison [address]       Mild to moderate patchy mixed infiltrate, right mid lung and left lower lung and   perhaps right base. No effusion. Borderline cardiomegaly without congestion             Medical Decision Making and ED Course   - I am the first and primary provider for this patient AND AM THE PRIMARY PROVIDER OF RECORD. - I reviewed the vital signs, available nursing notes, past medical history, past surgical history, family history and social history. - Initial assessment performed.  The patients presenting problems have been discussed, and the staff are in agreement with the care plan formulated and outlined with them. I have encouraged them to ask questions as they arise throughout their visit. Vital Signs-Reviewed the patient's vital signs. Patient Vitals for the past 12 hrs:   Temp Pulse Resp BP SpO2   06/07/22 1639 98.1 °F (36.7 °C) 84 16 93/63 98 %       EKG interpretation:         Provider Notes (Medical Decision Making):   71F w/hypoxia, SOB in setting of likely COVID PNA. Negative swab, however most likely diagnosis, may be other viral syndrome. Given supplemental O2 and need for close troponin trending, will admit. ED Course:       ED Course as of 06/07/22 1649   Tue Jun 07, 2022   1645 Pt desatted to 88% within 2min of discontinuing supplemental O2 and became SOB. 2LNC reapplied, pending admission. [YA]   5742 [facility] Admitted to Dr. Ernesto Correa. [YA]      ED Course User Index  [YA] Darrian Cha MD         Consultations:     Hospitalist Dr. Ernesto Correa. Disposition     Disposition: Admitted to Floor Medical Floor the case was discussed with the admitting physician Dr. Ernesto Correa. Admitted      Diagnosis     Clinical Impression:   1. SOB (shortness of breath)    2. Hypoxia        Attestations:     Quirino Smallwood MD

## 2022-06-07 NOTE — ED NOTES
Assumed care of pt at this time, no signs of acute or respiratory distress noted. Pt denies pain currently. Pt transferred to IP hospital bed for comfort.  VS updated

## 2022-06-07 NOTE — Clinical Note
Status[de-identified] INPATIENT [101]   Type of Bed: Medical [8]   Inpatient Hospitalization Certified Necessary for the Following Reasons: 3.  Patient receiving treatment that can only be provided in an inpatient setting (further clarification in H&P documentation)   Admitting Diagnosis: Hypoxia [340039]   Admitting Physician: Tawanna Garcia [[id number]]   Attending Physician: Tawanna Garcia [[id number]]   Estimated Length of Stay: 3-4 Midnights   Discharge Plan[de-identified] Other (Specify)

## 2022-06-07 NOTE — ED PROVIDER NOTES
EMERGENCY DEPARTMENT HISTORY AND PHYSICAL EXAM      Date: 6/7/2022  Patient Name: Felisa Cummings    History of Presenting Illness     Chief Complaint   Patient presents with    Shortness of Breath       History Provided By: Patient    HPI: Felisa Cummings, 66 y.o. female with a past medical history significant Coronary artery disease and recent stenting of LAD presents to the ED with cc of cough and increased shortness of breath for the last 3 days denies any fever at home, denies chest pain, patient has had stenting of her LAD 5 days ago    There are no other complaints, changes, or physical findings at this time. PCP: Yanni Edmonds MD    No current facility-administered medications on file prior to encounter. Current Outpatient Medications on File Prior to Encounter   Medication Sig Dispense Refill    aspirin 81 mg chewable tablet Take 1 Tablet by mouth daily. 30 Tablet 3    DULoxetine (CYMBALTA) 60 mg capsule Take 60 mg by mouth daily.  levothyroxine (SYNTHROID) 125 mcg tablet Take 125 mcg by mouth Daily (before breakfast).  oxybutynin (DITROPAN) 5 mg tablet Take 5 mg by mouth two (2) times a day.  metoprolol succinate (TOPROL-XL) 25 mg XL tablet Take 12.5 mg by mouth daily. 1/2 tablet daily      ergocalciferol (Vitamin D2) 1,250 mcg (50,000 unit) capsule Take 50,000 Units by mouth every seven (7) days. Patient stated takes on Sundays      escitalopram oxalate (LEXAPRO) 5 mg tablet Take 5 mg by mouth daily.  sacubitriL-valsartan (Entresto) 24-26 mg tablet Take 1 Tablet by mouth two (2) times a day.  albuterol (PROVENTIL HFA, VENTOLIN HFA, PROAIR HFA) 90 mcg/actuation inhaler Take 2 Puffs by inhalation every four (4) hours as needed.  amiodarone (CORDARONE) 200 mg tablet Take 200 mg by mouth daily.       glucose blood VI test strips (OneTouch Verio test strips) strip Check glucose 3 times a day 300 Strip 1    lancets (One Touch Delica) 33 gauge misc Check glucose 3 times a day 300 Lancet 2    Blood-Glucose Meter (OneTouch Verio Meter) misc Check glucose 3 times a day 1 Each 0    docusate sodium (Colace) 100 mg capsule Take 100 mg by mouth two (2) times a day.  butalbital-acetaminophen-caff (FIORICET) -40 mg per capsule Take 1 Capsule by mouth daily as needed.  metFORMIN (GLUCOPHAGE) 500 mg tablet 500 mg two (2) times daily (with meals).  insulin lispro protamin-lispro (HumaLOG Mix 75-25 KwikPen) flexpen 28 Units two (2) times a day.  bumetanide (BUMEX) 1 mg tablet Take 1 Tab by mouth two (2) times daily (with meals). (Patient taking differently: Take 1 mg by mouth two (2) times a day. Take two tablets in the morning and 0.5 tablet in the evening) 60 Tab 1    ticagrelor (Brilinta) 90 mg tablet Take 90 mg by mouth two (2) times a day.  multivitamin,tx-iron-minerals (Complete Multivitamin) tab Take 1 Tablet by mouth daily.  magnesium oxide (MAG-OX) 400 mg tablet Take 400 mg by mouth two (2) times a day.  melatonin 3 mg tablet Take 3 mg by mouth nightly as needed.  acetaminophen (TYLENOL EXTRA STRENGTH) 500 mg tablet Take 1,000 mg by mouth every six (6) hours as needed for Pain.  polyethylene glycol (MIRALAX) 17 gram packet Take 17 g by mouth as needed.  gabapentin (NEURONTIN) 100 mg capsule Take 300 mg by mouth two (2) times a day.  omeprazole (PRILOSEC) 40 mg capsule Take 40 mg by mouth daily.  atorvastatin (LIPITOR) 40 mg tablet Take 40 mg by mouth daily.  potassium chloride SR (KLOR-CON 10) 10 mEq tablet Take 20 mEq by mouth daily.          Past History     Past Medical History:  Past Medical History:   Diagnosis Date    Acid indigestion     heartburn    Adverse effect of anesthesia     Preop anxiety     Aneurysm ([facility] [address]     cerebral aneurysm patient stated is small     Anxiety disorder     Arthritis     Asthma     Back pain     Bladder spasms     CAD (coronary artery disease)     Patient stated has once stent     Cerebral artery occlusion with cerebral infarction ([facility] [address] 08/2020    Chest pain     Constipation     Cough     Diabetes mellitus     Diarrhea     Fatigue     Fatty liver     Fibromyalgia     Frequent episodic tension-type headache     GERD (gastroesophageal reflux disease)     Hearing reduced     Heart attack (HCC)     mild    Heart failure (HCC)     Hemorrhoids     Hernia of unspecified site of abdominal cavity without mention of obstruction or gangrene     HTN (hypertension)     Hypothyroidism     ICD (implantable cardioverter-defibrillator) in place     Ill-defined condition     Patient has noticed nodules in her neck on both sides stated she is going to make her PCP aware;     Muscle pain     joint pain    N&V (nausea and vomiting)     Pacemaker     biventricular ICD 1/26/18    Ringing in ears     Shortness of breath     Skin cancer     Sleep apnea     cant tolerate the machine    SOB (shortness of breath)     SOB (shortness of breath)     Swallowing difficulty     Thyroid disorder     Vertigo     Wears dentures        Past Surgical History:  Past Surgical History:   Procedure Laterality Date    HX BREAST BIOPSY Left     HX CARPAL TUNNEL RELEASE      right hand    HX GI      HX HEART CATHETERIZATION  2/6/16    placed one stent     HX HEENT      thyroid     HX HYSTERECTOMY      HX ORTHOPAEDIC      HX PACEMAKER Left 6/20/16    [employer] ICD    HX THYROIDECTOMY      [place] REMOVAL GALLBLADDER         Family History:  Family History   Problem Relation Age of Onset    Diabetes Mother     Heart Disease Mother     Heart Disease Father     Cancer Sister         breast, bone, and liver     Diabetes Sister     Heart Disease Sister     Hypertension Sister     Lupus Sister     Breast Cancer Sister     Diabetes Brother     Cerebral palsy Brother     Cerebral aneurysm Sister        Social History:  Social History     Tobacco Use    Smoking status: Never Smoker    Smokeless tobacco: Never Used   Vaping Use    Vaping Use: Never used   Substance Use Topics    Alcohol use: Yes     Comment: wine occasionally    Drug use: No       Allergies: Allergies   Allergen Reactions    Novocain [Procaine] Nausea and Vomiting    Tramadol Other (comments)     Headache    Codeine Other (comments)     Patient states she is not allergic         Review of Systems     Review of Systems   Constitutional: Negative for chills and fever. HENT: Negative for rhinorrhea and sore throat. Eyes: Negative for pain and visual disturbance. Respiratory: Positive for cough and shortness of breath. Cardiovascular: Negative for chest pain and leg swelling. Gastrointestinal: Negative for abdominal pain and vomiting. Endocrine: Negative for polydipsia and polyuria. Genitourinary: Negative for dysuria and hematuria. Musculoskeletal: Negative for back pain and neck pain. Skin: Negative for color change and pallor. Neurological: Negative for weakness and headaches. Psychiatric/Behavioral: Negative for agitation and suicidal ideas. Physical Exam     Physical Exam  Vitals and nursing note reviewed. Constitutional:       General: She is not in acute distress. Appearance: She is not ill-appearing, toxic-appearing or diaphoretic. HENT:      Head: Normocephalic and atraumatic. Right Ear: Tympanic membrane normal.      Left Ear: Tympanic membrane normal.      Nose: Nose normal. No congestion. Mouth/Throat:      Mouth: Mucous membranes are moist.      Pharynx: Oropharynx is clear. Eyes:      Extraocular Movements: Extraocular movements intact. Conjunctiva/sclera: Conjunctivae normal.      Pupils: Pupils are equal, round, and reactive to light. Cardiovascular:      Rate and Rhythm: Normal rate and regular rhythm. Pulses: Normal pulses. Heart sounds: Normal heart sounds.    Pulmonary:      Effort: Pulmonary effort is normal. Tachypnea present. Breath sounds: Decreased breath sounds present. Abdominal:      General: Bowel sounds are normal.      Palpations: Abdomen is soft. Tenderness: There is no abdominal tenderness. Musculoskeletal:         General: No tenderness, deformity or signs of injury. Normal range of motion. Cervical back: Normal range of motion and neck supple. No rigidity or tenderness. Lymphadenopathy:      Cervical: No cervical adenopathy. Skin:     General: Skin is warm and dry. Capillary Refill: Capillary refill takes less than 2 seconds. Findings: No rash. Neurological:      General: No focal deficit present. Mental Status: She is alert and oriented to person, place, and time. Cranial Nerves: No cranial nerve deficit. Sensory: No sensory deficit.    Psychiatric:         Mood and Affect: Mood normal.         Behavior: Behavior normal.         Lab and Diagnostic Study Results     Labs -     Recent Results (from the past 12 hour(s))   EKG, 12 LEAD, INITIAL    Collection Time: 06/07/22 10:43 AM   Result Value Ref Range    Ventricular Rate 95 BPM    Atrial Rate 96 BPM    P-R Interval 41 ms    QRS Duration 201 ms    Q-T Interval 443 ms    QTC Calculation (Bezet) 557 ms    Calculated P Axis 17 degrees    Calculated R Axis -100 degrees    Calculated T Axis 109 degrees    Diagnosis       Atrial-sensed ventricular-paced rhythm  No further analysis attempted due to paced rhythm     CBC WITH AUTOMATED DIFF    Collection Time: 06/07/22 11:00 AM   Result Value Ref Range    WBC 10.6 4.4 - 11.3 K/uL    RBC 3.14 (L) 4.50 - 5.90 M/uL    HGB 10.0 (L) 13.5 - 17.5 g/dL    HCT 29.2 (L) 36 - 46 %    MCV 93.0 80 - 100 FL    MCH 31.9 31 - 34 PG    MCHC 34.3 31.0 - 36.0 g/dL    RDW 14.4 11.5 - 14.5 %    PLATELET 269 868 - 196 K/uL    MPV 8.2 6.5 - 11.5 FL    NRBC 0.1  WBC    ABSOLUTE NRBC 0.01 K/uL    NEUTROPHILS 84 (H) 42 - 75 %    LYMPHOCYTES 7 (L) 20.5 - 51.1 %    MONOCYTES 8 1.7 - 9.3 %    EOSINOPHILS 0 (L) 0.9 - 2.9 %    BASOPHILS 1 0.0 - 2.5 %    ABS. NEUTROPHILS 9.0 (H) 1.8 - 7.7 K/UL    ABS. LYMPHOCYTES 0.7 (L) 1.0 - 4.8 K/UL    ABS. MONOCYTES 0.8 0.2 - 2.4 K/UL    ABS. EOSINOPHILS 0.0 0.0 - 0.7 K/UL    ABS. BASOPHILS 0.1 0.0 - 0.2 K/UL   PROTHROMBIN TIME + INR    Collection Time: 06/07/22 11:00 AM   Result Value Ref Range    Prothrombin time 18.8 (H) 11.9 - 14.6 sec    INR 1.6 (H) 0.9 - 1.1         Radiologic Studies -   @lastxrresult@  CT Results  (Last 48 hours)    None        CXR Results  (Last 48 hours)               06/07/22 1116  XR CHEST PORT Final result    Narrative:  1 view comparison [address]       Mild to moderate patchy mixed infiltrate, right mid lung and left lower lung and   perhaps right base. No effusion. Borderline cardiomegaly without congestion               Medical Decision Making   - I am the first provider for this patient. - I reviewed the vital signs, available nursing notes, past medical history, past surgical history, family history and social history. - Initial assessment performed. The patients presenting problems have been discussed, and they are in agreement with the care plan formulated and outlined with them. I have encouraged them to ask questions as they arise throughout their visit. Vital Signs-Reviewed the patient's vital signs.   Patient Vitals for the past 12 hrs:   Temp Pulse Resp BP SpO2   06/07/22 1048 -- 95 22 126/74 97 %   06/07/22 1041 98.3 °F (36.8 °C) 99 22 134/83 (!) 89 %       Records Reviewed: Nursing Notes    The patient presents with shortness of breath with a differential diagnosis of ACS, CHF, pneumonia      ED Course:     ED Course as of 06/07/22 1530   Tue Jun 07, 2022   1454 NT pro-BNP(!): 3,829 [SB]   1505 EKG atrial paced rate 95 no ST elevations or depressions [SB]   1530 Patient being transferred due to her diminished cardiac output and currently patient has bilateral pneumonia with put a strain on her heart functions and will need close following by cardiology due to her elevated troponins [SB]      ED Course User Index  [SB] Sonia Escobar MD       Provider Notes (Medical Decision Making): MDM       Procedures   Medical Decision Makingedical Decision Making  Performed by: Ariana Ring MD  PROCEDURES:  Procedures       Disposition   Disposition: Condition stable and improved  Transferred to [facility] patient verbally agreed to transfer and understand the risks involved as outlined in the EMTALA form. DISCHARGE PLAN:  1. Current Discharge Medication List      CONTINUE these medications which have NOT CHANGED    Details   aspirin 81 mg chewable tablet Take 1 Tablet by mouth daily. Qty: 30 Tablet, Refills: 3      DULoxetine (CYMBALTA) 60 mg capsule Take 60 mg by mouth daily. levothyroxine (SYNTHROID) 125 mcg tablet Take 125 mcg by mouth Daily (before breakfast). oxybutynin (DITROPAN) 5 mg tablet Take 5 mg by mouth two (2) times a day. metoprolol succinate (TOPROL-XL) 25 mg XL tablet Take 12.5 mg by mouth daily. 1/2 tablet daily      ergocalciferol (Vitamin D2) 1,250 mcg (50,000 unit) capsule Take 50,000 Units by mouth every seven (7) days. Patient stated takes on Sundays      escitalopram oxalate (LEXAPRO) 5 mg tablet Take 5 mg by mouth daily. sacubitriL-valsartan (Entresto) 24-26 mg tablet Take 1 Tablet by mouth two (2) times a day. albuterol (PROVENTIL HFA, VENTOLIN HFA, PROAIR HFA) 90 mcg/actuation inhaler Take 2 Puffs by inhalation every four (4) hours as needed. amiodarone (CORDARONE) 200 mg tablet Take 200 mg by mouth daily.       glucose blood VI test strips (OneTouch Verio test strips) strip Check glucose 3 times a day  Qty: 300 Strip, Refills: 1    Associated Diagnoses: Uncontrolled type 2 diabetes mellitus with hyperglycemia (HCC)      lancets (One Touch Delica) 33 gauge misc Check glucose 3 times a day  Qty: 300 Lancet, Refills: 2 Associated Diagnoses: Uncontrolled type 2 diabetes mellitus with hyperglycemia (HCC)      Blood-Glucose Meter (OneTouch Verio Meter) misc Check glucose 3 times a day  Qty: 1 Each, Refills: 0    Associated Diagnoses: Uncontrolled type 2 diabetes mellitus with hyperglycemia (HCC)      docusate sodium (Colace) 100 mg capsule Take 100 mg by mouth two (2) times a day. butalbital-acetaminophen-caff (FIORICET) -40 mg per capsule Take 1 Capsule by mouth daily as needed. metFORMIN (GLUCOPHAGE) 500 mg tablet 500 mg two (2) times daily (with meals). insulin lispro protamin-lispro (HumaLOG Mix 75-25 KwikPen) flexpen 28 Units two (2) times a day. bumetanide (BUMEX) 1 mg tablet Take 1 Tab by mouth two (2) times daily (with meals). Qty: 60 Tab, Refills: 1      ticagrelor (Brilinta) 90 mg tablet Take 90 mg by mouth two (2) times a day. multivitamin,tx-iron-minerals (Complete Multivitamin) tab Take 1 Tablet by mouth daily. magnesium oxide (MAG-OX) 400 mg tablet Take 400 mg by mouth two (2) times a day. melatonin 3 mg tablet Take 3 mg by mouth nightly as needed. acetaminophen (TYLENOL EXTRA STRENGTH) 500 mg tablet Take 1,000 mg by mouth every six (6) hours as needed for Pain.      polyethylene glycol (MIRALAX) 17 gram packet Take 17 g by mouth as needed. gabapentin (NEURONTIN) 100 mg capsule Take 300 mg by mouth two (2) times a day. omeprazole (PRILOSEC) 40 mg capsule Take 40 mg by mouth daily. atorvastatin (LIPITOR) 40 mg tablet Take 40 mg by mouth daily. Associated Diagnoses: Type II or unspecified type diabetes mellitus without mention of complication, uncontrolled; Other specified acquired hypothyroidism; Unspecified vitamin D deficiency      potassium chloride SR (KLOR-CON 10) 10 mEq tablet Take 20 mEq by mouth daily. Associated Diagnoses: Type II or unspecified type diabetes mellitus without mention of complication, uncontrolled           2.    Follow-up Information    None       3. Return to ED if worse   4. Current Discharge Medication List            Diagnosis     Clinical Impression:   1. Pneumonia of both lungs due to infectious organism, unspecified part of lung        Attestations:    Hazel Coronado MD    Please note that this dictation was completed with [employer], the computer voice recognition software. Quite often unanticipated grammatical, syntax, homophones, and other interpretive errors are inadvertently transcribed by the computer software. Please disregard these errors. Please excuse any errors that have escaped final proofreading. Thank you.

## 2022-06-08 NOTE — ACP (ADVANCE CARE PLANNING)
Advance Care Planning   Healthcare Decision Maker:       Primary Decision Maker: Dre Dawson - [phone]    Secondary Decision Maker: Pamela Pap ( Call 1st) - daughter - [phone]

## 2022-06-08 NOTE — CONSULTS
CONSULTATION    REASON FOR CONSULT: Congestive heart failure    REQUESTING PROVIDER:  See chart    CHIEF COMPLAINT:    Chief Complaint   Patient presents with    Shortness of Breath         HISTORY OF PRESENT ILLNESS:  Ricky Taylor is a 66y.o. year-old female with past medical history significant for coronary artery disease, ischemic cardiomyopathy with chronic congestive heart failure with reduced ejection fraction (HFrEF), paroxysmal atrial fibrillation,moderate to severe tricuspid regurgitation,  hypertension, and hyperlipidemia who was admitted to the hospital for further evaluation of cough and worsening shortness of breath for the past 3 days. Patient is s/p PCI to the LAD on 06/07/22. On examination, patient is sitting upright in the bed, unable to lie flat. She is visibly short of breath and unable to speak in full sentences. Hypoxic in the emergency department, placed on non-rebreather and transitioned to O2 per protocol via NC. Denies chest pain but continues to complain of shortness of breath and a non-productive cough. Patient noticed a decrease in her urinary output for the past 2 days. She is on daily diuretics and GDMT at home. Records from hospital admission course thus far reviewed. Telemetry Review: Atrial sensed, v-paced: HR 90s.        PAST MEDICAL HISTORY:    Past Medical History:   Diagnosis Date    Acid indigestion     heartburn    Adverse effect of anesthesia     Preop anxiety     Aneurysm ([address]     cerebral aneurysm patient stated is small     Anxiety disorder     Arthritis     Asthma     Back pain     Bladder spasms     CAD (coronary artery disease)     Patient stated has once stent     Cerebral artery occlusion with cerebral infarction ([address] 08/2020    Chest pain     Constipation     Cough     Diabetes mellitus     Diarrhea     Fatigue     Fatty liver     Fibromyalgia     Frequent episodic tension-type headache     GERD (gastroesophageal reflux disease)     Hearing reduced     Heart attack ([facility]     mild    Heart failure (HCC)     Hemorrhoids     Hernia of unspecified site of abdominal cavity without mention of obstruction or gangrene     HTN (hypertension)     Hypothyroidism     ICD (implantable cardioverter-defibrillator) in place     Ill-defined condition     Patient has noticed nodules in her neck on both sides stated she is going to make her PCP aware;     Muscle pain     joint pain    N&V (nausea and vomiting)     Pacemaker     biventricular ICD 1/26/18    Ringing in ears     Shortness of breath     Skin cancer     Sleep apnea     cant tolerate the machine    SOB (shortness of breath)     SOB (shortness of breath)     Swallowing difficulty     Thyroid disorder     Vertigo     Wears dentures        PAST SURGICAL HISTORY:   Past Surgical History:   Procedure Laterality Date    HX BREAST BIOPSY Left     HX CARPAL TUNNEL RELEASE      right hand    HX GI      HX HEART CATHETERIZATION  2/6/16    placed one stent     HX HEENT      thyroid     HX HYSTERECTOMY      HX ORTHOPAEDIC      HX PACEMAKER Left 6/20/16    [employer] ICD    HX THYROIDECTOMY      GA REMOVAL GALLBLADDER         ALLERGIES:    Allergies   Allergen Reactions    Novocain [Procaine] Nausea and Vomiting    Tramadol Other (comments)     Headache    Codeine Other (comments)     Patient states she is not allergic       FAMILY HISTORY:    Family History   Problem Relation Age of Onset    Diabetes Mother     Heart Disease Mother     Heart Disease Father     Cancer Sister         breast, bone, and liver     Diabetes Sister     Heart Disease Sister     Hypertension Sister     Lupus Sister     Breast Cancer Sister     Diabetes Brother     Cerebral palsy Brother     Cerebral aneurysm Sister        SOCIAL HISTORY:    Tobacco: Never smoker  Drugs: Not documented  ETOH: Not documented    HOME MEDICATIONS:    Prior to Admission Medications Prescriptions Last Dose Informant Patient Reported? Taking? Blood-Glucose Meter (OneTouch Verio Meter) misc   No No   Sig: Check glucose 3 times a day   DULoxetine (CYMBALTA) 60 mg capsule 2022  Yes Yes   Sig: Take 60 mg by mouth daily. acetaminophen (TYLENOL EXTRA STRENGTH) 500 mg tablet   Yes No   Sig: Take 1,000 mg by mouth every six (6) hours as needed for Pain. albuterol (PROVENTIL HFA, VENTOLIN HFA, PROAIR HFA) 90 mcg/actuation inhaler 2022  Yes Yes   Sig: Take 2 Puffs by inhalation every four (4) hours as needed. amiodarone (CORDARONE) 200 mg tablet 2022  Yes Yes   Sig: Take 200 mg by mouth daily. aspirin 81 mg chewable tablet Unknown  No No   Sig: Take 1 Tablet by mouth daily. atorvastatin (LIPITOR) 40 mg tablet 2022  Yes Yes   Sig: Take 40 mg by mouth daily. bumetanide (BUMEX) 1 mg tablet 2022  No Yes   Sig: Take 1 Tab by mouth two (2) times daily (with meals). Patient taking differently: Take 1 mg by mouth two (2) times a day. Take two tablets in the morning and 0.5 tablet in the evening   butalbital-acetaminophen-caff (FIORICET) -40 mg per capsule 2022  Yes Yes   Sig: Take 1 Capsule by mouth daily as needed. docusate sodium (Colace) 100 mg capsule 2022  Yes Yes   Sig: Take 100 mg by mouth two (2) times a day. ergocalciferol (Vitamin D2) 1,250 mcg (50,000 unit) capsule 2022  Yes Yes   Sig: Take 50,000 Units by mouth every seven (7) days. Patient stated takes on Sundays   escitalopram oxalate (LEXAPRO) 5 mg tablet 2022  Yes Yes   Sig: Take 5 mg by mouth daily. gabapentin (NEURONTIN) 100 mg capsule 2022  Yes Yes   Sig: Take 300 mg by mouth two (2) times a day. glucose blood VI test strips (OneTouch Verio test strips) strip Unknown  No No   Sig: Check glucose 3 times a day   insulin lispro protamin-lispro (HumaLOG Mix 75-25 KwikPen) flexpen 2022  Yes Yes   Si Units two (2) times a day.    lancets (One Touch Delica) 33 gauge misc Unknown  No No   Sig: Check glucose 3 times a day   levothyroxine (SYNTHROID) 125 mcg tablet 2022  Yes Yes   Sig: Take 125 mcg by mouth Daily (before breakfast). magnesium oxide (MAG-OX) 400 mg tablet 2022  Yes Yes   Sig: Take 400 mg by mouth two (2) times a day. melatonin 3 mg tablet 2022  Yes Yes   Sig: Take 3 mg by mouth nightly as needed. metFORMIN (GLUCOPHAGE) 500 mg tablet 2022  Yes Yes   Si mg two (2) times daily (with meals). metoprolol succinate (TOPROL-XL) 25 mg XL tablet 2022  Yes Yes   Sig: Take 12.5 mg by mouth daily. 1/2 tablet daily   multivitamin,tx-iron-minerals (Complete Multivitamin) tab Unknown  Yes No   Sig: Take 1 Tablet by mouth daily. omeprazole (PRILOSEC) 40 mg capsule 2022  Yes Yes   Sig: Take 40 mg by mouth daily. oxybutynin (DITROPAN) 5 mg tablet 2022  Yes Yes   Sig: Take 5 mg by mouth two (2) times a day. polyethylene glycol (MIRALAX) 17 gram packet Unknown  Yes No   Sig: Take 17 g by mouth as needed. potassium chloride SR (KLOR-CON 10) 10 mEq tablet 2022  Yes Yes   Sig: Take 20 mEq by mouth daily. sacubitriL-valsartan Merline Orn) 24-26 mg tablet 2022  Yes Yes   Sig: Take 1 Tablet by mouth two (2) times a day. ticagrelor (Brilinta) 90 mg tablet 2022  Yes Yes   Sig: Take 90 mg by mouth two (2) times a day. Facility-Administered Medications: None       REVIEW OF SYSTEMS:  Complete review of systems performed, pertinents noted above, all other systems are negative.     Patient Vitals for the past 24 hrs:   Temp Pulse Resp BP SpO2   22 0825 -- -- -- -- 93 %   22 0820 -- -- -- -- 90 %   22 0725 -- 98 30 (!) 113/58 99 %   22 0717 99 °F (37.2 °C) 95 24 (!) 113/58 (!) 74 %   22 0607 -- 87 20 119/74 97 %   22 0400 -- 87 -- -- 95 %   22 0245 -- 86 18 104/61 98 %   22 0109 -- 84 20 114/78 94 %   22 2300 -- 91 22 122/73 93 %   22 -- 92 25 136/84 96 %   22 1933 -- 94 26 122/64 93 %   06/07/22 1809 -- 87 -- (!) 103/56 99 %   06/07/22 1639 98.1 °F (36.7 °C) 84 16 93/63 98 %       PHYSICAL EXAMINATION:    General:  NAD, A&O  HEENT: Normocephalic, PERRL, no drainage  Neck: Supple, Trachea midline, No JVD  RESP: CTA bilaterally. + Symmetrical chest movement. No SOB or distress. On O2 per protocol, NC  Cardiovascular: RRR no MRG  PVS: No rubor, cyanosis, no edema  ABD: soft, NT, Normoactive BS  Derm: Warm/Dry/Intact with no lesions  Neuro: A&O PPTS, No focal deficits  PSYCH: No anxiety or agitation    Recent labs results and imaging reviewed. Recent Results (from the past 24 hour(s))   EKG, 12 LEAD, INITIAL    Collection Time: 06/07/22 10:43 AM   Result Value Ref Range    Ventricular Rate 95 BPM    Atrial Rate 96 BPM    P-R Interval 41 ms    QRS Duration 201 ms    Q-T Interval 443 ms    QTC Calculation (Bezet) 557 ms    Calculated P Axis 17 degrees    Calculated R Axis -100 degrees    Calculated T Axis 109 degrees    Diagnosis       Atrial-sensed ventricular-paced rhythm  No further analysis attempted due to paced rhythm     CBC WITH AUTOMATED DIFF    Collection Time: 06/07/22 11:00 AM   Result Value Ref Range    WBC 10.6 4.4 - 11.3 K/uL    RBC 3.14 (L) 4.50 - 5.90 M/uL    HGB 10.0 (L) 13.5 - 17.5 g/dL    HCT 29.2 (L) 36 - 46 %    MCV 93.0 80 - 100 FL    MCH 31.9 31 - 34 PG    MCHC 34.3 31.0 - 36.0 g/dL    RDW 14.4 11.5 - 14.5 %    PLATELET 421 223 - 286 K/uL    MPV 8.2 6.5 - 11.5 FL    NRBC 0.1  WBC    ABSOLUTE NRBC 0.01 K/uL    NEUTROPHILS 84 (H) 42 - 75 %    LYMPHOCYTES 7 (L) 20.5 - 51.1 %    MONOCYTES 8 1.7 - 9.3 %    EOSINOPHILS 0 (L) 0.9 - 2.9 %    BASOPHILS 1 0.0 - 2.5 %    ABS. NEUTROPHILS 9.0 (H) 1.8 - 7.7 K/UL    ABS. LYMPHOCYTES 0.7 (L) 1.0 - 4.8 K/UL    ABS. MONOCYTES 0.8 0.2 - 2.4 K/UL    ABS. EOSINOPHILS 0.0 0.0 - 0.7 K/UL    ABS.  BASOPHILS 0.1 0.0 - 0.2 K/UL   METABOLIC PANEL, COMPREHENSIVE    Collection Time: 06/07/22 11:00 AM   Result Value Ref Range    Sodium 133 (L) 136 - 145 mmol/L    Potassium 3.0 (L) 3.5 - 5.1 mmol/L    Chloride 96 (L) 97 - 108 mmol/L    CO2 26 21 - 32 mmol/L    Anion gap 11 5 - 15 mmol/L    Glucose 196 (H) 65 - 100 mg/dL    BUN 31 (H) 6 - 20 mg/dL    Creatinine 1.40 (H) 0.55 - 1.02 mg/dL    BUN/Creatinine ratio 22 (H) 12 - 20      GFR est AA 45 (L) >60 ml/min/1.73m2    GFR est non-AA 37 (L) >60 ml/min/1.73m2    Calcium 8.3 (L) 8.5 - 10.1 mg/dL    Bilirubin, total 1.3 (H) 0.2 - 1.0 mg/dL    AST (SGOT) 35 15 - 37 U/L    ALT (SGPT) 19 12 - 78 U/L    Alk.  phosphatase 92 45 - 117 U/L    Protein, total 8.4 (H) 6.4 - 8.2 g/dL    Albumin 2.4 (L) 3.5 - 5.0 g/dL    Globulin 6.0 (H) 2.0 - 4.0 g/dL    A-G Ratio 0.4 (L) 1.1 - 2.2     NT-PRO BNP    Collection Time: 06/07/22 11:00 AM   Result Value Ref Range    NT pro-BNP 3,829 (H) <125 pg/mL   TROPONIN-HIGH SENSITIVITY    Collection Time: 06/07/22 11:00 AM   Result Value Ref Range    Troponin-High Sensitivity 318 (HH) 0 - 51 ng/L   PROTHROMBIN TIME + INR    Collection Time: 06/07/22 11:00 AM   Result Value Ref Range    Prothrombin time 18.8 (H) 11.9 - 14.6 sec    INR 1.6 (H) 0.9 - 1.1     CULTURE, BLOOD, PAIRED    Collection Time: 06/07/22 12:00 PM    Specimen: Blood   Result Value Ref Range    Special Requests: No Special Requests      Culture result: No growth after 18 hours     LACTIC ACID    Collection Time: 06/07/22 12:00 PM   Result Value Ref Range    Lactic acid 1.5 0.4 - 2.0 mmol/L   BLOOD GAS, ARTERIAL    Collection Time: 06/07/22 12:00 PM   Result Value Ref Range    pH 7.50 (H) 7.35 - 7.45      PCO2 31 (L) 35 - 45 mmHg    PO2 84 70 - 100 mmHg    BICARBONATE 24 22 - 26 mmol/L    BASE EXCESS 1.2 0 - 2 mmol/L    O2 METHOD Nasal Cannula      O2 FLOW RATE 2 L/min    FIO2 28.0 %    Sample source Arterial      SITE Right Brachial      DAINA'S TEST Not Applicable      Carboxy-Hgb 0.0 0 - 5 %    Methemoglobin 0.3 0 - 5 %    tHb 10.0 (L) 13.5 - 17.5 g/dL    Oxyhemoglobin 96.6 95 - 100 %   COVID-19 WITH INFLUENZA A/B    Collection Time: 06/07/22 12:00 PM   Result Value Ref Range    SARS-CoV-2 by PCR Not Detected Not Detected      Influenza A by PCR Not Detected Not Detected      Influenza B by PCR Not Detected Not Detected     TROPONIN-HIGH SENSITIVITY    Collection Time: 06/07/22  1:00 PM   Result Value Ref Range    Troponin-High Sensitivity 294 (HH) 0 - 51 ng/L   TROPONIN-HIGH SENSITIVITY    Collection Time: 06/07/22  6:08 PM   Result Value Ref Range    Troponin-High Sensitivity 276 (HH) 0 - 51 ng/L   PTT    Collection Time: 06/07/22  7:03 PM   Result Value Ref Range    aPTT 30.5 21.2 - 34.1 sec    aPTT, therapeutic range   82 - 109 sec   CBC WITH AUTOMATED DIFF    Collection Time: 06/07/22  7:03 PM   Result Value Ref Range    WBC 10.8 3.6 - 11.0 K/uL    RBC 3.19 (L) 3.80 - 5.20 M/uL    HGB 9.7 (L) 11.5 - 16.0 g/dL    HCT 30.2 (L) 35.0 - 47.0 %    MCV 94.7 80.0 - 99.0 FL    MCH 30.4 26.0 - 34.0 PG    MCHC 32.1 30.0 - 36.5 g/dL    RDW 14.1 11.5 - 14.5 %    PLATELET 309 984 - 605 K/uL    MPV 10.1 8.9 - 12.9 FL    NRBC 0.0 0.0  WBC    ABSOLUTE NRBC 0.00 0.00 - 0.01 K/uL    NEUTROPHILS 83 (H) 32 - 75 %    LYMPHOCYTES 10 (L) 12 - 49 %    MONOCYTES 7 5 - 13 %    EOSINOPHILS 0 0 - 7 %    BASOPHILS 0 0 - 1 %    IMMATURE GRANULOCYTES 0 0 - 0.5 %    ABS. NEUTROPHILS 8.9 (H) 1.8 - 8.0 K/UL    ABS. LYMPHOCYTES 1.1 0.8 - 3.5 K/UL    ABS. MONOCYTES 0.7 0.0 - 1.0 K/UL    ABS. EOSINOPHILS 0.0 0.0 - 0.4 K/UL    ABS. BASOPHILS 0.0 0.0 - 0.1 K/UL    ABS. IMM.  GRANS. 0.0 0.00 - 0.04 K/UL    DF AUTOMATED     PTT    Collection Time: 06/08/22  2:37 AM   Result Value Ref Range    aPTT 46.7 (H) 21.2 - 34.1 sec    aPTT, therapeutic range   82 - 109 sec   PTT    Collection Time: 06/08/22  2:47 AM   Result Value Ref Range    aPTT 42.0 (H) 21.2 - 34.1 sec    aPTT, therapeutic range   82 - 109 sec   C REACTIVE PROTEIN, QT    Collection Time: 06/08/22  2:47 AM   Result Value Ref Range    C-Reactive protein 14.10 (H) 0.00 - 0.60 mg/dL   CBC WITH AUTOMATED DIFF    Collection Time: 06/08/22  2:47 AM   Result Value Ref Range    WBC 11.5 (H) 3.6 - 11.0 K/uL    RBC 2.83 (L) 3.80 - 5.20 M/uL    HGB 8.9 (L) 11.5 - 16.0 g/dL    HCT 26.9 (L) 35.0 - 47.0 %    MCV 95.1 80.0 - 99.0 FL    MCH 31.4 26.0 - 34.0 PG    MCHC 33.1 30.0 - 36.5 g/dL    RDW 14.3 11.5 - 14.5 %    PLATELET 517 674 - 423 K/uL    MPV 10.2 8.9 - 12.9 FL    NRBC 0.0 0.0  WBC    ABSOLUTE NRBC 0.00 0.00 - 0.01 K/uL    NEUTROPHILS 84 (H) 32 - 75 %    LYMPHOCYTES 10 (L) 12 - 49 %    MONOCYTES 6 5 - 13 %    EOSINOPHILS 0 0 - 7 %    BASOPHILS 0 0 - 1 %    IMMATURE GRANULOCYTES 0 0 - 0.5 %    ABS. NEUTROPHILS 9.6 (H) 1.8 - 8.0 K/UL    ABS. LYMPHOCYTES 1.2 0.8 - 3.5 K/UL    ABS. MONOCYTES 0.7 0.0 - 1.0 K/UL    ABS. EOSINOPHILS 0.0 0.0 - 0.4 K/UL    ABS. BASOPHILS 0.0 0.0 - 0.1 K/UL    ABS. IMM. GRANS. 0.0 0.00 - 0.04 K/UL    DF AUTOMATED     METABOLIC PANEL, COMPREHENSIVE    Collection Time: 06/08/22  2:47 AM   Result Value Ref Range    Sodium 134 (L) 136 - 145 mmol/L    Potassium 3.2 (L) 3.5 - 5.1 mmol/L    Chloride 100 97 - 108 mmol/L    CO2 29 21 - 32 mmol/L    Anion gap 5 5 - 15 mmol/L    Glucose 224 (H) 65 - 100 mg/dL    BUN 29 (H) 6 - 20 mg/dL    Creatinine 1.20 (H) 0.55 - 1.02 mg/dL    BUN/Creatinine ratio 24 (H) 12 - 20      GFR est AA 53 (L) >60 ml/min/1.73m2    GFR est non-AA 44 (L) >60 ml/min/1.73m2    Calcium 8.0 (L) 8.5 - 10.1 mg/dL    Bilirubin, total 1.0 0.2 - 1.0 mg/dL    AST (SGOT) 29 15 - 37 U/L    ALT (SGPT) 15 12 - 78 U/L    Alk.  phosphatase 75 45 - 117 U/L    Protein, total 7.7 6.4 - 8.2 g/dL    Albumin 2.1 (L) 3.5 - 5.0 g/dL    Globulin 5.6 (H) 2.0 - 4.0 g/dL    A-G Ratio 0.4 (L) 1.1 - 2.2     MAGNESIUM    Collection Time: 06/08/22  2:47 AM   Result Value Ref Range    Magnesium 1.3 (L) 1.6 - 2.4 mg/dL   D DIMER    Collection Time: 06/08/22  2:47 AM   Result Value Ref Range    D DIMER 1.78 (H) <0.50 ug/ml(FEU)   LD    Collection Time: 06/08/22  2:47 AM   Result Value Ref Range     (H) 81 - 246 U/L   TROPONIN-HIGH SENSITIVITY    Collection Time: 06/08/22  2:47 AM   Result Value Ref Range    Troponin-High Sensitivity 256 (HH) 0 - 51 ng/L   PROCALCITONIN    Collection Time: 06/08/22  2:47 AM   Result Value Ref Range    Procalcitonin 0.35 (H) 0 ng/mL   NT-PRO BNP    Collection Time: 06/08/22  7:35 AM   Result Value Ref Range    NT pro-BNP 5,477 (H) <125 pg/mL       XR Results (maximum last 3): Results from [place] encounter on 06/07/22    XR CHEST PORT      Results from [place] encounter on 06/07/22    XR CHEST PORT      Results from Hospital Encounter encounter on 06/15/21    XR CHEST PA LAT    Impression  The cardiomediastinal silhouette is appropriate for age, technique,  and lung expansion. Pulmonary vasculature is not congested. The lungs are  essentially clear. No effusion or pneumothorax is seen. CT Results (maximum last 3): Results from [place] encounter on 06/07/22    CT CHEST WO CONT    Impression  1. Increased right greater than left pulmonary airspace edema and/or pneumonia. New mediastinal and subcarinal adenopathy. 2. Increased cardiomegaly. Results from [place] encounter on 01/21/21    CT CHEST WO CONT    Impression  Coarse peripheral reticular markings along with groundglass opacity. Small mediastinal lymph nodes. Findings would fit with inflammatory change  involving the lungs. No pleural or pericardial effusion. Thoracoabdominal aorta atherosclerosis. CAD. Results from [place] encounter on 10/05/20    CTA NECK    Impression  IMPRESSION:  1. Interval improvement and near resolution of stenosis left middle cerebral  artery bifurcation involving posterior division. 2. 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   3. Atherosclerotic calcification carotid bifurcations with less than 30%  stenosis on the right and 0% stenosis on the left by NASCET criteria. 4. Atherosclerosis aortic arch and proximal left subclavian at least mild  stenosis. Minimal stenosis origin right subclavian artery. 5. Interval progression of gliosis left posterior superior lateral frontal  cortex consistent with previous ischemic injury. MRI Results (maximum last 3): Results from [place] encounter on 10/22/21    MRA BRAIN WO CONT    Impression  1. 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 2. No evidence of flow-limiting stenosis. Results from [place] encounter on 08/10/20    MRI BRAIN WO CONT    Impression  IMPRESSION:  1. Small scattered acute infarcts in the left cerebral hemisphere, in the left  middle cerebral artery territory. No major territorial infarct. 2. Mild to moderate white matter signal abnormality and chronic lacunar infarcts  consistent with chronic small vessel ischemic changes. Nuclear Medicine Results (maximum last 3): No results found for this or any previous visit. US Results (maximum last 3): Results from [place] encounter on 12/08/20    US THYROID/PARATHYROID/SOFT TISS    Impression  IMPRESSION:  1. Enlarged right thyroid lobe and isthmus. 2. Left thyroid lobe not identified. Technologist provides patient history of  left thyroid removed 10 years ago. 3. No focal nodule. Results from [place] encounter on 12/14/09    US EXTREMITY NON VASCULAR      US EXTREMITY NON VASCULAR      VAS/US Results (maximum last 3): No results found for this or any previous visit.         Current Facility-Administered Medications:     albuterol-ipratropium (DUO-NEB) 2.5 MG-0.5 MG/3 ML, 3 mL, Nebulization, Q6HWA RT, Prabhakar Martinez MD    furosemide (LASIX) injection 40 mg, 40 mg, IntraVENous, BID, Prabhakar Martinez MD    piperacillin-tazobactam (ZOSYN) 4.5 g in 0.9% sodium chloride (MBP/ADV) 100 mL MBP, 4.5 g, IntraVENous, ONCE **FOLLOWED BY** piperacillin-tazobactam (ZOSYN) 3.375 g in 0.9% sodium chloride (MBP/ADV) 100 mL MBP, 3.375 g, IntraVENous, Q8H, Prabhakar Martinez MD    sodium chloride (NS) flush 5-40 mL, 5-40 mL, IntraVENous, Q8H, Neville Davis MD    sodium chloride (NS) flush 5-40 mL, 5-40 mL, IntraVENous, PRN, Neville Naylor MD    acetaminophen (TYLENOL) tablet 650 mg, 650 mg, Oral, Q6H PRN **OR** acetaminophen (TYLENOL) suppository 650 mg, 650 mg, Rectal, Q6H PRN, Neville Naylor MD    polyethylene glycol (MIRALAX) packet 17 g, 17 g, Oral, DAILY PRN, Neville Naylor MD    ondansetron (ZOFRAN ODT) tablet 4 mg, 4 mg, Oral, Q8H PRN **OR** ondansetron (ZOFRAN) injection 4 mg, 4 mg, IntraVENous, Q6H PRN, Phoebe Pandya MD    heparin 25,000 units in D5W 250 ml infusion, 12-25 Units/kg/hr (Adjusted), IntraVENous, TITRATE, Phoebe Pandya MD, Last Rate: 8.6 mL/hr at 06/08/22 0359, 14 Units/kg/hr at 06/08/22 0359    amiodarone (CORDARONE) tablet 200 mg, 200 mg, Oral, DAILY, Phoebe Pandya MD, 200 mg at 06/08/22 0825    aspirin chewable tablet 81 mg, 81 mg, Oral, DAILY, Phoebe Pandya MD, 81 mg at 06/08/22 0825    atorvastatin (LIPITOR) tablet 40 mg, 40 mg, Oral, DAILY, Phoebe Pandya MD, 40 mg at 06/08/22 7887    butalbital-acetaminophen-caffeine (FIORICET, ESGIC) -40 mg per tablet 1 Tablet, 1 Tablet, Oral, DAILY PRN, Phoebe Pandya MD    docusate sodium (COLACE) capsule 100 mg, 100 mg, Oral, BID, Phoebe Pandya MD, 100 mg at 06/08/22 0825    DULoxetine (CYMBALTA) capsule 60 mg, 60 mg, Oral, DAILY, Phoebe Pandya MD, 60 mg at 06/08/22 3005    escitalopram oxalate (LEXAPRO) tablet 5 mg, 5 mg, Oral, DAILY, Neville Davis MD    gabapentin (NEURONTIN) capsule 300 mg, 300 mg, Oral, BID, Phoebe Pandya MD, 300 mg at 06/08/22 0825    levothyroxine (SYNTHROID) tablet 125 mcg, 125 mcg, Oral, ACB, Phoebe Pandya MD, 125 mcg at 06/08/22 0827    magnesium oxide (MAG-OX) tablet 400 mg, 400 mg, Oral, BID, Laura Davis MD    melatonin tablet 3 mg, 3 mg, Oral, QHS PRN, Laura Purvis MD    metoprolol succinate (TOPROL-XL) XL tablet 12.5 mg, 12.5 mg, Oral, DAILY, Laura Davis MD, 12.5 mg at 06/08/22 0828    pantoprazole (PROTONIX) tablet 40 mg, 40 mg, Oral, DAILY, Laura Davis MD    oxybutynin (DITROPAN) tablet 5 mg, 5 mg, Oral, BID, Radha Kapoor MD, 5 mg at 06/08/22 6983    potassium chloride SR (KLOR-CON 10) tablet 20 mEq, 20 mEq, Oral, DAILY, Radha Kapoor MD, 20 mEq at 06/08/22 8251    sacubitriL-valsartan (ENTRESTO) 24-26 mg tablet 1 Tablet, 1 Tablet, Oral, BID, Radha Kapoor MD, 1 Tablet at 06/07/22 2042    ticagrelor (BRILINTA) tablet 90 mg, 90 mg, Oral, BID, Radha Kapoor MD, 90 mg at 06/08/22 0828    heparin (porcine) 1,000 unit/mL injection 4,000 Units, 4,000 Units, IntraVENous, PRN **OR** heparin (porcine) 1,000 unit/mL injection 2,000 Units, 2,000 Units, IntraVENous, PRN, Radha Kapoor MD, 2,000 Units at 06/08/22 0358    Current Outpatient Medications:     DULoxetine (CYMBALTA) 60 mg capsule, Take 60 mg by mouth daily. , Disp: , Rfl:     levothyroxine (SYNTHROID) 125 mcg tablet, Take 125 mcg by mouth Daily (before breakfast). , Disp: , Rfl:     oxybutynin (DITROPAN) 5 mg tablet, Take 5 mg by mouth two (2) times a day., Disp: , Rfl:     metoprolol succinate (TOPROL-XL) 25 mg XL tablet, Take 12.5 mg by mouth daily. 1/2 tablet daily, Disp: , Rfl:     ergocalciferol (Vitamin D2) 1,250 mcg (50,000 unit) capsule, Take 50,000 Units by mouth every seven (7) days. Patient stated takes on Sundays, Disp: , Rfl:     escitalopram oxalate (LEXAPRO) 5 mg tablet, Take 5 mg by mouth daily. , Disp: , Rfl:     sacubitriL-valsartan (Entresto) 24-26 mg tablet, Take 1 Tablet by mouth two (2) times a day., Disp: , Rfl:     albuterol (PROVENTIL HFA, VENTOLIN HFA, PROAIR HFA) 90 mcg/actuation inhaler, Take 2 Puffs by inhalation every four (4) hours as needed. , Disp: , Rfl:     amiodarone (CORDARONE) 200 mg tablet, Take 200 mg by mouth daily. , Disp: , Rfl:     docusate sodium (Colace) 100 mg capsule, Take 100 mg by mouth two (2) times a day., Disp: , Rfl:     butalbital-acetaminophen-caff (FIORICET) -40 mg per capsule, Take 1 Capsule by mouth daily as needed. , Disp: , Rfl:     metFORMIN (GLUCOPHAGE) 500 mg tablet, 500 mg two (2) times daily (with meals). , Disp: , Rfl:     insulin lispro protamin-lispro (HumaLOG Mix 75-25 KwikPen) flexpen, 28 Units two (2) times a day., Disp: , Rfl:     bumetanide (BUMEX) 1 mg tablet, Take 1 Tab by mouth two (2) times daily (with meals). (Patient taking differently: Take 1 mg by mouth two (2) times a day. Take two tablets in the morning and 0.5 tablet in the evening), Disp: 60 Tab, Rfl: 1    ticagrelor (Brilinta) 90 mg tablet, Take 90 mg by mouth two (2) times a day., Disp: , Rfl:     magnesium oxide (MAG-OX) 400 mg tablet, Take 400 mg by mouth two (2) times a day., Disp: , Rfl:     melatonin 3 mg tablet, Take 3 mg by mouth nightly as needed. , Disp: , Rfl:     gabapentin (NEURONTIN) 100 mg capsule, Take 300 mg by mouth two (2) times a day., Disp: , Rfl:     omeprazole (PRILOSEC) 40 mg capsule, Take 40 mg by mouth daily. , Disp: , Rfl:     atorvastatin (LIPITOR) 40 mg tablet, Take 40 mg by mouth daily. , Disp: , Rfl:     potassium chloride SR (KLOR-CON 10) 10 mEq tablet, Take 20 mEq by mouth daily. , Disp: , Rfl:     aspirin 81 mg chewable tablet, Take 1 Tablet by mouth daily. , Disp: 30 Tablet, Rfl: 3    glucose blood VI test strips (OneTouch Verio test strips) strip, Check glucose 3 times a day, Disp: 300 Strip, Rfl: 1    lancets (One Touch Delica) 33 gauge misc, Check glucose 3 times a day, Disp: 300 Lancet, Rfl: 2    Blood-Glucose Meter (OneTouch Verio Meter) misc, Check glucose 3 times a day, Disp: 1 Each, Rfl: 0    multivitamin,tx-iron-minerals (Complete Multivitamin) tab, Take 1 Tablet by mouth daily. , Disp: , Rfl:     acetaminophen (TYLENOL EXTRA STRENGTH) 500 mg tablet, Take 1,000 mg by mouth every six (6) hours as needed for Pain., Disp: , Rfl:     polyethylene glycol (MIRALAX) 17 gram packet, Take 17 g by mouth as needed. , Disp: , Rfl:     Case discussed with collaborating physician Dr. Gold Luna and our impression and recommendations are as follows:     1. Ischemic Cardiomyopathy with chronic HFrEF:   - c/o worsening shortness of breath and decreased urinary output for the past 2 days.   - [employer] CRT-D in place.  -  Last report showed 2 AT/AF events, no shocks delivered. - per office monitoring [employer] report elevated today at  61 continues to trend upward. - pro-BNP 5477, chest xray with vascular congestion  - will add 2.5 mg metolazone, followed by lasix 30 minutes afterwards. Agree with IV lasix 40 mg BID, strict I&Os   - 4/22/22 ANISA showed EF 20-25%, CRT-D in place.   - Limited echo with EF 30-35%, severe TR    - holding  tier 1 Entresto, BB, and diuretics due to low BP.    2. Coronary artery disease:   - 6/2/22 S/p PIC to mid LAD with single ORALIA. - continue asa, statin, metoprolol, and Brilinta  - HS troponin peaked at 318, currently 256, continue heparin gtt. EKG without ischemic changes. No active CP. 3. Severe tricuspid regurgitation:   - echo showed worsening TR, moderate to severe 4/22  - 6/5 lead position and parameters were within normal limits. 4. Paroxysmal atrial fibrillation:   - paced rhythm per tele  - continue amiodarone and Brilinta    5. Hypertension:   - blood pressure at goal  - continue with current medications. 6. Hyperlipidemia:   - continue statin therapy      Thank you for involving us in the care of this patient. Please do not hesitate to call if additional questions arise. If after hours please call [phone]. Dr. [facility]  [address]    [address] [place], [address] [facility]  [phone]

## 2022-06-08 NOTE — ED NOTES
Bedside shift change report given to Alana Hale RN  (oncoming nurse) by Priscilla Alvarez RN  (offgoing nurse). Report included the following information SBAR, Kardex, ED Summary, [facility] and Recent Results.

## 2022-06-08 NOTE — ED NOTES
Assumed care of pt from WANDA Ferrari. Pt hooked to cardiac monitor, laying on heparin drip, in no apparent distress.

## 2022-06-08 NOTE — PROGRESS NOTES
6/8/22 Pt accepted to [facility]. - [facility] @ [phone] upon discharge. Est SOC: 6/11/22. Pt informed.

## 2022-06-08 NOTE — PROGRESS NOTES
Reason for Admission:   PNA                    RUR Score:  17%                PCP: First and Last name:   Mercedes Bhatt MD     Name of Practice:    Are you a current patient: Yes/No: Yes   Approximate date of last visit: Seen 2 mos ago. Can you participate in a virtual visit if needed: Yes/Call/Has cell phone. Do you (patient/family) have any concerns for transition/discharge? No               Plan for utilizing home health:  Pt signed Choice Letter for [facility]. Referral sent via [employer]. Pt uses cane/walker. Awaiting therapy evals. Current Advanced Directive/Advance Care Plan:  Full Code      Healthcare Decision Maker:             Primary Decision Maker: Cedric Viera - Spouse - [phone]    Transition of Care Plan:   D/C Plan is home with  & son. Family member to transport home. Call Rxs into [place] in [facility].

## 2022-06-08 NOTE — PROGRESS NOTES
6/8/22 Pt requested daughter [facility] Alivia Wood @ [phone]) to be called first.

## 2022-06-08 NOTE — CONSULTS
PULMONARY CONSULT  [facility]    Name: Kevin Mills MRN: [id number]   : 1950 Hospital: [facility]   Date: 2022  Admission date: 2022 Hospital Day: 2       HPI:     Hospital Problems  Date Reviewed: 2021          Codes Class Noted POA    CAD in native artery ICD-10-CM: I25.10  ICD-9-CM: 414.01  2022 Unknown        Acute on chronic systolic (congestive) heart failure (HCC) ICD-10-CM: I50.23  ICD-9-CM: 428.23, 428.0  2022 Unknown        Hypoxia ICD-10-CM: R09.02  ICD-9-CM: 799.02  2021 Unknown                   [x] High complexity decision making was performed  [x] See my orders for details      Subjective/Initial History:     I was asked by Omayra Hensley MD to see Kevin Mills  a 66 y.o.  female in consultation     Excerpts from admission 2022 or consult notes as follows:   75-year-old lady came in because of shortness of breath she was seen at [facility] in [facility] and she was transferred to [facility] recently she was seen by cardiologist and patient underwent cardiac catheterization with stent placement she was complaining of shortness of breath dyspnea and she was put on oxygen via nasal cannula complaining of generalized weakness no fever no chills cough with minimal sputum so admitted and pulmonary consult was called.   Past medical history of asthma on albuterol inhaler obstructive sleep apnea not on any CPAP machine or oxygen at home history of AICD placement in the past      Allergies   Allergen Reactions    Novocain [Procaine] Nausea and Vomiting    Tramadol Other (comments)     Headache    Codeine Other (comments)     Patient states she is not allergic        MAR reviewed and pertinent medications noted or modified as needed     Current Facility-Administered Medications   Medication    sodium chloride (NS) flush 5-40 mL    sodium chloride (NS) flush 5-40 mL    acetaminophen (TYLENOL) tablet 650 mg    Or    acetaminophen (TYLENOL) suppository 650 mg    polyethylene glycol (MIRALAX) packet 17 g    ondansetron (ZOFRAN ODT) tablet 4 mg    Or    ondansetron (ZOFRAN) injection 4 mg    heparin 25,000 units in D5W 250 ml infusion    amiodarone (CORDARONE) tablet 200 mg    aspirin chewable tablet 81 mg    atorvastatin (LIPITOR) tablet 40 mg    butalbital-acetaminophen-caffeine (FIORICET, ESGIC) -40 mg per tablet 1 Tablet    docusate sodium (COLACE) capsule 100 mg    DULoxetine (CYMBALTA) capsule 60 mg    escitalopram oxalate (LEXAPRO) tablet 5 mg    gabapentin (NEURONTIN) capsule 300 mg    levothyroxine (SYNTHROID) tablet 125 mcg    magnesium oxide (MAG-OX) tablet 400 mg    melatonin tablet 3 mg    metoprolol succinate (TOPROL-XL) XL tablet 12.5 mg    pantoprazole (PROTONIX) tablet 40 mg    oxybutynin (DITROPAN) tablet 5 mg    potassium chloride SR (KLOR-CON 10) tablet 20 mEq    sacubitriL-valsartan (ENTRESTO) 24-26 mg tablet 1 Tablet    ticagrelor (BRILINTA) tablet 90 mg    cefTRIAXone (ROCEPHIN) 1 g in sterile water (preservative free) 10 mL IV syringe    furosemide (LASIX) injection 20 mg    heparin (porcine) 1,000 unit/mL injection 4,000 Units    Or    heparin (porcine) 1,000 unit/mL injection 2,000 Units     Current Outpatient Medications   Medication Sig    DULoxetine (CYMBALTA) 60 mg capsule Take 60 mg by mouth daily.  levothyroxine (SYNTHROID) 125 mcg tablet Take 125 mcg by mouth Daily (before breakfast).  oxybutynin (DITROPAN) 5 mg tablet Take 5 mg by mouth two (2) times a day.  metoprolol succinate (TOPROL-XL) 25 mg XL tablet Take 12.5 mg by mouth daily. 1/2 tablet daily    ergocalciferol (Vitamin D2) 1,250 mcg (50,000 unit) capsule Take 50,000 Units by mouth every seven (7) days. Patient stated takes on Sundays    escitalopram oxalate (LEXAPRO) 5 mg tablet Take 5 mg by mouth daily.     sacubitriL-valsartan (Entresto) 24-26 mg tablet Take 1 Tablet by mouth two (2) times a day.  albuterol (PROVENTIL HFA, VENTOLIN HFA, PROAIR HFA) 90 mcg/actuation inhaler Take 2 Puffs by inhalation every four (4) hours as needed.  amiodarone (CORDARONE) 200 mg tablet Take 200 mg by mouth daily.  docusate sodium (Colace) 100 mg capsule Take 100 mg by mouth two (2) times a day.  butalbital-acetaminophen-caff (FIORICET) -40 mg per capsule Take 1 Capsule by mouth daily as needed.  metFORMIN (GLUCOPHAGE) 500 mg tablet 500 mg two (2) times daily (with meals).  insulin lispro protamin-lispro (HumaLOG Mix 75-25 KwikPen) flexpen 28 Units two (2) times a day.  bumetanide (BUMEX) 1 mg tablet Take 1 Tab by mouth two (2) times daily (with meals). (Patient taking differently: Take 1 mg by mouth two (2) times a day. Take two tablets in the morning and 0.5 tablet in the evening)    ticagrelor (Brilinta) 90 mg tablet Take 90 mg by mouth two (2) times a day.  magnesium oxide (MAG-OX) 400 mg tablet Take 400 mg by mouth two (2) times a day.  melatonin 3 mg tablet Take 3 mg by mouth nightly as needed.  gabapentin (NEURONTIN) 100 mg capsule Take 300 mg by mouth two (2) times a day.  omeprazole (PRILOSEC) 40 mg capsule Take 40 mg by mouth daily.  atorvastatin (LIPITOR) 40 mg tablet Take 40 mg by mouth daily.  potassium chloride SR (KLOR-CON 10) 10 mEq tablet Take 20 mEq by mouth daily.  aspirin 81 mg chewable tablet Take 1 Tablet by mouth daily.  glucose blood VI test strips (OneTouch Verio test strips) strip Check glucose 3 times a day    lancets (One Touch Delica) 33 gauge misc Check glucose 3 times a day    Blood-Glucose Meter (OneTouch Verio Meter) misc Check glucose 3 times a day    multivitamin,tx-iron-minerals (Complete Multivitamin) tab Take 1 Tablet by mouth daily.  acetaminophen (TYLENOL EXTRA STRENGTH) 500 mg tablet Take 1,000 mg by mouth every six (6) hours as needed for Pain.     polyethylene glycol (MIRALAX) 17 gram packet Take 17 g by mouth as needed. Patient PCP: Rohan Martínez MD  PMH:  has a past medical history of Acid indigestion, Adverse effect of anesthesia, Aneurysm ([address] Anxiety disorder, Arthritis, Asthma, Back pain, Bladder spasms, CAD (coronary artery disease), Cerebral artery occlusion with cerebral infarction ([address] (08/2020), Chest pain, Constipation, Cough, Diabetes mellitus, Diarrhea, Fatigue, Fatty liver, Fibromyalgia, Frequent episodic tension-type headache, GERD (gastroesophageal reflux disease), Hearing reduced, Heart attack ([address] Heart failure ([address] Hemorrhoids, Hernia of unspecified site of abdominal cavity without mention of obstruction or gangrene, HTN (hypertension), Hypothyroidism, ICD (implantable cardioverter-defibrillator) in place, Ill-defined condition, Muscle pain, N&V (nausea and vomiting), Pacemaker, Ringing in ears, Shortness of breath, Skin cancer, Sleep apnea, SOB (shortness of breath), SOB (shortness of breath), Swallowing difficulty, Thyroid disorder, Vertigo, and Wears dentures. PSH:   has a past surgical history that includes pr removal gallbladder; hx thyroidectomy; hx hysterectomy; hx carpal tunnel release; hx pacemaker (Left, 6/20/16); hx gi; hx orthopaedic; hx heent; hx breast biopsy (Left); and hx heart catheterization (2/6/16). FHX: family history includes Breast Cancer in her sister; Cancer in her sister; Cerebral aneurysm in her sister; Cerebral palsy in her brother; Diabetes in her brother, mother, and sister; Heart Disease in her father, mother, and sister; Hypertension in her sister; Lupus in her sister. SHX:  reports that she has never smoked. She has never used smokeless tobacco. She reports current alcohol use. She reports that she does not use drugs. ROS:    Review of Systems   Constitutional: Positive for malaise/fatigue. HENT: Negative. Eyes: Negative. Respiratory: Positive for shortness of breath. Cardiovascular: Negative. Gastrointestinal: Negative. Genitourinary: Negative. Musculoskeletal: Negative. Skin: Negative. Neurological: Negative. Psychiatric/Behavioral: Negative. Objective:     Vital Signs: Telemetry:    normal sinus rhythm Intake/Output:   Visit Vitals  BP (!) 113/58   Pulse 98   Temp 99 °F (37.2 °C)   Resp 30   Ht 5' 2\" (1.575 m)   Wt 78.9 kg (174 lb)   SpO2 93%   BMI 31.83 kg/m²       Temp (24hrs), Av.5 °F (36.9 °C), Min:98.1 °F (36.7 °C), Max:99 °F (37.2 °C)        O2 Device: Nasal cannula O2 Flow Rate (L/min): 5 l/min       Wt Readings from Last 4 Encounters:   22 78.9 kg (174 lb)   22 78.9 kg (174 lb)   22 78.9 kg (174 lb)   21 77.4 kg (170 lb 9.6 oz)        No intake or output data in the 24 hours ending 22 0846    Last shift:      No intake/output data recorded. Last 3 shifts: No intake/output data recorded. Physical Exam:     Physical Exam  Constitutional:       Appearance: Normal appearance. HENT:      Head: Normocephalic and atraumatic. Nose: Nose normal.      Mouth/Throat:      Mouth: Mucous membranes are moist.   Eyes:      Pupils: Pupils are equal, round, and reactive to light. Cardiovascular:      Rate and Rhythm: Normal rate. Heart sounds: Murmur heard. Pulmonary:      Effort: Pulmonary effort is normal.      Breath sounds: Rales present. Abdominal:      General: Abdomen is flat. Bowel sounds are normal.      Palpations: Abdomen is soft. Musculoskeletal:         General: Normal range of motion. Cervical back: Normal range of motion and neck supple. Skin:     General: Skin is warm. Neurological:      General: No focal deficit present. Mental Status: She is alert.    Psychiatric:         Mood and Affect: Mood normal.          Labs:    Recent Labs     22  0247 22  0237 22  1903 22  1100   WBC 11.5*  --  10.8 10.6   HGB 8.9*  --  9.7* 10.0*     --  171 188   INR  --   --   --  1.6*   APTT 42.0* 46.7* 30.5  -- Recent Labs     06/08/22  0247 06/07/22  1200 06/07/22  1100   *  --  133*   K 3.2*  --  3.0*     --  96*   CO2 29  --  26   *  --  196*   BUN 29*  --  31*   CREA 1.20*  --  1.40*   CA 8.0*  --  8.3*   MG 1.3*  --   --    LAC  --  1.5  --    ALB 2.1*  --  2.4*   ALT 15  --  19     Recent Labs     06/07/22  1200   PH 7.50*   PCO2 31*   PO2 84   HCO3 24   FIO2 28.0     No results for input(s): CPK, CKNDX, TROIQ in the last 72 hours. No lab exists for component: CPKMB  No results found for: BNPP, BNP   Lab Results   Component Value Date/Time    Culture result: No growth after 18 hours 06/07/2022 12:00 PM     Lab Results   Component Value Date/Time    TSH 0.267 (L) 08/04/2021 10:48 AM       Imaging:    CXR Results  (Last 48 hours)               06/08/22 0710  XR CHEST PORT Final result    Narrative:  Chest single view. Comparison single chest June 7, 2022. Similar patchy airspace opacity through lungs; predominant involvement right   upper lobe lung. Left-sided cardiac device. Cardiac silhouette enlargement. Atherosclerotic change thoracic aorta. No pneumothorax or sizable pleural   effusion. 06/07/22 1116  XR CHEST PORT Final result    Narrative:  1 view comparison [address] 15       Mild to moderate patchy mixed infiltrate, right mid lung and left lower lung and   perhaps right base. No effusion. Borderline cardiomegaly without congestion           Results from Hospital Encounter encounter on 06/07/22    XR CHEST PORT    Narrative  Chest single view. Comparison single chest June 7, 2022. Similar patchy airspace opacity through lungs; predominant involvement right  upper lobe lung. Left-sided cardiac device. Cardiac silhouette enlargement. Atherosclerotic change thoracic aorta. No pneumothorax or sizable pleural  effusion.       Results from [place] encounter on 06/07/22    XR CHEST PORT    Narrative  1 view comparison [address] 15    Mild to moderate patchy mixed infiltrate, right mid lung and left lower lung and  perhaps right base. No effusion. Borderline cardiomegaly without congestion      Results from Hospital Encounter encounter on 06/15/21    XR CHEST PA LAT    Narrative  2 view comparison January 25    Impression  The cardiomediastinal silhouette is appropriate for age, technique,  and lung expansion. Pulmonary vasculature is not congested. The lungs are  essentially clear. No effusion or pneumothorax is seen. Results from [place] encounter on 06/07/22    CT CHEST WO CONT    Narrative  COMPARISON:Chest x-ray 6/7/2022. Chest CT 6/15/2021. HISTORY: Lung infiltrates. Technique: Axial imaging chest without IV contrast,  with multiplanar formatting  and MIPs. Dose reduction: All CT scans at this facility are performed using dose reduction  optimization techniques as appropriate to a performed exam including the  following: Automated exposure control, adjustments of the mA and/or kV according  to patient's size, or use of iterative reconstruction technique. FINDINGS:  HEART: Increased Cardiomegaly. Cardiac leads right atrium, right ventricle,  coronary sinus. Multivessel coronary artery calcification. No pericardial  effusion. THORACIC AORTA: Calcific atherosclerosis. No aneurysm. PULMONARY ARTERIES: Nondilated. ADENOPATHY:  New Mediastinal and subcarinal adenopathy measuring up to 2.2 cm  short axis. THORACIC ESOPHAGUS:Collapsed. LUNG PARENCHYMA: Right greater than left pulmonary airspace disease has  increased compared to the prior chest CT. CENTRAL AIRWAYS: Major endobronchial tree clear. PLEURA: Trace bilateral pleural effusions. CHEST WALL: No axillary adenopathy. Enlarged right thyroid lobe unchanged. Left  lobe is absent or atrophic. UPPER ABDOMEN:  Calcific atherosclerosis. BONES: Unremarkable for age. Impression  1. Increased right greater than left pulmonary airspace edema and/or pneumonia.   New mediastinal and subcarinal adenopathy. 2. Increased cardiomegaly. IMPRESSION:   1. Acute hypoxic respiratory failure  2. Pulmonary edema  3. History of cardiomyopathy  4. HFrEF  5. Coronary artery disease recent status post stent placement  6. Rule out aspiration pneumonia bilateral upper lobe  7. Mediastinal and subcarinal adenopathy most likely reactive  8. History of asthma and obstructive sleep apnea  9. Pt is requiring Drug therapy requiring intensive monitoring for toxicity  10. Pt is unstable, unpredictable needing inpatient monitoring; is acutely ill and at high risk of sudden decline and decompensation with severe consequenses and continued end organ dysfunction and failure  11. Prognosis guarded       RECOMMENDATIONS/PLAN:     1. Patient is on 2 L nasal Cannula oxygen as salvage oxygen delivery device to provide high concentration of oxygen to overcome refractory hypoxia; blood gases acceptable  2. Discontinue Rocephin start patient on Zosyn possibility of aspiration  3. Patient on nebulizer treatment she was taking albuterol inhaler at home  4. Recent history of stent placement continue cardiac monitoring increase Lasix to 40 twice a day and repeat chest x-ray in couple of days  5. Supplemental O2 to keep sats > 93%  6. Aspiration precautions  7. Labs to follow electrolytes, renal function and and blood counts  8. Glucose monitoring and SSI  9. Bronchial hygiene with respiratory therapy techniques, bronchodilators  10.  DVT, SUP prophylaxis       This care involved high complexity medical decision making: I personally:  · Reviewed the flowsheet and previous days notes  · Reviewed and summarized records or history from previous days note or discussions with staff, family  · High Risk Drug therapy requiring intensive monitoring for toxicity: eg steroids, pressors, antibiotics  · Reviewed and/or ordered Clinical lab tests  · Reviewed images and/or ordered Radiology tests  · Reviewed the patients ECG / Telemetry  · Reviewed and/or adjusted NiPPV settings  · Called and arranged for Radiologic procedures or interventions  · performed or ordered Diagnostic endoscopies with identified risk factors.   · discussed my assessment/management with : Nursing, Hospitalist and Family for coordination of care          Prachi Sosa MD

## 2022-06-08 NOTE — PROGRESS NOTES
Hospitalist Progress Note         Huong Schneider MD          Daily Progress Note: 6/8/2022      Subjective: The patient is seen for follow  up.71 y.o. female who is transferred from [facility] ER where she presented with SOB, hypoxia, cough. Patient recently had PCI/LAD Stent 5 days ago by Dr. Emely Cortez. Denies fever, myalgia but +chills. Has had Covid last year with similar presentation; her Covid test reportedly is negative. She has severe CMP with EF 25% and has PPM/AICD - no discharge. LHC was via Right Femoral; no hematoma noted or peripheral leg changes. ---    Patient seen in follow-up. Notes shortness of breath with exertion.     Problem List:  Problem List as of 6/8/2022 Date Reviewed: 8/4/2021          Codes Class Noted - Resolved    Biventricular ICD (implantable cardioverter-defibrillator) in place ICD-10-CM: Z95.810  ICD-9-CM: V45.02  1/26/2018 - Present        CAD in native artery ICD-10-CM: I25.10  ICD-9-CM: 414.01  6/7/2022 - Present        Acute on chronic systolic (congestive) heart failure ([facility] ICD-10-CM: I50.23  ICD-9-CM: 428.23, 428.0  6/7/2022 - Present        Acute CHF (congestive heart failure) ([facility] [address] ICD-10-CM: I50.9  ICD-9-CM: 428.0  6/15/2021 - Present        Sinus congestion ICD-10-CM: R09.81  ICD-9-CM: 478.19  6/15/2021 - Present        Acquired hypothyroidism ICD-10-CM: E03.9  ICD-9-CM: 244.9  3/17/2021 - Present        History of lobectomy of thyroid ICD-10-CM: Z90.09  ICD-9-CM: V45.79  3/17/2021 - Present        Chest pain ICD-10-CM: R07.9  ICD-9-CM: 786.50  3/11/2021 - Present        Hypoxia ICD-10-CM: R09.02  ICD-9-CM: 799.02  1/21/2021 - Present        Elevated troponin ICD-10-CM: R77.8  ICD-9-CM: 790.6  1/21/2021 - Present        Atrial fibrillation ([facility] [address] ICD-10-CM: I48.91  ICD-9-CM: 427.31  11/16/2020 - Present        Acute on chronic congestive heart failure (HCC) ICD-10-CM: I50.9  ICD-9-CM: 428.0  11/16/2020 - Present        Hypomagnesemia ICD-10-CM: E83.42  ICD-9-CM: 275.2  9/28/2020 - Present        Hyperkalemia ICD-10-CM: E87.5  ICD-9-CM: 276.7  9/26/2020 - Present        CHF exacerbation (HCC) ICD-10-CM: I50.9  ICD-9-CM: 428.0  9/22/2020 - Present        Severe obesity ([place] [address] ICD-10-CM: E66.01  ICD-9-CM: 278.01  9/2/2020 - Present        Dysarthria ICD-10-CM: R47.1  ICD-9-CM: 784.51  8/11/2020 - Present        TIA (transient ischemic attack) ICD-10-CM: G45.9  ICD-9-CM: 435.9  8/10/2020 - Present        Cardiomyopathy ([place] [address] ICD-10-CM: I42.9  ICD-9-CM: 425.4  5/23/2016 - Present        Skin cancer ICD-10-CM: C44.90  ICD-9-CM: 173.90  Unknown - Present        Acid indigestion ICD-10-CM: K30  ICD-9-CM: 536.8  Unknown - Present        Asthma ICD-10-CM: 493  ICD-9-CM: 192  Unknown - Present        Back pain ICD-10-CM: M54.9  ICD-9-CM: 724.5  Unknown - Present        Wears dentures ICD-10-CM: Z97.2  ICD-9-CM: V45.84  Unknown - Present        Constipation ICD-10-CM: 564.0  ICD-9-CM: 564.0  Unknown - Present        Hyperglycemia due to type 2 diabetes mellitus ([facility] ICD-10-CM: E11.65  ICD-9-CM: 250.00  Unknown - Present        Diarrhea ICD-10-CM: R19.7  ICD-9-CM: 787.91  Unknown - Present        Frequent episodic tension-type headache ICD-10-CM: O54.562  ICD-9-CM: 339.11  Unknown - Present        Hemorrhoids ICD-10-CM: 455  ICD-9-CM: 314  Unknown - Present        Hernia of unspecified site of abdominal cavity without mention of obstruction or gangrene ICD-10-CM: K46.9  ICD-9-CM: 553.9  Unknown - Present        HTN (hypertension) ICD-10-CM: I10  ICD-9-CM: 401.9  Unknown - Present        Muscle pain ICD-10-CM: M79.10  ICD-9-CM: 729.1  Unknown - Present        SOB (shortness of breath) ICD-10-CM: R06.02  ICD-9-CM: 786.05  Unknown - Present        Thyroid disorder ICD-10-CM: E07.9  ICD-9-CM: 246. 9  Unknown - Present        RESOLVED: CHF (congestive heart failure) (HCC) ICD-10-CM: I50.9  ICD-9-CM: 428.0  9/25/2020 - 8/3/2021              Medications reviewed  Current Facility-Administered Medications   Medication Dose Route Frequency    albuterol-ipratropium (DUO-NEB) 2.5 MG-0.5 MG/3 ML  3 mL Nebulization Q6HWA RT    furosemide (LASIX) injection 40 mg  40 mg IntraVENous BID    piperacillin-tazobactam (ZOSYN) 3.375 g in 0.9% sodium chloride (MBP/ADV) 100 mL MBP  3.375 g IntraVENous Q8H    sodium chloride (NS) flush 5-40 mL  5-40 mL IntraVENous Q8H    sodium chloride (NS) flush 5-40 mL  5-40 mL IntraVENous PRN    acetaminophen (TYLENOL) tablet 650 mg  650 mg Oral Q6H PRN    Or    acetaminophen (TYLENOL) suppository 650 mg  650 mg Rectal Q6H PRN    polyethylene glycol (MIRALAX) packet 17 g  17 g Oral DAILY PRN    ondansetron (ZOFRAN ODT) tablet 4 mg  4 mg Oral Q8H PRN    Or    ondansetron (ZOFRAN) injection 4 mg  4 mg IntraVENous Q6H PRN    heparin 25,000 units in D5W 250 ml infusion  12-25 Units/kg/hr (Adjusted) IntraVENous TITRATE    amiodarone (CORDARONE) tablet 200 mg  200 mg Oral DAILY    aspirin chewable tablet 81 mg  81 mg Oral DAILY    atorvastatin (LIPITOR) tablet 40 mg  40 mg Oral DAILY    butalbital-acetaminophen-caffeine (FIORICET, ESGIC) -40 mg per tablet 1 Tablet  1 Tablet Oral DAILY PRN    docusate sodium (COLACE) capsule 100 mg  100 mg Oral BID    DULoxetine (CYMBALTA) capsule 60 mg  60 mg Oral DAILY    escitalopram oxalate (LEXAPRO) tablet 5 mg  5 mg Oral DAILY    gabapentin (NEURONTIN) capsule 300 mg  300 mg Oral BID    levothyroxine (SYNTHROID) tablet 125 mcg  125 mcg Oral ACB    magnesium oxide (MAG-OX) tablet 400 mg  400 mg Oral BID    melatonin tablet 3 mg  3 mg Oral QHS PRN    metoprolol succinate (TOPROL-XL) XL tablet 12.5 mg  12.5 mg Oral DAILY    pantoprazole (PROTONIX) tablet 40 mg  40 mg Oral DAILY    oxybutynin (DITROPAN) tablet 5 mg  5 mg Oral BID    potassium chloride SR (KLOR-CON 10) tablet 20 mEq  20 mEq Oral DAILY    sacubitriL-valsartan (ENTRESTO) 24-26 mg tablet 1 Tablet  1 Tablet Oral BID    ticagrelor (BRILINTA) tablet 90 mg  90 mg Oral BID    heparin (porcine) 1,000 unit/mL injection 4,000 Units  4,000 Units IntraVENous PRN    Or    heparin (porcine) 1,000 unit/mL injection 2,000 Units  2,000 Units IntraVENous PRN     Current Outpatient Medications   Medication Sig    DULoxetine (CYMBALTA) 60 mg capsule Take 60 mg by mouth daily.  levothyroxine (SYNTHROID) 125 mcg tablet Take 125 mcg by mouth Daily (before breakfast).  oxybutynin (DITROPAN) 5 mg tablet Take 5 mg by mouth two (2) times a day.  metoprolol succinate (TOPROL-XL) 25 mg XL tablet Take 12.5 mg by mouth daily. 1/2 tablet daily    ergocalciferol (Vitamin D2) 1,250 mcg (50,000 unit) capsule Take 50,000 Units by mouth every seven (7) days. Patient stated takes on Sundays    escitalopram oxalate (LEXAPRO) 5 mg tablet Take 5 mg by mouth daily.  sacubitriL-valsartan (Entresto) 24-26 mg tablet Take 1 Tablet by mouth two (2) times a day.  albuterol (PROVENTIL HFA, VENTOLIN HFA, PROAIR HFA) 90 mcg/actuation inhaler Take 2 Puffs by inhalation every four (4) hours as needed.  amiodarone (CORDARONE) 200 mg tablet Take 200 mg by mouth daily.  docusate sodium (Colace) 100 mg capsule Take 100 mg by mouth two (2) times a day.  butalbital-acetaminophen-caff (FIORICET) -40 mg per capsule Take 1 Capsule by mouth daily as needed.  metFORMIN (GLUCOPHAGE) 500 mg tablet 500 mg two (2) times daily (with meals).  insulin lispro protamin-lispro (HumaLOG Mix 75-25 KwikPen) flexpen 28 Units two (2) times a day.  bumetanide (BUMEX) 1 mg tablet Take 1 Tab by mouth two (2) times daily (with meals). (Patient taking differently: Take 1 mg by mouth two (2) times a day. Take two tablets in the morning and 0.5 tablet in the evening)    ticagrelor (Brilinta) 90 mg tablet Take 90 mg by mouth two (2) times a day.  magnesium oxide (MAG-OX) 400 mg tablet Take 400 mg by mouth two (2) times a day.     melatonin 3 mg tablet Take 3 mg by mouth nightly as needed.  gabapentin (NEURONTIN) 100 mg capsule Take 300 mg by mouth two (2) times a day.  omeprazole (PRILOSEC) 40 mg capsule Take 40 mg by mouth daily.  atorvastatin (LIPITOR) 40 mg tablet Take 40 mg by mouth daily.  potassium chloride SR (KLOR-CON 10) 10 mEq tablet Take 20 mEq by mouth daily.  aspirin 81 mg chewable tablet Take 1 Tablet by mouth daily.  glucose blood VI test strips (OneTouch Verio test strips) strip Check glucose 3 times a day    lancets (One Touch Delica) 33 gauge misc Check glucose 3 times a day    Blood-Glucose Meter (OneTouch Verio Meter) misc Check glucose 3 times a day    multivitamin,tx-iron-minerals (Complete Multivitamin) tab Take 1 Tablet by mouth daily.  acetaminophen (TYLENOL EXTRA STRENGTH) 500 mg tablet Take 1,000 mg by mouth every six (6) hours as needed for Pain.  polyethylene glycol (MIRALAX) 17 gram packet Take 17 g by mouth as needed. Review of Systems:   A comprehensive review of systems was negative except for that written in the HPI. Objective:   Physical Exam:     Visit Vitals  /62   Pulse 89   Temp 99 °F (37.2 °C)   Resp 30   Ht 5' 2\" (1.575 m)   Wt 78.9 kg (174 lb)   SpO2 94%   BMI 31.83 kg/m²    O2 Flow Rate (L/min): 5 l/min O2 Device: Nasal cannula    Temp (24hrs), Av.6 °F (37 °C), Min:98.1 °F (36.7 °C), Max:99 °F (37.2 °C)    No intake/output data recorded. No intake/output data recorded. General:  Alert, cooperative, no distress, appears stated age. Lungs:   Clear to auscultation bilaterally. Chest wall:  No tenderness or deformity. Heart:  Regular rate and rhythm, S1, S2 normal, no murmur, click, rub or gallop. Abdomen:   Soft, non-tender. Bowel sounds normal. No masses,  No organomegaly. Extremities: Extremities normal, atraumatic, no cyanosis or edema. Pulses: 2+ and symmetric all extremities. Skin: Skin color, texture, turgor normal. No rashes or lesions   Neurologic: CNII-XII intact. No gross sensory or motor deficits     Data Review:       Recent Days:  Recent Labs     06/08/22  0247 06/07/22  1903 06/07/22  1100   WBC 11.5* 10.8 10.6   HGB 8.9* 9.7* 10.0*   HCT 26.9* 30.2* 29.2*    171 188     Recent Labs     06/08/22  0247 06/07/22  1100   * 133*   K 3.2* 3.0*    96*   CO2 29 26   * 196*   BUN 29* 31*   CREA 1.20* 1.40*   CA 8.0* 8.3*   MG 1.3*  --    ALB 2.1* 2.4*   TBILI 1.0 1.3*   ALT 15 19   INR  --  1.6*     Recent Labs     06/07/22  1200   PH 7.50*   PCO2 31*   PO2 84   HCO3 24   FIO2 28.0       24 Hour Results:  Recent Results (from the past 24 hour(s))   TROPONIN-HIGH SENSITIVITY    Collection Time: 06/07/22  1:00 PM   Result Value Ref Range    Troponin-High Sensitivity 294 (HH) 0 - 51 ng/L   TROPONIN-HIGH SENSITIVITY    Collection Time: 06/07/22  6:08 PM   Result Value Ref Range    Troponin-High Sensitivity 276 (HH) 0 - 51 ng/L   PTT    Collection Time: 06/07/22  7:03 PM   Result Value Ref Range    aPTT 30.5 21.2 - 34.1 sec    aPTT, therapeutic range   82 - 109 sec   CBC WITH AUTOMATED DIFF    Collection Time: 06/07/22  7:03 PM   Result Value Ref Range    WBC 10.8 3.6 - 11.0 K/uL    RBC 3.19 (L) 3.80 - 5.20 M/uL    HGB 9.7 (L) 11.5 - 16.0 g/dL    HCT 30.2 (L) 35.0 - 47.0 %    MCV 94.7 80.0 - 99.0 FL    MCH 30.4 26.0 - 34.0 PG    MCHC 32.1 30.0 - 36.5 g/dL    RDW 14.1 11.5 - 14.5 %    PLATELET 116 424 - 394 K/uL    MPV 10.1 8.9 - 12.9 FL    NRBC 0.0 0.0  WBC    ABSOLUTE NRBC 0.00 0.00 - 0.01 K/uL    NEUTROPHILS 83 (H) 32 - 75 %    LYMPHOCYTES 10 (L) 12 - 49 %    MONOCYTES 7 5 - 13 %    EOSINOPHILS 0 0 - 7 %    BASOPHILS 0 0 - 1 %    IMMATURE GRANULOCYTES 0 0 - 0.5 %    ABS. NEUTROPHILS 8.9 (H) 1.8 - 8.0 K/UL    ABS. LYMPHOCYTES 1.1 0.8 - 3.5 K/UL    ABS. MONOCYTES 0.7 0.0 - 1.0 K/UL    ABS. EOSINOPHILS 0.0 0.0 - 0.4 K/UL    ABS. BASOPHILS 0.0 0.0 - 0.1 K/UL    ABS. IMM.  GRANS. 0.0 0.00 - 0.04 K/UL    DF AUTOMATED     PTT    Collection Time: 06/08/22 2: 37 AM   Result Value Ref Range    aPTT 46.7 (H) 21.2 - 34.1 sec    aPTT, therapeutic range   82 - 109 sec   PTT    Collection Time: 06/08/22  2:47 AM   Result Value Ref Range    aPTT 42.0 (H) 21.2 - 34.1 sec    aPTT, therapeutic range   82 - 109 sec   C REACTIVE PROTEIN, QT    Collection Time: 06/08/22  2:47 AM   Result Value Ref Range    C-Reactive protein 14.10 (H) 0.00 - 0.60 mg/dL   CBC WITH AUTOMATED DIFF    Collection Time: 06/08/22  2:47 AM   Result Value Ref Range    WBC 11.5 (H) 3.6 - 11.0 K/uL    RBC 2.83 (L) 3.80 - 5.20 M/uL    HGB 8.9 (L) 11.5 - 16.0 g/dL    HCT 26.9 (L) 35.0 - 47.0 %    MCV 95.1 80.0 - 99.0 FL    MCH 31.4 26.0 - 34.0 PG    MCHC 33.1 30.0 - 36.5 g/dL    RDW 14.3 11.5 - 14.5 %    PLATELET 457 052 - 016 K/uL    MPV 10.2 8.9 - 12.9 FL    NRBC 0.0 0.0  WBC    ABSOLUTE NRBC 0.00 0.00 - 0.01 K/uL    NEUTROPHILS 84 (H) 32 - 75 %    LYMPHOCYTES 10 (L) 12 - 49 %    MONOCYTES 6 5 - 13 %    EOSINOPHILS 0 0 - 7 %    BASOPHILS 0 0 - 1 %    IMMATURE GRANULOCYTES 0 0 - 0.5 %    ABS. NEUTROPHILS 9.6 (H) 1.8 - 8.0 K/UL    ABS. LYMPHOCYTES 1.2 0.8 - 3.5 K/UL    ABS. MONOCYTES 0.7 0.0 - 1.0 K/UL    ABS. EOSINOPHILS 0.0 0.0 - 0.4 K/UL    ABS. BASOPHILS 0.0 0.0 - 0.1 K/UL    ABS. IMM. GRANS. 0.0 0.00 - 0.04 K/UL    DF AUTOMATED     METABOLIC PANEL, COMPREHENSIVE    Collection Time: 06/08/22  2:47 AM   Result Value Ref Range    Sodium 134 (L) 136 - 145 mmol/L    Potassium 3.2 (L) 3.5 - 5.1 mmol/L    Chloride 100 97 - 108 mmol/L    CO2 29 21 - 32 mmol/L    Anion gap 5 5 - 15 mmol/L    Glucose 224 (H) 65 - 100 mg/dL    BUN 29 (H) 6 - 20 mg/dL    Creatinine 1.20 (H) 0.55 - 1.02 mg/dL    BUN/Creatinine ratio 24 (H) 12 - 20      GFR est AA 53 (L) >60 ml/min/1.73m2    GFR est non-AA 44 (L) >60 ml/min/1.73m2    Calcium 8.0 (L) 8.5 - 10.1 mg/dL    Bilirubin, total 1.0 0.2 - 1.0 mg/dL    AST (SGOT) 29 15 - 37 U/L    ALT (SGPT) 15 12 - 78 U/L    Alk.  phosphatase 75 45 - 117 U/L    Protein, total 7.7 6.4 - 8.2 g/dL    Albumin 2.1 (L) 3.5 - 5.0 g/dL    Globulin 5.6 (H) 2.0 - 4.0 g/dL    A-G Ratio 0.4 (L) 1.1 - 2.2     MAGNESIUM    Collection Time: 06/08/22  2:47 AM   Result Value Ref Range    Magnesium 1.3 (L) 1.6 - 2.4 mg/dL   D DIMER    Collection Time: 06/08/22  2:47 AM   Result Value Ref Range    D DIMER 1.78 (H) <0.50 ug/ml(FEU)   FERRITIN    Collection Time: 06/08/22  2:47 AM   Result Value Ref Range    Ferritin 367 (H) 8 - 252 ng/mL   LD    Collection Time: 06/08/22  2:47 AM   Result Value Ref Range     (H) 81 - 246 U/L   TROPONIN-HIGH SENSITIVITY    Collection Time: 06/08/22  2:47 AM   Result Value Ref Range    Troponin-High Sensitivity 256 (HH) 0 - 51 ng/L   PROCALCITONIN    Collection Time: 06/08/22  2:47 AM   Result Value Ref Range    Procalcitonin 0.35 (H) 0 ng/mL   NT-PRO BNP    Collection Time: 06/08/22  7:35 AM   Result Value Ref Range    NT pro-BNP 5,477 (H) <125 pg/mL   PTT    Collection Time: 06/08/22 10:23 AM   Result Value Ref Range    aPTT 53.8 (H) 21.2 - 34.1 sec    aPTT, therapeutic range   82 - 109 sec           Assessment/     Acute on chronic systolic heart failure EF of 25%  Coronary artery disease status post stents  Noncompliance with home medication  Gastroesophageal reflux disease  Type 2 diabetes. Fairly stable  History of fibromyalgia  Hard of hearing    Plan:  Continue supportive care including gentle IV diuresis  Monitor routine electrolytes  Continue IV heparin for another 24 hours  PT OT eval    Care Plan discussed with: Nurse    Total time spent with patient: 30 minutes.     Kenzie Whitmore MD

## 2022-06-09 NOTE — PROGRESS NOTES
Hospitalist Progress Note         Katt Palacios MD          Daily Progress Note: 6/9/2022      Subjective: The patient is seen for follow  up.71 y.o. female who is transferred from [facility] ER where she presented with SOB, hypoxia, cough. Patient recently had PCI/LAD Stent 5 days ago by Dr. Maryan Younger. Denies fever, myalgia but +chills. Has had Covid last year with similar presentation; her Covid test reportedly is negative. She has severe CMP with EF 25% and has PPM/AICD - no discharge. LHC was via Right Femoral; no hematoma noted or peripheral leg changes. ---    Patient seen in follow-up. Notes shortness of breath with exertion. transfered to ICU due to hypotension and hypoxemia. Started on dobutamine for cardiogenic shock. Awake and alert and on high flow oxygen.      Problem List:  Problem List as of 6/9/2022 Date Reviewed: 8/4/2021          Codes Class Noted - Resolved    Biventricular ICD (implantable cardioverter-defibrillator) in place ICD-10-CM: Z95.810  ICD-9-CM: V45.02  1/26/2018 - Present        CAD in native artery ICD-10-CM: I25.10  ICD-9-CM: 414.01  6/7/2022 - Present        Acute on chronic systolic (congestive) heart failure ([facility] ICD-10-CM: I50.23  ICD-9-CM: 428.23, 428.0  6/7/2022 - Present        Acute CHF (congestive heart failure) ([facility] ICD-10-CM: I50.9  ICD-9-CM: 428.0  6/15/2021 - Present        Sinus congestion ICD-10-CM: R09.81  ICD-9-CM: 478.19  6/15/2021 - Present        Acquired hypothyroidism ICD-10-CM: E03.9  ICD-9-CM: 244.9  3/17/2021 - Present        History of lobectomy of thyroid ICD-10-CM: Z90.09  ICD-9-CM: V45.79  3/17/2021 - Present        Chest pain ICD-10-CM: R07.9  ICD-9-CM: 786.50  3/11/2021 - Present        Hypoxia ICD-10-CM: R09.02  ICD-9-CM: 799.02  1/21/2021 - Present        Elevated troponin ICD-10-CM: R77.8  ICD-9-CM: 790.6  1/21/2021 - Present        Atrial fibrillation ([facility] ICD-10-CM: I48.91  ICD-9-CM: 427.31  11/16/2020 - Present        Acute on chronic congestive heart failure (HCC) ICD-10-CM: I50.9  ICD-9-CM: 428.0  11/16/2020 - Present        Hypomagnesemia ICD-10-CM: E83.42  ICD-9-CM: 275.2  9/28/2020 - Present        Hyperkalemia ICD-10-CM: E87.5  ICD-9-CM: 276.7  9/26/2020 - Present        CHF exacerbation (HCC) ICD-10-CM: I50.9  ICD-9-CM: 428.0  9/22/2020 - Present        Severe obesity ([facility] ICD-10-CM: E66.01  ICD-9-CM: 278.01  9/2/2020 - Present        Dysarthria ICD-10-CM: R47.1  ICD-9-CM: 784.51  8/11/2020 - Present        TIA (transient ischemic attack) ICD-10-CM: G45.9  ICD-9-CM: 435.9  8/10/2020 - Present        Cardiomyopathy ([facility] ICD-10-CM: I42.9  ICD-9-CM: 425.4  5/23/2016 - Present        Skin cancer ICD-10-CM: C44.90  ICD-9-CM: 173.90  Unknown - Present        Acid indigestion ICD-10-CM: K30  ICD-9-CM: 536.8  Unknown - Present        Asthma ICD-10-CM: 493  ICD-9-CM: 439  Unknown - Present        Back pain ICD-10-CM: M54.9  ICD-9-CM: 724.5  Unknown - Present        Wears dentures ICD-10-CM: Z97.2  ICD-9-CM: V45.84  Unknown - Present        Constipation ICD-10-CM: 564.0  ICD-9-CM: 564.0  Unknown - Present        Hyperglycemia due to type 2 diabetes mellitus ([facility] ICD-10-CM: E11.65  ICD-9-CM: 250.00  Unknown - Present        Diarrhea ICD-10-CM: R19.7  ICD-9-CM: 787.91  Unknown - Present        Frequent episodic tension-type headache ICD-10-CM: U35.600  ICD-9-CM: 339.11  Unknown - Present        Hemorrhoids ICD-10-CM: 106  ICD-9-CM: 561  Unknown - Present        Hernia of unspecified site of abdominal cavity without mention of obstruction or gangrene ICD-10-CM: K46.9  ICD-9-CM: 553.9  Unknown - Present        HTN (hypertension) ICD-10-CM: I10  ICD-9-CM: 401.9  Unknown - Present        Muscle pain ICD-10-CM: M79.10  ICD-9-CM: 729.1  Unknown - Present        SOB (shortness of breath) ICD-10-CM: R06.02  ICD-9-CM: 786.05  Unknown - Present        Thyroid disorder ICD-10-CM: E07.9  ICD-9-CM: 246. 9  Unknown - Present RESOLVED: CHF (congestive heart failure) ([facility]) ICD-10-CM: I50.9  ICD-9-CM: 428.0  9/25/2020 - 8/3/2021              Medications reviewed  Current Facility-Administered Medications   Medication Dose Route Frequency    DOBUTamine (DOBUTREX) 500 mg/250 mL (2,000 mcg/mL) infusion  0-10 mcg/kg/min IntraVENous TITRATE    NOREPINephrine (LEVOPHED) 8 mg in 0.9% NS 250ml infusion  0.5-16 mcg/min IntraVENous TITRATE    potassium chloride SR (KLOR-CON 10) tablet 40 mEq  40 mEq Oral DAILY    albuterol-ipratropium (DUO-NEB) 2.5 MG-0.5 MG/3 ML  3 mL Nebulization Q6HWA RT    furosemide (LASIX) injection 40 mg  40 mg IntraVENous BID    piperacillin-tazobactam (ZOSYN) 3.375 g in 0.9% sodium chloride (MBP/ADV) 100 mL MBP  3.375 g IntraVENous Q8H    metOLazone (ZAROXOLYN) tablet 2.5 mg  2.5 mg Oral Q24H    sodium chloride (NS) flush 5-40 mL  5-40 mL IntraVENous Q8H    sodium chloride (NS) flush 5-40 mL  5-40 mL IntraVENous PRN    acetaminophen (TYLENOL) tablet 650 mg  650 mg Oral Q6H PRN    Or    acetaminophen (TYLENOL) suppository 650 mg  650 mg Rectal Q6H PRN    polyethylene glycol (MIRALAX) packet 17 g  17 g Oral DAILY PRN    ondansetron (ZOFRAN ODT) tablet 4 mg  4 mg Oral Q8H PRN    Or    ondansetron (ZOFRAN) injection 4 mg  4 mg IntraVENous Q6H PRN    heparin 25,000 units in D5W 250 ml infusion  12-25 Units/kg/hr (Adjusted) IntraVENous TITRATE    amiodarone (CORDARONE) tablet 200 mg  200 mg Oral DAILY    aspirin chewable tablet 81 mg  81 mg Oral DAILY    atorvastatin (LIPITOR) tablet 40 mg  40 mg Oral DAILY    butalbital-acetaminophen-caffeine (FIORICET, ESGIC) -40 mg per tablet 1 Tablet  1 Tablet Oral DAILY PRN    docusate sodium (COLACE) capsule 100 mg  100 mg Oral BID    DULoxetine (CYMBALTA) capsule 60 mg  60 mg Oral DAILY    escitalopram oxalate (LEXAPRO) tablet 5 mg  5 mg Oral DAILY    gabapentin (NEURONTIN) capsule 300 mg  300 mg Oral BID    levothyroxine (SYNTHROID) tablet 125 mcg  125 mcg Oral ACB    magnesium oxide (MAG-OX) tablet 400 mg  400 mg Oral BID    melatonin tablet 3 mg  3 mg Oral QHS PRN    metoprolol succinate (TOPROL-XL) XL tablet 12.5 mg  12.5 mg Oral DAILY    pantoprazole (PROTONIX) tablet 40 mg  40 mg Oral DAILY    oxybutynin (DITROPAN) tablet 5 mg  5 mg Oral BID    sacubitriL-valsartan (ENTRESTO) 24-26 mg tablet 1 Tablet  1 Tablet Oral BID    ticagrelor (BRILINTA) tablet 90 mg  90 mg Oral BID    heparin (porcine) 1,000 unit/mL injection 4,000 Units  4,000 Units IntraVENous PRN    Or    heparin (porcine) 1,000 unit/mL injection 2,000 Units  2,000 Units IntraVENous PRN       Review of Systems:   A comprehensive review of systems was negative except for that written in the HPI. Objective:   Physical Exam:     Visit Vitals  BP (!) 77/48   Pulse 87   Temp 98 °F (36.7 °C)   Resp 22   Ht 5' 2\" (1.575 m)   Wt 79.4 kg (175 lb 0.7 oz)   SpO2 98%   BMI 32.02 kg/m²    O2 Flow Rate (L/min): 40 l/min O2 Device: Heated,Hi flow nasal cannula    Temp (24hrs), Av.6 °F (36.4 °C), Min:97 °F (36.1 °C), Max:98 °F (36.7 °C)     07 -  1900  In: -   Out: 120 [Urine:120]    1901 -  0700  In: 100 [I.V.:100]  Out: 450 [Urine:450]    General:  Alert, cooperative, no distress, appears stated age. Lungs:   Clear to auscultation bilaterally. Chest wall:  No tenderness or deformity. Heart:  Regular rate and rhythm, S1, S2 normal, no murmur, click, rub or gallop. Abdomen:   Soft, non-tender. Bowel sounds normal. No masses,  No organomegaly. Extremities: Extremities normal, atraumatic, no cyanosis or edema. Pulses: 2+ and symmetric all extremities. Skin: Skin color, texture, turgor normal. No rashes or lesions   Neurologic: CNII-XII intact.  No gross sensory or motor deficits     Data Review:       Recent Days:  Recent Labs     22  0247 22  1903 22  1100   WBC 11.5* 10.8 10.6   HGB 8.9* 9.7* 10.0*   HCT 26.9* 30.2* 29.2*    171 188     Recent Labs     06/08/22  0247 06/07/22  1100   * 133*   K 3.2* 3.0*    96*   CO2 29 26   * 196*   BUN 29* 31*   CREA 1.20* 1.40*   CA 8.0* 8.3*   MG 1.3*  --    ALB 2.1* 2.4*   TBILI 1.0 1.3*   ALT 15 19   INR  --  1.6*     Recent Labs     06/07/22  1200   PH 7.50*   PCO2 31*   PO2 84   HCO3 24   FIO2 28.0       24 Hour Results:  Recent Results (from the past 24 hour(s))   PTT    Collection Time: 06/08/22  1:00 PM   Result Value Ref Range    aPTT 67.8 (H) 21.2 - 34.1 sec    aPTT, therapeutic range   82 - 109 sec   ECHO ADULT FOLLOW-UP OR LIMITED    Collection Time: 06/08/22  1:09 PM   Result Value Ref Range    LV EDV A2C 39 mL    LV EDV A4C 103 mL    LV ESV A2C 12 mL    LV ESV A4C 82 mL    IVSd 1.0 (A) 0.6 - 0.9 cm    LVIDd 4.7 3.9 - 5.3 cm    LVIDs 4.3 cm    LVOT Diameter 2.0 cm    LVPWd 1.0 (A) 0.6 - 0.9 cm    LV E' Lateral Velocity 10 cm/s    LV E' Septal Velocity 8 cm/s    LV Ejection Fraction A2C 69 %    LV Ejection Fraction A4C 19 %    LVOT Area 3.1 cm2    LA [place] 5.5 cm    LA Area 4C 23.4 cm2    LA Diameter 4.9 cm    AV Mean Gradient 10 mmHg    AV VTI 36.3 cm    AV Mean Velocity 1.5 m/s    AV Peak Velocity 2.0 m/s    AV Peak Gradient 15 mmHg    Aortic Root 2.5 cm    Ascending Aorta 2.5 cm    MR VTI 75.6 cm    MR Peak Velocity 2.7 m/s    MR Peak Gradient 29 mmHg    MV A Velocity 0.43 m/s    MV E Velocity 1.24 m/s    WV Max Velocity 0.6 m/s    Pulmonary Artery EDP 1 mmHg    PV Mean Gradient 3 mmHg    PV VTI 17.6 cm    PV Mean Velocity 0.7 m/s    PV Max Velocity 1.2 m/s    PV Peak Gradient 6 mmHg    TR Max Velocity 2.62 m/s    TR Peak Gradient 27 mmHg    TAPSE 2.5 1.7 cm    Fractional Shortening 2D 9 28 - 44 %    LV ESV Index A4C 46 mL/m2    LV EDV Index A4C 57 mL/m2    LV ESV Index A2C 7 mL/m2    LV EDV Index A2C 22 mL/m2    LVIDd Index 2.61 cm/m2    LVIDs Index 2.39 cm/m2    LV RWT Ratio 0.43     LV Mass 2D 164.5 (A) 67 - 162 g    LV Mass 2D Index 91.4 43 - 95 g/m2    MV E/A 2.88     E/E' Ratio (Averaged) 13.95     E/E' Lateral 12.40     E/E' Septal 15.50     LA Size Index 2.72 cm/m2    LA/AO Root Ratio 1.96     Ao Root Index 1.39 cm/m2    Ascending Aorta Index 1.39 cm/m2    RV Basal Dimension 4.1 cm    RV Mid Dimension 2.7 cm    RV Free Wall Peak S' 11 cm/s   URINALYSIS W/ REFLEX CULTURE    Collection Time: 06/08/22  4:42 PM    Specimen: Urine   Result Value Ref Range    Color Yellow/Straw      Appearance Clear Clear      Specific gravity 1.014 1.003 - 1.030      pH (UA) 5.0 5.0 - 8.0      Protein Negative Negative mg/dL    Glucose 50 (A) Negative mg/dL    Ketone Negative Negative mg/dL    Bilirubin Negative Negative      Blood Negative Negative      Urobilinogen 0.1 0.1 - 1.0 EU/dL    Nitrites Negative Negative      Leukocyte Esterase Trace (A) Negative      WBC 0-4 0 - 4 /hpf    RBC 0-5 0 - 5 /hpf    Bacteria 1+ (A) Negative /hpf    UA:UC IF INDICATED Culture not indicated by UA result Culture not indicated by UA result      Hyaline cast 0-2 0 - 5 /lpf   PTT    Collection Time: 06/08/22  5:30 PM   Result Value Ref Range    aPTT 64.8 (H) 21.2 - 34.1 sec    aPTT, therapeutic range   82 - 109 sec   PTT    Collection Time: 06/08/22  8:57 PM   Result Value Ref Range    aPTT 116.5 (HH) 21.2 - 34.1 sec    aPTT, therapeutic range   82 - 109 sec   COVID-19 RAPID TEST    Collection Time: 06/09/22  8:00 AM   Result Value Ref Range    COVID-19 rapid test Not Detected Not Detected     PTT    Collection Time: 06/09/22  9:20 AM   Result Value Ref Range    aPTT 56.4 (H) 21.2 - 34.1 sec    aPTT, therapeutic range   82 - 109 sec   URINALYSIS W/MICROSCOPIC    Collection Time: 06/09/22 10:10 AM   Result Value Ref Range    Color Georgina      Appearance Turbid (A) Clear      Specific gravity 1.016 1.003 - 1.030      pH (UA) 5.0 5.0 - 8.0      Protein 30 (A) Negative mg/dL    Glucose 50 (A) Negative mg/dL    Ketone Negative Negative mg/dL    Bilirubin Negative Negative      Blood Negative Negative      Urobilinogen 0.1 0.1 - 1.0 EU/dL    Nitrites Negative Negative      Leukocyte Esterase Negative Negative      WBC 0-4 0 - 4 /hpf    RBC 0-5 0 - 5 /hpf    Bacteria Negative Negative /hpf    Mucus Trace (A) Negative /lpf    Hyaline cast 10-20 0 - 5 /lpf   GLUCOSE, POC    Collection Time: 06/09/22 11:39 AM   Result Value Ref Range    Glucose (POC) 286 (H) 65 - 117 mg/dL    Performed by Esequiel Cornelius (Trvlr)            Assessment/     Acute on chronic systolic heart failure EF of 25%  Acute hypoxic respiratory failure secondary to fluid overload  Cardiogenic shock  Coronary artery disease status post stents  Noncompliance with home medication  Gastroesophageal reflux disease  Type 2 diabetes. Fairly stable  History of fibromyalgia  Hard of hearing    Plan:  Continue supportive care including gentle IV diuresis  Continue Dobutamine with close monitoring of electrolytes  Herman cath for strict input and output  Continue IV heparin for another 24 hours  PT OT eval  Consider transfer to telemetry floor in a.m. .  Dobutamine drip can be managed on 4 W.    238pm: CODE status addressed with  and daughter at bedside.  opted for a DNR/DNI. Paper work signed and pl;aced on chart    Care Plan discussed with: Nurse    Total time spent with patient: 30 minutes.     Alexandra Moore MD

## 2022-06-09 NOTE — PROGRESS NOTES
Dr. Sonia Garber with cardiology contacted regarding update on patient. Continuously titrating pressor support per [facility], patient barely maintaining MAP goals. Work of breathing increasing and O2 titrated. Orders received and treated per MAR.

## 2022-06-09 NOTE — ED NOTES
TRANSFER - OUT REPORT:    Written/Verbal report given on Bandar Bennett  being transferred to [facility](unit) for routine progression of care       Report consisted of patients Situation, Background, Assessment and   Recommendations(SBAR). Information from the following report(s) SBAR, ED Summary, [facility] and Recent Results was reviewed with the receiving nurse. Lines:   Peripheral IV 06/07/22 Right Antecubital (Active)   Site Assessment Clean, dry, & intact 06/08/22 1206   Dressing Status Clean, dry, & intact 06/08/22 1206   Dressing Type Transparent 06/08/22 1206   Hub Color/Line Status Pink;Patent; Infusing 06/08/22 1206       Peripheral IV 06/08/22 Anterior;Left;Proximal Forearm (Active)   Site Assessment Clean, dry, & intact 06/08/22 1206   Dressing Status Clean, dry, & intact 06/08/22 1206   Dressing Type Transparent 06/08/22 1206   Hub Color/Line Status Pink;Patent; Infusing 06/08/22 1206        Opportunity for questions and clarification was provided.       Patient transported with:   Monitor  O2 @ 6 liters  Registered Nurse  [employer]

## 2022-06-09 NOTE — PROGRESS NOTES
PT consult received and acknowledged. PT eval attempted . However, pt.not appropriate for PT eval at this time sec to low BP of 77/48. PT will continue to monitor and reattempt for PT eval when medically appropriate .  Thanks

## 2022-06-09 NOTE — PROGRESS NOTES
OT eval order received and acknowledged, attempted at 1110 however Nsg deferred at this time d/t pt's low BP (77/48) and pt req'ing additional O2; Nsg requested therapy return later to attempt eval. Will continue to follow pt and attempt OT eval at a later time as medically appropriate. Thank you.

## 2022-06-09 NOTE — PROGRESS NOTES
Progress Note    Patient: Dee Levy MRN: [id number]  SSN: [id number]    YOB: 1950  Age: 66 y.o. Sex: female      Admit Date: 6/7/2022    LOS: 2 days     Subjective:     Patient seen and examined. Followed for acute on chronic HFrEF and CAD. Resting in bed, RN at the bedside about to start Dobutamine. Continues with shortness of breath. No chest pain. No other complaints offered.     Telemetry Reviewed    Review of Symptoms: as above    Objective:     Vitals:    06/09/22 1031 06/09/22 1100 06/09/22 1108 06/09/22 1119   BP: (!) 84/51  (!) 87/47 (!) 77/48   Pulse: 75  77 87   Resp:       Temp:  98.2 °F (36.8 °C)     SpO2:       Weight:       Height:            Intake and Output:  Current Shift: 06/09 0701 - 06/09 1900  In: -   Out: 120 [Urine:120]  Last three shifts: 06/07 1901 - 06/09 0700  In: 100 [I.V.:100]  Out: 450 [Urine:450]    Physical Exam:   General: Alert, NAD, A&O  RESP:  Coarse crackles  Cardiovascular: RRR no MRG  PVS: Minimal edema  ABD: rounded, soft  PSYCH: No anxiety or agitation      Lab/Data Review:  Labs reviewed        Current Facility-Administered Medications:     DOBUTamine (DOBUTREX) 500 mg/250 mL (2,000 mcg/mL) infusion, 0-10 mcg/kg/min, IntraVENous, TITRATE, Prabhakar Martinez MD, Last Rate: 23.7 mL/hr at 06/09/22 1119, 10 mcg/kg/min at 06/09/22 1119    NOREPINephrine (LEVOPHED) 8 mg in 0.9% NS 250ml infusion, 0.5-16 mcg/min, IntraVENous, TITRATE, Prabhakar Martinez MD, Last Rate: 18.8 mL/hr at 06/09/22 1141, 10 mcg/min at 06/09/22 1141    potassium chloride SR (KLOR-CON 10) tablet 40 mEq, 40 mEq, Oral, DAILY, Prabhakar Martinez MD, 40 mEq at 06/09/22 1041    albuterol-ipratropium (DUO-NEB) 2.5 MG-0.5 MG/3 ML, 3 mL, Nebulization, Q6HWA RT, Prabhakar Martinez MD, 3 mL at 06/09/22 0818    furosemide (LASIX) injection 40 mg, 40 mg, IntraVENous, BID, Prabhakar Martinez MD, 40 mg at 06/09/22 1046    [COMPLETED] piperacillin-tazobactam (ZOSYN) 4.5 g in 0.9% sodium chloride (MBP/ADV) 100 mL MBP, 4.5 g, IntraVENous, ONCE, IV Completed at 06/08/22 1153 **FOLLOWED BY** piperacillin-tazobactam (ZOSYN) 3.375 g in 0.9% sodium chloride (MBP/ADV) 100 mL MBP, 3.375 g, IntraVENous, Q8H, Prabhakar Martinez MD, Last Rate: 25 mL/hr at 06/09/22 1019, 3.375 g at 06/09/22 1019    metOLazone (ZAROXOLYN) tablet 2.5 mg, 2.5 mg, Oral, Q24H, Wendy Burton NP, 2.5 mg at 06/09/22 1043    sodium chloride (NS) flush 5-40 mL, 5-40 mL, IntraVENous, Q8H, SusanJanet soares MD, 10 mL at 06/08/22 1635    sodium chloride (NS) flush 5-40 mL, 5-40 mL, IntraVENous, PRN, Josh Soares MD    acetaminophen (TYLENOL) tablet 650 mg, 650 mg, Oral, Q6H PRN **OR** acetaminophen (TYLENOL) suppository 650 mg, 650 mg, Rectal, Q6H PRN, [facility], Janet Gomez MD    polyethylene glycol (MIRALAX) packet 17 g, 17 g, Oral, DAILY PRN, [facility], Janet Gomez MD    ondansetron (ZOFRAN ODT) tablet 4 mg, 4 mg, Oral, Q8H PRN **OR** ondansetron (ZOFRAN) injection 4 mg, 4 mg, IntraVENous, Q6H PRN, Josh Soares MD    heparin 25,000 units in D5W 250 ml infusion, 12-25 Units/kg/hr (Adjusted), IntraVENous, TITRATE, Josh Soares MD, Last Rate: 11.1 mL/hr at 06/09/22 1126, 18 Units/kg/hr at 06/09/22 1126    amiodarone (CORDARONE) tablet 200 mg, 200 mg, Oral, DAILY, Josh Soares MD, 200 mg at 06/08/22 0825    aspirin chewable tablet 81 mg, 81 mg, Oral, DAILY, Josh Soares MD, 81 mg at 06/09/22 1043    atorvastatin (LIPITOR) tablet 40 mg, 40 mg, Oral, DAILY, Josh Soares MD, 40 mg at 06/09/22 1043    butalbital-acetaminophen-caffeine (FIORICET, ESGIC) -40 mg per tablet 1 Tablet, 1 Tablet, Oral, DAILY PRN, Josh Soares MD    docusate sodium (COLACE) capsule 100 mg, 100 mg, Oral, BID, Josh Soares MD, 100 mg at 06/09/22 1044    DULoxetine (CYMBALTA) capsule 60 mg, 60 mg, Oral, DAILY, Josh Soares MD, 60 mg at 06/09/22 1043    escitalopram oxalate (LEXAPRO) tablet 5 mg, 5 mg, Oral, DAILY, Janet Davis MD, 5 mg at 06/09/22 1044    gabapentin (NEURONTIN) capsule 300 mg, 300 mg, Oral, BID, Skye Fox MD, 300 mg at 06/09/22 1044    levothyroxine (SYNTHROID) tablet 125 mcg, 125 mcg, Oral, ACB, Skye Fox MD, 125 mcg at 06/09/22 1038    magnesium oxide (MAG-OX) tablet 400 mg, 400 mg, Oral, BID, Skye Fox MD, 400 mg at 06/09/22 1042    melatonin tablet 3 mg, 3 mg, Oral, QHS PRN, Skye Fox MD, 3 mg at 06/09/22 0400    metoprolol succinate (TOPROL-XL) XL tablet 12.5 mg, 12.5 mg, Oral, DAILY, Skye Fox MD, 12.5 mg at 06/08/22 8801    pantoprazole (PROTONIX) tablet 40 mg, 40 mg, Oral, DAILY, Skye Fox MD, 40 mg at 06/09/22 1039    oxybutynin (DITROPAN) tablet 5 mg, 5 mg, Oral, BID, Skye Fox MD, 5 mg at 06/09/22 1043    sacubitriL-valsartan (ENTRESTO) 24-26 mg tablet 1 Tablet, 1 Tablet, Oral, BID, Skye Fox MD, 1 Tablet at 06/09/22 1043    ticagrelor (BRILINTA) tablet 90 mg, 90 mg, Oral, BID, Skye Fox MD, 90 mg at 06/09/22 1044    heparin (porcine) 1,000 unit/mL injection 4,000 Units, 4,000 Units, IntraVENous, PRN **OR** heparin (porcine) 1,000 unit/mL injection 2,000 Units, 2,000 Units, IntraVENous, PRN, Skye Fox MD, 2,000 Units at 06/09/22 1125      Assessment:     Active Problems:    Hypoxia (1/21/2021)      CAD in native artery (6/7/2022)      Acute on chronic systolic (congestive) heart failure ([facility] (6/7/2022)        Plan:     Our impression and recommendations are as follows:     1. Ischemic Cardiomyopathy with chronic HFrEF:   - volume overloaded, pro-BNP 5477, chest xray with vascular congestion  - s/p [employer] CRT-D in place, Last report showed 2 AT/AF events, no shocks delivered. [employer] report elevated this week at  61continues to trend upward. - 4/22/22 ANISA showed EF 20-25%, CRT-D in place.   - Limited echo with EF 30-35%, severe TR    - holding tier 1 Entresto, BB, due to low BP.  - now on dobutamine, diuresing as able     2.  Coronary artery disease:   - 6/2/22 S/p PIC to mid LAD with single ORALIA. - continue asa, statin, metoprolol, and Brilinta (Plavix nonresponder)  - HS troponin peaked at 318, currently 256, continue heparin gtt. EKG without ischemic changes. No active CP.      3. Severe tricuspid regurgitation:   - echo showed worsening TR, moderate to severe 4/22      4. Paroxysmal atrial fibrillation:   - continue amiodarone, off OAC given history of aneurysms, consider watchman as OP    5. Hypertension:   - blood pressure borderline, starting dobutamine  - continue with current medications.      6. Hyperlipidemia:   - continue statin therapy    Thank you for involving us in the care of this patient. Please do not hesitate to call if additional questions arise.  If after hours please call Jeremy Dunlap MD, [facility], [address] [place] [address]  Structural Heart Disease  Endovascular and Vascular Medicine  Interventional Cardiology  [facility]  [phone]

## 2022-06-09 NOTE — PROGRESS NOTES
Comprehensive Nutrition Assessment    Type and Reason for Visit: Initial,Positive nutrition screen (MST 4)    Nutrition Recommendations/Plan:   1. Continue current diet   2. Ensure Clear 3x/d (720kcals, 24g pro)  3. Encourage PO intake as tolerated, do not feed if not awake and alert      Malnutrition Assessment:  Malnutrition Status:  Mild malnutrition (06/09/22 7269)    Context:  Acute illness     Findings of the 6 clinical characteristics of malnutrition:   Energy Intake:  50% or less of est energy requirements for 5 or more days (pta and inpatient)  Weight Loss:  No significant weight loss     Body Fat Loss:  No significant body fat loss,     Muscle Mass Loss:  No significant muscle mass loss,    Fluid Accumulation:  No significant fluid accumulation,      Nutrition Assessment:    Admitted for Acute CHF exacerbation. Currently in ICU on NRB, dobutamine drip. Levophed initiated today. Per pt and  at bedside, intakes decreased x1 month to 50% of normal, no associated wt loss however this could be masked by fluid. No PO intakes documented yet inpatient, RD assisted in ordering L tray. Pt and family agreeable to ONS, will adjust to Ensure Clear d/t pt preference for juices. Labs: H/H 8.9/26.9, Na 134, K+3.2, BUN 29, Cr 1.20, -224, Mg 1.3, Alb 2.1. Meds: Dobutamine at 3.5 mg/kg/min, levophed at 15mcg/min, colace, cymbalta, lasix, levothyroxine, MgOx, Oxybutynin, PPI, zosyn, KCl. Nutrition Related Findings:    NFPE with no acute findings, noted pt utilizes walker and cane at baseline. No hx dysphagia. No N/V/D/C, last BM pta. No edema.  Wound Type: None    Current Nutrition Intake & Therapies:  Average Meal Intake: 1-25%  Average Supplement Intake: Unable to assess  ADULT DIET Regular; Low Fat/Low Chol/High Fiber/NIRMAL; Low Sodium (2 gm)  ADULT ORAL NUTRITION SUPPLEMENT Breakfast, Lunch, Dinner; Clear Liquid    Anthropometric Measures:  Height: 5' 2.01\" (157.5 cm)  Ideal Body Weight (IBW): 110 lbs (50 kg)     Current Body Wt:  79.4 kg (175 lb 0.7 oz) (6/9), 159.1 % IBW. Bed scale  Current BMI (kg/m2): 32  Usual Body Weight: 77.1 kg (170 lb) (per )  % Weight Change (Calculated): 3  Weight Adjustment:  (66.4kg AdjBW for obesity using geriatric IBW of 62kg)                 BMI Category: Obese class 1 (BMI 30.0-34. 9)  Wt Readings from Last 10 Encounters:   06/09/22 79.4 kg (175 lb 0.7 oz)   06/07/22 78.9 kg (174 lb)   05/31/22 78.9 kg (174 lb)   08/04/21 77.4 kg (170 lb 9.6 oz)   06/17/21 75.5 kg (166 lb 6.4 oz)   03/17/21 78.2 kg (172 lb 6.4 oz)   02/25/21 74.8 kg (165 lb)   01/21/21 78 kg (172 lb)   01/18/21 78.6 kg (173 lb 3.2 oz)   01/15/21 78.5 kg (173 lb)   Wt stable x6 months per EMR    Estimated Daily Nutrient Needs:  Energy Requirements Based On: Kcal/kg  Weight Used for Energy Requirements: Adjusted  Energy (kcal/day): 1660-1992kcals (25-30kcals/d)  Weight Used for Protein Requirements: Adjusted  Protein (g/day): 80-93g (1.2-1.4g/kg)  Method Used for Fluid Requirements: 1 ml/kcal  Fluid (ml/day): 1660-1992ml    Nutrition Diagnosis:   · Inadequate oral intake related to impaired respiratory function,early satiety as evidenced by poor intake prior to admission,intake 0-25%      Nutrition Interventions:   Food and/or Nutrient Delivery: Continue current diet,Modify oral nutrition supplement  Nutrition Education/Counseling: No recommendations at this time  Coordination of Nutrition Care: Continue to monitor while inpatient,Feeding assistance/environmental change (tray set up, help eating as needed 2/2 fatigue, SOB)  Plan of Care discussed with: pt, , daughter, RN    Goals:     Goals: PO intake 50% or greater,within 2 days,other (specify)  Specify Other Goals: wt maintenance within +/-0.5kg in 5 days    Nutrition Monitoring and Evaluation:   Behavioral-Environmental Outcomes: None identified  Food/Nutrient Intake Outcomes: Diet advancement/tolerance,Food and nutrient intake,Supplement intake  Physical Signs/Symptoms Outcomes: Meal time behavior,Weight,Hemodynamic status,Fluid status or edema    Discharge Planning:     Too soon to determine    [employer]: Ext [phone], or via [address] [place] [address]

## 2022-06-09 NOTE — PROGRESS NOTES
Post PICC insertion x-ray revealed PICC line still in the right upper atrium. PICC has been backed out 4cm and secured with BARD securement device. Primary RN made aware and educated on keeping dressing clean, dry, and intact to reduce risk of infection. I have called and spoke to Dr. Mariann Santos who is aware of PICC line location and advised us to leave the PICC line in at this time and to have nursing staff pull the PICC when patient is no longer needing pressor support.

## 2022-06-09 NOTE — PROGRESS NOTES
Progress Note    Patient: Navya Lopez MRN: [id number]  SSN: [id number]    YOB: 1950  Age: 66 y.o. Sex: female      Admit Date: 6/7/2022    LOS: 2 days     Subjective:   Patient followed for SIRS/Sepsis with possible pneumonia based upon CT Chest. Patient admitted to ICU on NRB mask now on high flow nasal cannula. She remains afebrile. Objective:     Vitals:    06/09/22 1119 06/09/22 1200 06/09/22 1255 06/09/22 1300   BP: (!) 77/48 139/69 106/60 106/65   Pulse: 87 (!) 111 (!) 111 (!) 110   Resp:  30  27   Temp:       SpO2:  91%  95%   Weight:       Height:            Intake and Output:  Current Shift: 06/09 0701 - 06/09 1900  In: -   Out: 120 [Urine:120]  Last three shifts: 06/07 1901 - 06/09 0700  In: 100 [I.V.:100]  Out: 450 [Urine:450]    Physical Exam:   Vitals and nursing note reviewed. Chaperone present:  and daughter. Constitutional:       General: She is not in acute distress. Appearance: She is ill-appearing. HENT: High flow nasal cannula     Head: Normocephalic and atraumatic. Right Ear: External ear normal.      Left Ear: External ear normal.      Mouth/Throat:      Pharynx: Oropharynx is clear. Eyes:      Extraocular Movements: Extraocular movements intact. Pupils: Pupils are equal, round, and reactive to light. Cardiovascular:      Rate and Rhythm: Normal rate and regular rhythm. Heart sounds: Normal heart sounds. No murmur heard. Pulmonary:      Effort: Respiratory distress present. Breath sounds: Rhonchi present. No wheezing. Abdominal:      General: Bowel sounds are normal. There is no distension. Palpations: Abdomen is soft. Tenderness: There is no abdominal tenderness. There is no right CVA tenderness or left CVA tenderness. Genitourinary:     Comments: No Herman  Musculoskeletal:       Right lower leg: No edema. Left lower leg: No edema. Skin:     Findings: No rash.    Neurological:      General: No focal deficit present. Mental Status: She is alert and oriented to person, place, and time. Psychiatric:         Mood and Affect: Mood normal.         Behavior: Behavior normal.         Thought Content: Thought content normal.         Judgment: Judgment normal.     Lab/Data Review:     WBC 11,500    Procal 0.35  CRP 14.10    Influenza A/B Negative  Covid-19 Not detected    Blood cultures (6/7) No growth 2 days  Blood cultures (6/7) No growth 2 days      CXR (6/9)  Unchanged asymmetric mixed interstitial and alveolar edema. No effusion or  pneumothorax. Borderline cardiomegaly without congestion        Assessment:     Active Problems:    Hypoxia (1/21/2021)      CAD in native artery (6/7/2022)      Acute on chronic systolic (congestive) heart failure ([facility] [address] (6/7/2022)    1. ?Bilateral pulmonary airspace disease, etiology unclear, ?pneumonia  2. SIRS/Sepsis with leukocytosis, elevated procal and CRP  3. Elevated LDH and ferritin, significance unclear(Covid-19 negative0  4. Uncontrolled diabetes mellitus     Comment:   Suspicion that this could be related to volume overload, but trhis would not explain his leukocytosi, elevated procal or CRP. Plan:   1. Continue IV Zosyn and Doxycycline for atypical coverage (Azithromycin and Levaquin would interact with Amiodarone)  2. Follow-up blood cultures  3. Follow-up urine for Legionella antigen and Mycoplasma IgG/IgM, Chlamydial antibodies and fungitell  4.  In am, repeat CBC, procal and CRP       Signed By: Devi Stokes MD     June 9, 2022

## 2022-06-09 NOTE — PROGRESS NOTES
Bedside shift change report given to Sanford Snider (oncoming nurse) by Toshia/Isabella Platt (offgoing nurse). Report included the following information SBAR, Kardex, Procedure Summary, Intake/Output, MAR, Accordion, Recent Results and Med Rec Status.

## 2022-06-09 NOTE — PROGRESS NOTES
PULMONARY ICU NOTE  [facility]    Name: Navya Lopez MRN: [id number]   : 1950 Hospital: [facility]   Date: 2022  Admission date: 2022 Hospital Day: 3       HPI:     Hospital Problems  Date Reviewed: 2021          Codes Class Noted POA    CAD in native artery ICD-10-CM: I25.10  ICD-9-CM: 414.01  2022 Unknown        Acute on chronic systolic (congestive) heart failure (HCC) ICD-10-CM: I50.23  ICD-9-CM: 428.23, 428.0  2022 Unknown        Hypoxia ICD-10-CM: R09.02  ICD-9-CM: 799.02  2021 Unknown                   [x] High complexity decision making was performed  [x] See my orders for details      Subjective/Initial History:     I was asked by Josiah Christina MD to see Navya Lopez  a 66 y.o.  female in consultation     Excerpts from admission 2022 or consult notes as follows:   68-year-old lady came in because of shortness of breath she was seen at [facility] in [facility] and she was transferred to [facility] recently she was seen by cardiologist and patient underwent cardiac catheterization with stent placement she was complaining of shortness of breath dyspnea and she was put on oxygen via nasal cannula complaining of generalized weakness no fever no chills cough with minimal sputum so admitted and pulmonary consult was called.   Past medical history of asthma on albuterol inhaler obstructive sleep apnea not on any CPAP machine or oxygen at home history of AICD placement in the past      Allergies   Allergen Reactions    Novocain [Procaine] Nausea and Vomiting    Tramadol Other (comments)     Headache    Codeine Other (comments)     Patient states she is not allergic        MAR reviewed and pertinent medications noted or modified as needed     Current Facility-Administered Medications   Medication    DOBUTamine (DOBUTREX) 500 mg/250 mL (2,000 mcg/mL) infusion    NOREPINephrine (LEVOPHED) 8 mg in 0.9% NS 250ml infusion    albuterol-ipratropium (DUO-NEB) 2.5 MG-0.5 MG/3 ML    furosemide (LASIX) injection 40 mg    piperacillin-tazobactam (ZOSYN) 3.375 g in 0.9% sodium chloride (MBP/ADV) 100 mL MBP    metOLazone (ZAROXOLYN) tablet 2.5 mg    oxymetazoline (AFRIN) 0.05 % nasal spray 2 Spray    sodium chloride (NS) flush 5-40 mL    sodium chloride (NS) flush 5-40 mL    acetaminophen (TYLENOL) tablet 650 mg    Or    acetaminophen (TYLENOL) suppository 650 mg    polyethylene glycol (MIRALAX) packet 17 g    ondansetron (ZOFRAN ODT) tablet 4 mg    Or    ondansetron (ZOFRAN) injection 4 mg    heparin 25,000 units in D5W 250 ml infusion    amiodarone (CORDARONE) tablet 200 mg    aspirin chewable tablet 81 mg    atorvastatin (LIPITOR) tablet 40 mg    butalbital-acetaminophen-caffeine (FIORICET, ESGIC) -40 mg per tablet 1 Tablet    docusate sodium (COLACE) capsule 100 mg    DULoxetine (CYMBALTA) capsule 60 mg    escitalopram oxalate (LEXAPRO) tablet 5 mg    gabapentin (NEURONTIN) capsule 300 mg    levothyroxine (SYNTHROID) tablet 125 mcg    magnesium oxide (MAG-OX) tablet 400 mg    melatonin tablet 3 mg    metoprolol succinate (TOPROL-XL) XL tablet 12.5 mg    pantoprazole (PROTONIX) tablet 40 mg    oxybutynin (DITROPAN) tablet 5 mg    potassium chloride SR (KLOR-CON 10) tablet 20 mEq    sacubitriL-valsartan (ENTRESTO) 24-26 mg tablet 1 Tablet    ticagrelor (BRILINTA) tablet 90 mg    heparin (porcine) 1,000 unit/mL injection 4,000 Units    Or    heparin (porcine) 1,000 unit/mL injection 2,000 Units      Patient PCP: Lashawn Davis MD  [facility]:  has a past medical history of Acid indigestion, Adverse effect of anesthesia, Aneurysm ([facility] Anxiety disorder, Arthritis, Asthma, Back pain, Bladder spasms, CAD (coronary artery disease), Cerebral artery occlusion with [facility] infarction ([facility] (08/2020), Chest pain, Constipation, Cough, Diabetes mellitus, Diarrhea, Fatigue, Fatty liver, Fibromyalgia, Frequent episodic tension-type headache, GERD (gastroesophageal reflux disease), Hearing reduced, Heart attack ([address] Heart failure ([address] Hemorrhoids, Hernia of unspecified site of abdominal cavity without mention of obstruction or gangrene, HTN (hypertension), Hypothyroidism, ICD (implantable cardioverter-defibrillator) in place, Ill-defined condition, Muscle pain, N&V (nausea and vomiting), Pacemaker, Ringing in ears, Shortness of breath, Skin cancer, Sleep apnea, SOB (shortness of breath), SOB (shortness of breath), Swallowing difficulty, Thyroid disorder, Vertigo, and Wears dentures. PSH:   has a past surgical history that includes pr removal gallbladder; hx thyroidectomy; hx hysterectomy; hx carpal tunnel release; hx pacemaker (Left, 6/20/16); hx gi; hx orthopaedic; hx heent; hx breast biopsy (Left); and hx heart catheterization (2/6/16). FHX: family history includes Breast Cancer in her sister; Cancer in her sister; Cerebral aneurysm in her sister; Cerebral palsy in her brother; Diabetes in her brother, mother, and sister; Heart Disease in her father, mother, and sister; Hypertension in her sister; Lupus in her sister. SHX:  reports that she has never smoked. She has never used smokeless tobacco. She reports current alcohol use. She reports that she does not use drugs. ROS:    Review of Systems   Constitutional: Positive for malaise/fatigue. Patient is 100% nonrebreather mask   HENT: Negative. Eyes: Negative. Respiratory: Positive for shortness of breath. Cardiovascular: Negative. Gastrointestinal: Negative. Genitourinary: Negative. Musculoskeletal: Negative. Skin: Negative. Neurological: Negative. Psychiatric/Behavioral: Negative.          Objective:     Vital Signs: Telemetry:    normal sinus rhythm Intake/Output:   Visit Vitals  BP (!) 93/57 (BP 1 Location: Right upper arm, BP Patient Position: At rest)   Pulse 84   Temp 97.9 °F (36.6 °C)   Resp 22 Ht 5' 2\" (1.575 m)   Wt 78.9 kg (174 lb)   SpO2 99%   BMI 31.83 kg/m²       Temp (24hrs), Av.5 °F (36.4 °C), Min:97 °F (36.1 °C), Max:97.9 °F (36.6 °C)        O2 Device: Non-rebreather mask O2 Flow Rate (L/min): 15 l/min       Wt Readings from Last 4 Encounters:   22 78.9 kg (174 lb)   22 78.9 kg (174 lb)   22 78.9 kg (174 lb)   21 77.4 kg (170 lb 9.6 oz)          Intake/Output Summary (Last 24 hours) at 2022 0813  Last data filed at 2022 1643  Gross per 24 hour   Intake 100 ml   Output 450 ml   Net -350 ml       Last shift:      No intake/output data recorded. Last 3 shifts: 1901 -  0700  In: 100 [I.V.:100]  Out: 450 [Urine:450]       Physical Exam:     Physical Exam  Constitutional:       Appearance: She is ill-appearing and diaphoretic. Comments: Patient is on 100% nonrebreather mask   HENT:      Head: Normocephalic and atraumatic. Nose: Nose normal.      Mouth/Throat:      Mouth: Mucous membranes are moist.   Eyes:      Pupils: Pupils are equal, round, and reactive to light. Cardiovascular:      Rate and Rhythm: Normal rate. Heart sounds: Murmur heard. Pulmonary:      Effort: Pulmonary effort is normal.      Breath sounds: Rhonchi and rales present. Abdominal:      General: Abdomen is flat. Bowel sounds are normal.      Palpations: Abdomen is soft. Musculoskeletal:         General: Normal range of motion. Cervical back: Normal range of motion and neck supple. Skin:     General: Skin is warm. Neurological:      General: No focal deficit present. Mental Status: She is alert.    Psychiatric:         Mood and Affect: Mood normal.          Labs:    Recent Labs     22  2057 22  1730 22  1300 22  1023 22  0247 22  0237 22  1903 22  1100   WBC  --   --   --   --  11.5*  --  10.8 10.6   HGB  --   --   --   --  8.9*  --  9.7* 10.0*   PLT  --   --   --   --  169  --  171 188   INR  -- --   --   --   --   --   --  1.6*   APTT 116.5* 64.8* 67.8*   < > 42.0*   < > 30.5  --     < > = values in this interval not displayed. Recent Labs     06/08/22  0247 06/07/22  1200 06/07/22  1100   *  --  133*   K 3.2*  --  3.0*     --  96*   CO2 29  --  26   *  --  196*   BUN 29*  --  31*   CREA 1.20*  --  1.40*   CA 8.0*  --  8.3*   MG 1.3*  --   --    LAC  --  1.5  --    ALB 2.1*  --  2.4*   ALT 15  --  19     Recent Labs     06/07/22  1200   PH 7.50*   PCO2 31*   PO2 84   HCO3 24   FIO2 28.0     No results for input(s): CPK, CKNDX, TROIQ in the last 72 hours. No lab exists for component: CPKMB  No results found for: BNPP, BNP   Lab Results   Component Value Date/Time    Culture result: No growth after 18 hours 06/07/2022 12:00 PM     Lab Results   Component Value Date/Time    TSH 0.267 (L) 08/04/2021 10:48 AM       Imaging:    CXR Results  (Last 48 hours)               06/08/22 0710  XR CHEST PORT Final result    Narrative:  Chest single view. Comparison single chest June 7, 2022. Similar patchy airspace opacity through lungs; predominant involvement right   upper lobe lung. Left-sided cardiac device. Cardiac silhouette enlargement. Atherosclerotic change thoracic aorta. No pneumothorax or sizable pleural   effusion. 06/07/22 1116  XR CHEST PORT Final result    Narrative:  1 view comparison [address]       Mild to moderate patchy mixed infiltrate, right mid lung and left lower lung and   perhaps right base. No effusion. Borderline cardiomegaly without congestion           Results from Hospital Encounter encounter on 06/07/22    XR CHEST PORT    Narrative  Chest single view. Comparison single chest June 7, 2022. Similar patchy airspace opacity through lungs; predominant involvement right  upper lobe lung. Left-sided cardiac device. Cardiac silhouette enlargement. Atherosclerotic change thoracic aorta. No pneumothorax or sizable pleural  effusion.       Results from Hospital Encounter encounter on 06/07/22    XR CHEST PORT    Narrative  1 view comparison [address]    Mild to moderate patchy mixed infiltrate, right mid lung and left lower lung and  perhaps right base. No effusion. Borderline cardiomegaly without congestion      Results from Hospital Encounter encounter on 06/15/21    XR CHEST PA LAT    Narrative  2 view comparison January 25    Impression  The cardiomediastinal silhouette is appropriate for age, technique,  and lung expansion. Pulmonary vasculature is not congested. The lungs are  essentially clear. No effusion or pneumothorax is seen. Results from [place] encounter on 06/07/22    CT CHEST WO CONT    Narrative  COMPARISON:Chest x-ray 6/7/2022. Chest CT 6/15/2021. HISTORY: Lung infiltrates. Technique: Axial imaging chest without IV contrast,  with multiplanar formatting  and MIPs. Dose reduction: All CT scans at this facility are performed using dose reduction  optimization techniques as appropriate to a performed exam including the  following: Automated exposure control, adjustments of the mA and/or kV according  to patient's size, or use of iterative reconstruction technique. FINDINGS:  HEART: Increased Cardiomegaly. Cardiac leads right atrium, right ventricle,  coronary sinus. Multivessel coronary artery calcification. No pericardial  effusion. THORACIC AORTA: Calcific atherosclerosis. No aneurysm. PULMONARY ARTERIES: Nondilated. ADENOPATHY:  New Mediastinal and subcarinal adenopathy measuring up to 2.2 cm  short axis. THORACIC ESOPHAGUS:Collapsed. LUNG PARENCHYMA: Right greater than left pulmonary airspace disease has  increased compared to the prior chest CT. CENTRAL AIRWAYS: Major endobronchial tree clear. PLEURA: Trace bilateral pleural effusions. CHEST WALL: No axillary adenopathy. Enlarged right thyroid lobe unchanged. Left  lobe is absent or atrophic. UPPER ABDOMEN:  Calcific atherosclerosis.   BONES: Unremarkable for age.    Impression  1. Increased right greater than left pulmonary airspace edema and/or pneumonia. New mediastinal and subcarinal adenopathy. 2. Increased cardiomegaly. IMPRESSION:   1. Acute hypoxic respiratory failure  2. Pulmonary edema  3. Ischemic cardiomyopathy  4. HFrEF ejection fraction 20% with severe tricuspid regurgitation  5. Coronary artery disease recent status post stent placement  6. Rule out aspiration pneumonia bilateral upper lobe  7. Mediastinal and subcarinal adenopathy most likely reactive  8. Paroxysmal atrial fibrillation  9. Hypokalemia  10. History of asthma and obstructive sleep apnea  11. Pt is requiring Drug therapy requiring intensive monitoring for toxicity  12. Pt is unstable, unpredictable needing inpatient monitoring; is acutely ill and at high risk of sudden decline and decompensation with severe consequenses and continued end organ dysfunction and failure  13. Prognosis guarded       RECOMMENDATIONS/PLAN:     1. Patient is on 100% nonrebreather mask as oxygen as salvage oxygen delivery device to provide high concentration of oxygen to overcome refractory hypoxia; blood gases acceptable  2. Will change to high flow nasal cannula will get arterial blood gases  3. Patient is hemodynamically unstable most likely cardiogenic shock will start patient on dobutamine and Levophed  4. Patient COVID-19 test was negative on 6/7  5. Recent cardiac catheterization with status post stent on 6/2 along with single drug-eluting stent patient is on aspirin metoprolol and Brilinta  6. Lasix and Zaroxolyn on board as blood pressure is low will hold Zaroxolyn BNP 5477 elevated troponin probably secondary demand ischemia  7. Patient on Zosyn possibility of aspiration  8. Patient on nebulizer treatment she was taking albuterol inhaler at home  9.  Recent history of stent placement continue cardiac monitoring increase Lasix to 40 twice a day and repeat chest x-ray in couple of days  10. Supplemental O2 to keep sats > 93%  11. Aspiration precautions  12. Labs to follow electrolytes, renal function and and blood counts  13. Glucose monitoring and SSI  14. Bronchial hygiene with respiratory therapy techniques, bronchodilators  15. DVT, SUP prophylaxis       This care involved high complexity medical decision making: I personally:  · Reviewed the flowsheet and previous days notes  · Reviewed and summarized records or history from previous days note or discussions with staff, family  · High Risk Drug therapy requiring intensive monitoring for toxicity: eg steroids, pressors, antibiotics  · Reviewed and/or ordered Clinical lab tests  · Reviewed images and/or ordered Radiology tests  · Reviewed the patients ECG / Telemetry  · Reviewed and/or adjusted NiPPV settings  · Called and arranged for Radiologic procedures or interventions  · performed or ordered Diagnostic endoscopies with identified risk factors.   · discussed my assessment/management with : Nursing, Hospitalist and Family for coordination of care  · Time spent in ICU patient care 35 minutes          Jeanne Mccullough MD

## 2022-06-09 NOTE — PROGRESS NOTES
PICC Placement Note blood cultures drawn prior to insertion    PRE-PROCEDURE VERIFICATION  Correct Procedure: yes  Correct Site:  yes  Temperature: Temp: 97.9 °F (36.6 °C), Temperature Source: Temp Source: Oral  Recent Labs     06/08/22  0247 06/07/22  1903 06/07/22  1100   BUN 29*  --  31*   CREA 1.20*  --  1.40*      < > 188   INR  --   --  1.6*   WBC 11.5*   < > 10.6    < > = values in this interval not displayed. Allergies: Novocain [procaine], Tramadol, and Codeine  Education materials for PICC Care given to family: yes. See Patient Education activity for further details. PICC Booklet placed on chart: yes    PROCEDURE DETAIL blood cultures drawn prior to procedure patient has a pace maker on the left  A double lumen PICC line was started for vascular access. The following documentation is in addition to the PICC properties in the lines/airways flowsheet :  Lot #: [id number]  xylocaine used: yes  Mid-Arm Circumference: 32 (cm)  Internal Catheter Length: 37 (cm) Internal catheter pulled back to 34cm post xray to 34. PICC tip resides in the right upper atrium and Dr Yogi Millard is aware. Internal Catheter Total Length: 38 (cm)  Vein Selection for PICC:right basilic  Central Line Bundle followed yes  Complication Related to Insertion: none    The placement was verified by xray because of pacer interference and found to be too long: .  The picc was pulled back   4 cm and a second xray obtained     Line is okay to use: per Dr Olivia, RN

## 2022-06-10 NOTE — PROGRESS NOTES
Progress Note    Patient: Bessie Franco MRN: [id number]  SSN: [id number]    YOB: 1950  Age: 66 y.o. Sex: female      Admit Date: 6/7/2022    LOS: 3 days     Subjective:     Patient seen and examined. Followed for acute on chronic HFrEF and CAD. In bed, on NRB. On dobutamine at 10, levo 18, vaso 0.01, heparin gtt. Continues with shortness of breath. No chest pain. No other complaints offered.     Telemetry Reviewed    Review of Symptoms: as above    Objective:     Vitals:    06/10/22 0900 06/10/22 1000 06/10/22 1100 06/10/22 1200   BP: (!) 106/45 (!) 98/52 113/61 (!) 101/53   Pulse: (!) 109 (!) 109 (!) 107 (!) 107   Resp: 25 26 27 25   Temp:   97.9 °F (36.6 °C)    SpO2: 93% 91% 91% 91%   Weight:       Height:            Intake and Output:  Current Shift: 06/10 0701 - 06/10 1900  In: -   Out: 295 [Urine:295]  Last three shifts: 06/08 1901 - 06/10 0700  In: 955.8 [I.V.:955.8]  Out: 1700 [Urine:1700]    Physical Exam:   General: Alert, ill appearing, on NRB  RESP:  Coarse crackles, wheezes  Cardiovascular: ST  PVS: no edema  ABD: rounded, soft  PSYCH: unable to assess      Lab/Data Review:  Labs reviewed        Current Facility-Administered Medications:     DOBUTamine (DOBUTREX) 500 mg/250 mL (2,000 mcg/mL) infusion, 0-10 mcg/kg/min, IntraVENous, TITRATE, Prabhakar Martinez MD, Last Rate: 16.6 mL/hr at 06/10/22 1148, 7 mcg/kg/min at 06/10/22 1148    NOREPINephrine (LEVOPHED) 8 mg in 0.9% NS 250ml infusion, 0.5-16 mcg/min, IntraVENous, TITRATE, Prabhakar Martinez MD, Last Rate: 33.8 mL/hr at 06/10/22 1035, 18 mcg/min at 06/10/22 1035    potassium chloride SR (KLOR-CON 10) tablet 40 mEq, 40 mEq, Oral, DAILY, Prabhakar Martinez MD, 40 mEq at 06/10/22 1053    doxycycline (VIBRAMYCIN) 100 mg in 0.9% sodium chloride (MBP/ADV) 100 mL MBP, 100 mg, IntraVENous, Q12H, Antwan Kaiser MD, Last Rate: 100 mL/hr at 06/10/22 0154, 100 mg at 06/10/22 0154    vasopressin (VASOSTRICT) 20 Units in 0.9% sodium chloride 100 mL infusion, 0.01-0.03 Units/min, IntraVENous, TITRATE, Noah Johnson MD, Held at 06/09/22 1900    albuterol-ipratropium (DUO-NEB) 2.5 MG-0.5 MG/3 ML, 3 mL, Nebulization, Q6HWA RT, Prabhakar Martinez MD, 3 mL at 06/10/22 0833    furosemide (LASIX) injection 40 mg, 40 mg, IntraVENous, BID, Prabhakar Martinez MD, 40 mg at 06/10/22 1050    [COMPLETED] piperacillin-tazobactam (ZOSYN) 4.5 g in 0.9% sodium chloride (MBP/ADV) 100 mL MBP, 4.5 g, IntraVENous, ONCE, IV Completed at 06/08/22 1153 **FOLLOWED BY** piperacillin-tazobactam (ZOSYN) 3.375 g in 0.9% sodium chloride (MBP/ADV) 100 mL MBP, 3.375 g, IntraVENous, Q8H, Prabhakar Martinez MD, Last Rate: 25 mL/hr at 06/10/22 0951, 3.375 g at 06/10/22 0951    metOLazone (ZAROXOLYN) tablet 2.5 mg, 2.5 mg, Oral, Q24H, Jonnie Poe NP, 2.5 mg at 06/09/22 1043    sodium chloride (NS) flush 5-40 mL, 5-40 mL, IntraVENous, Q8H, Gerard Davis MD, 10 mL at 06/10/22 0554    sodium chloride (NS) flush 5-40 mL, 5-40 mL, IntraVENous, PRN, Evelyn Kunz MD    acetaminophen (TYLENOL) tablet 650 mg, 650 mg, Oral, Q6H PRN **OR** acetaminophen (TYLENOL) suppository 650 mg, 650 mg, Rectal, Q6H PRN, Gerard Abdi MD    polyethylene glycol (MIRALAX) packet 17 g, 17 g, Oral, DAILY PRN, Gerard Abdi MD    ondansetron (ZOFRAN ODT) tablet 4 mg, 4 mg, Oral, Q8H PRN **OR** ondansetron (ZOFRAN) injection 4 mg, 4 mg, IntraVENous, Q6H PRN, Evelyn Kunz MD    heparin 25,000 units in D5W 250 ml infusion, 12-25 Units/kg/hr (Adjusted), IntraVENous, TITRATE, Gerard Davis MD, Stopped at 06/10/22 1100    amiodarone (CORDARONE) tablet 200 mg, 200 mg, Oral, DAILY, Gerard Davis MD, 200 mg at 06/10/22 1050    aspirin chewable tablet 81 mg, 81 mg, Oral, DAILY, Evelyn Kunz MD, 81 mg at 06/10/22 1050    atorvastatin (LIPITOR) tablet 40 mg, 40 mg, Oral, DAILY, Evelyn Kunz MD, 40 mg at 06/10/22 1050    butalbital-acetaminophen-caffeine (FIORICET, ESGIC) -40 mg per tablet 1 Tablet, 1 Tablet, Oral, DAILY PRN, Woodrow Patel MD    docusate sodium (COLACE) capsule 100 mg, 100 mg, Oral, BID, Ashu Mixon MD, 100 mg at 06/09/22 1044    DULoxetine (CYMBALTA) capsule 60 mg, 60 mg, Oral, DAILY, Ashu Mixon MD, 60 mg at 06/10/22 1053    escitalopram oxalate (LEXAPRO) tablet 5 mg, 5 mg, Oral, DAILY, SusanWoodrow MD, 5 mg at 06/10/22 1053    gabapentin (NEURONTIN) capsule 300 mg, 300 mg, Oral, BID, Ashu Mixon MD, 300 mg at 06/10/22 1050    levothyroxine (SYNTHROID) tablet 125 mcg, 125 mcg, Oral, ACB, Ashu Mixon MD, 125 mcg at 06/09/22 1038    magnesium oxide (MAG-OX) tablet 400 mg, 400 mg, Oral, BID, Ashu Mixon MD, 400 mg at 06/10/22 1050    melatonin tablet 3 mg, 3 mg, Oral, QHS PRN, Ashu Mixon MD, 3 mg at 06/09/22 0400    metoprolol succinate (TOPROL-XL) XL tablet 12.5 mg, 12.5 mg, Oral, DAILY, Ashu Mixon MD, 12.5 mg at 06/08/22 9906    pantoprazole (PROTONIX) tablet 40 mg, 40 mg, Oral, DAILY, Ashu Mixon MD, 40 mg at 06/10/22 1050    oxybutynin (DITROPAN) tablet 5 mg, 5 mg, Oral, BID, Ashu Mixon MD, 5 mg at 06/10/22 1050    sacubitriL-valsartan (ENTRESTO) 24-26 mg tablet 1 Tablet, 1 Tablet, Oral, BID, Ashu Mixon MD, 1 Tablet at 06/09/22 1043    ticagrelor (BRILINTA) tablet 90 mg, 90 mg, Oral, BID, Ashu iMxon MD, 90 mg at 06/10/22 1050    heparin (porcine) 1,000 unit/mL injection 4,000 Units, 4,000 Units, IntraVENous, PRN **OR** heparin (porcine) 1,000 unit/mL injection 2,000 Units, 2,000 Units, IntraVENous, PRN, Ashu Mixon MD, 2,000 Units at 06/09/22 2007      Assessment:     Active Problems:    Hypoxia (1/21/2021)      CAD in native artery (6/7/2022)      Acute on chronic systolic (congestive) heart failure ([facility] [address] (6/7/2022)        Plan:     Our impression and recommendations are as follows:     1.  Ischemic Cardiomyopathy with chronic HFrEF, in acute respiratory failure:  - volume overloaded, diuresed well overnight, try to wean dobutamine today given high dose  -continue to treat possible infection, ID following  - s/p [employer] CRT-D in place, Last report showed 2 AT/AF events, no shocks delivered. Heart Logic report elevated  - Limited echo with EF 30-35%, severe TR    - holding tier 1 Entresto, BB, due to low BP.     2. Coronary artery disease:   - 6/2/22 S/p PIC to mid LAD with single ORALIA. - continue asa, statin, metoprolol, and Brilinta (Plavix nonresponder)  - HS troponin peaked at 318, currently 256, EKG without ischemic changes. No active CP.   -completed 48 hours hep gtt     3. Severe tricuspid regurgitation:   - echo showed worsening TR, moderate to severe 4/22      4. Paroxysmal atrial fibrillation:   - continue amiodarone, off OAC given history of aneurysms, consider watchman as OP    5. Hypertension:   - wean pressors as able     6. Hyperlipidemia:   - continue statin therapy    Thank you for involving us in the care of this patient. Please do not hesitate to call if additional questions arise.  If after hours please call [phone]

## 2022-06-10 NOTE — PROGRESS NOTES
OCCUPATIONAL THERAPY EVALUATION  Patient: Neri Falk (83 y.o. female)  Date: 6/10/2022  Primary Diagnosis: Hypoxia [R09.02]  Acute on chronic systolic (congestive) heart failure (HCC) [I50.23]  CAD in native artery [I25.10]        Precautions: fall risk       ASSESSMENT  Pt is a 65 y/o F with PMH of arthritis, back pain, and asthma presenting to [facility] from [facility] w/ c/o SOB, hypoxia, and cough, admitted 6/7 and being treated for acute respiratory failure w/ hypoxia and ischemic cardiomyopathy. Pt received semi-supine in bed upon arrival w/ family at bedside (family remained in room w/ pt's permission), AXO x4, and agreeable to OT evaluation at this time. Per pt report, pt lives with  and son in a one-story home with 3 LORENZA and B HR, was IND with ADLs and Mod I using rollator/SPC for mobility at [facility]. Pt reports she has a walk in shower and uses a shower chair. She denies recent falls and no home O2. Pt currently w/ 15L on NRB. Based on current observations, pt presents with deficits in generalized strength/AROM, bed mobility, static/dynamic sitting balance, static/dynamic standing balance, functional activity tolerance, and pain impacting overall performance of ADLs and functional transfers/mobility. Pt currently requires min A to [facility]/don hospital gown in supine. She req'd SBA to roll R and complete sup>sit transfer w/ HOB elevated. Pt reported dizziness at EOB; unable to provide accurate reading for BP. Pt then reported she needed to have a bowel movement; she was returned to supine w/ SBA and bed pan placed under her. O2 dropped to low 80s following sit>sup transfer and req'd prolonged time to recover. Overall, pt tolerates session fair w/out c/o pain, however, reported she does has some chest pain occasionally w/ BUE tingling.  Anticipate pt return to baseline as she become medically stable, however, at this time pt would benefit from skilled acute OT services to address current impairments and improve IND and safety with self cares and functional transfers/mobility. Current OT d/c recommendation SNF vs. [facility] pending progress once medically appropriate. Spoke w/ daughter, who reports pt will most likely want to discharge home when she is stable. Other factors to consider for discharge: family/social support, DME, time since onset, severity of deficits, decline from functional baseline     Patient will benefit from skilled therapy intervention to address the above noted impairments. PLAN :  Recommendations and Planned Interventions: self care training, functional mobility training, therapeutic exercise, balance training, therapeutic activities, endurance activities, patient education and home safety training    Frequency/Duration: Patient will be followed by occupational therapy:  3-5x/week to address goals. Recommendation for discharge: (in order for the patient to meet his/her long term goals)  SNF vs. [facility] pending medical progress    This discharge recommendation:  Has been made in collaboration with the attending provider and/or case management    IF patient discharges home will need the following DME: TBD next placement        SUBJECTIVE:   Patient stated I am dizzy.     OBJECTIVE DATA SUMMARY:   HISTORY:   Past Medical History:   Diagnosis Date    Acid indigestion     heartburn    Adverse effect of anesthesia     Preop anxiety     Aneurysm ([facility] [address]     cerebral aneurysm patient stated is small     Anxiety disorder     Arthritis     Asthma     Back pain     Bladder spasms     CAD (coronary artery disease)     Patient stated has once stent     Cerebral artery occlusion with cerebral infarction ([facility] [address] 08/2020    Chest pain     Constipation     Cough     Diabetes mellitus     Diarrhea     Fatigue     Fatty liver     Fibromyalgia     Frequent episodic tension-type headache     GERD (gastroesophageal reflux disease)     Hearing reduced     Heart attack (HCC)     mild    Heart failure (HCC)     Hemorrhoids     Hernia of unspecified site of abdominal cavity without mention of obstruction or gangrene     HTN (hypertension)     Hypothyroidism     ICD (implantable cardioverter-defibrillator) in place     Ill-defined condition     Patient has noticed nodules in her neck on both sides stated she is going to make her PCP aware;     Muscle pain     joint pain    N&V (nausea and vomiting)     Pacemaker     biventricular ICD 1/26/18    Ringing in ears     Shortness of breath     Skin cancer     Sleep apnea     cant tolerate the machine    SOB (shortness of breath)     SOB (shortness of breath)     Swallowing difficulty     Thyroid disorder     Vertigo     Wears dentures      Past Surgical History:   Procedure Laterality Date    HX BREAST BIOPSY Left     HX CARPAL TUNNEL RELEASE      right hand    HX GI      HX HEART CATHETERIZATION  2/6/16    placed one stent     HX HEENT      thyroid     HX HYSTERECTOMY      HX ORTHOPAEDIC      HX PACEMAKER Left 6/20/16    [employer] ICD    HX THYROIDECTOMY      [place] REMOVAL GALLBLADDER         Expanded or extensive additional review of patient history:     Home Situation  Home Environment: Private residence  # Steps to Enter: 3  Rails to Enter: Yes  Hand Rails : Bilateral  One/Two Story Residence: One story  Living Alone: No  Support Systems: Spouse/Significant Other,Child(juana)  Patient Expects to be Discharged to[de-identified] Home with home health  Current DME Used/Available at Home: Micheal Newness, rollator,Cane, straight,Shower chair  Tub or Shower Type: Shower    Hand dominance: Right    EXAMINATION OF PERFORMANCE DEFICITS:  Cognitive/Behavioral Status:  Neurologic State: Alert  Orientation Level: Oriented X4  Cognition: Follows commands; Appropriate decision making; Appropriate safety awareness                 Hearing:   Auditory  Auditory Impairment: Hard of hearing, right side      Range of Motion:  AROM: Generally decreased, functional Strength:  Strength: Generally decreased, functional                Coordination:     Fine Motor Skills-Upper: Left Intact; Right Intact    Gross Motor Skills-Upper: Left Intact; Right Intact    Tone & Sensation:     Sensation: Intact                      Balance:  Sitting: Impaired; Without support  Sitting - Static: Fair (occasional)  Sitting - Dynamic: Fair (occasional)    Functional Mobility and Transfers for ADLs:  Bed Mobility:  Rolling: Stand-by assistance  Supine to Sit: Stand-by assistance  Sit to Supine: Stand-by assistance  Scooting: Stand-by assistance    Transfers:       ADL Assessment:                                            ADL Intervention and task modifications:                      Upper Body [address]: Minimum  assistance                   Therapeutic Exercise:  Pt will benefit from BUE HEP to improve participation in ADLs and mobility. Plan will be initiated at next session. Functional Measure:    [facility] [employer] AM-PACTM \"6 Clicks\"                                                       Daily Activity Inpatient Short Form  How much help from another person does the patient currently need. .. Total; A Lot A Little None   1. Putting on and taking off regular lower body clothing? [x]  1 []  2 []  3 []  4   2. Bathing (including washing, rinsing, drying)? []  1 [x]  2 []  3 []  4   3. Toileting, which includes using toilet, bedpan or urinal? [x] 1 []  2 []  3 []  4   4. Putting on and taking off regular upper body clothing? []  1 []  2 [x]  3 []  4   5. Taking care of personal grooming such as brushing teeth? []  1 []  2 [x]  3 []  4   6. Eating meals? []  1 []  2 []  3 [x]  4   © 2007, Trustees of [employer], under license to [employer]. All rights reserved     Score: 19/24     Interpretation of Tool:  Represents clinically-significant functional categories (i.e. Activities of daily living).   Percentage of Impairment CH    0% CI    1-19% CJ    20-39% CK    40-59% CL    60-79% CM    80-99% CN     100%   [facility]  Score 6-24 24 23 20-22 15-19 10-14 7-9 6         Occupational Therapy Evaluation Charge Determination   History Examination Decision-Making   MEDIUM Complexity : Expanded review of history including physical, cognitive and psychosocial  history  MEDIUM Complexity : 3-5 performance deficits relating to physical, cognitive , or psychosocial skils that result in activity limitations and / or participation restrictions MEDIUM Complexity : Patient may present with comorbidities that affect occupational performnce. Miniml to moderate modification of tasks or assistance (eg, physical or verbal ) with assesment(s) is necessary to enable patient to complete evaluation       Based on the above components, the patient evaluation is determined to be of the following complexity level: MEDIUM  Pain Rating:  Pt did not report pain during session    Activity Tolerance:   Fair, requires rest breaks and observed SOB with activity    After treatment patient left in no apparent distress:    Supine in bed, Call bell within reach, Caregiver / family present and Side rails x 3    COMMUNICATION/EDUCATION:   The patients plan of care was discussed with: Registered nurse. Patient/family have participated as able in goal setting and plan of care. and Patient/family agree to work toward stated goals and plan of care. This patients plan of care is appropriate for delegation to [facility]. Thank you for this referral.  Mikki Sandoval OT  Time Calculation: 28 mins   Problem: Self Care Deficits Care Plan (Adult)  Goal: *Acute Goals and Plan of Care (Insert Text)  Description: Pt stated goal \"I want to go home \".      Pt will be IND sup <>sit in prep for EOB ADLs  Pt will be Mod I grooming standing at sinktop LRAD  Pt will be IND LE dressing sitting EOB/long sit  Pt will be Mod I sit <>  prep for toileting LRAD  Pt will be Mod I toileting/toilet transfer/cloth mgmt LRAD  Pt will be IND following UE HEP in prep for self care tasks   Outcome: Not Met

## 2022-06-10 NOTE — PROGRESS NOTES
Hospitalist Progress Note         Cesar Dillard MD          Daily Progress Note: 6/10/2022      Subjective: The patient is seen for follow  up.71 y.o. female who is transferred from [facility] ER where she presented with SOB, hypoxia, cough. Patient recently had PCI/LAD Stent 5 days ago by Dr. Rena Juarez. Denies fever, myalgia but +chills. Has had Covid last year with similar presentation; her Covid test reportedly is negative. She has severe CMP with EF 25% and has PPM/AICD - no discharge. LHC was via Right Femoral; no hematoma noted or peripheral leg changes. ---    Patient seen in follow-up. Notes shortness of breath with exertion. transfered to ICU due to hypotension and hypoxemia. Started on dobutamine for cardiogenic shock. Awake and alert and on high flow oxygen.     6/10/22 no new complaints, tolerating medications well  Remains in critical condition in ICU  Remains on dobutamine drip, norepinephrine drip, vasopressin drip, heparin drip  Intravenous Zosyn and doxycycline per ID  Overall prognosis very poor    Problem List:  Problem List as of 6/10/2022 Date Reviewed: 8/4/2021          Codes Class Noted - Resolved    Biventricular ICD (implantable cardioverter-defibrillator) in place ICD-10-CM: Z95.810  ICD-9-CM: V45.02  1/26/2018 - Present        CAD in native artery ICD-10-CM: I25.10  ICD-9-CM: 414.01  6/7/2022 - Present        Acute on chronic systolic (congestive) heart failure (HCC) ICD-10-CM: I50.23  ICD-9-CM: 428.23, 428.0  6/7/2022 - Present        Acute CHF (congestive heart failure) ([facility] [address] ICD-10-CM: I50.9  ICD-9-CM: 428.0  6/15/2021 - Present        Sinus congestion ICD-10-CM: R09.81  ICD-9-CM: 478.19  6/15/2021 - Present        Acquired hypothyroidism ICD-10-CM: E03.9  ICD-9-CM: 244.9  3/17/2021 - Present        History of lobectomy of thyroid ICD-10-CM: Z90.09  ICD-9-CM: V45.79  3/17/2021 - Present        Chest pain ICD-10-CM: R07.9  ICD-9-CM: 786.50  3/11/2021 - Present        Hypoxia ICD-10-CM: R09.02  ICD-9-CM: 799.02  1/21/2021 - Present        Elevated troponin ICD-10-CM: R77.8  ICD-9-CM: 790.6  1/21/2021 - Present        Atrial fibrillation (HCC) ICD-10-CM: I48.91  ICD-9-CM: 427.31  11/16/2020 - Present        Acute on chronic congestive heart failure (HCC) ICD-10-CM: I50.9  ICD-9-CM: 428.0  11/16/2020 - Present        Hypomagnesemia ICD-10-CM: E83.42  ICD-9-CM: 275.2  9/28/2020 - Present        Hyperkalemia ICD-10-CM: E87.5  ICD-9-CM: 276.7  9/26/2020 - Present        CHF exacerbation (HCC) ICD-10-CM: I50.9  ICD-9-CM: 428.0  9/22/2020 - Present        Severe obesity ([facility] ICD-10-CM: E66.01  ICD-9-CM: 278.01  9/2/2020 - Present        Dysarthria ICD-10-CM: R47.1  ICD-9-CM: 784.51  8/11/2020 - Present        TIA (transient ischemic attack) ICD-10-CM: G45.9  ICD-9-CM: 435.9  8/10/2020 - Present        Cardiomyopathy ([facility] ICD-10-CM: I42.9  ICD-9-CM: 425.4  5/23/2016 - Present        Skin cancer ICD-10-CM: C44.90  ICD-9-CM: 173.90  Unknown - Present        Acid indigestion ICD-10-CM: K30  ICD-9-CM: 536.8  Unknown - Present        Asthma ICD-10-CM: 493  ICD-9-CM: 814  Unknown - Present        Back pain ICD-10-CM: M54.9  ICD-9-CM: 724.5  Unknown - Present        Wears dentures ICD-10-CM: Z97.2  ICD-9-CM: V45.84  Unknown - Present        Constipation ICD-10-CM: 564.0  ICD-9-CM: 564.0  Unknown - Present        Hyperglycemia due to type 2 diabetes mellitus ([address] ICD-10-CM: E11.65  ICD-9-CM: 250.00  Unknown - Present        Diarrhea ICD-10-CM: R19.7  ICD-9-CM: 787.91  Unknown - Present        Frequent episodic tension-type headache ICD-10-CM: W19.615  ICD-9-CM: 339.11  Unknown - Present        Hemorrhoids ICD-10-CM: 615  ICD-9-CM: 311  Unknown - Present        Hernia of unspecified site of abdominal cavity without mention of obstruction or gangrene ICD-10-CM: K46.9  ICD-9-CM: 553.9  Unknown - Present        HTN (hypertension) ICD-10-CM: I10  ICD-9-CM: 401.9  Unknown - Present Muscle pain ICD-10-CM: M79.10  ICD-9-CM: 729.1  Unknown - Present        SOB (shortness of breath) ICD-10-CM: R06.02  ICD-9-CM: 786.05  Unknown - Present        Thyroid disorder ICD-10-CM: E07.9  ICD-9-CM: 246. 9  Unknown - Present        RESOLVED: CHF (congestive heart failure) (HCC) ICD-10-CM: I50.9  ICD-9-CM: 428.0  9/25/2020 - 8/3/2021              Medications reviewed  Current Facility-Administered Medications   Medication Dose Route Frequency    DOBUTamine (DOBUTREX) 500 mg/250 mL (2,000 mcg/mL) infusion  0-10 mcg/kg/min IntraVENous TITRATE    NOREPINephrine (LEVOPHED) 8 mg in 0.9% NS 250ml infusion  0.5-16 mcg/min IntraVENous TITRATE    potassium chloride SR (KLOR-CON 10) tablet 40 mEq  40 mEq Oral DAILY    doxycycline (VIBRAMYCIN) 100 mg in 0.9% sodium chloride (MBP/ADV) 100 mL MBP  100 mg IntraVENous Q12H    vasopressin (VASOSTRICT) 20 Units in 0.9% sodium chloride 100 mL infusion  0.01-0.03 Units/min IntraVENous TITRATE    albuterol-ipratropium (DUO-NEB) 2.5 MG-0.5 MG/3 ML  3 mL Nebulization Q6HWA RT    furosemide (LASIX) injection 40 mg  40 mg IntraVENous BID    piperacillin-tazobactam (ZOSYN) 3.375 g in 0.9% sodium chloride (MBP/ADV) 100 mL MBP  3.375 g IntraVENous Q8H    metOLazone (ZAROXOLYN) tablet 2.5 mg  2.5 mg Oral Q24H    sodium chloride (NS) flush 5-40 mL  5-40 mL IntraVENous Q8H    sodium chloride (NS) flush 5-40 mL  5-40 mL IntraVENous PRN    acetaminophen (TYLENOL) tablet 650 mg  650 mg Oral Q6H PRN    Or    acetaminophen (TYLENOL) suppository 650 mg  650 mg Rectal Q6H PRN    polyethylene glycol (MIRALAX) packet 17 g  17 g Oral DAILY PRN    ondansetron (ZOFRAN ODT) tablet 4 mg  4 mg Oral Q8H PRN    Or    ondansetron (ZOFRAN) injection 4 mg  4 mg IntraVENous Q6H PRN    heparin 25,000 units in D5W 250 ml infusion  12-25 Units/kg/hr (Adjusted) IntraVENous TITRATE    amiodarone (CORDARONE) tablet 200 mg  200 mg Oral DAILY    aspirin chewable tablet 81 mg  81 mg Oral DAILY  atorvastatin (LIPITOR) tablet 40 mg  40 mg Oral DAILY    butalbital-acetaminophen-caffeine (FIORICET, ESGIC) -40 mg per tablet 1 Tablet  1 Tablet Oral DAILY PRN    docusate sodium (COLACE) capsule 100 mg  100 mg Oral BID    DULoxetine (CYMBALTA) capsule 60 mg  60 mg Oral DAILY    escitalopram oxalate (LEXAPRO) tablet 5 mg  5 mg Oral DAILY    gabapentin (NEURONTIN) capsule 300 mg  300 mg Oral BID    levothyroxine (SYNTHROID) tablet 125 mcg  125 mcg Oral ACB    magnesium oxide (MAG-OX) tablet 400 mg  400 mg Oral BID    melatonin tablet 3 mg  3 mg Oral QHS PRN    metoprolol succinate (TOPROL-XL) XL tablet 12.5 mg  12.5 mg Oral DAILY    pantoprazole (PROTONIX) tablet 40 mg  40 mg Oral DAILY    oxybutynin (DITROPAN) tablet 5 mg  5 mg Oral BID    sacubitriL-valsartan (ENTRESTO) 24-26 mg tablet 1 Tablet  1 Tablet Oral BID    ticagrelor (BRILINTA) tablet 90 mg  90 mg Oral BID    heparin (porcine) 1,000 unit/mL injection 4,000 Units  4,000 Units IntraVENous PRN    Or    heparin (porcine) 1,000 unit/mL injection 2,000 Units  2,000 Units IntraVENous PRN       Review of Systems:   A comprehensive review of systems was negative except for that written in the HPI. Objective:   Physical Exam:     Visit Vitals  /85 (BP 1 Location: Left upper arm, BP Patient Position: At rest)   Pulse (!) 108   Temp 98.4 °F (36.9 °C)   Resp 26   Ht 5' 2.01\" (1.575 m)   Wt 81.5 kg (179 lb 10.8 oz)   SpO2 92%   BMI 32.85 kg/m²    O2 Flow Rate (L/min): 15 l/min O2 Device: Non-rebreather mask    Temp (24hrs), Av.1 °F (36.7 °C), Min:97.6 °F (36.4 °C), Max:98.5 °F (36.9 °C)    06/10 0701 - 06/10 1900  In: -   Out: 170 [Urine:170]   1901 - 06/10 0700  In: 955.8 [I.V.:955.8]  Out: 1700 [Urine:1700]    Physical Exam  Constitutional:       Appearance: She is ill-appearing and diaphoretic. Comments: Patient is on 100% nonrebreather mask   HENT:      Head: Normocephalic and atraumatic.       Nose: Nose normal. Mouth/Throat:      Mouth: Mucous membranes are moist.   Eyes:      Pupils: Pupils are equal, round, and reactive to light. Cardiovascular:      Rate and Rhythm: Normal rate. Heart sounds: Murmur heard. Pulmonary:      Effort: Pulmonary effort is normal.      Breath sounds: Rhonchi and rales present. Abdominal:      General: Abdomen is flat. Bowel sounds are normal.      Palpations: Abdomen is soft. Musculoskeletal:         General: Normal range of motion. Cervical back: Normal range of motion and neck supple. Skin:     General: Skin is warm. Neurological:      General: No focal deficit present. Mental Status: She is alert.    Psychiatric:         Mood and Affect: Mood normal.     Data Review:       Recent Days:  Recent Labs     06/10/22  0230 06/08/22  0247 06/07/22  1903   WBC 11.9* 11.5* 10.8   HGB 8.7* 8.9* 9.7*   HCT 27.8* 26.9* 30.2*    169 171     Recent Labs     06/10/22  0230 06/08/22  0247 06/07/22  1100   * 134* 133*   K 3.3* 3.2* 3.0*   CL 97 100 96*   CO2 28 29 26   * 224* 196*   BUN 32* 29* 31*   CREA 1.16* 1.20* 1.40*   CA 8.0* 8.0* 8.3*   MG  --  1.3*  --    ALB 1.9* 2.1* 2.4*   TBILI 1.1* 1.0 1.3*   ALT 14 15 19   INR  --   --  1.6*     Recent Labs     06/10/22  0524 06/07/22  1200   PH 7.35 7.50*   PCO2 47* 31*   PO2 60* 84   HCO3 24 24   FIO2 100.0 28.0       24 Hour Results:  Recent Results (from the past 24 hour(s))   URINALYSIS W/MICROSCOPIC    Collection Time: 06/09/22 10:10 AM   Result Value Ref Range    Color Georgina      Appearance Turbid (A) Clear      Specific gravity 1.016 1.003 - 1.030      pH (UA) 5.0 5.0 - 8.0      Protein 30 (A) Negative mg/dL    Glucose 50 (A) Negative mg/dL    Ketone Negative Negative mg/dL    Bilirubin Negative Negative      Blood Negative Negative      Urobilinogen 0.1 0.1 - 1.0 EU/dL    Nitrites Negative Negative      Leukocyte Esterase Negative Negative      WBC 0-4 0 - 4 /hpf    RBC 0-5 0 - 5 /hpf    Bacteria Negative Negative /hpf    Mucus Trace (A) Negative /lpf    Hyaline cast 10-20 0 - 5 /lpf   GLUCOSE, POC    Collection Time: 06/09/22 11:39 AM   Result Value Ref Range    Glucose (POC) 286 (H) 65 - 117 mg/dL    Performed by Iggy Vail ([facility])    PTT    Collection Time: 06/09/22  5:15 PM   Result Value Ref Range    aPTT 61.3 (H) 21.2 - 34.1 sec    aPTT, therapeutic range   82 - 109 sec   CBC WITH AUTOMATED DIFF    Collection Time: 06/10/22  2:30 AM   Result Value Ref Range    WBC 11.9 (H) 3.6 - 11.0 K/uL    RBC 2.87 (L) 3.80 - 5.20 M/uL    HGB 8.7 (L) 11.5 - 16.0 g/dL    HCT 27.8 (L) 35.0 - 47.0 %    MCV 96.9 80.0 - 99.0 FL    MCH 30.3 26.0 - 34.0 PG    MCHC 31.3 30.0 - 36.5 g/dL    RDW 14.3 11.5 - 14.5 %    PLATELET 016 304 - 727 K/uL    MPV 10.6 8.9 - 12.9 FL    NRBC 0.0 0.0  WBC    ABSOLUTE NRBC 0.00 0.00 - 0.01 K/uL    NEUTROPHILS 92 (H) 32 - 75 %    LYMPHOCYTES 4 (L) 12 - 49 %    MONOCYTES 3 (L) 5 - 13 %    EOSINOPHILS 0 0 - 7 %    BASOPHILS 0 0 - 1 %    IMMATURE GRANULOCYTES 1 (H) 0 - 0.5 %    ABS. NEUTROPHILS 11.0 (H) 1.8 - 8.0 K/UL    ABS. LYMPHOCYTES 0.5 (L) 0.8 - 3.5 K/UL    ABS. MONOCYTES 0.4 0.0 - 1.0 K/UL    ABS. EOSINOPHILS 0.0 0.0 - 0.4 K/UL    ABS. BASOPHILS 0.0 0.0 - 0.1 K/UL    ABS. IMM. GRANS. 0.1 (H) 0.00 - 0.04 K/UL    DF AUTOMATED     METABOLIC PANEL, COMPREHENSIVE    Collection Time: 06/10/22  2:30 AM   Result Value Ref Range    Sodium 131 (L) 136 - 145 mmol/L    Potassium 3.3 (L) 3.5 - 5.1 mmol/L    Chloride 97 97 - 108 mmol/L    CO2 28 21 - 32 mmol/L    Anion gap 6 5 - 15 mmol/L    Glucose 448 (H) 65 - 100 mg/dL    BUN 32 (H) 6 - 20 mg/dL    Creatinine 1.16 (H) 0.55 - 1.02 mg/dL    BUN/Creatinine ratio 28 (H) 12 - 20      GFR est AA 56 (L) >60 ml/min/1.73m2    GFR est non-AA 46 (L) >60 ml/min/1.73m2    Calcium 8.0 (L) 8.5 - 10.1 mg/dL    Bilirubin, total 1.1 (H) 0.2 - 1.0 mg/dL    AST (SGOT) 34 15 - 37 U/L    ALT (SGPT) 14 12 - 78 U/L    Alk.  phosphatase 81 45 - 117 U/L    Protein, total 7.4 6.4 - 8.2 g/dL    Albumin 1.9 (L) 3.5 - 5.0 g/dL    Globulin 5.5 (H) 2.0 - 4.0 g/dL    A-G Ratio 0.3 (L) 1.1 - 2.2     C REACTIVE PROTEIN, QT    Collection Time: 06/10/22  2:30 AM   Result Value Ref Range    C-Reactive protein 20.30 (H) 0.00 - 0.60 mg/dL   PROCALCITONIN    Collection Time: 06/10/22  2:30 AM   Result Value Ref Range    Procalcitonin 0.79 (H) 0 ng/mL   BLOOD GAS, ARTERIAL    Collection Time: 06/10/22  5:24 AM   Result Value Ref Range    pH 7.35 7.35 - 7.45      PCO2 47 (H) 35 - 45 mmHg    PO2 60 (L) 75 - 100 mmHg    O2 SAT 91 (L) >95 %    BICARBONATE 24 22 - 26 mmol/L    BASE EXCESS 0.1 0 - 2 mmol/L    O2 METHOD NRB mask      O2 FLOW RATE 15 L/min    FIO2 100.0 %    Sample source Arterial      SITE Right Brachial      DAINA'S TEST PASS             Assessment/     Acute on chronic systolic heart failure EF of 25%  Acute hypoxic respiratory failure secondary to fluid overload  Cardiogenic shock  Coronary artery disease status post stents  Noncompliance with home medication  Gastroesophageal reflux disease  Type 2 diabetes. Fairly stable  History of fibromyalgia  Hard of hearing    Plan:  Continue supportive care including gentle IV diuresis  Continue Dobutamine with close monitoring of electrolytes  Herman cath for strict input and output  Continue IV heparin for another 24 hours  PT OT eval  Consider transfer to telemetry floor in a.m. .  Dobutamine drip can be managed on 4 W.    238pm: CODE status addressed with  and daughter at bedside.  opted for a DNR/DNI. Paper work signed and pl;aced on chart    6/10/22 no new complaints, tolerating medications well  Remains in critical condition in ICU  Remains on dobutamine drip, norepinephrine drip, vasopressin drip, heparin drip  Intravenous Zosyn and doxycycline per ID  Overall prognosis very poor    Care Plan discussed with: Nurse    Total time spent with patient: 30 minutes.     Bartolome Bingham MD

## 2022-06-10 NOTE — PROGRESS NOTES
Progress Note    Patient: Alan Aguero MRN: [id number]  SSN: [id number]    YOB: 1950  Age: 66 y.o. Sex: female      Admit Date: 6/7/2022    LOS: 3 days     Subjective:   Patient followed for SIRS/Sepsis with possible pneumonia based upon CT Chest. Patient remains in ICU and currently on NRB mask. She remains afebrile but WBC, procal and CRP increasing. Currently on Zosyn and Doxycycline. Objective:     Vitals:    06/10/22 0500 06/10/22 0600 06/10/22 0800 06/10/22 0833   BP: 111/74 102/85     Pulse: (!) 111 (!) 109 (!) 108    Resp: 24 26     Temp:  98.4 °F (36.9 °C)     SpO2: 94% 91%  92%   Weight:       Height:            Intake and Output:  Current Shift: 06/10 0701 - 06/10 1900  In: -   Out: 260 [Urine:260]  Last three shifts: 06/08 1901 - 06/10 0700  In: 955.8 [I.V.:955.8]  Out: 1700 [Urine:1700]    Physical Exam:   Vitals and nursing note reviewed. Chaperone present:  and daughter. Constitutional:       General: She is not in acute distress. Appearance: She is ill-appearing. HENT: NRB mask     Head: Normocephalic and atraumatic. Right Ear: External ear normal.      Left Ear: External ear normal.      Mouth/Throat:      Pharynx: Oropharynx is clear. Eyes:      Extraocular Movements: Extraocular movements intact. Pupils: Pupils are equal, round, and reactive to light. Cardiovascular:      Rate and Rhythm: Normal rate and regular rhythm. Heart sounds: Normal heart sounds. No murmur heard. Pulmonary:      Effort: Respiratory distress present. Breath sounds: Rhonchi present. No wheezing. Abdominal:      General: Bowel sounds are normal. There is no distension. Palpations: Abdomen is soft. Tenderness: There is no abdominal tenderness. There is no right CVA tenderness or left CVA tenderness. Genitourinary:     Comments: No Herman  Musculoskeletal:       Right lower leg: No edema. Left lower leg: No edema.    Skin:     Findings: No rash.   Neurological:      General: No focal deficit present. Mental Status: She is alert and oriented to person, place, and time. Psychiatric:         Mood and Affect: Mood normal.         Behavior: Behavior normal.         Thought Content: Thought content normal.         Judgment: Judgment normal.     Lab/Data Review:     WBC 11,900    Procal 0.79 <0.35  CRP 20.30 <14.10    Influenza A/B Negative  Covid-19 Not detected    Blood cultures (6/7) No growth 3 days  Blood cultures (6/7) No growth 2 days      CXR (6/10 Hazy opacities through lungs, same distribution. Consider pulmonary edema,  cardiogenic and/or infectious/inflammatory origin. Assessment:     Active Problems:    Hypoxia (1/21/2021)      CAD in native artery (6/7/2022)      Acute on chronic systolic (congestive) heart failure ([facility] [address] (6/7/2022)    1. ?Bilateral pulmonary airspace disease, etiology unclear, ?pneumonia  2. SIRS/Sepsis with leukocytosis, elevated procal and CRP  3. Elevated LDH and ferritin, significance unclear (Covid-19 negative)  4. Uncontrolled diabetes mellitus     Comment:   Suspicion that this could be related to volume overload, but this would not explain her leukocytosis, elevated procal or CRP. Neither urine for Legionella or Mycoplasma serologies were done as ordered. Plan:   1. Continue IV Zosyn and Doxycycline for atypical coverage (Azithromycin and Levaquin would interact with Amiodarone)  2. Follow-up blood cultures  3.  In am, repeat CBC, procal and CRP       Signed By: Manuel Llanos MD     Kathie 10, 2022

## 2022-06-10 NOTE — PROGRESS NOTES
KRISTYN Lowery paged for orders for morphine for Dyspnea and restlessness. Order received for a one time dose, 2 mg IV Morphine. PO Nighttime medications held due to low O 2 saturation and the inability to wean from NRB, per KRISTYN Lowery. Beverley also informed of the increasing Mews Score (5). MEWS score noted, no new orders.

## 2022-06-10 NOTE — PROGRESS NOTES
Dr. Rebeka Greenberg and Niki Aguilar NP to bedside. Patient currently on Norepinephrine, Dobutamine, Vasopressin. Per cardiology wean Dobutamine and other pressors to MAP of 55.

## 2022-06-10 NOTE — PROGRESS NOTES
PULMONARY ICU NOTE  [facility]    Name: Ricky Taylor MRN: [id number]   : 1950 Hospital: [facility]   Date: 6/10/2022  Admission date: 2022 Hospital Day: 4       HPI:     Hospital Problems  Date Reviewed: 2021          Codes Class Noted POA    CAD in native artery ICD-10-CM: I25.10  ICD-9-CM: 414.01  2022 Unknown        Acute on chronic systolic (congestive) heart failure (HCC) ICD-10-CM: I50.23  ICD-9-CM: 428.23, 428.0  2022 Unknown        Hypoxia ICD-10-CM: R09.02  ICD-9-CM: 799.02  2021 Unknown                   [x] High complexity decision making was performed  [x] See my orders for details      Subjective/Initial History:     I was asked by Ashutosh Villagran MD to see Ricky Taylor  a 66 y.o.  female in consultation     Excerpts from admission 2022 or consult notes as follows:   64-year-old lady came in because of shortness of breath she was seen at [facility] in [facility] and she was transferred to [facility] recently she was seen by cardiologist and patient underwent cardiac catheterization with stent placement she was complaining of shortness of breath dyspnea and she was put on oxygen via nasal cannula complaining of generalized weakness no fever no chills cough with minimal sputum so admitted and pulmonary consult was called.   Past medical history of asthma on albuterol inhaler obstructive sleep apnea not on any CPAP machine or oxygen at home history of AICD placement in the past      Allergies   Allergen Reactions    Novocain [Procaine] Nausea and Vomiting    Tramadol Other (comments)     Headache    Codeine Other (comments)     Patient states she is not allergic        MAR reviewed and pertinent medications noted or modified as needed     Current Facility-Administered Medications   Medication    DOBUTamine (DOBUTREX) 500 mg/250 mL (2,000 mcg/mL) infusion    NOREPINephrine (LEVOPHED) 8 mg in 0.9% NS 250ml infusion    potassium chloride SR (KLOR-CON 10) tablet 40 mEq    doxycycline (VIBRAMYCIN) 100 mg in 0.9% sodium chloride (MBP/ADV) 100 mL MBP    vasopressin (VASOSTRICT) 20 Units in 0.9% sodium chloride 100 mL infusion    albuterol-ipratropium (DUO-NEB) 2.5 MG-0.5 MG/3 ML    furosemide (LASIX) injection 40 mg    piperacillin-tazobactam (ZOSYN) 3.375 g in 0.9% sodium chloride (MBP/ADV) 100 mL MBP    metOLazone (ZAROXOLYN) tablet 2.5 mg    sodium chloride (NS) flush 5-40 mL    sodium chloride (NS) flush 5-40 mL    acetaminophen (TYLENOL) tablet 650 mg    Or    acetaminophen (TYLENOL) suppository 650 mg    polyethylene glycol (MIRALAX) packet 17 g    ondansetron (ZOFRAN ODT) tablet 4 mg    Or    ondansetron (ZOFRAN) injection 4 mg    heparin 25,000 units in D5W 250 ml infusion    amiodarone (CORDARONE) tablet 200 mg    aspirin chewable tablet 81 mg    atorvastatin (LIPITOR) tablet 40 mg    butalbital-acetaminophen-caffeine (FIORICET, ESGIC) -40 mg per tablet 1 Tablet    docusate sodium (COLACE) capsule 100 mg    DULoxetine (CYMBALTA) capsule 60 mg    escitalopram oxalate (LEXAPRO) tablet 5 mg    gabapentin (NEURONTIN) capsule 300 mg    levothyroxine (SYNTHROID) tablet 125 mcg    magnesium oxide (MAG-OX) tablet 400 mg    melatonin tablet 3 mg    metoprolol succinate (TOPROL-XL) XL tablet 12.5 mg    pantoprazole (PROTONIX) tablet 40 mg    oxybutynin (DITROPAN) tablet 5 mg    sacubitriL-valsartan (ENTRESTO) 24-26 mg tablet 1 Tablet    ticagrelor (BRILINTA) tablet 90 mg    heparin (porcine) 1,000 unit/mL injection 4,000 Units    Or    heparin (porcine) 1,000 unit/mL injection 2,000 Units      Patient PCP: Arsenio Fletcher MD  PMH:  has a past medical history of Acid indigestion, Adverse effect of anesthesia, Aneurysm ([address] Anxiety disorder, Arthritis, Asthma, Back pain, Bladder spasms, CAD (coronary artery disease), Cerebral artery occlusion with cerebral infarction ([facility] [address] (08/2020), Chest pain, Constipation, Cough, Diabetes mellitus, Diarrhea, Fatigue, Fatty liver, Fibromyalgia, Frequent episodic tension-type headache, GERD (gastroesophageal reflux disease), Hearing reduced, Heart attack ([address] Heart failure ([address] Hemorrhoids, Hernia of unspecified site of abdominal cavity without mention of obstruction or gangrene, HTN (hypertension), Hypothyroidism, ICD (implantable cardioverter-defibrillator) in place, Ill-defined condition, Muscle pain, N&V (nausea and vomiting), Pacemaker, Ringing in ears, Shortness of breath, Skin cancer, Sleep apnea, SOB (shortness of breath), SOB (shortness of breath), Swallowing difficulty, Thyroid disorder, Vertigo, and Wears dentures. PSH:   has a past surgical history that includes pr removal gallbladder; hx thyroidectomy; hx hysterectomy; hx carpal tunnel release; hx pacemaker (Left, 6/20/16); hx gi; hx orthopaedic; hx heent; hx breast biopsy (Left); and hx heart catheterization (2/6/16). FHX: family history includes Breast Cancer in her sister; Cancer in her sister; Cerebral aneurysm in her sister; Cerebral palsy in her brother; Diabetes in her brother, mother, and sister; Heart Disease in her father, mother, and sister; Hypertension in her sister; Lupus in her sister. SHX:  reports that she has never smoked. She has never used smokeless tobacco. She reports current alcohol use. She reports that she does not use drugs. ROS:    Review of Systems   Constitutional: Positive for malaise/fatigue. Patient is 100% nonrebreather mask   HENT: Negative. Eyes: Negative. Respiratory: Positive for shortness of breath. Cardiovascular: Negative. Gastrointestinal: Negative. Genitourinary: Negative. Musculoskeletal: Negative. Skin: Negative. Neurological: Negative. Psychiatric/Behavioral: Negative.          Objective:     Vital Signs: Telemetry:    normal sinus rhythm Intake/Output:   Visit Vitals  /85 (BP 1 Location: Left upper arm, BP Patient Position: At rest)   Pulse (!) 109   Temp 98.4 °F (36.9 °C)   Resp 26   Ht 5' 2.01\" (1.575 m)   Wt 81.5 kg (179 lb 10.8 oz)   SpO2 92%   BMI 32.85 kg/m²       Temp (24hrs), Av.1 °F (36.7 °C), Min:97.6 °F (36.4 °C), Max:98.5 °F (36.9 °C)        O2 Device: Non-rebreather mask O2 Flow Rate (L/min): 15 l/min       Wt Readings from Last 4 Encounters:   06/10/22 81.5 kg (179 lb 10.8 oz)   22 78.9 kg (174 lb)   22 78.9 kg (174 lb)   21 77.4 kg (170 lb 9.6 oz)          Intake/Output Summary (Last 24 hours) at 6/10/2022 0839  Last data filed at 6/10/2022 0604  Gross per 24 hour   Intake 955.79 ml   Output 1700 ml   Net -744.21 ml       Last shift:      No intake/output data recorded. Last 3 shifts:  1901 - 06/10 0700  In: 955.8 [I.V.:955.8]  Out: 1700 [Urine:1700]       Physical Exam:     Physical Exam  Constitutional:       Appearance: She is ill-appearing and diaphoretic. Comments: Patient is on 100% nonrebreather mask   HENT:      Head: Normocephalic and atraumatic. Nose: Nose normal.      Mouth/Throat:      Mouth: Mucous membranes are moist.   Eyes:      Pupils: Pupils are equal, round, and reactive to light. Cardiovascular:      Rate and Rhythm: Normal rate. Heart sounds: Murmur heard. Pulmonary:      Effort: Pulmonary effort is normal.      Breath sounds: Rhonchi and rales present. Abdominal:      General: Abdomen is flat. Bowel sounds are normal.      Palpations: Abdomen is soft. Musculoskeletal:         General: Normal range of motion. Cervical back: Normal range of motion and neck supple. Skin:     General: Skin is warm. Neurological:      General: No focal deficit present. Mental Status: She is alert.    Psychiatric:         Mood and Affect: Mood normal.          Labs:    Recent Labs     06/10/22  0230 22  1715 22  0920 22  2057 22  1023 22  0247 22  0237 22  1903 22  1100 06/07/22  1100   WBC 11.9*  --   --   --   --  11.5*  --  10.8   < > 10.6   HGB 8.7*  --   --   --   --  8.9*  --  9.7*   < > 10.0*     --   --   --   --  169  --  171   < > 188   INR  --   --   --   --   --   --   --   --   --  1.6*   APTT  --  61.3* 56.4* 116.5*   < > 42.0*   < > 30.5  --   --     < > = values in this interval not displayed. Recent Labs     06/10/22  0230 06/08/22  0247 06/07/22  1200 06/07/22  1100   * 134*  --  133*   K 3.3* 3.2*  --  3.0*   CL 97 100  --  96*   CO2 28 29  --  26   * 224*  --  196*   BUN 32* 29*  --  31*   CREA 1.16* 1.20*  --  1.40*   CA 8.0* 8.0*  --  8.3*   MG  --  1.3*  --   --    LAC  --   --  1.5  --    ALB 1.9* 2.1*  --  2.4*   ALT 14 15  --  19     Recent Labs     06/10/22  0524 06/07/22  1200   PH 7.35 7.50*   PCO2 47* 31*   PO2 60* 84   HCO3 24 24   FIO2 100.0 28.0     No results for input(s): CPK, CKNDX, TROIQ in the last 72 hours. No lab exists for component: CPKMB  No results found for: BNPP, BNP   Lab Results   Component Value Date/Time    Culture result: No growth 1 day 06/07/2022 07:15 PM    Culture result: No growth 3 days 06/07/2022 12:00 PM     Lab Results   Component Value Date/Time    TSH 0.267 (L) 08/04/2021 10:48 AM       Imaging:    CXR Results  (Last 48 hours)               06/10/22 0303  XR CHEST PORT Final result    Narrative:  Chest single view. Comparison single view chest June 9, 2022. Unchanged right PICC. Hazy opacities through lungs, same distribution. Consider pulmonary edema,   cardiogenic and/or infectious/inflammatory origin. Cardiac and mediastinal structures unchanged. Left-sided cardiac device. Thoracic aorta atherosclerosis. No pneumothorax or sizable pleural effusion. 06/09/22 1101  XR CHEST PORT Final result    Narrative:  1 view 7545       Right PICC line with tip now in the upper right atrium       Unchanged asymmetric mixed interstitial and alveolar edema.  No effusion or pneumothorax. Borderline cardiomegaly without congestion       06/09/22 0930  XR CHEST PORT Final result    Impression:  1. The recently inserted right upper extremity PICC line tip projects within   the lower right atrium. 2.  There is persistent multifocal airspace disease with lobar consolidation   within the right upper lobe and more patchy airspace disease within the left mid   to lower lung zones. These findings are most compatible with multifocal   pneumonia. Narrative:  Reason for Visit:    HISTORY: Recent PICC line catheter placement. TECHNIQUE: Portable radiograph of the chest dated 6/9/2022 obtained at 9:16 AM.   COMPARISON: 6/8/2022 obtained at 7:03 AM.   LIMITATIONS: Patient rotation to the left. TUBES/LINES:  There is a new right upper extremity PICC line tip is located   within the lower right atrium. The tip is in a somewhat more central position   than demonstrated with some catheter placements. There is a left-sided 3-lead transvenous pacer which is unchanged in its   positioning. HEART AND PULMONARY VESSELS: There is a mild enlargement of the cardiac   silhouette. LUNG PARENCHYMA: There is lobar consolidation of the right upper lobe and more   patchy airspace disease within the left mid to lower lung zones. The   differential diagnosis would include multifocal pneumonia. PLEURA: This examination is negative for larger pleural effusions or a   pneumothorax. MEDIASTINUM: Stable. BONE/SOFT TISSUES: Stable. Results from [place] encounter on 06/07/22    XR CHEST PORT    Narrative  1 view 1052    Right PICC line with tip now in the upper right atrium    Unchanged asymmetric mixed interstitial and alveolar edema. No effusion or  pneumothorax. Borderline cardiomegaly without congestion      XR CHEST PORT    Narrative  Reason for Visit:  HISTORY: Recent PICC line catheter placement.     TECHNIQUE: Portable radiograph of the chest dated 6/9/2022 obtained at 9:16 AM.  COMPARISON: 6/8/2022 obtained at 7:03 AM.  LIMITATIONS: Patient rotation to the left. TUBES/LINES:  There is a new right upper extremity PICC line tip is located  within the lower right atrium. The tip is in a somewhat more central position  than demonstrated with some catheter placements. There is a left-sided 3-lead transvenous pacer which is unchanged in its  positioning. HEART AND PULMONARY VESSELS: There is a mild enlargement of the cardiac  silhouette. LUNG PARENCHYMA: There is lobar consolidation of the right upper lobe and more  patchy airspace disease within the left mid to lower lung zones. The  differential diagnosis would include multifocal pneumonia. PLEURA: This examination is negative for larger pleural effusions or a  pneumothorax. MEDIASTINUM: Stable. BONE/SOFT TISSUES: Stable. Impression  1. The recently inserted right upper extremity PICC line tip projects within  the lower right atrium. 2.  There is persistent multifocal airspace disease with lobar consolidation  within the right upper lobe and more patchy airspace disease within the left mid  to lower lung zones. These findings are most compatible with multifocal  pneumonia. XR CHEST PORT    Narrative  Chest single view. Comparison single view chest June 9, 2022. Unchanged right PICC. Hazy opacities through lungs, same distribution. Consider pulmonary edema,  cardiogenic and/or infectious/inflammatory origin. Cardiac and mediastinal structures unchanged. Left-sided cardiac device. Thoracic aorta atherosclerosis. No pneumothorax or sizable pleural effusion. Results from [place] encounter on 06/07/22    CT CHEST WO CONT    Narrative  COMPARISON:Chest x-ray 6/7/2022. Chest CT 6/15/2021. HISTORY: Lung infiltrates. Technique: Axial imaging chest without IV contrast,  with multiplanar formatting  and MIPs.   Dose reduction: All CT scans at this facility are performed using dose reduction  optimization techniques as appropriate to a performed exam including the  following: Automated exposure control, adjustments of the mA and/or kV according  to patient's size, or use of iterative reconstruction technique. FINDINGS:  HEART: Increased Cardiomegaly. Cardiac leads right atrium, right ventricle,  coronary sinus. Multivessel coronary artery calcification. No pericardial  effusion. THORACIC AORTA: Calcific atherosclerosis. No aneurysm. PULMONARY ARTERIES: Nondilated. ADENOPATHY:  New Mediastinal and subcarinal adenopathy measuring up to 2.2 cm  short axis. THORACIC ESOPHAGUS:Collapsed. LUNG PARENCHYMA: Right greater than left pulmonary airspace disease has  increased compared to the prior chest CT. CENTRAL AIRWAYS: Major endobronchial tree clear. PLEURA: Trace bilateral pleural effusions. CHEST WALL: No axillary adenopathy. Enlarged right thyroid lobe unchanged. Left  lobe is absent or atrophic. UPPER ABDOMEN:  Calcific atherosclerosis. BONES: Unremarkable for age. Impression  1. Increased right greater than left pulmonary airspace edema and/or pneumonia. New mediastinal and subcarinal adenopathy. 2. Increased cardiomegaly. IMPRESSION:   1. Acute hypoxic respiratory failure  2. Pulmonary edema  3. Ischemic cardiomyopathy  4. HFrEF ejection fraction 20% with severe tricuspid regurgitation  5. Coronary artery disease recent status post stent placement  6. Rule out aspiration pneumonia bilateral upper lobe  7. Mediastinal and subcarinal adenopathy most likely reactive  8. Paroxysmal atrial fibrillation  9. Hypokalemia  10. History of asthma and obstructive sleep apnea  11. Pt is requiring Drug therapy requiring intensive monitoring for toxicity  12.  Pt is unstable, unpredictable needing inpatient monitoring; is acutely ill and at high risk of sudden decline and decompensation with severe consequenses and continued end organ dysfunction and failure  13. Prognosis guarded       RECOMMENDATIONS/PLAN:     1. Patient is on 100% nonrebreather mask as oxygen as salvage oxygen delivery device to provide high concentration of oxygen to overcome refractory hypoxia; blood gases due to hypoxia probably will start patient on noninvasive ventilator BiPAP machine if needed we will intubate she is DNR but not DNI discussed with the granddaughter at bedside  2. Patient is hemodynamically unstable most likely cardiogenic shock patient on dobutamine and Levophed  3. Patient COVID-19 test was negative on 6/7  4. Recent cardiac catheterization with status post stent on 6/2 along with single drug-eluting stent patient is on aspirin metoprolol and Brilinta  5. Lasix and Zaroxolyn on board as blood pressure is low will hold Zaroxolyn BNP 5477 elevated troponin probably secondary demand ischemia  6. Patient on Zosyn possibility of aspiration  7. Patient on nebulizer treatment she was taking albuterol inhaler at home  8. Recent history of stent placement continue cardiac monitoring increase Lasix to 40 twice a day   9. Replace potassium  10. Supplemental O2 to keep sats > 93%  11. Aspiration precautions  12. Labs to follow electrolytes, renal function and and blood counts  13. Glucose monitoring and SSI  14. Bronchial hygiene with respiratory therapy techniques, bronchodilators  15. DVT, SUP prophylaxis  16.  Patient is DNR discussed with the family about CODE STATUS and also about intubation       This care involved high complexity medical decision making: I personally:  · Reviewed the flowsheet and previous days notes  · Reviewed and summarized records or history from previous days note or discussions with staff, family  · High Risk Drug therapy requiring intensive monitoring for toxicity: eg steroids, pressors, antibiotics  · Reviewed and/or ordered Clinical lab tests  · Reviewed images and/or ordered Radiology tests  · Reviewed the patients ECG / Telemetry  · Reviewed and/or adjusted NiPPV settings  · Called and arranged for Radiologic procedures or interventions  · performed or ordered Diagnostic endoscopies with identified risk factors.   · discussed my assessment/management with : Nursing, Hospitalist and Family for coordination of care  · Time spent in ICU patient care 35 minutes          Zach Espinosa MD

## 2022-06-10 NOTE — WOUND CARE
Wound Care Note:      Wound Care into see patient because of erythema to sacrum    Blanchable erythema noted to sacrum, no open areas noted at this time. Recommend cleansing with remedy barrier wipes and applying zinc to sacrum. Apply Remedy Z-Guard Protectant Paste TID and prn for soiling. If soiled, remove top soiled layer only and reapply. Use Remedy Phytoplex Barrier Cream wipes for gentle cleansing. To completely remove, use Remedy Foaming Cleanser or soap and water. Patient is to be turned approximately every 2 hours with wedge/pillows at an angle of 30 degrees or more if can be tolerated and not contraindicated. FOB should be elevated to prevent friction/shear from patient sliding down in bed. HOB should be elevated to 30 degrees or less to assist with offloading and pressure reduction to sacrum/coccyx. Float heels on pillow at all times while in bed, place pillow long ways under patient leg so knee is supported and heel is hanging off edge of pillow. Skin Care & Pressure Relief Recommendations:  Minimize layers of linen  Pads under patient to optimize support surface and microclimate  Turn/Reposition approximately every 2 hours  Pillow/Wedge  Manage Incontinence  Promote Continence; Skin Protective lotion to buttocks and sacrum daily and as needed with incontinence care  Offload heels with pillows or offloading boots      Discussed above plan with patient & Kristopher    Transition of Care: Please re-consult [facility] for any changes in skin condition.

## 2022-06-11 NOTE — DEATH NOTE
Patient assessed for signs of life by two RN's, no heartbeat or respiration noted.  Contacted   Dr Ana Nj who pronounced patient's death at 12:47am. Notified smitha Hankins MD for her attending Dr Zandra Alfaro at [phone]

## 2022-06-11 NOTE — DISCHARGE SUMMARY
Admit date: 6/7/2022   Admitting Provider: Lala Wisdom MD    Discharge date: 6/11/2022  Discharging Provider: Brian Guillory MD      * Admission Diagnoses: Hypoxia [R09.02]  Acute on chronic systolic (congestive) heart failure ([address] [I50.23]  CAD in native artery [I25.10]    * Discharge Diagnoses:    Hospital Problems as of 6/11/2022 Date Reviewed: 8/4/2021          Codes Class Noted - Resolved POA    CAD in native artery ICD-10-CM: I25.10  ICD-9-CM: 414.01  6/7/2022 - Present Unknown        Acute on chronic systolic (congestive) heart failure ([facility]) ICD-10-CM: I50.23  ICD-9-CM: 428.23, 428.0  6/7/2022 - Present Unknown        Hypoxia ICD-10-CM: R09.02  ICD-9-CM: 799.02  1/21/2021 - Present Unknown              * Hospital Course:   66 y. o. female who is transferred from [facility] where she presented with SOB, hypoxia, cough. Patient recently had PCI/LAD Stent 5 days ago by Dr. Louie Wagoner. Denies fever, myalgia but +chills. Has had Covid last year with similar presentation; her Covid test reportedly is negative. She has severe CMP with EF 25% and has PPM/AICD - no discharge. LHC was via Right Femoral; no hematoma noted or peripheral leg changes. ---     6/9/22  Patient seen in follow-up. Notes shortness of breath with exertion. transfered to ICU due to hypotension and hypoxemia. Started on dobutamine for cardiogenic shock. Awake and alert and on high flow oxygen.     6/10/22 no new complaints, tolerating medications well  Remains in critical condition in ICU  Remains on dobutamine drip, norepinephrine drip, vasopressin drip, heparin drip  Intravenous Zosyn and doxycycline per ID for pneumonia  Overall prognosis very poor     6/11/22 patient passed away in hospital after transition to comfort measures:  Per overnight nursing at bedside:  Patient assessed for signs of life by two RN's, no heartbeat or respiration noted.  Contacted   Dr Olivia Zeng who pronounced patient's death at 12:47am. Notified Nataliia Diaz smitha De Oliveira MD for her attending Dr Sarbjit Tineo at [phone]    * Procedures:   * No surgery found *      Consults: Cardiology, Pulmonary/Intensive care and ID    Significant Diagnostic Studies:     6/7/22 CT Chest IMPRESSION  1. Increased right greater than left pulmonary airspace edema and/or pneumonia. New mediastinal and subcarinal adenopathy. 2. Increased cardiomegaly. 6/8/22 Echocardiogram    Left Ventricle: Normal left ventricular systolic function with a visually estimated EF of 30 - 35%. Left ventricle size is normal. Normal wall thickness. Normal wall motion. Grade III diastolic dysfunction with increased LAP.   Aortic Valve: Valve structure is normal. Mild sclerosis of the aortic valve cusp.   Mitral Valve: Mildly thickened leaflet. Mildly calcified leaflet.   Tricuspid Valve: Severe regurgitation.   Left Atrium: Left atrium is moderately dilated. Left atrial volume index is mildly increased (35-41 mL/m2). Discharge Exam: N/A    * Discharge Condition: passed away in hospital 6/11/22  * Disposition: passed away in hospital 6/11/22    Discharge Medications:  Discharge Medication List as of 6/11/2022  6:23 AM          * Follow-up Care/Patient Instructions:   Activity: N/A  Diet:N/A  Wound Care: N/A    Follow-up Information     Follow up With Specialties Details Why Contact Info    [address] [facility] AFTER Discharge [address], [address]  [phone]          Signed:  Naldo Moon MD  6/11/2022  8:57 AM

## 2022-06-11 NOTE — PROGRESS NOTES
bedside waiting on other family members to arrive,very tearful.  services were  again offered at this time, he declined the services.

## 2022-06-11 NOTE — ROUTINE PROCESS
Patient with acute anxiety, cyanosis around the mouth and oxygen desaturation while receiving due neb.  Duoneb discontinued, NRB replaced, patient continues with agonal breathing, O2 [address]

## 2022-06-11 NOTE — PROGRESS NOTES
Received phone call from the patient's nurse regarding care status. Patient family is in the room, had a discussion with the attending physician about comfort care before and make decision to go with the comfort care. Patient condition is worse at this time, on multiple pressors still with hypotension. Talk to the daughter Ms. [place] over the phone about the care and confirmed her wishes. She does not want her mother to be suffering anymore, understands her prognosis is not good, has no chance of recovering from this condition. According to her mother wishes wants to keep her on comfort care. She understands we are withdrawing all the support that we are doing at this time and keep her only on comfort management with no suffering from pain. I ordered comfort care and discontinued all the pressors and IV heparin and lab work.

## 2022-06-11 NOTE — PROGRESS NOTES
All pressors discontinued. Patient continues with dyspnea, breathing is labored using the accessory muscles. Family is bedside, declined  services at this time.

## 2022-06-13 LAB
BACTERIA SPEC CULT: NORMAL
SPECIAL REQUESTS,SREQ: NORMAL

## 2022-06-13 NOTE — PROGRESS NOTES
Physician Progress Note      PATIENT:               Ariadna Rosado  CSN #:                  [id number]  :                       1950  ADMIT DATE:       2022 4:24 PM  [address] DATE:        2022 12:44 AM  RESPONDING  PROVIDER #:        Brenna Castellano NP [facility] NP          QUERY TEXT:    Pt admitted with SOB. Pt noted to have NSTEMI documented in H&P and elevated troponin. If possible, please document in the progress notes and discharge summary if you are evaluating and/or treating any of the following: The medical record reflects the following:  Risk Factors: CHF, HTN, CAD, RESP FAILURE, NSTEMI  Clinical Indicators: H&P: \" #1 NSTEMI (?) Type 1 v Type 2.: TROP: 294> 276> 256. Treatment: Cardiology consulted, EKG, ECHO, CXR, Lab monitoring. Thank you,  Elroy Boast, BSN, RN, CRCR, [facility] Specialist  [email or web address] or [phone]  Options provided:  -- NSTEMI type 2 due to, please specify. -- NSTEMI type 1  -- Elevated troponin without myocardial injury  -- Other - I will add my own diagnosis  -- Disagree - Not applicable / Not valid  -- Disagree - Clinically unable to determine / Unknown  -- Refer to Clinical Documentation Reviewer    PROVIDER RESPONSE TEXT:    This patient has elevated troponin without myocardial injury.     Query created by: Treva Kumar on 2022 9:03 AM      Electronically signed by:  Brenna Castellano NP [facility] NP 2022 10:44 AM

## 2022-06-14 LAB
BACTERIA SPEC CULT: NORMAL
SPECIAL REQUESTS,SREQ: NORMAL

## 2022-06-20 NOTE — PROGRESS NOTES
Physician Progress Note      PATIENT:               Audrey Bolden  CSN #:                  [id number]  :                       1950  ADMIT DATE:       2022 4:24 PM  [address] [place] DATE:        2022 12:44 AM  RESPONDING  PROVIDER #:        LEOBARDO MADERA MD          QUERY TEXT:    Patient admitted with AECHF. Noted documentation of SIRS/Sepsis in ID consultant PN's. In order to support the diagnosis of SIRS/sepsis, please include additional clinical indicators in your documentation. Or please document if the diagnosis of sepsis has been ruled out after further study. The medical record reflects the following:  Risk Factors: CHF, HTN, Resp Failure w/ Hypoxia, Anemia, Cardiogenic shock. Clinical Indicators: WBC less than 12:  10.6->  11.5-> 6/10 11.9.   ID PN: \"SIRS/Sepsis with leukocytosis, elevated procal and CRP. \" LAC Acid less than 2:  1.5, RR >20 (22-26), HR >90 (90-94), No fever, No confirmed source of infection documented. BC's Negative. Treatment: ID, Pulmo, & Cardio consulted, BC's obtained, IV ABX, Lab monitoring. [facility] SEPSIS CRITERIA  1. At least 2 SIRS Criteria: Temp >100.9 or <96.8, HR >90, RR>20, WBC >12,000 or <4,000 or >10% bands. 2. Documented suspected or confirmed source of infection. 3. Documented Organ Dysfunction by ONE of the following: AMS, SBP<90 or MAP <65, Resp Failure w/ Intub, BiPAP, or CPAP, CORIN, Bilirubin >2mg w/ No Liver Dx, PLT <100,000, INR >1.5 or aPTT >60 sec & not on Coumadin, Lactic Acid >2mmol/L. Thank you,  ZAINA Jones, RN, [place], [facility] Specialist  [phone] or [email or web address]  Options provided:  -- Sepsis not POA as evidenced by, Please document evidence. -- SIRS POA as evidenced by, Please document evidence. -- SIRS not POA as evidenced by, Please document evidence. -- Sepsis POA as evidenced by, Please document evidence.   -- SIRS/Sepsis was ruled out after study  -- Other - I will add my own diagnosis  -- Disagree - Not applicable / Not valid  -- Disagree - Clinically unable to determine / Unknown  -- Refer to Clinical Documentation Reviewer    PROVIDER RESPONSE TEXT:    SIRS/Sepsis was ruled out after study.     Query created by: Elijah Forde on 6/17/2022 7:32 AM      Electronically signed by:  Nasir Vickers MD 6/20/2022 5:52 PM

## 2022-08-17 NOTE — Clinical Note
TRANSFER - OUT REPORT:     Verbal report given to: yung. Report consisted of patient's Situation, Background, Assessment and   Recommendations(SBAR). Opportunity for questions and clarification was provided. Patient transported with a Registered Nurse. Patient transported to: pacu. Solaraze Counseling:  I discussed with the patient the risks of Solaraze including but not limited to erythema, scaling, itching, weeping, crusting, and pain.

## 2024-09-25 NOTE — ROUTINE PROCESS
Arterial Line    Performed by: Koko Pettit M.D.  Authorized by: Koko Pettit M.D.    Start Time:  9/25/2024 7:56 AM  Localization: surface landmarks    Patient Location:  OR  Indication: continuous blood pressure monitoring        Catheter Size:  20 G  Seldinger Technique?: Yes    Laterality:  Left  Site:  Radial artery  Line Secured:  Antimicrobial disc, tape and transparent dressing  Events: patient tolerated procedure well with no complications         Assumed care of the patient. Alert and responsive. Right groin dressing dry and intact. No redness or hematoma noted at site. Pedal pulses intact.  at the bedside. Bedside report received from [id number] Denies pain or discomfort.

## 2025-01-17 NOTE — PROGRESS NOTES
Primary Care/Behavioral Health Integration - Program Case Closure    Patient referred to behavioral health by her primary care provider, Nima Trevino MD, for concerns related to history of depression.          The case has been closed on 1/17/2025 for the following reason(s): BHI program was explained and patient declined.  Reason for decline: Patient Referred to Specialty/Psychotherapy    BHI coordinator spoke to the patient's Daughter in Law. Family will follow up with current neurologist at this time.    Remains acute in ICU. Currently on 15L non re-breather mask.         Lance Dumont, MSW

## (undated) DEVICE — Device: Brand: OMNIWIRE PRESSURE GUIDE WIRE

## (undated) DEVICE — SYRINGE MED 10ML PUR GAM COMPATIBLE POLYCARB FIX M LUER CONN

## (undated) DEVICE — SYRINGE MED 10ML RED POLYCARB BRL FIX M LUER CONN FLAT GRP

## (undated) DEVICE — BOWL UTIL 16OZ STRL --

## (undated) DEVICE — CATHETER ANGIO JL4 0.045 INX5 FRX100 CM THRULUMEN EXPO

## (undated) DEVICE — GLIDESHEATH SLENDER STAINLESS STEEL KIT: Brand: GLIDESHEATH SLENDER

## (undated) DEVICE — GUIDEWIRE VASC J 1.5 MM 0.035 INX260 CM FIX EXCHANGE INQWIRE

## (undated) DEVICE — PINNACLE INTRODUCER SHEATH: Brand: PINNACLE

## (undated) DEVICE — SYRINGE ANGIO 20ML WHT POLYCARB VAC PRSS CAP PLUNG FIX M

## (undated) DEVICE — PERCUTANEOUS ENTRY THINWALL NEEDLE  ONE-PART: Brand: COOK

## (undated) DEVICE — Device

## (undated) DEVICE — TOOL INSRT ANGI GDWIRE MTL SS --

## (undated) DEVICE — CATH 5F 100CM JL35 -- DXTERITY

## (undated) DEVICE — DEVICE INFL SYR BLLN ENDO 30 -- INTELLI

## (undated) DEVICE — SURGICAL PROCEDURE TRAY CRD CATH 3 PRT

## (undated) DEVICE — COPILOT BLEEDBACK CONTROL VALVE: Brand: COPILOT

## (undated) DEVICE — COPE MANDRIL WIRE GUIDE STAINLESS STEEL: Brand: COPE

## (undated) DEVICE — LULU RADIAL/FEMORAL ANGIOGRAPHY SHEET: Brand: CONVERTORS

## (undated) DEVICE — WASTEBAG DRIP/ADAPTER: Brand: MEDLINE INDUSTRIES, INC.

## (undated) DEVICE — CATHETER ANGIO 5FR L100CM GRY S STL NYL JL3.5 3 SEG BRAID L

## (undated) DEVICE — MICROPUNCTURE, INTRODUCER SET SILHOUETTE, TRANSITIONLESS PUSH-PLUS DESIGN - STIFFENED CANNULA WITH NITINOL WIRE GUIDE: Brand: MICROPUNCTURE

## (undated) DEVICE — Device: Brand: EAGLE EYE PLATINUM ST RX DIGITAL IVUS CATHETER

## (undated) DEVICE — 48" PROBE COVER W/GEL, ULTRASOUND, STERILE: Brand: SITE-RITE

## (undated) DEVICE — CATHETER ANGIO 5FR L100CM GRY S STL NYL JR4 3 SEG BRAID L

## (undated) DEVICE — GUIDEWIRE VASC L260CM DIA0.035IN TIP L3MM STD EXCHG PTFE J

## (undated) DEVICE — GLIDESHEATH SLENDER NITINOL HYDROPHILIC COATED INTRODUCER SHEATH: Brand: GLIDESHEATH SLENDER

## (undated) DEVICE — CATH 5F 100CM JR40 -- DXTERITY

## (undated) DEVICE — CATH GUID COR JL4.0 6FR 100CM -- LAUNCHER

## (undated) DEVICE — DEVICE TORQ FLRESCNT PNK FOR HEMSTAS VLV

## (undated) DEVICE — CATH ANGI BLLN DIL 3.0X08MM -- NC EUPHORA

## (undated) DEVICE — SYRINGE ONLY,20ML LUER LOCK: Brand: MEDLINE INDUSTRIES, INC.

## (undated) DEVICE — 3M™ STERI-DRAPE™ SMALL DRAPE WITH ADHESIVE APERTURE 1092 25/BX,4/CS&#X20;: Brand: STERI-DRAPE™

## (undated) DEVICE — PRESSURE TUBING: Brand: TRUWAVE